# Patient Record
Sex: MALE | Race: WHITE | NOT HISPANIC OR LATINO | Employment: OTHER | URBAN - METROPOLITAN AREA
[De-identification: names, ages, dates, MRNs, and addresses within clinical notes are randomized per-mention and may not be internally consistent; named-entity substitution may affect disease eponyms.]

---

## 2020-01-09 ENCOUNTER — TELEPHONE (OUTPATIENT)
Dept: SCHEDULING | Facility: IMAGING CENTER | Age: 65
End: 2020-01-09

## 2020-01-21 ENCOUNTER — OFFICE VISIT (OUTPATIENT)
Dept: MEDICAL GROUP | Facility: PHYSICIAN GROUP | Age: 65
End: 2020-01-21
Payer: COMMERCIAL

## 2020-01-21 VITALS
WEIGHT: 315 LBS | OXYGEN SATURATION: 97 % | SYSTOLIC BLOOD PRESSURE: 142 MMHG | TEMPERATURE: 97.7 F | HEIGHT: 72 IN | DIASTOLIC BLOOD PRESSURE: 86 MMHG | RESPIRATION RATE: 20 BRPM | BODY MASS INDEX: 42.66 KG/M2 | HEART RATE: 70 BPM

## 2020-01-21 DIAGNOSIS — I10 BENIGN ESSENTIAL HTN: ICD-10-CM

## 2020-01-21 DIAGNOSIS — M54.16 LUMBAR RADICULAR SYNDROME: ICD-10-CM

## 2020-01-21 DIAGNOSIS — E78.2 MIXED HYPERLIPIDEMIA: ICD-10-CM

## 2020-01-21 DIAGNOSIS — Z79.01 CHRONIC ANTICOAGULATION: ICD-10-CM

## 2020-01-21 DIAGNOSIS — E11.9 CONTROLLED TYPE 2 DIABETES MELLITUS WITHOUT COMPLICATION, WITHOUT LONG-TERM CURRENT USE OF INSULIN (HCC): ICD-10-CM

## 2020-01-21 DIAGNOSIS — Z12.11 COLON CANCER SCREENING: ICD-10-CM

## 2020-01-21 DIAGNOSIS — E11.621 TYPE 2 DIABETES MELLITUS WITH LEFT DIABETIC FOOT ULCER (HCC): ICD-10-CM

## 2020-01-21 DIAGNOSIS — L97.529 TYPE 2 DIABETES MELLITUS WITH LEFT DIABETIC FOOT ULCER (HCC): ICD-10-CM

## 2020-01-21 DIAGNOSIS — Z86.718 HISTORY OF DVT (DEEP VEIN THROMBOSIS): ICD-10-CM

## 2020-01-21 PROBLEM — L97.519 TYPE 2 DIABETES MELLITUS WITH RIGHT DIABETIC FOOT ULCER (HCC): Status: ACTIVE | Noted: 2020-01-21

## 2020-01-21 PROCEDURE — 99204 OFFICE O/P NEW MOD 45 MIN: CPT | Performed by: FAMILY MEDICINE

## 2020-01-21 RX ORDER — ATENOLOL 100 MG/1
100 TABLET ORAL DAILY
Qty: 100 TAB | Refills: 1 | Status: ON HOLD | OUTPATIENT
Start: 2020-01-21 | End: 2020-03-25

## 2020-01-21 RX ORDER — HYDROCHLOROTHIAZIDE 25 MG/1
25 TABLET ORAL DAILY
Qty: 100 TAB | Refills: 1 | Status: ON HOLD | OUTPATIENT
Start: 2020-01-21 | End: 2020-03-25

## 2020-01-21 RX ORDER — LISINOPRIL 40 MG/1
40 TABLET ORAL DAILY
COMMUNITY
End: 2021-03-09 | Stop reason: SDUPTHER

## 2020-01-21 RX ORDER — DOCUSATE SODIUM 250 MG
250 CAPSULE ORAL 2 TIMES DAILY
Status: ON HOLD | COMMUNITY
End: 2020-03-25

## 2020-01-21 RX ORDER — ATORVASTATIN CALCIUM 10 MG/1
10 TABLET, FILM COATED ORAL NIGHTLY
COMMUNITY
End: 2020-01-21 | Stop reason: SDUPTHER

## 2020-01-21 RX ORDER — OXYCODONE HYDROCHLORIDE AND ACETAMINOPHEN 5; 325 MG/1; MG/1
1 TABLET ORAL EVERY MORNING
COMMUNITY
End: 2022-07-27

## 2020-01-21 RX ORDER — ATORVASTATIN CALCIUM 10 MG/1
10 TABLET, FILM COATED ORAL DAILY
Qty: 100 TAB | Refills: 1 | Status: ON HOLD | OUTPATIENT
Start: 2020-01-21 | End: 2020-03-25

## 2020-01-21 RX ORDER — ATENOLOL 100 MG/1
100 TABLET ORAL DAILY
COMMUNITY
End: 2020-01-21 | Stop reason: SDUPTHER

## 2020-01-21 RX ORDER — LISINOPRIL 40 MG/1
40 TABLET ORAL DAILY
Qty: 100 TAB | Refills: 1 | Status: ON HOLD | OUTPATIENT
Start: 2020-01-21 | End: 2020-03-25

## 2020-01-21 RX ORDER — HYDROCHLOROTHIAZIDE 25 MG/1
25 TABLET ORAL DAILY
COMMUNITY
End: 2020-01-21 | Stop reason: SDUPTHER

## 2020-01-21 SDOH — HEALTH STABILITY: MENTAL HEALTH: HOW OFTEN DO YOU HAVE A DRINK CONTAINING ALCOHOL?: MONTHLY OR LESS

## 2020-01-21 ASSESSMENT — ENCOUNTER SYMPTOMS
HEARTBURN: 0
BRUISES/BLEEDS EASILY: 0
DOUBLE VISION: 0
DIZZINESS: 0
CHILLS: 0
GASTROINTESTINAL NEGATIVE: 1
FEVER: 0
EYES NEGATIVE: 1
BACK PAIN: 1
TINGLING: 0
PSYCHIATRIC NEGATIVE: 1
DEPRESSION: 0
CARDIOVASCULAR NEGATIVE: 1
CONSTITUTIONAL NEGATIVE: 1
NEUROLOGICAL NEGATIVE: 1
HEMOPTYSIS: 0
RESPIRATORY NEGATIVE: 1
HEADACHES: 0
MYALGIAS: 0
BLURRED VISION: 0
NAUSEA: 0
COUGH: 0
PALPITATIONS: 0

## 2020-01-21 ASSESSMENT — PATIENT HEALTH QUESTIONNAIRE - PHQ9: CLINICAL INTERPRETATION OF PHQ2 SCORE: 0

## 2020-01-21 NOTE — PROGRESS NOTES
Subjective:      Jose Mc is a 64 y.o. male who presents with Wound Check (Foot, wants to be looked out) and Referral Needed (Wound care, Pain management)            New patient visit  Retired  moved here last spring and has been commuting to [place] for past year but now getting to be too hard  Has history of htn/dm/hld and needs refills on those meds  Also with amputation of left 4th and 5th toes last year due to DM ulcers (had 3rd toe amputated years ago) and still has ulcer on lateral left foot. Has been seeing podiatry in [place] but now wishes to take care of here  He has been using dressings/cleaning it per their instructions  On exam he has a 1.5 by 1.5 cm ulcer on the lateral left foot approximately 5 mm deep, no erythema or discharge  Will send to wound care clinic for hyperbarics and dressing changes  Also needs to see pain clinic for chronic back pain - uses cane for ambulation    1. Colon cancer screening    - OCCULT BLOOD FECES IMMUNOASSAY; Future  - Comp Metabolic Panel; Future  - HEMOGLOBIN A1C; Future  - Lipid Profile; Future  - MICROALBUMIN 24 HR URINE; Future  - CBC WITHOUT DIFFERENTIAL; Future    2. Benign essential HTN  Currently treated for HTN, taking meds with no CP or sob, monitors bp at home periodically. controlled  - Comp Metabolic Panel; Future  - HEMOGLOBIN A1C; Future  - Lipid Profile; Future  - MICROALBUMIN 24 HR URINE; Future  - CBC WITHOUT DIFFERENTIAL; Future  - lisinopril (PRINIVIL) 40 MG tablet; Take 1 Tab by mouth every day.  Dispense: 100 Tab; Refill: 1  - hydroCHLOROthiazide (HYDRODIURIL) 25 MG Tab; Take 1 Tab by mouth every day.  Dispense: 100 Tab; Refill: 1  - atenolol (TENORMIN) 100 MG Tab; Take 1 Tab by mouth every day.  Dispense: 100 Tab; Refill: 1    3. Controlled type 2 diabetes mellitus without complication, without long-term current use of insulin (HCC)  Currently treated for DM, taking meds and checking bs at home, trying to do DM  diet.controlled    - REFERRAL TO WOUND CLINIC  - Comp Metabolic Panel; Future  - HEMOGLOBIN A1C; Future  - Lipid Profile; Future  - MICROALBUMIN 24 HR URINE; Future  - CBC WITHOUT DIFFERENTIAL; Future  - metformin (GLUCOPHAGE) 1000 MG tablet; Take 1 Tab by mouth 2 times a day.  Dispense: 200 Tab; Refill: 1    4. Mixed hyperlipidemia  Currently treated for HLD, taking meds with no new myalgias or joint pain, watching fats in diet  controlled    - Comp Metabolic Panel; Future  - HEMOGLOBIN A1C; Future  - Lipid Profile; Future  - MICROALBUMIN 24 HR URINE; Future  - CBC WITHOUT DIFFERENTIAL; Future  - atorvastatin (LIPITOR) 10 MG Tab; Take 1 Tab by mouth every day.  Dispense: 100 Tab; Refill: 1    5. History of DVT (deep vein thrombosis)  Left thigh in the past - had twice so told to be on anticoag for life  - Comp Metabolic Panel; Future  - HEMOGLOBIN A1C; Future  - Lipid Profile; Future  - MICROALBUMIN 24 HR URINE; Future  - CBC WITHOUT DIFFERENTIAL; Future  - rivaroxaban (XARELTO) 20 MG Tab tablet; Take 1 Tab by mouth with dinner.  Dispense: 100 Tab; Refill: 1    6. Chronic anticoagulation    - Comp Metabolic Panel; Future  - HEMOGLOBIN A1C; Future  - Lipid Profile; Future  - MICROALBUMIN 24 HR URINE; Future  - CBC WITHOUT DIFFERENTIAL; Future  - rivaroxaban (XARELTO) 20 MG Tab tablet; Take 1 Tab by mouth with dinner.  Dispense: 100 Tab; Refill: 1    7. Lumbar radicular syndrome    - Comp Metabolic Panel; Future  - HEMOGLOBIN A1C; Future  - Lipid Profile; Future  - MICROALBUMIN 24 HR URINE; Future  - CBC WITHOUT DIFFERENTIAL; Future  - REFERRAL TO PAIN CLINIC    8. Type 2 diabetes mellitus with left diabetic foot ulcer (HCC)    - REFERRAL TO WOUND CLINIC  - Comp Metabolic Panel; Future  - HEMOGLOBIN A1C; Future  - Lipid Profile; Future  - MICROALBUMIN 24 HR URINE; Future  - CBC WITHOUT DIFFERENTIAL; Future    Past Medical History:  No date: Diabetes (HCC)  No date: Diabetic neuropathy (HCC)  No date:  Hyperlipidemia  No date: Hypertension  Past Surgical History:  No date: TOE AMPUTATION; Left  Social History    Tobacco Use      Smoking status: Former Smoker        Packs/day: 0.00        Quit date: 1980        Years since quittin.0      Smokeless tobacco: Never Used    Alcohol use: Not Currently      Frequency: Monthly or less      Comment: OCC    Drug use: Never    Review of patient's family history indicates:  Problem: Cancer      Relation: Mother          Age of Onset: (Not Specified)  Problem: Cancer      Relation: Father          Age of Onset: (Not Specified)      Current Outpatient Medications: •  oxyCODONE-acetaminophen (PERCOCET) 5-325 MG Tab, Take 1 Tab by mouth every morning., Disp: , Rfl: •  docusate sodium (COLACE) 250 MG capsule, Take 250 mg by mouth 2 Times a Day., Disp: , Rfl: •  Sennosides 17.2 MG Tab, Take 17.2 mg by mouth 2 times a day., Disp: , Rfl: •  rivaroxaban (XARELTO) 20 MG Tab tablet, Take 1 Tab by mouth with dinner., Disp: 100 Tab, Rfl: 1•  metformin (GLUCOPHAGE) 1000 MG tablet, Take 1 Tab by mouth 2 times a day., Disp: 200 Tab, Rfl: 1•  lisinopril (PRINIVIL) 40 MG tablet, Take 1 Tab by mouth every day., Disp: 100 Tab, Rfl: 1•  hydroCHLOROthiazide (HYDRODIURIL) 25 MG Tab, Take 1 Tab by mouth every day., Disp: 100 Tab, Rfl: 1•  atorvastatin (LIPITOR) 10 MG Tab, Take 1 Tab by mouth every day., Disp: 100 Tab, Rfl: 1•  atenolol (TENORMIN) 100 MG Tab, Take 1 Tab by mouth every day., Disp: 100 Tab, Rfl: 1    Patient was instructed on the use of medications, either prescriptions or OTC and informed on when the appropriate follow up time period should be. In addition, patient was also instructed that should any acute worsening occur that they should notify this clinic asap or call 911.          Review of Systems   Constitutional: Negative.  Negative for chills and fever.   HENT: Negative.  Negative for hearing loss.    Eyes: Negative.  Negative for blurred vision and double vision.    Respiratory: Negative.  Negative for cough and hemoptysis.    Cardiovascular: Negative.  Negative for chest pain and palpitations.   Gastrointestinal: Negative.  Negative for heartburn and nausea.   Genitourinary: Negative.  Negative for dysuria.   Musculoskeletal: Positive for back pain. Negative for myalgias.   Skin: Negative.  Negative for rash.   Neurological: Negative.  Negative for dizziness, tingling and headaches.   Endo/Heme/Allergies: Negative.  Does not bruise/bleed easily.   Psychiatric/Behavioral: Negative.  Negative for depression and suicidal ideas.   All other systems reviewed and are negative.         Objective:     /86   Pulse 70   Temp 36.5 °C (97.7 °F) (Temporal)   Resp 20   Ht 1.829 m (6')   Wt (!) 144.7 kg (319 lb)   SpO2 97%   BMI 43.26 kg/m²      Physical Exam  Vitals signs and nursing note reviewed.   Constitutional:       General: He is not in acute distress.     Appearance: He is well-developed. He is not diaphoretic.   HENT:      Head: Normocephalic and atraumatic.      Mouth/Throat:      Pharynx: No oropharyngeal exudate.   Eyes:      Pupils: Pupils are equal, round, and reactive to light.   Cardiovascular:      Rate and Rhythm: Normal rate and regular rhythm.      Heart sounds: Normal heart sounds. No murmur. No friction rub. No gallop.    Pulmonary:      Effort: Pulmonary effort is normal. No respiratory distress.      Breath sounds: Normal breath sounds. No wheezing or rales.   Chest:      Chest wall: No tenderness.   Musculoskeletal:        Feet:    Feet:      Left foot:      Skin integrity: Ulcer present.   Neurological:      Mental Status: He is alert and oriented to person, place, and time.   Psychiatric:         Behavior: Behavior normal.         Thought Content: Thought content normal.         Judgment: Judgment normal.                 Assessment/Plan:       1. Colon cancer screening    - OCCULT BLOOD FECES IMMUNOASSAY; Future  - Comp Metabolic Panel; Future  -  HEMOGLOBIN A1C; Future  - Lipid Profile; Future  - MICROALBUMIN 24 HR URINE; Future  - CBC WITHOUT DIFFERENTIAL; Future    2. Benign essential HTN    - Comp Metabolic Panel; Future  - HEMOGLOBIN A1C; Future  - Lipid Profile; Future  - MICROALBUMIN 24 HR URINE; Future  - CBC WITHOUT DIFFERENTIAL; Future  - lisinopril (PRINIVIL) 40 MG tablet; Take 1 Tab by mouth every day.  Dispense: 100 Tab; Refill: 1  - hydroCHLOROthiazide (HYDRODIURIL) 25 MG Tab; Take 1 Tab by mouth every day.  Dispense: 100 Tab; Refill: 1  - atenolol (TENORMIN) 100 MG Tab; Take 1 Tab by mouth every day.  Dispense: 100 Tab; Refill: 1    3. Controlled type 2 diabetes mellitus without complication, without long-term current use of insulin (HCC)    - REFERRAL TO WOUND CLINIC  - Comp Metabolic Panel; Future  - HEMOGLOBIN A1C; Future  - Lipid Profile; Future  - MICROALBUMIN 24 HR URINE; Future  - CBC WITHOUT DIFFERENTIAL; Future  - metformin (GLUCOPHAGE) 1000 MG tablet; Take 1 Tab by mouth 2 times a day.  Dispense: 200 Tab; Refill: 1    4. Mixed hyperlipidemia    - Comp Metabolic Panel; Future  - HEMOGLOBIN A1C; Future  - Lipid Profile; Future  - MICROALBUMIN 24 HR URINE; Future  - CBC WITHOUT DIFFERENTIAL; Future  - atorvastatin (LIPITOR) 10 MG Tab; Take 1 Tab by mouth every day.  Dispense: 100 Tab; Refill: 1    5. History of DVT (deep vein thrombosis)    - Comp Metabolic Panel; Future  - HEMOGLOBIN A1C; Future  - Lipid Profile; Future  - MICROALBUMIN 24 HR URINE; Future  - CBC WITHOUT DIFFERENTIAL; Future  - rivaroxaban (XARELTO) 20 MG Tab tablet; Take 1 Tab by mouth with dinner.  Dispense: 100 Tab; Refill: 1    6. Chronic anticoagulation    - Comp Metabolic Panel; Future  - HEMOGLOBIN A1C; Future  - Lipid Profile; Future  - MICROALBUMIN 24 HR URINE; Future  - CBC WITHOUT DIFFERENTIAL; Future  - rivaroxaban (XARELTO) 20 MG Tab tablet; Take 1 Tab by mouth with dinner.  Dispense: 100 Tab; Refill: 1    7. Lumbar radicular syndrome    - Comp Metabolic  Panel; Future  - HEMOGLOBIN A1C; Future  - Lipid Profile; Future  - MICROALBUMIN 24 HR URINE; Future  - CBC WITHOUT DIFFERENTIAL; Future  - REFERRAL TO PAIN CLINIC    8. Type 2 diabetes mellitus with left diabetic foot ulcer (HCC  - REFERRAL TO WOUND CLINIC  - Comp Metabolic Panel; Future  - HEMOGLOBIN A1C; Future  - Lipid Profile; Future  - MICROALBUMIN 24 HR URINE; Future  - CBC WITHOUT DIFFERENTIAL; Future

## 2020-01-31 ENCOUNTER — HOSPITAL ENCOUNTER (OUTPATIENT)
Facility: MEDICAL CENTER | Age: 65
End: 2020-01-31
Attending: FAMILY MEDICINE
Payer: COMMERCIAL

## 2020-01-31 PROCEDURE — 82274 ASSAY TEST FOR BLOOD FECAL: CPT

## 2020-02-03 DIAGNOSIS — Z12.11 COLON CANCER SCREENING: ICD-10-CM

## 2020-02-03 LAB — HEMOCCULT STL QL IA: NEGATIVE

## 2020-03-10 ENCOUNTER — HOSPITAL ENCOUNTER (INPATIENT)
Facility: REHABILITATION | Age: 65
LOS: 16 days | DRG: 560 | End: 2020-03-26
Attending: PHYSICAL MEDICINE & REHABILITATION | Admitting: PHYSICAL MEDICINE & REHABILITATION
Payer: COMMERCIAL

## 2020-03-10 DIAGNOSIS — Z79.01 CHRONIC ANTICOAGULATION: ICD-10-CM

## 2020-03-10 DIAGNOSIS — M54.16 LUMBAR RADICULAR SYNDROME: ICD-10-CM

## 2020-03-10 DIAGNOSIS — Z86.718 HISTORY OF DVT (DEEP VEIN THROMBOSIS): ICD-10-CM

## 2020-03-10 DIAGNOSIS — Z79.891 CHRONIC PRESCRIPTION OPIATE USE: ICD-10-CM

## 2020-03-10 PROBLEM — E08.65 DIABETES MELLITUS DUE TO UNDERLYING CONDITION WITH HYPERGLYCEMIA (HCC): Status: ACTIVE | Noted: 2020-03-10

## 2020-03-10 PROBLEM — Z89.512 STATUS POST BELOW-KNEE AMPUTATION OF LEFT LOWER EXTREMITY (HCC): Status: ACTIVE | Noted: 2020-03-10

## 2020-03-10 PROBLEM — G47.33 OBSTRUCTIVE SLEEP APNEA SYNDROME: Status: ACTIVE | Noted: 2020-03-10

## 2020-03-10 PROBLEM — G62.9 PERIPHERAL NEUROPATHY: Status: ACTIVE | Noted: 2020-03-10

## 2020-03-10 LAB
GLUCOSE BLD-MCNC: 140 MG/DL (ref 65–99)
GLUCOSE BLD-MCNC: 154 MG/DL (ref 65–99)

## 2020-03-10 PROCEDURE — 700102 HCHG RX REV CODE 250 W/ 637 OVERRIDE(OP): Performed by: PHYSICAL MEDICINE & REHABILITATION

## 2020-03-10 PROCEDURE — 770010 HCHG ROOM/CARE - REHAB SEMI PRIVAT*

## 2020-03-10 PROCEDURE — 99223 1ST HOSP IP/OBS HIGH 75: CPT | Performed by: PHYSICAL MEDICINE & REHABILITATION

## 2020-03-10 PROCEDURE — A9270 NON-COVERED ITEM OR SERVICE: HCPCS | Performed by: PHYSICAL MEDICINE & REHABILITATION

## 2020-03-10 PROCEDURE — 82962 GLUCOSE BLOOD TEST: CPT | Mod: 91

## 2020-03-10 PROCEDURE — 94760 N-INVAS EAR/PLS OXIMETRY 1: CPT

## 2020-03-10 RX ORDER — HYDROXYZINE HYDROCHLORIDE 25 MG/1
50 TABLET, FILM COATED ORAL EVERY 6 HOURS PRN
Status: DISCONTINUED | OUTPATIENT
Start: 2020-03-10 | End: 2020-03-26 | Stop reason: HOSPADM

## 2020-03-10 RX ORDER — POLYVINYL ALCOHOL 14 MG/ML
1 SOLUTION/ DROPS OPHTHALMIC PRN
Status: DISCONTINUED | OUTPATIENT
Start: 2020-03-10 | End: 2020-03-26 | Stop reason: HOSPADM

## 2020-03-10 RX ORDER — LACTULOSE 20 G/30ML
30 SOLUTION ORAL
Status: DISCONTINUED | OUTPATIENT
Start: 2020-03-10 | End: 2020-03-26 | Stop reason: HOSPADM

## 2020-03-10 RX ORDER — AMOXICILLIN 250 MG
2 CAPSULE ORAL 2 TIMES DAILY
Status: DISCONTINUED | OUTPATIENT
Start: 2020-03-10 | End: 2020-03-17

## 2020-03-10 RX ORDER — OXYCODONE HYDROCHLORIDE AND ACETAMINOPHEN 5; 325 MG/1; MG/1
1 TABLET ORAL EVERY 4 HOURS PRN
Status: DISCONTINUED | OUTPATIENT
Start: 2020-03-10 | End: 2020-03-26 | Stop reason: HOSPADM

## 2020-03-10 RX ORDER — ATENOLOL 25 MG/1
100 TABLET ORAL
Status: DISCONTINUED | OUTPATIENT
Start: 2020-03-11 | End: 2020-03-26 | Stop reason: HOSPADM

## 2020-03-10 RX ORDER — ONDANSETRON 4 MG/1
4 TABLET, ORALLY DISINTEGRATING ORAL 4 TIMES DAILY PRN
Status: DISCONTINUED | OUTPATIENT
Start: 2020-03-10 | End: 2020-03-26 | Stop reason: HOSPADM

## 2020-03-10 RX ORDER — MORPHINE SULFATE 15 MG/1
15 TABLET, FILM COATED, EXTENDED RELEASE ORAL EVERY 12 HOURS
Status: DISCONTINUED | OUTPATIENT
Start: 2020-03-10 | End: 2020-03-26 | Stop reason: HOSPADM

## 2020-03-10 RX ORDER — ACETAMINOPHEN 500 MG
1000 TABLET ORAL 3 TIMES DAILY
Status: DISCONTINUED | OUTPATIENT
Start: 2020-03-10 | End: 2020-03-10

## 2020-03-10 RX ORDER — ACETAMINOPHEN 325 MG/1
650 TABLET ORAL EVERY 4 HOURS PRN
Status: DISCONTINUED | OUTPATIENT
Start: 2020-03-10 | End: 2020-03-26 | Stop reason: HOSPADM

## 2020-03-10 RX ORDER — BISACODYL 10 MG
10 SUPPOSITORY, RECTAL RECTAL
Status: DISCONTINUED | OUTPATIENT
Start: 2020-03-10 | End: 2020-03-17

## 2020-03-10 RX ORDER — DEXTROSE MONOHYDRATE 25 G/50ML
50 INJECTION, SOLUTION INTRAVENOUS
Status: DISCONTINUED | OUTPATIENT
Start: 2020-03-10 | End: 2020-03-11

## 2020-03-10 RX ORDER — ECHINACEA PURPUREA EXTRACT 125 MG
2 TABLET ORAL PRN
Status: DISCONTINUED | OUTPATIENT
Start: 2020-03-10 | End: 2020-03-26 | Stop reason: HOSPADM

## 2020-03-10 RX ORDER — ONDANSETRON 2 MG/ML
4 INJECTION INTRAMUSCULAR; INTRAVENOUS 4 TIMES DAILY PRN
Status: DISCONTINUED | OUTPATIENT
Start: 2020-03-10 | End: 2020-03-26 | Stop reason: HOSPADM

## 2020-03-10 RX ORDER — TRAMADOL HYDROCHLORIDE 50 MG/1
50 TABLET ORAL EVERY 4 HOURS PRN
Status: DISCONTINUED | OUTPATIENT
Start: 2020-03-10 | End: 2020-03-26 | Stop reason: HOSPADM

## 2020-03-10 RX ORDER — LANOLIN ALCOHOL/MO/W.PET/CERES
3 CREAM (GRAM) TOPICAL NIGHTLY PRN
Status: DISCONTINUED | OUTPATIENT
Start: 2020-03-10 | End: 2020-03-26 | Stop reason: HOSPADM

## 2020-03-10 RX ORDER — POLYETHYLENE GLYCOL 3350 17 G/17G
1 POWDER, FOR SOLUTION ORAL
Status: DISCONTINUED | OUTPATIENT
Start: 2020-03-10 | End: 2020-03-17

## 2020-03-10 RX ORDER — GABAPENTIN 300 MG/1
300 CAPSULE ORAL 3 TIMES DAILY
Status: DISCONTINUED | OUTPATIENT
Start: 2020-03-10 | End: 2020-03-26 | Stop reason: HOSPADM

## 2020-03-10 RX ORDER — ALUMINA, MAGNESIA, AND SIMETHICONE 2400; 2400; 240 MG/30ML; MG/30ML; MG/30ML
20 SUSPENSION ORAL
Status: DISCONTINUED | OUTPATIENT
Start: 2020-03-10 | End: 2020-03-26 | Stop reason: HOSPADM

## 2020-03-10 RX ORDER — TRAZODONE HYDROCHLORIDE 50 MG/1
50 TABLET ORAL
Status: DISCONTINUED | OUTPATIENT
Start: 2020-03-10 | End: 2020-03-26 | Stop reason: HOSPADM

## 2020-03-10 RX ORDER — ATORVASTATIN CALCIUM 10 MG/1
20 TABLET, FILM COATED ORAL EVERY EVENING
Status: DISCONTINUED | OUTPATIENT
Start: 2020-03-10 | End: 2020-03-26 | Stop reason: HOSPADM

## 2020-03-10 RX ORDER — OXYCODONE HYDROCHLORIDE 5 MG/1
5 TABLET ORAL EVERY 4 HOURS PRN
Status: DISCONTINUED | OUTPATIENT
Start: 2020-03-10 | End: 2020-03-26 | Stop reason: HOSPADM

## 2020-03-10 RX ADMIN — SENNOSIDES AND DOCUSATE SODIUM 2 TABLET: 8.6; 5 TABLET ORAL at 20:53

## 2020-03-10 RX ADMIN — INSULIN HUMAN 1 UNITS: 100 INJECTION, SOLUTION PARENTERAL at 21:52

## 2020-03-10 RX ADMIN — MORPHINE SULFATE 15 MG: 15 TABLET, FILM COATED, EXTENDED RELEASE ORAL at 20:53

## 2020-03-10 RX ADMIN — ATORVASTATIN CALCIUM 20 MG: 10 TABLET, FILM COATED ORAL at 20:53

## 2020-03-10 RX ADMIN — GABAPENTIN 300 MG: 300 CAPSULE ORAL at 17:00

## 2020-03-10 RX ADMIN — OXYCODONE HYDROCHLORIDE AND ACETAMINOPHEN 1 TABLET: 5; 325 TABLET ORAL at 17:33

## 2020-03-10 RX ADMIN — RIVAROXABAN 20 MG: 10 TABLET, FILM COATED ORAL at 17:33

## 2020-03-10 RX ADMIN — GABAPENTIN 300 MG: 300 CAPSULE ORAL at 20:53

## 2020-03-10 ASSESSMENT — PATIENT HEALTH QUESTIONNAIRE - PHQ9
1. LITTLE INTEREST OR PLEASURE IN DOING THINGS: NOT AT ALL
SUM OF ALL RESPONSES TO PHQ9 QUESTIONS 1 AND 2: 0
2. FEELING DOWN, DEPRESSED, IRRITABLE, OR HOPELESS: NOT AT ALL

## 2020-03-10 ASSESSMENT — LIFESTYLE VARIABLES
ALCOHOL_USE: NO
ON A TYPICAL DAY WHEN YOU DRINK ALCOHOL HOW MANY DRINKS DO YOU HAVE: 0
TOTAL SCORE: 0
EVER FELT BAD OR GUILTY ABOUT YOUR DRINKING: NO
TOTAL SCORE: 0
TOTAL SCORE: 0
EVER HAD A DRINK FIRST THING IN THE MORNING TO STEADY YOUR NERVES TO GET RID OF A HANGOVER: NO
AVERAGE NUMBER OF DAYS PER WEEK YOU HAVE A DRINK CONTAINING ALCOHOL: 0
HOW MANY TIMES IN THE PAST YEAR HAVE YOU HAD 5 OR MORE DRINKS IN A DAY: 0
HAVE YOU EVER FELT YOU SHOULD CUT DOWN ON YOUR DRINKING: NO
CONSUMPTION TOTAL: NEGATIVE
EVER_SMOKED: YES
HAVE PEOPLE ANNOYED YOU BY CRITICIZING YOUR DRINKING: NO

## 2020-03-10 NOTE — H&P
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 male with a past medical history of diabetes, peripheral neuropathy, hypertension, sleep apnea, DVT on Xarelto; now admitted for acute inpatient rehabilitation with severe functional debility after a left BKA.      On admission the patient and medical record report patient was admitted to the outside hospital with a non-healing left foot ulcer and osteomyelitis, for which he underwent a left transtibial amputation on 3/6 with Dr. Castro. He was restarted on his Xarelto on 3/8, which he takes for his history of DVT. His most recent HgbA1c was 6.5. He takes Metformin at home, no insulin. He is working with Howard from [employer].     Patient current reports residual limb pain.  He reports prior to the surgery he was heel weightbearing and ambulating with a cane.  His post-operative drain was removed yesterday and did have a small amount of drainage. He is on chronic opiates at home, Morphine 15 mg twice daily, as well as Percocet 5 mg. He has had diabetes for 10 years. He has a history of peripheral neuropathy, with numbness in his feet. His DVT was in his LLE 2 years ago, and he's been on Xarelto ever since then. He has chronic constipation for which he takes laxatives and stool softeners at home. He denies any urinary symptoms.     Patient was evaluated by Rehab Medicine physician and Physical Therapy and Occupational Therapy and determined to be appropriate for acute inpatient rehab and was transferred to [facility] on 3/10/2020  2:38 PM.    With this acute therapeutic intervention, this patient hopes to  improve his functional status, and return to independent living with the supportive care of family.    REVIEW OF SYSTEMS:     A complete review of systems was performed and was negative in detail with the exception of items mentioned elsewhere in this document.    PMH:  Past Medical History:   Diagnosis Date   • At risk for sleep apnea    • Diabetes (HCC)    • Diabetic neuropathy (HCC)    • DVT (deep venous thrombosis) (HCC)    • Hyperlipidemia    • Hypertension    • Sleep apnea        PSH:  Past Surgical History:   Procedure Laterality Date   • TOE AMPUTATION Left        Family History   Problem Relation Age of Onset   • Cancer Mother    • Cancer Father         MEDICATIONS:  Current Facility-Administered Medications   Medication Dose   • Respiratory Therapy Consult     • Pharmacy Consult Request ...Pain Management Review 1 Each  1 Each   • acetaminophen (TYLENOL) tablet 650 mg  650 mg   • senna-docusate (PERICOLACE or SENOKOT S) 8.6-50 MG per tablet 2 Tab  2 Tab    And   • polyethylene glycol/lytes (MIRALAX) PACKET 1 Packet  1 Packet    And   • magnesium hydroxide (MILK OF MAGNESIA) suspension 30 mL  30 mL    And   • bisacodyl (DULCOLAX) suppository 10 mg  10 mg   • artificial tears ophthalmic solution 1 Drop  1 Drop   • benzocaine-menthol (CEPACOL) lozenge 1 Lozenge  1 Lozenge   • mag hydrox-al hydrox-simeth (MAALOX PLUS ES or MYLANTA DS) suspension 20 mL  20 mL   • ondansetron (ZOFRAN ODT) dispertab 4 mg  4 mg    Or   • ondansetron (ZOFRAN) syringe/vial injection 4 mg  4 mg   • traZODone (DESYREL) tablet 50 mg  50 mg   • sodium chloride (OCEAN) 0.65 % nasal spray 2 Spray  2 Spray   • hydrOXYzine HCl (ATARAX) tablet 50 mg  50 mg   • melatonin tablet 3 mg  3 mg   • [START ON 3/11/2020] atenolol (TENORMIN) tablet 100 mg  100 mg   • atorvastatin (LIPITOR) tablet 20 mg  20 mg   • gabapentin (NEURONTIN) capsule 300 mg  300 mg   • tramadol (ULTRAM) 50 MG tablet 50 mg  50 mg   • acetaminophen (TYLENOL) tablet 1,000 mg   1,000 mg   • oxyCODONE immediate-release (ROXICODONE) tablet 5 mg  5 mg   • enoxaparin (LOVENOX) inj 40 mg  40 mg   • lactulose 20 GM/30ML solution 30 mL  30 mL   • docusate sodium (ENEMEEZ) enema 283 mg  283 mg   • insulin regular (HUMULIN R) injection 1-6 Units  1-6 Units    And   • glucose 4 g chewable tablet 16 g  16 g    And   • dextrose 50% (D50W) injection 50 mL  50 mL       ALLERGIES:  Pcn [penicillins]; Eggs; and Zyvox [linezolid]    PSYCHOSOCIAL HISTORY:  Pre-mobidly, the patient lived in a single level home with 3 steps to enter, in [place] with his family. He moved her 1 year ago from [place].     He is currently living with his mother who has multiple myeloma and his brother who is visiting from [place] to help keep take care of his mother.  Her to the surgery the plan was for the brother to return to [place] and the patient was going to take care of their mother.  It is surgery other family members are planning to move here for assistance at discharge.    Patient is been a  for 7 years.  He has 1 son.  Patient is a retired .  For recreation he likes to play poker, shoot, and does oil painting.    Patient quit tobacco 10 years ago, rare alcohol, no marijuana or drugs.    LEVEL OF FUNCTION PRIOR TO DISABILTY:  Modified independent, ambulated with a cane    LEVEL OF FUNCTION PRIOR TO ADMISSION to [facility]:  Min assist for transfers  Min to max assist for ADLs    CURRENT LEVEL OF FUNCTION:   Same as level of function prior to admission to [facility]    PHYSICAL EXAM:     VITAL SIGNS:   height is 1.829 m (6') and weight is 150.6 kg (332 lb) (abnormal). His temporal temperature is 36.5 °C (97.7 °F). His blood pressure is 148/81 and his pulse is 74. His respiration is 18 and oxygen saturation is 94%.     GENERAL: No apparent distress, obese  HEENT: Normocephalic/atraumatic  CARDIAC: Regular rate and rhythm, normal S1, S2, no murmurs, no  peripheral edema   LUNGS: Clear to auscultation, normal respiratory effort, on room air   ABDOMINAL: bowel sounds present, soft, nontender and nondistended    EXTREMITIES: crumbling yellow toes on right foot  MSK: left transtibial amputation    NEURO:    Mental status: alert    Motor:  Shoulder flexors:  Right -  5/5, Left -  5/5  Elbow flexors:  Right -  5/5, Left -  5/5  Elbow extensors:  Right -  5/5, Left -  5/5  Symmetrical   Hip flexors:  Right -  4/5, Left -  4/5  Knee ext:  Right -  5/5  Dorsiflexors:  Right -  5/5  EHL:  Right -  5/5  Plantar flexors:  Right -  5/5    Sensory:   Absent in right foot     PRIMARY REHAB DIAGNOSIS:    This patient is a 64 y.o. male admitted for acute inpatient rehabilitation with Status post below-knee amputation of left lower extremity (HCC).    IMPAIRMENTS:   ADLs/IADLs  Mobility    SECONDARY DIAGNOSIS/MEDICAL CO-MORBIDITIES AFFECTING FUNCTION:    Peripheral Neuropathy  Opiate induced constipation  Hypertension  Diabetes with hyperglycemia  Sleep apnea  Hyperlipidemia  History of DVT    RELEVANT CHANGES SINCE PREADMISSION EVALUATION:    Status unchanged    The patient's rehabilitation potential is excellent  The patient's medical prognosis is good    PLAN:   Discussion and Recommendations, discussed with the patient and/or family:   1. The patient requires an acute inpatient rehabilitation program with a coordinated program of care at an intensity and frequency not available at a lower level of care. This recommendation is substantiated by the patient's medical physicians who recommend that the patient's intervention and assessment of medical issues needs to be done at an acute level of care for patient's safety and maximum outcome.     2. A coordinated program of care will be supplied by an interdisciplinary team of physical therapy, occupational therapy, rehab physician, rehab nursing, and, if needed, speech therapy and rehab psychology. Rehab team presents a  patient-specific rehabilitation and education program concentrating on prevention of future problems related to accessibility, mobility, skin, bowel, bladder, sexuality, and psychosocial and medical/surgical problems.     3. Need for Rehabilitation Physician: The rehab physician will be evaluating the patient on a multi-weekly basis to help coordinate the program of care. The rehab physician communicates between medical physicians, therapists, and nurses to maximize the patient's potential outcome. Specific areas in which the rehab physician will be providing daily assessment include the following:   A. Assessing the patient's heart rate and blood pressure response (vitals monitoring) to activity and making adjustments in medications or conservative measures as needed.   B. The rehab physician will be assessing the frequency at which the program can be increased to allow the patient to reach optimal functional outcome.   C. The rehab physician will also provide assessments in daily skin care, especially in light of patient's impairments in mobility.   D. The rehab physician will provide special expertise in understanding how to work with functional impairment and recommend appropriate interventions, compensatory techniques, and education that will facilitate the patient's outcome.     4. Rehab R.N.   The rehab RN will be working with patient to carry over in room mobility and activities of daily living when the patient is not in 3 hours of skilled therapy. Rehab nursing will be working in conjunction with rehab physician to address all the medical issues above and continue to assess laboratory work and discuss abnormalities with the treating physicians, assess vitals, and response to activity, and discuss and report abnormalities with the rehab physician. Rehab RN will also continue daily skin care, supervise bladder/bowel program, instruct in medication administration, and ensure patient safety.     5. Therapies to  treat at intensity and frequency of (may change after completion of evaluation by all therapeutic disciplines):       PT:  Physical therapy to address mobility, transfer, gait training and evaluation for adaptive equipment needs 1.5hour/day at least 5 days/week for the duration of the ELOS (see below)       OT:  Occupational therapy to address ADLs, self-care, home management training, functional mobility/transfers and assistive device evaluation, and community re-integration 1.5hour/day at least 5 days/week for the duration of the ELOS (see below).          6. Medical management / Rehabilitation Issues/Adverse Potential affecting function as part of rehabilitation plan.    Chronic opiate use  Continue home medications  Morphine ER 15 mg BID  Percocets 5-325     Peripheral Neuropathy  Continue gabapentin  Monitor need to increase    Opioid induced constipation  Continue bowel meds    Appreciate assistance of hospitalist with his medical co-morbidities:    Hypertension  Diabetes with hyperglycemia  Sleep apnea  Hyperlipidemia  History of DVT    I performed a complete drug regimen review and did not identify any potential clinically significant medication issues.    The patient's CODE STATUS was confirmed as FULL CODE on admission, with the patient and/or family at bedside.    REHABILITATION ISSUES/ADVERSE POTENTIAL:  1.  BKA: Patient demonstrates functional deficits in strength, balance, coordination, and ADL's. Patient is admitted to [facility] for comprehensive rehabilitation therapy as described below.   Rehabilitation nursing monitors bowel and bladder control, educates on medication administration, co-morbidities and monitors patient safety.    2.  DVT prophylaxis:  Patient is on Xarelto for anticoagulation upon transfer. Encourage OOB. Monitor daily for signs and symptoms of DVT including but not limited to swelling and pain to prevent the development of DVT that may interfere with  therapies.    3.  Pain: See above.    4.  Nutrition/Dysphagia: Dietician monitors nutrient intake, recommend supplements prn and provide nutrition education to pt/family to promote optimal nutrition for wound healing/recovery.     5.  Bladder/bowel:  Start bowel and bladder program as described below, to prevent constipation, urinary retention (which may lead to UTI), and urinary incontinence (which will impact upon pt's functional independence).   - TV Q3h while awake with post void bladder scans, I&O cath for PVRs >400  - up to commode after meal     6.  Skin/dermal ulcer prophylaxis: Monitor for new skin conditions with q.2 h. turns as required to prevent the development of skin breakdown.     7.  Cognition/Behavior:  Psychologist Dr. Reno provides adjustment counseling to illness and psychosocial barriers that may be potential barriers to rehabilitation.     8. Respiratory therapy: RT performs O2 management prn, breathing retraining, pulmonary hygiene and bronchospasm management prn to optimize participation in therapies.    Pt was seen today for 73 min, and entire time spent in face-to-face contact was >50% in counseling and coordination of care as detailed in A/P above.        GOALS/EXPECTED LEVEL OF FUNCTION BASED ON CURRENT MEDICAL AND FUNCTIONAL STATUS (may change based on patient's medical status and rate of impairment recovery):  Transfers:   Modified Independent  Mobility/Gait:   Modified Independent  ADL's:   Minimal Assistance    DISPOSITION: Discharge to pre-morbid independent living setting with the supportive care of patient's family.      ELOS: 14 days    Sophia Beck M.D.  Physical Medicine and Rehabilitation

## 2020-03-10 NOTE — FLOWSHEET NOTE
03/10/20 1611   Events/Summary/Plan   Events/Summary/Plan CPAP set up at pt bedside   Vital Signs   Pulse 72   Respiration 18   Pulse Oximetry 92 %   $ Pulse Oximetry (Spot Check) Yes   Respiratory Assessment   Level of Consciousness Alert   Breath Sounds   RLL Breath Sounds Diminished   LLL Breath Sounds Diminished   Oxygen   O2 Delivery Device None - Room Air   Smoking History   Have you ever smoked Yes   Have you smoked in the last 12 months No   Confirm Quit Date 03/10/83

## 2020-03-10 NOTE — CARE PLAN
Problem: Safety  Goal: Will remain free from falls  Note: Patient is AOx4 oriented to unit and unit routine. Patient oriented to safety precautions that are in place and how to call for assistance when assistance is needed. Patient verbalizes understanding on how to use the call light. Patient has call light close by, bed in low position, alarms set on bed and w/c. Patient has non-skid sock in place, frequently rounded on to make sure needs are being met. Will continue to educate as needed.     Problem: Skin Integrity  Goal: Risk for impaired skin integrity will decrease  Note: 2 RN skin check performed with Carmelita. Patient has left BKA, area cleansed and redressed. Patient has dry flaky right foot with crack to heel. Patient has redness to left arm where PICC line was removed today. Patient has moist skin in general with some redness noted on back that is blanching. In general patient skin is moist but intact. Photos have been uploaded to media manager, wound consult has been placed and podiatry consult has been placed.

## 2020-03-10 NOTE — PROGRESS NOTES
1430 Pt arrived at [facility] from [facility] via w/c. Dr Beck to follow for diagnosis of left BKA. Initial assessment initiated. Pt oriented to room and facility routine and safety measures; pt education binder provided and discussed. Pt A/O x 4, continent of bowel and bladder. Mod assist for transfers. All wounds photographed and documented; photos uploaded to . Pt denies pain or discomfort at this time. Pt positioned for comfort in bed. Call light within reach, safety measures in place. Will continue to monitor.

## 2020-03-10 NOTE — FLOWSHEET NOTE
03/10/20 1610   Patient History   Pulmonary Diagnosis ÓSCAR   Procedures Relevant to Respiratory Status no   Home O2 No   Nocturnal CPAP Yes   Nocturnal CPAP Oxygen liter flow 0   Home Treatments/Frequency No   Protocol Pathways   Protocol Pathways None

## 2020-03-11 ENCOUNTER — APPOINTMENT (OUTPATIENT)
Dept: RADIOLOGY | Facility: MEDICAL CENTER | Age: 65
DRG: 560 | End: 2020-03-11
Attending: PHYSICAL MEDICINE & REHABILITATION
Payer: COMMERCIAL

## 2020-03-11 ENCOUNTER — ANCILLARY PROCEDURE (OUTPATIENT)
Dept: CARDIOLOGY | Facility: REHABILITATION | Age: 65
DRG: 560 | End: 2020-03-11
Attending: PHYSICAL MEDICINE & REHABILITATION
Payer: COMMERCIAL

## 2020-03-11 PROBLEM — Z78.9 IMPAIRED MOBILITY AND ADLS: Status: ACTIVE | Noted: 2020-03-11

## 2020-03-11 PROBLEM — E55.9 VITAMIN D DEFICIENCY: Status: ACTIVE | Noted: 2020-03-11

## 2020-03-11 PROBLEM — D72.829 LEUKOCYTOSIS: Status: ACTIVE | Noted: 2020-03-11

## 2020-03-11 PROBLEM — Z74.09 IMPAIRED MOBILITY AND ADLS: Status: ACTIVE | Noted: 2020-03-11

## 2020-03-11 LAB
25(OH)D3 SERPL-MCNC: 14 NG/ML (ref 30–100)
ALBUMIN SERPL BCP-MCNC: 3.2 G/DL (ref 3.2–4.9)
ALBUMIN/GLOB SERPL: 1 G/DL
ALP SERPL-CCNC: 65 U/L (ref 30–99)
ALT SERPL-CCNC: 15 U/L (ref 2–50)
ANION GAP SERPL CALC-SCNC: 9 MMOL/L (ref 0–11.9)
AST SERPL-CCNC: 13 U/L (ref 12–45)
BASOPHILS # BLD AUTO: 0.6 % (ref 0–1.8)
BASOPHILS # BLD: 0.07 K/UL (ref 0–0.12)
BILIRUB SERPL-MCNC: 0.5 MG/DL (ref 0.1–1.5)
BUN SERPL-MCNC: 20 MG/DL (ref 8–22)
CALCIUM SERPL-MCNC: 9 MG/DL (ref 8.5–10.5)
CHLORIDE SERPL-SCNC: 107 MMOL/L (ref 96–112)
CO2 SERPL-SCNC: 24 MMOL/L (ref 20–33)
CREAT SERPL-MCNC: 1.17 MG/DL (ref 0.5–1.4)
EOSINOPHIL # BLD AUTO: 0.34 K/UL (ref 0–0.51)
EOSINOPHIL NFR BLD: 2.7 % (ref 0–6.9)
ERYTHROCYTE [DISTWIDTH] IN BLOOD BY AUTOMATED COUNT: 42.5 FL (ref 35.9–50)
GLOBULIN SER CALC-MCNC: 3.2 G/DL (ref 1.9–3.5)
GLUCOSE BLD-MCNC: 135 MG/DL (ref 65–99)
GLUCOSE BLD-MCNC: 144 MG/DL (ref 65–99)
GLUCOSE BLD-MCNC: 151 MG/DL (ref 65–99)
GLUCOSE BLD-MCNC: 166 MG/DL (ref 65–99)
GLUCOSE SERPL-MCNC: 126 MG/DL (ref 65–99)
HCT VFR BLD AUTO: 35.6 % (ref 42–52)
HGB BLD-MCNC: 11.6 G/DL (ref 14–18)
IMM GRANULOCYTES # BLD AUTO: 0.34 K/UL (ref 0–0.11)
IMM GRANULOCYTES NFR BLD AUTO: 2.7 % (ref 0–0.9)
LYMPHOCYTES # BLD AUTO: 1.89 K/UL (ref 1–4.8)
LYMPHOCYTES NFR BLD: 15.1 % (ref 22–41)
MAGNESIUM SERPL-MCNC: 1.7 MG/DL (ref 1.5–2.5)
MCH RBC QN AUTO: 28.5 PG (ref 27–33)
MCHC RBC AUTO-ENTMCNC: 32.6 G/DL (ref 33.7–35.3)
MCV RBC AUTO: 87.5 FL (ref 81.4–97.8)
MONOCYTES # BLD AUTO: 0.83 K/UL (ref 0–0.85)
MONOCYTES NFR BLD AUTO: 6.6 % (ref 0–13.4)
NEUTROPHILS # BLD AUTO: 9.05 K/UL (ref 1.82–7.42)
NEUTROPHILS NFR BLD: 72.3 % (ref 44–72)
NRBC # BLD AUTO: 0 K/UL
NRBC BLD-RTO: 0 /100 WBC
PLATELET # BLD AUTO: 338 K/UL (ref 164–446)
PMV BLD AUTO: 10.7 FL (ref 9–12.9)
POTASSIUM SERPL-SCNC: 4.2 MMOL/L (ref 3.6–5.5)
PROT SERPL-MCNC: 6.4 G/DL (ref 6–8.2)
RBC # BLD AUTO: 4.07 M/UL (ref 4.7–6.1)
SODIUM SERPL-SCNC: 140 MMOL/L (ref 135–145)
WBC # BLD AUTO: 12.5 K/UL (ref 4.8–10.8)

## 2020-03-11 PROCEDURE — 97110 THERAPEUTIC EXERCISES: CPT

## 2020-03-11 PROCEDURE — 770010 HCHG ROOM/CARE - REHAB SEMI PRIVAT*

## 2020-03-11 PROCEDURE — 99223 1ST HOSP IP/OBS HIGH 75: CPT | Performed by: HOSPITALIST

## 2020-03-11 PROCEDURE — 97162 PT EVAL MOD COMPLEX 30 MIN: CPT

## 2020-03-11 PROCEDURE — 80053 COMPREHEN METABOLIC PANEL: CPT

## 2020-03-11 PROCEDURE — 97166 OT EVAL MOD COMPLEX 45 MIN: CPT

## 2020-03-11 PROCEDURE — 97530 THERAPEUTIC ACTIVITIES: CPT

## 2020-03-11 PROCEDURE — A9270 NON-COVERED ITEM OR SERVICE: HCPCS | Performed by: PHYSICAL MEDICINE & REHABILITATION

## 2020-03-11 PROCEDURE — 83735 ASSAY OF MAGNESIUM: CPT

## 2020-03-11 PROCEDURE — 99233 SBSQ HOSP IP/OBS HIGH 50: CPT | Performed by: PHYSICAL MEDICINE & REHABILITATION

## 2020-03-11 PROCEDURE — 82962 GLUCOSE BLOOD TEST: CPT | Mod: 91

## 2020-03-11 PROCEDURE — 700102 HCHG RX REV CODE 250 W/ 637 OVERRIDE(OP): Performed by: HOSPITALIST

## 2020-03-11 PROCEDURE — 93971 EXTREMITY STUDY: CPT | Mod: LT

## 2020-03-11 PROCEDURE — 97535 SELF CARE MNGMENT TRAINING: CPT

## 2020-03-11 PROCEDURE — 85025 COMPLETE CBC W/AUTO DIFF WBC: CPT

## 2020-03-11 PROCEDURE — 82306 VITAMIN D 25 HYDROXY: CPT

## 2020-03-11 PROCEDURE — 36415 COLL VENOUS BLD VENIPUNCTURE: CPT

## 2020-03-11 PROCEDURE — 700102 HCHG RX REV CODE 250 W/ 637 OVERRIDE(OP): Performed by: PHYSICAL MEDICINE & REHABILITATION

## 2020-03-11 PROCEDURE — 94760 N-INVAS EAR/PLS OXIMETRY 1: CPT

## 2020-03-11 PROCEDURE — A9270 NON-COVERED ITEM OR SERVICE: HCPCS | Performed by: HOSPITALIST

## 2020-03-11 RX ORDER — SULFAMETHOXAZOLE AND TRIMETHOPRIM 800; 160 MG/1; MG/1
1 TABLET ORAL EVERY 12 HOURS
Status: COMPLETED | OUTPATIENT
Start: 2020-03-11 | End: 2020-03-16

## 2020-03-11 RX ORDER — VITAMIN B COMPLEX
2000 TABLET ORAL DAILY
Status: DISCONTINUED | OUTPATIENT
Start: 2020-03-11 | End: 2020-03-18

## 2020-03-11 RX ORDER — DEXTROSE MONOHYDRATE 25 G/50ML
50 INJECTION, SOLUTION INTRAVENOUS
Status: DISCONTINUED | OUTPATIENT
Start: 2020-03-11 | End: 2020-03-26 | Stop reason: HOSPADM

## 2020-03-11 RX ADMIN — ATENOLOL 100 MG: 25 TABLET ORAL at 05:39

## 2020-03-11 RX ADMIN — SENNOSIDES AND DOCUSATE SODIUM 2 TABLET: 8.6; 5 TABLET ORAL at 08:23

## 2020-03-11 RX ADMIN — GABAPENTIN 300 MG: 300 CAPSULE ORAL at 19:51

## 2020-03-11 RX ADMIN — ATORVASTATIN CALCIUM 20 MG: 10 TABLET, FILM COATED ORAL at 19:51

## 2020-03-11 RX ADMIN — SULFAMETHOXAZOLE AND TRIMETHOPRIM 1 TABLET: 800; 160 TABLET ORAL at 19:51

## 2020-03-11 RX ADMIN — INSULIN HUMAN 1 UNITS: 100 INJECTION, SOLUTION PARENTERAL at 10:59

## 2020-03-11 RX ADMIN — MORPHINE SULFATE 15 MG: 15 TABLET, FILM COATED, EXTENDED RELEASE ORAL at 19:51

## 2020-03-11 RX ADMIN — RIVAROXABAN 20 MG: 10 TABLET, FILM COATED ORAL at 17:08

## 2020-03-11 RX ADMIN — MORPHINE SULFATE 15 MG: 15 TABLET, FILM COATED, EXTENDED RELEASE ORAL at 08:22

## 2020-03-11 RX ADMIN — OXYCODONE HYDROCHLORIDE AND ACETAMINOPHEN 1 TABLET: 5; 325 TABLET ORAL at 05:39

## 2020-03-11 RX ADMIN — GABAPENTIN 300 MG: 300 CAPSULE ORAL at 08:23

## 2020-03-11 RX ADMIN — SENNOSIDES AND DOCUSATE SODIUM 2 TABLET: 8.6; 5 TABLET ORAL at 19:50

## 2020-03-11 RX ADMIN — GABAPENTIN 300 MG: 300 CAPSULE ORAL at 15:28

## 2020-03-11 RX ADMIN — MELATONIN 2000 UNITS: at 10:05

## 2020-03-11 ASSESSMENT — BRIEF INTERVIEW FOR MENTAL STATUS (BIMS)
WHAT DAY OF THE WEEK IS IT: CORRECT
ASKED TO RECALL SOCK: YES, NO CUE REQUIRED
WHAT YEAR IS IT: CORRECT
BIMS SUMMARY SCORE: 15
INITIAL REPETITION OF BED BLUE SOCK - FIRST ATTEMPT: 3
ASKED TO RECALL BED: YES, NO CUE REQUIRED
WHAT MONTH IS IT: ACCURATE WITHIN 5 DAYS
ASKED TO RECALL BLUE: YES, NO CUE REQUIRED

## 2020-03-11 ASSESSMENT — ACTIVITIES OF DAILY LIVING (ADL): TOILETING: INDEPENDENT

## 2020-03-11 NOTE — ASSESSMENT & PLAN NOTE
Hba1c: 6.8  On Metformin: 250 mg bid  Off accuchecks  Note: home meds include Metformin 1000 mg bid

## 2020-03-11 NOTE — THERAPY
Physical Therapy   Daily Treatment     Patient Name: Jose Mc  Age:  64 y.o., Sex:  male  Medical Record #: [id number]  Today's Date: 3/11/2020     Precautions  Precautions: Non Weight Bearing Left Lower Extremity, Fall Risk  Comments: L BKA    Subjective    Pt sitting edge of bed, ready for PT. Just received IPOP today.     Objective       03/11/20 1401   Supine Lower Body Exercise   Supine Lower Body Exercises Yes   Hip Flexion 2 sets of 10   Other Exercises quad sets/ glut sets/ adductor squeezes 2 x 10.    Bed Mobility    Sit to Stand Contact Guard Assist  (from slightly elevated edge of mat to FWW/ 5 trials)   PT Total Time Spent   PT Individual Total Time Spent (Mins) 30   PT Charge Group   PT Therapeutic Exercise 1   PT Therapeutic Activities 1     Skin inspection under IPOP protector - area of redness along posterior thigh from plastic edge of protector sleeve. Removed during exercises/ re-wrapped residual limb after ROM/ strength training. IPOP replaced over pant leg for skin protection.     Sit<>stand/ pivot transfer with FWW and min A for balance    Assessment    Pt pleasant/ cooperative and motivated for improved independence. Pt aware of skin protection and receptive to all education. Improved comfort with pant leg between plastic IPOP and skin.     Plan    L BKA education/ positioning/ desensitization training, LE strength training, progressive mobility at wc and bariatric FWW level of function.

## 2020-03-11 NOTE — CARE PLAN
Problem: Communication  Goal: The ability to communicate needs accurately and effectively will improve  Outcome: PROGRESSING AS EXPECTED  Note: Pt is able to communicate his needs effectively to staff.      Problem: Safety  Goal: Will remain free from injury  Outcome: PROGRESSING AS EXPECTED  Note: Pt uses call light consistently for staff assistance. Pt has good safety awareness, no impulsivity observed.      Problem: Respiratory:  Goal: Respiratory status will improve  Outcome: PROGRESSING AS EXPECTED  Note: Pt wears CPAP at night and respirations are WNL

## 2020-03-11 NOTE — FLOWSHEET NOTE
03/11/20 1115   Events/Summary/Plan   Events/Summary/Plan 02 spot check, pt reminded to do deep breathing   Vital Signs   Pulse 68   Respiration 18   Pulse Oximetry 91 %   $ Pulse Oximetry (Spot Check) Yes   Respiratory Assessment   Level of Consciousness Alert   Chest Exam   Chest Observation Barrel Chest   Oxygen   O2 Delivery Device None - Room Air   Non-Invasive Ventilation ÓSCAR Group   Nocturnal CPAP or BIPAP CPAP - Home Unit

## 2020-03-11 NOTE — ASSESSMENT & PLAN NOTE
Incision clean and dry with intact sutures  U/S (3/15): showed extensive soft tissue edema and phlegmonous change without evidence of well formed loculated fluid collection  U/S (3/22): fluid collections within the soft tissues measuring up to 3.4 x 1 x 4.9 cm fluid                     may represent postsurgical seromas, hematomas or abscesses  S/P leukocytosis  S/P empiric Cipro (3/24)  For f/u with Surgeon Friday

## 2020-03-11 NOTE — THERAPY
"Occupational Therapy  Daily Treatment     Patient Name: Jose Mc  Age:  64 y.o., Sex:  male  Medical Record #: [id number]  Today's Date: 3/11/2020     Precautions  Precautions: Non Weight Bearing Left Lower Extremity, Fall Risk  Comments: L BKA         Subjective    \"If I have to I can hop into the bathroom\"     Objective    Stand pivot w/c to bed with FWW CGA    Edu pt re: bathroom DME, demo provided on tub transfer bench and bariatric commode with drop-arms. Discussed bathroom set-up and managing shower transfer with walk-in shower curb - pt thinks his shower chair is wide enough that it can extend to outside of shower for ease of transfer.     Assessed skin around IPOP d/t reports from PT that IPOP edges were rubbing/pinching against skin. Trialed IPOP over pants which showed improvement with skin integrity.      03/11/20 1431   Interdisciplinary Plan of Care Collaboration   Patient Position at End of Therapy Call Light within Reach;Tray Table within Reach;In Bed  (Dr. Curry present in room)   OT Total Time Spent   OT Individual Total Time Spent (Mins) 30   OT Charge Group   OT Self Care / ADL 2       Assessment    Edu on bathroom DME/set-up initiated. Pt will provide pictures and measurements of home/bathroom. IPOP to be worn over pants to prevent skin wounds.    Plan    Cont overall strength/endurance and standing balance to improve ADL's and functional mobility    "

## 2020-03-11 NOTE — CONSULTS
DATE OF SERVICE:  3/11/2020    REQUESTING PHYSICIAN:  Sophia Beck MD    CHIEF COMPLAINT / REASON FOR CONSULTATION:   Hypertension  Diabetes  Leukocytosis  LUE swelling & erythema    HISTORY OF PRESENT ILLNESS:  This is a 65 y/o male with a PMH significant for hypertension, diabetes, peripheral neuropathy, sleep apnea, and a hx of DVT on Xarelto who was admitted to an outside hospital with a non-healing left foot ulcer and osteomyelitis.  He subsequently underwent a left transtibial amputation on 3/6 with Dr. Castro.  He tolerated the procedure well.  He was restarted on his Xarelto after the surgery which he takes for his history of DVT.  His most recent Hgba1c was 6.5.  He takes Metformin at home for his diabetes.    Because of the patient's weakness and debility, Rehab was consulted, evaluated the patient, and was deemed a good Rehab candidate.  The patient was transferred over to the Rehab facility on 3/10/2020.      The patient denies fever, chills, nausea, vomiting, headaches, blurry vision, or chest pain.    REVIEW OF SYSTEMS: All review of systems are negative pre AMA and CMS criteria   except for that stated in the HPI.    PAST MEDICAL HISTORY:  Past Medical History:   Diagnosis Date   • At risk for sleep apnea    • Diabetes (HCC)    • Diabetic neuropathy (HCC)    • DVT (deep venous thrombosis) (HCC)    • Hyperlipidemia    • Hypertension    • Sleep apnea        PAST SURGICAL HISTORY:  Past Surgical History:   Procedure Laterality Date   • TOE AMPUTATION Left        Allergies   Allergen Reactions   • Pcn [Penicillins] Unspecified     Given in eye drops as a child, eyes swell   • Zyvox [Linezolid]        CURRENT MEDICATIONS:    Current Facility-Administered Medications:   •  vitamin D  •  Respiratory Therapy Consult  •  Pharmacy Consult Request  •  acetaminophen  •  senna-docusate **AND** polyethylene glycol/lytes **AND** magnesium hydroxide **AND** bisacodyl  •  artificial tears  •   benzocaine-menthol  •  mag hydrox-al hydrox-simeth  •  ondansetron **OR** ondansetron  •  traZODone  •  sodium chloride  •  hydrOXYzine HCl  •  melatonin  •  atenolol  •  atorvastatin  •  gabapentin  •  tramadol  •  oxyCODONE immediate-release  •  lactulose  •  docusate sodium  •  insulin regular **AND** Accu-Chek ACHS **AND** NOTIFY MD and PharmD **AND** glucose **AND** dextrose 50%  •  rivaroxaban  •  morphine ER  •  oxyCODONE-acetaminophen    Social History     Socioeconomic History   • Marital status:      Spouse name: Not on file   • Number of children: Not on file   • Years of education: Not on file   • Highest education level: Not on file   Occupational History   • Occupation: retired    Social Needs   • Financial resource strain: Not on file   • Food insecurity     Worry: Not on file     Inability: Not on file   • Transportation needs     Medical: Not on file     Non-medical: Not on file   Tobacco Use   • Smoking status: Former Smoker     Packs/day: 0.00     Last attempt to quit: 1980     Years since quittin.1   • Smokeless tobacco: Never Used   Substance and Sexual Activity   • Alcohol use: Not Currently     Frequency: Monthly or less     Comment: OCC   • Drug use: Never   • Sexual activity: Not Currently     Partners: Female   Lifestyle   • Physical activity     Days per week: Not on file     Minutes per session: Not on file   • Stress: Not on file   Relationships   • Social connections     Talks on phone: Not on file     Gets together: Not on file     Attends Restorationism service: Not on file     Active member of club or organization: Not on file     Attends meetings of clubs or organizations: Not on file     Relationship status: Not on file   • Intimate partner violence     Fear of current or ex partner: Not on file     Emotionally abused: Not on file     Physically abused: Not on file     Forced sexual activity: Not on file   Other Topics Concern   • Not on file   Social History  Narrative   • Not on file       FAMILY HISTORY:  was reviewed and is not pertinent to this consultation.    PHYSICAL EXAMINATION:  VITAL SIGNS:  Temp is 97.8, blood pressure is 112/56, heart rate is 63, respiratory rate is 18.  GENERAL:  Patient was lying in bed in no distress.  HEENT:  Pupils were equal, round and reactive to light and accomodation.  Oral mucosa was pink and moist.  NECK:  Soft.  Supple.  No JVD.  HEART:  Regular rate and rhythm.  Normal S1 and S2.  No murmurs were appreciated.  LUNGS:  Are clear to auscultation bilaterally.  ABDOMEN:  Soft, non tender, non distended.  Bowels sound were positive in all four quadrants.  EXTREMITIES:  No clubbing, cyanosis.  There was no right lower extremity edema.  There was noted the left BKA with surgical wound appearing to be healing well.  Has some LUE swelling with some pinkish discoloration.  NEUROLOGIC:  Cranial nerves two through twelve were grossly intact.    LABS:  Lab Results   Component Value Date/Time    SODIUM 140 03/11/2020 05:35 AM    POTASSIUM 4.2 03/11/2020 05:35 AM    CHLORIDE 107 03/11/2020 05:35 AM    CO2 24 03/11/2020 05:35 AM    GLUCOSE 126 (H) 03/11/2020 05:35 AM    BUN 20 03/11/2020 05:35 AM    CREATININE 1.17 03/11/2020 05:35 AM      Lab Results   Component Value Date/Time    WBC 12.5 (H) 03/11/2020 05:35 AM    RBC 4.07 (L) 03/11/2020 05:35 AM    HEMOGLOBIN 11.6 (L) 03/11/2020 05:35 AM    HEMATOCRIT 35.6 (L) 03/11/2020 05:35 AM    MCV 87.5 03/11/2020 05:35 AM    MCH 28.5 03/11/2020 05:35 AM    MCHC 32.6 (L) 03/11/2020 05:35 AM    MPV 10.7 03/11/2020 05:35 AM    NEUTSPOLYS 72.30 (H) 03/11/2020 05:35 AM    LYMPHOCYTES 15.10 (L) 03/11/2020 05:35 AM    MONOCYTES 6.60 03/11/2020 05:35 AM    EOSINOPHILS 2.70 03/11/2020 05:35 AM    BASOPHILS 0.60 03/11/2020 05:35 AM      No results found for: PROTHROMBTM, INR       Benign essential HTN  BP ok  On Atenolol: 100 mg daily  Monitor    Diabetes mellitus due to underlying condition with hyperglycemia  ([facility]ly [facility])  Hba1c: no levels in Epic -- but reported recently as 6.5 -- will get current level at the next blood draw  BS: 140-154  Currently not on any diabetic meds  Will decrease SS coverage for better BS eval  Note: home meds include Metformin 1000 mg bid  Cont to monitor    History of DVT (deep vein thrombosis)  On Xarelto    Mixed hyperlipidemia  On Lipitor    Peripheral neuropathy  On Neurontin    Status post below-knee amputation of left lower extremity (Former[facility])  Surgical wound appears to be healing well  Has some dusky erythema  Has a little drainage on the bandage    Leukocytosis  Has been afebrile  WBC's: 12.5 (3/11)  Stump surgical wound appears to be healing well  Has swelling and mild erythema of LUE around the medial elbow area  US LUE: no DVT  Had a recent PICC line around that area that was pulled just recently -- suspect phlebitis or infiltration  Will start Bactrim (3/11)  Monitor    Vitamin D deficiency  Vit D: 14  On supplements      This case has been discussed with the attending Physiatrist.    Thank you for the consultation.  Will follow the patient with you.

## 2020-03-11 NOTE — THERAPY
Occupational Therapy   Initial Evaluation     Patient Name: Jose Mc  Age:  64 y.o., Sex:  male  Medical Record #: [id number]  Today's Date: 3/11/2020     Subjective    Pt supine awake in bed upon arrival, pleasant and cooperative, agreeable to therapy - declined shower as he had a bed bath the day before     Objective       03/11/20 0701   Prior Living Situation   Prior Services None   Housing / Facility 1 Story House   Steps Into Home 3   Steps In Home 0   Elevator No   Bathroom Set up Walk In Shower;Grab Bars;Shower Chair   Equipment Owned Wheelchair;Grab Bar(s) In Tub / Shower;Tub / Shower Seat;Single Point Cane   Lives with - Patient's Self Care Capacity Other (Comments)  (mother and brother)   Comments pt reports living in [place] with mother and brother. Brother provides physical assist for mother. Some modifications are in place d/t mothers current functional status, they plan making on more modifications including a ramp   Prior Level of ADL Function   Self Feeding Independent   Grooming / Hygiene Independent   Bathing Independent   Dressing Independent   Toileting Independent   Prior Level of IADL Function   Medication Management Independent   Laundry Independent   Kitchen Mobility Independent   Finances Independent   Home Management Independent   Shopping Independent   Prior Level Of Mobility Independent With Device in Community;Independent With Device in Home   Driving / Transportation Driving Independent   Occupation (Pre-Hospital Vocational) Retired Due To Disability   IRF-SANDY:  Prior Functioning: Everyday Activities   Self Care Independent   Indoor Mobility (Ambulation) Independent   Stairs Independent   Functional Cognition Independent   Prior Device Use Manual wheelchair   Pain 0 - 10 Group   Therapist Pain Assessment 0;Prior to Activity   Cognition    Cognition / Consciousness WDL   IRF-SANDY:  Brief Interview for Mental Status (BIMS)   Repetition of Three Words (First Attempt) 3   Temporal  "Orientation: Able to Report the Correct Year Correct   Temporal Orientation: Able to Report the Correct Month Accurate within 5 days   Temporal Orientation: Able to Report the Correct Day of the Week Correct   Able to Recall \"Sock\" Yes, no cue required   Able to Recall \"Blue\" Yes, no cue required   Able to Recall \"Bed\" Yes, no cue required   BIMS Summary Score 15   Passive ROM Upper Body   Passive ROM Upper Body WDL   Active ROM Upper Body   Active ROM Upper Body  WDL   Dominant Hand Right   Strength Upper Body   Upper Body Strength  WDL   Sensation Upper Body   Upper Extremity Sensation  WDL   Upper Body Muscle Tone   Upper Body Muscle Tone  WDL   Balance Assessment   Sitting Balance (Static) Good   Sitting Balance (Dynamic) Fair +   Standing Balance (Static) Fair -  (with UE support)   Standing Balance (Dynamic) Fair -  (with UE support)   Weight Shift Sitting Fair   Weight Shift Standing Absent  (L BKA)   Bed Mobility    Supine to Sit Stand by Assist   Sit to Stand Minimal Assist   Coordination Upper Body   Coordination WDL   IRF-SANDY:  Eating   Assistance Needed Set-up / clean-up   [place] Physical Assistance Level No physical assistance or only touching/steadying assist   CARE Score 5   Discharge Goal:  Assistance Needed Independent   Discharge Goal:  Physical Assistance Level No physical assistance or only touching/steadying assist   Discharge Goal:  Score 6   IRF-SANDY:  Oral Hygiene   Assistance Needed Set-up / clean-up   Physical Assistance Level No physical assistance   CARE Score 5   Discharge Goal:  Assistance Needed Independent   Discharge Goal:  Physical Assistance Level No physical assistance or only touching/steadying assist   Discharge Goal:  Score 6   IRF-SANDY:  Shower/Bathe Self   Assistance Needed Physical assistance   Physical Assistance Level 50%-74%   CARE Score 2   Discharge Goal:  Assistance Needed Independent;Adaptive equipment   Discharge Goal:  Physical Assistance Level No physical assistance " or only touching/steadying assist   Discharge Goal Score 6   IRF-SANDY:  Upper Body Dressing   Assistance Needed Set-up / clean-up   Physical Assistance Level No physical assistance or only touching/steadying assist   CARE Score 5   Discharge Goal:  Assistance Needed Independent   Discharge Goal:  Physical Assistance Level No physical assistance or only touching/steadying assist   Dischage Goal:  Score 6   IRF-SANDY:  Lower Body Dressing   Assistance Needed Physical assistance   Physical Assistance Level 75% or more   CARE Score 2   Discharge Goal:  Assistance Needed Independent;Adaptive equipment   Discharge Goal:  Physical Assistance Level No physical assistance or only touching/steadying assist   Discharge Goal:  Score 6   IRF SANDY:  Putting On/Taking Off Footwear   Assistance Needed Physical assistance   Physical Assistance Level Total assistance   CARE Score 1   Discharge Goal:  Assistance Needed Independent;Adaptive equipment   Discharge Goal:  Physical Assistance Level No physical assistance or only touching/steadying assist   Discharge Goal:  Score 6   IRF-SANDY:  Toileting Hygiene   Assistance Needed Physical assistance   Physical Assistance Level Total assistance   CARE Score 1   Discharge Goal:  Assistance Needed Independent;Adaptive equipment   Discharge Goal:  Physical Assistance Level No physical assistance or only touching/steadying assist   Discharge Goal:  Score 6   IRF-SANDY:  Toilet Transfer   Assistance Needed Physical assistance   Physical Assistance Level Less than 25%   CARE Score 3   Discharge Goal:  Assistance Needed Independent;Adaptive equipment   Discharge Goal:  Physical Assistance Level No physical assistance or only touching/steadying assist   Discahrge Goal:  Score 6   Problem List   Problem List Decreased Active Daily Living Skills;Decreased Homemaking Skills;Decreased Functional Mobility;Decreased Activity Tolerance;Impaired Postural Control / Balance;Limited Knowledge of Post Op Precautions    Precautions   Precautions Non Weight Bearing Left Lower Extremity;Fall Risk   Comments L BKA   Current Discharge Plan   Current Discharge Plan Return to Prior Living Situation   Benefit    Therapy Benefit Patient Would Benefit from Inpatient Rehab Occupational Therapy to Maximize [place] with ADLs, IADLs and Functional Mobility.   Interdisciplinary Plan of Care Collaboration   IDT Collaboration with  Nursing;Certified Nursing Assistant;Physical Therapist   Patient Position at End of Therapy Seated;Self Releasing Lap Belt Applied  (in dining room)   Collaboration Comments re: CLOF   Equipment Needs   Assistive Device / DME Grab Bars By Toilet;Grab Bars In Shower / Tub;Tub Transfer Bench;Bedside Commode   Adaptive Equipment Reacher;Sock Aide   OT Total Time Spent   OT Individual Total Time Spent (Mins) 60   OT Charge Group   Charges Yes   OT Self Care / ADL 1   OT Evaluation OT Evaluation Mod       FIM Eating Score:  5 - Standby Prompting/Supervision or Set-up  Eating Description:  Supervision for safety, Verbal cueing, Set-up of equipment or meal/tube feeding, Increased time    FIM Grooming Score:  5 - Standby Prompting/Supervision or Set-up  Grooming Description:  Increased time, Supervision for safety, Verbal cueing, Set-up of equipment    FIM Bathing Score:  0 - Not tested, patient refused  Bathing Description:       FIM Upper Body Dressin - Standby Prompting/Supervision or Set-up  Upper Body Dressing Description:  Supervision for safety, Verbal cueing, Set-up of equipment, Increased time    FIM Lower Body Dressing Score:  2 - Max Assistance  Lower Body Dressing Description:  Increased time, Supervision for safety, Verbal cueing, Set-up of equipment    FIM Toileting Body Dressin - Total Assistance  Toileting Description:  Grab bar, Increased time, Supervision for safety, Verbal cueing, Set-up of equipment    FIM Bed/Chair/Wheelchair Transfers Score:    Bed/Chair/Wheelchair Transfers Description:        FIM Toilet Transfer Score:  4 - Minimal Assistance  Toilet Transfer Description:  Grab bar, Increased time, Supervision for safety, Verbal cueing, Set-up of equipment(stand pivot w/c<>toilet with GB)    FIM Tub/Shower Transfers Score:     Tub/Shower Transfers Description:         Assessment  Patient is 64 y.o. male with a diagnosis of L BKA. PMH includes diabetes, peripheral neuropathy, hypertension, sleep apnea, DVT on Xarelto.  Additional factors influencing patient status / progress (ie: cognitive factors, co-morbidities, social support, etc): Pt presents with poor standing balance, will require home modification education, BKA care education, and decrease strength/activity tolerance/coordination that impacts full participation with ADL's and functional mobility.      Plan  Recommend Occupational Therapy  minutes per day 5-7 days per week for 2 weeks for the following treatments:  OT Group Therapy, OT Self Care/ADL, OT Community Reintegration, OT Neuro Re-Ed/Balance, OT Therapeutic Activity and OT Therapeutic Exercise.    Goals:  Long term and short term goals have been discussed with patient and they are in agreement.    Occupational Therapy Goals     Problem: Bathing     Dates: Start: 03/11/20       Description:     Goal: STG-Within one week, patient will bathe     Dates: Start: 03/11/20       Description: 1) Individualized Goal:  With Min A using DME/AE as needed  2) Interventions:  OT Group Therapy, OT Self Care/ADL, OT Community Reintegration, OT Neuro Re-Ed/Balance, OT Therapeutic Activity and OT Therapeutic Exercise                   Problem: Dressing     Dates: Start: 03/11/20       Description:     Goal: STG-Within one week, patient will dress LB     Dates: Start: 03/11/20       Description: 1) Individualized Goal: With Min A using DME/AE as needed  2) Interventions: OT Group Therapy, OT Self Care/ADL, OT Community Reintegration, OT Neuro Re-Ed/Balance, OT Therapeutic Activity and OT  Therapeutic Exercise                   Problem: OT Long Term Goals     Dates: Start: 03/11/20       Description:     Goal: LTG-By discharge, patient will complete basic self care tasks     Dates: Start: 03/11/20       Description: 1) Individualized Goal: With Mod I using DME/AE as needed  2) Interventions: OT Group Therapy, OT Self Care/ADL, OT Community Reintegration, OT Neuro Re-Ed/Balance, OT Therapeutic Activity and OT Therapeutic Exercise             Goal: LTG-By discharge, patient will perform bathroom transfers     Dates: Start: 03/11/20       Description: 1) Individualized Goal: With Mod I using DME/AE as needed  2) Interventions: OT Group Therapy, OT Self Care/ADL, OT Community Reintegration, OT Neuro Re-Ed/Balance, OT Therapeutic Activity and OT Therapeutic Exercise                 Problem: Toileting     Dates: Start: 03/11/20       Description:     Goal: STG-Within one week, patient will complete toileting tasks     Dates: Start: 03/11/20       Description: 1) Individualized Goal: With Mod A using DME/AE as needed  2) Interventions: OT Group Therapy, OT Self Care/ADL, OT Community Reintegration, OT Neuro Re-Ed/Balance, OT Therapeutic Activity and OT Therapeutic Exercise

## 2020-03-11 NOTE — PROGRESS NOTES
Rehab Progress Note     Date of Service: 3/11/2020  Chief Complaint: follow up BKA    Interval Events (Subjective)    Patient seen and evaluated today in his room.  He is already had some of his therapies this morning.  He denies any pain at rest in his residual limb.  It does start to hurt when he moves however.  He denies any significant phantom sensation or symptoms at this morning.  He is getting an ultrasound of the right upper extremity to check for a clot.  His residual limb was unwrapped and evaluated at bedside with the hospitalist.  Patient has not moved his bowels since yesterday.  He is urinating without any issues.    Objective:  VITAL SIGNS: /56   Pulse 68   Temp 36.6 °C (97.8 °F) (Oral)   Resp 18   Ht 1.829 m (6')   Wt (!) 150.6 kg (332 lb)   SpO2 91%   BMI 45.03 kg/m²   Gen: alert, no apparent distress  CV: regular rate and rhythm, no murmurs, no peripheral edema  Resp: clear to ascultation bilaterally, normal respiratory effort  GI: soft, non-tender abdomen, bowel sounds present  Msk: Left residual limb with intact sutures, mild surrounding erythema, no fluid collections, minimal drainage    Recent Results (from the past 72 hour(s))   ACCU-CHEK GLUCOSE    Collection Time: 03/10/20  5:07 PM   Result Value Ref Range    Glucose - Accu-Ck 140 (H) 65 - 99 mg/dL   ACCU-CHEK GLUCOSE    Collection Time: 03/10/20  9:48 PM   Result Value Ref Range    Glucose - Accu-Ck 154 (H) 65 - 99 mg/dL   CBC with Differential    Collection Time: 03/11/20  5:35 AM   Result Value Ref Range    WBC 12.5 (H) 4.8 - 10.8 K/uL    RBC 4.07 (L) 4.70 - 6.10 M/uL    Hemoglobin 11.6 (L) 14.0 - 18.0 g/dL    Hematocrit 35.6 (L) 42.0 - 52.0 %    MCV 87.5 81.4 - 97.8 fL    MCH 28.5 27.0 - 33.0 pg    MCHC 32.6 (L) 33.7 - 35.3 g/dL    RDW 42.5 35.9 - 50.0 fL    Platelet Count 338 164 - 446 K/uL    MPV 10.7 9.0 - 12.9 fL    Neutrophils-Polys 72.30 (H) 44.00 - 72.00 %    Lymphocytes 15.10 (L) 22.00 - 41.00 %    Monocytes 6.60  0.00 - 13.40 %    Eosinophils 2.70 0.00 - 6.90 %    Basophils 0.60 0.00 - 1.80 %    Immature Granulocytes 2.70 (H) 0.00 - 0.90 %    Nucleated RBC 0.00 /100 WBC    Neutrophils (Absolute) 9.05 (H) 1.82 - 7.42 K/uL    Lymphs (Absolute) 1.89 1.00 - 4.80 K/uL    Monos (Absolute) 0.83 0.00 - 0.85 K/uL    Eos (Absolute) 0.34 0.00 - 0.51 K/uL    Baso (Absolute) 0.07 0.00 - 0.12 K/uL    Immature Granulocytes (abs) 0.34 (H) 0.00 - 0.11 K/uL    NRBC (Absolute) 0.00 K/uL   Comp Metabolic Panel (CMP)    Collection Time: 03/11/20  5:35 AM   Result Value Ref Range    Sodium 140 135 - 145 mmol/L    Potassium 4.2 3.6 - 5.5 mmol/L    Chloride 107 96 - 112 mmol/L    Co2 24 20 - 33 mmol/L    Anion Gap 9.0 0.0 - 11.9    Glucose 126 (H) 65 - 99 mg/dL    Bun 20 8 - 22 mg/dL    Creatinine 1.17 0.50 - 1.40 mg/dL    Calcium 9.0 8.5 - 10.5 mg/dL    AST(SGOT) 13 12 - 45 U/L    ALT(SGPT) 15 2 - 50 U/L    Alkaline Phosphatase 65 30 - 99 U/L    Total Bilirubin 0.5 0.1 - 1.5 mg/dL    Albumin 3.2 3.2 - 4.9 g/dL    Total Protein 6.4 6.0 - 8.2 g/dL    Globulin 3.2 1.9 - 3.5 g/dL    A-G Ratio 1.0 g/dL   Magnesium    Collection Time: 03/11/20  5:35 AM   Result Value Ref Range    Magnesium 1.7 1.5 - 2.5 mg/dL   Vitamin D, 25-hydroxy (blood)    Collection Time: 03/11/20  5:35 AM   Result Value Ref Range    25-Hydroxy   Vitamin D 25 14 (L) 30 - 100 ng/mL   ESTIMATED GFR    Collection Time: 03/11/20  5:35 AM   Result Value Ref Range    GFR If African American >60 >60 mL/min/1.73 m 2    GFR If Non African American >60 >60 mL/min/1.73 m 2   ACCU-CHEK GLUCOSE    Collection Time: 03/11/20  8:00 AM   Result Value Ref Range    Glucose - Accu-Ck 144 (H) 65 - 99 mg/dL   ACCU-CHEK GLUCOSE    Collection Time: 03/11/20 10:58 AM   Result Value Ref Range    Glucose - Accu-Ck 151 (H) 65 - 99 mg/dL       Current Facility-Administered Medications   Medication Frequency   • vitamin D (cholecalciferol) tablet 2,000 Units DAILY   • Respiratory Therapy Consult Continuous RT    • Pharmacy Consult Request ...Pain Management Review 1 Each PHARMACY TO DOSE   • acetaminophen (TYLENOL) tablet 650 mg Q4HRS PRN   • senna-docusate (PERICOLACE or SENOKOT S) 8.6-50 MG per tablet 2 Tab BID    And   • polyethylene glycol/lytes (MIRALAX) PACKET 1 Packet QDAY PRN    And   • magnesium hydroxide (MILK OF MAGNESIA) suspension 30 mL QDAY PRN    And   • bisacodyl (DULCOLAX) suppository 10 mg QDAY PRN   • artificial tears ophthalmic solution 1 Drop PRN   • benzocaine-menthol (CEPACOL) lozenge 1 Lozenge Q2HRS PRN   • mag hydrox-al hydrox-simeth (MAALOX PLUS ES or MYLANTA DS) suspension 20 mL Q2HRS PRN   • ondansetron (ZOFRAN ODT) dispertab 4 mg 4X/DAY PRN    Or   • ondansetron (ZOFRAN) syringe/vial injection 4 mg 4X/DAY PRN   • traZODone (DESYREL) tablet 50 mg QHS PRN   • sodium chloride (OCEAN) 0.65 % nasal spray 2 Spray PRN   • hydrOXYzine HCl (ATARAX) tablet 50 mg Q6HRS PRN   • melatonin tablet 3 mg HS PRN   • atenolol (TENORMIN) tablet 100 mg Q DAY   • atorvastatin (LIPITOR) tablet 20 mg Q EVENING   • gabapentin (NEURONTIN) capsule 300 mg TID   • tramadol (ULTRAM) 50 MG tablet 50 mg Q4HRS PRN   • oxyCODONE immediate-release (ROXICODONE) tablet 5 mg Q4HRS PRN   • lactulose 20 GM/30ML solution 30 mL QDAY PRN   • docusate sodium (ENEMEEZ) enema 283 mg QDAY PRN   • insulin regular (HUMULIN R) injection 1-6 Units 4X/DAY ACHS    And   • glucose 4 g chewable tablet 16 g Q15 MIN PRN    And   • dextrose 50% (D50W) injection 50 mL Q15 MIN PRN   • rivaroxaban (XARELTO) tablet 20 mg PM MEAL   • morphine ER (MS CONTIN) tablet 15 mg Q12HRS   • oxyCODONE-acetaminophen (PERCOCET) 5-325 MG per tablet 1 Tab Q4HRS PRN       Orders Placed This Encounter   Procedures   • Diet Order Diabetic     Standing Status:   Standing     Number of Occurrences:   1     Order Specific Question:   Diet:     Answer:   Diabetic [3]       Assessment:  Active Hospital Problems    Diagnosis   • *Status post below-knee amputation of left lower  extremity (HCC)   • Leukocytosis   • Vitamin D deficiency   • Impaired mobility and ADLs   • Diabetes mellitus due to underlying condition with hyperglycemia (HCC)   • Peripheral neuropathy   • Obstructive sleep apnea syndrome   • Benign essential HTN   • Mixed hyperlipidemia   • History of DVT (deep vein thrombosis)     This patient is a 64 y.o. male admitted for acute inpatient rehabilitation with Status post below-knee amputation of left lower extremity (HCC).     Medical Decision Making and Plan:    Left BKA  3/6 Dr. Ceja  Daily wound care  PT/OT, 1.5 hr each discipline, 5 days per week    Chronic opiate use  Continue home medications  Morphine ER 15 mg BID  Percocets 5-325      Peripheral Neuropathy  Continue gabapentin  Monitor need to increase     Opioid induced constipation  Continue bowel meds  Last BM 3/10    Bladder  Check PVRs  ICP for over 400 cc  Scheduled toileting    DVT prophylaxis  Xarelto       Appreciate assistance of hospitalist with his medical co-morbidities:     Hypertension  Diabetes with hyperglycemia  Sleep apnea  Hyperlipidemia  History of DVT  Left arm erythema/edema  Leukocytosis  Anemia  Vit D deficiency    Total time:  35 minutes.  I spent greater than 50% of the time for patient care, counseling, and coordination on this date, including patient face-to face time, unit/floor time with review of records/pertinent lab data and studies, as well as discussing diagnostic evaluation/work up, planned therapeutic interventions, and future disposition of care, as per the interval events/subjective and the assessment and plan as noted above.      Sophia Beck M.D.   Physical Medicine and Rehabilitation

## 2020-03-11 NOTE — CARE PLAN
Problem: Safety  Goal: Will remain free from injury  Outcome: PROGRESSING AS EXPECTED  Goal: Will remain free from falls  Outcome: PROGRESSING AS EXPECTED      Education performed on fall precaution and fall risk given also with education on safety, pt shows understanding, will continue to reinforce fall AND safety educations needed and monitor pt .

## 2020-03-12 PROBLEM — I80.8 PHLEBITIS OF LEFT ARM: Status: ACTIVE | Noted: 2020-03-11

## 2020-03-12 LAB
GLUCOSE BLD-MCNC: 106 MG/DL (ref 65–99)
GLUCOSE BLD-MCNC: 126 MG/DL (ref 65–99)
GLUCOSE BLD-MCNC: 134 MG/DL (ref 65–99)
GLUCOSE BLD-MCNC: 186 MG/DL (ref 65–99)

## 2020-03-12 PROCEDURE — A9270 NON-COVERED ITEM OR SERVICE: HCPCS | Performed by: HOSPITALIST

## 2020-03-12 PROCEDURE — 700102 HCHG RX REV CODE 250 W/ 637 OVERRIDE(OP): Performed by: HOSPITALIST

## 2020-03-12 PROCEDURE — 97535 SELF CARE MNGMENT TRAINING: CPT

## 2020-03-12 PROCEDURE — 700102 HCHG RX REV CODE 250 W/ 637 OVERRIDE(OP): Performed by: PHYSICAL MEDICINE & REHABILITATION

## 2020-03-12 PROCEDURE — 99232 SBSQ HOSP IP/OBS MODERATE 35: CPT | Performed by: HOSPITALIST

## 2020-03-12 PROCEDURE — 97530 THERAPEUTIC ACTIVITIES: CPT

## 2020-03-12 PROCEDURE — 94760 N-INVAS EAR/PLS OXIMETRY 1: CPT

## 2020-03-12 PROCEDURE — 97110 THERAPEUTIC EXERCISES: CPT

## 2020-03-12 PROCEDURE — A9270 NON-COVERED ITEM OR SERVICE: HCPCS | Performed by: PHYSICAL MEDICINE & REHABILITATION

## 2020-03-12 PROCEDURE — 99232 SBSQ HOSP IP/OBS MODERATE 35: CPT | Performed by: PHYSICAL MEDICINE & REHABILITATION

## 2020-03-12 PROCEDURE — 82962 GLUCOSE BLOOD TEST: CPT | Mod: 91

## 2020-03-12 PROCEDURE — 97116 GAIT TRAINING THERAPY: CPT

## 2020-03-12 PROCEDURE — 770010 HCHG ROOM/CARE - REHAB SEMI PRIVAT*

## 2020-03-12 RX ADMIN — SULFAMETHOXAZOLE AND TRIMETHOPRIM 1 TABLET: 800; 160 TABLET ORAL at 08:24

## 2020-03-12 RX ADMIN — MELATONIN 2000 UNITS: at 08:24

## 2020-03-12 RX ADMIN — MORPHINE SULFATE 15 MG: 15 TABLET, FILM COATED, EXTENDED RELEASE ORAL at 08:25

## 2020-03-12 RX ADMIN — SENNOSIDES AND DOCUSATE SODIUM 2 TABLET: 8.6; 5 TABLET ORAL at 08:24

## 2020-03-12 RX ADMIN — METFORMIN HYDROCHLORIDE 250 MG: 500 TABLET ORAL at 09:46

## 2020-03-12 RX ADMIN — SULFAMETHOXAZOLE AND TRIMETHOPRIM 1 TABLET: 800; 160 TABLET ORAL at 20:14

## 2020-03-12 RX ADMIN — ATENOLOL 100 MG: 25 TABLET ORAL at 05:32

## 2020-03-12 RX ADMIN — ATORVASTATIN CALCIUM 20 MG: 10 TABLET, FILM COATED ORAL at 20:14

## 2020-03-12 RX ADMIN — METFORMIN HYDROCHLORIDE 250 MG: 500 TABLET ORAL at 17:15

## 2020-03-12 RX ADMIN — GABAPENTIN 300 MG: 300 CAPSULE ORAL at 15:23

## 2020-03-12 RX ADMIN — MORPHINE SULFATE 15 MG: 15 TABLET, FILM COATED, EXTENDED RELEASE ORAL at 20:15

## 2020-03-12 RX ADMIN — GABAPENTIN 300 MG: 300 CAPSULE ORAL at 08:24

## 2020-03-12 RX ADMIN — GABAPENTIN 300 MG: 300 CAPSULE ORAL at 20:15

## 2020-03-12 RX ADMIN — SENNOSIDES AND DOCUSATE SODIUM 2 TABLET: 8.6; 5 TABLET ORAL at 20:14

## 2020-03-12 RX ADMIN — RIVAROXABAN 20 MG: 10 TABLET, FILM COATED ORAL at 17:15

## 2020-03-12 ASSESSMENT — ENCOUNTER SYMPTOMS
DIZZINESS: 0
BLURRED VISION: 0
COUGH: 0
FEVER: 0
DIARRHEA: 0
NERVOUS/ANXIOUS: 0

## 2020-03-12 NOTE — PROGRESS NOTES
Rehab Progress Note     Date of Service: 3/12/2020  Chief Complaint: follow up BKA    Interval Events (Subjective)    Seen and evaluated today in his room.  His wound was examined at bedside.  He reports minimal pain in his residual limb only about a 1 out of 10.  He denies any significant phantom pain currently.  He reports his surgeon's office called him to schedule a follow-up appointment.  IPOP was delivered yesterday and he reports it does not fit very well and falls off often.  Is moving his bowels and denies any issues with his urination.  He has no other complaints.    Objective:  VITAL SIGNS: /49   Pulse 69   Temp 36.2 °C (97.2 °F) (Temporal)   Resp 18   Ht 1.829 m (6')   Wt (!) 150.6 kg (332 lb)   SpO2 92%   BMI 45.03 kg/m²   Gen: alert, no apparent distress  CV: regular rate and rhythm, no murmurs, no peripheral edema  Resp: clear to ascultation bilaterally, normal respiratory effort  GI: soft, non-tender abdomen, bowel sounds present  Msk: Left residual limb with edema, dependent erythema, no significant drainage, intact sutures    Recent Results (from the past 72 hour(s))   ACCU-CHEK GLUCOSE    Collection Time: 03/10/20  5:07 PM   Result Value Ref Range    Glucose - Accu-Ck 140 (H) 65 - 99 mg/dL   ACCU-CHEK GLUCOSE    Collection Time: 03/10/20  9:48 PM   Result Value Ref Range    Glucose - Accu-Ck 154 (H) 65 - 99 mg/dL   CBC with Differential    Collection Time: 03/11/20  5:35 AM   Result Value Ref Range    WBC 12.5 (H) 4.8 - 10.8 K/uL    RBC 4.07 (L) 4.70 - 6.10 M/uL    Hemoglobin 11.6 (L) 14.0 - 18.0 g/dL    Hematocrit 35.6 (L) 42.0 - 52.0 %    MCV 87.5 81.4 - 97.8 fL    MCH 28.5 27.0 - 33.0 pg    MCHC 32.6 (L) 33.7 - 35.3 g/dL    RDW 42.5 35.9 - 50.0 fL    Platelet Count 338 164 - 446 K/uL    MPV 10.7 9.0 - 12.9 fL    Neutrophils-Polys 72.30 (H) 44.00 - 72.00 %    Lymphocytes 15.10 (L) 22.00 - 41.00 %    Monocytes 6.60 0.00 - 13.40 %    Eosinophils 2.70 0.00 - 6.90 %    Basophils 0.60  0.00 - 1.80 %    Immature Granulocytes 2.70 (H) 0.00 - 0.90 %    Nucleated RBC 0.00 /100 WBC    Neutrophils (Absolute) 9.05 (H) 1.82 - 7.42 K/uL    Lymphs (Absolute) 1.89 1.00 - 4.80 K/uL    Monos (Absolute) 0.83 0.00 - 0.85 K/uL    Eos (Absolute) 0.34 0.00 - 0.51 K/uL    Baso (Absolute) 0.07 0.00 - 0.12 K/uL    Immature Granulocytes (abs) 0.34 (H) 0.00 - 0.11 K/uL    NRBC (Absolute) 0.00 K/uL   Comp Metabolic Panel (CMP)    Collection Time: 03/11/20  5:35 AM   Result Value Ref Range    Sodium 140 135 - 145 mmol/L    Potassium 4.2 3.6 - 5.5 mmol/L    Chloride 107 96 - 112 mmol/L    Co2 24 20 - 33 mmol/L    Anion Gap 9.0 0.0 - 11.9    Glucose 126 (H) 65 - 99 mg/dL    Bun 20 8 - 22 mg/dL    Creatinine 1.17 0.50 - 1.40 mg/dL    Calcium 9.0 8.5 - 10.5 mg/dL    AST(SGOT) 13 12 - 45 U/L    ALT(SGPT) 15 2 - 50 U/L    Alkaline Phosphatase 65 30 - 99 U/L    Total Bilirubin 0.5 0.1 - 1.5 mg/dL    Albumin 3.2 3.2 - 4.9 g/dL    Total Protein 6.4 6.0 - 8.2 g/dL    Globulin 3.2 1.9 - 3.5 g/dL    A-G Ratio 1.0 g/dL   Magnesium    Collection Time: 03/11/20  5:35 AM   Result Value Ref Range    Magnesium 1.7 1.5 - 2.5 mg/dL   Vitamin D, 25-hydroxy (blood)    Collection Time: 03/11/20  5:35 AM   Result Value Ref Range    25-Hydroxy   Vitamin D 25 14 (L) 30 - 100 ng/mL   ESTIMATED GFR    Collection Time: 03/11/20  5:35 AM   Result Value Ref Range    GFR If African American >60 >60 mL/min/1.73 m 2    GFR If Non African American >60 >60 mL/min/1.73 m 2   ACCU-CHEK GLUCOSE    Collection Time: 03/11/20  8:00 AM   Result Value Ref Range    Glucose - Accu-Ck 144 (H) 65 - 99 mg/dL   ACCU-CHEK GLUCOSE    Collection Time: 03/11/20 10:58 AM   Result Value Ref Range    Glucose - Accu-Ck 151 (H) 65 - 99 mg/dL   ACCU-CHEK GLUCOSE    Collection Time: 03/11/20  5:06 PM   Result Value Ref Range    Glucose - Accu-Ck 135 (H) 65 - 99 mg/dL   ACCU-CHEK GLUCOSE    Collection Time: 03/11/20  8:07 PM   Result Value Ref Range    Glucose - Accu-Ck 166 (H) 65 -  99 mg/dL   ACCU-CHEK GLUCOSE    Collection Time: 03/12/20 11:03 AM   Result Value Ref Range    Glucose - Accu-Ck 126 (H) 65 - 99 mg/dL       Current Facility-Administered Medications   Medication Frequency   • metFORMIN (GLUCOPHAGE) tablet 250 mg BID WITH MEALS   • vitamin D (cholecalciferol) tablet 2,000 Units DAILY   • insulin regular (HUMULIN R) injection 2-10 Units 4X/DAY ACHS    And   • glucose 4 g chewable tablet 16 g Q15 MIN PRN    And   • dextrose 50% (D50W) injection 50 mL Q15 MIN PRN   • sulfamethoxazole-trimethoprim (BACTRIM DS) 800-160 MG tablet 1 Tab Q12HRS   • Respiratory Therapy Consult Continuous RT   • Pharmacy Consult Request ...Pain Management Review 1 Each PHARMACY TO DOSE   • acetaminophen (TYLENOL) tablet 650 mg Q4HRS PRN   • senna-docusate (PERICOLACE or SENOKOT S) 8.6-50 MG per tablet 2 Tab BID    And   • polyethylene glycol/lytes (MIRALAX) PACKET 1 Packet QDAY PRN    And   • magnesium hydroxide (MILK OF MAGNESIA) suspension 30 mL QDAY PRN    And   • bisacodyl (DULCOLAX) suppository 10 mg QDAY PRN   • artificial tears ophthalmic solution 1 Drop PRN   • benzocaine-menthol (CEPACOL) lozenge 1 Lozenge Q2HRS PRN   • mag hydrox-al hydrox-simeth (MAALOX PLUS ES or MYLANTA DS) suspension 20 mL Q2HRS PRN   • ondansetron (ZOFRAN ODT) dispertab 4 mg 4X/DAY PRN    Or   • ondansetron (ZOFRAN) syringe/vial injection 4 mg 4X/DAY PRN   • traZODone (DESYREL) tablet 50 mg QHS PRN   • sodium chloride (OCEAN) 0.65 % nasal spray 2 Spray PRN   • hydrOXYzine HCl (ATARAX) tablet 50 mg Q6HRS PRN   • melatonin tablet 3 mg HS PRN   • atenolol (TENORMIN) tablet 100 mg Q DAY   • atorvastatin (LIPITOR) tablet 20 mg Q EVENING   • gabapentin (NEURONTIN) capsule 300 mg TID   • tramadol (ULTRAM) 50 MG tablet 50 mg Q4HRS PRN   • oxyCODONE immediate-release (ROXICODONE) tablet 5 mg Q4HRS PRN   • lactulose 20 GM/30ML solution 30 mL QDAY PRN   • docusate sodium (ENEMEEZ) enema 283 mg QDAY PRN   • rivaroxaban (XARELTO) tablet 20  mg PM MEAL   • morphine ER (MS CONTIN) tablet 15 mg Q12HRS   • oxyCODONE-acetaminophen (PERCOCET) 5-325 MG per tablet 1 Tab Q4HRS PRN       Orders Placed This Encounter   Procedures   • Diet Order Diabetic     Standing Status:   Standing     Number of Occurrences:   1     Order Specific Question:   Diet:     Answer:   Diabetic [3]       Assessment:  Active Hospital Problems    Diagnosis   • *Status post below-knee amputation of left lower extremity (HCC)   • Leukocytosis   • Vitamin D deficiency   • Impaired mobility and ADLs   • Diabetes mellitus due to underlying condition with hyperglycemia (HCC)   • Peripheral neuropathy   • Obstructive sleep apnea syndrome   • Benign essential HTN   • Mixed hyperlipidemia   • History of DVT (deep vein thrombosis)     This patient is a 64 y.o. male admitted for acute inpatient rehabilitation with Status post below-knee amputation of left lower extremity (HCC).     Medical Decision Making and Plan:    Left BKA  3/6 Dr. Ceja  Daily wound care  PT/OT, 1.5 hr each discipline, 5 days per week  Outpatient follow up with surgery    Chronic opiate use  Continue home medications  Morphine ER 15 mg BID  Percocets 5-325 PRN     Peripheral Neuropathy  Continue gabapentin  Monitor need to increase     Opioid induced constipation  Continue bowel meds  Last BM 3/12    Bladder  Check PVRs  Not retaining  ICP for over 400 cc  Scheduled toileting    DVT prophylaxis  Xarelto     Appreciate assistance of hospitalist with his medical co-morbidities:     Hypertension, atenolol  Diabetes with hyperglycemia, SSI  Sleep apnea, CPAP  Hyperlipidemia, statin  History of DVT  Left arm erythema/edema/?cellulitis, Bactrim  Leukocytosis  Anemia  Vit D deficiency, on supplementation    Total time:  26 minutes.  I spent greater than 50% of the time for patient care, counseling, and coordination on this date, including patient face-to face time, unit/floor time with review of records/pertinent lab data and  studies, as well as discussing diagnostic evaluation/work up, planned therapeutic interventions, and future disposition of care, as per the interval events/subjective and the assessment and plan as noted above.    I have performed a physical exam, reviewed and updated ROS, as well as the assessment and plan today 3/12/2020. In review of note from 3/11/2020 there are no new changes except as documented above.          Sophia Beck M.D.   Physical Medicine and Rehabilitation

## 2020-03-12 NOTE — CARE PLAN
Problem: Skin Integrity  Goal: Risk for impaired skin integrity will decrease  Note: Left BKA incision appears pink, edematous and approximated. Sutures in place. Area was cleansed and new dressing was applied.      Problem: Pain Management  Goal: Pain level will decrease to patient's comfort goal  Note: Pt. denies pain through the day. Per patient, Morphine ER works just fine. He didn't requested any PRN meds today.

## 2020-03-12 NOTE — THERAPY
Physical Therapy   Daily Treatment     Patient Name: Jose Mc  Age:  64 y.o., Sex:  male  Medical Record #: [id number]  Today's Date: 3/12/2020     Precautions  Precautions: Non Weight Bearing Left Lower Extremity, Fall Risk  Comments: L BKA    Subjective    Pt resting in bed, willing to participate     Objective       03/12/20 0901   Supine Lower Body Exercise   Hip Flexion 3 sets of 10;Left   Other Exercises AAROM and manual cues for desensitization/ deep pressure sensory input.    PT Total Time Spent   PT Individual Total Time Spent (Mins) 30   PT Charge Group   PT Gait Training 1   PT Therapeutic Exercise 1       FIM Bed/Chair/Wheelchair Transfers Score: 4 - Minimal Assistance  Bed/Chair/Wheelchair Transfers Description:  Adaptive equipment, Increased time, Set-up of equipment(CGA/ min A steadying for sit<>stand-pivot transfer with FWW)    FIM Walking Score:  1 - Total Assistance  Walking Description:  (CGA/ mi nA with // bars for 10 ft x 2 hop-to BKA training. )    Assessment    Pt with improved gait tolerance today but remains with limited upper body strength for smooth hop-to sequencing.     Plan    L BKA education/ positioning/ desensitization training, LE strength training, progressive mobility at wc and bariatric FWW level of function.

## 2020-03-12 NOTE — WOUND TEAM
Wound Team in to assess right heel. There is a small superficial fissure to plantar aspect of right heel. Does not need wound care. Applied Sween Daily Moisturizer to right foot and ankle, not in between the toes, because patient has xerosis.

## 2020-03-12 NOTE — FLOWSHEET NOTE
03/12/20 1203   Events/Summary/Plan   Events/Summary/Plan SpO2 check, CPAP machine does not read Daily hours of use.   Vital Signs   Pulse 66   Respiration 18   Pulse Oximetry 92 %   $ Pulse Oximetry (Spot Check) Yes   Oxygen   O2 Delivery Device None - Room Air

## 2020-03-12 NOTE — CARE PLAN
Problem: Communication  Goal: The ability to communicate needs accurately and effectively will improve  Outcome: PROGRESSING AS EXPECTED  Note: Pt is able to communicate his needs effectively to staff.      Problem: Safety  Goal: Will remain free from injury  Outcome: PROGRESSING AS EXPECTED  Note: Pt uses call light consistently for staff assistance. Pt has good safety awareness, no impulsivity observed.      Problem: Bowel/Gastric:  Goal: Normal bowel function is maintained or improved  Outcome: PROGRESSING AS EXPECTED  Note: Pt is continent of bowel and has BMs at least every other day.

## 2020-03-12 NOTE — PROGRESS NOTES
[facility] Medicine Daily Progress Note    Date of Service  3/12/2020    Chief Complaint:  Hypertension  Diabetes  Leukocytosis  LUE swelling & erythema    Interval History:  No significant events or changes since last visit    Review of Systems  Review of Systems   Constitutional: Negative for fever.   Eyes: Negative for blurred vision.   Respiratory: Negative for cough.    Cardiovascular: Negative for chest pain.   Gastrointestinal: Negative for diarrhea.   Musculoskeletal: Negative for joint pain.   Neurological: Negative for dizziness.   Psychiatric/Behavioral: The patient is not nervous/anxious.         Physical Exam  Temp:  [36.4 °C (97.6 °F)-37 °C (98.6 °F)] 36.6 °C (97.9 °F)  Pulse:  [60-72] 64  Resp:  [18-20] 20  BP: (118-155)/(68-95) 118/68  SpO2:  [91 %-96 %] 96 %    Physical Exam  Vitals signs and nursing note reviewed.   Constitutional:       Appearance: He is not diaphoretic.   HENT:      Mouth/Throat:      Pharynx: No oropharyngeal exudate or posterior oropharyngeal erythema.   Eyes:      Extraocular Movements: Extraocular movements intact.   Neck:      Vascular: No carotid bruit.   Cardiovascular:      Rate and Rhythm: Normal rate and regular rhythm.      Heart sounds: No murmur.   Pulmonary:      Effort: Pulmonary effort is normal.      Breath sounds: Normal breath sounds. No stridor.   Abdominal:      General: Bowel sounds are normal.      Palpations: Abdomen is soft.   Musculoskeletal:      Right lower leg: No edema.      Comments: Has left BKA.  Surgical wound healing well.   Skin:     General: Skin is warm and dry.      Comments: Has mild LUE swelling.  Pinikish discoloration nearly resolved.   Neurological:      Mental Status: He is alert and oriented to person, place, and time.   Psychiatric:         Mood and Affect: Mood normal.         Behavior: Behavior normal.         Fluids    Intake/Output Summary (Last 24 hours) at 3/12/2020 0838  Last data filed at 3/12/2020 0532  Gross per 24 hour    Intake 600 ml   Output 900 ml   Net -300 ml       Laboratory  Recent Labs     03/11/20  0535   WBC 12.5*   RBC 4.07*   HEMOGLOBIN 11.6*   HEMATOCRIT 35.6*   MCV 87.5   MCH 28.5   MCHC 32.6*   RDW 42.5   PLATELETCT 338   MPV 10.7     Recent Labs     03/11/20  0535   SODIUM 140   POTASSIUM 4.2   CHLORIDE 107   CO2 24   GLUCOSE 126*   BUN 20   CREATININE 1.17   CALCIUM 9.0                   Imaging    Assessment/Plan  * Status post below-knee amputation of left lower extremity (HCC)- (present on admission)  Assessment & Plan  Surgical wound appears to be healing well  Has some dusky erythema  Has a little drainage on the bandage    Vitamin D deficiency- (present on admission)  Assessment & Plan  Vit D: 14  On supplements    Phlebitis of left arm- (present on admission)  Assessment & Plan  Has been afebrile  WBC's: 12.5 (3/11)  Stump surgical wound appears to be healing well  The swelling and mild erythema of LUE are nearly resolved  US LUE: no DVT  Had a recent PICC line around that area that was pulled just recently -- suspect phlebitis and/or infiltration  On Bactrim (thru 3/16)  Monitor    Peripheral neuropathy- (present on admission)  Assessment & Plan  On Neurontin    Diabetes mellitus due to underlying condition with hyperglycemia (HCC)- (present on admission)  Assessment & Plan  Hba1c: no levels in Epic -- but reported recently as 6.5 -- will get current level at the next blood draw  BS: 135-186  Currently not on any diabetic meds  Will start Metformin: 250 mg bid (3/12)  On SS with decreased coverage  Note: home meds include Metformin 1000 mg bid  Cont to monitor    History of DVT (deep vein thrombosis)- (present on admission)  Assessment & Plan  On Xarelto    Mixed hyperlipidemia- (present on admission)  Assessment & Plan  On Lipitor    Benign essential HTN- (present on admission)  Assessment & Plan  BP ok  On Atenolol: 100 mg daily  Monitor

## 2020-03-12 NOTE — THERAPY
"Occupational Therapy  Daily Treatment     Patient Name: Jose Mc  Age:  64 y.o., Sex:  male  Medical Record #: [id number]  Today's Date: 3/12/2020     Precautions  Precautions: Non Weight Bearing Left Lower Extremity, Fall Risk  Comments: L BKA    Safety   ADL Safety : (P) Requires Supervision for Safety, Requires Physical Assist for Safety    Subjective    \"The IPOP keeps falling off. Both of my primary therapists know about it, but if I don't have material under the IPOP my skin welts.\"    \"I need a break before pivoting. Pulling my pants up took a lot out of me.\" pt stated as he was breathing heavily.     Objective       20 0931   Safety    ADL Safety  Requires Supervision for Safety;Requires Physical Assist for Safety   Pain 0 - 10 Group   Pain Rating Scale (NPRS) 1  (\"it is there but not limiting at all.\")   Non Verbal Descriptors   Non Verbal Scale  Calm   Cognition    Level of Consciousness Alert   Sitting Upper Body Exercises   Other Exercise rickshaw: 3 sets of 15; 30 lbs; facing forward   Balance   Sitting Balance (Static) Good   Sitting Balance (Dynamic) Good   Standing Balance (Static) Fair -   Standing Balance (Dynamic) Fair -   Interdisciplinary Plan of Care Collaboration   Patient Position at End of Therapy In Bed;Call Light within Reach;Tray Table within Reach;Phone within Reach   OT Total Time Spent   OT Individual Total Time Spent (Mins) 30   OT Charge Group   OT Self Care / ADL 1   OT Therapeutic Exercise  1         FIM Grooming Score:  6 - Modified Independent  Grooming Description:  (seated washing hands at sink)    FIM Toileting Body Dressin - Minimal Assistance  Toileting Description:  Increased time, Grab bar, Supervision for safety(w/c to toilet transfer with grab bar. Pt required rest break after pulling pants up/before pivoting back to w/c.)    FIM Bed/Chair/Wheelchair Transfers Score: 5 - Standby Prompting/Supervision or Set-up  Bed/Chair/Wheelchair Transfers Description: "  Increased time, Supervision for safety, Set-up of equipment    FIM Toilet Transfer Score:  5 - Standby Prompting/Supervision or Set-up  Toilet Transfer Description:  (w/c to toilet transfer with grab bar. Pt required rest break after pulling pants up/before pivoting back to w/c.))        Assessment    Pt with motivated affect. Pt progressed with toileting this date. Pt able to tolerate UE exercise after ADL session. Pt educated on importance of sitting up in w/c at times instead of staying in bed. Pt declined recommendation to stay in chair, but did choose to sit at EOB for a bit before returning to supine.    Plan    Cont overall strength/endurance and standing balance to improve ADL's and functional mobility

## 2020-03-12 NOTE — THERAPY
Occupational Therapy  Daily Treatment     Patient Name: Jose Mc  Age:  64 y.o., Sex:  male  Medical Record #: [id number]  Today's Date: 3/12/2020     Precautions  Precautions: Non Weight Bearing Left Lower Extremity, Fall Risk  Comments: L OFELIAA         Subjective    Pt seated EOB upon arrival, pleasant and cooperative, agreeable to therapy - ready for shower     Objective       20 0701   Interdisciplinary Plan of Care Collaboration   Patient Position at End of Therapy In Bed;Call Light within Reach;Tray Table within Reach   OT Total Time Spent   OT Individual Total Time Spent (Mins) 60   OT Charge Group   OT Self Care / ADL 4       FIM Eating Score:  5 - Standby Prompting/Supervision or Set-up  Eating Description:       FIM Grooming Score:  6 - Modified Independent  Grooming Description:  Increased time    FIM Bathing Score:  4 - Minimal Assistance  Bathing Description:       FIM Upper Body Dressin - Standby Prompting/Supervision or Set-up  Upper Body Dressing Description:  Supervision for safety, Verbal cueing, Set-up of equipment, Increased time    FIM Lower Body Dressing Score:  4 - Minimal Assistance  Lower Body Dressing Description:  Increased time, Supervision for safety, Verbal cueing, Set-up of equipment    FIM Toileting Body Dressin - Max Assistance  Toileting Description:  Grab bar, Increased time, Supervision for safety, Verbal cueing, Set-up of equipment    FIM Bed/Chair/Wheelchair Transfers Score:    Bed/Chair/Wheelchair Transfers Description:       FIM Toilet Transfer Score:  5 - Standby Prompting/Supervision or Set-up  Toilet Transfer Description:  Grab bar, Increased time, Supervision for safety, Verbal cueing, Set-up of equipment(stand pivot w/c<>toilet with GB)    FIM Tub/Shower Transfers Score:  4 - Minimal Assistance  Tub/Shower Transfers Description:  Grab bar, Increased time, Verbal cueing, Supervision for safety, Set-up of equipment(stand pivot w/c<>tub transfer bench  with GB)      Assessment    Pt completed shower routine at S[facility] - Min A overall using w/c, shower bench, GB's. Pt's functional performance was impacted by poor activity tolerance, obesity, impaired standing balance    Plan    Cardiovascular endurance exercise;     primary therapist will trial various toileting aids/clothing mgmt strategies/home bathroom DME options next week with pt

## 2020-03-13 LAB
GLUCOSE BLD-MCNC: 113 MG/DL (ref 65–99)
GLUCOSE BLD-MCNC: 114 MG/DL (ref 65–99)
GLUCOSE BLD-MCNC: 116 MG/DL (ref 65–99)
GLUCOSE BLD-MCNC: 130 MG/DL (ref 65–99)

## 2020-03-13 PROCEDURE — 82962 GLUCOSE BLOOD TEST: CPT | Mod: 91

## 2020-03-13 PROCEDURE — 99231 SBSQ HOSP IP/OBS SF/LOW 25: CPT | Performed by: PHYSICAL MEDICINE & REHABILITATION

## 2020-03-13 PROCEDURE — 97110 THERAPEUTIC EXERCISES: CPT

## 2020-03-13 PROCEDURE — 97110 THERAPEUTIC EXERCISES: CPT | Mod: CQ

## 2020-03-13 PROCEDURE — 770010 HCHG ROOM/CARE - REHAB SEMI PRIVAT*

## 2020-03-13 PROCEDURE — A9270 NON-COVERED ITEM OR SERVICE: HCPCS | Performed by: HOSPITALIST

## 2020-03-13 PROCEDURE — 97116 GAIT TRAINING THERAPY: CPT

## 2020-03-13 PROCEDURE — 94760 N-INVAS EAR/PLS OXIMETRY 1: CPT

## 2020-03-13 PROCEDURE — 700102 HCHG RX REV CODE 250 W/ 637 OVERRIDE(OP): Performed by: HOSPITALIST

## 2020-03-13 PROCEDURE — 97530 THERAPEUTIC ACTIVITIES: CPT | Mod: CQ

## 2020-03-13 PROCEDURE — 700102 HCHG RX REV CODE 250 W/ 637 OVERRIDE(OP): Performed by: PHYSICAL MEDICINE & REHABILITATION

## 2020-03-13 PROCEDURE — 99232 SBSQ HOSP IP/OBS MODERATE 35: CPT | Performed by: HOSPITALIST

## 2020-03-13 PROCEDURE — A9270 NON-COVERED ITEM OR SERVICE: HCPCS | Performed by: PHYSICAL MEDICINE & REHABILITATION

## 2020-03-13 RX ADMIN — GABAPENTIN 300 MG: 300 CAPSULE ORAL at 08:39

## 2020-03-13 RX ADMIN — OXYCODONE HYDROCHLORIDE AND ACETAMINOPHEN 1 TABLET: 5; 325 TABLET ORAL at 09:19

## 2020-03-13 RX ADMIN — ATENOLOL 100 MG: 25 TABLET ORAL at 05:15

## 2020-03-13 RX ADMIN — MORPHINE SULFATE 15 MG: 15 TABLET, FILM COATED, EXTENDED RELEASE ORAL at 20:18

## 2020-03-13 RX ADMIN — MELATONIN 2000 UNITS: at 08:39

## 2020-03-13 RX ADMIN — SULFAMETHOXAZOLE AND TRIMETHOPRIM 1 TABLET: 800; 160 TABLET ORAL at 08:39

## 2020-03-13 RX ADMIN — GABAPENTIN 300 MG: 300 CAPSULE ORAL at 16:19

## 2020-03-13 RX ADMIN — METFORMIN HYDROCHLORIDE 250 MG: 500 TABLET ORAL at 08:39

## 2020-03-13 RX ADMIN — RIVAROXABAN 20 MG: 10 TABLET, FILM COATED ORAL at 17:18

## 2020-03-13 RX ADMIN — SULFAMETHOXAZOLE AND TRIMETHOPRIM 1 TABLET: 800; 160 TABLET ORAL at 20:17

## 2020-03-13 RX ADMIN — ATORVASTATIN CALCIUM 20 MG: 10 TABLET, FILM COATED ORAL at 20:18

## 2020-03-13 RX ADMIN — SENNOSIDES AND DOCUSATE SODIUM 2 TABLET: 8.6; 5 TABLET ORAL at 20:18

## 2020-03-13 RX ADMIN — SENNOSIDES AND DOCUSATE SODIUM 2 TABLET: 8.6; 5 TABLET ORAL at 08:39

## 2020-03-13 RX ADMIN — MORPHINE SULFATE 15 MG: 15 TABLET, FILM COATED, EXTENDED RELEASE ORAL at 08:39

## 2020-03-13 RX ADMIN — GABAPENTIN 300 MG: 300 CAPSULE ORAL at 20:18

## 2020-03-13 RX ADMIN — METFORMIN HYDROCHLORIDE 250 MG: 500 TABLET ORAL at 17:18

## 2020-03-13 ASSESSMENT — ENCOUNTER SYMPTOMS
VOMITING: 0
ABDOMINAL PAIN: 0
DIARRHEA: 0
FEVER: 0
SHORTNESS OF BREATH: 0
NERVOUS/ANXIOUS: 0
CHILLS: 0
NAUSEA: 0

## 2020-03-13 NOTE — PROGRESS NOTES
Received patient during shift change, report rec'd from day shift RN. Resting in bed, VS stable on room air. Per report continent of B&B, Mod assist for transfers. A&O x 4, able to make needs known. Bed in low position, call light within reach.

## 2020-03-13 NOTE — THERAPY
"Occupational Therapy  Daily Treatment     Patient Name: Jose Mc  Age:  64 y.o., Sex:  male  Medical Record #: [id number]  Today's Date: 3/13/2020     Precautions  Precautions: (P) Non Weight Bearing Left Lower Extremity, Fall Risk  Comments: (P) L BKA    Safety   ADL Safety : Requires Supervision for Safety, Requires Physical Assist for Safety    Subjective    \"I need to get my arms stronger\"     Objective       03/13/20 1301   Precautions   Precautions Non Weight Bearing Left Lower Extremity;Fall Risk   Comments L BKA   Sitting Upper Body Exercises   Sitting Upper Body Exercises Yes   Upper Extremity Bike Level 3 Resistance  (20 minutes fluidobike, 3.23 miles)   OT Total Time Spent   OT Individual Total Time Spent (Mins) 30   OT Charge Group   OT Therapeutic Exercise  2     Assessment    Pt tolerated session well with focus on progressing UE strength end endurance toward increased independence with transfers and functional mobility. He remains motivated and participatory toward increasing independence    Plan    Cardiovascular endurance exercise;      primary therapist will trial various toileting aids/clothing mgmt strategies/home bathroom DME options next week with pt    "

## 2020-03-13 NOTE — THERAPY
Physical Therapy   Daily Treatment     Patient Name: Jose Mc  Age:  64 y.o., Sex:  male  Medical Record #: [id number]  Today's Date: 3/13/2020     Precautions  Precautions: Non Weight Bearing Left Lower Extremity, Fall Risk  Comments: L OFELIAA    Subjective    Pt sitting edge of bed, ready for PT     Objective       03/12/20 1401   Supine Lower Body Exercise   Supine Lower Body Exercises Yes   Bridges Two Legged;3 sets of 10  (bolster to L BKA)   Hip Flexion 3 sets of 10   Hip Abduction Side Lying;3 sets of 10;Left   Straight Leg Raises 3 sets of 10;Left   Other Exercises quad sets/ glut sets/ adductor squeezes 3 x `0. Prone lying hip ext/ hamstring curls LLE.   Sitting Lower Body Exercises   Sitting Lower Body Exercises Yes   Long Arc Quad 3 sets of 10   Hamstring Curl 3 sets of 10   PT Total Time Spent   PT Individual Total Time Spent (Mins) 60   PT Charge Group   PT Therapeutic Exercise 3   PT Therapeutic Activities 1     Transfer training wc<>bed/ therapy mat with FWW and min A for balance/ steadying.     Assessment    Pt tolerated BKA mat program with manual cues for coordination LLE. Remains with upper body weakness for use of FWW for transfers. Aware of functional deficits.    Plan    L BKA education/ positioning/ desensitization training, LE strength training, progressive mobility at wc and bariatric FWW level of function.

## 2020-03-13 NOTE — REHAB-PHARMACY IDT TEAM NOTE
Pharmacy   Pharmacy  Antibiotics/Antifungals/Antivirals:  Medication:      Active Orders (From admission, onward)    Ordered     Ordering Provider       Wed Mar 11, 2020  4:15 PM    03/11/20 1615  sulfamethoxazole-trimethoprim (BACTRIM DS) 800-160 MG tablet 1 Tab  EVERY 12 HOURS      Alberto Curry M.D.        Route:         po  Stop Date:  3/16  Reason: UTI  Antihypertensives/Cardiac:  Medication:    Orders (72h ago, onward)     Start     Ordered    03/11/20 0600  atenolol (TENORMIN) tablet 100 mg  Q DAY      03/10/20 1445    03/10/20 2100  atorvastatin (LIPITOR) tablet 20 mg  EVERY EVENING      03/10/20 1445              Patient Vitals for the past 24 hrs:   BP Pulse   03/13/20 0814 -- 64   03/13/20 0640 110/74 62   03/13/20 0515 131/74 60   03/13/20 0514 131/74 60   03/12/20 1900 111/56 68   03/12/20 1310 113/49 69   03/12/20 1203 -- 66     Anticoagulation:  Medication:  Xarelto  INR:      Other key medications: gabapentin, humulin R, metformin   A review of the medication list reveals no issues at this time.    Section completed by:  Ava Crump RP

## 2020-03-13 NOTE — THERAPY
Physical Therapy   Daily Treatment     Patient Name: Jose Mc  Age:  64 y.o., Sex:  male  Medical Record #: [id number]  Today's Date: 3/13/2020     Precautions  Precautions: Non Weight Bearing Left Lower Extremity, Fall Risk  Comments: L BKA    Subjective    Pt resting in bed, willing to participate     Objective       03/13/20 0931   Supine Lower Body Exercise   Supine Lower Body Exercises Yes   Knee to Chest 3 sets of 10   Sitting Lower Body Exercises   Tricep Press   (4 sets of 5 reps with push-up blocks seated edge of mat)   PT Total Time Spent   PT Individual Total Time Spent (Mins) 60   PT Charge Group   PT Gait Training 1   PT Therapeutic Exercise 2   PT Therapeutic Activities 1       FIM Bed/Chair/Wheelchair Transfers Score: 4 - Minimal Assistance  Bed/Chair/Wheelchair Transfers Description:  (CGA/ mi nA sit<>stand/pivot-hop transfer with bariatric FWW)    FIM Walking Score:  1 - Total Assistance  Walking Description:  (min A with // bars 10 ft x 3)    FIM Wheelchair Score:  5 - Standby Prompting/Supervision or Set-up  Wheelchair Description:   SPV on unit with BUE's / RLE > 150 ft      Assessment    Pt remains highly motivated with all exercises and activity. Improved gait with // bars and cues for RLE joint protection.     Plan    L BKA education/ positioning/ desensitization training, LE strength training, progressive mobility at wc and bariatric FWW level of function.

## 2020-03-13 NOTE — PROGRESS NOTES
Rehab Progress Note     Date of Service: 3/13/2020  Chief Complaint: follow up BKA    Interval Events (Subjective)    Patient seen and evaluated in the therapy gym today. He is working in the parallel bars with PT. Working on decreasing the amount of force he puts on his right leg, by using his arms more. He is currently denying any phantom pain, but did have phantom sensation last night. Residual limb pain is controlled. He has his IPOP on. He moved his bowels yesterday. No issues with urination. No new complaints.     Objective:  VITAL SIGNS: /74   Pulse 64   Temp 36.4 °C (97.6 °F) (Oral)   Resp 16   Ht 1.829 m (6')   Wt (!) 150.6 kg (332 lb)   SpO2 91%   BMI 45.03 kg/m²   Gen: alert, no apparent distress, obese  CV: regular rate and rhythm, no murmurs, no peripheral edema  Resp: clear to ascultation bilaterally, normal respiratory effort  GI: soft, non-tender abdomen, bowel sounds present  Msk: left BKA with IPOP      Recent Results (from the past 72 hour(s))   ACCU-CHEK GLUCOSE    Collection Time: 03/10/20  5:07 PM   Result Value Ref Range    Glucose - Accu-Ck 140 (H) 65 - 99 mg/dL   ACCU-CHEK GLUCOSE    Collection Time: 03/10/20  9:48 PM   Result Value Ref Range    Glucose - Accu-Ck 154 (H) 65 - 99 mg/dL   CBC with Differential    Collection Time: 03/11/20  5:35 AM   Result Value Ref Range    WBC 12.5 (H) 4.8 - 10.8 K/uL    RBC 4.07 (L) 4.70 - 6.10 M/uL    Hemoglobin 11.6 (L) 14.0 - 18.0 g/dL    Hematocrit 35.6 (L) 42.0 - 52.0 %    MCV 87.5 81.4 - 97.8 fL    MCH 28.5 27.0 - 33.0 pg    MCHC 32.6 (L) 33.7 - 35.3 g/dL    RDW 42.5 35.9 - 50.0 fL    Platelet Count 338 164 - 446 K/uL    MPV 10.7 9.0 - 12.9 fL    Neutrophils-Polys 72.30 (H) 44.00 - 72.00 %    Lymphocytes 15.10 (L) 22.00 - 41.00 %    Monocytes 6.60 0.00 - 13.40 %    Eosinophils 2.70 0.00 - 6.90 %    Basophils 0.60 0.00 - 1.80 %    Immature Granulocytes 2.70 (H) 0.00 - 0.90 %    Nucleated RBC 0.00 /100 WBC    Neutrophils (Absolute) 9.05 (H)  1.82 - 7.42 K/uL    Lymphs (Absolute) 1.89 1.00 - 4.80 K/uL    Monos (Absolute) 0.83 0.00 - 0.85 K/uL    Eos (Absolute) 0.34 0.00 - 0.51 K/uL    Baso (Absolute) 0.07 0.00 - 0.12 K/uL    Immature Granulocytes (abs) 0.34 (H) 0.00 - 0.11 K/uL    NRBC (Absolute) 0.00 K/uL   Comp Metabolic Panel (CMP)    Collection Time: 03/11/20  5:35 AM   Result Value Ref Range    Sodium 140 135 - 145 mmol/L    Potassium 4.2 3.6 - 5.5 mmol/L    Chloride 107 96 - 112 mmol/L    Co2 24 20 - 33 mmol/L    Anion Gap 9.0 0.0 - 11.9    Glucose 126 (H) 65 - 99 mg/dL    Bun 20 8 - 22 mg/dL    Creatinine 1.17 0.50 - 1.40 mg/dL    Calcium 9.0 8.5 - 10.5 mg/dL    AST(SGOT) 13 12 - 45 U/L    ALT(SGPT) 15 2 - 50 U/L    Alkaline Phosphatase 65 30 - 99 U/L    Total Bilirubin 0.5 0.1 - 1.5 mg/dL    Albumin 3.2 3.2 - 4.9 g/dL    Total Protein 6.4 6.0 - 8.2 g/dL    Globulin 3.2 1.9 - 3.5 g/dL    A-G Ratio 1.0 g/dL   Magnesium    Collection Time: 03/11/20  5:35 AM   Result Value Ref Range    Magnesium 1.7 1.5 - 2.5 mg/dL   Vitamin D, 25-hydroxy (blood)    Collection Time: 03/11/20  5:35 AM   Result Value Ref Range    25-Hydroxy   Vitamin D 25 14 (L) 30 - 100 ng/mL   ESTIMATED GFR    Collection Time: 03/11/20  5:35 AM   Result Value Ref Range    GFR If African American >60 >60 mL/min/1.73 m 2    GFR If Non African American >60 >60 mL/min/1.73 m 2   ACCU-CHEK GLUCOSE    Collection Time: 03/11/20  8:00 AM   Result Value Ref Range    Glucose - Accu-Ck 144 (H) 65 - 99 mg/dL   ACCU-CHEK GLUCOSE    Collection Time: 03/11/20 10:58 AM   Result Value Ref Range    Glucose - Accu-Ck 151 (H) 65 - 99 mg/dL   ACCU-CHEK GLUCOSE    Collection Time: 03/11/20  5:06 PM   Result Value Ref Range    Glucose - Accu-Ck 135 (H) 65 - 99 mg/dL   ACCU-CHEK GLUCOSE    Collection Time: 03/11/20  8:07 PM   Result Value Ref Range    Glucose - Accu-Ck 166 (H) 65 - 99 mg/dL   ACCU-CHEK GLUCOSE    Collection Time: 03/12/20  7:39 AM   Result Value Ref Range    Glucose - Accu-Ck 186 (H) 65 -  99 mg/dL   ACCU-CHEK GLUCOSE    Collection Time: 03/12/20 11:03 AM   Result Value Ref Range    Glucose - Accu-Ck 126 (H) 65 - 99 mg/dL   ACCU-CHEK GLUCOSE    Collection Time: 03/12/20  5:14 PM   Result Value Ref Range    Glucose - Accu-Ck 106 (H) 65 - 99 mg/dL   ACCU-CHEK GLUCOSE    Collection Time: 03/12/20  8:12 PM   Result Value Ref Range    Glucose - Accu-Ck 134 (H) 65 - 99 mg/dL   ACCU-CHEK GLUCOSE    Collection Time: 03/13/20  7:19 AM   Result Value Ref Range    Glucose - Accu-Ck 114 (H) 65 - 99 mg/dL       Current Facility-Administered Medications   Medication Frequency   • metFORMIN (GLUCOPHAGE) tablet 250 mg BID WITH MEALS   • vitamin D (cholecalciferol) tablet 2,000 Units DAILY   • insulin regular (HUMULIN R) injection 2-10 Units 4X/DAY ACHS    And   • glucose 4 g chewable tablet 16 g Q15 MIN PRN    And   • dextrose 50% (D50W) injection 50 mL Q15 MIN PRN   • sulfamethoxazole-trimethoprim (BACTRIM DS) 800-160 MG tablet 1 Tab Q12HRS   • Respiratory Therapy Consult Continuous RT   • Pharmacy Consult Request ...Pain Management Review 1 Each PHARMACY TO DOSE   • acetaminophen (TYLENOL) tablet 650 mg Q4HRS PRN   • senna-docusate (PERICOLACE or SENOKOT S) 8.6-50 MG per tablet 2 Tab BID    And   • polyethylene glycol/lytes (MIRALAX) PACKET 1 Packet QDAY PRN    And   • magnesium hydroxide (MILK OF MAGNESIA) suspension 30 mL QDAY PRN    And   • bisacodyl (DULCOLAX) suppository 10 mg QDAY PRN   • artificial tears ophthalmic solution 1 Drop PRN   • benzocaine-menthol (CEPACOL) lozenge 1 Lozenge Q2HRS PRN   • mag hydrox-al hydrox-simeth (MAALOX PLUS ES or MYLANTA DS) suspension 20 mL Q2HRS PRN   • ondansetron (ZOFRAN ODT) dispertab 4 mg 4X/DAY PRN    Or   • ondansetron (ZOFRAN) syringe/vial injection 4 mg 4X/DAY PRN   • traZODone (DESYREL) tablet 50 mg QHS PRN   • sodium chloride (OCEAN) 0.65 % nasal spray 2 Spray PRN   • hydrOXYzine HCl (ATARAX) tablet 50 mg Q6HRS PRN   • melatonin tablet 3 mg HS PRN   • atenolol  (TENORMIN) tablet 100 mg Q DAY   • atorvastatin (LIPITOR) tablet 20 mg Q EVENING   • gabapentin (NEURONTIN) capsule 300 mg TID   • tramadol (ULTRAM) 50 MG tablet 50 mg Q4HRS PRN   • oxyCODONE immediate-release (ROXICODONE) tablet 5 mg Q4HRS PRN   • lactulose 20 GM/30ML solution 30 mL QDAY PRN   • docusate sodium (ENEMEEZ) enema 283 mg QDAY PRN   • rivaroxaban (XARELTO) tablet 20 mg PM MEAL   • morphine ER (MS CONTIN) tablet 15 mg Q12HRS   • oxyCODONE-acetaminophen (PERCOCET) 5-325 MG per tablet 1 Tab Q4HRS PRN       Orders Placed This Encounter   Procedures   • Diet Order Diabetic     Standing Status:   Standing     Number of Occurrences:   1     Order Specific Question:   Diet:     Answer:   Diabetic [3]       Assessment:  Active Hospital Problems    Diagnosis   • *Status post below-knee amputation of left lower extremity (HCC)   • Leukocytosis   • Vitamin D deficiency   • Impaired mobility and ADLs   • Diabetes mellitus due to underlying condition with hyperglycemia ([facility])   • Peripheral neuropathy   • Obstructive sleep apnea syndrome   • Benign essential HTN   • Mixed hyperlipidemia   • History of DVT (deep vein thrombosis)     This patient is a 64 y.o. male admitted for acute inpatient rehabilitation with Status post below-knee amputation of left lower extremity (HCC).    Therapy update 3/13/2020  Supervision eating  Modified Independent grooming  Min assist bathing  Supervision upper body dressing  Min assist lower body dressing  Min assist toileting  Min assistbladder  Min assist bowel  Min assist bed/chair transfer  Supervision toilet transfer  Min assist tub/shower transfer  Total assist ambulation  Supervision wheelchair propulsion  Not tested stairs  Modified Independent comprehension  Modified Independent expression  Independent social interaction  Supervision problem solving  Supervision memory    We will have his first weekly conference on Monday to discuss a discharge date.        Medical Decision  Making and Plan:    Left BKA  3/6 Dr. Ceja  Daily wound care  PT/OT, 1.5 hr each discipline, 5 days per week  Outpatient follow up with surgery    Chronic opiate use  Continue home medications  Morphine ER 15 mg BID  Percocets 5-325 PRN     Peripheral Neuropathy  Phantom sensation  Continue gabapentin  Monitor need to increase     Opioid induced constipation  Continue bowel meds  Last BM 3/13    Bladder  Check PVRs  Not retaining  ICP for over 400 cc  Scheduled toileting    DVT prophylaxis  Xarelto     Appreciate assistance of hospitalist with his medical co-morbidities:     Hypertension, atenolol  Diabetes with hyperglycemia, SSI  Sleep apnea, CPAP  Hyperlipidemia, statin  History of DVT, Xarelto  Left arm erythema/edema/?cellulitis, Bactrim  Leukocytosis, recheck in am  Anemia  Vit D deficiency, on supplementation    Total time:  17 minutes.  I spent greater than 50% of the time for patient care, counseling, and coordination on this date, including patient face-to face time, unit/floor time with review of records/pertinent lab data and studies, as well as discussing diagnostic evaluation/work up, planned therapeutic interventions, and future disposition of care, as per the interval events/subjective and the assessment and plan as noted above.    I have performed a physical exam, reviewed and updated ROS, as well as the assessment and plan today 3/13/2020. In review of note from 3/12/2020 there are no new changes except as documented above.            Sophia Beck M.D.   Physical Medicine and Rehabilitation

## 2020-03-13 NOTE — CARE PLAN
Problem: Communication  Goal: The ability to communicate needs accurately and effectively will improve  Outcome: PROGRESSING AS EXPECTED     Problem: Safety  Goal: Will remain free from injury  Outcome: PROGRESSING AS EXPECTED     Problem: Venous Thromboembolism (VTW)/Deep Vein Thrombosis (DVT) Prevention:  Goal: Patient will participate in Venous Thrombosis (VTE)/Deep Vein Thrombosis (DVT)Prevention Measures  Outcome: PROGRESSING AS EXPECTED     Problem: Bowel/Gastric:  Goal: Normal bowel function is maintained or improved  Outcome: PROGRESSING AS EXPECTED     Problem: Pain Management  Goal: Pain level will decrease to patient's comfort goal  Outcome: PROGRESSING AS EXPECTED  Intervention: Follow pain managment plan developed in collaboration with patient and Interdisciplinary Team  Note: Scheduled pain meds administered at hs. Per pt, pain managed w/scheduled meds at this time.     Problem: Respiratory:  Goal: Respiratory status will improve  Outcome: PROGRESSING AS EXPECTED  Intervention: Administer and titrate oxygen therapy  Note: Pt placed home Cpap at hs. Tolerating well.

## 2020-03-13 NOTE — REHAB-DIETARY IDT TEAM NOTE
Dietary   Nutrition  Dietary Problems (Active)      There are no active problems.            NUTRITION SERVICES: BMI - Pt with BMI >40 (=Body mass index is 45.03 kg/m².), morbid obesity. Weight loss counseling not appropriate in acute care setting. RECOMMEND - Referral to outpatient nutrition services for weight management after D/C.     Section completed by:  Gina Dunlap R.D.

## 2020-03-13 NOTE — FLOWSHEET NOTE
03/13/20 0814   Events/Summary/Plan   Events/Summary/Plan spot check done pt wearing cpap at noc    Vital Signs   Pulse 64   Respiration 16   Pulse Oximetry 91 %   $ Pulse Oximetry (Spot Check) Yes   Respiratory Assessment   Level of Consciousness Alert   Oxygen   O2 (LPM) 0   O2 Delivery Device None - Room Air   Non-Invasive Ventilation [employer]   Nocturnal CPAP or BIPAP CPAP - Home Unit   FiO2 or LPM 0

## 2020-03-13 NOTE — PROGRESS NOTES
[facility] Medicine Daily Progress Note    Date of Service  3/13/2020    Chief Complaint:  Hypertension  Diabetes  Leukocytosis  LUE swelling & erythema    Interval History:  No significant events or changes since last visit    Review of Systems  Review of Systems   Constitutional: Negative for chills and fever.   Respiratory: Negative for shortness of breath.    Cardiovascular: Negative for chest pain.   Gastrointestinal: Negative for abdominal pain, diarrhea, nausea and vomiting.   Psychiatric/Behavioral: The patient is not nervous/anxious.         Physical Exam  Temp:  [36.2 °C (97.2 °F)-36.4 °C (97.6 °F)] 36.4 °C (97.6 °F)  Pulse:  [60-69] 64  Resp:  [16-18] 16  BP: (110-131)/(49-74) 110/74  SpO2:  [91 %-94 %] 91 %    Physical Exam  Vitals signs and nursing note reviewed.   Constitutional:       Appearance: Normal appearance.   HENT:      Head: Atraumatic.   Eyes:      Conjunctiva/sclera: Conjunctivae normal.      Pupils: Pupils are equal, round, and reactive to light.   Neck:      Musculoskeletal: Normal range of motion and neck supple.   Cardiovascular:      Rate and Rhythm: Normal rate and regular rhythm.   Pulmonary:      Effort: Pulmonary effort is normal.      Breath sounds: Normal breath sounds.   Abdominal:      General: Bowel sounds are normal.      Palpations: Abdomen is soft.   Musculoskeletal:      Right lower leg: No edema.      Comments: Has left BKA.  Surgical wound healing well.   Skin:     General: Skin is warm and dry.   Neurological:      Mental Status: He is alert and oriented to person, place, and time.   Psychiatric:         Mood and Affect: Mood normal.         Behavior: Behavior normal.         Fluids    Intake/Output Summary (Last 24 hours) at 3/13/2020 0902  Last data filed at 3/13/2020 0546  Gross per 24 hour   Intake 480 ml   Output 1550 ml   Net -1070 ml       Laboratory  Recent Labs     03/11/20  0535   WBC 12.5*   RBC 4.07*   HEMOGLOBIN 11.6*   HEMATOCRIT 35.6*   MCV 87.5   MCH 28.5    MCHC 32.6*   RDW 42.5   PLATELETCT 338   MPV 10.7     Recent Labs     03/11/20  0535   SODIUM 140   POTASSIUM 4.2   CHLORIDE 107   CO2 24   GLUCOSE 126*   BUN 20   CREATININE 1.17   CALCIUM 9.0                   Imaging    Assessment/Plan  * Status post below-knee amputation of left lower extremity (HCC)- (present on admission)  Assessment & Plan  Surgical wound appears to be healing well  Has some dusky erythema  Has a little drainage on the bandage    Vitamin D deficiency- (present on admission)  Assessment & Plan  Vit D: 14  On supplements    Phlebitis of left arm- (present on admission)  Assessment & Plan  Has been afebrile  WBC's: 12.5 (3/11)  Had swelling and mild erythema of LUE -- resolved (3/12)  Stump surgical wound appears to be healing well  US LUE: no DVT  Suspect 2nd to phlebitis and/or infiltration  Note: had a recent PICC line around that area that was pulled just recently  On Bactrim (thru 3/16)  Monitor    Peripheral neuropathy- (present on admission)  Assessment & Plan  On Neurontin    Diabetes mellitus due to underlying condition with hyperglycemia (HCC)- (present on admission)  Assessment & Plan  Hba1c: no levels in Epic -- but reported recently as 6.5 -- will get current level at the next blood draw  BS: 106-134  On Metformin: 250 mg bid (3/12)  On SS with decreased coverage  Note: home meds include Metformin 1000 mg bid  Cont to monitor    History of DVT (deep vein thrombosis)- (present on admission)  Assessment & Plan  On Xarelto    Mixed hyperlipidemia- (present on admission)  Assessment & Plan  On Lipitor    Benign essential HTN- (present on admission)  Assessment & Plan  BP ok  On Atenolol: 100 mg daily  Monitor

## 2020-03-13 NOTE — THERAPY
"Occupational Therapy  Daily Treatment     Patient Name: Jose Mc  Age:  64 y.o., Sex:  male  Medical Record #: [id number]  Today's Date: 3/13/2020     Precautions  Precautions: (P) Non Weight Bearing Left Lower Extremity, Fall Risk  Comments: (P) L BKA    Safety   ADL Safety : Requires Supervision for Safety, Requires Physical Assist for Safety    Subjective    \" I really want to walk again. These will help.\" referring to exercises performed      Objective       03/13/20 1031   Precautions   Precautions Non Weight Bearing Left Lower Extremity;Fall Risk   Comments L BKA   Sitting Upper Body Exercises   Chest Press 3 sets of 15;Bilateral  (wegihted pulley  15lbs )   Lat Pull 3 sets of 15;Bilateral  (weighted pulley  20lbs )   Tricep Press 3 sets of 15;Bilateral  (rickhsaw  backed in  with 25lbs )   Comments 3 sets of 15;Bilateral  overhead press seated  weighted pulley   15lbs    Interdisciplinary Plan of Care Collaboration   Patient Position at End of Therapy Seated;Self Releasing Lap Belt Applied  (self propel w/b back to room )   OT Total Time Spent   OT Individual Total Time Spent (Mins) 30   OT Charge Group   OT Therapeutic Exercise  2           Assessment    Completed tx no complaints   Participates to best of his ability     Plan     ADL  IADL , related mobility  Strength/endurance building     "

## 2020-03-13 NOTE — THERAPY
"Physical Therapy   Daily Treatment     Patient Name: Jose Mc  Age:  64 y.o., Sex:  male  Medical Record #: [id number]  Today's Date: 3/13/2020     Precautions  Precautions: Non Weight Bearing Left Lower Extremity, Fall Risk  Comments: L BKA    Subjective    \"Joanne got my exercises right here in my bag\"     Objective       03/13/20 1331   Interdisciplinary Plan of Care Collaboration   IDT Collaboration with  Physical Therapist   Patient Position at End of Therapy Seated;Self Releasing Lap Belt Applied   Collaboration Comments tx plan   PT Total Time Spent   PT Individual Total Time Spent (Mins) 60   PT Charge Group   PT Therapeutic Exercise 3   PT Therapeutic Activities 1   Supervising Physical Therapist jose Tabares       FIM Bed/Chair/Wheelchair Transfers Score: 4 - Minimal Assistance  Bed/Chair/Wheelchair Transfers Description:  Supervision for safety, Verbal cueing, Increased time(CG/Liz SPT with bariatric FWW, bed mobility SPV)    TAM mat program-    GS, QS, SLR, hip add, sidelying abd, prone hip ext and prone knee flexion    All 3 x 15    Assessment    Pt able to complete mat program with SPV. Required steadying assist with SPT using a FWW    Plan    L OFELIAA education/ positioning/ desensitization training, LE strength training, progressive mobility at wc and bariatric FWW level of function.    "

## 2020-03-13 NOTE — DISCHARGE PLANNING
CASE MANAGEMENT INITIAL ASSESSMENT    Admit Date:  3/10/2020     I met with patient to discuss role of case management / discharge planning / team conference.     Patient is a  64 y.o. male who lives in [place], [place].  His elderly mom lives with him and his brother is also staying with him to help out.  Patient has other family members coming to stay PRN.  Much support at home.      Patient is very pleasant and appeared motivated to get better as soon as possible.  He stated he is very happy with rehab and plans to gain strength and heal. CM will continue to assist as needed.      Diagnosis: 05.4 bilateral le bka  S/P BKA (below knee amputation) ([facility])    Co-morbidities:   Patient Active Problem List    Diagnosis Date Noted   • Phlebitis of left arm 03/11/2020   • Vitamin D deficiency 03/11/2020   • Impaired mobility and ADLs 03/11/2020   • Diabetes mellitus due to underlying condition with hyperglycemia ([facility]) 03/10/2020   • Peripheral neuropathy 03/10/2020   • Status post below-knee amputation of left lower extremity ([facility]) 03/10/2020   • Obstructive sleep apnea syndrome 03/10/2020   • Benign essential HTN 01/21/2020   • Controlled type 2 diabetes mellitus without complication, without long-term current use of insulin ([facility]) 01/21/2020   • Mixed hyperlipidemia 01/21/2020   • Type 2 diabetes mellitus with right diabetic foot ulcer ([facility]) 01/21/2020   • History of DVT (deep vein thrombosis) 01/21/2020   • Chronic anticoagulation 01/21/2020   • Lumbar radicular syndrome 01/21/2020     Prior Living Situation:  Housing / Facility: 1 Story House  Lives with - Patient's Self Care Capacity: Other (Comments)(Mother lives with patient and patients brother is there too.)    Prior Level of Function:  Medication Management: Independent  Finances: Independent  Home Management: Requires Assist  Shopping: Independent  Prior Level Of Mobility: Independent With Device in Community  Driving / Transportation: Driving  Independent    Support Systems:  Primary : Benoit-Brother: [phone]  Mom, brother and other siblings and son.         Previous Services Utilized:   Equipment Owned: Single Point Cane, Wheelchair, Tub / Shower Seat, Grab Bar(s) In Tub / Shower  Prior Services: None    Other Information:  Occupation (Pre-Hospital Vocational): Retired Due To Age     Primary Payor Source: Private Insurance([facility])  Primary Care Practitioner : Sergio James: [phone]    Additional Case Management Questions:  Have you ever received case management services for yourself or a family member? I don't know    Do you feel you have and an understanding of what services  provide? Yes    Do you have any additional questions regarding case management?    No      CASE MANAGEMENT PLAN OF CARE   Individualized Goals:   1. To get stronger  2. Be able to walk      Barriers:   1. New condition    Patient / Family Goal:  Patient / Family Goal: To be able to walk, to gain more independence, to get home as soon as possible    Plan:  1. Continue to follow patient through hospitalization and provide discharge planning in collaboration with patient, family, physicians and ancillary services.     2. Utilize community resources to ensure a safe discharge.

## 2020-03-14 ENCOUNTER — APPOINTMENT (OUTPATIENT)
Dept: RADIOLOGY | Facility: REHABILITATION | Age: 65
DRG: 560 | End: 2020-03-14
Attending: HOSPITALIST
Payer: COMMERCIAL

## 2020-03-14 PROBLEM — D72.829 LEUKOCYTOSIS: Status: ACTIVE | Noted: 2020-03-14

## 2020-03-14 LAB
BASOPHILS # BLD AUTO: 0.6 % (ref 0–1.8)
BASOPHILS # BLD: 0.1 K/UL (ref 0–0.12)
EOSINOPHIL # BLD AUTO: 0.7 K/UL (ref 0–0.51)
EOSINOPHIL NFR BLD: 3.9 % (ref 0–6.9)
ERYTHROCYTE [DISTWIDTH] IN BLOOD BY AUTOMATED COUNT: 46.3 FL (ref 35.9–50)
GLUCOSE BLD-MCNC: 103 MG/DL (ref 65–99)
GLUCOSE BLD-MCNC: 104 MG/DL (ref 65–99)
GLUCOSE BLD-MCNC: 111 MG/DL (ref 65–99)
GLUCOSE BLD-MCNC: 96 MG/DL (ref 65–99)
HCT VFR BLD AUTO: 41.5 % (ref 42–52)
HGB BLD-MCNC: 12.8 G/DL (ref 14–18)
IMM GRANULOCYTES # BLD AUTO: 0.37 K/UL (ref 0–0.11)
IMM GRANULOCYTES NFR BLD AUTO: 2.1 % (ref 0–0.9)
LYMPHOCYTES # BLD AUTO: 2.86 K/UL (ref 1–4.8)
LYMPHOCYTES NFR BLD: 15.9 % (ref 22–41)
MCH RBC QN AUTO: 28.5 PG (ref 27–33)
MCHC RBC AUTO-ENTMCNC: 30.8 G/DL (ref 33.7–35.3)
MCV RBC AUTO: 92.4 FL (ref 81.4–97.8)
MONOCYTES # BLD AUTO: 1 K/UL (ref 0–0.85)
MONOCYTES NFR BLD AUTO: 5.6 % (ref 0–13.4)
NEUTROPHILS # BLD AUTO: 12.96 K/UL (ref 1.82–7.42)
NEUTROPHILS NFR BLD: 71.9 % (ref 44–72)
NRBC # BLD AUTO: 0 K/UL
NRBC BLD-RTO: 0 /100 WBC
PLATELET # BLD AUTO: 433 K/UL (ref 164–446)
PMV BLD AUTO: 10.8 FL (ref 9–12.9)
RBC # BLD AUTO: 4.49 M/UL (ref 4.7–6.1)
WBC # BLD AUTO: 18 K/UL (ref 4.8–10.8)

## 2020-03-14 PROCEDURE — 71045 X-RAY EXAM CHEST 1 VIEW: CPT

## 2020-03-14 PROCEDURE — 97535 SELF CARE MNGMENT TRAINING: CPT

## 2020-03-14 PROCEDURE — A9270 NON-COVERED ITEM OR SERVICE: HCPCS | Performed by: PHYSICAL MEDICINE & REHABILITATION

## 2020-03-14 PROCEDURE — 85025 COMPLETE CBC W/AUTO DIFF WBC: CPT

## 2020-03-14 PROCEDURE — 97530 THERAPEUTIC ACTIVITIES: CPT

## 2020-03-14 PROCEDURE — 81001 URINALYSIS AUTO W/SCOPE: CPT

## 2020-03-14 PROCEDURE — 94760 N-INVAS EAR/PLS OXIMETRY 1: CPT

## 2020-03-14 PROCEDURE — 700102 HCHG RX REV CODE 250 W/ 637 OVERRIDE(OP): Performed by: PHYSICAL MEDICINE & REHABILITATION

## 2020-03-14 PROCEDURE — 36415 COLL VENOUS BLD VENIPUNCTURE: CPT

## 2020-03-14 PROCEDURE — 99232 SBSQ HOSP IP/OBS MODERATE 35: CPT | Performed by: HOSPITALIST

## 2020-03-14 PROCEDURE — 82962 GLUCOSE BLOOD TEST: CPT | Mod: 91

## 2020-03-14 PROCEDURE — 97110 THERAPEUTIC EXERCISES: CPT

## 2020-03-14 PROCEDURE — 770010 HCHG ROOM/CARE - REHAB SEMI PRIVAT*

## 2020-03-14 PROCEDURE — 700102 HCHG RX REV CODE 250 W/ 637 OVERRIDE(OP): Performed by: HOSPITALIST

## 2020-03-14 PROCEDURE — A9270 NON-COVERED ITEM OR SERVICE: HCPCS | Performed by: HOSPITALIST

## 2020-03-14 RX ORDER — DOCUSATE SODIUM 100 MG/1
100 CAPSULE, LIQUID FILLED ORAL 2 TIMES DAILY
Status: DISCONTINUED | OUTPATIENT
Start: 2020-03-14 | End: 2020-03-17

## 2020-03-14 RX ADMIN — MORPHINE SULFATE 15 MG: 15 TABLET, FILM COATED, EXTENDED RELEASE ORAL at 09:00

## 2020-03-14 RX ADMIN — MELATONIN 2000 UNITS: at 09:00

## 2020-03-14 RX ADMIN — GABAPENTIN 300 MG: 300 CAPSULE ORAL at 09:00

## 2020-03-14 RX ADMIN — OXYCODONE HYDROCHLORIDE AND ACETAMINOPHEN 1 TABLET: 5; 325 TABLET ORAL at 09:09

## 2020-03-14 RX ADMIN — GABAPENTIN 300 MG: 300 CAPSULE ORAL at 15:00

## 2020-03-14 RX ADMIN — RIVAROXABAN 20 MG: 10 TABLET, FILM COATED ORAL at 18:00

## 2020-03-14 RX ADMIN — METFORMIN HYDROCHLORIDE 250 MG: 500 TABLET ORAL at 08:00

## 2020-03-14 RX ADMIN — SULFAMETHOXAZOLE AND TRIMETHOPRIM 1 TABLET: 800; 160 TABLET ORAL at 20:05

## 2020-03-14 RX ADMIN — SULFAMETHOXAZOLE AND TRIMETHOPRIM 1 TABLET: 800; 160 TABLET ORAL at 09:00

## 2020-03-14 RX ADMIN — GABAPENTIN 300 MG: 300 CAPSULE ORAL at 20:05

## 2020-03-14 RX ADMIN — METFORMIN HYDROCHLORIDE 250 MG: 500 TABLET ORAL at 17:30

## 2020-03-14 RX ADMIN — ATENOLOL 100 MG: 25 TABLET ORAL at 05:22

## 2020-03-14 RX ADMIN — SENNOSIDES AND DOCUSATE SODIUM 2 TABLET: 8.6; 5 TABLET ORAL at 09:00

## 2020-03-14 RX ADMIN — ATORVASTATIN CALCIUM 20 MG: 10 TABLET, FILM COATED ORAL at 20:05

## 2020-03-14 RX ADMIN — MORPHINE SULFATE 15 MG: 15 TABLET, FILM COATED, EXTENDED RELEASE ORAL at 20:05

## 2020-03-14 ASSESSMENT — ENCOUNTER SYMPTOMS
CHILLS: 0
ABDOMINAL PAIN: 0
VOMITING: 0
NAUSEA: 0
FEVER: 0
DIARRHEA: 0
NERVOUS/ANXIOUS: 0
SHORTNESS OF BREATH: 0

## 2020-03-14 NOTE — THERAPY
Physical Therapy   Daily Treatment     Patient Name: Jose Mc  Age:  64 y.o., Sex:  male  Medical Record #: [id number]  Today's Date: 3/14/2020     Precautions  Precautions: (P) Non Weight Bearing Left Lower Extremity, Fall Risk  Comments: (P) L BKA    Subjective    Patient in bed and agreeable to therapy, no pain reported.     Objective       03/14/20 1301   Precautions   Precautions Non Weight Bearing Left Lower Extremity;Fall Risk   Comments L BKA   Bed Mobility    Supine to Sit Stand by Assist   Sit to Supine Stand by Assist   Sit to Stand Contact Guard Assist   Scooting Stand by Assist   Rolling Supervised   Interdisciplinary Plan of Care Collaboration   Patient Position at End of Therapy Seated;Self Releasing Lap Belt Applied  (in gym waiting for OT)   PT Total Time Spent   PT Individual Total Time Spent (Mins) 30   PT Charge Group   PT Therapeutic Exercise 1   PT Therapeutic Activities 1       FIM Bed/Chair/Wheelchair Transfers Score: 4 - Minimal Assistance  Bed/Chair/Wheelchair Transfers Description:  Verbal cueing, Supervision for safety, Increased time, Adaptive equipment(SBA bed mobility on therapy mat, CGA stand pivot transfer with FWW)    Prone positioning for passive hip flexor stretch as well as performing BUE scapular protraction in prone on elbows position 1x15.    Education and trial of Dycem underneath IPOP straps to reduce IPOP falling off per patient report.    Seated BUE press ups on yellow kickboards x3 for lat/tricep/serratus strengthening/motor control 1x10.     Unsupported seated 4# medball oblique crunches 2x10 each direction.    Standing partial hops with good control to reduce strain on R knee with CGA and FWW x10.      Assessment    Patient tolerated session well, focusing on functional mobility and amputee strengthening exercises. Demonstrates good insight into ability and pacing.    Plan    L BKA education/ positioning/ desensitization training, LE strength training, progressive  mobility at wc and bariatric FWW level of function.

## 2020-03-14 NOTE — THERAPY
"Physical Therapy   Daily Treatment     Patient Name: Jose Mc  Age:  64 y.o., Sex:  male  Medical Record #: [id number]  Today's Date: 3/14/2020     Precautions  Precautions: Non Weight Bearing Left Lower Extremity, Fall Risk  Comments: L BKA     Subjective    Pt pleasant and agreeable to therapy.       Objective     03/14/20 0931   Precautions   Precautions Non Weight Bearing Left Lower Extremity;Fall Risk   Comments L BKA    Supine Lower Body Exercise   Supine Lower Body Exercises Yes   Sitting Lower Body Exercises   Sitting Lower Body Exercises Yes   PT Total Time Spent   PT Individual Total Time Spent (Mins) 60   PT Charge Group   PT Therapeutic Exercise 2   PT Therapeutic Activities 2     Repeated transfer training w/c <> mat table stand pivot on RLE/ hop FWW. Verbal instructions on weight bearing through walker as to not tip to one side during transition to and from sit to stand. Repeated practice pt was able to demonstrates consistent SBA with good safety     FIM Bed/Chair/Wheelchair Transfers Score: 5 - Standby Prompting/Supervision or Set-up  Bed/Chair/Wheelchair Transfers Description:  (Stand pivot/hop transfer w/c<> mat table with use of FWW SBA . Sit <> supine <> prone SBA )    Therapeutic exercise: seated LLE on mat RLE on floor table long sit hamstring stretch on left 2 x 30 \", seated long sit left hip adductor stretch 2 x 30\", seated left figure 4 stretch with left limb 2 x 30\". Supine hamstring stretch LLE 2 x 30\", prone prolonged extension stretch with bolster under left thigh 5 min, prone left knee flexion 2 x 10, prone left LE hip extension 2 x 10. Seated mirror therapy RLE ankle pumps, circles, knee extension/flexion all x 10. Pt education given on phantom limb pain and use of mirror exercises.     Standing balance: static standing instructed to reduce UE support on walk to light or non 1 minute intervals x 2. Increased task with head turns right to left and UE and down "     Assessment    Improved safety and confidence with stand pivot transfer following repeated practice. Pt doing well with mat program. He like the new stretches.     Plan    Continue standing balance, mat program, patient education

## 2020-03-14 NOTE — THERAPY
"Occupational Therapy  Daily Treatment     Patient Name: Jose Mc  Age:  64 y.o., Sex:  male  Medical Record #: [id number]  Today's Date: 3/14/2020     Precautions  Precautions: Non Weight Bearing Left Lower Extremity, Fall Risk  Comments: L BKA     Safety   ADL Safety : Requires Supervision for Safety, Requires Physical Assist for Safety    Subjective    \"I'm using all the techniques you've taught. They've been really helpful. I do a better job using the w/c now and I make sure not to tip to one side with the walker. 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 Pt nostalgic.     Objective       03/14/20 1331   Pain 0 - 10 Group   Pain Rating Scale (NPRS) 0   Non Verbal Descriptors   Non Verbal Scale  Calm   Cognition    Level of Consciousness Alert   Sitting Upper Body Exercises   Upper Extremity Bike Level 3 Resistance  (seated fluidobike 20 min, 3 km)   Bed Mobility    Comments Practiced in queen sized bed. Used FWW for 2 transfers. Pt practiced moving his w/c out of the way during transfer which was difficult, but doable with CGA. Pt utilized bed rail without FWW x2 which was less favorable. Pt had no LOB. Pt required education on making the shortest turns/pivots as opposed to making a 300 degree turn. OTR recommended pt use FWW as opposed to bed rails in future as this was most successful and safer. Pt's bed at home is taller and more firm which will be to his benefit. Pt required several breaks throughout the 4 transfers due to impaired endurance.    Interdisciplinary Plan of Care Collaboration   Patient Position at End of Therapy Edge of Bed;Tray Table within Reach;Phone within Reach;Call Light within Reach   OT Total Time Spent   OT Individual Total Time Spent (Mins) 60   OT Charge Group   OT Self Care / " ADL 2   OT Therapeutic Exercise  2     Adjustments made to pt's w/c, specifically arm rest and seatbelt fixes.      FIM Bed/Chair/Wheelchair Transfers Score: 5 - Standby Prompting/Supervision or Set-up  Bed/Chair/Wheelchair Transfers Description:  Increased time, Supervision for safety, Verbal cueing, Set-up of equipment, Initial preparation for task(transferred from w/c to queen sized bed x4. FWW used for 2 transfers and bed rail used for 2 transfers)      Assessment    Pt with motivated affect. Pt is doing a great job of carrying over techniques taught in prior sessions. Pt with improved, but still limited, endurance this date. Pt enjoys working out and if given good HEP may continue on his own upon d/c. Prior to amputation, pt states he did not work out so this would be a good lifestyle change for him.    Plan    Bed mobility while pt manages w/c on his own     Cardiovascular endurance exercise;      primary therapist will trial various toileting aids/clothing mgmt strategies/home bathroom DME options next week with pt

## 2020-03-14 NOTE — PROGRESS NOTES
[facility] Medicine Daily Progress Note    Date of Service  3/14/2020    Chief Complaint:  Hypertension  Diabetes  Leukocytosis  LUE swelling & erythema    Interval History:  No significant events or changes since last visit    Review of Systems  Review of Systems   Constitutional: Negative for chills and fever.   Respiratory: Negative for shortness of breath.    Cardiovascular: Negative for chest pain.   Gastrointestinal: Negative for abdominal pain, diarrhea, nausea and vomiting.   Psychiatric/Behavioral: The patient is not nervous/anxious.         Physical Exam  Temp:  [36.3 °C (97.3 °F)-36.9 °C (98.4 °F)] 36.9 °C (98.4 °F)  Pulse:  [62-81] 64  Resp:  [18] 18  BP: (109-128)/(63-79) 109/63  SpO2:  [94 %] 94 %    Physical Exam  Vitals signs and nursing note reviewed.   Constitutional:       Appearance: Normal appearance.   HENT:      Head: Atraumatic.   Eyes:      Conjunctiva/sclera: Conjunctivae normal.      Pupils: Pupils are equal, round, and reactive to light.   Neck:      Musculoskeletal: Normal range of motion and neck supple.   Cardiovascular:      Rate and Rhythm: Normal rate and regular rhythm.   Pulmonary:      Effort: Pulmonary effort is normal.      Breath sounds: Normal breath sounds.   Abdominal:      General: Bowel sounds are normal.      Palpations: Abdomen is soft.   Musculoskeletal:      Right lower leg: No edema.      Comments: Has left BKA.  Surgical wound healing well.   Skin:     General: Skin is warm and dry.   Neurological:      Mental Status: He is alert and oriented to person, place, and time.   Psychiatric:         Mood and Affect: Mood normal.         Behavior: Behavior normal.         Fluids    Intake/Output Summary (Last 24 hours) at 3/14/2020 0910  Last data filed at 3/14/2020 0100  Gross per 24 hour   Intake 1360 ml   Output 1200 ml   Net 160 ml       Laboratory  Recent Labs     03/14/20  0544   WBC 18.0*   RBC 4.49*   HEMOGLOBIN 12.8*   HEMATOCRIT 41.5*   MCV 92.4   MCH 28.5   MCHC  30.8*   RDW 46.3   PLATELETCT 433   MPV 10.8                       Imaging    Assessment/Plan  * Status post below-knee amputation of left lower extremity (HCC)- (present on admission)  Assessment & Plan  Surgical wound appears to be healing well  Has some dusky erythema  Has a little drainage on the bandage    Leukocytosis  Assessment & Plan  Has been afebrile  WBC's: 12.5 (3/11) --> 18.0 (3/14)  Surgical wound healing well  On abx for LUE phlebitis (nearly resolved)  Will check U/A  Will check CXR  Will check repeat CBC  Monitor    Vitamin D deficiency- (present on admission)  Assessment & Plan  Vit D: 14  On supplements    Phlebitis of left arm- (present on admission)  Assessment & Plan  Had swelling and mild erythema of LUE -- resolved (3/12)  Stump surgical wound appears to be healing well  US LUE: no DVT  Suspect 2nd to phlebitis and/or infiltration  Note: had a recent PICC line around that area that was pulled just recently  On Bactrim (thru 3/16)  Monitor    Peripheral neuropathy- (present on admission)  Assessment & Plan  On Neurontin    Diabetes mellitus due to underlying condition with hyperglycemia (HCC)- (present on admission)  Assessment & Plan  Hba1c: no levels in Epic -- but reported recently as 6.5 -- will get current level at the next blood draw  BS: 106-134  On Metformin: 250 mg bid (3/12)  On SS with decreased coverage  Note: home meds include Metformin 1000 mg bid  Cont to monitor    History of DVT (deep vein thrombosis)- (present on admission)  Assessment & Plan  On Xarelto    Mixed hyperlipidemia- (present on admission)  Assessment & Plan  On Lipitor    Benign essential HTN- (present on admission)  Assessment & Plan  BP ok  On Atenolol: 100 mg daily  Monitor

## 2020-03-14 NOTE — FLOWSHEET NOTE
03/14/20 1009   Events/Summary/Plan   Events/Summary/Plan spot check done pt wearing home cpap unit at noc   Vital Signs   Pulse 77   Respiration 18   Pulse Oximetry 94 %   $ Pulse Oximetry (Spot Check) Yes   Respiratory Assessment   Level of Consciousness Alert   Oxygen   O2 Delivery Device None - Room Air   Non-Invasive Ventilation ÓSCAR Group   Nocturnal CPAP or BIPAP CPAP - Home Unit   FiO2 or LPM 0

## 2020-03-14 NOTE — ASSESSMENT & PLAN NOTE
C Dif negative  Symptoms improved on Questran, Culturelle, and Simethicone  Change Questran to prn to avoid constipation  Continue Culturelle while on abx

## 2020-03-14 NOTE — THERAPY
"Occupational Therapy  Daily Treatment     Patient Name: Jose Mc  Age:  64 y.o., Sex:  male  Medical Record #: [id number]  Today's Date: 3/14/2020     Precautions  Precautions: Non Weight Bearing Left Lower Extremity, Fall Risk  Comments: L BKA    Safety   ADL Safety : Requires Supervision for Safety, Requires Physical Assist for Safety    Subjective    \"I love working out. It feels so good to sweat. I stopped working out when I got  because I didn't have to look good anymore.\"     Objective       03/14/20 1031   Pain 0 - 10 Group   Pain Rating Scale (NPRS) 0   Non Verbal Descriptors   Non Verbal Scale  Calm   Cognition    Level of Consciousness Alert   Sitting Upper Body Exercises   Lat Pull 3 sets of 15   Bilateral Row 3 sets of 15   Other Exercise rickshaw 3 sets of 15 forward; 2 sets of 15 backwards; 40 lbs   Comments 25 lb equalizer   Balance   Sitting Balance (Static) Good   Sitting Balance (Dynamic) Good   Standing Balance (Static) Fair -   Standing Balance (Dynamic) Fair -   Interdisciplinary Plan of Care Collaboration   Patient Position at End of Therapy Edge of Bed;Call Light within Reach;Tray Table within Reach;Phone within Reach   OT Total Time Spent   OT Individual Total Time Spent (Mins) 30   OT Charge Group   OT Therapeutic Exercise  2     FIM Bed/Chair/Wheelchair Transfers Score: 5 - Standby Prompting/Supervision or Set-up  Bed/Chair/Wheelchair Transfers Description:  Increased time, Supervision for safety, Verbal cueing, Set-up of equipment(SPT with bariatric FWW, bed mobility SPV)    Pt brought up showering at home. Pt has shower chair without arm rests. Pt states he is considering getting a bench style for ease of use.     Assessment    Pt with bright affect. Pt motivated and requesting more weight on machines. Pt's activity tolerance is increasing. Pt's bed transfer was safer this date with supervision level and FWW.    Plan    Cardiovascular endurance exercise;      primary " therapist will trial various toileting aids/clothing mgmt strategies/home bathroom DME options next week with pt

## 2020-03-14 NOTE — CARE PLAN
Problem: Skin Integrity  Goal: Risk for impaired skin integrity will decrease  Note:   Note: Left BKA incision appears pink, edematous and approximated. Sutures in place. Area was cleansed and new dressing was applied.      Problem: Pain Management  Goal: Pain level will decrease to patient's comfort goal  Note: Pt. denies pain through the day. Per patient, Morphine ER works just fine. He didn't requested any PRN meds today.

## 2020-03-14 NOTE — CARE PLAN
Problem: Communication  Goal: The ability to communicate needs accurately and effectively will improve  Outcome: PROGRESSING AS EXPECTED     Problem: Safety  Goal: Will remain free from injury  Outcome: PROGRESSING AS EXPECTED     Problem: Bowel/Gastric:  Goal: Normal bowel function is maintained or improved  Outcome: PROGRESSING AS EXPECTED     Problem: Pain Management  Goal: Pain level will decrease to patient's comfort goal  Outcome: PROGRESSING AS EXPECTED     Problem: Respiratory:  Goal: Respiratory status will improve  Outcome: PROGRESSING AS EXPECTED

## 2020-03-15 ENCOUNTER — APPOINTMENT (OUTPATIENT)
Dept: RADIOLOGY | Facility: REHABILITATION | Age: 65
DRG: 560 | End: 2020-03-15
Attending: HOSPITALIST
Payer: COMMERCIAL

## 2020-03-15 ENCOUNTER — APPOINTMENT (OUTPATIENT)
Dept: RADIOLOGY | Facility: MEDICAL CENTER | Age: 65
DRG: 560 | End: 2020-03-15
Attending: HOSPITALIST
Payer: COMMERCIAL

## 2020-03-15 LAB
APPEARANCE UR: ABNORMAL
BACTERIA #/AREA URNS HPF: NEGATIVE /HPF
BASOPHILS # BLD AUTO: 0.6 % (ref 0–1.8)
BASOPHILS # BLD: 0.14 K/UL (ref 0–0.12)
BILIRUB UR QL STRIP.AUTO: NEGATIVE
COLOR UR: YELLOW
EOSINOPHIL # BLD AUTO: 0.31 K/UL (ref 0–0.51)
EOSINOPHIL NFR BLD: 1.2 % (ref 0–6.9)
EPI CELLS #/AREA URNS HPF: NEGATIVE /HPF
ERYTHROCYTE [DISTWIDTH] IN BLOOD BY AUTOMATED COUNT: 44.8 FL (ref 35.9–50)
EST. AVERAGE GLUCOSE BLD GHB EST-MCNC: 148 MG/DL
GLUCOSE BLD-MCNC: 133 MG/DL (ref 65–99)
GLUCOSE UR STRIP.AUTO-MCNC: NEGATIVE MG/DL
HBA1C MFR BLD: 6.8 % (ref 0–5.6)
HCT VFR BLD AUTO: 39.1 % (ref 42–52)
HGB BLD-MCNC: 12.1 G/DL (ref 14–18)
HYALINE CASTS #/AREA URNS LPF: ABNORMAL /LPF
IMM GRANULOCYTES # BLD AUTO: 0.34 K/UL (ref 0–0.11)
IMM GRANULOCYTES NFR BLD AUTO: 1.4 % (ref 0–0.9)
KETONES UR STRIP.AUTO-MCNC: NEGATIVE MG/DL
LEUKOCYTE ESTERASE UR QL STRIP.AUTO: ABNORMAL
LYMPHOCYTES # BLD AUTO: 1.59 K/UL (ref 1–4.8)
LYMPHOCYTES NFR BLD: 6.3 % (ref 22–41)
MCH RBC QN AUTO: 27.8 PG (ref 27–33)
MCHC RBC AUTO-ENTMCNC: 30.9 G/DL (ref 33.7–35.3)
MCV RBC AUTO: 89.7 FL (ref 81.4–97.8)
MICRO URNS: ABNORMAL
MONOCYTES # BLD AUTO: 1.3 K/UL (ref 0–0.85)
MONOCYTES NFR BLD AUTO: 5.2 % (ref 0–13.4)
NEUTROPHILS # BLD AUTO: 21.39 K/UL (ref 1.82–7.42)
NEUTROPHILS NFR BLD: 85.3 % (ref 44–72)
NITRITE UR QL STRIP.AUTO: NEGATIVE
NRBC # BLD AUTO: 0 K/UL
NRBC BLD-RTO: 0 /100 WBC
PH UR STRIP.AUTO: 5 [PH] (ref 5–8)
PLATELET # BLD AUTO: 367 K/UL (ref 164–446)
PMV BLD AUTO: 11.1 FL (ref 9–12.9)
PROT UR QL STRIP: NEGATIVE MG/DL
RBC # BLD AUTO: 4.36 M/UL (ref 4.7–6.1)
RBC # URNS HPF: ABNORMAL /HPF
RBC UR QL AUTO: NEGATIVE
SP GR UR STRIP.AUTO: 1.02
URATE CRY #/AREA URNS HPF: POSITIVE /HPF
UROBILINOGEN UR STRIP.AUTO-MCNC: 0.2 MG/DL
WBC # BLD AUTO: 25.1 K/UL (ref 4.8–10.8)
WBC #/AREA URNS HPF: ABNORMAL /HPF

## 2020-03-15 PROCEDURE — A9270 NON-COVERED ITEM OR SERVICE: HCPCS | Performed by: PHYSICAL MEDICINE & REHABILITATION

## 2020-03-15 PROCEDURE — 700102 HCHG RX REV CODE 250 W/ 637 OVERRIDE(OP): Performed by: PHYSICAL MEDICINE & REHABILITATION

## 2020-03-15 PROCEDURE — 82962 GLUCOSE BLOOD TEST: CPT

## 2020-03-15 PROCEDURE — 76882 US LMTD JT/FCL EVL NVASC XTR: CPT | Mod: LT

## 2020-03-15 PROCEDURE — 700102 HCHG RX REV CODE 250 W/ 637 OVERRIDE(OP): Performed by: HOSPITALIST

## 2020-03-15 PROCEDURE — 99232 SBSQ HOSP IP/OBS MODERATE 35: CPT | Performed by: HOSPITALIST

## 2020-03-15 PROCEDURE — 770010 HCHG ROOM/CARE - REHAB SEMI PRIVAT*

## 2020-03-15 PROCEDURE — 36415 COLL VENOUS BLD VENIPUNCTURE: CPT

## 2020-03-15 PROCEDURE — 83036 HEMOGLOBIN GLYCOSYLATED A1C: CPT

## 2020-03-15 PROCEDURE — 94760 N-INVAS EAR/PLS OXIMETRY 1: CPT

## 2020-03-15 PROCEDURE — A9270 NON-COVERED ITEM OR SERVICE: HCPCS | Performed by: HOSPITALIST

## 2020-03-15 PROCEDURE — 85025 COMPLETE CBC W/AUTO DIFF WBC: CPT

## 2020-03-15 RX ADMIN — MELATONIN 2000 UNITS: at 08:37

## 2020-03-15 RX ADMIN — SULFAMETHOXAZOLE AND TRIMETHOPRIM 1 TABLET: 800; 160 TABLET ORAL at 20:05

## 2020-03-15 RX ADMIN — METFORMIN HYDROCHLORIDE 250 MG: 500 TABLET ORAL at 08:38

## 2020-03-15 RX ADMIN — GABAPENTIN 300 MG: 300 CAPSULE ORAL at 20:06

## 2020-03-15 RX ADMIN — METFORMIN HYDROCHLORIDE 250 MG: 500 TABLET ORAL at 17:21

## 2020-03-15 RX ADMIN — ATENOLOL 100 MG: 25 TABLET ORAL at 05:17

## 2020-03-15 RX ADMIN — GABAPENTIN 300 MG: 300 CAPSULE ORAL at 08:37

## 2020-03-15 RX ADMIN — SULFAMETHOXAZOLE AND TRIMETHOPRIM 1 TABLET: 800; 160 TABLET ORAL at 08:38

## 2020-03-15 RX ADMIN — GABAPENTIN 300 MG: 300 CAPSULE ORAL at 14:57

## 2020-03-15 RX ADMIN — ATORVASTATIN CALCIUM 20 MG: 10 TABLET, FILM COATED ORAL at 20:06

## 2020-03-15 RX ADMIN — MORPHINE SULFATE 15 MG: 15 TABLET, FILM COATED, EXTENDED RELEASE ORAL at 20:06

## 2020-03-15 RX ADMIN — RIVAROXABAN 20 MG: 10 TABLET, FILM COATED ORAL at 17:21

## 2020-03-15 RX ADMIN — MORPHINE SULFATE 15 MG: 15 TABLET, FILM COATED, EXTENDED RELEASE ORAL at 08:37

## 2020-03-15 ASSESSMENT — FIBROSIS 4 INDEX: FIB4 SCORE: 0.5

## 2020-03-15 ASSESSMENT — ENCOUNTER SYMPTOMS
DIZZINESS: 0
COUGH: 0
DIARRHEA: 0
NERVOUS/ANXIOUS: 0
BLURRED VISION: 0
FEVER: 0

## 2020-03-15 NOTE — CARE PLAN
Problem: Communication  Goal: The ability to communicate needs accurately and effectively will improve  Outcome: PROGRESSING AS EXPECTED     Problem: Safety  Goal: Will remain free from injury  Outcome: PROGRESSING AS EXPECTED     Problem: Infection  Goal: Will remain free from infection  Outcome: PROGRESSING AS EXPECTED     Problem: Bowel/Gastric:  Goal: Normal bowel function is maintained or improved  Outcome: PROGRESSING AS EXPECTED     Problem: Skin Integrity  Goal: Risk for impaired skin integrity will decrease  Outcome: PROGRESSING AS EXPECTED     Problem: Pain Management  Goal: Pain level will decrease to patient's comfort goal  Outcome: PROGRESSING AS EXPECTED     Problem: Respiratory:  Goal: Respiratory status will improve  Outcome: PROGRESSING AS EXPECTED

## 2020-03-15 NOTE — CARE PLAN
Problem: Safety  Goal: Will remain free from injury  Outcome: PROGRESSING AS EXPECTED  Patient with left BKA & unsteady, assisted with transfers to prevent falls/injury.     Problem: Infection  Goal: Will remain free from infection  Outcome: PROGRESSING SLOWER THAN EXPECTED  Dressing to left BKA wound changed, site red, examined by Dr. Curry, pt remains on ABT.

## 2020-03-15 NOTE — PROGRESS NOTES
[facility] Medicine Daily Progress Note    Date of Service  3/15/2020    Chief Complaint:  Hypertension  Diabetes  Leukocytosis  LUE swelling & erythema    Interval History:  No significant events or changes since last visit    Review of Systems  Review of Systems   Constitutional: Negative for fever.   Eyes: Negative for blurred vision.   Respiratory: Negative for cough.    Cardiovascular: Negative for chest pain.   Gastrointestinal: Negative for diarrhea.   Musculoskeletal: Negative for joint pain.   Neurological: Negative for dizziness.   Psychiatric/Behavioral: The patient is not nervous/anxious.         Physical Exam  Temp:  [36.3 °C (97.3 °F)-37.3 °C (99.2 °F)] 37.1 °C (98.8 °F)  Pulse:  [67-77] 76  Resp:  [17-18] 18  BP: (115-130)/(56-74) 130/74  SpO2:  [94 %] 94 %    Physical Exam  Vitals signs and nursing note reviewed.   Constitutional:       Appearance: He is not diaphoretic.   HENT:      Mouth/Throat:      Pharynx: No oropharyngeal exudate or posterior oropharyngeal erythema.   Eyes:      Extraocular Movements: Extraocular movements intact.   Neck:      Vascular: No carotid bruit.   Cardiovascular:      Rate and Rhythm: Normal rate and regular rhythm.   Pulmonary:      Effort: Pulmonary effort is normal.      Breath sounds: No wheezing or rales.   Abdominal:      General: There is no distension.      Palpations: Abdomen is soft.      Tenderness: There is no abdominal tenderness.   Musculoskeletal:      Right lower leg: No edema.      Comments: Has left BKA.  Surgical wound healing well.   Skin:     General: Skin is warm and dry.   Neurological:      Mental Status: He is alert and oriented to person, place, and time.   Psychiatric:         Mood and Affect: Mood normal.         Behavior: Behavior normal.         Fluids    Intake/Output Summary (Last 24 hours) at 3/15/2020 0852  Last data filed at 3/15/2020 0700  Gross per 24 hour   Intake 480 ml   Output 1350 ml   Net -870 ml       Laboratory  Recent Labs      03/14/20  0544 03/15/20  0529   WBC 18.0* 25.1*   RBC 4.49* 4.36*   HEMOGLOBIN 12.8* 12.1*   HEMATOCRIT 41.5* 39.1*   MCV 92.4 89.7   MCH 28.5 27.8   MCHC 30.8* 30.9*   RDW 46.3 44.8   PLATELETCT 433 367   MPV 10.8 11.1                       Imaging    Assessment/Plan  * Status post below-knee amputation of left lower extremity (HCC)- (present on admission)  Assessment & Plan  Surgical wound appears to be healing well  Has some dusky erythema  Has a little drainage on the bandage    Leukocytosis  Assessment & Plan  Has been afebrile  WBC's: 12.5 (3/11) --> 18.0 (3/14) --> 25.1 (3/15)  Surgical wound healing well  On Bactrim for LUE phlebitis -- see other assessment  U/A (-)  CXR: unremarkable  Has occ loose stools -- will get C. Diff if recurs  Will get US of stump  Monitor    Vitamin D deficiency- (present on admission)  Assessment & Plan  Vit D: 14  On supplements    Phlebitis of left arm- (present on admission)  Assessment & Plan  Had swelling and mild erythema of LUE -- resolved (3/12)  Stump surgical wound appears to be healing well  US LUE: no DVT  Suspect 2nd to phlebitis and/or infiltration  Note: had a recent PICC line around that area that was pulled just recently  On Bactrim (thru 3/16)  Monitor    Peripheral neuropathy- (present on admission)  Assessment & Plan  On Neurontin    Diabetes mellitus due to underlying condition with hyperglycemia (HCC)- (present on admission)  Assessment & Plan  Hba1c: no levels in Epic -- but reported recently as 6.5   BS: 196-130  On Metformin: 250 mg bid (3/12)  On SS with decreased coverage -- will d/c (3/15)  Note: home meds include Metformin 1000 mg bid  Cont to monitor    History of DVT (deep vein thrombosis)- (present on admission)  Assessment & Plan  On Xarelto    Mixed hyperlipidemia- (present on admission)  Assessment & Plan  On Lipitor    Benign essential HTN- (present on admission)  Assessment & Plan  BP ok  On Atenolol: 100 mg daily  Monitor

## 2020-03-15 NOTE — PROGRESS NOTES
Received patient during shift change, report rec'd from day shift RN. Resting in bed, VS stable on room air. Continent of B&B, min-mod assist for transfers. A&O x 4, able to make needs known. Bed in low position, call light within reach.

## 2020-03-15 NOTE — CARE PLAN
Problem: Communication  Goal: The ability to communicate needs accurately and effectively will improve  Outcome: PROGRESSING AS EXPECTED     Problem: Safety  Goal: Will remain free from injury  Outcome: PROGRESSING AS EXPECTED     Problem: Bowel/Gastric:  Goal: Normal bowel function is maintained or improved  Outcome: PROGRESSING SLOWER THAN EXPECTED  Note: Pt had hard BM this morning, then this evening has a large loose accident.      Problem: Infection  Goal: Will remain free from infection  Note: Pt's WBC elevated. UA collected, chest xray complete. L BKA dressing changed. Scant dry serosanguinous drainage on old bandage, pink around incision, photos taken and uploaded.

## 2020-03-15 NOTE — FLOWSHEET NOTE
03/15/20 1123   Events/Summary/Plan   Events/Summary/Plan 02 spot check, pt using CPAP at night   Vital Signs   Pulse 74   Respiration 18   Pulse Oximetry 92 %   $ Pulse Oximetry (Spot Check) Yes   Respiratory Assessment   Level of Consciousness Alert   Chest Exam   Chest Observation Barrel Chest   Oxygen   O2 Delivery Device None - Room Air

## 2020-03-16 LAB
ALBUMIN SERPL BCP-MCNC: 3.2 G/DL (ref 3.2–4.9)
ALBUMIN/GLOB SERPL: 1 G/DL
ALP SERPL-CCNC: 83 U/L (ref 30–99)
ALT SERPL-CCNC: 9 U/L (ref 2–50)
ANION GAP SERPL CALC-SCNC: 11 MMOL/L (ref 7–16)
AST SERPL-CCNC: 16 U/L (ref 12–45)
BASOPHILS # BLD AUTO: 0.3 % (ref 0–1.8)
BASOPHILS # BLD: 0.07 K/UL (ref 0–0.12)
BILIRUB SERPL-MCNC: 0.4 MG/DL (ref 0.1–1.5)
BUN SERPL-MCNC: 24 MG/DL (ref 8–22)
C DIFF DNA SPEC QL NAA+PROBE: NORMAL
C DIFF TOX A+B STL QL IA: NEGATIVE
C DIFF TOX GENS STL QL NAA+PROBE: NORMAL
CALCIUM SERPL-MCNC: 8.4 MG/DL (ref 8.5–10.5)
CHLORIDE SERPL-SCNC: 110 MMOL/L (ref 96–112)
CO2 SERPL-SCNC: 13 MMOL/L (ref 20–33)
CREAT SERPL-MCNC: 1.49 MG/DL (ref 0.5–1.4)
EOSINOPHIL # BLD AUTO: 0.29 K/UL (ref 0–0.51)
EOSINOPHIL NFR BLD: 1.2 % (ref 0–6.9)
ERYTHROCYTE [DISTWIDTH] IN BLOOD BY AUTOMATED COUNT: 46.5 FL (ref 35.9–50)
GLOBULIN SER CALC-MCNC: 3.2 G/DL (ref 1.9–3.5)
GLUCOSE SERPL-MCNC: 116 MG/DL (ref 65–99)
HCT VFR BLD AUTO: 35.5 % (ref 42–52)
HGB BLD-MCNC: 11.2 G/DL (ref 14–18)
IMM GRANULOCYTES # BLD AUTO: 0.3 K/UL (ref 0–0.11)
IMM GRANULOCYTES NFR BLD AUTO: 1.2 % (ref 0–0.9)
LACTATE BLD-SCNC: 1.5 MMOL/L (ref 0.5–2)
LYMPHOCYTES # BLD AUTO: 1.69 K/UL (ref 1–4.8)
LYMPHOCYTES NFR BLD: 7 % (ref 22–41)
MAGNESIUM SERPL-MCNC: 1.7 MG/DL (ref 1.5–2.5)
MCH RBC QN AUTO: 28.7 PG (ref 27–33)
MCHC RBC AUTO-ENTMCNC: 31.5 G/DL (ref 33.7–35.3)
MCV RBC AUTO: 91 FL (ref 81.4–97.8)
MONOCYTES # BLD AUTO: 1.29 K/UL (ref 0–0.85)
MONOCYTES NFR BLD AUTO: 5.3 % (ref 0–13.4)
NEUTROPHILS # BLD AUTO: 20.66 K/UL (ref 1.82–7.42)
NEUTROPHILS NFR BLD: 85 % (ref 44–72)
NRBC # BLD AUTO: 0 K/UL
NRBC BLD-RTO: 0 /100 WBC
PHOSPHATE SERPL-MCNC: 3.4 MG/DL (ref 2.5–4.5)
PLATELET # BLD AUTO: 274 K/UL (ref 164–446)
PMV BLD AUTO: 11.2 FL (ref 9–12.9)
POTASSIUM SERPL-SCNC: 4.2 MMOL/L (ref 3.6–5.5)
PROT SERPL-MCNC: 6.4 G/DL (ref 6–8.2)
RBC # BLD AUTO: 3.9 M/UL (ref 4.7–6.1)
SODIUM SERPL-SCNC: 134 MMOL/L (ref 135–145)
WBC # BLD AUTO: 24.3 K/UL (ref 4.8–10.8)

## 2020-03-16 PROCEDURE — 84100 ASSAY OF PHOSPHORUS: CPT

## 2020-03-16 PROCEDURE — 85025 COMPLETE CBC W/AUTO DIFF WBC: CPT

## 2020-03-16 PROCEDURE — 770010 HCHG ROOM/CARE - REHAB SEMI PRIVAT*

## 2020-03-16 PROCEDURE — 99232 SBSQ HOSP IP/OBS MODERATE 35: CPT | Performed by: HOSPITALIST

## 2020-03-16 PROCEDURE — 87324 CLOSTRIDIUM AG IA: CPT

## 2020-03-16 PROCEDURE — 97110 THERAPEUTIC EXERCISES: CPT

## 2020-03-16 PROCEDURE — A9270 NON-COVERED ITEM OR SERVICE: HCPCS | Performed by: PHYSICAL MEDICINE & REHABILITATION

## 2020-03-16 PROCEDURE — 97535 SELF CARE MNGMENT TRAINING: CPT

## 2020-03-16 PROCEDURE — 83605 ASSAY OF LACTIC ACID: CPT

## 2020-03-16 PROCEDURE — 97116 GAIT TRAINING THERAPY: CPT

## 2020-03-16 PROCEDURE — A9270 NON-COVERED ITEM OR SERVICE: HCPCS | Performed by: HOSPITALIST

## 2020-03-16 PROCEDURE — 700102 HCHG RX REV CODE 250 W/ 637 OVERRIDE(OP): Performed by: PHYSICAL MEDICINE & REHABILITATION

## 2020-03-16 PROCEDURE — 99233 SBSQ HOSP IP/OBS HIGH 50: CPT | Performed by: PHYSICAL MEDICINE & REHABILITATION

## 2020-03-16 PROCEDURE — 87493 C DIFF AMPLIFIED PROBE: CPT

## 2020-03-16 PROCEDURE — 36415 COLL VENOUS BLD VENIPUNCTURE: CPT

## 2020-03-16 PROCEDURE — 94760 N-INVAS EAR/PLS OXIMETRY 1: CPT

## 2020-03-16 PROCEDURE — 83735 ASSAY OF MAGNESIUM: CPT

## 2020-03-16 PROCEDURE — 97530 THERAPEUTIC ACTIVITIES: CPT

## 2020-03-16 PROCEDURE — 80053 COMPREHEN METABOLIC PANEL: CPT

## 2020-03-16 PROCEDURE — 700102 HCHG RX REV CODE 250 W/ 637 OVERRIDE(OP): Performed by: HOSPITALIST

## 2020-03-16 RX ADMIN — ATORVASTATIN CALCIUM 20 MG: 10 TABLET, FILM COATED ORAL at 22:02

## 2020-03-16 RX ADMIN — GABAPENTIN 300 MG: 300 CAPSULE ORAL at 22:03

## 2020-03-16 RX ADMIN — RIVAROXABAN 20 MG: 10 TABLET, FILM COATED ORAL at 17:36

## 2020-03-16 RX ADMIN — ATENOLOL 100 MG: 25 TABLET ORAL at 04:28

## 2020-03-16 RX ADMIN — METFORMIN HYDROCHLORIDE 250 MG: 500 TABLET ORAL at 08:37

## 2020-03-16 RX ADMIN — MELATONIN 2000 UNITS: at 08:38

## 2020-03-16 RX ADMIN — MORPHINE SULFATE 15 MG: 15 TABLET, FILM COATED, EXTENDED RELEASE ORAL at 08:38

## 2020-03-16 RX ADMIN — SULFAMETHOXAZOLE AND TRIMETHOPRIM 1 TABLET: 800; 160 TABLET ORAL at 08:39

## 2020-03-16 RX ADMIN — GABAPENTIN 300 MG: 300 CAPSULE ORAL at 14:18

## 2020-03-16 RX ADMIN — MORPHINE SULFATE 15 MG: 15 TABLET, FILM COATED, EXTENDED RELEASE ORAL at 22:02

## 2020-03-16 RX ADMIN — GABAPENTIN 300 MG: 300 CAPSULE ORAL at 08:38

## 2020-03-16 RX ADMIN — METFORMIN HYDROCHLORIDE 250 MG: 500 TABLET ORAL at 17:35

## 2020-03-16 ASSESSMENT — ENCOUNTER SYMPTOMS
NAUSEA: 0
FEVER: 0
CHILLS: 0
ABDOMINAL PAIN: 0
VOMITING: 0
DIARRHEA: 0
SHORTNESS OF BREATH: 0
NERVOUS/ANXIOUS: 0

## 2020-03-16 NOTE — PROGRESS NOTES
[facility] Medicine Daily Progress Note    Date of Service  3/16/2020    Chief Complaint:  Hypertension  Diabetes  Leukocytosis  LUE swelling & erythema    Interval History:  No significant events or changes since last visit    Review of Systems  Review of Systems   Constitutional: Negative for chills and fever.   Respiratory: Negative for shortness of breath.    Cardiovascular: Negative for chest pain.   Gastrointestinal: Negative for abdominal pain, diarrhea, nausea and vomiting.   Psychiatric/Behavioral: The patient is not nervous/anxious.         Physical Exam  Temp:  [36.8 °C (98.3 °F)-36.9 °C (98.5 °F)] 36.8 °C (98.3 °F)  Pulse:  [54-88] 54  Resp:  [17-18] 18  BP: (101-113)/(55-64) 113/63  SpO2:  [91 %-92 %] 91 %    Physical Exam  Vitals signs and nursing note reviewed.   Constitutional:       Appearance: Normal appearance.   HENT:      Head: Atraumatic.   Eyes:      Conjunctiva/sclera: Conjunctivae normal.      Pupils: Pupils are equal, round, and reactive to light.   Neck:      Musculoskeletal: Normal range of motion.      Vascular: No carotid bruit.   Cardiovascular:      Rate and Rhythm: Normal rate and regular rhythm.      Heart sounds: No murmur.   Pulmonary:      Effort: Pulmonary effort is normal.      Breath sounds: No stridor. No wheezing or rales.   Abdominal:      General: There is no distension.      Palpations: Abdomen is soft.      Tenderness: There is no abdominal tenderness.   Musculoskeletal:      Right lower leg: No edema.      Comments: Has left BKA.  Surgical wound healing well.   Skin:     General: Skin is warm and dry.      Findings: No rash.   Neurological:      Mental Status: He is alert and oriented to person, place, and time.   Psychiatric:         Mood and Affect: Mood normal.         Behavior: Behavior normal.         Fluids    Intake/Output Summary (Last 24 hours) at 3/16/2020 0841  Last data filed at 3/16/2020 0431  Gross per 24 hour   Intake 840 ml   Output --   Net 840 ml        Laboratory  Recent Labs     03/14/20  0544 03/15/20  0529 03/16/20  0645   WBC 18.0* 25.1* 24.3*   RBC 4.49* 4.36* 3.90*   HEMOGLOBIN 12.8* 12.1* 11.2*   HEMATOCRIT 41.5* 39.1* 35.5*   MCV 92.4 89.7 91.0   MCH 28.5 27.8 28.7   MCHC 30.8* 30.9* 31.5*   RDW 46.3 44.8 46.5   PLATELETCT 433 367 274   MPV 10.8 11.1 11.2                       Imaging    Assessment/Plan  * Status post below-knee amputation of left lower extremity (HCC)- (present on admission)  Assessment & Plan  Surgical wound appears to be healing well  Has some dusky erythema  Has a little drainage on the bandage    Leukocytosis  Assessment & Plan  Has been afebrile  Denies cough  WBC's: 12.5 (3/11) --> 18.0 (3/14) --> 25.1 (3/15) --> 24.3 (3/16)  Surgical wound: clinically healing well, no discharge from wound  U/A (-)  CXR: unremarkable  US stump: there is extensive soft tissue edema in the area of concern;                    no well-defined loculated fluid collection identified                    there does appear to be phlegmonous change  Has occ loose stools -- but recently formed, soft   Note: on Bactrim for LUE phlebitis -- see other assessment  ? Etiology  Suggest eval by surgeon  Monitor    Vitamin D deficiency- (present on admission)  Assessment & Plan  Vit D: 14  On supplements    Phlebitis of left arm- (present on admission)  Assessment & Plan  Had swelling and mild erythema of LUE -- resolved   Stump surgical wound appears to be healing well  US LUE: no DVT  Suspect 2nd to phlebitis and/or infiltration  S/P Bactrim (3/16)  Note: had a recent PICC line around that area that was pulled just recently  Monitor    Peripheral neuropathy- (present on admission)  Assessment & Plan  On Neurontin    Diabetes mellitus due to underlying condition with hyperglycemia (HCC)- (present on admission)  Assessment & Plan  Hba1c: no levels in Epic -- but reported recently as 6.5   BS: have been good  On Metformin: 250 mg bid (3/12)  Off accuchecks  (3/15)  Note: home meds include Metformin 1000 mg bid  Cont to monitor    History of DVT (deep vein thrombosis)- (present on admission)  Assessment & Plan  On Xarelto    Mixed hyperlipidemia- (present on admission)  Assessment & Plan  On Lipitor    Benign essential HTN- (present on admission)  Assessment & Plan  BP ok  On Atenolol: 100 mg daily  Monitor

## 2020-03-16 NOTE — REHAB-PT IDT TEAM NOTE
Physical Therapy   Mobility  Bed mobility:   SBA  Bed /Chair/Wheelchair Transfer Initial:  4 - Minimal Assistance  Bed /Chair/Wheelchair Transfer Current:  5 - Standby Prompting/Supervision SBA FWW   Bed/Chair/Wheelchair Transfer Description:  Adaptive equipment, Increased time, Supervision for safety, Verbal cueing(seated in wc <> supine on mat table and wc > seated EOB transfer with FWW, SBA with VC for hand placement during sit >stand)  Walk Initial:  1 - Total Assistance  Walk Current:  1 - Total Assistance  //bars 10 ft x 2, CGA   Walk Description:  Assist device/equipment, Hand rail, Walker, Extra time, Safety concerns, Verbal cueing, Supervision for safety, Requires incidental assist(4x 10 ft with BUE support on // bars, 2x 10 ft with FWW in // bars, CGA with wc follow and VC for controlled landing and use of RLE for momentum)  Wheelchair Initial:  5 - Standby Prompting/Supervision or Set-up  Wheelchair Current:  5 - Standby Prompting/Supervision SPV, 250 ft   Wheelchair Description:  Adaptive equipment, Verbal cueing(self propelled wc 250 ft with BUE from room > back gym, SPV, VC for pathfinding)  Stairs Initial:  0 - Not tested,unsafe activity  Stairs Current: 0 - Not tested,unsafe activity   Stairs Description:    Patient/Family Training/Education:  In progress  DME/DC Recommendations:  TBD  Strengths:  Making steady progress towards goals, Motivated for self care and independence, Pleasant and cooperative and Willingly participates in therapeutic activities  Barriers:   Other: Bilateral BKA, impaired mobility, transfers fall risk  # of short term goals set= 6  # of short term goals met=2  Physical Therapy Problems     Problem: Mobility     Dates: Start: 03/11/20       Description:     Goal: STG-Within one week, patient will propel wheelchair community     Dates: Start: 03/11/20       Description: 1) Individualized goal:  Min A x 150 ft for outdoor terrain/ small ramps  2) Interventions:  PT Group Therapy,  PT E Stim Attended, PT Orthotics Training, PT Prosthetic Training, PT Gait Training, PT Self Care/Home Eval, PT Therapeutic Exercises, PT TENS Application, PT Neuro Re-Ed/Balance, PT Therapeutic Activity, PT Manual Therapy and PT Evaluation             Goal: STG-Within one week, patient will ambulate household distance     Dates: Start: 03/11/20       Description: 1) Individualized goal:  Min A with FWW 10 ft x 2  2) Interventions:  PT Group Therapy, PT E Stim Attended, PT Orthotics Training, PT Prosthetic Training, PT Gait Training, PT Self Care/Home Eval, PT Therapeutic Exercises, PT TENS Application, PT Neuro Re-Ed/Balance, PT Therapeutic Activity, PT Manual Therapy and PT Evaluation                   Problem: Mobility Transfers     Dates: Start: 03/11/20       Description:     Goal: STG-Within one week, patient will sit to stand     Dates: Start: 03/11/20       Description: 1) Individualized goal:  SBA with FWW  2) Interventions:  PT Group Therapy, PT E Stim Attended, PT Orthotics Training, PT Prosthetic Training, PT Gait Training, PT Self Care/Home Eval, PT Therapeutic Exercises, PT TENS Application, PT Neuro Re-Ed/Balance, PT Therapeutic Activity, PT Manual Therapy and PT Evaluation             Goal: STG-Within one week, patient will transfer bed to chair     Dates: Start: 03/11/20       Description: 1) Individualized goal:  SBA/ set-up with FWW vs wc level scoot  2) Interventions:  PT Group Therapy, PT E Stim Attended, PT Orthotics Training, PT Prosthetic Training, PT Gait Training, PT Self Care/Home Eval, PT Therapeutic Exercises, PT TENS Application, PT Neuro Re-Ed/Balance, PT Therapeutic Activity, PT Manual Therapy and PT Evaluation                   Problem: PT-Long Term Goals     Dates: Start: 03/11/20       Description:     Goal: LTG-By discharge, patient will propel wheelchair     Dates: Start: 03/11/20       Description: 1) Individualized goal:  Modified independent 150 ft x 2 indoors/ level  outdoors  2) Interventions:  PT Group Therapy, PT E Stim Attended, PT Orthotics Training, PT Prosthetic Training, PT Gait Training, PT Self Care/Home Eval, PT Therapeutic Exercises, PT TENS Application, PT Neuro Re-Ed/Balance, PT Therapeutic Activity, PT Manual Therapy and PT Evaluation             Goal: LTG-By discharge, patient will ambulate     Dates: Start: 03/11/20       Description: 1) Individualized goal:  SBA with FWW 25 ft x 2  2) Interventions:  PT Group Therapy, PT E Stim Attended, PT Orthotics Training, PT Prosthetic Training, PT Gait Training, PT Self Care/Home Eval, PT Therapeutic Exercises, PT TENS Application, PT Neuro Re-Ed/Balance, PT Therapeutic Activity, PT Manual Therapy and PT Evaluation           Goal: LTG-By discharge, patient will transfer one surface to another     Dates: Start: 03/11/20       Description: 1) Individualized goal:  Modified independent with FWW vs wc level  2) Interventions:  PT Group Therapy, PT E Stim Attended, PT Orthotics Training, PT Prosthetic Training, PT Gait Training, PT Self Care/Home Eval, PT Therapeutic Exercises, PT TENS Application, PT Neuro Re-Ed/Balance, PT Therapeutic Activity, PT Manual Therapy and PT Evaluation             Goal: LTG-By discharge, patient will transfer in/out of a car     Dates: Start: 03/11/20       Description: 1) Individualized goal:  SPV/ sset-up with FWW vs wc level  2) Interventions:  PT Group Therapy, PT E Stim Attended, PT Orthotics Training, PT Prosthetic Training, PT Gait Training, PT Self Care/Home Eval, PT Therapeutic Exercises, PT TENS Application, PT Neuro Re-Ed/Balance, PT Therapeutic Activity, PT Manual Therapy and PT Evaluation                           Section completed by:  RICARDO Geiger

## 2020-03-16 NOTE — THERAPY
"Occupational Therapy  Daily Treatment     Patient Name: Jose Mc  Age:  64 y.o., Sex:  male  Medical Record #: [id number]  Today's Date: 3/16/2020     Precautions  Precautions: Non Weight Bearing Left Lower Extremity, Fall Risk  Comments: L BKA    Safety   ADL Safety : Requires Supervision for Safety, Requires Physical Assist for Safety    Subjective    \"this is like the one my mom uses\" re: toileting aid     Objective    Edu pt re: toileting aids. Provided pt with toileting aid similar to tongs to trial and how to position on toilet - pt in agreement to trial. Demo provided re: tub transfer bench placement in walk-in shower to assist with ease of shower transfers and safety. Pt in agreement that the bench will be more stable than the shower stool that he currently owns.      03/16/20 0831   Sitting Upper Body Exercises   Upper Extremity Bike Level 8 Resistance  (20 min, 0 RB, 2.69 mile )   Interdisciplinary Plan of Care Collaboration   Patient Position at End of Therapy Seated;Call Light within Reach;Tray Table within Reach   OT Total Time Spent   OT Individual Total Time Spent (Mins) 60   OT Charge Group   OT Self Care / ADL 2   OT Therapeutic Exercise  2       Assessment    Continuing to provide edu re: bathroom modification/DME set-up. This session pt in agreement to using a tub transfer bench for his walk-in shower and pt is open to using toileting aid during rehab stay to trial that will improve with per-hygiene independence. Will continue to edu and discuss next on pt's toilet set-up and will receive measurements from family whether w/c or FWW will fit into bathroom    Plan    Cont overall strength/endurance and standing balance to improve ADL's and functional mobility    "

## 2020-03-16 NOTE — REHAB-NURSING IDT TEAM NOTE
Nursing   Nursing  Diet/Nutrition:  Diabetic and Thin Liquids  Medication Administration:  Whole with Liquid Wash  % consumed at meals in last 24 hours:  Consumed % of meals per documentation.  Meal/Snack Consumption for the past 24 hrs:   Oral Nutrition   03/15/20 1245 Lunch;Between % Consumed   03/15/20 0947 Breakfast;Between % Consumed     Snack schedule:    Supplement:  none  Appetite:  Good  Fluid Intake/Output in past 24 hours: In: 480 [P.O.:480]  Out: 850   Admit Weight:  Weight: (Abnormal) 150.6 kg (332 lb)  Weight Last 7 Days   [147 kg (324 lb 1.2 oz)-150.6 kg (332 lb)] 147 kg (324 lb 1.2 oz) (03/15 0517)    Pain Issues:    Location:  Leg (03/15 2001)  Left (03/15 2001)         Severity:  Mild   Scheduled pain medications:  gabapentin (NEURONTIN) ; MS Contin     PRN pain medications used in last 24 hours:  None   Non Pharmacologic Interventions:  warm blanket, distraction, emotional support, food, relaxation, repositioned and rest  Effectiveness of pain management plan:  good=patient states acceptable comfort after interventions    Bowel:    Bowel Assist Initial Score:  4 - Minimal Assistance  Bowel Assist Current Score:  4 - Minimal Assistance  Bowl Accidents in last 7 days:     Last bowel movement: 03/15/20  Stool Description: Large, Brown, Soft     Usual bowel pattern:  More than once a day  Scheduled bowel medications:  docusate sodium (COLACE) and senna-docusate (PERICOLACE or SENOKOT S)   PRN bowel medications used in last 24 hours:  None  Nursing Interventions:  Increased time, Scheduled medication, Supervision, Positioning on commode/toilet, Brief  Effectiveness of bowel program:   good=regular, predictable, controlled emptying of bowel  Bladder:    Bladder Assist Initial Score:  4 - Minimal Assistance  Bladder Assist Current Score:  4 - Minimal Assistance  Bladder Accidents in last 7 days:  0  PVR range for past 24-48 hours:  [25]   Intermittent Catheterization:  0  Medications  affecting bladder:  None    Time void schedule/voiding pattern:  Voiding every 2-4 hours  Interventions:  Increased time, Emptying of device, Urinal, Time void patient initiated  Effectiveness of bladder training:  Good=regular, predictable, emptying of bladder, patient initiates time voiding      Diabetes:  Blood Sugar Frequency:  Before meals and at bedtime    Range of BS for last 48 hours:     Recent Labs     03/14/20  0806 03/14/20  1120 03/14/20  1718 03/14/20  2002 03/15/20  0740   POCGLUCOSE 111* 96 104* 103* 133*     Scheduled diabetic medications:  None  Sliding scale usage in past 24 hours:  No  Total Short acting insulin in the past 24 hours:  None  Total Long acting insulin in the past 24 hours:  None    Wound:         Patient Lines/Drains/Airways Status    Active Current Wounds     Name: Placement date: Placement time: Site: Days:    Wound 03/10/20 Left left BKA  03/10/20   1600   no documentation  5                   Interventions:  Daily dressing change  Effectiveness of intervention:  wound is improving       Sleep/wake cycle:   Average hours slept:  Sleeps 4-6 hours without waking  Sleep medication usage:  Declines    Patient/Family Training/Education:  Bowel Management/Training, Diabetes Management, Diet/Nutrition, Fall Prevention, General Self Care, Medication Management, Pain Management, Respiratory Hygiene, Safe Transfers, Safety, Skin Care and Wound Care  Discharge Supply Recommendations:  Wound Care Supplies  Strengths: Alert and oriented, Willingly participates in therapeutic activities, Able to follow instructions, Supportive family, Pleasant and cooperative, Effective communication skills, Good carryover of learning, Motivated for self care and independence, Good endurance and Manages pain appropriately   Barriers:   Fatigue and Generalized weakness       Nursing Problems     Problem: Bowel/Gastric:     Description:     Goal: Normal bowel function is maintained or improved     Description:            Goal: Will not experience complications related to bowel motility     Description:                 Problem: Communication     Description:     Goal: The ability to communicate needs accurately and effectively will improve     Description:                 Problem: Discharge Barriers/Planning     Description:     Goal: Patient's continuum of care needs will be met     Description:                 Problem: Infection     Description:     Goal: Will remain free from infection     Description:                 Problem: Knowledge Deficit     Description:     Goal: Knowledge of disease process/condition, treatment plan, diagnostic tests, and medications will improve     Description:           Goal: Knowledge of the prescribed therapeutic regimen will improve     Description:                 Problem: Pain Management     Description:     Goal: Pain level will decrease to patient's comfort goal     Description:                 Problem: Psychosocial Needs:     Description:     Goal: Level of anxiety will decrease     Description:                 Problem: Respiratory:     Description:     Goal: Respiratory status will improve     Description:                 Problem: Safety     Description:     Goal: Will remain free from injury     Description:           Goal: Will remain free from falls     Description:                 Problem: Skin Integrity     Description:     Goal: Risk for impaired skin integrity will decrease     Description:                 Problem: Venous Thromboembolism (VTW)/Deep Vein Thrombosis (DVT) Prevention:     Description:     Goal: Patient will participate in Venous Thrombosis (VTE)/Deep Vein Thrombosis (DVT)Prevention Measures     Description:                        Long Term Goals:   At discharge patient will be able to function safely at home and in the community with support.    Section completed by:  Kristin Díaz R.N.

## 2020-03-16 NOTE — REHAB-CM IDT TEAM NOTE
Case Management    DC Planning  DC destination/dispostion:  Home with family    Referrals: TBD     DC Needs: Anticipate Home Health/DME     Barriers to discharge: Functional deficits       Strengths: Supportive family, positive attitude, motivated to gain independence again     Section completed by:  Celia Stallworth

## 2020-03-16 NOTE — PROGRESS NOTES
Rehab Progress Note     Date of Service: 3/16/2020  Chief Complaint: follow up BKA    Interval Events (Subjective)    Patient seen and examined in his room this morning. He denies any pain in his residual limb. He had loose stools over the weekend, resolved this morning. He has an elevated WBC, discussed with Dr. Curry. We are going to move up his appointment with his surgeon. US over the weekend did not show a fluid collection.    Objective:  VITAL SIGNS: /63   Pulse (!) 54   Temp 36.8 °C (98.3 °F) (Oral)   Resp 18   Ht 1.829 m (6')   Wt (!) 147 kg (324 lb 1.2 oz)   SpO2 91%   BMI 43.95 kg/m²   Gen: alert, no apparent distress  CV: regular rate and rhythm, no murmurs, no peripheral edema  Resp: clear to ascultation bilaterally, normal respiratory effort  GI: soft, non-tender abdomen, bowel sounds present  Msk: left transtibial amputation, IPOP      Recent Results (from the past 72 hour(s))   ACCU-CHEK GLUCOSE    Collection Time: 03/13/20  5:17 PM   Result Value Ref Range    Glucose - Accu-Ck 116 (H) 65 - 99 mg/dL   ACCU-CHEK GLUCOSE    Collection Time: 03/13/20  8:15 PM   Result Value Ref Range    Glucose - Accu-Ck 130 (H) 65 - 99 mg/dL   CBC WITH DIFFERENTIAL    Collection Time: 03/14/20  5:44 AM   Result Value Ref Range    WBC 18.0 (H) 4.8 - 10.8 K/uL    RBC 4.49 (L) 4.70 - 6.10 M/uL    Hemoglobin 12.8 (L) 14.0 - 18.0 g/dL    Hematocrit 41.5 (L) 42.0 - 52.0 %    MCV 92.4 81.4 - 97.8 fL    MCH 28.5 27.0 - 33.0 pg    MCHC 30.8 (L) 33.7 - 35.3 g/dL    RDW 46.3 35.9 - 50.0 fL    Platelet Count 433 164 - 446 K/uL    MPV 10.8 9.0 - 12.9 fL    Neutrophils-Polys 71.90 44.00 - 72.00 %    Lymphocytes 15.90 (L) 22.00 - 41.00 %    Monocytes 5.60 0.00 - 13.40 %    Eosinophils 3.90 0.00 - 6.90 %    Basophils 0.60 0.00 - 1.80 %    Immature Granulocytes 2.10 (H) 0.00 - 0.90 %    Nucleated RBC 0.00 /100 WBC    Neutrophils (Absolute) 12.96 (H) 1.82 - 7.42 K/uL    Lymphs (Absolute) 2.86 1.00 - 4.80 K/uL    Monos  (Absolute) 1.00 (H) 0.00 - 0.85 K/uL    Eos (Absolute) 0.70 (H) 0.00 - 0.51 K/uL    Baso (Absolute) 0.10 0.00 - 0.12 K/uL    Immature Granulocytes (abs) 0.37 (H) 0.00 - 0.11 K/uL    NRBC (Absolute) 0.00 K/uL   ACCU-CHEK GLUCOSE    Collection Time: 03/14/20  8:06 AM   Result Value Ref Range    Glucose - Accu-Ck 111 (H) 65 - 99 mg/dL   ACCU-CHEK GLUCOSE    Collection Time: 03/14/20 11:20 AM   Result Value Ref Range    Glucose - Accu-Ck 96 65 - 99 mg/dL   URINALYSIS    Collection Time: 03/14/20  5:00 PM   Result Value Ref Range    Color Yellow     Character Cloudy (A)     Specific Gravity 1.019 <1.035    Ph 5.0 5.0 - 8.0    Glucose Negative Negative mg/dL    Ketones Negative Negative mg/dL    Protein Negative Negative mg/dL    Bilirubin Negative Negative    Urobilinogen, Urine 0.2 Negative    Nitrite Negative Negative    Leukocyte Esterase Trace (A) Negative    Occult Blood Negative Negative    Micro Urine Req Microscopic    URINE MICROSCOPIC (W/UA)    Collection Time: 03/14/20  5:00 PM   Result Value Ref Range    WBC 0-2 (A) /hpf    RBC 2-5 (A) /hpf    Bacteria Negative None /hpf    Epithelial Cells Negative /hpf    Uric Acid Crystal Positive /hpf    Hyaline Cast 0-2 /lpf   ACCU-CHEK GLUCOSE    Collection Time: 03/14/20  5:18 PM   Result Value Ref Range    Glucose - Accu-Ck 104 (H) 65 - 99 mg/dL   ACCU-CHEK GLUCOSE    Collection Time: 03/14/20  8:02 PM   Result Value Ref Range    Glucose - Accu-Ck 103 (H) 65 - 99 mg/dL   CBC WITH DIFFERENTIAL    Collection Time: 03/15/20  5:29 AM   Result Value Ref Range    WBC 25.1 (H) 4.8 - 10.8 K/uL    RBC 4.36 (L) 4.70 - 6.10 M/uL    Hemoglobin 12.1 (L) 14.0 - 18.0 g/dL    Hematocrit 39.1 (L) 42.0 - 52.0 %    MCV 89.7 81.4 - 97.8 fL    MCH 27.8 27.0 - 33.0 pg    MCHC 30.9 (L) 33.7 - 35.3 g/dL    RDW 44.8 35.9 - 50.0 fL    Platelet Count 367 164 - 446 K/uL    MPV 11.1 9.0 - 12.9 fL    Neutrophils-Polys 85.30 (H) 44.00 - 72.00 %    Lymphocytes 6.30 (L) 22.00 - 41.00 %    Monocytes  5.20 0.00 - 13.40 %    Eosinophils 1.20 0.00 - 6.90 %    Basophils 0.60 0.00 - 1.80 %    Immature Granulocytes 1.40 (H) 0.00 - 0.90 %    Nucleated RBC 0.00 /100 WBC    Neutrophils (Absolute) 21.39 (H) 1.82 - 7.42 K/uL    Lymphs (Absolute) 1.59 1.00 - 4.80 K/uL    Monos (Absolute) 1.30 (H) 0.00 - 0.85 K/uL    Eos (Absolute) 0.31 0.00 - 0.51 K/uL    Baso (Absolute) 0.14 (H) 0.00 - 0.12 K/uL    Immature Granulocytes (abs) 0.34 (H) 0.00 - 0.11 K/uL    NRBC (Absolute) 0.00 K/uL   HEMOGLOBIN A1C    Collection Time: 03/15/20  5:29 AM   Result Value Ref Range    Glycohemoglobin 6.8 (H) 0.0 - 5.6 %    Est Avg Glucose 148 mg/dL   ACCU-CHEK GLUCOSE    Collection Time: 03/15/20  7:40 AM   Result Value Ref Range    Glucose - Accu-Ck 133 (H) 65 - 99 mg/dL   CBC WITH DIFFERENTIAL    Collection Time: 03/16/20  6:45 AM   Result Value Ref Range    WBC 24.3 (H) 4.8 - 10.8 K/uL    RBC 3.90 (L) 4.70 - 6.10 M/uL    Hemoglobin 11.2 (L) 14.0 - 18.0 g/dL    Hematocrit 35.5 (L) 42.0 - 52.0 %    MCV 91.0 81.4 - 97.8 fL    MCH 28.7 27.0 - 33.0 pg    MCHC 31.5 (L) 33.7 - 35.3 g/dL    RDW 46.5 35.9 - 50.0 fL    Platelet Count 274 164 - 446 K/uL    MPV 11.2 9.0 - 12.9 fL    Neutrophils-Polys 85.00 (H) 44.00 - 72.00 %    Lymphocytes 7.00 (L) 22.00 - 41.00 %    Monocytes 5.30 0.00 - 13.40 %    Eosinophils 1.20 0.00 - 6.90 %    Basophils 0.30 0.00 - 1.80 %    Immature Granulocytes 1.20 (H) 0.00 - 0.90 %    Nucleated RBC 0.00 /100 WBC    Neutrophils (Absolute) 20.66 (H) 1.82 - 7.42 K/uL    Lymphs (Absolute) 1.69 1.00 - 4.80 K/uL    Monos (Absolute) 1.29 (H) 0.00 - 0.85 K/uL    Eos (Absolute) 0.29 0.00 - 0.51 K/uL    Baso (Absolute) 0.07 0.00 - 0.12 K/uL    Immature Granulocytes (abs) 0.30 (H) 0.00 - 0.11 K/uL    NRBC (Absolute) 0.00 K/uL   MAGNESIUM    Collection Time: 03/16/20  6:45 AM   Result Value Ref Range    Magnesium 1.7 1.5 - 2.5 mg/dL   PHOSPHORUS    Collection Time: 03/16/20  6:45 AM   Result Value Ref Range    Phosphorus 3.4 2.5 - 4.5  mg/dL   LACTIC ACID    Collection Time: 03/16/20  6:45 AM   Result Value Ref Range    Lactic Acid 1.5 0.5 - 2.0 mmol/L   Comp Metabolic Panel    Collection Time: 03/16/20  6:45 AM   Result Value Ref Range    Sodium 134 (L) 135 - 145 mmol/L    Potassium 4.2 3.6 - 5.5 mmol/L    Chloride 110 96 - 112 mmol/L    Co2 13 (L) 20 - 33 mmol/L    Anion Gap 11.0 7.0 - 16.0    Glucose 116 (H) 65 - 99 mg/dL    Bun 24 (H) 8 - 22 mg/dL    Creatinine 1.49 (H) 0.50 - 1.40 mg/dL    Calcium 8.4 (L) 8.5 - 10.5 mg/dL    AST(SGOT) 16 12 - 45 U/L    ALT(SGPT) 9 2 - 50 U/L    Alkaline Phosphatase 83 30 - 99 U/L    Total Bilirubin 0.4 0.1 - 1.5 mg/dL    Albumin 3.2 3.2 - 4.9 g/dL    Total Protein 6.4 6.0 - 8.2 g/dL    Globulin 3.2 1.9 - 3.5 g/dL    A-G Ratio 1.0 g/dL   ESTIMATED GFR    Collection Time: 03/16/20  6:45 AM   Result Value Ref Range    GFR If  57 (A) >60 mL/min/1.73 m 2    GFR If Non  47 (A) >60 mL/min/1.73 m 2       Current Facility-Administered Medications   Medication Frequency   • docusate sodium (COLACE) capsule 100 mg BID   • metFORMIN (GLUCOPHAGE) tablet 250 mg BID WITH MEALS   • vitamin D (cholecalciferol) tablet 2,000 Units DAILY   • glucose 4 g chewable tablet 16 g Q15 MIN PRN    And   • dextrose 50% (D50W) injection 50 mL Q15 MIN PRN   • Respiratory Therapy Consult Continuous RT   • Pharmacy Consult Request ...Pain Management Review 1 Each PHARMACY TO DOSE   • acetaminophen (TYLENOL) tablet 650 mg Q4HRS PRN   • senna-docusate (PERICOLACE or SENOKOT S) 8.6-50 MG per tablet 2 Tab BID    And   • polyethylene glycol/lytes (MIRALAX) PACKET 1 Packet QDAY PRN    And   • magnesium hydroxide (MILK OF MAGNESIA) suspension 30 mL QDAY PRN    And   • bisacodyl (DULCOLAX) suppository 10 mg QDAY PRN   • artificial tears ophthalmic solution 1 Drop PRN   • benzocaine-menthol (CEPACOL) lozenge 1 Lozenge Q2HRS PRN   • mag hydrox-al hydrox-simeth (MAALOX PLUS ES or MYLANTA DS) suspension 20 mL Q2HRS PRN   •  ondansetron (ZOFRAN ODT) dispertab 4 mg 4X/DAY PRN    Or   • ondansetron (ZOFRAN) syringe/vial injection 4 mg 4X/DAY PRN   • traZODone (DESYREL) tablet 50 mg QHS PRN   • sodium chloride (OCEAN) 0.65 % nasal spray 2 Spray PRN   • hydrOXYzine HCl (ATARAX) tablet 50 mg Q6HRS PRN   • melatonin tablet 3 mg HS PRN   • atenolol (TENORMIN) tablet 100 mg Q DAY   • atorvastatin (LIPITOR) tablet 20 mg Q EVENING   • gabapentin (NEURONTIN) capsule 300 mg TID   • tramadol (ULTRAM) 50 MG tablet 50 mg Q4HRS PRN   • oxyCODONE immediate-release (ROXICODONE) tablet 5 mg Q4HRS PRN   • lactulose 20 GM/30ML solution 30 mL QDAY PRN   • docusate sodium (ENEMEEZ) enema 283 mg QDAY PRN   • rivaroxaban (XARELTO) tablet 20 mg PM MEAL   • morphine ER (MS CONTIN) tablet 15 mg Q12HRS   • oxyCODONE-acetaminophen (PERCOCET) 5-325 MG per tablet 1 Tab Q4HRS PRN       Orders Placed This Encounter   Procedures   • Diet Order Diabetic     Standing Status:   Standing     Number of Occurrences:   1     Order Specific Question:   Diet:     Answer:   Diabetic [3]       Assessment:  Active Hospital Problems    Diagnosis   • *Status post below-knee amputation of left lower extremity (HCC)   • Leukocytosis   • Vitamin D deficiency   • Impaired mobility and ADLs   • Diabetes mellitus due to underlying condition with hyperglycemia (HCC)   • Peripheral neuropathy   • Obstructive sleep apnea syndrome   • Benign essential HTN   • Mixed hyperlipidemia   • History of DVT (deep vein thrombosis)     This patient is a 64 y.o. male admitted for acute inpatient rehabilitation with Status post below-knee amputation of left lower extremity (HCC).    I led and attended the weekly conference today, and agree with the IDT conference documentation and plan of care as noted below.    Date of conference: 3/16/2020    Goals and barriers: See IDT note.    Biggest barriers: obesity    Goals in next week: home evaluation    Therapy update 3/16/2020  Supervision eating  Modified  Independent grooming  Min assist bathing  Supervision upper body dressing  Min assist lower body dressing  Min assist toileting  Min assistbladder  Min assist bowel  Supervision bed/chair transfer  Supervision toilet transfer  Min assist tub/shower transfer  Total assist ambulation  Supervision wheelchair propulsion  Not tested stairs  Modified Independent comprehension  Modified Independent expression  Independent social interaction  Supervision problem solving  Supervision memory    CM/social support: siblings supportive    Anticipated DC date: 3/26/2020    Home health: PT/OT/RN    Equip: WC, FWW    Follow up: PCP, surgery     Medical Decision Making and Plan:    Left BKA  3/6 Dr. Ceja  Daily wound care  PT/OT, 1.5 hr each discipline, 5 days per week  Outpatient follow up with surgery, scheduled for tomorrow    Chronic opiate use  Continue home medications  Morphine ER 15 mg BID  Percocets 5-325 PRN     Peripheral Neuropathy  Phantom sensation  Continue gabapentin  Monitor need to increase  Currently well controlled     Opioid induced constipation  Continue bowel meds  Last BM 3/16    Bladder  Check PVRs  Not retaining  ICP for over 400 cc  Scheduled toileting    DVT prophylaxis  Xarelto     Appreciate assistance of hospitalist with his medical co-morbidities:     Hypertension, atenolol  Diabetes with hyperglycemia, metformin, SSI  Sleep apnea, CPAP  Hyperlipidemia, statin  History of DVT, Xarelto  Left arm erythema/edema/?cellulitis, s/p Bactrim  Leukocytosis, continues, unclear etiology, US of residual limb without fluid collection  Anemia  Vit D deficiency, on supplementation    Total time:  40 minutes.  I spent greater than 50% of the time for patient care, counseling, and coordination on this date, including patient face-to face time, unit/floor time with review of records/pertinent lab data and studies, as well as discussing diagnostic evaluation/work up, planned therapeutic interventions, and future  disposition of care, as per the interval events/subjective and the assessment and plan as noted above.      Sophia Beck M.D.   Physical Medicine and Rehabilitation

## 2020-03-16 NOTE — REHAB-OT IDT TEAM NOTE
Occupational Therapy   Activities of Daily Living  Eating Initial:  5 - Standby Prompting/Supervision or Set-up  Eating Current:  5 - Standby Prompting/Supervision or Set-up   Eating Description:  Supervision for safety, Verbal cueing, Set-up of equipment or meal/tube feeding, Increased time  Grooming Initial:  5 - Standby Prompting/Supervision or Set-up  Grooming Current:  6 - Modified Independent   Grooming Description:  (seated washing hands at sink)  Bathing Initial:  0 - Not tested, patient refused  Bathing Current:  4 - Minimal Assistance   Bathing Description:  Grab bar, Tub bench, Hand held shower, Increased time, Supervision for safety, Verbal cueing(Min A to wash buttocks)  Upper Body Dressing Initial:  5 - Standby Prompting/Supervision or Set-up  Upper Body Dressing Current:  5 - Standby Prompting/Supervision or Set-up   Upper Body Dressing Description:  Supervision for safety, Verbal cueing, Set-up of equipment, Increased time  Lower Body Dressing Initial:  2 - Max Assistance  Lower Body Dressing Current:  4 - Minimal Assistance   Lower Body Dressing Description:  4 - Minimal Assistance  Toileting Initial:  1 - Total Assistance  Toileting Current:  4 - Minimal Assistance   Toileting Description:  Grab bar, Increased time, Supervision for safety  Toilet Transfer Initial:  4 - Minimal Assistance  Toilet Transfer Current:  5 - Standby Prompting/Supervision or Set-up   Toilet Transfer Description:  5 - Standby Prompting/Supervision or Set-up  Tub / Shower Transfer Initial:  4 - Minimal Assistance  Tub / Shower Transfer Current:  4 - Minimal Assistance   Tub / Shower Transfer Description:  Grab bar, Increased time, Verbal cueing, Supervision for safety, Set-up of equipment(stand pivot w/c<>tub transfer bench with GB)  IADL:  SBA for laundry at w/c level, Min v/c's for w/c safety when reaching   Family Training/Education:  TBD  DME/DC Recommendations: pt has walk in shower and GB's in shower. Recommend tub  transfer bench, possibly commode over toilet, and GB's adjacent to toilet     Strengths:  Able to follow instructions, Adequate strength, Alert and oriented, Effective communication skills, Independent PLOF, Making steady progress towards goals, Motivated for self care and independence, Pleasant and cooperative, Supportive family and Willingly participates in therapeutic activities  Barriers:  Decreased endurance, Generalized weakness, Home accessibility, Poor activity tolerance and Poor balance     # of short term goals set= 3    # of short term goals met= 3/3     Occupational Therapy Goals     Problem: Bathing     Dates: Start: 03/16/20       Description:     Goal: STG-Within one week, patient will bathe     Dates: Start: 03/16/20       Description: 1) Individualized Goal: With SBA using DME/AE as needed  2) Interventions: OT Group Therapy, OT Self Care/ADL, OT Community Reintegration, OT Neuro Re-Ed/Balance, OT Therapeutic Activity and OT Therapeutic Exercise                   Problem: Dressing     Dates: Start: 03/16/20       Description:     Goal: STG-Within one week, patient will dress LB     Dates: Start: 03/16/20       Description: 1) Individualized Goal: With SBA using DME/AE as needed  2) Interventions: OT Group Therapy, OT Self Care/ADL, OT Community Reintegration, OT Neuro Re-Ed/Balance, OT Therapeutic Activity and OT Therapeutic Exercise                   Problem: OT Long Term Goals     Dates: Start: 03/11/20       Description:     Goal: LTG-By discharge, patient will complete basic self care tasks     Dates: Start: 03/11/20       Description: 1) Individualized Goal: With Mod I using DME/AE as needed  2) Interventions: OT Group Therapy, OT Self Care/ADL, OT Community Reintegration, OT Neuro Re-Ed/Balance, OT Therapeutic Activity and OT Therapeutic Exercise             Goal: LTG-By discharge, patient will perform bathroom transfers     Dates: Start: 03/11/20       Description: 1) Individualized Goal:  With Mod I using DME/AE as needed  2) Interventions: OT Group Therapy, OT Self Care/ADL, OT Community Reintegration, OT Neuro Re-Ed/Balance, OT Therapeutic Activity and OT Therapeutic Exercise                 Problem: Toileting     Dates: Start: 03/16/20       Description:     Goal: STG-Within one week, patient will complete toileting tasks     Dates: Start: 03/16/20       Description: 1) Individualized Goal: With SBA using DME/AE as needed  2) Interventions: OT Group Therapy, OT Self Care/ADL, OT Community Reintegration, OT Neuro Re-Ed/Balance, OT Therapeutic Activity and OT Therapeutic Exercise                         Section completed by:  Radha Pierre MS,OTR/L

## 2020-03-16 NOTE — THERAPY
"Physical Therapy   Daily Treatment     Patient Name: Jose Mc  Age:  64 y.o., Sex:  male  Medical Record #: [id number]  Today's Date: 3/16/2020     Precautions  Precautions: (P) Non Weight Bearing Left Lower Extremity, Fall Risk  Comments: (P) L BKA    Subjective    Pt seated in room, agreeable to PT. \"I just went to the bathroom so we're good.\"     Objective       03/16/20 1031   Precautions   Precautions Non Weight Bearing Left Lower Extremity;Fall Risk   Comments L BKA   Cognition    Level of Consciousness Alert   Supine Lower Body Exercise   Bridges Two Legged  (8 x during supine mobility, bolster to L BKA)   Other Exercises Prone lying hip flexor stretch 2 x 1 min for LLE; Sidelying hip ABD, hip ABD + Ext, and forward/backward hip circles 1 set 10 reps each, performed bilaterally; pt instructed in single knee to chest stretch    Sitting Lower Body Exercises   Sit to Stand Other Resistance (See Comments)  (1 set of 5 with FWW )   Bed Mobility    Supine to Sit Stand by Assist   Sit to Supine Stand by Assist   Sit to Stand Contact Guard Assist   Scooting Stand by Assist   Rolling Supervised   Interdisciplinary Plan of Care Collaboration   IDT Collaboration with  Occupational Therapist   Patient Position at End of Therapy Seated;Edge of Bed;Call Light within Reach   Collaboration Comments Re: CLOF     FIM Bed/Chair/Wheelchair Transfers Score: 5 - Standby Prompting/Supervision or Set-up  Bed/Chair/Wheelchair Transfers Description:  Adaptive equipment, Increased time, Supervision for safety, Verbal cueing(seated in wc <> supine on mat table and wc > seated EOB transfer with FWW, SBA with VC for hand placement during sit >stand)    FIM Walking Score:  1 - Total Assistance  Walking Description:  Assist device/equipment, Hand rail, Walker, Extra time, Safety concerns, Verbal cueing, Supervision for safety, Requires incidental assist(4x 10 ft with BUE support on // bars, 2x 10 ft with FWW in // bars (with WC " follow), CGA with VC for controlled descent and use of RLE for momentum)    FIM Wheelchair Score:  5 - Standby Prompting/Supervision or Set-up  Wheelchair Description:  Adaptive equipment, Verbal cueing(self propelled wc 250 ft with BUE from room > back gym, SPV, VC for pathfinding)    Assessment    Pt participatory in therapy. Progressed to ambulating with FWW in // bars with wc follow, pt tolerated well and highly motivated to become more independent. Pt tolerated hip strengthening well and will benefit from continued hip and LE strengthening.    Plan    L BKA education/ positioning/ desensitization training, LE strength training and stretching, progressive mobility at wc and bariatric FWW level of function, continue gait training with bariatric FWW.

## 2020-03-16 NOTE — THERAPY
"Occupational Therapy  Daily Treatment     Patient Name: Jose Mc  Age:  64 y.o., Sex:  male  Medical Record #: [id number]  Today's Date: 3/16/2020     Precautions  Precautions: Non Weight Bearing Left Lower Extremity, Fall Risk  Comments: L BKA    Safety   ADL Safety : Requires Supervision for Safety, Requires Physical Assist for Safety    Subjective    \"I can feel the burn\"      Objective       03/16/20 1231   Sitting Upper Body Exercises   Lat Press 3 sets of 10;Bilateral;Weight (See Comments for lbs)  (40# rickshaw)   Bilateral Row 3 sets of 10;Bilateral;Weight (See Comments for lbs)  (25# equalizer)   Bicep Curls 3 sets of 10;Bilateral;Weight (See Comments for lbs)  (25# equalizer)   Tricep Press 3 sets of 10;Bilateral;Weight (See Comments for lbs)  (25# rickshaw)   Interdisciplinary Plan of Care Collaboration   Patient Position at End of Therapy Seated;Call Light within Reach;Tray Table within Reach;Self Releasing Lap Belt Applied   OT Total Time Spent   OT Individual Total Time Spent (Mins) 30   OT Charge Group   OT Therapeutic Exercise  2       Assessment    Pt completed UB therex to the best of their abilities with no complaints of pain    Plan    Cont overall strength/endurance and standing balance to improve ADL's and functional mobility    "

## 2020-03-16 NOTE — THERAPY
Physical Therapy   Daily Treatment     Patient Name: Jose Mc  Age:  64 y.o., Sex:  male  Medical Record #: [id number]  Today's Date: 3/16/2020     Precautions  Precautions: Non Weight Bearing Left Lower Extremity, Fall Risk  Comments: L BKA    Subjective    I am tired today. Can we check my laundry?     Objective     03/16/20 0931   Precautions   Precautions Non Weight Bearing Left Lower Extremity;Fall Risk   Comments L BKA   Pain   Intervention Declines   Non Verbal Descriptors   Non Verbal Scale  Calm   Cognition    Level of Consciousness Alert   Supine Lower Body Exercise   Bridges 3 sets of 10;Two Legged  (green bolster for improved hip extension)   Other Exercises glut sets, 10 x 3, hold 5 sec with each rep   Sitting Lower Body Exercises   Hip Adduction 3 sets of 10  (squeezing pillow, 5 sec isometric hold each rep)   Other Exercises 10 x 3 oblique crunches with medicine ball, 6 lbs, unsupported sitting, patient tapping ball on stack of 3 kickboards left and right   Bed Mobility    Supine to Sit Stand by Assist   Sit to Supine Stand by Assist   Scooting Supervised   Neuro-Muscular Treatments   Neuro-Muscular Treatments Sequencing;Co-Contraction   Interdisciplinary Plan of Care Collaboration   IDT Collaboration with  Certified Nursing Assistant   Patient Position at End of Therapy Seated   Collaboration Comments patient requesting transfer back to bed shortly   PT Total Time Spent   PT Individual Total Time Spent (Mins) 30   PT Charge Group   PT Therapeutic Exercise 2       FIM Bed/Chair/Wheelchair Transfers Score: 5 - Standby Prompting/Supervision or Set-up  Bed/Chair/Wheelchair Transfers Description:  Supervision for safety, Increased time, Verbal cueing, Adaptive equipment(SBA squat pivot WC to mat, R arm rest removed, mat table loser than WC; supervised for positioning on mat)    FIM Wheelchair Score:  5 - Standby Prompting/Supervision or Set-up  Wheelchair Description:  Adaptive equipment, Verbal  cueing      Assessment    Patient able to complete transfers from right side to mat, and from left side to WC. Patient demonstrated good safety awareness with transfers, removing arm rests, positioning of WC. Good form with supine and seated exercises for L BKA.    Plan    L BKA education/ positioning/ desensitization training, LE strength training, progressive mobility at wc and bariatric FWW level of function.

## 2020-03-16 NOTE — RESPIRATORY CARE
Conscious Sedation Respiratory Update       O2 (LPM): 0 (03/16/20 1250)       Events/Summary/Plan: SpO2 check on RA.  Pt uses his CPAP every night and really likes it. (03/16/20 1250)

## 2020-03-16 NOTE — CARE PLAN
Problem: Safety  Goal: Will remain free from injury  Outcome: PROGRESSING AS EXPECTED  Patient with R BKA & is high risk for falls, reminded to call for assist, patient compliant.     Problem: Infection  Goal: Will remain free from infection  Outcome: PROGRESSING AS EXPECTED  Dressing to left leg BKA done, site reddened with swelling, remains on ABT for infection.

## 2020-03-16 NOTE — REHAB-COLLABORATIVE ONGOING IDT TEAM NOTE
Weekly Interdisciplinary Team Conference Note    Weekly Interdisciplinary Team Conference # 1   Date:  3/16/2020    Clinicians present and reporting at team conference include the following:   MD: Sophia Beck MD   RN:  Adelita Chamberlain RN   PT:   Hugo Tovar PT, MPT, MEd  OT:  Radha Pierre MS, OT/L   ST:  None  CM:  Celia Stallworth MS, LSW  REC:  None  RT:  None  RPh:  [facility]  Other:   None  All reporting clinicians have a working knowledge of this patient's plan of care.    Targeted DC Date:  3/26/2020     Medical    Patient Active Problem List    Diagnosis Date Noted   • Leukocytosis 03/14/2020   • Phlebitis of left arm 03/11/2020   • Vitamin D deficiency 03/11/2020   • Impaired mobility and ADLs 03/11/2020   • Diabetes mellitus due to underlying condition with hyperglycemia (Formerly [facility]) 03/10/2020   • Peripheral neuropathy 03/10/2020   • Status post below-knee amputation of left lower extremity (Formerly [facility]) 03/10/2020   • Obstructive sleep apnea syndrome 03/10/2020   • Benign essential HTN 01/21/2020   • Controlled type 2 diabetes mellitus without complication, without long-term current use of insulin (Formerly [facility]) 01/21/2020   • Mixed hyperlipidemia 01/21/2020   • Type 2 diabetes mellitus with right diabetic foot ulcer (Formerly [facility]) 01/21/2020   • History of DVT (deep vein thrombosis) 01/21/2020   • Chronic anticoagulation 01/21/2020   • Lumbar radicular syndrome 01/21/2020     Results     Procedure Component Value Ref Range Date/Time    C Diff Toxin [[id number]] Collected:  03/16/20 0100    Order Status:  Completed Lab Status:  Final result Updated:  03/16/20 1335     C.Diff Toxin A&B Negative     Comment: Treatment for C. difficile not recommended.  TOXIN NEGATIVE  Toxin negative by EIA; C. difficile detected by PCR.  Indicates colonization, infection unlikely. If clinical  suspicion persists, consider ID or GI Consult.  Repeat testing not indicated within 7 days.         Narrative:       Collected By:[id number] CHAI BATRES  F  Does this patient have risk factors for C-diff?->Yes    Cdiff By PCR Rflx Toxin [[id number]] Collected:  03/16/20 0100    Order Status:  Completed Lab Status:  Final result Updated:  03/16/20 1333    Specimen:  Stool      C Diff by PCR See Toxin Negative      027-NAP1-BI Presumptive See Toxin Negative      Comment: Presumptive 027/NAP1/BI target DNA sequences are DETECTED.       Narrative:       Collected By:[id number] GIUSEPPESCOTT BATRES F  Does this patient have risk factors for C-diff?->Yes    ESTIMATED GFR [[id number]]  (Abnormal) Collected:  03/16/20 0645    Order Status:  Completed Lab Status:  Final result Updated:  03/16/20 1207     GFR If African American 57 >60 mL/min/1.73 m 2      GFR If Non  47 >60 mL/min/1.73 m 2     MAGNESIUM [[id number]] Collected:  03/16/20 0645    Order Status:  Completed Lab Status:  Final result Updated:  03/16/20 1207    Specimen:  Blood      Magnesium 1.7 1.5 - 2.5 mg/dL     PHOSPHORUS [[id number]] Collected:  03/16/20 0645    Order Status:  Completed Lab Status:  Final result Updated:  03/16/20 1207    Specimen:  Blood      Phosphorus 3.4 2.5 - 4.5 mg/dL     Comp Metabolic Panel [[id number]]  (Abnormal) Collected:  03/16/20 0645    Order Status:  Completed Lab Status:  Final result Updated:  03/16/20 1207    Specimen:  Blood      Sodium 134 135 - 145 mmol/L      Potassium 4.2 3.6 - 5.5 mmol/L      Chloride 110 96 - 112 mmol/L      Co2 13 20 - 33 mmol/L      Anion Gap 11.0 7.0 - 16.0      Glucose 116 65 - 99 mg/dL      Bun 24 8 - 22 mg/dL      Creatinine 1.49 0.50 - 1.40 mg/dL      Calcium 8.4 8.5 - 10.5 mg/dL      AST(SGOT) 16 12 - 45 U/L      ALT(SGPT) 9 2 - 50 U/L      Alkaline Phosphatase 83 30 - 99 U/L      Total Bilirubin 0.4 0.1 - 1.5 mg/dL      Albumin 3.2 3.2 - 4.9 g/dL      Total Protein 6.4 6.0 - 8.2 g/dL      Globulin 3.2 1.9 - 3.5 g/dL      A-G Ratio 1.0 g/dL     LACTIC ACID [[id number]] Collected:  03/16/20 0645    Order Status:  Completed Lab Status:  Final  result Updated:  03/16/20 0720    Specimen:  Blood      Lactic Acid 1.5 0.5 - 2.0 mmol/L     CBC WITH DIFFERENTIAL [[id number]]  (Abnormal) Collected:  03/16/20 0645    Order Status:  Completed Lab Status:  Final result Updated:  03/16/20 0709    Specimen:  Blood      WBC 24.3 4.8 - 10.8 K/uL      RBC 3.90 4.70 - 6.10 M/uL      Hemoglobin 11.2 14.0 - 18.0 g/dL      Hematocrit 35.5 42.0 - 52.0 %      MCV 91.0 81.4 - 97.8 fL      MCH 28.7 27.0 - 33.0 pg      MCHC 31.5 33.7 - 35.3 g/dL      RDW 46.5 35.9 - 50.0 fL      Platelet Count 274 164 - 446 K/uL      MPV 11.2 9.0 - 12.9 fL      Neutrophils-Polys 85.00 44.00 - 72.00 %      Lymphocytes 7.00 22.00 - 41.00 %      Monocytes 5.30 0.00 - 13.40 %      Eosinophils 1.20 0.00 - 6.90 %      Basophils 0.30 0.00 - 1.80 %      Immature Granulocytes 1.20 0.00 - 0.90 %      Nucleated RBC 0.00 /100 WBC      Neutrophils (Absolute) 20.66 1.82 - 7.42 K/uL      Comment: Includes immature neutrophils, if present.        Lymphs (Absolute) 1.69 1.00 - 4.80 K/uL      Monos (Absolute) 1.29 0.00 - 0.85 K/uL      Eos (Absolute) 0.29 0.00 - 0.51 K/uL      Baso (Absolute) 0.07 0.00 - 0.12 K/uL      Immature Granulocytes (abs) 0.30 0.00 - 0.11 K/uL      NRBC (Absolute) 0.00 K/uL         Nursing  Diet/Nutrition:  Diabetic and Thin Liquids  Medication Administration:  Whole with Liquid Wash  % consumed at meals in last 24 hours:  Consumed % of meals per documentation.  Meal/Snack Consumption for the past 24 hrs:   Oral Nutrition   03/15/20 1245 Lunch;Between % Consumed   03/15/20 0947 Breakfast;Between % Consumed     Snack schedule:  HS  Supplement:  none  Appetite:  Good  Fluid Intake/Output in past 24 hours: In: 480 [P.O.:480]  Out: 850   Admit Weight:  Weight: (Abnormal) 150.6 kg (332 lb)  Weight Last 7 Days   [147 kg (324 lb 1.2 oz)-150.6 kg (332 lb)] 147 kg (324 lb 1.2 oz) (03/15 0517)    Pain Issues:    Location:  Leg (03/15 2001)  Left (03/15 2001)         Severity:  Mild    Scheduled pain medications:  gabapentin (NEURONTIN) ; MS Contin     PRN pain medications used in last 24 hours:  None   Non Pharmacologic Interventions:  warm blanket, distraction, emotional support, food, relaxation, repositioned and rest  Effectiveness of pain management plan:  good=patient states acceptable comfort after interventions    Bowel:    Bowel Assist Initial Score:  4 - Minimal Assistance  Bowel Assist Current Score:  4 - Minimal Assistance  Bowl Accidents in last 7 days:     Last bowel movement: 03/15/20  Stool Description: Large, Brown, Soft     Usual bowel pattern:  More than once a day  Scheduled bowel medications:  docusate sodium (COLACE) and senna-docusate (PERICOLACE or SENOKOT S)   PRN bowel medications used in last 24 hours:  None  Nursing Interventions:  Increased time, Scheduled medication, Supervision, Positioning on commode/toilet, Brief  Effectiveness of bowel program:   good=regular, predictable, controlled emptying of bowel  Bladder:    Bladder Assist Initial Score:  4 - Minimal Assistance  Bladder Assist Current Score:  4 - Minimal Assistance  Bladder Accidents in last 7 days:  0  PVR range for past 24-48 hours:  [25]   Intermittent Catheterization:  0  Medications affecting bladder:  None    Time void schedule/voiding pattern:  Voiding every 2-4 hours  Interventions:  Increased time, Emptying of device, Urinal, Time void patient initiated  Effectiveness of bladder training:  Good=regular, predictable, emptying of bladder, patient initiates time voiding      Diabetes:  Blood Sugar Frequency:  Before meals and at bedtime    Range of BS for last 48 hours:     Recent Labs     03/14/20  0806 03/14/20  1120 03/14/20  1718 03/14/20  2002 03/15/20  0740   POCGLUCOSE 111* 96 104* 103* 133*     Scheduled diabetic medications:  None  Sliding scale usage in past 24 hours:  No  Total Short acting insulin in the past 24 hours:  None  Total Long acting insulin in the past 24 hours:  None    Wound:          Patient Lines/Drains/Airways Status    Active Current Wounds     Name: Placement date: Placement time: Site: Days:    Wound 03/10/20 Left left BKA  03/10/20   1600   no documentation  5                   Interventions:  Daily dressing change  Effectiveness of intervention:  wound is improving       Sleep/wake cycle:   Average hours slept:  Sleeps 4-6 hours without waking  Sleep medication usage:  Declines    Patient/Family Training/Education:  Bowel Management/Training, Diabetes Management, Diet/Nutrition, Fall Prevention, General Self Care, Medication Management, Pain Management, Respiratory Hygiene, Safe Transfers, Safety, Skin Care and Wound Care  Discharge Supply Recommendations:  Wound Care Supplies  Strengths: Alert and oriented, Willingly participates in therapeutic activities, Able to follow instructions, Supportive family, Pleasant and cooperative, Effective communication skills, Good carryover of learning, Motivated for self care and independence, Good endurance and Manages pain appropriately   Barriers:   Fatigue and Generalized weakness       Nursing Problems     Problem: Bowel/Gastric:     Description:     Goal: Normal bowel function is maintained or improved     Description:           Goal: Will not experience complications related to bowel motility     Description:                 Problem: Communication     Description:     Goal: The ability to communicate needs accurately and effectively will improve     Description:                 Problem: Discharge Barriers/Planning     Description:     Goal: Patient's continuum of care needs will be met     Description:                 Problem: Infection     Description:     Goal: Will remain free from infection     Description:                 Problem: Knowledge Deficit     Description:     Goal: Knowledge of disease process/condition, treatment plan, diagnostic tests, and medications will improve     Description:           Goal: Knowledge of the  prescribed therapeutic regimen will improve     Description:                 Problem: Pain Management     Description:     Goal: Pain level will decrease to patient's comfort goal     Description:                 Problem: Psychosocial Needs:     Description:     Goal: Level of anxiety will decrease     Description:                 Problem: Respiratory:     Description:     Goal: Respiratory status will improve     Description:                 Problem: Safety     Description:     Goal: Will remain free from injury     Description:           Goal: Will remain free from falls     Description:                 Problem: Skin Integrity     Description:     Goal: Risk for impaired skin integrity will decrease     Description:                 Problem: Venous Thromboembolism (VTW)/Deep Vein Thrombosis (DVT) Prevention:     Description:     Goal: Patient will participate in Venous Thrombosis (VTE)/Deep Vein Thrombosis (DVT)Prevention Measures     Description:                        Long Term Goals:   At discharge patient will be able to function safely at home and in the community with support.    Section completed by:  Kristin Díaz R.N.           Mobility  Bed mobility:   SBA  Bed /Chair/Wheelchair Transfer Initial:  4 - Minimal Assistance  Bed /Chair/Wheelchair Transfer Current:  5 - Standby Prompting/Supervision SBA FWW   Bed/Chair/Wheelchair Transfer Description:  Adaptive equipment, Increased time, Supervision for safety, Verbal cueing(seated in wc <> supine on mat table and wc > seated EOB transfer with FWW, SBA with VC for hand placement during sit >stand)  Walk Initial:  1 - Total Assistance  Walk Current:  1 - Total Assistance  //bars 10 ft x 2, CGA   Walk Description:  Assist device/equipment, Hand rail, Walker, Extra time, Safety concerns, Verbal cueing, Supervision for safety, Requires incidental assist(4x 10 ft with BUE support on // bars, 2x 10 ft with FWW in // bars, CGA with wc follow and VC for controlled  landing and use of RLE for momentum)  Wheelchair Initial:  5 - Standby Prompting/Supervision or Set-up  Wheelchair Current:  5 - Standby Prompting/Supervision SPV, 250 ft   Wheelchair Description:  Adaptive equipment, Verbal cueing(self propelled wc 250 ft with BUE from room > back gym, SPV, VC for pathfinding)  Stairs Initial:  0 - Not tested,unsafe activity  Stairs Current: 0 - Not tested,unsafe activity   Stairs Description:    Patient/Family Training/Education:  In progress  DME/DC Recommendations:  TBD  Strengths:  Making steady progress towards goals, Motivated for self care and independence, Pleasant and cooperative and Willingly participates in therapeutic activities  Barriers:   Other: Bilateral BKA, impaired mobility, transfers fall risk  # of short term goals set= 6  # of short term goals met=2  Physical Therapy Problems     Problem: Mobility     Dates: Start: 03/11/20       Description:     Goal: STG-Within one week, patient will propel wheelchair community     Dates: Start: 03/11/20       Description: 1) Individualized goal:  Min A x 150 ft for outdoor terrain/ small ramps  2) Interventions:  PT Group Therapy, PT E Stim Attended, PT Orthotics Training, PT Prosthetic Training, PT Gait Training, PT Self Care/Home Eval, PT Therapeutic Exercises, PT TENS Application, PT Neuro Re-Ed/Balance, PT Therapeutic Activity, PT Manual Therapy and PT Evaluation             Goal: STG-Within one week, patient will ambulate household distance     Dates: Start: 03/11/20       Description: 1) Individualized goal:  Min A with FWW 10 ft x 2  2) Interventions:  PT Group Therapy, PT E Stim Attended, PT Orthotics Training, PT Prosthetic Training, PT Gait Training, PT Self Care/Home Eval, PT Therapeutic Exercises, PT TENS Application, PT Neuro Re-Ed/Balance, PT Therapeutic Activity, PT Manual Therapy and PT Evaluation                   Problem: Mobility Transfers     Dates: Start: 03/11/20       Description:     Goal: STG-Within  one week, patient will sit to stand     Dates: Start: 03/11/20       Description: 1) Individualized goal:  SBA with FWW  2) Interventions:  PT Group Therapy, PT E Stim Attended, PT Orthotics Training, PT Prosthetic Training, PT Gait Training, PT Self Care/Home Eval, PT Therapeutic Exercises, PT TENS Application, PT Neuro Re-Ed/Balance, PT Therapeutic Activity, PT Manual Therapy and PT Evaluation             Goal: STG-Within one week, patient will transfer bed to chair     Dates: Start: 03/11/20       Description: 1) Individualized goal:  SBA/ set-up with FWW vs wc level scoot  2) Interventions:  PT Group Therapy, PT E Stim Attended, PT Orthotics Training, PT Prosthetic Training, PT Gait Training, PT Self Care/Home Eval, PT Therapeutic Exercises, PT TENS Application, PT Neuro Re-Ed/Balance, PT Therapeutic Activity, PT Manual Therapy and PT Evaluation                   Problem: PT-Long Term Goals     Dates: Start: 03/11/20       Description:     Goal: LTG-By discharge, patient will propel wheelchair     Dates: Start: 03/11/20       Description: 1) Individualized goal:  Modified independent 150 ft x 2 indoors/ level outdoors  2) Interventions:  PT Group Therapy, PT E Stim Attended, PT Orthotics Training, PT Prosthetic Training, PT Gait Training, PT Self Care/Home Eval, PT Therapeutic Exercises, PT TENS Application, PT Neuro Re-Ed/Balance, PT Therapeutic Activity, PT Manual Therapy and PT Evaluation             Goal: LTG-By discharge, patient will ambulate     Dates: Start: 03/11/20       Description: 1) Individualized goal:  SBA with FWW 25 ft x 2  2) Interventions:  PT Group Therapy, PT E Stim Attended, PT Orthotics Training, PT Prosthetic Training, PT Gait Training, PT Self Care/Home Eval, PT Therapeutic Exercises, PT TENS Application, PT Neuro Re-Ed/Balance, PT Therapeutic Activity, PT Manual Therapy and PT Evaluation           Goal: LTG-By discharge, patient will transfer one surface to another     Dates: Start:  03/11/20       Description: 1) Individualized goal:  Modified independent with FWW vs wc level  2) Interventions:  PT Group Therapy, PT E Stim Attended, PT Orthotics Training, PT Prosthetic Training, PT Gait Training, PT Self Care/Home Eval, PT Therapeutic Exercises, PT TENS Application, PT Neuro Re-Ed/Balance, PT Therapeutic Activity, PT Manual Therapy and PT Evaluation             Goal: LTG-By discharge, patient will transfer in/out of a car     Dates: Start: 03/11/20       Description: 1) Individualized goal:  SPV/ sset-up with FWW vs wc level  2) Interventions:  PT Group Therapy, PT E Stim Attended, PT Orthotics Training, PT Prosthetic Training, PT Gait Training, PT Self Care/Home Eval, PT Therapeutic Exercises, PT TENS Application, PT Neuro Re-Ed/Balance, PT Therapeutic Activity, PT Manual Therapy and PT Evaluation                           Section completed by:  Hugo Tovar MSPT    Activities of Daily Living  Eating Initial:  5 - Standby Prompting/Supervision or Set-up  Eating Current:  5 - Standby Prompting/Supervision or Set-up   Eating Description:  Supervision for safety, Verbal cueing, Set-up of equipment or meal/tube feeding, Increased time  Grooming Initial:  5 - Standby Prompting/Supervision or Set-up  Grooming Current:  6 - Modified Independent   Grooming Description:  (seated washing hands at sink)  Bathing Initial:  0 - Not tested, patient refused  Bathing Current:  4 - Minimal Assistance   Bathing Description:  Grab bar, Tub bench, Hand held shower, Increased time, Supervision for safety, Verbal cueing(Min A to wash buttocks)  Upper Body Dressing Initial:  5 - Standby Prompting/Supervision or Set-up  Upper Body Dressing Current:  5 - Standby Prompting/Supervision or Set-up   Upper Body Dressing Description:  Supervision for safety, Verbal cueing, Set-up of equipment, Increased time  Lower Body Dressing Initial:  2 - Max Assistance  Lower Body Dressing Current:  4 - Minimal Assistance   Lower  Body Dressing Description:  4 - Minimal Assistance  Toileting Initial:  1 - Total Assistance  Toileting Current:  4 - Minimal Assistance   Toileting Description:  Grab bar, Increased time, Supervision for safety  Toilet Transfer Initial:  4 - Minimal Assistance  Toilet Transfer Current:  5 - Standby Prompting/Supervision or Set-up   Toilet Transfer Description:  5 - Standby Prompting/Supervision or Set-up  Tub / Shower Transfer Initial:  4 - Minimal Assistance  Tub / Shower Transfer Current:  4 - Minimal Assistance   Tub / Shower Transfer Description:  Grab bar, Increased time, Verbal cueing, Supervision for safety, Set-up of equipment(stand pivot w/c<>tub transfer bench with GB)  IADL:  SBA for laundry at w/c level, Min v/c's for w/c safety when reaching   Family Training/Education:  TBD  DME/DC Recommendations: pt has walk in shower and GB's in shower. Recommend tub transfer bench, possibly commode over toilet, and GB's adjacent to toilet     Strengths:  Able to follow instructions, Adequate strength, Alert and oriented, Effective communication skills, Independent PLOF, Making steady progress towards goals, Motivated for self care and independence, Pleasant and cooperative, Supportive family and Willingly participates in therapeutic activities  Barriers:  Decreased endurance, Generalized weakness, Home accessibility, Poor activity tolerance and Poor balance     # of short term goals set= 3    # of short term goals met= 3/3     Occupational Therapy Goals     Problem: Bathing     Dates: Start: 03/16/20       Description:     Goal: STG-Within one week, patient will bathe     Dates: Start: 03/16/20       Description: 1) Individualized Goal: With SBA using DME/AE as needed  2) Interventions: OT Group Therapy, OT Self Care/ADL, OT Community Reintegration, OT Neuro Re-Ed/Balance, OT Therapeutic Activity and OT Therapeutic Exercise                   Problem: Dressing     Dates: Start: 03/16/20       Description:     Goal:  STG-Within one week, patient will dress LB     Dates: Start: 03/16/20       Description: 1) Individualized Goal: With SBA using DME/AE as needed  2) Interventions: OT Group Therapy, OT Self Care/ADL, OT Community Reintegration, OT Neuro Re-Ed/Balance, OT Therapeutic Activity and OT Therapeutic Exercise                   Problem: OT Long Term Goals     Dates: Start: 03/11/20       Description:     Goal: LTG-By discharge, patient will complete basic self care tasks     Dates: Start: 03/11/20       Description: 1) Individualized Goal: With Mod I using DME/AE as needed  2) Interventions: OT Group Therapy, OT Self Care/ADL, OT Community Reintegration, OT Neuro Re-Ed/Balance, OT Therapeutic Activity and OT Therapeutic Exercise             Goal: LTG-By discharge, patient will perform bathroom transfers     Dates: Start: 03/11/20       Description: 1) Individualized Goal: With Mod I using DME/AE as needed  2) Interventions: OT Group Therapy, OT Self Care/ADL, OT Community Reintegration, OT Neuro Re-Ed/Balance, OT Therapeutic Activity and OT Therapeutic Exercise                 Problem: Toileting     Dates: Start: 03/16/20       Description:     Goal: STG-Within one week, patient will complete toileting tasks     Dates: Start: 03/16/20       Description: 1) Individualized Goal: With SBA using DME/AE as needed  2) Interventions: OT Group Therapy, OT Self Care/ADL, OT Community Reintegration, OT Neuro Re-Ed/Balance, OT Therapeutic Activity and OT Therapeutic Exercise                         Section completed by:  Radha Pierre MS,OTR/L          Nutrition  Dietary Problems (Active)      There are no active problems.            NUTRITION SERVICES: BMI - Pt with BMI >40 (=Body mass index is 45.03 kg/m².), morbid obesity. Weight loss counseling not appropriate in acute care setting. RECOMMEND - Referral to outpatient nutrition services for weight management after D/C.     Section completed by:  Gina Dunlap,  [place]    REHAB-Pharmacy IDT Team Note by Ava Crump RPH at 3/13/2020 12:01 PM  Version 1 of 1    Author:  Ava Crump RPH Service:  -- Author Type:  Pharmacist    Filed:  3/13/2020 12:03 PM Date of Service:  3/13/2020 12:01 PM Status:  Signed    :  Ava Crump RPH (Pharmacist)         Pharmacy   Pharmacy  Antibiotics/Antifungals/Antivirals:  Medication:      Active Orders (From admission, onward)    Ordered     Ordering Provider       Wed Mar 11, 2020  4:15 PM    03/11/20 1615  sulfamethoxazole-trimethoprim (BACTRIM DS) 800-160 MG tablet 1 Tab  EVERY 12 HOURS      Alberto Curry M.D.        Route:         po  Stop Date:  3/16  Reason: UTI  Antihypertensives/Cardiac:  Medication:    Orders (72h ago, onward)     Start     Ordered    03/11/20 0600  atenolol (TENORMIN) tablet 100 mg  Q DAY      03/10/20 1445    03/10/20 2100  atorvastatin (LIPITOR) tablet 20 mg  EVERY EVENING      03/10/20 1445              Patient Vitals for the past 24 hrs:   BP Pulse   03/13/20 0814 -- 64   03/13/20 0640 110/74 62   03/13/20 0515 131/74 60   03/13/20 0514 131/74 60   03/12/20 1900 111/56 68   03/12/20 1310 113/49 69   03/12/20 1203 -- 66     Anticoagulation:  Medication:  Xarelto  INR:      Other key medications: gabapentin, humulin R, metformin   A review of the medication list reveals no issues at this time.    Section completed by:  Ava Crump RPH[TK.1]        Attribution Key     TK.1 - Ava Crump RPH on 3/13/2020 12:01 PM                  DC Planning  DC destination/dispostion:  Home with family    Referrals: TBD     DC Needs: Anticipate Home Health/DME     Barriers to discharge: Functional deficits       Strengths: Supportive family, positive attitude, motivated to gain independence again     Section completed by:  Celia Stallworth      Physician Summary  Sophia Beck MD participated and led team conference discussion.

## 2020-03-17 PROCEDURE — 97530 THERAPEUTIC ACTIVITIES: CPT

## 2020-03-17 PROCEDURE — 700102 HCHG RX REV CODE 250 W/ 637 OVERRIDE(OP): Performed by: PHYSICAL MEDICINE & REHABILITATION

## 2020-03-17 PROCEDURE — 99232 SBSQ HOSP IP/OBS MODERATE 35: CPT | Performed by: PHYSICAL MEDICINE & REHABILITATION

## 2020-03-17 PROCEDURE — 97116 GAIT TRAINING THERAPY: CPT

## 2020-03-17 PROCEDURE — A9270 NON-COVERED ITEM OR SERVICE: HCPCS | Performed by: PHYSICAL MEDICINE & REHABILITATION

## 2020-03-17 PROCEDURE — 97110 THERAPEUTIC EXERCISES: CPT

## 2020-03-17 PROCEDURE — A9270 NON-COVERED ITEM OR SERVICE: HCPCS | Performed by: HOSPITALIST

## 2020-03-17 PROCEDURE — 99232 SBSQ HOSP IP/OBS MODERATE 35: CPT | Performed by: HOSPITALIST

## 2020-03-17 PROCEDURE — 700102 HCHG RX REV CODE 250 W/ 637 OVERRIDE(OP): Performed by: HOSPITALIST

## 2020-03-17 PROCEDURE — 770010 HCHG ROOM/CARE - REHAB SEMI PRIVAT*

## 2020-03-17 PROCEDURE — 97535 SELF CARE MNGMENT TRAINING: CPT

## 2020-03-17 RX ORDER — POLYETHYLENE GLYCOL 3350 17 G/17G
1 POWDER, FOR SOLUTION ORAL
Status: DISCONTINUED | OUTPATIENT
Start: 2020-03-17 | End: 2020-03-26 | Stop reason: HOSPADM

## 2020-03-17 RX ORDER — AMOXICILLIN 250 MG
2 CAPSULE ORAL 2 TIMES DAILY PRN
Status: DISCONTINUED | OUTPATIENT
Start: 2020-03-17 | End: 2020-03-26 | Stop reason: HOSPADM

## 2020-03-17 RX ORDER — BISACODYL 10 MG
10 SUPPOSITORY, RECTAL RECTAL
Status: DISCONTINUED | OUTPATIENT
Start: 2020-03-17 | End: 2020-03-26 | Stop reason: HOSPADM

## 2020-03-17 RX ADMIN — ATENOLOL 100 MG: 25 TABLET ORAL at 05:48

## 2020-03-17 RX ADMIN — RIVAROXABAN 20 MG: 10 TABLET, FILM COATED ORAL at 17:27

## 2020-03-17 RX ADMIN — GABAPENTIN 300 MG: 300 CAPSULE ORAL at 08:26

## 2020-03-17 RX ADMIN — METFORMIN HYDROCHLORIDE 250 MG: 500 TABLET ORAL at 17:26

## 2020-03-17 RX ADMIN — ATORVASTATIN CALCIUM 20 MG: 10 TABLET, FILM COATED ORAL at 20:02

## 2020-03-17 RX ADMIN — GABAPENTIN 300 MG: 300 CAPSULE ORAL at 20:02

## 2020-03-17 RX ADMIN — MELATONIN 2000 UNITS: at 08:26

## 2020-03-17 RX ADMIN — MORPHINE SULFATE 15 MG: 15 TABLET, FILM COATED, EXTENDED RELEASE ORAL at 20:02

## 2020-03-17 RX ADMIN — MORPHINE SULFATE 15 MG: 15 TABLET, FILM COATED, EXTENDED RELEASE ORAL at 08:26

## 2020-03-17 RX ADMIN — METFORMIN HYDROCHLORIDE 250 MG: 500 TABLET ORAL at 08:26

## 2020-03-17 RX ADMIN — GABAPENTIN 300 MG: 300 CAPSULE ORAL at 14:29

## 2020-03-17 NOTE — THERAPY
"Occupational Therapy  Daily Treatment     Patient Name: Jose Mc  Age:  64 y.o., Sex:  male  Medical Record #: [id number]  Today's Date: 3/17/2020     Precautions  Precautions: Non Weight Bearing Left Lower Extremity, Fall Risk  Comments: L BKA    Safety   ADL Safety : Requires Supervision for Safety, Requires Physical Assist for Safety    Subjective    \"that works great\"     Objective       20 0701   Interdisciplinary Plan of Care Collaboration   Patient Position at End of Therapy Call Light within Reach;Tray Table within Reach;In Bed   OT Total Time Spent   OT Individual Total Time Spent (Mins) 60   OT Charge Group   OT Self Care / ADL 4       FIM Eating Score:  5 - Standby Prompting/Supervision or Set-up  Eating Description:       FIM Grooming Score:  6 - Modified Independent  Grooming Description:  Increased time    FIM Bathing Score:  5 - Standby Prompting/Supervision or Set-up  Bathing Description:       FIM Upper Body Dressin - Standby Prompting/Supervision or Set-up  Upper Body Dressing Description:  Supervision for safety, Verbal cueing, Set-up of equipment, Increased time    FIM Lower Body Dressing Score:  5 - Standby Prompting/Supervsion or Set-up  Lower Body Dressing Description:  Increased time, Supervision for safety, Verbal cueing, Set-up of equipment(don clothes at bed level)    FIM Toileting Body Dressing:  3 - Moderate Assistance  Toileting Description:  Grab bar, Increased time, Supervision for safety, Verbal cueing, Set-up of equipment(pt used toileting aid for deborah-hygiene)    FIM Bed/Chair/Wheelchair Transfers Score:    Bed/Chair/Wheelchair Transfers Description:       FIM Toilet Transfer Score:  5 - Standby Prompting/Supervision or Set-up  Toilet Transfer Description:  Grab bar, Increased time, Supervision for safety, Verbal cueing, Set-up of equipment(stand pivot w/c<>toilet with GB)    FIM Tub/Shower Transfers Score:  5 - Standby Prompting/Supervision or Set-up  Tub/Shower " Transfers Description:  Grab bar, Increased time, Supervision for safety, Verbal cueing, Set-up of equipment(stand pivot w/c<>fold down bench with GB)      Assessment    Pt completed shower routine at A overall using w/c, shower bench, GB's but cont to require v/c's for set-up and w/c safety. Pt's functional performance was impacted by impaired activity tolerance and standing dynamic balance    Plan    Cont overall strength/endurance and standing balance to improve ADL's and functional mobility

## 2020-03-17 NOTE — THERAPY
Physical Therapy   Daily Treatment     Patient Name: Jose Mc  Age:  64 y.o., Sex:  male  Medical Record #: [id number]  Today's Date: 3/17/2020     Precautions  Precautions: (P) Non Weight Bearing Left Lower Extremity  Comments: (P) left IPOP    Subjective    Patient tired from Dr appointment- pleased no infection was found     Objective       03/17/20 1601   Precautions   Precautions Non Weight Bearing Left Lower Extremity   Comments left IPOP   Supine Lower Body Exercise   Supine Lower Body Exercises Yes   Bridges 1 set of 10   Hip Abduction 1 set of 10;Bilateral   Straight Leg Raises 1 set of 10;Left   Other Exercises sidelying leg lift 10x left, prone leg extension 10x left, prone hamstring curls 10x left   Sitting Lower Body Exercises   Long Arc Quad 1 set of 10;Left  (for PROM extension/flexion stretch)   PT Total Time Spent   PT Individual Total Time Spent (Mins) 30   PT Charge Group   PT Therapeutic Exercise 2       Assessment    Patient compliant with supine strengthening program, requires minimal cueing for safe exercise form    Plan    Progress gait with bariatric FWW, trial w/c on uneven surfaces; patient education on prosthetic prep

## 2020-03-17 NOTE — CARE PLAN
Problem: Safety  Goal: Will remain free from falls  Intervention: Implement fall precautions  Flowsheets (Taken 3/17/2020 0429)  Bed Alarm: Yes - Alarm On  Environmental Precautions:   Treaded Slipper Socks on Patient   Personal Belongings, Wastebasket, Call Bell etc. in Easy Reach   Transferred to Stronger Side   Bed in Low Position   Mobility Assessed & Appropriate Sign Placed  Note: With left BKA, safety measures enforced, needs anticipated and attended. Pt free from fall and injury.     Problem: Bowel/Gastric:  Goal: Will not experience complications related to bowel motility  Intervention: Implement Bowel Protocol, if applicable  Note: Pt refused colace and senna, pt had bm, 3/16/20.

## 2020-03-17 NOTE — DISCHARGE PLANNING
CM met with patient to discuss DC planning.  Patient chose [facility] for [facility]. And no preference for DME Co.  Therapies will do a home eval on 3/24/2020 and will know more regarding equipment-Patient needs bariatric DME and therapies want to make sure door openings can accommodate WC and walker.  CM will continue to monitor for DC needs.

## 2020-03-17 NOTE — THERAPY
"Physical Therapy   Daily Treatment     Patient Name: Jose Mc  Age:  64 y.o., Sex:  male  Medical Record #: [id number]  Today's Date: 3/17/2020     Precautions  Precautions: Non Weight Bearing Left Lower Extremity  Comments: MIRANDA GILBERTJOSIAH    Subjective    Patient reports therapy is challenging, reports his family is planning to install ramp for home entrance (home eval planned for next Tuesday)     Objective       03/17/20 0931   Precautions   Precautions Non Weight Bearing Left Lower Extremity   Comments L BKA   Sitting Lower Body Exercises   Other Exercises Nu-Step 10min # (left LE on stool)   Interdisciplinary Plan of Care Collaboration   IDT Collaboration with  Physician   Collaboration Comments daily rounds   PT Total Time Spent   PT Individual Total Time Spent (Mins) 60   PT Charge Group   PT Gait Training 1   PT Therapeutic Exercise 1   PT Therapeutic Activities 2       FIM Bed/Chair/Wheelchair Transfers Score: 5 - Standby Prompting/Supervision or Set-up  Bed/Chair/Wheelchair Transfers Description:  (SBA SPT; extra time for set-up)    FIM Walking Score:  1 - Total Assistance  Walking Description:  (amb 10ft, 15ft FWW hop-to CGA with w/c follow, 1 LOB with stand to sit requiring min assist)    FIM Wheelchair Score:  6 - Modified Independent  Wheelchair Description:  (mod I w/c indoors)      Assessment    Ambulation distance limited by right LE weakness (patient reports feeling like right knee \"could give out\"), SOB, and UE fatigue from FWW depression; right foot able to hop \"softly\" for first 5 hops, then begins to  \"thud\" with fatigue    Plan    Continue to progress ambulation with FWW; practice transfer sequencing, residual limb education, review supine LE strengthening program    "

## 2020-03-17 NOTE — PROGRESS NOTES
Rehab Progress Note     Date of Service: 3/17/2020  Chief Complaint: follow up BKA    Interval Events (Subjective)    Patient seen and examined in the therapy gym today. He has an appointment this afternoon with his surgeon. He is currently denying any pain. He is sleeping OK, moving his bowels, loose stools resolved, neg C. Diff, no issues with his urination. Plan for home evaluation next Tuesday.     Objective:  VITAL SIGNS: /62   Pulse 68   Temp 36.3 °C (97.4 °F) (Oral)   Resp 20   Ht 1.829 m (6')   Wt (!) 147 kg (324 lb 1.2 oz)   SpO2 95%   BMI 43.95 kg/m²   Gen: alert, no apparent distress  CV: regular rate and rhythm, no murmurs, no peripheral edema  Resp: clear to ascultation bilaterally, normal respiratory effort  GI: soft, non-tender abdomen, bowel sounds present  Msk: left BKA with IPOP      Recent Results (from the past 72 hour(s))   URINALYSIS    Collection Time: 03/14/20  5:00 PM   Result Value Ref Range    Color Yellow     Character Cloudy (A)     Specific Gravity 1.019 <1.035    Ph 5.0 5.0 - 8.0    Glucose Negative Negative mg/dL    Ketones Negative Negative mg/dL    Protein Negative Negative mg/dL    Bilirubin Negative Negative    Urobilinogen, Urine 0.2 Negative    Nitrite Negative Negative    Leukocyte Esterase Trace (A) Negative    Occult Blood Negative Negative    Micro Urine Req Microscopic    URINE MICROSCOPIC (W/UA)    Collection Time: 03/14/20  5:00 PM   Result Value Ref Range    WBC 0-2 (A) /hpf    RBC 2-5 (A) /hpf    Bacteria Negative None /hpf    Epithelial Cells Negative /hpf    Uric Acid Crystal Positive /hpf    Hyaline Cast 0-2 /lpf   ACCU-CHEK GLUCOSE    Collection Time: 03/14/20  5:18 PM   Result Value Ref Range    Glucose - Accu-Ck 104 (H) 65 - 99 mg/dL   ACCU-CHEK GLUCOSE    Collection Time: 03/14/20  8:02 PM   Result Value Ref Range    Glucose - Accu-Ck 103 (H) 65 - 99 mg/dL   CBC WITH DIFFERENTIAL    Collection Time: 03/15/20  5:29 AM   Result Value Ref Range    WBC  25.1 (H) 4.8 - 10.8 K/uL    RBC 4.36 (L) 4.70 - 6.10 M/uL    Hemoglobin 12.1 (L) 14.0 - 18.0 g/dL    Hematocrit 39.1 (L) 42.0 - 52.0 %    MCV 89.7 81.4 - 97.8 fL    MCH 27.8 27.0 - 33.0 pg    MCHC 30.9 (L) 33.7 - 35.3 g/dL    RDW 44.8 35.9 - 50.0 fL    Platelet Count 367 164 - 446 K/uL    MPV 11.1 9.0 - 12.9 fL    Neutrophils-Polys 85.30 (H) 44.00 - 72.00 %    Lymphocytes 6.30 (L) 22.00 - 41.00 %    Monocytes 5.20 0.00 - 13.40 %    Eosinophils 1.20 0.00 - 6.90 %    Basophils 0.60 0.00 - 1.80 %    Immature Granulocytes 1.40 (H) 0.00 - 0.90 %    Nucleated RBC 0.00 /100 WBC    Neutrophils (Absolute) 21.39 (H) 1.82 - 7.42 K/uL    Lymphs (Absolute) 1.59 1.00 - 4.80 K/uL    Monos (Absolute) 1.30 (H) 0.00 - 0.85 K/uL    Eos (Absolute) 0.31 0.00 - 0.51 K/uL    Baso (Absolute) 0.14 (H) 0.00 - 0.12 K/uL    Immature Granulocytes (abs) 0.34 (H) 0.00 - 0.11 K/uL    NRBC (Absolute) 0.00 K/uL   HEMOGLOBIN A1C    Collection Time: 03/15/20  5:29 AM   Result Value Ref Range    Glycohemoglobin 6.8 (H) 0.0 - 5.6 %    Est Avg Glucose 148 mg/dL   ACCU-CHEK GLUCOSE    Collection Time: 03/15/20  7:40 AM   Result Value Ref Range    Glucose - Accu-Ck 133 (H) 65 - 99 mg/dL   Cdiff By PCR Rflx Toxin    Collection Time: 03/16/20  1:00 AM   Result Value Ref Range    C Diff by PCR See Toxin Negative    [id number] Presumptive See Toxin Negative   C Diff Toxin    Collection Time: 03/16/20  1:00 AM   Result Value Ref Range    C.Diff Toxin A&B Negative    CBC WITH DIFFERENTIAL    Collection Time: 03/16/20  6:45 AM   Result Value Ref Range    WBC 24.3 (H) 4.8 - 10.8 K/uL    RBC 3.90 (L) 4.70 - 6.10 M/uL    Hemoglobin 11.2 (L) 14.0 - 18.0 g/dL    Hematocrit 35.5 (L) 42.0 - 52.0 %    MCV 91.0 81.4 - 97.8 fL    MCH 28.7 27.0 - 33.0 pg    MCHC 31.5 (L) 33.7 - 35.3 g/dL    RDW 46.5 35.9 - 50.0 fL    Platelet Count 274 164 - 446 K/uL    MPV 11.2 9.0 - 12.9 fL    Neutrophils-Polys 85.00 (H) 44.00 - 72.00 %    Lymphocytes 7.00 (L) 22.00 - 41.00 %     Monocytes 5.30 0.00 - 13.40 %    Eosinophils 1.20 0.00 - 6.90 %    Basophils 0.30 0.00 - 1.80 %    Immature Granulocytes 1.20 (H) 0.00 - 0.90 %    Nucleated RBC 0.00 /100 WBC    Neutrophils (Absolute) 20.66 (H) 1.82 - 7.42 K/uL    Lymphs (Absolute) 1.69 1.00 - 4.80 K/uL    Monos (Absolute) 1.29 (H) 0.00 - 0.85 K/uL    Eos (Absolute) 0.29 0.00 - 0.51 K/uL    Baso (Absolute) 0.07 0.00 - 0.12 K/uL    Immature Granulocytes (abs) 0.30 (H) 0.00 - 0.11 K/uL    NRBC (Absolute) 0.00 K/uL   MAGNESIUM    Collection Time: 03/16/20  6:45 AM   Result Value Ref Range    Magnesium 1.7 1.5 - 2.5 mg/dL   PHOSPHORUS    Collection Time: 03/16/20  6:45 AM   Result Value Ref Range    Phosphorus 3.4 2.5 - 4.5 mg/dL   LACTIC ACID    Collection Time: 03/16/20  6:45 AM   Result Value Ref Range    Lactic Acid 1.5 0.5 - 2.0 mmol/L   Comp Metabolic Panel    Collection Time: 03/16/20  6:45 AM   Result Value Ref Range    Sodium 134 (L) 135 - 145 mmol/L    Potassium 4.2 3.6 - 5.5 mmol/L    Chloride 110 96 - 112 mmol/L    Co2 13 (L) 20 - 33 mmol/L    Anion Gap 11.0 7.0 - 16.0    Glucose 116 (H) 65 - 99 mg/dL    Bun 24 (H) 8 - 22 mg/dL    Creatinine 1.49 (H) 0.50 - 1.40 mg/dL    Calcium 8.4 (L) 8.5 - 10.5 mg/dL    AST(SGOT) 16 12 - 45 U/L    ALT(SGPT) 9 2 - 50 U/L    Alkaline Phosphatase 83 30 - 99 U/L    Total Bilirubin 0.4 0.1 - 1.5 mg/dL    Albumin 3.2 3.2 - 4.9 g/dL    Total Protein 6.4 6.0 - 8.2 g/dL    Globulin 3.2 1.9 - 3.5 g/dL    A-G Ratio 1.0 g/dL   ESTIMATED GFR    Collection Time: 03/16/20  6:45 AM   Result Value Ref Range    GFR If  57 (A) >60 mL/min/1.73 m 2    GFR If Non  47 (A) >60 mL/min/1.73 m 2       Current Facility-Administered Medications   Medication Frequency   • docusate sodium (COLACE) capsule 100 mg BID   • metFORMIN (GLUCOPHAGE) tablet 250 mg BID WITH MEALS   • vitamin D (cholecalciferol) tablet 2,000 Units DAILY   • glucose 4 g chewable tablet 16 g Q15 MIN PRN    And   • dextrose 50%  (D50W) injection 50 mL Q15 MIN PRN   • Respiratory Therapy Consult Continuous RT   • Pharmacy Consult Request ...Pain Management Review 1 Each PHARMACY TO DOSE   • acetaminophen (TYLENOL) tablet 650 mg Q4HRS PRN   • senna-docusate (PERICOLACE or SENOKOT S) 8.6-50 MG per tablet 2 Tab BID    And   • polyethylene glycol/lytes (MIRALAX) PACKET 1 Packet QDAY PRN    And   • magnesium hydroxide (MILK OF MAGNESIA) suspension 30 mL QDAY PRN    And   • bisacodyl (DULCOLAX) suppository 10 mg QDAY PRN   • artificial tears ophthalmic solution 1 Drop PRN   • benzocaine-menthol (CEPACOL) lozenge 1 Lozenge Q2HRS PRN   • mag hydrox-al hydrox-simeth (MAALOX PLUS ES or MYLANTA DS) suspension 20 mL Q2HRS PRN   • ondansetron (ZOFRAN ODT) dispertab 4 mg 4X/DAY PRN    Or   • ondansetron (ZOFRAN) syringe/vial injection 4 mg 4X/DAY PRN   • traZODone (DESYREL) tablet 50 mg QHS PRN   • sodium chloride (OCEAN) 0.65 % nasal spray 2 Spray PRN   • hydrOXYzine HCl (ATARAX) tablet 50 mg Q6HRS PRN   • melatonin tablet 3 mg HS PRN   • atenolol (TENORMIN) tablet 100 mg Q DAY   • atorvastatin (LIPITOR) tablet 20 mg Q EVENING   • gabapentin (NEURONTIN) capsule 300 mg TID   • tramadol (ULTRAM) 50 MG tablet 50 mg Q4HRS PRN   • oxyCODONE immediate-release (ROXICODONE) tablet 5 mg Q4HRS PRN   • lactulose 20 GM/30ML solution 30 mL QDAY PRN   • docusate sodium (ENEMEEZ) enema 283 mg QDAY PRN   • rivaroxaban (XARELTO) tablet 20 mg PM MEAL   • morphine ER (MS CONTIN) tablet 15 mg Q12HRS   • oxyCODONE-acetaminophen (PERCOCET) 5-325 MG per tablet 1 Tab Q4HRS PRN       Orders Placed This Encounter   Procedures   • Diet Order Diabetic     Standing Status:   Standing     Number of Occurrences:   1     Order Specific Question:   Diet:     Answer:   Diabetic [3]       Assessment:  Active Hospital Problems    Diagnosis   • *Status post below-knee amputation of left lower extremity (HCC)   • Leukocytosis   • Vitamin D deficiency   • Impaired mobility and ADLs   •  Diabetes mellitus due to underlying condition with hyperglycemia (HCC)   • Peripheral neuropathy   • Obstructive sleep apnea syndrome   • Benign essential HTN   • Mixed hyperlipidemia   • History of DVT (deep vein thrombosis)     This patient is a 64 y.o. male admitted for acute inpatient rehabilitation with Status post below-knee amputation of left lower extremity (HCC).    I led and attended the weekly conference, and agree with the IDT conference documentation and plan of care as noted below.    Date of conference: 3/16/2020    Goals and barriers: See IDT note.    Biggest barriers: obesity    Goals in next week: home evaluation    CM/social support: siblings supportive    Anticipated DC date: 3/26/2020    Home health: PT/OT/RN    Equip: WC, FWW    Follow up: PCP, surgery     Medical Decision Making and Plan:    Left BKA  3/6 Dr. Ceja  Daily wound care  PT/OT, 1.5 hr each discipline, 5 days per week  Outpatient follow up with surgery, scheduled for today    Chronic opiate use  Continue home medications  Morphine ER 15 mg BID  Percocets 5-325 PRN - not using very often     Peripheral Neuropathy  Phantom sensation  Continue gabapentin  Monitor need to increase  Currently well controlled     Opioid induced constipation, resolved  As needed bowel meds  Last BM 3/17    Bladder  Check PVRs  Not retaining  ICP for over 400 cc  Scheduled toileting    DVT prophylaxis  Xarelto     Appreciate assistance of hospitalist with his medical co-morbidities:     Hypertension, atenolol  Diabetes with hyperglycemia, metformin, SSI  Sleep apnea, CPAP  Hyperlipidemia, statin  History of DVT, Xarelto  Left arm erythema/edema/?cellulitis, s/p Bactrim  Leukocytosis, continues, unclear etiology, US of residual limb without fluid collection  Anemia  Vit D deficiency, on supplementation  Loose stools, resolved, negative c diff    Total time:  26 minutes.  I spent greater than 50% of the time for patient care, counseling, and  coordination on this date, including patient face-to face time, unit/floor time with review of records/pertinent lab data and studies, as well as discussing diagnostic evaluation/work up, planned therapeutic interventions, and future disposition of care, as per the interval events/subjective and the assessment and plan as noted above.    I have performed a physical exam, reviewed and updated ROS, as well as the assessment and plan today 3/17/2020. In review of note from 3/16/2020 there are no new changes except as documented above.    Sophia Beck M.D.   Physical Medicine and Rehabilitation

## 2020-03-17 NOTE — THERAPY
"Occupational Therapy  Daily Treatment     Patient Name: Jose Mc  Age:  64 y.o., Sex:  male  Medical Record #: [id number]  Today's Date: 3/17/2020     Precautions  Precautions: Non Weight Bearing Left Lower Extremity  Comments: L BKA    Safety   ADL Safety : Requires Supervision for Safety, Requires Physical Assist for Safety    Subjective    \"I think I am going to get a bench, it will be safer\"      Objective    Edu pt re: kitchen mobility and safety strategies at w/c level with reacher. Pt able to retreive items from high/low shelves and appliances at supervision level.     Edu pt re: shower transfer into walk-in shower using tub transfer bench. Pt able to complete lateral scoot with CGA and v/c's for sequencing hand placement.     Functional mobility/UB therex using w/c - pulling therapist 100ft with only 1 RB     03/17/20 1031   Sitting Upper Body Exercises   Other Exercise rebounder seated using soccer ball x 50, 6# ball x 50   Interdisciplinary Plan of Care Collaboration   Patient Position at End of Therapy In Bed;Call Light within Reach;Tray Table within Reach   OT Total Time Spent   OT Individual Total Time Spent (Mins) 60   OT Charge Group   OT Self Care / ADL 1   OT Therapy Activity 1   OT Therapeutic Exercise  2       FIM Toileting:  3 - Moderate Assistance  Toileting Description:  Grab bar, Increased time, Supervision for safety, Verbal cueing, Set-up of equipment    FIM Toilet Transfer Score:  5 - Standby Prompting/Supervision or Set-up  Toilet Transfer Description:  Increased time, Grab bar, Supervision for safety, Verbal cueing, Set-up of equipment(stand pivot w/c<>toilet with GB)      Assessment    Pt cont to demo improve independence with ADL's when using AE (toileting aid, reacher), but cont to require intermittent reminders for w/c safety and seat belt use when reaching outside VERONICA when seated in w/c. Also initiated edu re: kitchen mobility and shower transfers and demo good understanding " but would benefit with more practice for safety    Plan    Cont overall strength/endurance and standing balance to improve ADL's and functional mobility

## 2020-03-18 ENCOUNTER — APPOINTMENT (OUTPATIENT)
Dept: RADIOLOGY | Facility: REHABILITATION | Age: 65
DRG: 560 | End: 2020-03-18
Attending: PHYSICAL MEDICINE & REHABILITATION
Payer: COMMERCIAL

## 2020-03-18 PROBLEM — N17.9 AKI (ACUTE KIDNEY INJURY) (HCC): Status: ACTIVE | Noted: 2020-03-18

## 2020-03-18 PROBLEM — E87.6 HYPOKALEMIA: Status: ACTIVE | Noted: 2020-03-18

## 2020-03-18 LAB
ANION GAP SERPL CALC-SCNC: 12 MMOL/L (ref 7–16)
BUN SERPL-MCNC: 24 MG/DL (ref 8–22)
CALCIUM SERPL-MCNC: 8.5 MG/DL (ref 8.5–10.5)
CHLORIDE SERPL-SCNC: 107 MMOL/L (ref 96–112)
CO2 SERPL-SCNC: 18 MMOL/L (ref 20–33)
CREAT SERPL-MCNC: 1.45 MG/DL (ref 0.5–1.4)
ERYTHROCYTE [DISTWIDTH] IN BLOOD BY AUTOMATED COUNT: 44.6 FL (ref 35.9–50)
GLUCOSE SERPL-MCNC: 122 MG/DL (ref 65–99)
HCT VFR BLD AUTO: 33.6 % (ref 42–52)
HGB BLD-MCNC: 10.8 G/DL (ref 14–18)
MCH RBC QN AUTO: 28.3 PG (ref 27–33)
MCHC RBC AUTO-ENTMCNC: 32.1 G/DL (ref 33.7–35.3)
MCV RBC AUTO: 88 FL (ref 81.4–97.8)
PLATELET # BLD AUTO: 291 K/UL (ref 164–446)
PMV BLD AUTO: 11.5 FL (ref 9–12.9)
POTASSIUM SERPL-SCNC: 3.4 MMOL/L (ref 3.6–5.5)
RBC # BLD AUTO: 3.82 M/UL (ref 4.7–6.1)
SODIUM SERPL-SCNC: 137 MMOL/L (ref 135–145)
WBC # BLD AUTO: 20.5 K/UL (ref 4.8–10.8)

## 2020-03-18 PROCEDURE — 97110 THERAPEUTIC EXERCISES: CPT

## 2020-03-18 PROCEDURE — 700102 HCHG RX REV CODE 250 W/ 637 OVERRIDE(OP)

## 2020-03-18 PROCEDURE — A9270 NON-COVERED ITEM OR SERVICE: HCPCS | Performed by: HOSPITALIST

## 2020-03-18 PROCEDURE — 97116 GAIT TRAINING THERAPY: CPT

## 2020-03-18 PROCEDURE — 700102 HCHG RX REV CODE 250 W/ 637 OVERRIDE(OP): Performed by: HOSPITALIST

## 2020-03-18 PROCEDURE — 99232 SBSQ HOSP IP/OBS MODERATE 35: CPT | Performed by: PHYSICAL MEDICINE & REHABILITATION

## 2020-03-18 PROCEDURE — 80048 BASIC METABOLIC PNL TOTAL CA: CPT

## 2020-03-18 PROCEDURE — 700102 HCHG RX REV CODE 250 W/ 637 OVERRIDE(OP): Performed by: PHYSICAL MEDICINE & REHABILITATION

## 2020-03-18 PROCEDURE — 770010 HCHG ROOM/CARE - REHAB SEMI PRIVAT*

## 2020-03-18 PROCEDURE — 97535 SELF CARE MNGMENT TRAINING: CPT

## 2020-03-18 PROCEDURE — 74018 RADEX ABDOMEN 1 VIEW: CPT

## 2020-03-18 PROCEDURE — A9270 NON-COVERED ITEM OR SERVICE: HCPCS | Performed by: PHYSICAL MEDICINE & REHABILITATION

## 2020-03-18 PROCEDURE — 99232 SBSQ HOSP IP/OBS MODERATE 35: CPT | Performed by: HOSPITALIST

## 2020-03-18 PROCEDURE — A9270 NON-COVERED ITEM OR SERVICE: HCPCS

## 2020-03-18 PROCEDURE — 94760 N-INVAS EAR/PLS OXIMETRY 1: CPT

## 2020-03-18 PROCEDURE — 97530 THERAPEUTIC ACTIVITIES: CPT

## 2020-03-18 PROCEDURE — 85027 COMPLETE CBC AUTOMATED: CPT

## 2020-03-18 PROCEDURE — 36415 COLL VENOUS BLD VENIPUNCTURE: CPT

## 2020-03-18 RX ORDER — POTASSIUM CHLORIDE 20 MEQ/1
40 TABLET, EXTENDED RELEASE ORAL ONCE
Status: COMPLETED | OUTPATIENT
Start: 2020-03-18 | End: 2020-03-18

## 2020-03-18 RX ORDER — POTASSIUM CHLORIDE 20 MEQ/1
TABLET, EXTENDED RELEASE ORAL
Status: COMPLETED
Start: 2020-03-18 | End: 2020-03-18

## 2020-03-18 RX ORDER — METRONIDAZOLE 500 MG/1
500 TABLET ORAL EVERY 8 HOURS
Status: DISCONTINUED | OUTPATIENT
Start: 2020-03-18 | End: 2020-03-20

## 2020-03-18 RX ORDER — SODIUM CHLORIDE 9 MG/ML
1000 INJECTION, SOLUTION INTRAVENOUS ONCE
Status: COMPLETED | OUTPATIENT
Start: 2020-03-19 | End: 2020-03-19

## 2020-03-18 RX ORDER — CIPROFLOXACIN 500 MG/1
500 TABLET, FILM COATED ORAL EVERY 12 HOURS
Status: COMPLETED | OUTPATIENT
Start: 2020-03-18 | End: 2020-03-24

## 2020-03-18 RX ADMIN — ATORVASTATIN CALCIUM 20 MG: 10 TABLET, FILM COATED ORAL at 20:38

## 2020-03-18 RX ADMIN — POTASSIUM CHLORIDE 40 MEQ: 20 TABLET, EXTENDED RELEASE ORAL at 20:43

## 2020-03-18 RX ADMIN — CIPROFLOXACIN HYDROCHLORIDE 500 MG: 500 TABLET, FILM COATED ORAL at 14:27

## 2020-03-18 RX ADMIN — MELATONIN 2000 UNITS: at 08:25

## 2020-03-18 RX ADMIN — ALUMINUM HYDROXIDE, MAGNESIUM HYDROXIDE, AND DIMETHICONE 20 ML: 400; 400; 40 SUSPENSION ORAL at 20:37

## 2020-03-18 RX ADMIN — METRONIDAZOLE 500 MG: 500 TABLET ORAL at 20:38

## 2020-03-18 RX ADMIN — ALUMINUM HYDROXIDE, MAGNESIUM HYDROXIDE, AND DIMETHICONE 20 ML: 400; 400; 40 SUSPENSION ORAL at 16:07

## 2020-03-18 RX ADMIN — GABAPENTIN 300 MG: 300 CAPSULE ORAL at 08:25

## 2020-03-18 RX ADMIN — MORPHINE SULFATE 15 MG: 15 TABLET, FILM COATED, EXTENDED RELEASE ORAL at 20:38

## 2020-03-18 RX ADMIN — METRONIDAZOLE 500 MG: 500 TABLET ORAL at 14:28

## 2020-03-18 RX ADMIN — METFORMIN HYDROCHLORIDE 250 MG: 500 TABLET ORAL at 17:19

## 2020-03-18 RX ADMIN — GABAPENTIN 300 MG: 300 CAPSULE ORAL at 20:38

## 2020-03-18 RX ADMIN — MORPHINE SULFATE 15 MG: 15 TABLET, FILM COATED, EXTENDED RELEASE ORAL at 08:25

## 2020-03-18 RX ADMIN — POTASSIUM CHLORIDE 40 MEQ: 1500 TABLET, EXTENDED RELEASE ORAL at 20:43

## 2020-03-18 RX ADMIN — RIVAROXABAN 20 MG: 10 TABLET, FILM COATED ORAL at 17:19

## 2020-03-18 RX ADMIN — ATENOLOL 100 MG: 25 TABLET ORAL at 05:22

## 2020-03-18 RX ADMIN — CIPROFLOXACIN HYDROCHLORIDE 500 MG: 500 TABLET, FILM COATED ORAL at 20:38

## 2020-03-18 RX ADMIN — METFORMIN HYDROCHLORIDE 250 MG: 500 TABLET ORAL at 08:25

## 2020-03-18 RX ADMIN — GABAPENTIN 300 MG: 300 CAPSULE ORAL at 14:27

## 2020-03-18 RX ADMIN — OXYCODONE HYDROCHLORIDE AND ACETAMINOPHEN 1 TABLET: 5; 325 TABLET ORAL at 08:29

## 2020-03-18 ASSESSMENT — ENCOUNTER SYMPTOMS
BRUISES/BLEEDS EASILY: 0
PALPITATIONS: 0
COUGH: 0
EYES NEGATIVE: 1
NAUSEA: 0
DIARRHEA: 1
SHORTNESS OF BREATH: 0
VOMITING: 0
FEVER: 0
POLYDIPSIA: 0
CHILLS: 0
ABDOMINAL PAIN: 0

## 2020-03-18 NOTE — DISCHARGE PLANNING
JUNI spoke with Juan J Vance at [facility] [phone] and he stated he should be able to approve more LOS days until anticipated DC of 3/26/2020.  He stated he would look at the clinicals sent and fax over letter tomorrow.

## 2020-03-18 NOTE — THERAPY
Physical Therapy   Daily Treatment     Patient Name: Jose Mc  Age:  64 y.o., Sex:  male  Medical Record #: [id number]  Today's Date: 3/18/2020     Precautions  Precautions: (P) Non Weight Bearing Left Lower Extremity  Comments: (P) left IPOP too big, required multiple checks for secure fastening, [employer] rep- Sathish aware and trying to come to the hospital.    Subjective    X-ray tech in room at beginning of session, patient agreeable to PT post xrays.     Objective     03/18/20 1031   Precautions   Precautions Non Weight Bearing Left Lower Extremity   Comments left IPOP too big, required multiple checks for secure fastening, [employer] rep- Sathish aware and trying to come to the hospital.   Pain   Intervention Declines   Non Verbal Descriptors   Non Verbal Scale  Calm   Cognition    Level of Consciousness Alert   Standing Lower Body Exercises   Other Exercises gait in // bars forward x 4 laps with mirror for visual feedback, backwards gait x 5 ft   Neuro-Muscular Treatments   Neuro-Muscular Treatments Sequencing;Verbal Cuing;Postural Facilitation;Biofeedback   Comments mirror used for visual feedback for gait training   Interdisciplinary Plan of Care Collaboration   IDT Collaboration with  Physical Therapist   Patient Position at End of Therapy Other (Comments)  (transferred to OT)   Collaboration Comments CLOF, treatment plans   PT Total Time Spent   PT Individual Total Time Spent (Mins) 30   PT Charge Group   PT Gait Training 1   PT Therapeutic Activities 1     WC mobility around unit, Pal x 10 min; gait training in // bars and FWW with WC follow as needed.     FIM Walking Score:  1 - Total Assistance  Walking Description:  Walker, Requires wheelchair follow(25 ft hop with FWW, CGA, WC follow)    FIM Wheelchair Score:  6 - Modified Independent  Wheelchair Description:         Assessment    Patient able to control R LE stepping, step length and height  to decrease force on R LE with hopping with  fww.    Plan    Continue to progress FWW distance; w/c outdoors- ramps, uneven terrain; assess transfers for mod I status; review ther-ex for LE/core strengthening

## 2020-03-18 NOTE — PROGRESS NOTES
Hospital Medicine Daily Progress Note      Chief Complaint:  Hypertension  Diabetes  Leukocytosis  LUE swelling & erythema    Interval History:  No overnight problems.  Pain controlled.  Had Ortho F/U today.    Review of Systems  Review of Systems   Constitutional: Negative for chills and fever.   HENT: Negative.    Eyes: Negative.    Respiratory: Negative for cough and shortness of breath.    Cardiovascular: Negative for chest pain and palpitations.   Gastrointestinal: Negative for abdominal pain, nausea and vomiting.   Genitourinary: Negative.    Musculoskeletal:        Wound pain   Skin: Negative for itching and rash.   Endo/Heme/Allergies: Negative for polydipsia. Does not bruise/bleed easily.        Physical Exam  Temp:  [36.4 °C (97.6 °F)-37.1 °C (98.8 °F)] 36.4 °C (97.6 °F)  Pulse:  [68-79] 79  Resp:  [18] 18  BP: (104-122)/(54-67) 122/67  SpO2:  [91 %-98 %] 98 %    Physical Exam  Vitals signs reviewed.   Constitutional:       General: He is not in acute distress.     Appearance: Normal appearance. He is not ill-appearing.   HENT:      Head: Normocephalic and atraumatic.      Right Ear: External ear normal.      Left Ear: External ear normal.      Nose: Nose normal.      Mouth/Throat:      Pharynx: Oropharynx is clear.   Eyes:      General:         Right eye: No discharge.         Left eye: No discharge.      Extraocular Movements: Extraocular movements intact.      Conjunctiva/sclera: Conjunctivae normal.   Neck:      Musculoskeletal: Normal range of motion.      Vascular: No carotid bruit.   Cardiovascular:      Rate and Rhythm: Normal rate and regular rhythm.   Pulmonary:      Effort: No respiratory distress.      Breath sounds: No wheezing.      Comments: Decreased BS  Abdominal:      General: Bowel sounds are normal. There is no distension.      Palpations: Abdomen is soft.      Tenderness: There is no abdominal tenderness.   Musculoskeletal:      Comments: Left BKA incision clean and dry w/ intact  sutures, stump appears swollen   Skin:     General: Skin is warm and dry.   Neurological:      Mental Status: He is alert and oriented to person, place, and time.         Fluids    Intake/Output Summary (Last 24 hours) at 3/18/2020 0678  Last data filed at 3/18/2020 1900  Gross per 24 hour   Intake 600 ml   Output --   Net 600 ml       Laboratory  Recent Labs     03/16/20  0645 03/18/20  0529   WBC 24.3* 20.5*   RBC 3.90* 3.82*   HEMOGLOBIN 11.2* 10.8*   HEMATOCRIT 35.5* 33.6*   MCV 91.0 88.0   MCH 28.7 28.3   MCHC 31.5* 32.1*   RDW 46.5 44.6   PLATELETCT 274 291   MPV 11.2 11.5     Recent Labs     03/16/20  0645 03/18/20  0529   SODIUM 134* 137   POTASSIUM 4.2 3.4*   CHLORIDE 110 107   CO2 13* 18*   GLUCOSE 116* 122*   BUN 24* 24*   CREATININE 1.49* 1.45*   CALCIUM 8.4* 8.5                   Assessment/Plan  * Status post below-knee amputation of left lower extremity (HCC)- (present on admission)  Assessment & Plan  Incision clean and dry w/ intact sutures but stump appears swollen  U/S shows extensive soft tissue edema and phlegmonous change without evidence of well formed loculated fluid collection  Ortho F/U note states no concern for infection    MELANIE (acute kidney injury) ([facility])  Assessment & Plan  Has metabolic acidosis  Check F/U labs in am    Leukocytosis  Assessment & Plan  UA and CXR both unremarkable  C Dif negative  Completed abx for LUE phlebitis  Continue to monitor for post-op infection    Vitamin D deficiency- (present on admission)  Assessment & Plan  Vit D level 14  On supplementation    Phlebitis of left arm- (present on admission)  Assessment & Plan  U/S negative DVT  Suspect phlebitis from recent PICC  Completed Bactrim 3/16/20    Diabetes mellitus due to underlying condition with hyperglycemia ([facility])- (present on admission)  Assessment & Plan  HbA1c 6.8  Continue Metformin  Off FSBS and SSI  Outpt meds include Metformin 1000 mg bid    History of DVT (deep vein thrombosis)- (present on  admission)  Assessment & Plan  Anticoagulated on Xarelto    Mixed hyperlipidemia- (present on admission)  Assessment & Plan  On Lipitor    Benign essential HTN- (present on admission)  Assessment & Plan  Observe blood pressure trends on Atenolol    Full Code

## 2020-03-18 NOTE — THERAPY
"Physical Therapy   Daily Treatment     Patient Name: Jose Mc  Age:  64 y.o., Sex:  male  Medical Record #: [id number]  Today's Date: 3/18/2020     Precautions  Precautions: Non Weight Bearing Left Lower Extremity  Comments: left IPOP too big ([employer] [facility]- aware but unable to come to hospital per policy)    Subjective    Patient agreeable to therapy plan; motivated for progress     Objective       03/18/20 0831   Precautions   Precautions Non Weight Bearing Left Lower Extremity   Comments left IPOP too big ([employer] [facility]- aware but unable to come to hospital per policy)   Supine Lower Body Exercise   Bridges 2 sets of 10   Hip Abduction 2 sets of 10;Bilateral   Straight Leg Raises 2 sets of 10;Left   Other Exercises sidelying leg lift 10x left, prone leg extension 10x left, prone hamstring curls 10x left   PT Total Time Spent   PT Individual Total Time Spent (Mins) 60   PT Charge Group   PT Gait Training 1   PT Therapeutic Exercise 2   PT Therapeutic Activities 1       FIM Bed/Chair/Wheelchair Transfers Score: 5 - Standby Prompting/Supervision or Set-up  Bed/Chair/Wheelchair Transfers Description:  (SBA SPT; SPV bed mobility)    FIM Walking Score:  1 - Total Assistance  Walking Description:  (20ft hop-to FWW CGA with w/c follow)    FIM Wheelchair Score:  6 - Modified Independent  Wheelchair Description:  (mod I w/c indoors using bilateral UE)      Assessment    Ambulation distance limited by SOB, right knee weakness, and UE fatigue from FWW depression; able to make more delicate hops today by focusing on landing on ball of foot (not heel)    IPOP is too loose- called [facility] ([facility]) to adjust- [facility] relayed message vendor is \"not allowed to come to hospital at this time\", used Dysem and clothespin to keep IPOP temporarily    Plan    Continue to progress FWW distance; w/c outdoors- ramps, uneven terrain; assess transfers for mod I status; review there-ex for LE/core strengthening    "

## 2020-03-18 NOTE — THERAPY
"Occupational Therapy  Daily Treatment     Patient Name: Jose Mc  Age:  64 y.o., Sex:  male  Medical Record #: [id number]  Today's Date: 3/18/2020     Precautions  Precautions: Non Weight Bearing Left Lower Extremity  Comments: left IPOP too big, required multiple checks for secure fastening, [employer] [facility]- [facility] aware and trying to come to the hospital.    Safety   ADL Safety : Requires Supervision for Safety, Requires Physical Assist for Safety    Subjective    \"I feel the burn in my shoulders and back\"     Objective       03/18/20 1231   Sitting Upper Body Exercises   Upper Extremity Bike Level 4 Resistance  (BUE motomed 10 min, 0 RB, 1.32 mile)   Other Exercise seated EOM - sit-ups x 10, trunk rotational reach and lateral reach to tap cones x 20 each. BUE purple resistance band: scapular retraction, diagonal pulls, chest pull 2 sets x 10-15 each. 8# ball - chest press to shoulder press, overhead circles, shoulder extension, internal rotation x 10 each.   Interdisciplinary Plan of Care Collaboration   Patient Position at End of Therapy In Bed;Call Light within Reach;Tray Table within Reach   OT Total Time Spent   OT Individual Total Time Spent (Mins) 60   OT Charge Group   OT Therapeutic Exercise  4       Assessment    Pt completed UB therex to the best of their abilities with no complaints of pain    Plan    Cont overall strength/endurance and standing balance to improve ADL's and functional mobility    "

## 2020-03-18 NOTE — PROGRESS NOTES
Rehab Progress Note     Date of Service: 3/18/2020  Chief Complaint: follow up BKA    Interval Events (Subjective)    Patient seen and examined today in his room.  He denies any residual limb pain.  This was looked at at bedside with the hospitalist.  Patient continues to report however irritable bowel as well as diarrhea.  This was also discussed with the hospitalist.  He has not been getting any bowel medications.  Only new medication is vitamin D.    Objective:  VITAL SIGNS: /55   Pulse 75   Temp 37.1 °C (98.8 °F) (Oral)   Resp 18   Ht 1.829 m (6')   Wt (!) 147 kg (324 lb 1.2 oz)   SpO2 95%   BMI 43.95 kg/m²   Gen: alert, no apparent distress  CV: regular rate and rhythm, no murmurs, no peripheral edema  Resp: clear to ascultation bilaterally, normal respiratory effort  GI: soft, non-tender abdomen, bowel sounds present  Msk: Left transtibial amputation site with intact incision, no drainage, surrounding erythema particularly at the end of the residual limb      Recent Results (from the past 72 hour(s))   Cdiff By PCR Rflx Toxin    Collection Time: 03/16/20  1:00 AM   Result Value Ref Range    C Diff by PCR See Toxin Negative    [id number] Presumptive See Toxin Negative   C Diff Toxin    Collection Time: 03/16/20  1:00 AM   Result Value Ref Range    C.Diff Toxin A&B Negative    CBC WITH DIFFERENTIAL    Collection Time: 03/16/20  6:45 AM   Result Value Ref Range    WBC 24.3 (H) 4.8 - 10.8 K/uL    RBC 3.90 (L) 4.70 - 6.10 M/uL    Hemoglobin 11.2 (L) 14.0 - 18.0 g/dL    Hematocrit 35.5 (L) 42.0 - 52.0 %    MCV 91.0 81.4 - 97.8 fL    MCH 28.7 27.0 - 33.0 pg    MCHC 31.5 (L) 33.7 - 35.3 g/dL    RDW 46.5 35.9 - 50.0 fL    Platelet Count 274 164 - 446 K/uL    MPV 11.2 9.0 - 12.9 fL    Neutrophils-Polys 85.00 (H) 44.00 - 72.00 %    Lymphocytes 7.00 (L) 22.00 - 41.00 %    Monocytes 5.30 0.00 - 13.40 %    Eosinophils 1.20 0.00 - 6.90 %    Basophils 0.30 0.00 - 1.80 %    Immature Granulocytes 1.20 (H) 0.00 -  0.90 %    Nucleated RBC 0.00 /100 WBC    Neutrophils (Absolute) 20.66 (H) 1.82 - 7.42 K/uL    Lymphs (Absolute) 1.69 1.00 - 4.80 K/uL    Monos (Absolute) 1.29 (H) 0.00 - 0.85 K/uL    Eos (Absolute) 0.29 0.00 - 0.51 K/uL    Baso (Absolute) 0.07 0.00 - 0.12 K/uL    Immature Granulocytes (abs) 0.30 (H) 0.00 - 0.11 K/uL    NRBC (Absolute) 0.00 K/uL   MAGNESIUM    Collection Time: 03/16/20  6:45 AM   Result Value Ref Range    Magnesium 1.7 1.5 - 2.5 mg/dL   PHOSPHORUS    Collection Time: 03/16/20  6:45 AM   Result Value Ref Range    Phosphorus 3.4 2.5 - 4.5 mg/dL   LACTIC ACID    Collection Time: 03/16/20  6:45 AM   Result Value Ref Range    Lactic Acid 1.5 0.5 - 2.0 mmol/L   Comp Metabolic Panel    Collection Time: 03/16/20  6:45 AM   Result Value Ref Range    Sodium 134 (L) 135 - 145 mmol/L    Potassium 4.2 3.6 - 5.5 mmol/L    Chloride 110 96 - 112 mmol/L    Co2 13 (L) 20 - 33 mmol/L    Anion Gap 11.0 7.0 - 16.0    Glucose 116 (H) 65 - 99 mg/dL    Bun 24 (H) 8 - 22 mg/dL    Creatinine 1.49 (H) 0.50 - 1.40 mg/dL    Calcium 8.4 (L) 8.5 - 10.5 mg/dL    AST(SGOT) 16 12 - 45 U/L    ALT(SGPT) 9 2 - 50 U/L    Alkaline Phosphatase 83 30 - 99 U/L    Total Bilirubin 0.4 0.1 - 1.5 mg/dL    Albumin 3.2 3.2 - 4.9 g/dL    Total Protein 6.4 6.0 - 8.2 g/dL    Globulin 3.2 1.9 - 3.5 g/dL    A-G Ratio 1.0 g/dL   ESTIMATED GFR    Collection Time: 03/16/20  6:45 AM   Result Value Ref Range    GFR If  57 (A) >60 mL/min/1.73 m 2    GFR If Non  47 (A) >60 mL/min/1.73 m 2   Basic Metabolic Panel    Collection Time: 03/18/20  5:29 AM   Result Value Ref Range    Sodium 137 135 - 145 mmol/L    Potassium 3.4 (L) 3.6 - 5.5 mmol/L    Chloride 107 96 - 112 mmol/L    Co2 18 (L) 20 - 33 mmol/L    Glucose 122 (H) 65 - 99 mg/dL    Bun 24 (H) 8 - 22 mg/dL    Creatinine 1.45 (H) 0.50 - 1.40 mg/dL    Calcium 8.5 8.5 - 10.5 mg/dL    Anion Gap 12.0 7.0 - 16.0   CBC WITHOUT DIFFERENTIAL    Collection Time: 03/18/20  5:29 AM    Result Value Ref Range    WBC 20.5 (H) 4.8 - 10.8 K/uL    RBC 3.82 (L) 4.70 - 6.10 M/uL    Hemoglobin 10.8 (L) 14.0 - 18.0 g/dL    Hematocrit 33.6 (L) 42.0 - 52.0 %    MCV 88.0 81.4 - 97.8 fL    MCH 28.3 27.0 - 33.0 pg    MCHC 32.1 (L) 33.7 - 35.3 g/dL    RDW 44.6 35.9 - 50.0 fL    Platelet Count 291 164 - 446 K/uL    MPV 11.5 9.0 - 12.9 fL   ESTIMATED GFR    Collection Time: 03/18/20  5:29 AM   Result Value Ref Range    GFR If  59 (A) >60 mL/min/1.73 m 2    GFR If Non African American 49 (A) >60 mL/min/1.73 m 2       Current Facility-Administered Medications   Medication Frequency   • senna-docusate (PERICOLACE or SENOKOT S) 8.6-50 MG per tablet 2 Tab BID PRN    And   • polyethylene glycol/lytes (MIRALAX) PACKET 1 Packet QDAY PRN    And   • magnesium hydroxide (MILK OF MAGNESIA) suspension 30 mL QDAY PRN    And   • bisacodyl (DULCOLAX) suppository 10 mg QDAY PRN   • metFORMIN (GLUCOPHAGE) tablet 250 mg BID WITH MEALS   • glucose 4 g chewable tablet 16 g Q15 MIN PRN    And   • dextrose 50% (D50W) injection 50 mL Q15 MIN PRN   • Respiratory Therapy Consult Continuous RT   • Pharmacy Consult Request ...Pain Management Review 1 Each PHARMACY TO DOSE   • acetaminophen (TYLENOL) tablet 650 mg Q4HRS PRN   • artificial tears ophthalmic solution 1 Drop PRN   • benzocaine-menthol (CEPACOL) lozenge 1 Lozenge Q2HRS PRN   • mag hydrox-al hydrox-simeth (MAALOX PLUS ES or MYLANTA DS) suspension 20 mL Q2HRS PRN   • ondansetron (ZOFRAN ODT) dispertab 4 mg 4X/DAY PRN    Or   • ondansetron (ZOFRAN) syringe/vial injection 4 mg 4X/DAY PRN   • traZODone (DESYREL) tablet 50 mg QHS PRN   • sodium chloride (OCEAN) 0.65 % nasal spray 2 Spray PRN   • hydrOXYzine HCl (ATARAX) tablet 50 mg Q6HRS PRN   • melatonin tablet 3 mg HS PRN   • atenolol (TENORMIN) tablet 100 mg Q DAY   • atorvastatin (LIPITOR) tablet 20 mg Q EVENING   • gabapentin (NEURONTIN) capsule 300 mg TID   • tramadol (ULTRAM) 50 MG tablet 50 mg Q4HRS PRN   •  oxyCODONE immediate-release (ROXICODONE) tablet 5 mg Q4HRS PRN   • lactulose 20 GM/30ML solution 30 mL QDAY PRN   • docusate sodium (ENEMEEZ) enema 283 mg QDAY PRN   • rivaroxaban (XARELTO) tablet 20 mg PM MEAL   • morphine ER (MS CONTIN) tablet 15 mg Q12HRS   • oxyCODONE-acetaminophen (PERCOCET) 5-325 MG per tablet 1 Tab Q4HRS PRN       Orders Placed This Encounter   Procedures   • Diet Order Diabetic     Standing Status:   Standing     Number of Occurrences:   1     Order Specific Question:   Diet:     Answer:   Diabetic [3]       Assessment:  Active Hospital Problems    Diagnosis   • *Status post below-knee amputation of left lower extremity (HCC)   • Leukocytosis   • Vitamin D deficiency   • Impaired mobility and ADLs   • Diabetes mellitus due to underlying condition with hyperglycemia (HCC)   • Peripheral neuropathy   • Obstructive sleep apnea syndrome   • Benign essential HTN   • Mixed hyperlipidemia   • History of DVT (deep vein thrombosis)     This patient is a 64 y.o. male admitted for acute inpatient rehabilitation with Status post below-knee amputation of left lower extremity (HCC).    I led and attended the weekly conference, and agree with the IDT conference documentation and plan of care as noted below.    Date of conference: 3/16/2020    Goals and barriers: See IDT note.    Biggest barriers: obesity    Goals in next week: home evaluation    CM/social support: siblings supportive    Anticipated DC date: 3/26/2020    Home health: PT/OT/RN    Equip: EDA, RAVIW    Follow up: PCP, surgery     Medical Decision Making and Plan:    Left BKA  3/6 Dr. Ceja  Daily wound care  PT/OT, 1.5 hr each discipline, 5 days per week  Outpatient follow up with surgery 3/17 - no concern for infection  Will re-ultrasound residual limb next week    Chronic opiate use  Continue home medications  Morphine ER 15 mg BID  Percocets 5-325 PRN - not using very often     Peripheral Neuropathy  Phantom sensation  Continue  gabapentin  Monitor need to increase  Currently well controlled     Opioid induced constipation, resolved  Now with loose stools, neg c diff  As needed bowel meds  Last BM 3/17    Bladder  Check PVRs  Not retaining  ICP for over 400 cc  Scheduled toileting    DVT prophylaxis  Xarelto     Appreciate the assistance of hospitalist with his medical co-morbidities:     Hypertension, atenolol  Diabetes with hyperglycemia, metformin, SSI  Sleep apnea, CPAP  Hyperlipidemia, statin  History of DVT, Xarelto  Left arm erythema/edema/cellulitis, s/p Bactrim  Leukocytosis, continues/improved, unclear etiology, US of residual limb without fluid collection, recheck next week  Anemia  Vit D deficiency, discontinue supplementation  Loose stools, continues, unclear etiology, check KUB, negative c diff, ??colitis, discussed with Dr. Stockton    I have performed a physical exam, reviewed and updated ROS, as well as the assessment and plan today 3/18/2020. In review of note from 3/17/2020 there are no new changes except as documented above.    Sophia Beck M.D.   Physical Medicine and Rehabilitation

## 2020-03-18 NOTE — THERAPY
"Occupational Therapy  Daily Treatment     Patient Name: Jose Mc  Age:  64 y.o., Sex:  male  Medical Record #: [id number]  Today's Date: 3/18/2020     Precautions  Precautions: Non Weight Bearing Left Lower Extremity  Comments: left IPOP too big ([employer] [facility]- aware but unable to come to hospital per policy) - for now its clothespin to pants    Safety   ADL Safety : Requires Supervision for Safety, Requires Physical Assist for Safety    Subjective    \"I think my mother will also like to use the bench instead of our stool that we currently use. It will become a permament item in the bathroom\"     Objective    Edu provided on: bathroom DME set-up - most likely pt will need to ambulate (hop) with FWW into the bathroom and discussed whether there is enough space to turn FWW to transfer to toilet. Pt reports that he believes that there will be enough space however we don't know until we receive the measurements from the family and the completion of the home evaluation. Edu pt re: obtaining a long shower curtain and its set-up at the tub bench to keep water inside shower. Discussed toilet DME options - pt will most likely require a bariatric commode over the toilet with arms since grab bars are not feasible at this time. Provided pt with tong stye - toileting aid and bariatric tub transfer bench handouts from [employer] with pricing, product description and dimensions info.     Stand pivot w/c<>bed with SBA     03/18/20 1101   Interdisciplinary Plan of Care Collaboration   Patient Position at End of Therapy Call Light within Reach;Tray Table within Reach;In Bed   OT Total Time Spent   OT Individual Total Time Spent (Mins) 30   OT Charge Group   OT Self Care / ADL 2       Assessment    Continuing to edu/problem solve bathroom AE/DME set-up. Recommendations made so far: tong stye - toileting aid and bariatric tub transfer bench with handouts provided. Pt will also most likely require a bariatric commode over the " toilet. Pt verbalized understanding of edu and agreeable to obtaining equipment that was discussed.     Plan    Cont overall strength/endurance and standing balance to improve ADL's and functional mobility

## 2020-03-18 NOTE — FLOWSHEET NOTE
03/18/20 0748   Events/Summary/Plan   Events/Summary/Plan 02 spot check, using CPAP without problems   Vital Signs   Pulse 75   Respiration 18   Pulse Oximetry 95 %   $ Pulse Oximetry (Spot Check) Yes   Respiratory Assessment   Level of Consciousness Alert   Chest Exam   Work Of Breathing / Effort Accessory Muscle Use  (with dressing)   Oxygen   O2 Delivery Device None - Room Air   Non-Invasive Ventilation [employer]   Nocturnal CPAP or BIPAP CPAP - Home Unit

## 2020-03-19 PROBLEM — R19.7 DIARRHEA: Status: ACTIVE | Noted: 2020-03-14

## 2020-03-19 PROCEDURE — 700102 HCHG RX REV CODE 250 W/ 637 OVERRIDE(OP): Performed by: HOSPITALIST

## 2020-03-19 PROCEDURE — 770010 HCHG ROOM/CARE - REHAB SEMI PRIVAT*

## 2020-03-19 PROCEDURE — 700105 HCHG RX REV CODE 258: Performed by: HOSPITALIST

## 2020-03-19 PROCEDURE — A9270 NON-COVERED ITEM OR SERVICE: HCPCS | Performed by: PHYSICAL MEDICINE & REHABILITATION

## 2020-03-19 PROCEDURE — A9270 NON-COVERED ITEM OR SERVICE: HCPCS | Performed by: HOSPITALIST

## 2020-03-19 PROCEDURE — 97110 THERAPEUTIC EXERCISES: CPT

## 2020-03-19 PROCEDURE — 97116 GAIT TRAINING THERAPY: CPT

## 2020-03-19 PROCEDURE — 99232 SBSQ HOSP IP/OBS MODERATE 35: CPT | Performed by: PHYSICAL MEDICINE & REHABILITATION

## 2020-03-19 PROCEDURE — 94760 N-INVAS EAR/PLS OXIMETRY 1: CPT

## 2020-03-19 PROCEDURE — 99232 SBSQ HOSP IP/OBS MODERATE 35: CPT | Performed by: HOSPITALIST

## 2020-03-19 PROCEDURE — 700102 HCHG RX REV CODE 250 W/ 637 OVERRIDE(OP): Performed by: PHYSICAL MEDICINE & REHABILITATION

## 2020-03-19 PROCEDURE — 97530 THERAPEUTIC ACTIVITIES: CPT

## 2020-03-19 RX ORDER — CHOLESTYRAMINE LIGHT 4 G/5.7G
1 POWDER, FOR SUSPENSION ORAL 2 TIMES DAILY
Status: DISCONTINUED | OUTPATIENT
Start: 2020-03-19 | End: 2020-03-21

## 2020-03-19 RX ORDER — LACTOBACILLUS RHAMNOSUS GG 10B CELL
1 CAPSULE ORAL
Status: DISCONTINUED | OUTPATIENT
Start: 2020-03-19 | End: 2020-03-24

## 2020-03-19 RX ORDER — SIMETHICONE 80 MG
160 TABLET,CHEWABLE ORAL
Status: DISCONTINUED | OUTPATIENT
Start: 2020-03-19 | End: 2020-03-24

## 2020-03-19 RX ADMIN — CIPROFLOXACIN HYDROCHLORIDE 500 MG: 500 TABLET, FILM COATED ORAL at 20:13

## 2020-03-19 RX ADMIN — SIMETHICONE CHEW TAB 80 MG 160 MG: 80 TABLET ORAL at 20:13

## 2020-03-19 RX ADMIN — SIMETHICONE CHEW TAB 80 MG 160 MG: 80 TABLET ORAL at 11:32

## 2020-03-19 RX ADMIN — MORPHINE SULFATE 15 MG: 15 TABLET, FILM COATED, EXTENDED RELEASE ORAL at 08:29

## 2020-03-19 RX ADMIN — SODIUM CHLORIDE 1000 ML: 9 INJECTION, SOLUTION INTRAVENOUS at 06:59

## 2020-03-19 RX ADMIN — GABAPENTIN 300 MG: 300 CAPSULE ORAL at 08:29

## 2020-03-19 RX ADMIN — ATENOLOL 100 MG: 25 TABLET ORAL at 05:26

## 2020-03-19 RX ADMIN — RIVAROXABAN 20 MG: 10 TABLET, FILM COATED ORAL at 17:38

## 2020-03-19 RX ADMIN — Medication 1 CAPSULE: at 08:56

## 2020-03-19 RX ADMIN — METFORMIN HYDROCHLORIDE 250 MG: 500 TABLET ORAL at 17:38

## 2020-03-19 RX ADMIN — ALUMINUM HYDROXIDE, MAGNESIUM HYDROXIDE, AND DIMETHICONE 20 ML: 400; 400; 40 SUSPENSION ORAL at 01:05

## 2020-03-19 RX ADMIN — GABAPENTIN 300 MG: 300 CAPSULE ORAL at 20:13

## 2020-03-19 RX ADMIN — MORPHINE SULFATE 15 MG: 15 TABLET, FILM COATED, EXTENDED RELEASE ORAL at 20:13

## 2020-03-19 RX ADMIN — ATORVASTATIN CALCIUM 20 MG: 10 TABLET, FILM COATED ORAL at 20:13

## 2020-03-19 RX ADMIN — METFORMIN HYDROCHLORIDE 250 MG: 500 TABLET ORAL at 08:29

## 2020-03-19 RX ADMIN — GABAPENTIN 300 MG: 300 CAPSULE ORAL at 14:06

## 2020-03-19 RX ADMIN — METRONIDAZOLE 500 MG: 500 TABLET ORAL at 20:13

## 2020-03-19 RX ADMIN — SIMETHICONE CHEW TAB 80 MG 160 MG: 80 TABLET ORAL at 17:37

## 2020-03-19 RX ADMIN — METRONIDAZOLE 500 MG: 500 TABLET ORAL at 14:06

## 2020-03-19 RX ADMIN — ALUMINUM HYDROXIDE, MAGNESIUM HYDROXIDE, AND DIMETHICONE 20 ML: 400; 400; 40 SUSPENSION ORAL at 05:25

## 2020-03-19 RX ADMIN — CHOLESTYRAMINE 4 G: 4 POWDER, FOR SUSPENSION ORAL at 20:12

## 2020-03-19 RX ADMIN — METRONIDAZOLE 500 MG: 500 TABLET ORAL at 05:26

## 2020-03-19 RX ADMIN — CHOLESTYRAMINE 4 G: 4 POWDER, FOR SUSPENSION ORAL at 08:56

## 2020-03-19 RX ADMIN — CIPROFLOXACIN HYDROCHLORIDE 500 MG: 500 TABLET, FILM COATED ORAL at 08:29

## 2020-03-19 ASSESSMENT — ENCOUNTER SYMPTOMS
VOMITING: 0
BRUISES/BLEEDS EASILY: 0
EYES NEGATIVE: 1
PALPITATIONS: 0
COUGH: 0
POLYDIPSIA: 0
ABDOMINAL PAIN: 0
SHORTNESS OF BREATH: 0
FEVER: 0
NAUSEA: 0
DIARRHEA: 1
CHILLS: 0

## 2020-03-19 NOTE — PROGRESS NOTES
Rehab Progress Note     Date of Service: 3/19/2020  Chief Complaint: follow up BKA    Interval Events (Subjective)    Patient seen and examined today in his room.  He continued to have loose stools overnight only slightly improved on his last trip to the bathroom just now.  He reports his bowels still are very active.  He has been nervous about eating too much due to his loose stools.  He denies any pain in his residual limb.  He has no other complaints.    Objective:  VITAL SIGNS: /60   Pulse 72   Temp 36.4 °C (97.5 °F) (Oral)   Resp 18   Ht 1.829 m (6')   Wt (!) 147 kg (324 lb 1.2 oz)   SpO2 95%   BMI 43.95 kg/m²   Gen: alert, no apparent distress  CV: regular rate and rhythm, no murmurs, no peripheral edema  Resp: clear to ascultation bilaterally, normal respiratory effort  GI: soft, non-tender abdomen, bowel sounds present and hyperactive  Msk: left BKA      Recent Results (from the past 72 hour(s))   Basic Metabolic Panel    Collection Time: 03/18/20  5:29 AM   Result Value Ref Range    Sodium 137 135 - 145 mmol/L    Potassium 3.4 (L) 3.6 - 5.5 mmol/L    Chloride 107 96 - 112 mmol/L    Co2 18 (L) 20 - 33 mmol/L    Glucose 122 (H) 65 - 99 mg/dL    Bun 24 (H) 8 - 22 mg/dL    Creatinine 1.45 (H) 0.50 - 1.40 mg/dL    Calcium 8.5 8.5 - 10.5 mg/dL    Anion Gap 12.0 7.0 - 16.0   CBC WITHOUT DIFFERENTIAL    Collection Time: 03/18/20  5:29 AM   Result Value Ref Range    WBC 20.5 (H) 4.8 - 10.8 K/uL    RBC 3.82 (L) 4.70 - 6.10 M/uL    Hemoglobin 10.8 (L) 14.0 - 18.0 g/dL    Hematocrit 33.6 (L) 42.0 - 52.0 %    MCV 88.0 81.4 - 97.8 fL    MCH 28.3 27.0 - 33.0 pg    MCHC 32.1 (L) 33.7 - 35.3 g/dL    RDW 44.6 35.9 - 50.0 fL    Platelet Count 291 164 - 446 K/uL    MPV 11.5 9.0 - 12.9 fL   ESTIMATED GFR    Collection Time: 03/18/20  5:29 AM   Result Value Ref Range    GFR If  59 (A) >60 mL/min/1.73 m 2    GFR If Non African American 49 (A) >60 mL/min/1.73 m 2       Current Facility-Administered  Medications   Medication Frequency   • simethicone (MYLICON) chewable tab 160 mg 4X/DAY WITH MEALS + NIGHTLY   • lactobacillus rhamnosus (CULTURELLE) capsule 1 Cap QDAY with Breakfast   • cholestyramine (QUESTRAN,PREVALITE) 4 GM powder 4 g BID   • metroNIDAZOLE (FLAGYL) tablet 500 mg Q8HRS   • ciprofloxacin (CIPRO) tablet 500 mg Q12HRS   • senna-docusate (PERICOLACE or SENOKOT S) 8.6-50 MG per tablet 2 Tab BID PRN    And   • polyethylene glycol/lytes (MIRALAX) PACKET 1 Packet QDAY PRN    And   • magnesium hydroxide (MILK OF MAGNESIA) suspension 30 mL QDAY PRN    And   • bisacodyl (DULCOLAX) suppository 10 mg QDAY PRN   • metFORMIN (GLUCOPHAGE) tablet 250 mg BID WITH MEALS   • glucose 4 g chewable tablet 16 g Q15 MIN PRN    And   • dextrose 50% (D50W) injection 50 mL Q15 MIN PRN   • Respiratory Therapy Consult Continuous RT   • Pharmacy Consult Request ...Pain Management Review 1 Each PHARMACY TO DOSE   • acetaminophen (TYLENOL) tablet 650 mg Q4HRS PRN   • artificial tears ophthalmic solution 1 Drop PRN   • benzocaine-menthol (CEPACOL) lozenge 1 Lozenge Q2HRS PRN   • mag hydrox-al hydrox-simeth (MAALOX PLUS ES or MYLANTA DS) suspension 20 mL Q2HRS PRN   • ondansetron (ZOFRAN ODT) dispertab 4 mg 4X/DAY PRN    Or   • ondansetron (ZOFRAN) syringe/vial injection 4 mg 4X/DAY PRN   • traZODone (DESYREL) tablet 50 mg QHS PRN   • sodium chloride (OCEAN) 0.65 % nasal spray 2 Spray PRN   • hydrOXYzine HCl (ATARAX) tablet 50 mg Q6HRS PRN   • melatonin tablet 3 mg HS PRN   • atenolol (TENORMIN) tablet 100 mg Q DAY   • atorvastatin (LIPITOR) tablet 20 mg Q EVENING   • gabapentin (NEURONTIN) capsule 300 mg TID   • tramadol (ULTRAM) 50 MG tablet 50 mg Q4HRS PRN   • oxyCODONE immediate-release (ROXICODONE) tablet 5 mg Q4HRS PRN   • lactulose 20 GM/30ML solution 30 mL QDAY PRN   • docusate sodium (ENEMEEZ) enema 283 mg QDAY PRN   • rivaroxaban (XARELTO) tablet 20 mg PM MEAL   • morphine ER (MS CONTIN) tablet 15 mg Q12HRS   •  oxyCODONE-acetaminophen (PERCOCET) 5-325 MG per tablet 1 Tab Q4HRS PRN       Orders Placed This Encounter   Procedures   • Diet Order Diabetic     Standing Status:   Standing     Number of Occurrences:   1     Order Specific Question:   Diet:     Answer:   Diabetic [3]       Assessment:  Active Hospital Problems    Diagnosis   • *Status post below-knee amputation of left lower extremity (HCC)   • Leukocytosis   • Vitamin D deficiency   • Impaired mobility and ADLs   • Diabetes mellitus due to underlying condition with hyperglycemia (HCC)   • Peripheral neuropathy   • Obstructive sleep apnea syndrome   • Benign essential HTN   • Mixed hyperlipidemia   • History of DVT (deep vein thrombosis)     This patient is a 64 y.o. male admitted for acute inpatient rehabilitation with Status post below-knee amputation of left lower extremity (HCC).    I led and attended the weekly conference, and agree with the IDT conference documentation and plan of care as noted below.    Date of conference: 3/16/2020    Goals and barriers: See IDT note.    Biggest barriers: obesity    Goals in next week: home evaluation    CM/social support: siblings supportive    Anticipated DC date: 3/26/2020    Home health: PT/OT/RN    Equip: EDA, RAVIW    Follow up: PCP, surgery     Medical Decision Making and Plan:    Left BKA  3/6 Dr. Ceja  Daily wound care  PT/OT, 1.5 hr each discipline, 5 days per week  Outpatient follow up with surgery 3/17 - no concern for infection  Will re-ultrasound residual limb next week    Chronic opiate use  Continue home medications  Morphine ER 15 mg BID  Percocets 5-325 PRN - not using very often     Peripheral Neuropathy  Phantom sensation  Continue gabapentin  Monitor need to increase  Currently well controlled     Opioid induced constipation, resolved  Now with loose stools, neg c diff, see below  As needed bowel meds  Last BM 3/19    Bladder  Checked PVRs  Not retaining  ICP for over 400 cc  Scheduled  toileting    DVT prophylaxis  Xarelto     Appreciate the assistance of hospitalist with his medical co-morbidities:     Hypertension, atenolol  Diabetes with hyperglycemia, metformin, SSI  Sleep apnea, CPAP  Hyperlipidemia, statin  History of DVT, Xarelto  Left arm erythema/edema/cellulitis, s/p Bactrim  Leukocytosis, continues/improved, unclear etiology, US of residual limb without fluid collection, recheck next week  Anemia  Vit D deficiency, discontinue supplementation  Loose stools, continues, unclear etiology, negative KUB, negative c diff, ??colitis, discussed with Dr. Stockton, started on empiric antibiotics for enteritis/colitis    Total time:  26 minutes.  I spent greater than 50% of the time for patient care, counseling, and coordination on this date, including patient face-to face time, unit/floor time with review of records/pertinent lab data and studies, as well as discussing diagnostic evaluation/work up, planned therapeutic interventions, and future disposition of care, as per the interval events/subjective and the assessment and plan as noted above.    I have performed a physical exam, reviewed and updated ROS, as well as the assessment and plan today 3/19/2020. In review of note from 3/18/2020 there are no new changes except as documented above.          Sophia Beck M.D.   Physical Medicine and Rehabilitation

## 2020-03-19 NOTE — FLOWSHEET NOTE
Conscious Sedation Respiratory Update       O2 (LPM): 0 (03/17/20 0700)       Events/Summary/Plan: SpO2 checked.  No distress. (03/19/20 1030)

## 2020-03-19 NOTE — THERAPY
"Occupational Therapy  Daily Treatment     Patient Name: Jose Mc  Age:  64 y.o., Sex:  male  Medical Record #: [id number]  Today's Date: 3/19/2020     Precautions  Precautions: Non Weight Bearing Left Lower Extremity  Comments: left IPOP too big, required multiple checks for secure fastening, [employer] rep- Sathish aware and trying to come to the hospital.    Safety   ADL Safety : Requires Supervision for Safety, Requires Physical Assist for Safety    Subjective    \"I have been having diarrhea all night and day and stomach cramps\"     Objective       03/19/20 1001   Supine Upper Body Exercises   Other Exercise core therex: trunk rotation with UE stretch x 10. BUE purple resistance band with SPC as weight bar: shrugs, rows 3 sets x 20 with increasing speed each set to improve endurance   Interdisciplinary Plan of Care Collaboration   Patient Position at End of Therapy Seated;Call Light within Reach;Tray Table within Reach   OT Total Time Spent   OT Individual Total Time Spent (Mins) 30   OT Charge Group   OT Therapeutic Exercise  2     Assessment    Unable to complete therapy at 7 am today d/t bowel incontinence/diarrhea - CNA assisting with clean-up. Returned at this time, upon entering room CNA assisting pt again for clean-up of bowel incontinence/diarrhea at bed. Pt continues to be motivated and agreeable to exercises at bed level which he completed to the best of his abilities with no issues    Plan    Cont overall strength/endurance and standing balance to improve ADL's and functional mobility    "

## 2020-03-19 NOTE — PROGRESS NOTES
Hospital Medicine Daily Progress Note      Chief Complaint:  Hypertension  Diabetes  Leukocytosis  LUE swelling & erythema    Interval History:  Afebrile but still w/ high WBC and continues to have diarrhea.    Review of Systems  Review of Systems   Constitutional: Negative for chills and fever.   HENT: Negative.    Eyes: Negative.    Respiratory: Negative for cough and shortness of breath.    Cardiovascular: Negative for chest pain and palpitations.   Gastrointestinal: Positive for diarrhea. Negative for abdominal pain, nausea and vomiting.   Genitourinary: Negative.    Musculoskeletal:        Wound pain   Skin: Negative for itching and rash.   Endo/Heme/Allergies: Negative for polydipsia. Does not bruise/bleed easily.        Physical Exam  Temp:  [36.4 °C (97.6 °F)-37.1 °C (98.8 °F)] 36.4 °C (97.6 °F)  Pulse:  [68-79] 79  Resp:  [18] 18  BP: (104-122)/(54-67) 122/67  SpO2:  [91 %-98 %] 98 %    Physical Exam  Vitals signs reviewed.   Constitutional:       General: He is not in acute distress.     Appearance: Normal appearance. He is not ill-appearing.   HENT:      Head: Normocephalic and atraumatic.      Right Ear: External ear normal.      Left Ear: External ear normal.      Nose: Nose normal.      Mouth/Throat:      Pharynx: Oropharynx is clear.   Eyes:      General:         Right eye: No discharge.         Left eye: No discharge.      Extraocular Movements: Extraocular movements intact.      Conjunctiva/sclera: Conjunctivae normal.   Neck:      Musculoskeletal: Normal range of motion.      Vascular: No carotid bruit.   Cardiovascular:      Rate and Rhythm: Normal rate and regular rhythm.   Pulmonary:      Effort: No respiratory distress.      Breath sounds: No wheezing.      Comments: Decreased BS  Abdominal:      General: Bowel sounds are normal. There is no distension.      Palpations: Abdomen is soft.      Tenderness: There is no abdominal tenderness.   Musculoskeletal:      Comments: Left BKA incision clean  and dry w/ intact sutures, stump swollen   Skin:     General: Skin is warm and dry.   Neurological:      Mental Status: He is alert and oriented to person, place, and time.         Fluids    Intake/Output Summary (Last 24 hours) at 3/18/2020 2306  Last data filed at 3/18/2020 1900  Gross per 24 hour   Intake 600 ml   Output --   Net 600 ml       Laboratory  Recent Labs     03/16/20  0645 03/18/20  0529   WBC 24.3* 20.5*   RBC 3.90* 3.82*   HEMOGLOBIN 11.2* 10.8*   HEMATOCRIT 35.5* 33.6*   MCV 91.0 88.0   MCH 28.7 28.3   MCHC 31.5* 32.1*   RDW 46.5 44.6   PLATELETCT 274 291   MPV 11.2 11.5     Recent Labs     03/16/20  0645 03/18/20  0529   SODIUM 134* 137   POTASSIUM 4.2 3.4*   CHLORIDE 110 107   CO2 13* 18*   GLUCOSE 116* 122*   BUN 24* 24*   CREATININE 1.49* 1.45*   CALCIUM 8.4* 8.5                   Assessment/Plan  * Status post below-knee amputation of left lower extremity (HCC)- (present on admission)  Assessment & Plan  Incision clean and dry w/ intact sutures but stump appears swollen  U/S shows extensive soft tissue edema and phlegmonous change without evidence of well formed loculated fluid collection  Ortho F/U note states no concern for infection  Recommend repeat soft tissue U/S prior to discharge    Hypokalemia  Assessment & Plan  Replete K+    MELANIE (acute kidney injury) ([facility])  Assessment & Plan  Metabolic acidosis improved  Start IVF    Leukocytosis  Assessment & Plan  UA and CXR both unremarkable  C Dif negative  Completed Bactrim for LUE phlebitis  Start empiric Cipro and Flagyl for possible colitis/enteritis given diarrhea    Vitamin D deficiency- (present on admission)  Assessment & Plan  Vit D level 14  On supplementation    Phlebitis of left arm- (present on admission)  Assessment & Plan  U/S negative DVT  Suspect phlebitis from recent PICC  Completed Bactrim 3/16/20    Diabetes mellitus due to underlying condition with hyperglycemia ([facility])- (present on admission)  Assessment & Plan  HbA1c  6.8  Continue Metformin  Off FSBS and SSI  Outpt meds include Metformin 1000 mg bid    History of DVT (deep vein thrombosis)- (present on admission)  Assessment & Plan  Anticoagulated on Xarelto    Mixed hyperlipidemia- (present on admission)  Assessment & Plan  On Lipitor    Benign essential HTN- (present on admission)  Assessment & Plan  Observe blood pressure trends on Atenolol    Full Code    Reviewed w/ Dr. Beck

## 2020-03-19 NOTE — PROGRESS NOTES
Hospital Medicine Daily Progress Note      Chief Complaint:  Hypertension  Diabetes  Leukocytosis  LUE swelling & erythema    Interval History:  Pt c/o watery diarrhea overnight; no abd pain, nausea, or vomiting.    Review of Systems  Review of Systems   Constitutional: Negative for chills and fever.   HENT: Negative.    Eyes: Negative.    Respiratory: Negative for cough and shortness of breath.    Cardiovascular: Negative for chest pain and palpitations.   Gastrointestinal: Positive for diarrhea. Negative for abdominal pain, nausea and vomiting.   Genitourinary: Negative.    Musculoskeletal:        Wound pain   Skin: Negative for itching and rash.   Endo/Heme/Allergies: Negative for polydipsia. Does not bruise/bleed easily.        Physical Exam  Temp:  [36.4 °C (97.5 °F)-37 °C (98.6 °F)] 37 °C (98.6 °F)  Pulse:  [66-79] 66  Resp:  [18] 18  BP: (106-122)/(56-67) 110/56  SpO2:  [95 %-96 %] 95 %    Physical Exam  Vitals signs reviewed.   Constitutional:       General: He is not in acute distress.     Appearance: Normal appearance. He is not ill-appearing.   HENT:      Head: Normocephalic and atraumatic.      Right Ear: External ear normal.      Left Ear: External ear normal.      Nose: Nose normal.      Mouth/Throat:      Pharynx: Oropharynx is clear.   Eyes:      General:         Right eye: No discharge.         Left eye: No discharge.      Extraocular Movements: Extraocular movements intact.      Conjunctiva/sclera: Conjunctivae normal.   Neck:      Musculoskeletal: Normal range of motion.      Vascular: No carotid bruit.   Cardiovascular:      Rate and Rhythm: Normal rate and regular rhythm.   Pulmonary:      Effort: No respiratory distress.      Breath sounds: No wheezing.      Comments: Decreased BS  Abdominal:      General: Bowel sounds are normal. There is no distension.      Palpations: Abdomen is soft.      Tenderness: There is no abdominal tenderness.   Musculoskeletal:      Comments: Left BKA wound dressed    Skin:     General: Skin is warm and dry.   Neurological:      Mental Status: He is alert and oriented to person, place, and time.         Fluids    Intake/Output Summary (Last 24 hours) at 3/19/2020 1442  Last data filed at 3/19/2020 1200  Gross per 24 hour   Intake 1080 ml   Output --   Net 1080 ml       Laboratory  Recent Labs     03/18/20  0529   WBC 20.5*   RBC 3.82*   HEMOGLOBIN 10.8*   HEMATOCRIT 33.6*   MCV 88.0   MCH 28.3   MCHC 32.1*   RDW 44.6   PLATELETCT 291   MPV 11.5     Recent Labs     03/18/20  0529   SODIUM 137   POTASSIUM 3.4*   CHLORIDE 107   CO2 18*   GLUCOSE 122*   BUN 24*   CREATININE 1.45*   CALCIUM 8.5                   Assessment/Plan  * Status post below-knee amputation of left lower extremity (HCC)- (present on admission)  Assessment & Plan  Incision clean and dry w/ intact sutures but stump appears swollen  U/S shows extensive soft tissue edema and phlegmonous change without evidence of well formed loculated fluid collection  Ortho F/U note states no concern for infection  Recommend repeat soft tissue U/S prior to discharge    Hypokalemia  Assessment & Plan  Replete K+  Check Mg++    MELANIE (acute kidney injury) ([facility])  Assessment & Plan  Metabolic acidosis improved  Trial IVF  Check F/U labs in am    Diarrhea  Assessment & Plan  C Dif negative  Continue empiric Cipro and Flagyl for possible colitis/enteritis  Trial symptomatic control w/ Questran, Culturelle, and Simethicone    Vitamin D deficiency- (present on admission)  Assessment & Plan  Vit D level 14  On supplementation    Phlebitis of left arm- (present on admission)  Assessment & Plan  U/S negative DVT  Suspect phlebitis from recent PICC  Completed Bactrim 3/16/20    Diabetes mellitus due to underlying condition with hyperglycemia ([facility])- (present on admission)  Assessment & Plan  HbA1c 6.8  Continue Metformin  Off FSBS and SSI  Outpt meds include Metformin 1000 mg bid    History of DVT (deep vein thrombosis)- (present on  admission)  Assessment & Plan  Anticoagulated on Xarelto    Mixed hyperlipidemia- (present on admission)  Assessment & Plan  On Lipitor    Benign essential HTN- (present on admission)  Assessment & Plan  Observe blood pressure trends on Atenolol    Full Code

## 2020-03-19 NOTE — CARE PLAN
Problem: Safety  Goal: Will remain free from injury  Note: Patient educated on importance of utilizing call light to minimize the potential of injury from falls. Patient verbalizes understanding.       Problem: Infection  Goal: Will remain free from infection  Note: Education reinforced to wash hands thoroughly after using the restroom, prior to eating and throughout the day to minimize the potential of acquiring germs while in a facility setting. Patient engaged in conversation and verbalizes understanding.       Problem: Skin Integrity  Goal: Risk for impaired skin integrity will decrease  Note: Patient educated to change positions to minimize the potential of skin break down and possible further complications of open wounds due to pressure, when sitting or laying for periods over half of an hour. Patient engaged in education and verbalizes understanding.

## 2020-03-19 NOTE — THERAPY
Physical Therapy   Daily Treatment     Patient Name: Jose Mc  Age:  64 y.o., Sex:  male  Medical Record #: [id number]  Today's Date: 3/19/2020     Precautions  Precautions: (P) Non Weight Bearing Left Lower Extremity  Comments: (P) left IPOP too big- awaiting visit from Howard from [facility] for revision    Subjective    I have had several BMs in the bed. The antibiotics have given me diarrhea.     Objective     03/19/20 0901   Precautions   Precautions Non Weight Bearing Left Lower Extremity   Comments left IPOP too big- awaiting visit from Howard from [facility] for revision   Vitals   Pulse 74   Patient BP Position Sitting   Blood Pressure 110/60   Pulse Oximetry 95 %   Room Air Oximetry 95   O2 (LPM) 0   O2 Delivery Device Room air w/o2 available   Pain   Intervention Repositioned   Pain 0 - 10 Group   Location Abdomen   Location Orientation Lower   Pain Rating Scale (NPRS) 5   Description Cramping   Comfort Goal Comfort at Rest   Therapist Pain Assessment During Activity;Nurse Notified   Non Verbal Descriptors   Non Verbal Scale  Tense Body Language   Cognition    Level of Consciousness Alert   Supine Lower Body Exercise   Hip Abduction 2 sets of 10;Bilateral   Straight Leg Raises 2 sets of 10;Bilateral   Knee to Chest 2 sets of 10;Bilateral   Short Arc Quad 2 sets of 10;Bilateral   Other Exercises glut sets, 10 reps x 2, 5 sec isometric hold   Neuro-Muscular Treatments   Neuro-Muscular Treatments Co-Contraction   Interdisciplinary Plan of Care Collaboration   IDT Collaboration with  Certified Nursing Assistant   Patient Position at End of Therapy In Bed;Call Light within Reach;Tray Table within Reach;Phone within Reach   Collaboration Comments frequent bowel movements   PT Total Time Spent   PT Individual Total Time Spent (Mins) 30   PT Charge Group   PT Therapeutic Exercise 2       FIM Bed/Chair/Wheelchair Transfers Score: 5 - Standby Prompting/Supervision or Set-up  Bed/Chair/Wheelchair Transfers  Description:  Verbal cueing, Set-up of equipment, Adaptive equipment(supervised bed mobility, SBA squat pivot EOB to WC with fww)      Assessment    Patient with limited ability to leave room due to frequent bowel movements. RN/CNA aware. Good ROM with supine exercises fo L BKA. Reviewed need to perform frequently and change positions hourly for skin care and contracture prevention.     Plan    Once bowel movements are contained, continue to progress FWW distance; w/c outdoors- ramps, uneven terrain; assess transfers for mod I status; review there-ex for LE/core strengthening

## 2020-03-19 NOTE — DISCHARGE PLANNING
Home health referral sent to [facility] per choice form.  Awaiting response.

## 2020-03-19 NOTE — CARE PLAN
Problem: Safety  Goal: Will remain free from injury  Note: Patient uses call light consistently and appropriately this shift.  Waits for assistance when needed and does not attempt self transfer.  Able to verbalize needs.  Will continue to monitor.      Problem: Skin Integrity  Goal: Risk for impaired skin integrity will decrease  Note: Patient's skin remains intact and free from new or accidental injury this shift.  Will continue to monitor.

## 2020-03-19 NOTE — THERAPY
"Occupational Therapy  Daily Treatment     Patient Name: Jose Mc  Age:  64 y.o., Sex:  male  Medical Record #: [id number]  Today's Date: 3/19/2020     Precautions  Precautions: Non Weight Bearing Left Lower Extremity  Comments: left IPOP too big, Howard from [employer] aware and trying to come in to adjust    Safety   ADL Safety : Requires Supervision for Safety, Requires Physical Assist for Safety    Subjective    \"I feel a little bit better\"     Objective       03/19/20 1401   Supine Upper Body Exercises   Other Exercise BUE purple resistance band: shoulder abduction, scapular retraction, diagonal pulls, chest pull 2 sets x 10-15 each. BUE AROM with no weight: arm circles in both directions, overhead claps, shoulder flexion 2 sets x 20 each.    Interdisciplinary Plan of Care Collaboration   Patient Position at End of Therapy Call Light within Reach;Tray Table within Reach;In Bed   OT Total Time Spent   OT Individual Total Time Spent (Mins) 60   OT Charge Group   OT Therapeutic Exercise  4       Assessment    UB therex completed at bed level d/t fear of incontinence/diarrhea although last BM was late morning. Pt completed UB therex with no complaints of pain/no BM.     Plan    Cont overall strength/endurance and standing balance to improve ADL's and functional mobility    "

## 2020-03-20 LAB
ANION GAP SERPL CALC-SCNC: 10 MMOL/L (ref 7–16)
BUN SERPL-MCNC: 16 MG/DL (ref 8–22)
CALCIUM SERPL-MCNC: 8.2 MG/DL (ref 8.5–10.5)
CHLORIDE SERPL-SCNC: 110 MMOL/L (ref 96–112)
CO2 SERPL-SCNC: 18 MMOL/L (ref 20–33)
CREAT SERPL-MCNC: 1.03 MG/DL (ref 0.5–1.4)
ERYTHROCYTE [DISTWIDTH] IN BLOOD BY AUTOMATED COUNT: 45.4 FL (ref 35.9–50)
GLUCOSE SERPL-MCNC: 133 MG/DL (ref 65–99)
HCT VFR BLD AUTO: 36.3 % (ref 42–52)
HGB BLD-MCNC: 11.5 G/DL (ref 14–18)
MAGNESIUM SERPL-MCNC: 1.9 MG/DL (ref 1.5–2.5)
MCH RBC QN AUTO: 28.3 PG (ref 27–33)
MCHC RBC AUTO-ENTMCNC: 31.7 G/DL (ref 33.7–35.3)
MCV RBC AUTO: 89.4 FL (ref 81.4–97.8)
PLATELET # BLD AUTO: 282 K/UL (ref 164–446)
PMV BLD AUTO: 11.3 FL (ref 9–12.9)
POTASSIUM SERPL-SCNC: 3.2 MMOL/L (ref 3.6–5.5)
RBC # BLD AUTO: 4.06 M/UL (ref 4.7–6.1)
SODIUM SERPL-SCNC: 138 MMOL/L (ref 135–145)
WBC # BLD AUTO: 16.6 K/UL (ref 4.8–10.8)

## 2020-03-20 PROCEDURE — 99232 SBSQ HOSP IP/OBS MODERATE 35: CPT | Performed by: HOSPITALIST

## 2020-03-20 PROCEDURE — 99233 SBSQ HOSP IP/OBS HIGH 50: CPT | Performed by: PHYSICAL MEDICINE & REHABILITATION

## 2020-03-20 PROCEDURE — A9270 NON-COVERED ITEM OR SERVICE: HCPCS | Performed by: PHYSICAL MEDICINE & REHABILITATION

## 2020-03-20 PROCEDURE — 36415 COLL VENOUS BLD VENIPUNCTURE: CPT

## 2020-03-20 PROCEDURE — 97110 THERAPEUTIC EXERCISES: CPT | Mod: CQ

## 2020-03-20 PROCEDURE — A9270 NON-COVERED ITEM OR SERVICE: HCPCS | Performed by: HOSPITALIST

## 2020-03-20 PROCEDURE — 97110 THERAPEUTIC EXERCISES: CPT

## 2020-03-20 PROCEDURE — 700102 HCHG RX REV CODE 250 W/ 637 OVERRIDE(OP): Performed by: PHYSICAL MEDICINE & REHABILITATION

## 2020-03-20 PROCEDURE — 80048 BASIC METABOLIC PNL TOTAL CA: CPT

## 2020-03-20 PROCEDURE — 83735 ASSAY OF MAGNESIUM: CPT

## 2020-03-20 PROCEDURE — 85027 COMPLETE CBC AUTOMATED: CPT

## 2020-03-20 PROCEDURE — 97530 THERAPEUTIC ACTIVITIES: CPT | Mod: CQ

## 2020-03-20 PROCEDURE — 700102 HCHG RX REV CODE 250 W/ 637 OVERRIDE(OP): Performed by: HOSPITALIST

## 2020-03-20 PROCEDURE — 94760 N-INVAS EAR/PLS OXIMETRY 1: CPT

## 2020-03-20 PROCEDURE — 97535 SELF CARE MNGMENT TRAINING: CPT

## 2020-03-20 PROCEDURE — 770010 HCHG ROOM/CARE - REHAB SEMI PRIVAT*

## 2020-03-20 RX ORDER — POTASSIUM CHLORIDE 20 MEQ/1
40 TABLET, EXTENDED RELEASE ORAL ONCE
Status: COMPLETED | OUTPATIENT
Start: 2020-03-20 | End: 2020-03-20

## 2020-03-20 RX ADMIN — GABAPENTIN 300 MG: 300 CAPSULE ORAL at 15:35

## 2020-03-20 RX ADMIN — GABAPENTIN 300 MG: 300 CAPSULE ORAL at 09:10

## 2020-03-20 RX ADMIN — METFORMIN HYDROCHLORIDE 250 MG: 500 TABLET ORAL at 08:11

## 2020-03-20 RX ADMIN — CHOLESTYRAMINE 4 G: 4 POWDER, FOR SUSPENSION ORAL at 20:01

## 2020-03-20 RX ADMIN — METRONIDAZOLE 500 MG: 500 TABLET ORAL at 05:38

## 2020-03-20 RX ADMIN — CHOLESTYRAMINE 4 G: 4 POWDER, FOR SUSPENSION ORAL at 09:10

## 2020-03-20 RX ADMIN — CIPROFLOXACIN HYDROCHLORIDE 500 MG: 500 TABLET, FILM COATED ORAL at 20:01

## 2020-03-20 RX ADMIN — POTASSIUM CHLORIDE 40 MEQ: 1500 TABLET, EXTENDED RELEASE ORAL at 15:37

## 2020-03-20 RX ADMIN — OXYCODONE HYDROCHLORIDE 5 MG: 5 TABLET ORAL at 08:15

## 2020-03-20 RX ADMIN — SIMETHICONE CHEW TAB 80 MG 160 MG: 80 TABLET ORAL at 20:01

## 2020-03-20 RX ADMIN — MORPHINE SULFATE 15 MG: 15 TABLET, FILM COATED, EXTENDED RELEASE ORAL at 20:01

## 2020-03-20 RX ADMIN — MORPHINE SULFATE 15 MG: 15 TABLET, FILM COATED, EXTENDED RELEASE ORAL at 09:10

## 2020-03-20 RX ADMIN — ATORVASTATIN CALCIUM 20 MG: 10 TABLET, FILM COATED ORAL at 20:01

## 2020-03-20 RX ADMIN — RIVAROXABAN 20 MG: 10 TABLET, FILM COATED ORAL at 17:37

## 2020-03-20 RX ADMIN — METFORMIN HYDROCHLORIDE 250 MG: 500 TABLET ORAL at 17:37

## 2020-03-20 RX ADMIN — OXYCODONE HYDROCHLORIDE 5 MG: 5 TABLET ORAL at 15:40

## 2020-03-20 RX ADMIN — GABAPENTIN 300 MG: 300 CAPSULE ORAL at 20:01

## 2020-03-20 RX ADMIN — CIPROFLOXACIN HYDROCHLORIDE 500 MG: 500 TABLET, FILM COATED ORAL at 09:10

## 2020-03-20 RX ADMIN — Medication 1 CAPSULE: at 08:11

## 2020-03-20 RX ADMIN — SIMETHICONE CHEW TAB 80 MG 160 MG: 80 TABLET ORAL at 17:37

## 2020-03-20 RX ADMIN — ATENOLOL 100 MG: 25 TABLET ORAL at 05:38

## 2020-03-20 RX ADMIN — ACETAMINOPHEN 650 MG: 325 TABLET, FILM COATED ORAL at 15:38

## 2020-03-20 RX ADMIN — SIMETHICONE CHEW TAB 80 MG 160 MG: 80 TABLET ORAL at 08:11

## 2020-03-20 ASSESSMENT — ENCOUNTER SYMPTOMS
BRUISES/BLEEDS EASILY: 0
CHILLS: 0
FEVER: 0
EYES NEGATIVE: 1
NAUSEA: 0
POLYDIPSIA: 0
SHORTNESS OF BREATH: 0
PALPITATIONS: 0
COUGH: 0
VOMITING: 0
ABDOMINAL PAIN: 0
DIARRHEA: 1

## 2020-03-20 NOTE — THERAPY
"Physical Therapy   Daily Treatment     Patient Name: Jose Mc  Age:  64 y.o., Sex:  male  Medical Record #: [id number]  Today's Date: 3/20/2020     Precautions  Precautions: Non Weight Bearing Left Lower Extremity  Comments: L residual limb rigid orthosis too large ([facility] from [facility] aware)    Subjective    Patient agreeable to PT; received supine with elevated HOB; L residual limb orthosis in place as usual.    Pt reports Howard from [facility] (in [place]) is aware of current fit of L residual limb orthosis but that \"they're closed because of all that's going on\" regarding COVID-19 community restrictions.     Objective       03/20/20 0831   Precautions   Precautions Non Weight Bearing Left Lower Extremity   Comments L residual limb rigid orthosis too large ([facility]ard from [facility] aware)   Vitals   O2 (LPM) 0   O2 Delivery Device None - Room Air   Bed Mobility    Supine to Sit Modified Independent   Sit to Supine Modified Independent   Sit to Stand Supervised   Scooting Independent   Rolling Modified Independent   Interdisciplinary Plan of Care Collaboration   Patient Position at End of Therapy In Bed;Call Light within Reach;Tray Table within Reach;Phone within Reach   PT Total Time Spent   PT Individual Total Time Spent (Mins) 30   PT Charge Group   PT Therapeutic Activities 2     Discussed w/c mobility techniques, safety, CLOF, and POC.    FIM Bed/Chair/Wheelchair Transfers Score: 5 - Standby Prompting/Supervision or Set-up  Bed/Chair/Wheelchair Transfers Description:  (SPV, FWW, elevated HOB, setup, increased time. Mod I bed mobility. Patient able to direct care and sequencing.)    FIM Wheelchair Score:  5 - Standby Prompting/Supervision or Set-up  Wheelchair Description:  (SPV-Mod I indoors/outdoors with focus on long ramp, steep ramp, and uneven surfaces; 2 reps of about 500 feet each; setup A prn; pt uses posterior approach to ascending ramps.)    FIM Stairs Score:  0 - Not " tested,unsafe activity  Stairs Description:         Assessment    Patient has improving endurance, consistent safe performance but requires increased time, great cognition and awareness; pt demonstrates limited R foot clearance with hop-to usage using FWW.    Plan    Continue to progress FWW distance; w/c outdoors- ramps, uneven terrain; assess transfers for mod I status; review there-ex for LE/core strengthening

## 2020-03-20 NOTE — THERAPY
"Occupational Therapy  Daily Treatment     Patient Name: Jose Mc  Age:  64 y.o., Sex:  male  Medical Record #: [id number]  Today's Date: 3/20/2020     Precautions  Precautions: Non Weight Bearing Left Lower Extremity  Comments: L residual limb rigid orthosis too large (Howard from [facility] aware)    Safety   ADL Safety : Requires Supervision for Safety, Requires Physical Assist for Safety    Subjective    \"that's a lot of work\"     Objective       20 0931   Interdisciplinary Plan of Care Collaboration   IDT Collaboration with  Nursing   Patient Position at End of Therapy Call Light within Reach;Tray Table within Reach;In Bed   Collaboration Comments re: R foot redness on toes   OT Total Time Spent   OT Individual Total Time Spent (Mins) 60   OT Charge Group   OT Self Care / ADL 4       FIM Eating Score:  7 - Independent  Eating Description:       FIM Grooming Score:  7 - Independent  Grooming Description:       FIM Bathing Score:  6 - Modified Independent  Bathing Description:       FIM Upper Body Dressin - Modified Independent  Upper Body Dressing Description:  Increased time(tuck shirt into pants at bed level)    FIM Lower Body Dressing Score:  5 - Standby Prompting/Supervsion or Set-up  Lower Body Dressing Description:  Increased time, Supervision for safety, Verbal cueing, Set-up of equipment    FIM Toileting Body Dressing:  3 - Moderate Assistance  Toileting Description:       FIM Bed/Chair/Wheelchair Transfers Score:    Bed/Chair/Wheelchair Transfers Description:       FIM Toilet Transfer Score:  5 - Standby Prompting/Supervision or Set-up  Toilet Transfer Description:       FIM Tub/Shower Transfers Score:  5 - Standby Prompting/Supervision or Set-up  Tub/Shower Transfers Description:  Grab bar, Increased time, Supervision for safety, Verbal cueing, Set-up of equipment(stand pivot w/c<>tub bench with GB)      Assessment    Pt reported improve sleep last night with no bowel incontinence. " Pt completed shower routine at [facility] overall using w/c, shower bench, GB's, and bed to complete dressing. Pt's functional performance was impacted by L BKA, impaired standing balance, impaired activity tolerance, obesity. Pt demo improvement with set-up of items pre/post shower including covering own residual limb    Plan    Cont overall strength/endurance and standing balance to improve ADL's and functional mobility

## 2020-03-20 NOTE — CARE PLAN
Problem: Infection  Goal: Will remain free from infection  Note: Education reinforced to wash hands thoroughly after using the restroom, prior to eating and throughout the day to minimize the potential of acquiring germs while in a facility setting. Patient engaged in conversation and verbalizes understanding.       Problem: Bowel/Gastric:  Goal: Normal bowel function is maintained or improved  Note: Patient educated regarding diet, fluids, medications and mobilization to maximize potential for regular bowel movements. Patient engages in education and verbalizes understanding.

## 2020-03-20 NOTE — THERAPY
"Physical Therapy   Daily Treatment     Patient Name: Jose Mc  Age:  64 y.o., Sex:  male  Medical Record #: [id number]  Today's Date: 3/20/2020     Precautions  Precautions: Non Weight Bearing Left Lower Extremity  Comments: L residual limb rigid orthosis too large ([facility] from [facility] aware)    Subjective    \"im not sure about using the walker today, my right leg is feeling a little weak\"     Objective       03/20/20 1401   Supine Lower Body Exercise   Supine Lower Body Exercises Yes   Bridges Two Legged;3 sets of 15  (modified BKA positioning)   Hip Abduction 3 sets of 15;Side Lying;Left   Hip Adduction  3 sets of 15   Straight Leg Raises 3 sets of 15   Other Exercises quad sets, glute sets x 15 with iso holds, prone hip flexor stretch, prone knee flexion and hip extension 3 x 15   Interdisciplinary Plan of Care Collaboration   Patient Position at End of Therapy Seated;Self Releasing Lap Belt Applied   PT Total Time Spent   PT Individual Total Time Spent (Mins) 60   PT Charge Group   PT Therapeutic Exercise 3   PT Therapeutic Activities 1   Supervising Physical Therapist braulio eli       Assessment    Pt tolerated treatment session well with focus on improved independence mat program.     Plan    Continue to progress FWW distance; w/c outdoors- ramps, uneven terrain; assess transfers for mod I status; review there-ex for LE/core strengthening    "

## 2020-03-20 NOTE — PROGRESS NOTES
Rehab Progress Note     Date of Service: 3/20/2020  Chief Complaint: follow up BKA    Interval Events (Subjective)    Patient seen and examined today in his room.  He reports his stools have decreased in frequency and are slightly more firm, though he's still having irritable bowel sensation.  He denies any pain in his residual limb. It was examined at bedside, looks improved, discussed with Dr. Stockton. Patient went outside today with therapy and bumped in toes on the right side.     Objective:  VITAL SIGNS: /63   Pulse 65   Temp 36.7 °C (98.1 °F) (Oral)   Resp 17   Ht 1.829 m (6')   Wt (!) 147 kg (324 lb 1.2 oz)   SpO2 95%   BMI 43.95 kg/m²   Gen: alert, no apparent distress, obese  CV: regular rate and rhythm, no murmurs, no peripheral edema  Resp: clear to ascultation bilaterally, normal respiratory effort  GI: soft, non-tender abdomen, bowel sounds present  Msk: Left transtibial amputation incision clean dry and intact without any drainage, decrease in erythema at the end of the residual limb    Recent Results (from the past 72 hour(s))   Basic Metabolic Panel    Collection Time: 03/18/20  5:29 AM   Result Value Ref Range    Sodium 137 135 - 145 mmol/L    Potassium 3.4 (L) 3.6 - 5.5 mmol/L    Chloride 107 96 - 112 mmol/L    Co2 18 (L) 20 - 33 mmol/L    Glucose 122 (H) 65 - 99 mg/dL    Bun 24 (H) 8 - 22 mg/dL    Creatinine 1.45 (H) 0.50 - 1.40 mg/dL    Calcium 8.5 8.5 - 10.5 mg/dL    Anion Gap 12.0 7.0 - 16.0   CBC WITHOUT DIFFERENTIAL    Collection Time: 03/18/20  5:29 AM   Result Value Ref Range    WBC 20.5 (H) 4.8 - 10.8 K/uL    RBC 3.82 (L) 4.70 - 6.10 M/uL    Hemoglobin 10.8 (L) 14.0 - 18.0 g/dL    Hematocrit 33.6 (L) 42.0 - 52.0 %    MCV 88.0 81.4 - 97.8 fL    MCH 28.3 27.0 - 33.0 pg    MCHC 32.1 (L) 33.7 - 35.3 g/dL    RDW 44.6 35.9 - 50.0 fL    Platelet Count 291 164 - 446 K/uL    MPV 11.5 9.0 - 12.9 fL   ESTIMATED GFR    Collection Time: 03/18/20  5:29 AM   Result Value Ref Range    GFR If   59 (A) >60 mL/min/1.73 m 2    GFR If Non African American 49 (A) >60 mL/min/1.73 m 2   MAGNESIUM    Collection Time: 03/20/20  5:30 AM   Result Value Ref Range    Magnesium 1.9 1.5 - 2.5 mg/dL   CBC WITHOUT DIFFERENTIAL    Collection Time: 03/20/20  5:30 AM   Result Value Ref Range    WBC 16.6 (H) 4.8 - 10.8 K/uL    RBC 4.06 (L) 4.70 - 6.10 M/uL    Hemoglobin 11.5 (L) 14.0 - 18.0 g/dL    Hematocrit 36.3 (L) 42.0 - 52.0 %    MCV 89.4 81.4 - 97.8 fL    MCH 28.3 27.0 - 33.0 pg    MCHC 31.7 (L) 33.7 - 35.3 g/dL    RDW 45.4 35.9 - 50.0 fL    Platelet Count 282 164 - 446 K/uL    MPV 11.3 9.0 - 12.9 fL   Basic Metabolic Panel    Collection Time: 03/20/20  5:30 AM   Result Value Ref Range    Sodium 138 135 - 145 mmol/L    Potassium 3.2 (L) 3.6 - 5.5 mmol/L    Chloride 110 96 - 112 mmol/L    Co2 18 (L) 20 - 33 mmol/L    Glucose 133 (H) 65 - 99 mg/dL    Bun 16 8 - 22 mg/dL    Creatinine 1.03 0.50 - 1.40 mg/dL    Calcium 8.2 (L) 8.5 - 10.5 mg/dL    Anion Gap 10.0 7.0 - 16.0   ESTIMATED GFR    Collection Time: 03/20/20  5:30 AM   Result Value Ref Range    GFR If African American >60 >60 mL/min/1.73 m 2    GFR If Non African American >60 >60 mL/min/1.73 m 2       Current Facility-Administered Medications   Medication Frequency   • simethicone (MYLICON) chewable tab 160 mg 4X/DAY WITH MEALS + NIGHTLY   • lactobacillus rhamnosus (CULTURELLE) capsule 1 Cap QDAY with Breakfast   • cholestyramine (QUESTRAN,PREVALITE) 4 GM powder 4 g BID   • metroNIDAZOLE (FLAGYL) tablet 500 mg Q8HRS   • ciprofloxacin (CIPRO) tablet 500 mg Q12HRS   • senna-docusate (PERICOLACE or SENOKOT S) 8.6-50 MG per tablet 2 Tab BID PRN    And   • polyethylene glycol/lytes (MIRALAX) PACKET 1 Packet QDAY PRN    And   • magnesium hydroxide (MILK OF MAGNESIA) suspension 30 mL QDAY PRN    And   • bisacodyl (DULCOLAX) suppository 10 mg QDAY PRN   • metFORMIN (GLUCOPHAGE) tablet 250 mg BID WITH MEALS   • glucose 4 g chewable tablet 16 g Q15 MIN PRN     And   • dextrose 50% (D50W) injection 50 mL Q15 MIN PRN   • Respiratory Therapy Consult Continuous RT   • Pharmacy Consult Request ...Pain Management Review 1 Each PHARMACY TO DOSE   • acetaminophen (TYLENOL) tablet 650 mg Q4HRS PRN   • artificial tears ophthalmic solution 1 Drop PRN   • benzocaine-menthol (CEPACOL) lozenge 1 Lozenge Q2HRS PRN   • mag hydrox-al hydrox-simeth (MAALOX PLUS ES or MYLANTA DS) suspension 20 mL Q2HRS PRN   • ondansetron (ZOFRAN ODT) dispertab 4 mg 4X/DAY PRN    Or   • ondansetron (ZOFRAN) syringe/vial injection 4 mg 4X/DAY PRN   • traZODone (DESYREL) tablet 50 mg QHS PRN   • sodium chloride (OCEAN) 0.65 % nasal spray 2 Spray PRN   • hydrOXYzine HCl (ATARAX) tablet 50 mg Q6HRS PRN   • melatonin tablet 3 mg HS PRN   • atenolol (TENORMIN) tablet 100 mg Q DAY   • atorvastatin (LIPITOR) tablet 20 mg Q EVENING   • gabapentin (NEURONTIN) capsule 300 mg TID   • tramadol (ULTRAM) 50 MG tablet 50 mg Q4HRS PRN   • oxyCODONE immediate-release (ROXICODONE) tablet 5 mg Q4HRS PRN   • lactulose 20 GM/30ML solution 30 mL QDAY PRN   • docusate sodium (ENEMEEZ) enema 283 mg QDAY PRN   • rivaroxaban (XARELTO) tablet 20 mg PM MEAL   • morphine ER (MS CONTIN) tablet 15 mg Q12HRS   • oxyCODONE-acetaminophen (PERCOCET) 5-325 MG per tablet 1 Tab Q4HRS PRN       Orders Placed This Encounter   Procedures   • Diet Order Diabetic     Standing Status:   Standing     Number of Occurrences:   1     Order Specific Question:   Diet:     Answer:   Diabetic [3]       Assessment:  Active Hospital Problems    Diagnosis   • *Status post below-knee amputation of left lower extremity (HCC)   • Leukocytosis   • Vitamin D deficiency   • Impaired mobility and ADLs   • Diabetes mellitus due to underlying condition with hyperglycemia (HCC)   • Peripheral neuropathy   • Obstructive sleep apnea syndrome   • Benign essential HTN   • Mixed hyperlipidemia   • History of DVT (deep vein thrombosis)     This patient is a 64 y.o. male  admitted for acute inpatient rehabilitation with Status post below-knee amputation of left lower extremity (HCC).    I led and attended the weekly conference, and agree with the IDT conference documentation and plan of care as noted below.    Date of conference: 3/16/2020    Goals and barriers: See IDT note.    Biggest barriers: obesity    Goals in next week: home evaluation    CM/social support: siblings supportive    Anticipated DC date: 3/26/2020    Home health: PT/OT/RN    Equip: WC, FWW    Follow up: PCP, surgery     Medical Decision Making and Plan:    Left BKA  3/6 Dr. Ceja  Daily wound care  PT/OT, 1.5 hr each discipline, 5 days per week  Outpatient follow up with surgery 3/17 - no concern for infection, has another apt on 3/27  Will re-ultrasound residual limb next week    Chronic opiate use  Continue home medications  Morphine ER 15 mg BID  Percocets 5-325 PRN - not using very often     Peripheral Neuropathy  Phantom sensation  Continue gabapentin  Monitor need to increase  Currently well controlled     Opioid induced constipation, resolved  Now with loose stools, neg c diff, see below  As needed bowel meds  Last BM 3/19    Bladder  Checked PVRs  Not retaining  ICP for over 400 cc  Scheduled toileting    DVT prophylaxis  Xarelto     Appreciate the assistance of hospitalist with his medical co-morbidities:     Hypertension, atenolol  Diabetes with hyperglycemia, metformin, SSI  Sleep apnea, CPAP  Hyperlipidemia, statin  History of DVT, Xarelto  Left arm erythema/edema/cellulitis, s/p Bactrim  Leukocytosis, continues/improved, unclear etiology, US of residual limb without distinct fluid collection, recheck next week   Anemia  Vit D deficiency, discontinue supplementation  Loose stools, continues, unclear etiology, negative KUB, negative c diff, treatment per Dr. Stockton, could be side effect from anti-biotics    Total time:  36 minutes.  I spent greater than 50% of the time for patient care, counseling, and  coordination on this date, including patient face-to face time, unit/floor time with review of records/pertinent lab data and studies, as well as discussing diagnostic evaluation/work up, planned therapeutic interventions, and future disposition of care, as per the interval events/subjective and the assessment and plan as noted above.    I have performed a physical exam, reviewed and updated ROS, as well as the assessment and plan today 3/20/2020. In review of note from 3/19/2020 there are no new changes except as documented above.    oSphia Beck M.D.   Physical Medicine and Rehabilitation

## 2020-03-20 NOTE — REHAB-CM IDT TEAM NOTE
Case Management    DC Planning  DC destination/dispostion:  Single level home, 3 step entry, in [place] with family support.    Referrals: [facility] - PT/OT/RN    DC Needs: DME - has Single Point Cane, Wheelchair, Tub / Shower Seat, Grab Bar(s) In Tub / Shower. Needs FWW.   OT has recommended tub transfer bench, possibly commode over toilet, and GB's adjacent to toilet.  MD follow up appointments - PCP, surgery    Barriers to discharge:   Functional deficits.    Strengths: Motivated. PLOF -modified independent with cane. . Supportive family.     Section completed by:  Pamela Medina R.N.

## 2020-03-20 NOTE — PROGRESS NOTES
Hospital Medicine Daily Progress Note      Chief Complaint:  Hypertension  Diabetes  Leukocytosis  LUE swelling & erythema    Interval History:  Loose stools much better.  Labs reviewed.    Review of Systems  Review of Systems   Constitutional: Negative for chills and fever.   HENT: Negative.    Eyes: Negative.    Respiratory: Negative for cough and shortness of breath.    Cardiovascular: Negative for chest pain and palpitations.   Gastrointestinal: Positive for diarrhea. Negative for abdominal pain, nausea and vomiting.   Genitourinary: Negative.    Musculoskeletal:        Wound pain   Skin: Negative for itching and rash.   Endo/Heme/Allergies: Negative for polydipsia. Does not bruise/bleed easily.        Physical Exam  Temp:  [36.2 °C (97.1 °F)-36.4 °C (97.6 °F)] 36.2 °C (97.1 °F)  Pulse:  [60-70] 68  Resp:  [14-18] 18  BP: (104-124)/(54-70) 121/70  SpO2:  [98 %] 98 %    Physical Exam  Vitals signs reviewed.   Constitutional:       General: He is not in acute distress.     Appearance: Normal appearance. He is not ill-appearing.   HENT:      Head: Normocephalic and atraumatic.      Right Ear: External ear normal.      Left Ear: External ear normal.      Nose: Nose normal.      Mouth/Throat:      Pharynx: Oropharynx is clear.   Eyes:      General:         Right eye: No discharge.         Left eye: No discharge.      Extraocular Movements: Extraocular movements intact.      Conjunctiva/sclera: Conjunctivae normal.   Neck:      Musculoskeletal: Normal range of motion and neck supple.   Cardiovascular:      Rate and Rhythm: Normal rate and regular rhythm.   Pulmonary:      Effort: No respiratory distress.      Breath sounds: No wheezing.      Comments: Decreased BS  Abdominal:      General: Bowel sounds are normal. There is no distension.      Palpations: Abdomen is soft.      Tenderness: There is no abdominal tenderness.   Musculoskeletal:      Comments: Left BKA wound dressed   Skin:     General: Skin is warm and  dry.   Neurological:      Mental Status: He is alert and oriented to person, place, and time.         Fluids    Intake/Output Summary (Last 24 hours) at 3/21/2020 2226  Last data filed at 3/21/2020 1200  Gross per 24 hour   Intake 600 ml   Output --   Net 600 ml       Laboratory  Recent Labs     03/20/20  0530   WBC 16.6*   RBC 4.06*   HEMOGLOBIN 11.5*   HEMATOCRIT 36.3*   MCV 89.4   MCH 28.3   MCHC 31.7*   RDW 45.4   PLATELETCT 282   MPV 11.3     Recent Labs     03/20/20  0530   SODIUM 138   POTASSIUM 3.2*   CHLORIDE 110   CO2 18*   GLUCOSE 133*   BUN 16   CREATININE 1.03   CALCIUM 8.2*                   Assessment/Plan  * Status post below-knee amputation of left lower extremity (HCC)- (present on admission)  Assessment & Plan  Incision clean and dry w/ intact sutures but stump appears swollen  U/S shows extensive soft tissue edema and phlegmonous change without evidence of well formed loculated fluid collection  Ortho F/U note states no concern for infection  Recommend repeat soft tissue U/S prior to discharge    Hypokalemia  Assessment & Plan  Continue to replete K+    MELANIE (acute kidney injury) ([facility])  Assessment & Plan  Metabolic acidosis better  Renal function improved w/ IVF    Diarrhea  Assessment & Plan  C Dif negative  Continue empiric Cipro and Flagyl for possible colitis/enteritis  Symptoms improving on Questran, Culturelle, and Simethicone    Vitamin D deficiency- (present on admission)  Assessment & Plan  Vit D level 14  On supplementation    Phlebitis of left arm- (present on admission)  Assessment & Plan  U/S negative DVT  Suspect phlebitis from recent PICC  Completed Bactrim 3/16/20    Diabetes mellitus due to underlying condition with hyperglycemia ([facility])- (present on admission)  Assessment & Plan  HbA1c 6.8  Continue Metformin  Off FSBS and SSI  Outpt meds include Metformin 1000 mg bid    History of DVT (deep vein thrombosis)- (present on admission)  Assessment & Plan  Anticoagulated on  Xarelto    Mixed hyperlipidemia- (present on admission)  Assessment & Plan  On Lipitor    Benign essential HTN- (present on admission)  Assessment & Plan  Observe blood pressure trends on Atenolol    Full Code

## 2020-03-20 NOTE — THERAPY
"Occupational Therapy  Daily Treatment     Patient Name: Jose Mc  Age:  64 y.o., Sex:  male  Medical Record #: [id number]  Today's Date: 3/20/2020     Precautions  Precautions: Non Weight Bearing Left Lower Extremity  Comments: L residual limb rigid orthosis too large ([facility] from [facility] aware)    Safety   ADL Safety : Requires Supervision for Safety, Requires Physical Assist for Safety    Subjective    \"I was going faster than I thought\"     Objective       03/20/20 1101   Sitting Upper Body Exercises   Upper Extremity Bike Level 5 Resistance  (BUE motomed 15 min, 0 RB, 1.78 mile)   Interdisciplinary Plan of Care Collaboration   Patient Position at End of Therapy In Bed;Call Light within Reach;Tray Table within Reach   OT Total Time Spent   OT Individual Total Time Spent (Mins) 30   OT Charge Group   OT Therapeutic Exercise  2       Assessment    Pt completed UB therex to the best of their abilities with no complaints of pain    Plan    Cont overall strength/endurance, standing balance for ADL's and functional mobility    "

## 2020-03-20 NOTE — REHAB-PHARMACY IDT TEAM NOTE
Pharmacy   Pharmacy  Antibiotics/Antifungals/Antivirals:  Medication:      Active Orders (From admission, onward)    Ordered     Ordering Provider       Wed Mar 18, 2020  1:20 PM    03/18/20 1320  ciprofloxacin (CIPRO) tablet 500 mg  EVERY 12 HOURS      Lor Stockton M.D.        Route:         po  Stop Date:  3/25/2020  Reason: empiric Cipro  for possible colitis/enteritis  Antihypertensives/Cardiac:  Medication:    Orders (72h ago, onward)     Start     Ordered    03/19/20 0900  cholestyramine (QUESTRAN,PREVALITE) 4 GM powder 4 g  2 TIMES DAILY      03/19/20 0841    03/11/20 0600  atenolol (TENORMIN) tablet 100 mg  Q DAY      03/10/20 1445    03/10/20 2100  atorvastatin (LIPITOR) tablet 20 mg  EVERY EVENING      03/10/20 1445              Patient Vitals for the past 24 hrs:   BP Pulse   03/20/20 1340 112/63 65   03/20/20 0701 103/58 66   03/20/20 0538 119/66 65   03/19/20 1855 114/66 67     Anticoagulation:  Medication: Xarelto    Other key medications: A review of the medication list reveals no issues at this time. Patient is currently on antihypertensive(s). Recommend home blood pressure monitoring/recording if antihypertensive(s) regimen(s) continue. Patient is currently on diabetic medication(s) and/or Insulin(s). Recommend home blood glucose monitoring/recording if these regimen(s) continue.    Section completed by: Lee [facility]

## 2020-03-21 PROCEDURE — 94760 N-INVAS EAR/PLS OXIMETRY 1: CPT

## 2020-03-21 PROCEDURE — 770010 HCHG ROOM/CARE - REHAB SEMI PRIVAT*

## 2020-03-21 PROCEDURE — 97110 THERAPEUTIC EXERCISES: CPT

## 2020-03-21 PROCEDURE — 700102 HCHG RX REV CODE 250 W/ 637 OVERRIDE(OP): Performed by: HOSPITALIST

## 2020-03-21 PROCEDURE — A9270 NON-COVERED ITEM OR SERVICE: HCPCS | Performed by: PHYSICAL MEDICINE & REHABILITATION

## 2020-03-21 PROCEDURE — A9270 NON-COVERED ITEM OR SERVICE: HCPCS | Performed by: HOSPITALIST

## 2020-03-21 PROCEDURE — 99232 SBSQ HOSP IP/OBS MODERATE 35: CPT | Performed by: HOSPITALIST

## 2020-03-21 PROCEDURE — 97535 SELF CARE MNGMENT TRAINING: CPT

## 2020-03-21 PROCEDURE — 700102 HCHG RX REV CODE 250 W/ 637 OVERRIDE(OP): Performed by: PHYSICAL MEDICINE & REHABILITATION

## 2020-03-21 PROCEDURE — 97530 THERAPEUTIC ACTIVITIES: CPT

## 2020-03-21 RX ORDER — CHOLESTYRAMINE LIGHT 4 G/5.7G
1 POWDER, FOR SUSPENSION ORAL 3 TIMES DAILY
Status: DISCONTINUED | OUTPATIENT
Start: 2020-03-21 | End: 2020-03-23

## 2020-03-21 RX ADMIN — CHOLESTYRAMINE 4 G: 4 POWDER, FOR SUSPENSION ORAL at 09:41

## 2020-03-21 RX ADMIN — MORPHINE SULFATE 15 MG: 15 TABLET, FILM COATED, EXTENDED RELEASE ORAL at 21:22

## 2020-03-21 RX ADMIN — METFORMIN HYDROCHLORIDE 250 MG: 500 TABLET ORAL at 08:01

## 2020-03-21 RX ADMIN — ATENOLOL 100 MG: 25 TABLET ORAL at 05:21

## 2020-03-21 RX ADMIN — SIMETHICONE CHEW TAB 80 MG 160 MG: 80 TABLET ORAL at 17:41

## 2020-03-21 RX ADMIN — GABAPENTIN 300 MG: 300 CAPSULE ORAL at 15:04

## 2020-03-21 RX ADMIN — MORPHINE SULFATE 15 MG: 15 TABLET, FILM COATED, EXTENDED RELEASE ORAL at 09:41

## 2020-03-21 RX ADMIN — SIMETHICONE CHEW TAB 80 MG 160 MG: 80 TABLET ORAL at 08:01

## 2020-03-21 RX ADMIN — SIMETHICONE CHEW TAB 80 MG 160 MG: 80 TABLET ORAL at 21:22

## 2020-03-21 RX ADMIN — METFORMIN HYDROCHLORIDE 250 MG: 500 TABLET ORAL at 17:41

## 2020-03-21 RX ADMIN — CIPROFLOXACIN HYDROCHLORIDE 500 MG: 500 TABLET, FILM COATED ORAL at 09:42

## 2020-03-21 RX ADMIN — Medication 1 CAPSULE: at 08:00

## 2020-03-21 RX ADMIN — CHOLESTYRAMINE 4 G: 4 POWDER, FOR SUSPENSION ORAL at 21:22

## 2020-03-21 RX ADMIN — SIMETHICONE CHEW TAB 80 MG 160 MG: 80 TABLET ORAL at 11:18

## 2020-03-21 RX ADMIN — GABAPENTIN 300 MG: 300 CAPSULE ORAL at 09:42

## 2020-03-21 RX ADMIN — RIVAROXABAN 20 MG: 10 TABLET, FILM COATED ORAL at 17:41

## 2020-03-21 RX ADMIN — GABAPENTIN 300 MG: 300 CAPSULE ORAL at 21:22

## 2020-03-21 RX ADMIN — ACETAMINOPHEN 650 MG: 325 TABLET, FILM COATED ORAL at 11:19

## 2020-03-21 RX ADMIN — CHOLESTYRAMINE 4 G: 4 POWDER, FOR SUSPENSION ORAL at 15:03

## 2020-03-21 RX ADMIN — ATORVASTATIN CALCIUM 20 MG: 10 TABLET, FILM COATED ORAL at 21:22

## 2020-03-21 RX ADMIN — CIPROFLOXACIN HYDROCHLORIDE 500 MG: 500 TABLET, FILM COATED ORAL at 21:22

## 2020-03-21 ASSESSMENT — ENCOUNTER SYMPTOMS
EYES NEGATIVE: 1
SHORTNESS OF BREATH: 0
FEVER: 0
COUGH: 0
VOMITING: 0
PALPITATIONS: 0
POLYDIPSIA: 0
BRUISES/BLEEDS EASILY: 0
ABDOMINAL PAIN: 0
CHILLS: 0
DIARRHEA: 1
NAUSEA: 0

## 2020-03-21 NOTE — THERAPY
Occupational Therapy  Daily Treatment     Patient Name: Jose Mc  Age:  64 y.o., Sex:  male  Medical Record #: [id number]  Today's Date: 3/21/2020     Precautions  Precautions: Non Weight Bearing Left Lower Extremity  Comments: L residual limb rigid orthosis too large ([facility])    Safety   ADL Safety : Requires Supervision for Safety  Bathroom Safety: Requires Supervision for Safety  Comments: see FIM for toileting    Subjective    Pt expresses pain in R heel during amb but inability to wear current shoes due to stickiness of sole. Unable to fit into loaner shoe.      Objective       03/21/20 0831   Precautions   Precautions Non Weight Bearing Left Lower Extremity   Comments L residual limb rigid orthosis too large ([facility])   Safety    ADL Safety  Requires Supervision for Safety   Bathroom Safety Requires Supervision for Safety   Comments see FIM for toileting   Sitting Upper Body Exercises   Other Exercise Seated core and BUE exercises with wted ball. Side to side motion striking on kickboards with posterior lean to engage core with 6lb wted ball 3 sets of 10. Over head circles alteranting directions while engaging core 4 sets of 10. Triceps with 8lb wted dowel 3 sets of 10 L and R. Requires rest breaks between sets and occ vc for posture and core engagement.   Balance   Standing Balance (Static) Fair -   Standing Balance (Dynamic) Fair -   Skilled Intervention Verbal Cuing   Comments see DN for functional amb activity   Bed Mobility    Supine to Sit Modified Independent   Sit to Supine Modified Independent   Scooting Independent   Rolling Modified Independent   Interdisciplinary Plan of Care Collaboration   Patient Position at End of Therapy In Bed;Call Light within Reach;Tray Table within Reach;Phone within Reach   OT Total Time Spent   OT Individual Total Time Spent (Mins) 60   OT Charge Group   OT Self Care / ADL 1   OT Therapy Activity 1   OT  "Therapeutic Exercise  2       FIM Toiletin - Standby Prompting/Supervision or Set-up  Toileting Description:  Grab bar, Increased time, Supervision for safety    FIM Toilet Transfer Score:  5 - Standby Prompting/Supervision or Set-up  Toilet Transfer Description:  (SBA SPT wc <> toilet with support of grab bars)    Functional Amb:  -Pt expressed desire to walk due to not having scheduled PT this date. Able to amb 25ft along garcia with wc follow, intermittent standing rest breaks, and support of FWW. Demonstrates dec pain with small steps and dec heel strike. Discussed acquisition of less \"sticky\" shoe to dec pain with heel strike and inc ease with amb.       Assessment    Pt demonstrates strong motivation towards rehab goals. Tolerates UB and core strengthening with intermittent rest breaks. Amb longest reported distance this date of 25 ft; however, cont to be limited by R heel pain and BUE weakness.     Plan    Cont overall strength/endurance, standing balance for ADL's and functional mobility    "

## 2020-03-21 NOTE — CARE PLAN
Problem: Safety  Goal: Will remain free from injury  Outcome: PROGRESSING AS EXPECTED  Note: Call light with in reach. Redirection to use call light for assistance. Upper siderails up x2 while in bed.      Problem: Infection  Goal: Will remain free from infection  Outcome: PROGRESSING AS EXPECTED  Note: Continues ABT, no adverse reaction. Encouraged increase fluids intake. HS snacks given. No complains of pain at this time, on scheduled pain medication. CPAP on. Able to make needs known. Assisted as needed. Will continue to monitor.

## 2020-03-21 NOTE — CARE PLAN
Problem: Skin Integrity  Goal: Risk for impaired skin integrity will decrease  Outcome: PROGRESSING SLOWER THAN EXPECTED  1st noted today pt states he scraped his toes while in w/c, all toes have old and now new spots (see pictures. ) Wound consult ordered. Dr Beck saw and assessed foot and BKA on left leg BKA wound is clean dry and per DR Beck less red and less edematous. Cleansed and redressed. Will assess daily and note changes will change dressing if advised by wound care.

## 2020-03-21 NOTE — CARE PLAN
Problem: Safety  Goal: Will remain free from injury  Outcome: PROGRESSING AS EXPECTED  No falls no injury  Goal: Will remain free from falls  Outcome: PROGRESSING AS EXPECTED  Continue fall precautions and safety measure. Make sure to protect LE/toes are safe when transferring or in wheel chair     Problem: Infection  Goal: Will remain free from infection  Outcome: PROGRESSING AS EXPECTED  MEWs is 0 today. Denies feeling sick hot tired nauseated VSS. Wound BKA on left redressed and assessed, per Dr Stockton looks good  better then the last time she saw it. No drainage noted.      Problem: Bowel/Gastric:  Goal: Normal bowel function is maintained or improved  Outcome: PROGRESSING AS EXPECTED  BM times 2 so far this shift. See flow sheet. Increased Questran to TID from BID.

## 2020-03-21 NOTE — FLOWSHEET NOTE
03/20/20 1822   Events/Summary/Plan   Events/Summary/Plan SpO2 check   Vital Signs   Pulse 65   Respiration 16   Pulse Oximetry 96 %   $ Pulse Oximetry (Spot Check) Yes   Respiratory Assessment   Level of Consciousness Alert   Oxygen   O2 Delivery Device None - Room Air

## 2020-03-22 ENCOUNTER — APPOINTMENT (OUTPATIENT)
Dept: RADIOLOGY | Facility: REHABILITATION | Age: 65
DRG: 560 | End: 2020-03-22
Attending: HOSPITALIST
Payer: COMMERCIAL

## 2020-03-22 ENCOUNTER — APPOINTMENT (OUTPATIENT)
Dept: RADIOLOGY | Facility: MEDICAL CENTER | Age: 65
DRG: 560 | End: 2020-03-22
Attending: HOSPITALIST
Payer: COMMERCIAL

## 2020-03-22 PROCEDURE — 76882 US LMTD JT/FCL EVL NVASC XTR: CPT | Mod: LT

## 2020-03-22 PROCEDURE — 700102 HCHG RX REV CODE 250 W/ 637 OVERRIDE(OP): Performed by: HOSPITALIST

## 2020-03-22 PROCEDURE — 99232 SBSQ HOSP IP/OBS MODERATE 35: CPT | Performed by: HOSPITALIST

## 2020-03-22 PROCEDURE — 700102 HCHG RX REV CODE 250 W/ 637 OVERRIDE(OP): Performed by: PHYSICAL MEDICINE & REHABILITATION

## 2020-03-22 PROCEDURE — 94760 N-INVAS EAR/PLS OXIMETRY 1: CPT

## 2020-03-22 PROCEDURE — A9270 NON-COVERED ITEM OR SERVICE: HCPCS | Performed by: HOSPITALIST

## 2020-03-22 PROCEDURE — 770010 HCHG ROOM/CARE - REHAB SEMI PRIVAT*

## 2020-03-22 PROCEDURE — A9270 NON-COVERED ITEM OR SERVICE: HCPCS | Performed by: PHYSICAL MEDICINE & REHABILITATION

## 2020-03-22 RX ADMIN — CHOLESTYRAMINE 4 G: 4 POWDER, FOR SUSPENSION ORAL at 20:10

## 2020-03-22 RX ADMIN — CHOLESTYRAMINE 4 G: 4 POWDER, FOR SUSPENSION ORAL at 09:53

## 2020-03-22 RX ADMIN — CIPROFLOXACIN HYDROCHLORIDE 500 MG: 500 TABLET, FILM COATED ORAL at 09:53

## 2020-03-22 RX ADMIN — ATENOLOL 100 MG: 25 TABLET ORAL at 05:18

## 2020-03-22 RX ADMIN — SIMETHICONE CHEW TAB 80 MG 160 MG: 80 TABLET ORAL at 17:32

## 2020-03-22 RX ADMIN — GABAPENTIN 300 MG: 300 CAPSULE ORAL at 09:53

## 2020-03-22 RX ADMIN — GABAPENTIN 300 MG: 300 CAPSULE ORAL at 14:59

## 2020-03-22 RX ADMIN — MORPHINE SULFATE 15 MG: 15 TABLET, FILM COATED, EXTENDED RELEASE ORAL at 20:10

## 2020-03-22 RX ADMIN — SIMETHICONE CHEW TAB 80 MG 160 MG: 80 TABLET ORAL at 20:10

## 2020-03-22 RX ADMIN — ATORVASTATIN CALCIUM 20 MG: 10 TABLET, FILM COATED ORAL at 20:10

## 2020-03-22 RX ADMIN — CHOLESTYRAMINE 4 G: 4 POWDER, FOR SUSPENSION ORAL at 14:59

## 2020-03-22 RX ADMIN — GABAPENTIN 300 MG: 300 CAPSULE ORAL at 20:10

## 2020-03-22 RX ADMIN — MORPHINE SULFATE 15 MG: 15 TABLET, FILM COATED, EXTENDED RELEASE ORAL at 09:53

## 2020-03-22 RX ADMIN — SIMETHICONE CHEW TAB 80 MG 160 MG: 80 TABLET ORAL at 11:32

## 2020-03-22 RX ADMIN — SIMETHICONE CHEW TAB 80 MG 160 MG: 80 TABLET ORAL at 08:10

## 2020-03-22 RX ADMIN — RIVAROXABAN 20 MG: 10 TABLET, FILM COATED ORAL at 17:32

## 2020-03-22 RX ADMIN — Medication 1 CAPSULE: at 08:10

## 2020-03-22 RX ADMIN — METFORMIN HYDROCHLORIDE 250 MG: 500 TABLET ORAL at 08:10

## 2020-03-22 RX ADMIN — CIPROFLOXACIN HYDROCHLORIDE 500 MG: 500 TABLET, FILM COATED ORAL at 20:10

## 2020-03-22 RX ADMIN — METFORMIN HYDROCHLORIDE 250 MG: 500 TABLET ORAL at 17:32

## 2020-03-22 ASSESSMENT — ENCOUNTER SYMPTOMS
CHILLS: 0
ABDOMINAL PAIN: 0
FEVER: 0
VOMITING: 0
BRUISES/BLEEDS EASILY: 0
POLYDIPSIA: 0
DIARRHEA: 1
PALPITATIONS: 0
NAUSEA: 0
COUGH: 0
EYES NEGATIVE: 1
SHORTNESS OF BREATH: 0

## 2020-03-22 NOTE — FLOWSHEET NOTE
03/22/20 1552   Events/Summary/Plan   Events/Summary/Plan 02 spot check   Vital Signs   Pulse 70   Respiration 18   Pulse Oximetry 98 %   $ Pulse Oximetry (Spot Check) Yes   Respiratory Assessment   Level of Consciousness Alert   Chest Exam   Chest Observation Barrel Chest   Oxygen   O2 Delivery Device None - Room Air

## 2020-03-22 NOTE — FLOWSHEET NOTE
03/21/20 1915   Events/Summary/Plan   Events/Summary/Plan SpO2 check   Vital Signs   Pulse 68   Respiration 18   Pulse Oximetry 98 %   $ Pulse Oximetry (Spot Check) Yes   Respiratory Assessment   Level of Consciousness Alert   Non-Invasive Ventilation [employer]   Nocturnal CPAP or BIPAP CPAP - Home Unit

## 2020-03-22 NOTE — PROGRESS NOTES
Hospital Medicine Daily Progress Note      Chief Complaint:  Hypertension  Diabetes  Leukocytosis  LUE swelling & erythema    Interval History:  Stools bulking up but still frequent.  Wounds examined w/ RN.    Review of Systems  Review of Systems   Constitutional: Negative for chills and fever.   HENT: Negative.    Eyes: Negative.    Respiratory: Negative for cough and shortness of breath.    Cardiovascular: Negative for chest pain and palpitations.   Gastrointestinal: Positive for diarrhea. Negative for abdominal pain, nausea and vomiting.   Genitourinary: Negative.    Musculoskeletal:        Wound pain   Skin: Negative for itching and rash.   Endo/Heme/Allergies: Negative for polydipsia. Does not bruise/bleed easily.        Physical Exam  Temp:  [36.2 °C (97.1 °F)-36.4 °C (97.6 °F)] 36.2 °C (97.1 °F)  Pulse:  [60-70] 68  Resp:  [14-18] 18  BP: (104-124)/(54-70) 121/70  SpO2:  [98 %] 98 %    Physical Exam  Vitals signs reviewed.   Constitutional:       General: He is not in acute distress.     Appearance: Normal appearance. He is not ill-appearing.   HENT:      Head: Normocephalic and atraumatic.      Right Ear: External ear normal.      Left Ear: External ear normal.      Nose: Nose normal.      Mouth/Throat:      Pharynx: Oropharynx is clear.   Eyes:      General:         Right eye: No discharge.         Left eye: No discharge.      Extraocular Movements: Extraocular movements intact.      Conjunctiva/sclera: Conjunctivae normal.   Neck:      Musculoskeletal: Normal range of motion and neck supple.   Cardiovascular:      Rate and Rhythm: Normal rate and regular rhythm.   Pulmonary:      Effort: No respiratory distress.      Breath sounds: No wheezing.      Comments: Decreased BS  Abdominal:      General: Bowel sounds are normal. There is no distension.      Palpations: Abdomen is soft.      Tenderness: There is no abdominal tenderness.   Musculoskeletal:      Comments: Left BKA incision clean and dry w/ intact  sutures, stump much less swollen, fluctuance decreased   Skin:     General: Skin is warm and dry.   Neurological:      Mental Status: He is alert and oriented to person, place, and time.         Fluids    Intake/Output Summary (Last 24 hours) at 3/21/2020 2235  Last data filed at 3/21/2020 1200  Gross per 24 hour   Intake 600 ml   Output --   Net 600 ml       Laboratory  Recent Labs     03/20/20  0530   WBC 16.6*   RBC 4.06*   HEMOGLOBIN 11.5*   HEMATOCRIT 36.3*   MCV 89.4   MCH 28.3   MCHC 31.7*   RDW 45.4   PLATELETCT 282   MPV 11.3     Recent Labs     03/20/20  0530   SODIUM 138   POTASSIUM 3.2*   CHLORIDE 110   CO2 18*   GLUCOSE 133*   BUN 16   CREATININE 1.03   CALCIUM 8.2*                   Assessment/Plan  * Status post below-knee amputation of left lower extremity ([facility])- (present on admission)  Assessment & Plan  Incision clean and dry w/ intact sutures but stump appeared swollen and fluctuant  U/S showed extensive soft tissue edema and phlegmonous change without evidence of well formed loculated fluid collection  Ortho F/U note stated no concern for infection  F/U wound exam shows decreased swelling and fluctuance  Leukocytosis improving, continue empiric abx  Recommend repeat soft tissue U/S prior to discharge    Hypokalemia  Assessment & Plan  K+ repleted    MELANIE (acute kidney injury) ([facility])  Assessment & Plan  Metabolic acidosis better  Renal function improved w/ IVF    Diarrhea  Assessment & Plan  C Dif negative  Will increase Questran  Continue Culturelle and Simethicone    Vitamin D deficiency- (present on admission)  Assessment & Plan  Vit D level 14  On supplementation    Phlebitis of left arm- (present on admission)  Assessment & Plan  U/S negative DVT  Suspect phlebitis from recent PICC  Completed Bactrim 3/16/20    Diabetes mellitus due to underlying condition with hyperglycemia ([facility])- (present on admission)  Assessment & Plan  HbA1c 6.8  Continue Metformin  Off FSBS and SSI  Outpt meds include  Metformin 1000 mg bid    History of DVT (deep vein thrombosis)- (present on admission)  Assessment & Plan  Anticoagulated on Xarelto    Mixed hyperlipidemia- (present on admission)  Assessment & Plan  On Lipitor    Benign essential HTN- (present on admission)  Assessment & Plan  Observe blood pressure trends on Atenolol    Full Code

## 2020-03-22 NOTE — WOUND TEAM
[facility]  Inpatient Services  Initial Wound and Skin Care Evaluation    Admission Date: 3/10/2020     Last order of IP CONSULT TO WOUND CARE was found on 3/20/2020 from Hospital Encounter on 3/9/2020       HPI, PMH, SH: Reviewed    Unit where seen by Wound Team: [facility]08/[id number]     WOUND CONSULT RELATED TO:  R foot toes 2-4    Self Report / Pain Level: Neuropathy, no sensation.    OBJECTIVE:  Pt lying in bed eating dinner, dressing in place CDI.      WOUND TYPE, LOCATION, CHARACTERISTICS (Pressure Injuries: location, stage, POA or date identified)        Wound 03/20/20 Abrasion Foot;Toe, 3rd;Toe, 4th Anterior Right open reddend with some drainage pt states he scraped his toes while in the wheel chair while outside. all toes have scab darkened areas and nail fungus (Active)       3/21/2020     Site Assessment Red;Epithelialization    Periwound Assessment Intact    Margins Defined edges    Closure Secondary intention    Drainage Amount Scant    Drainage Description Serosanguineous    Treatments Cleansed;CSWD - Conservative Sharp Wound Debridement;Site care    Wound Cleansing Approved Wound Cleanser    Periwound Protectant Not Applicable    Dressing Cleansing/Solutions Not Applicable    Dressing Options Hydrofera Blue Ready;Hypafix Tape    Dressing Changed New    Dressing Status Clean;Dry;Intact    Dressing Change/Treatment Frequency Every 48 hrs, and As Needed    NEXT Dressing Change/Treatment Date 03/23/20    NEXT Weekly Photo (Inpatient Only) 03/28/20    Wound Length (cm) 0.5 cm    Wound Width (cm) 0.5 cm    Wound Depth (cm) 0.1 cm    Wound Surface Area (cm^2) 0.25 cm^2    Wound Volume (cm^3) 0.02 cm^3    Wound Odor None    Pulses 2+;DP    Exposed Structures None       Vascular:    COOPER:   No results found.      Lab Values:    Lab Results   Component Value Date/Time    WBC 16.6 (H) 03/20/2020 05:30 AM    RBC 4.06 (L) 03/20/2020 05:30 AM    HEMOGLOBIN 11.5 (L) 03/20/2020 05:30 AM    HEMATOCRIT 36.3 (L)  03/20/2020 05:30 AM    HBA1C 6.8 (H) 03/15/2020 05:29 AM          Culture: No clinical indicators   Culture Results show:  No results found for this or any previous visit (from the past 720 hour(s)).      INTERVENTIONS BY WOUND TEAM:  Removed dressings cleansed with wound cleanser.  3-4 toes appear to be abrasions, 2nd toe possible diabetic ulcer. 2nd toe debrided with curette to remove eschar, wound bed pink - no bleeding noted. Cleansed with wound cleanser. Hydrofera blue ready to each wound bed, secured with hypafix tape.     Interdisciplinary consultation: Patient, Bedside RN     EVALUATION: Hydrofera blue will maintain wound bed moisture while also being antimicrobial and antibacterial     Goals: Steady decrease in wound area and depth weekly.    NURSING PLAN OF CARE ORDERS (X):    Dressing changes: See Dressing Care orders: X  Skin care: See Skin Care orders:    Rectal tube care: See Rectal Tube Care orders:   Other orders:    RSKIN:   CURRENTLY IN PLACE (X), APPLIED THIS VISIT (A), ORDERED (O):    Q shift Des:    Q shift pressure point assessments:    Pressure redistribution mattress            Low Airloss          Bariatric SANCHO         Bariatric foam           Heel float boots     Heel Silicone dressing        Float Heels off Bed with Pillows               Barrier wipes         Barrier Cream         Barrier paste          Sacral silicone dressing         Silicone O2 tubing         Anchorfast         Cannula fixation Device (Tender )          Gray Foam Ear protectors           Trach with Optifoam split foam                 Waffle cushion        Waffle Overlay         Rectal tube or BMS    Purwick/Condom Cath          Antifungal tx      Interdry          Reposition q 2 hours        Up to chair    X    Ambulate  X    PT/OT        Dietician        Diabetes Education      PO  X   TF     TPN     NPO   # days   Other        WOUND TEAM PLAN OF CARE    Dressing changes by wound team:          Follow up 1-2  times weekly:        x       Follow up 3 times weekly:                NPWT change 3 times weekly:     Follow up as needed:       Other (explain):     Anticipated discharge plans:  LTACH:        SNF/Rehab:       X           Home Care:           Outpatient Wound Center:            Self Care:

## 2020-03-22 NOTE — CARE PLAN
Problem: Safety  Goal: Will remain free from injury  Outcome: PROGRESSING AS EXPECTED  Note: Call light with in reach. Redirection to use call light for assistance. Upper siderails up x2 while in bed. No complains of pain, on scheduled pain medication. CPAP on. HS snack given. Able to make needs known. Assisted as needed. Will continue to monitor.

## 2020-03-22 NOTE — PROGRESS NOTES
Hospital Medicine Daily Progress Note      Chief Complaint:  Hypertension  Diabetes  Leukocytosis  LUE swelling & erythema    Interval History:  No abd pain, nausea, or vomiting.  Loose stools bulking up.    Review of Systems  Review of Systems   Constitutional: Negative for chills and fever.   HENT: Negative.    Eyes: Negative.    Respiratory: Negative for cough and shortness of breath.    Cardiovascular: Negative for chest pain and palpitations.   Gastrointestinal: Positive for diarrhea. Negative for abdominal pain, nausea and vomiting.   Genitourinary: Negative.    Musculoskeletal:        Wound pain   Skin: Negative for itching and rash.   Endo/Heme/Allergies: Negative for polydipsia. Does not bruise/bleed easily.        Physical Exam  Temp:  [36.2 °C (97.1 °F)-36.3 °C (97.4 °F)] 36.3 °C (97.4 °F)  Pulse:  [60-68] 62  Resp:  [14-18] 18  BP: (108-121)/(58-70) 120/62  SpO2:  [98 %] 98 %    Physical Exam  Vitals signs reviewed.   Constitutional:       General: He is not in acute distress.     Appearance: Normal appearance. He is not ill-appearing.   HENT:      Head: Normocephalic and atraumatic.      Right Ear: External ear normal.      Left Ear: External ear normal.      Nose: Nose normal.      Mouth/Throat:      Pharynx: Oropharynx is clear.   Eyes:      General:         Right eye: No discharge.         Left eye: No discharge.      Extraocular Movements: Extraocular movements intact.      Conjunctiva/sclera: Conjunctivae normal.   Neck:      Musculoskeletal: Normal range of motion and neck supple.   Cardiovascular:      Rate and Rhythm: Normal rate and regular rhythm.   Pulmonary:      Effort: No respiratory distress.      Breath sounds: No wheezing.      Comments: Decreased BS  Abdominal:      General: Bowel sounds are normal. There is no distension.      Palpations: Abdomen is soft.      Tenderness: There is no abdominal tenderness.   Musculoskeletal:      Comments: Left BKA incision clean and dry w/ intact  sutures, stump much less swollen, fluctuance decreased   Skin:     General: Skin is warm and dry.   Neurological:      Mental Status: He is alert and oriented to person, place, and time.         Fluids    Intake/Output Summary (Last 24 hours) at 3/22/2020 1442  Last data filed at 3/22/2020 1200  Gross per 24 hour   Intake 720 ml   Output --   Net 720 ml       Laboratory  Recent Labs     03/20/20  0530   WBC 16.6*   RBC 4.06*   HEMOGLOBIN 11.5*   HEMATOCRIT 36.3*   MCV 89.4   MCH 28.3   MCHC 31.7*   RDW 45.4   PLATELETCT 282   MPV 11.3     Recent Labs     03/20/20  0530   SODIUM 138   POTASSIUM 3.2*   CHLORIDE 110   CO2 18*   GLUCOSE 133*   BUN 16   CREATININE 1.03   CALCIUM 8.2*                   Assessment/Plan  * Status post below-knee amputation of left lower extremity ([facility])- (present on admission)  Assessment & Plan  Incision clean and dry w/ intact sutures but stump appeared swollen and fluctuant  U/S showed extensive soft tissue edema and phlegmonous change without evidence of well formed loculated fluid collection  F/U wound exam shows decreased swelling and fluctuance  Leukocytosis improving, continue empiric abx  Repeat soft tissue U/S prior to discharge    Hypokalemia  Assessment & Plan  K+ repleted  Check F/U labs in am    MELANIE (acute kidney injury) ([facility])  Assessment & Plan  Metabolic acidosis better  Renal function improved w/ IVF  Check F/U labs in am    Diarrhea  Assessment & Plan  C Dif negative  Continue Questran, Culturelle, and Simethicone for symptomatic control    Vitamin D deficiency- (present on admission)  Assessment & Plan  Vit D level 14  On supplementation    Phlebitis of left arm- (present on admission)  Assessment & Plan  U/S negative DVT  Suspect phlebitis from recent PICC  Completed Bactrim 3/16/20    Diabetes mellitus due to underlying condition with hyperglycemia ([facility])- (present on admission)  Assessment & Plan  HbA1c 6.8  Continue Metformin  Off FSBS and SSI  Outpt meds include  Metformin 1000 mg bid    History of DVT (deep vein thrombosis)- (present on admission)  Assessment & Plan  Anticoagulated on Xarelto    Mixed hyperlipidemia- (present on admission)  Assessment & Plan  On Lipitor    Benign essential HTN- (present on admission)  Assessment & Plan  Observe blood pressure trends on Atenolol    Full Code

## 2020-03-23 LAB
ANION GAP SERPL CALC-SCNC: 10 MMOL/L (ref 7–16)
BUN SERPL-MCNC: 12 MG/DL (ref 8–22)
CALCIUM SERPL-MCNC: 8.2 MG/DL (ref 8.5–10.5)
CHLORIDE SERPL-SCNC: 116 MMOL/L (ref 96–112)
CO2 SERPL-SCNC: 19 MMOL/L (ref 20–33)
CREAT SERPL-MCNC: 1.09 MG/DL (ref 0.5–1.4)
ERYTHROCYTE [DISTWIDTH] IN BLOOD BY AUTOMATED COUNT: 46.2 FL (ref 35.9–50)
GLUCOSE SERPL-MCNC: 112 MG/DL (ref 65–99)
HCT VFR BLD AUTO: 33.7 % (ref 42–52)
HGB BLD-MCNC: 10.8 G/DL (ref 14–18)
MCH RBC QN AUTO: 28.3 PG (ref 27–33)
MCHC RBC AUTO-ENTMCNC: 32 G/DL (ref 33.7–35.3)
MCV RBC AUTO: 88.2 FL (ref 81.4–97.8)
PLATELET # BLD AUTO: 284 K/UL (ref 164–446)
PMV BLD AUTO: 10.8 FL (ref 9–12.9)
POTASSIUM SERPL-SCNC: 3.8 MMOL/L (ref 3.6–5.5)
RBC # BLD AUTO: 3.82 M/UL (ref 4.7–6.1)
SODIUM SERPL-SCNC: 145 MMOL/L (ref 135–145)
WBC # BLD AUTO: 10.7 K/UL (ref 4.8–10.8)

## 2020-03-23 PROCEDURE — 97530 THERAPEUTIC ACTIVITIES: CPT

## 2020-03-23 PROCEDURE — 97116 GAIT TRAINING THERAPY: CPT

## 2020-03-23 PROCEDURE — 97110 THERAPEUTIC EXERCISES: CPT

## 2020-03-23 PROCEDURE — A9270 NON-COVERED ITEM OR SERVICE: HCPCS | Performed by: HOSPITALIST

## 2020-03-23 PROCEDURE — 770010 HCHG ROOM/CARE - REHAB SEMI PRIVAT*

## 2020-03-23 PROCEDURE — 36415 COLL VENOUS BLD VENIPUNCTURE: CPT

## 2020-03-23 PROCEDURE — 99232 SBSQ HOSP IP/OBS MODERATE 35: CPT | Performed by: HOSPITALIST

## 2020-03-23 PROCEDURE — 97535 SELF CARE MNGMENT TRAINING: CPT

## 2020-03-23 PROCEDURE — 700102 HCHG RX REV CODE 250 W/ 637 OVERRIDE(OP): Performed by: HOSPITALIST

## 2020-03-23 PROCEDURE — 85027 COMPLETE CBC AUTOMATED: CPT

## 2020-03-23 PROCEDURE — 80048 BASIC METABOLIC PNL TOTAL CA: CPT

## 2020-03-23 PROCEDURE — A9270 NON-COVERED ITEM OR SERVICE: HCPCS | Performed by: PHYSICAL MEDICINE & REHABILITATION

## 2020-03-23 PROCEDURE — 700102 HCHG RX REV CODE 250 W/ 637 OVERRIDE(OP): Performed by: PHYSICAL MEDICINE & REHABILITATION

## 2020-03-23 PROCEDURE — 99233 SBSQ HOSP IP/OBS HIGH 50: CPT | Performed by: PHYSICAL MEDICINE & REHABILITATION

## 2020-03-23 RX ORDER — CHOLESTYRAMINE LIGHT 4 G/5.7G
1 POWDER, FOR SUSPENSION ORAL 3 TIMES DAILY PRN
Status: DISCONTINUED | OUTPATIENT
Start: 2020-03-23 | End: 2020-03-26 | Stop reason: HOSPADM

## 2020-03-23 RX ORDER — VITAMIN B COMPLEX
1000 TABLET ORAL DAILY
Status: DISCONTINUED | OUTPATIENT
Start: 2020-03-24 | End: 2020-03-26 | Stop reason: HOSPADM

## 2020-03-23 RX ADMIN — SIMETHICONE CHEW TAB 80 MG 160 MG: 80 TABLET ORAL at 17:36

## 2020-03-23 RX ADMIN — METFORMIN HYDROCHLORIDE 250 MG: 500 TABLET ORAL at 08:09

## 2020-03-23 RX ADMIN — GABAPENTIN 300 MG: 300 CAPSULE ORAL at 14:32

## 2020-03-23 RX ADMIN — GABAPENTIN 300 MG: 300 CAPSULE ORAL at 08:08

## 2020-03-23 RX ADMIN — OXYCODONE HYDROCHLORIDE AND ACETAMINOPHEN 1 TABLET: 5; 325 TABLET ORAL at 08:10

## 2020-03-23 RX ADMIN — SIMETHICONE CHEW TAB 80 MG 160 MG: 80 TABLET ORAL at 20:13

## 2020-03-23 RX ADMIN — ATORVASTATIN CALCIUM 20 MG: 10 TABLET, FILM COATED ORAL at 20:14

## 2020-03-23 RX ADMIN — MORPHINE SULFATE 15 MG: 15 TABLET, FILM COATED, EXTENDED RELEASE ORAL at 08:09

## 2020-03-23 RX ADMIN — SIMETHICONE CHEW TAB 80 MG 160 MG: 80 TABLET ORAL at 08:09

## 2020-03-23 RX ADMIN — SIMETHICONE CHEW TAB 80 MG 160 MG: 80 TABLET ORAL at 11:52

## 2020-03-23 RX ADMIN — GABAPENTIN 300 MG: 300 CAPSULE ORAL at 20:14

## 2020-03-23 RX ADMIN — MORPHINE SULFATE 15 MG: 15 TABLET, FILM COATED, EXTENDED RELEASE ORAL at 20:14

## 2020-03-23 RX ADMIN — CIPROFLOXACIN HYDROCHLORIDE 500 MG: 500 TABLET, FILM COATED ORAL at 08:09

## 2020-03-23 RX ADMIN — CIPROFLOXACIN HYDROCHLORIDE 500 MG: 500 TABLET, FILM COATED ORAL at 20:14

## 2020-03-23 RX ADMIN — Medication 1 CAPSULE: at 08:08

## 2020-03-23 RX ADMIN — ATENOLOL 100 MG: 25 TABLET ORAL at 05:29

## 2020-03-23 RX ADMIN — RIVAROXABAN 20 MG: 10 TABLET, FILM COATED ORAL at 17:37

## 2020-03-23 RX ADMIN — METFORMIN HYDROCHLORIDE 250 MG: 500 TABLET ORAL at 17:37

## 2020-03-23 ASSESSMENT — ENCOUNTER SYMPTOMS
COUGH: 0
POLYDIPSIA: 0
BRUISES/BLEEDS EASILY: 0
NAUSEA: 0
VOMITING: 0
ABDOMINAL PAIN: 0
PALPITATIONS: 0
SHORTNESS OF BREATH: 0
DIARRHEA: 0
CHILLS: 0
FEVER: 0
EYES NEGATIVE: 1

## 2020-03-23 NOTE — REHAB-COLLABORATIVE ONGOING IDT TEAM NOTE
Weekly Interdisciplinary Team Conference Note    Weekly Interdisciplinary Team Conference # 2  Date:  3/23/2020    Clinicians present and reporting at team conference include the following:   MD: Sophia Beck MD   RN:  Trisha De La Torre RN and Karen Tamayo RN   PT:   Cass Tabares PT, BSPT  OT:  Radha Pierre MS, OT/L   ST:  Not Applicable.   CM:  Pamela Medina RN [facility]  REC:  Not Applicable  RT:  None  RPh:  Lee Marsh [facility]  Other:   None  All reporting clinicians have a working knowledge of this patient's plan of care.    Targeted DC Date:  3/26/2020     Medical    Patient Active Problem List    Diagnosis Date Noted   • MELANIE (acute kidney injury) (Formerly [facility]) 03/18/2020   • Hypokalemia 03/18/2020   • Diarrhea 03/14/2020   • Phlebitis of left arm 03/11/2020   • Vitamin D deficiency 03/11/2020   • Impaired mobility and ADLs 03/11/2020   • Diabetes mellitus due to underlying condition with hyperglycemia (Formerly [facility]) 03/10/2020   • Peripheral neuropathy 03/10/2020   • Status post below-knee amputation of left lower extremity (Formerly [facility]) 03/10/2020   • Obstructive sleep apnea syndrome 03/10/2020   • Benign essential HTN 01/21/2020   • Controlled type 2 diabetes mellitus without complication, without long-term current use of insulin (Formerly [facility]) 01/21/2020   • Mixed hyperlipidemia 01/21/2020   • Type 2 diabetes mellitus with right diabetic foot ulcer (Formerly [facility]) 01/21/2020   • History of DVT (deep vein thrombosis) 01/21/2020   • Chronic anticoagulation 01/21/2020   • Lumbar radicular syndrome 01/21/2020     Results     Procedure Component Value Ref Range Date/Time    ESTIMATED GFR [[id number]] Collected:  03/23/20 0521    Order Status:  Completed Lab Status:  Final result Updated:  03/23/20 0620     GFR If African American >60 >60 mL/min/1.73 m 2      GFR If Non African American >60 >60 mL/min/1.73 m 2     Basic Metabolic Panel [[id number]]  (Abnormal) Collected:  03/23/20 0521    Order Status:  Completed Lab Status:  Final result Updated:  03/23/20 0619     Specimen:  Blood      Sodium 145 135 - 145 mmol/L      Potassium 3.8 3.6 - 5.5 mmol/L      Chloride 116 96 - 112 mmol/L      Co2 19 20 - 33 mmol/L      Glucose 112 65 - 99 mg/dL      Bun 12 8 - 22 mg/dL      Creatinine 1.09 0.50 - 1.40 mg/dL      Calcium 8.2 8.5 - 10.5 mg/dL      Anion Gap 10.0 7.0 - 16.0     CBC WITHOUT DIFFERENTIAL [[id number]]  (Abnormal) Collected:  03/23/20 0521    Order Status:  Completed Lab Status:  Final result Updated:  03/23/20 0550    Specimen:  Blood      WBC 10.7 4.8 - 10.8 K/uL      RBC 3.82 4.70 - 6.10 M/uL      Hemoglobin 10.8 14.0 - 18.0 g/dL      Hematocrit 33.7 42.0 - 52.0 %      MCV 88.2 81.4 - 97.8 fL      MCH 28.3 27.0 - 33.0 pg      MCHC 32.0 33.7 - 35.3 g/dL      RDW 46.2 35.9 - 50.0 fL      Platelet Count 284 164 - 446 K/uL      MPV 10.8 9.0 - 12.9 fL         Nursing  Diet/Nutrition:  Diabetic and Thin Liquids  Medication Administration:  Whole with Liquid Wash  % consumed at meals in last 24 hours:  Consumed % of meals per documentation.  Meal/Snack Consumption for the past 24 hrs:   Oral Nutrition   03/22/20 1200 Lunch;Between % Consumed   03/22/20 0800 Breakfast;Between % Consumed     Snack schedule:    Supplement:  Other: none  Appetite:  Good  Fluid Intake/Output in past 24 hours: In: 720 [P.O.:720]  Out: 200   Admit Weight:  Weight: (!) 150.6 kg (332 lb)  Weight Last 7 Days       Pain Issues:    Location:  Leg (03/22 0700)  Left;Lower (03/22 0700)         Severity:  Denies   Scheduled pain medications:  gabapentin (NEURONTIN)  and morphine SR (MS CONTIN)      PRN pain medications used in last 24 hours:  None   Non Pharmacologic Interventions:  repositioned and rest  Effectiveness of pain management plan:  good=patient states acceptable comfort after interventions    Bowel:    Bowel Assist Initial Score:  4 - Minimal Assistance  Bowel Assist Current Score:  4 - Minimal Assistance  Bowl Accidents in last 7 days:     Last bowel movement:  03/22/20  Stool Description: Medium, Soft     Usual bowel pattern:  daily  Scheduled bowel medications:  None  PRN bowel medications used in last 24 hours:  None  Nursing Interventions:  Increased time, Scheduled medication, Supervision, Brief, Positioning on commode/toilet  Effectiveness of bowel program:   good=regular, predictable, controlled emptying of bowel  Bladder:    Bladder Assist Initial Score:  4 - Minimal Assistance  Bladder Assist Current Score:  4 - Minimal Assistance  Bladder Accidents in last 7 days:  0  PVR range for past 24-48 hours: --  Intermittent Catheterization:  n/a  Medications affecting bladder:  None    Time void schedule/voiding pattern:  Voiding every 2-4 hours  Interventions:  Increased time, Supervision  Effectiveness of bladder training:  Good=regular, predictable, emptying of bladder, patient initiates time voiding    Ambrosio:    Type:     Patient Lines/Drains/Airways Status    Active Catheter     None              Date placed:          Justification:    Diabetes:  Blood Sugar Frequency:  None    Range of BS for last 48 hours:       Scheduled diabetic medications:  metformin (GLUCOPHAGE)   Sliding scale usage in past 24 hours:  No  Total Short acting insulin in the past 24 hours:  None  Total Long acting insulin in the past 24 hours:  None    Wound:         Patient Lines/Drains/Airways Status    Active Current Wounds     Name: Placement date: Placement time: Site: Days:    Wound 03/10/20 Left left BKA  03/10/20   1600   --  12    Wound 03/20/20 Abrasion Foot;Toe, 3rd;Toe, 4th Anterior Right open reddend with some drainage pt states he scraped his toes while in the wheel chair while outside. all toes have scab darkened areas and nail fungus  03/20/20   1500   Foot;Toe, 3rd;Toe, 4th  2                   Interventions:  Change dressings daily and every 48 hours, monitor every shift.  Effectiveness of intervention:  wound is improving     Wound Vac Location:  Not applicable  Dressing last  changed:  3/21/2020, 3/22/2020  Pump Mode Pressure Setting       Description of drainage:  scant serosanguinous    Sleep/wake cycle:   Average hours slept:  Sleeps 4-6 hours without waking  Sleep medication usage:  None    Patient/Family Training/Education:  Diabetes Management, Fall Prevention, General Self Care, Pain Management, Safe Transfers, Safety and Wound Care  Discharge Supply Recommendations:  Glucometer and Strips, Blood Pressure Monitor and Wound Care Supplies  Strengths: Alert and oriented, Willingly participates in therapeutic activities, Able to follow instructions, Motivated for self care and independence and Manages pain appropriately   Barriers:   Generalized weakness and Hypertension       Nursing Problems     Problem: Bowel/Gastric:     Description:     Goal: Normal bowel function is maintained or improved     Description:           Goal: Will not experience complications related to bowel motility     Description:                 Problem: Communication     Description:     Goal: The ability to communicate needs accurately and effectively will improve     Description:                 Problem: Discharge Barriers/Planning     Description:     Goal: Patient's continuum of care needs will be met     Description:                 Problem: Infection     Description:     Goal: Will remain free from infection     Description:                 Problem: Knowledge Deficit     Description:     Goal: Knowledge of disease process/condition, treatment plan, diagnostic tests, and medications will improve     Description:           Goal: Knowledge of the prescribed therapeutic regimen will improve     Description:                 Problem: Pain Management     Description:     Goal: Pain level will decrease to patient's comfort goal     Description:                 Problem: Psychosocial Needs:     Description:     Goal: Level of anxiety will decrease     Description:                 Problem: Respiratory:      "Description:     Goal: Respiratory status will improve     Description:                 Problem: Safety     Description:     Goal: Will remain free from injury     Description:           Goal: Will remain free from falls     Description:                 Problem: Skin Integrity     Description:     Goal: Risk for impaired skin integrity will decrease     Description:                 Problem: Venous Thromboembolism (VTW)/Deep Vein Thrombosis (DVT) Prevention:     Description:     Goal: Patient will participate in Venous Thrombosis (VTE)/Deep Vein Thrombosis (DVT)Prevention Measures     Description:                        Long Term Goals:   At discharge patient will be able to function safely at home and in the community with support.    Section completed by:  Jayshree Gold R.N.      Read and reviewed by Trisha De La Torre RN    Respiratory Therapy    Pt has been using his home CPAP unit at night.  He does not use oxygen and he has been on RA since admission.  SATS are in the low 90s.  Section completed by:  Ena Meza, RRT    Mobility  Bed mobility:   Modified independent  Bed /Chair/Wheelchair Transfer Initial:  4 - Minimal Assistance  Bed /Chair/Wheelchair Transfer Current:  5 - Standby Prompting/Supervision or Set-up   Bed/Chair/Wheelchair Transfer Description:  Adaptive equipment, Verbal cueing, Set-up of equipment  Walk Initial:  1 - Total Assistance  Walk Current:  1 - Total Assistance   Walk Description:  Two hand rails(SBA with // bars / wc follow 10 ft x 5 trials)  Wheelchair Initial:  5 - Standby Prompting/Supervision or Set-up  Wheelchair Current:  5 - Standby Prompting/Supervision or Set-up   Wheelchair Description:  Extra time  Stairs Initial:  0 - Not tested,unsafe activity  Stairs Current: 0 - Not tested,unsafe activity   Stairs Description:    Patient/Family Training/Education:  On-going transfers/ wc skills/ BKA care education, bariatric FWW  DME/DC Recommendations:  22\" wide x 20\" deep x 20\" " high lightweight wc, contoured cushion, L amputee board  Strengths:  Able to follow instructions, Good carryover of learning, Independent PLOF, Making steady progress towards goals, Manages pain appropriately, Motivated for self care and independence, Pleasant and cooperative, Supportive family and Willingly participates in therapeutic activities  Barriers:   Home accessibility, Limited mobility, Poor balance and Other: L BKA, obesity  # of short term goals set= 6  # of short term goals met=6  Physical Therapy Problems     Problem: PT-Long Term Goals     Dates: Start: 03/11/20       Description:     Goal: LTG-By discharge, patient will propel wheelchair     Dates: Start: 03/11/20       Description: 1) Individualized goal:  Modified independent 150 ft x 2 indoors/ level outdoors  2) Interventions:  PT Group Therapy, PT E Stim Attended, PT Orthotics Training, PT Prosthetic Training, PT Gait Training, PT Self Care/Home Eval, PT Therapeutic Exercises, PT TENS Application, PT Neuro Re-Ed/Balance, PT Therapeutic Activity, PT Manual Therapy and PT Evaluation             Goal: LTG-By discharge, patient will ambulate     Dates: Start: 03/11/20       Description: 1) Individualized goal:  SBA with FWW 25 ft x 2  2) Interventions:  PT Group Therapy, PT E Stim Attended, PT Orthotics Training, PT Prosthetic Training, PT Gait Training, PT Self Care/Home Eval, PT Therapeutic Exercises, PT TENS Application, PT Neuro Re-Ed/Balance, PT Therapeutic Activity, PT Manual Therapy and PT Evaluation           Goal: LTG-By discharge, patient will transfer one surface to another     Dates: Start: 03/11/20       Description: 1) Individualized goal:  Modified independent with FWW vs wc level  2) Interventions:  PT Group Therapy, PT E Stim Attended, PT Orthotics Training, PT Prosthetic Training, PT Gait Training, PT Self Care/Home Eval, PT Therapeutic Exercises, PT TENS Application, PT Neuro Re-Ed/Balance, PT Therapeutic Activity, PT Manual  Therapy and PT Evaluation             Goal: LTG-By discharge, patient will transfer in/out of a car     Dates: Start: 03/11/20       Description: 1) Individualized goal:  SPV/ sset-up with FWW vs wc level  2) Interventions:  PT Group Therapy, PT E Stim Attended, PT Orthotics Training, PT Prosthetic Training, PT Gait Training, PT Self Care/Home Eval, PT Therapeutic Exercises, PT TENS Application, PT Neuro Re-Ed/Balance, PT Therapeutic Activity, PT Manual Therapy and PT Evaluation                           Section completed by:  Jessica Tabares, BSPT, ATP    Activities of Daily Living  Eating Initial:  5 - Standby Prompting/Supervision or Set-up  Eating Current:  7 - Independent   Eating Description:  Supervision for safety, Verbal cueing, Set-up of equipment or meal/tube feeding, Increased time  Grooming Initial:  5 - Standby Prompting/Supervision or Set-up  Grooming Current:  7 - Independent   Grooming Description:  Increased time  Bathing Initial:  0 - Not tested, patient refused  Bathing Current:  6 - Modified Independent   Bathing Description:  Grab bar, Tub bench, Hand held shower, Increased time, Verbal cueing  Upper Body Dressing Initial:  5 - Standby Prompting/Supervision or Set-up  Upper Body Dressing Current:  6 - Modified Independent   Upper Body Dressing Description:  Increased time  Lower Body Dressing Initial:  2 - Max Assistance  Lower Body Dressing Current:  6 - Modified Independent   Lower Body Dressing Description:  6 - Modified Independent  Toileting Initial:  1 - Total Assistance  Toileting Current:  5 - Standby Prompting/Supervision or Set-up   Toileting Description:  Grab bar, Increased time, Supervision for safety  Toilet Transfer Initial:  4 - Minimal Assistance  Toilet Transfer Current:  5 - Standby Prompting/Supervision or Set-up   Toilet Transfer Description:  5 - Standby Prompting/Supervision or Set-up  Tub / Shower Transfer Initial:  4 - Minimal Assistance  Tub / Shower Transfer  Current:  5 - Standby Prompting/Supervision or Set-up   Tub / Shower Transfer Description:  Grab bar, Increased time, Supervision for safety(stand pivot w/c<>tub bench with GB)  IADL:  SBA for laundry at w/c level, will continue to focus on IADL tasks at w/c level this week  Family Training/Education:  TBD  - home evaluation tomorrow with family  DME/DC Recommendations: pt has walk in shower and GB's in shower. Recommend tub transfer bench, possibly commode over toilet, and GB's adjacent to toilet      Strengths:  Able to follow instructions, Adequate strength, Alert and oriented, Effective communication skills, Independent PLOF, Making steady progress towards goals, Motivated for self care and independence, Pleasant and cooperative, Supportive family and Willingly participates in therapeutic activities  Barriers:  Decreased endurance, Generalized weakness, Home accessibility, Poor activity tolerance and Poor balance     # of short term goals set= 3    # of short term goals met= 3/3      Occupational Therapy Goals     Problem: IADL's     Dates: Start: 03/23/20       Description:     Goal: STG-Within one week, patient will prepare a meal     Dates: Start: 03/23/20       Description: 1) Individualized Goal: With SBA using DME/AE as needed at w/c level  2) Interventions: OT Group Therapy, OT Self Care/ADL, OT Community Reintegration, OT Neuro Re-Ed/Balance, OT Therapeutic Activity and OT Therapeutic Exercise             Goal: STG-Within one week, patient will access kitchen area     Dates: Start: 03/23/20       Description: 1) Individualized Goal: With distant supervision using DME/AE as needed at w/c level  2) Interventions: OT Group Therapy, OT Self Care/ADL, OT Community Reintegration, OT Neuro Re-Ed/Balance, OT Therapeutic Activity and OT Therapeutic Exercise           Goal: STG-Within one week, patient will utilize washer and dryer     Dates: Start: 03/23/20       Description: 1) Individualized Goal: With distant  supervision using DME/AE as needed at w/c level  2) Interventions: OT Group Therapy, OT Self Care/ADL, OT Community Reintegration, OT Neuro Re-Ed/Balance, OT Therapeutic Activity and OT Therapeutic Exercise                   Problem: OT Long Term Goals     Dates: Start: 03/11/20       Description:     Goal: LTG-By discharge, patient will complete basic self care tasks     Dates: Start: 03/11/20       Description: 1) Individualized Goal: With Mod I using DME/AE as needed  2) Interventions: OT Group Therapy, OT Self Care/ADL, OT Community Reintegration, OT Neuro Re-Ed/Balance, OT Therapeutic Activity and OT Therapeutic Exercise             Goal: LTG-By discharge, patient will perform bathroom transfers     Dates: Start: 03/11/20       Description: 1) Individualized Goal: With Mod I using DME/AE as needed  2) Interventions: OT Group Therapy, OT Self Care/ADL, OT Community Reintegration, OT Neuro Re-Ed/Balance, OT Therapeutic Activity and OT Therapeutic Exercise                       Section completed by:  Radha Pierre MS,OTR/L          Nutrition  Dietary Problems (Active)      There are no active problems.            NUTRITION SERVICES: BMI - Pt with BMI >40 (=Body mass index is 45.03 kg/m².), morbid obesity. Weight loss counseling not appropriate in acute care setting. RECOMMEND - Referral to outpatient nutrition services for weight management after D/C.     Section completed by:  Gina Dunlap R.D.    REHAB-Pharmacy IDT Team Note by Lee Marsh RPH at 3/20/2020  3:33 PM  Version 1 of 1    Author:  Lee Marsh RPH Service:  -- Author Type:  Pharmacist    Filed:  3/20/2020  3:36 PM Date of Service:  3/20/2020  3:33 PM Status:  Signed    :  Lee Marsh RPH (Pharmacist)         Pharmacy   Pharmacy  Antibiotics/Antifungals/Antivirals:  Medication:      Active Orders (From admission, onward)    Ordered     Ordering Provider       Wed Mar 18, 2020  1:20 PM    03/18/20 2627   ciprofloxacin (CIPRO) tablet 500 mg  EVERY 12 HOURS      Lor Stockton M.D.        Route:         po  Stop Date:  3/25/2020  Reason: empiric Cipro  for possible colitis/enteritis  Antihypertensives/Cardiac:  Medication:    Orders (72h ago, onward)     Start     Ordered    03/19/20 0900  cholestyramine (QUESTRAN,PREVALITE) 4 GM powder 4 g  2 TIMES DAILY      03/19/20 0841    03/11/20 0600  atenolol (TENORMIN) tablet 100 mg  Q DAY      03/10/20 1445    03/10/20 2100  atorvastatin (LIPITOR) tablet 20 mg  EVERY EVENING      03/10/20 1445              Patient Vitals for the past 24 hrs:   BP Pulse   03/20/20 1340 112/63 65   03/20/20 0701 103/58 66   03/20/20 0538 119/66 65   03/19/20 1855 114/66 67     Anticoagulation:  Medication: Xarelto    Other key medications: A review of the medication list reveals no issues at this time. Patient is currently on antihypertensive(s). Recommend home blood pressure monitoring/recording if antihypertensive(s) regimen(s) continue. Patient is currently on diabetic medication(s) and/or Insulin(s). Recommend home blood glucose monitoring/recording if these regimen(s) continue.    Section completed by: Lee Marsh [facility][AW.1]     Attribution Key     AW.1 - Lee Marsh [facility] on 3/20/2020  3:33 PM                  DC Planning  DC destination/dispostion:  Single level home, 3 step entry, in [place] with family support.    Referrals: [facility] - PT/OT/RN    DC Needs: DME - has Single Point Cane, Wheelchair, Tub / Shower Seat, Grab Bar(s) In Tub / Shower. Needs FWW.   OT has recommended tub transfer bench, possibly commode over toilet, and GB's adjacent to toilet.  MD follow up appointments - PCP, surgery    Barriers to discharge:   Functional deficits.    Strengths: Motivated. PLOF -modified independent with cane. . Supportive family.     Section completed by:  Pamela Medina R.N.      Physician Summary  Sophia Beck MD participated and led team conference discussion.

## 2020-03-23 NOTE — THERAPY
"Occupational Therapy  Daily Treatment     Patient Name: Jose Mc  Age:  64 y.o., Sex:  male  Medical Record #: [id number]  Today's Date: 3/23/2020     Precautions  Precautions: Non Weight Bearing Left Lower Extremity  Comments: L residual limb rigid orthosis too large ([facility] from [facility] aware)    Safety   ADL Safety : Requires Supervision for Safety  Bathroom Safety: Requires Supervision for Safety  Comments: see FIM for toileting    Subjective    \"I need to continue to work on my shoulder strength\"     Objective       03/23/20 1431   Sitting Upper Body Exercises   Lat Press Bilateral;Weight (See Comments for lbs);3 sets of 10  (45# rickshaw)   Tricep Press 3 sets of 15;Bilateral;Weight (See Comments for lbs)  (45# rickshaw)   IADL Treatments   Home Management pt completed laundry activity at Mod I level using w/c and demo improve safety when reaching for items and manuvering w/c in appropriate positions   Interdisciplinary Plan of Care Collaboration   Patient Position at End of Therapy Seated;Call Light within Reach;Tray Table within Reach   OT Total Time Spent   OT Individual Total Time Spent (Mins) 30   OT Charge Group   OT Therapy Activity 1   OT Therapeutic Exercise  1       Assessment    Pt cont to demo improvement with w/c safety during laundry activity and completed UB therex to the best of his abilities without complaints    Plan    Cont overall strength/endurance and standing balance to improve ADL's and functional mobility; home eval tomorrow   "

## 2020-03-23 NOTE — REHAB-RESPIRATORY IDT TEAM NOTE
Respiratory Therapy   Respiratory Therapy    Pt has been using his home CPAP unit at night.  He does not use oxygen and he has been on RA since admission.  SATS are in the low 90s.  Section completed by:  Ena Meza, RRT

## 2020-03-23 NOTE — CARE PLAN
Problem: Safety  Goal: Will remain free from injury  Outcome: PROGRESSING AS EXPECTED  Note: Call light with in reach. Redirection to use call light for assistance. Upper siderails up x2 while in bed.

## 2020-03-23 NOTE — REHAB-OT IDT TEAM NOTE
Occupational Therapy   Activities of Daily Living  Eating Initial:  5 - Standby Prompting/Supervision or Set-up  Eating Current:  7 - Independent   Eating Description:  Supervision for safety, Verbal cueing, Set-up of equipment or meal/tube feeding, Increased time  Grooming Initial:  5 - Standby Prompting/Supervision or Set-up  Grooming Current:  7 - Independent   Grooming Description:  Increased time  Bathing Initial:  0 - Not tested, patient refused  Bathing Current:  6 - Modified Independent   Bathing Description:  Grab bar, Tub bench, Hand held shower, Increased time, Verbal cueing  Upper Body Dressing Initial:  5 - Standby Prompting/Supervision or Set-up  Upper Body Dressing Current:  6 - Modified Independent   Upper Body Dressing Description:  Increased time  Lower Body Dressing Initial:  2 - Max Assistance  Lower Body Dressing Current:  6 - Modified Independent   Lower Body Dressing Description:  6 - Modified Independent  Toileting Initial:  1 - Total Assistance  Toileting Current:  5 - Standby Prompting/Supervision or Set-up   Toileting Description:  Grab bar, Increased time, Supervision for safety  Toilet Transfer Initial:  4 - Minimal Assistance  Toilet Transfer Current:  5 - Standby Prompting/Supervision or Set-up   Toilet Transfer Description:  5 - Standby Prompting/Supervision or Set-up  Tub / Shower Transfer Initial:  4 - Minimal Assistance  Tub / Shower Transfer Current:  5 - Standby Prompting/Supervision or Set-up   Tub / Shower Transfer Description:  Grab bar, Increased time, Supervision for safety(stand pivot w/c<>tub bench with GB)  IADL:  SBA for laundry at w/c level, will continue to focus on IADL tasks at w/c level this week  Family Training/Education:  TBD - home evaluation tomorrow with family  DME/DC Recommendations: pt has walk in shower and GB's in shower. Recommend tub transfer bench, possibly commode over toilet, and GB's adjacent to toilet      Strengths:  Able to follow instructions,  Adequate strength, Alert and oriented, Effective communication skills, Independent PLOF, Making steady progress towards goals, Motivated for self care and independence, Pleasant and cooperative, Supportive family and Willingly participates in therapeutic activities  Barriers:  Decreased endurance, Generalized weakness, Home accessibility, Poor activity tolerance and Poor balance     # of short term goals set= 3    # of short term goals met= 3/3     Occupational Therapy Goals     Problem: IADL's     Dates: Start: 03/23/20       Description:     Goal: STG-Within one week, patient will prepare a meal     Dates: Start: 03/23/20       Description: 1) Individualized Goal: With SBA using DME/AE as needed at w/c level  2) Interventions: OT Group Therapy, OT Self Care/ADL, OT Community Reintegration, OT Neuro Re-Ed/Balance, OT Therapeutic Activity and OT Therapeutic Exercise             Goal: STG-Within one week, patient will access kitchen area     Dates: Start: 03/23/20       Description: 1) Individualized Goal: With distant supervision using DME/AE as needed at w/c level  2) Interventions: OT Group Therapy, OT Self Care/ADL, OT Community Reintegration, OT Neuro Re-Ed/Balance, OT Therapeutic Activity and OT Therapeutic Exercise           Goal: STG-Within one week, patient will utilize washer and dryer     Dates: Start: 03/23/20       Description: 1) Individualized Goal: With distant supervision using DME/AE as needed at w/c level  2) Interventions: OT Group Therapy, OT Self Care/ADL, OT Community Reintegration, OT Neuro Re-Ed/Balance, OT Therapeutic Activity and OT Therapeutic Exercise                   Problem: OT Long Term Goals     Dates: Start: 03/11/20       Description:     Goal: LTG-By discharge, patient will complete basic self care tasks     Dates: Start: 03/11/20       Description: 1) Individualized Goal: With Mod I using DME/AE as needed  2) Interventions: OT Group Therapy, OT Self Care/ADL, OT Community  Reintegration, OT Neuro Re-Ed/Balance, OT Therapeutic Activity and OT Therapeutic Exercise             Goal: LTG-By discharge, patient will perform bathroom transfers     Dates: Start: 03/11/20       Description: 1) Individualized Goal: With Mod I using DME/AE as needed  2) Interventions: OT Group Therapy, OT Self Care/ADL, OT Community Reintegration, OT Neuro Re-Ed/Balance, OT Therapeutic Activity and OT Therapeutic Exercise                       Section completed by:  Radha Pierre MS,OTR/L

## 2020-03-23 NOTE — THERAPY
"Physical Therapy   Daily Treatment     Patient Name: Jose Mc  Age:  64 y.o., Sex:  male  Medical Record #: [id number]  Today's Date: 3/23/2020     Precautions  Precautions: Non Weight Bearing Left Lower Extremity  Comments: L residual limb rigid orthosis too large [facility]    Subjective    Pt present and ready for PT     Objective       03/23/20 1501   Supine Lower Body Exercise   Bridges Two Legged;2 sets of 15   Hip Abduction Side Lying;2 sets of 15;Left   Hip Adduction  2 sets of 15  (FABRICIO squeeze)   Straight Leg Raises 2 sets of 15;Left   Knee to Chest 2 sets of 15;Left   Other Exercises quad sets/ glut sets 2 x 15. Prone lying hamstring curls/ hip extension 2 x 15   Sitting Lower Body Exercises   Long Arc Quad 2 sets of 15;Left   Hamstring Curl 2 sets of 15;Left   Nustep Resistance Level 2  (5 minutes for general cardio/endurance training)   Other Exercises Gentle manual overpressure for knee ROM/ stretching   PT Total Time Spent   PT Individual Total Time Spent (Mins) 60   PT Charge Group   PT Gait Training 1   PT Therapeutic Exercise 3       FIM Bed/Chair/Wheelchair Transfers Score: 5 - Standby Prompting/Supervision or Set-up  Bed/Chair/Wheelchair Transfers Description:  Adaptive equipment, Increased time, Set-up of equipment(pt able to complete wc<>therapy mat/ Nustep/ bed transfers with FWW for stand/pivot with SPV only, requires increased time for set-up. )    FIM Walking Score:  1 - Total Assistance  Walking Description:  (CGA with FWW x 10 ft. )    FIM Wheelchair Score:  5 - Standby Prompting/Supervision or Set-up  Wheelchair Description:  Extra time    Prone lying \"scaption\"  Exercise for \"Y\" and \"T\" position x 10 R/ L. Pt performs with each UE seperately due to limited spinal / neck flexibility in prone.     Assessment    Pt completes L BKA exercise program with set-up only, able to recall all exercises without assist. Continue to recommend SPV for transfers with FWW " for safety/ fall prevention.    Plan    Continue to progress FWW distance; w/c outdoors- ramps, uneven terrain; assess transfers for mod I status; review there-ex for LE/core strengthening

## 2020-03-23 NOTE — THERAPY
"Occupational Therapy  Daily Treatment     Patient Name: Jose Mc  Age:  64 y.o., Sex:  male  Medical Record #: [id number]  Today's Date: 3/23/2020     Precautions  Precautions: Non Weight Bearing Left Lower Extremity  Comments: L residual limb rigid orthosis too large ([facility] from [facility] P[facility] aware)    Safety   ADL Safety : Requires Supervision for Safety  Bathroom Safety: Requires Supervision for Safety  Comments: see FIM for toileting    Subjective    \"my mother and siblings will be there\" re: home eval     Objective       20 0831   Interdisciplinary Plan of Care Collaboration   Patient Position at End of Therapy Seated;Call Light within Reach;Tray Table within Reach   OT Total Time Spent   OT Individual Total Time Spent (Mins) 60   OT Charge Group   OT Self Care / ADL 4       FIM Eating Score:  7 - Independent  Eating Description:       FIM Grooming Score:  7 - Independent  Grooming Description:       FIM Bathing Score:  6 - Modified Independent  Bathing Description:       FIM Upper Body Dressin - Modified Independent  Upper Body Dressing Description:  Increased time    FIM Lower Body Dressing Score:  6 - Modified Independent  Lower Body Dressing Description:  Increased time, Supervision for safety, Verbal cueing, Set-up of equipment    FIM Bed/Chair/Wheelchair Transfers Score:    Bed/Chair/Wheelchair Transfers Description:       FIM Toilet Transfer Score:  5 - Standby Prompting/Supervision or Set-up  Toilet Transfer Description:       FIM Tub/Shower Transfers Score:  5 - Standby Prompting/Supervision or Set-up  Tub/Shower Transfers Description:  Grab bar, Increased time, Supervision for safety(stand pivot w/c<>tub bench with GB)      Assessment    Pt completed shower routine at supervision - Mod I overall using w/c, shower bench, GB's. Tx focused on ADL's, retrieving own items safely for set-up/clean-up, and complete shower cover over residual limb independently which pt cont to demo " improvement with routine. Pt's functional performance was impacted by obesity, impaired activity tolerance and standing balance.     Plan    Cont overall strength/endurance and standing balance to improve ADL's and functional mobility; Home eval tomorrow

## 2020-03-23 NOTE — CARE PLAN
Problem: Communication  Goal: The ability to communicate needs accurately and effectively will improve  Outcome: PROGRESSING AS EXPECTED   Patient understands plan of care.      Problem: Safety  Goal: Will remain free from injury  Outcome: PROGRESSING AS EXPECTED  Patient understands fall risk education and uses call light when in need of help.      Problem: Skin Integrity  Goal: Risk for impaired skin integrity will decrease  Outcome: PROGRESSING AS EXPECTED  Patient is free of infection.

## 2020-03-23 NOTE — PROGRESS NOTES
Hospital Medicine Daily Progress Note      Chief Complaint:  Hypertension  Diabetes  Leukocytosis  LUE swelling & erythema    Interval History:  Pt now reporting hard stools.  Labs reviewed.    Review of Systems  Review of Systems   Constitutional: Negative for chills and fever.   HENT: Negative.    Eyes: Negative.    Respiratory: Negative for cough and shortness of breath.    Cardiovascular: Negative for chest pain and palpitations.   Gastrointestinal: Negative for abdominal pain, diarrhea, nausea and vomiting.   Genitourinary: Negative.    Musculoskeletal:        Wound pain   Skin: Negative for itching and rash.   Endo/Heme/Allergies: Negative for polydipsia. Does not bruise/bleed easily.        Physical Exam  Temp:  [36 °C (96.8 °F)-36.6 °C (97.9 °F)] 36 °C (96.8 °F)  Pulse:  [60-70] 66  Resp:  [18] 18  BP: (107-142)/(56-78) 142/78  SpO2:  [95 %-98 %] 95 %    Physical Exam  Vitals signs reviewed.   Constitutional:       General: He is not in acute distress.     Appearance: Normal appearance. He is not ill-appearing.   HENT:      Head: Normocephalic and atraumatic.      Right Ear: External ear normal.      Left Ear: External ear normal.      Nose: Nose normal.      Mouth/Throat:      Pharynx: Oropharynx is clear.   Eyes:      General:         Right eye: No discharge.         Left eye: No discharge.      Extraocular Movements: Extraocular movements intact.      Conjunctiva/sclera: Conjunctivae normal.   Neck:      Musculoskeletal: Normal range of motion and neck supple.   Cardiovascular:      Rate and Rhythm: Normal rate and regular rhythm.   Pulmonary:      Effort: No respiratory distress.      Breath sounds: No wheezing.      Comments: Decreased BS  Abdominal:      General: Bowel sounds are normal. There is no distension.      Palpations: Abdomen is soft.      Tenderness: There is no abdominal tenderness.   Musculoskeletal:      Comments: Left BKA dressed   Skin:     General: Skin is warm and dry.   Neurological:       Mental Status: He is alert and oriented to person, place, and time.         Fluids    Intake/Output Summary (Last 24 hours) at 3/23/2020 1204  Last data filed at 3/23/2020 0900  Gross per 24 hour   Intake 360 ml   Output 200 ml   Net 160 ml       Laboratory  Recent Labs     03/23/20  0521   WBC 10.7   RBC 3.82*   HEMOGLOBIN 10.8*   HEMATOCRIT 33.7*   MCV 88.2   MCH 28.3   MCHC 32.0*   RDW 46.2   PLATELETCT 284   MPV 10.8     Recent Labs     03/23/20  0521   SODIUM 145   POTASSIUM 3.8   CHLORIDE 116*   CO2 19*   GLUCOSE 112*   BUN 12   CREATININE 1.09   CALCIUM 8.2*                   Assessment/Plan  * Status post below-knee amputation of left lower extremity (HCC)- (present on admission)  Assessment & Plan  Incision clean and dry w/ intact sutures but stump appeared swollen and fluctuant  U/S showed extensive soft tissue edema and phlegmonous change without evidence of well formed loculated fluid collection  F/U wound exam shows decreased swelling and fluctuance  Leukocytosis improving, continue empiric abx  Repeat soft tissue U/S now shows fluid collections  Consider aspiration of fluid but will defer to surgeon    MELANIE (acute kidney injury) ([facility])  Assessment & Plan  Metabolic acidosis better  Renal function improved w/ IVF  Encourage PO fluids    Diarrhea  Assessment & Plan  C Dif negative  Symptoms improved on Questran, Culturelle, and Simethicone  Change Questran to prn to avoid constipation  Continue Culturelle while on abx    Vitamin D deficiency- (present on admission)  Assessment & Plan  Vit D level 14  On supplementation    Phlebitis of left arm- (present on admission)  Assessment & Plan  U/S negative DVT  Suspect phlebitis from recent PICC  Completed Bactrim 3/16/20    Diabetes mellitus due to underlying condition with hyperglycemia ([facility])- (present on admission)  Assessment & Plan  HbA1c 6.8  Continue Metformin  Off FSBS and SSI  Outpt meds include Metformin 1000 mg bid    History of DVT (deep vein  thrombosis)- (present on admission)  Assessment & Plan  Anticoagulated on Xarelto    Mixed hyperlipidemia- (present on admission)  Assessment & Plan  On Lipitor    Benign essential HTN- (present on admission)  Assessment & Plan  Observe blood pressure trends on Atenolol    Full Code    Discussed w/ pt and Dr. Beck

## 2020-03-23 NOTE — CARE PLAN
Problem: Communication  Goal: The ability to communicate needs accurately and effectively will improve  Outcome: PROGRESSING AS EXPECTED  Very able to make needs known and converse with staff. Calls for assistance (sba-cga) to go to and from bathroom. No therapies today.     Problem: Safety  Goal: Will remain free from injury  Outcome: PROGRESSING AS EXPECTED  No injury no falls as above calls for assistance and waits for staff to arrive.      Problem: Venous Thromboembolism (VTW)/Deep Vein Thrombosis (DVT) Prevention:  Goal: Patient will participate in Venous Thrombosis (VTE)/Deep Vein Thrombosis (DVT)Prevention Measures  Outcome: PROGRESSING AS EXPECTED  U/S done today shows only soft tissue fluid collections, no DVT.      Problem: Bowel/Gastric:  Goal: Normal bowel function is maintained or improved  Outcome: PROGRESSING AS EXPECTED  BM times one today with threat of another, just gas. Feels better with more control if needed give lax with caution.

## 2020-03-23 NOTE — REHAB-PT IDT TEAM NOTE
"Physical Therapy   Mobility  Bed mobility:   Modified independent  Bed /Chair/Wheelchair Transfer Initial:  4 - Minimal Assistance  Bed /Chair/Wheelchair Transfer Current:  5 - Standby Prompting/Supervision or Set-up   Bed/Chair/Wheelchair Transfer Description:  Adaptive equipment, Verbal cueing, Set-up of equipment  Walk Initial:  1 - Total Assistance  Walk Current:  1 - Total Assistance   Walk Description:  Two hand rails(SBA with // bars / wc follow 10 ft x 5 trials)  Wheelchair Initial:  5 - Standby Prompting/Supervision or Set-up  Wheelchair Current:  5 - Standby Prompting/Supervision or Set-up   Wheelchair Description:  Extra time  Stairs Initial:  0 - Not tested,unsafe activity  Stairs Current: 0 - Not tested,unsafe activity   Stairs Description:    Patient/Family Training/Education:  On-going transfers/ wc skills/ BKA care education, bariatric FWW  DME/DC Recommendations:  22\" wide x 20\" deep x 20\" high lightweight wc, contoured cushion, L amputee board  Strengths:  Able to follow instructions, Good carryover of learning, Independent PLOF, Making steady progress towards goals, Manages pain appropriately, Motivated for self care and independence, Pleasant and cooperative, Supportive family and Willingly participates in therapeutic activities  Barriers:   Home accessibility, Limited mobility, Poor balance and Other: L BKA, obesity  # of short term goals set= 6  # of short term goals met=6  Physical Therapy Problems     Problem: PT-Long Term Goals     Dates: Start: 03/11/20       Description:     Goal: LTG-By discharge, patient will propel wheelchair     Dates: Start: 03/11/20       Description: 1) Individualized goal:  Modified independent 150 ft x 2 indoors/ level outdoors  2) Interventions:  PT Group Therapy, PT E Stim Attended, PT Orthotics Training, PT Prosthetic Training, PT Gait Training, PT Self Care/Home Eval, PT Therapeutic Exercises, PT TENS Application, PT Neuro Re-Ed/Balance, PT Therapeutic " Activity, PT Manual Therapy and PT Evaluation             Goal: LTG-By discharge, patient will ambulate     Dates: Start: 03/11/20       Description: 1) Individualized goal:  SBA with FWW 25 ft x 2  2) Interventions:  PT Group Therapy, PT E Stim Attended, PT Orthotics Training, PT Prosthetic Training, PT Gait Training, PT Self Care/Home Eval, PT Therapeutic Exercises, PT TENS Application, PT Neuro Re-Ed/Balance, PT Therapeutic Activity, PT Manual Therapy and PT Evaluation           Goal: LTG-By discharge, patient will transfer one surface to another     Dates: Start: 03/11/20       Description: 1) Individualized goal:  Modified independent with FWW vs wc level  2) Interventions:  PT Group Therapy, PT E Stim Attended, PT Orthotics Training, PT Prosthetic Training, PT Gait Training, PT Self Care/Home Eval, PT Therapeutic Exercises, PT TENS Application, PT Neuro Re-Ed/Balance, PT Therapeutic Activity, PT Manual Therapy and PT Evaluation             Goal: LTG-By discharge, patient will transfer in/out of a car     Dates: Start: 03/11/20       Description: 1) Individualized goal:  SPV/ sset-up with FWW vs wc level  2) Interventions:  PT Group Therapy, PT E Stim Attended, PT Orthotics Training, PT Prosthetic Training, PT Gait Training, PT Self Care/Home Eval, PT Therapeutic Exercises, PT TENS Application, PT Neuro Re-Ed/Balance, PT Therapeutic Activity, PT Manual Therapy and PT Evaluation                           Section completed by:  Jessica Tabares, BSPT, ATP

## 2020-03-23 NOTE — THERAPY
Physical Therapy   Daily Treatment     Patient Name: Jose Mc  Age:  64 y.o., Sex:  male  Medical Record #: [id number]  Today's Date: 3/23/2020     Precautions  Precautions: Non Weight Bearing Left Lower Extremity  Comments: L residual limb rigid orthosis too large ([facility] from [facility] P&O aware)    Subjective    Pt resting in bed, willing to participate     Objective       03/23/20 1001   Bed Mobility    Supine to Sit Modified Independent   Sit to Supine Modified Independent   Sit to Stand Supervised   Scooting Modified Independent   Rolling Modified Independent   PT Total Time Spent   PT Individual Total Time Spent (Mins) 30   PT Charge Group   PT Gait Training 1   PT Therapeutic Activities 1       FIM Bed/Chair/Wheelchair Transfers Score: 5 - Standby Prompting/Supervision or Set-up  Bed/Chair/Wheelchair Transfers Description:  Adaptive equipment, Verbal cueing, Set-up of equipment    FIM Walking Score:  1 - Total Assistance  Walking Description:  Two hand rails (SBA with // bars / wc follow 10 ft x 5 trials)    FIM Wheelchair Score:  5 - Standby Prompting/Supervision or Set-up  Wheelchair Description:  Extra time      Assessment    Pt able to complete sit<>stand/ pivot transfer with FWW and SPV only, able to complete set-up with increased time independent.     Plan    Continue to progress FWW distance; w/c outdoors- ramps, uneven terrain; assess transfers for mod I status; review there-ex for LE/core strengthening

## 2020-03-23 NOTE — REHAB-NURSING IDT TEAM NOTE
Nursing   Nursing  Diet/Nutrition:  Diabetic and Thin Liquids  Medication Administration:  Whole with Liquid Wash  % consumed at meals in last 24 hours:  Consumed % of meals per documentation.  Meal/Snack Consumption for the past 24 hrs:   Oral Nutrition   03/22/20 1200 Lunch;Between % Consumed   03/22/20 0800 Breakfast;Between % Consumed     Snack schedule:  HS  Supplement:  Other: none  Appetite:  Good  Fluid Intake/Output in past 24 hours: In: 720 [P.O.:720]  Out: 200   Admit Weight:  Weight: (!) 150.6 kg (332 lb)  Weight Last 7 Days       Pain Issues:    Location:  Leg (03/22 0700)  Left;Lower (03/22 0700)         Severity:  Denies   Scheduled pain medications:  gabapentin (NEURONTIN)  and morphine SR (MS CONTIN)      PRN pain medications used in last 24 hours:  None   Non Pharmacologic Interventions:  repositioned and rest  Effectiveness of pain management plan:  good=patient states acceptable comfort after interventions    Bowel:    Bowel Assist Initial Score:  4 - Minimal Assistance  Bowel Assist Current Score:  4 - Minimal Assistance  Bowl Accidents in last 7 days:     Last bowel movement: 03/22/20  Stool Description: Medium, Soft     Usual bowel pattern:  daily  Scheduled bowel medications:  None  PRN bowel medications used in last 24 hours:  None  Nursing Interventions:  Increased time, Scheduled medication, Supervision, Brief, Positioning on commode/toilet  Effectiveness of bowel program:   good=regular, predictable, controlled emptying of bowel  Bladder:    Bladder Assist Initial Score:  4 - Minimal Assistance  Bladder Assist Current Score:  4 - Minimal Assistance  Bladder Accidents in last 7 days:  0  PVR range for past 24-48 hours: --  Intermittent Catheterization:  n/a  Medications affecting bladder:  None    Time void schedule/voiding pattern:  Voiding every 2-4 hours  Interventions:  Increased time, Supervision  Effectiveness of bladder training:  Good=regular, predictable,  emptying of bladder, patient initiates time voiding    Ambrosio:    Type:     Patient Lines/Drains/Airways Status    Active Catheter     None              Date placed:          Justification:    Diabetes:  Blood Sugar Frequency:  None    Range of BS for last 48 hours:       Scheduled diabetic medications:  metformin (GLUCOPHAGE)   Sliding scale usage in past 24 hours:  No  Total Short acting insulin in the past 24 hours:  None  Total Long acting insulin in the past 24 hours:  None    Wound:         Patient Lines/Drains/Airways Status    Active Current Wounds     Name: Placement date: Placement time: Site: Days:    Wound 03/10/20 Left left BKA  03/10/20   1600   --  12    Wound 03/20/20 Abrasion Foot;Toe, 3rd;Toe, 4th Anterior Right open reddend with some drainage pt states he scraped his toes while in the wheel chair while outside. all toes have scab darkened areas and nail fungus  03/20/20   1500   Foot;Toe, 3rd;Toe, 4th  2                   Interventions:  Change dressings daily and every 48 hours, monitor every shift.  Effectiveness of intervention:  wound is improving     Wound Vac Location:  Not applicable  Dressing last changed:  3/21/2020, 3/22/2020  Pump Mode Pressure Setting       Description of drainage:  scant serosanguinous    Sleep/wake cycle:   Average hours slept:  Sleeps 4-6 hours without waking  Sleep medication usage:  None    Patient/Family Training/Education:  Diabetes Management, Fall Prevention, General Self Care, Pain Management, Safe Transfers, Safety and Wound Care  Discharge Supply Recommendations:  Glucometer and Strips, Blood Pressure Monitor and Wound Care Supplies  Strengths: Alert and oriented, Willingly participates in therapeutic activities, Able to follow instructions, Motivated for self care and independence and Manages pain appropriately   Barriers:   Generalized weakness and Hypertension       Nursing Problems     Problem: Bowel/Gastric:     Description:     Goal: Normal bowel  function is maintained or improved     Description:           Goal: Will not experience complications related to bowel motility     Description:                 Problem: Communication     Description:     Goal: The ability to communicate needs accurately and effectively will improve     Description:                 Problem: Discharge Barriers/Planning     Description:     Goal: Patient's continuum of care needs will be met     Description:                 Problem: Infection     Description:     Goal: Will remain free from infection     Description:                 Problem: Knowledge Deficit     Description:     Goal: Knowledge of disease process/condition, treatment plan, diagnostic tests, and medications will improve     Description:           Goal: Knowledge of the prescribed therapeutic regimen will improve     Description:                 Problem: Pain Management     Description:     Goal: Pain level will decrease to patient's comfort goal     Description:                 Problem: Psychosocial Needs:     Description:     Goal: Level of anxiety will decrease     Description:                 Problem: Respiratory:     Description:     Goal: Respiratory status will improve     Description:                 Problem: Safety     Description:     Goal: Will remain free from injury     Description:           Goal: Will remain free from falls     Description:                 Problem: Skin Integrity     Description:     Goal: Risk for impaired skin integrity will decrease     Description:                 Problem: Venous Thromboembolism (VTW)/Deep Vein Thrombosis (DVT) Prevention:     Description:     Goal: Patient will participate in Venous Thrombosis (VTE)/Deep Vein Thrombosis (DVT)Prevention Measures     Description:                        Long Term Goals:   At discharge patient will be able to function safely at home and in the community with support.    Section completed by:  Jayshree Gold R.N.      Read and reviewed  by Trisha De La Torre, RN

## 2020-03-23 NOTE — CARE PLAN
Problem: Mobility  Goal: STG-Within one week, patient will propel wheelchair community  Description: 1) Individualized goal:  Min A x 150 ft for outdoor terrain/ small ramps  2) Interventions:  PT Group Therapy, PT E Stim Attended, PT Orthotics Training, PT Prosthetic Training, PT Gait Training, PT Self Care/Home Eval, PT Therapeutic Exercises, PT TENS Application, PT Neuro Re-Ed/Balance, PT Therapeutic Activity, PT Manual Therapy and PT Evaluation     Outcome: MET  Goal: STG-Within one week, patient will ambulate household distance  Description: 1) Individualized goal:  Min A with FWW 10 ft x 2  2) Interventions:  PT Group Therapy, PT E Stim Attended, PT Orthotics Training, PT Prosthetic Training, PT Gait Training, PT Self Care/Home Eval, PT Therapeutic Exercises, PT TENS Application, PT Neuro Re-Ed/Balance, PT Therapeutic Activity, PT Manual Therapy and PT Evaluation     Outcome: MET     Problem: Mobility Transfers  Goal: STG-Within one week, patient will sit to stand  Description: 1) Individualized goal:  SBA with FWW  2) Interventions:  PT Group Therapy, PT E Stim Attended, PT Orthotics Training, PT Prosthetic Training, PT Gait Training, PT Self Care/Home Eval, PT Therapeutic Exercises, PT TENS Application, PT Neuro Re-Ed/Balance, PT Therapeutic Activity, PT Manual Therapy and PT Evaluation     Outcome: MET  Goal: STG-Within one week, patient will transfer bed to chair  Description: 1) Individualized goal:  SBA/ set-up with FWW vs wc level scoot  2) Interventions:  PT Group Therapy, PT E Stim Attended, PT Orthotics Training, PT Prosthetic Training, PT Gait Training, PT Self Care/Home Eval, PT Therapeutic Exercises, PT TENS Application, PT Neuro Re-Ed/Balance, PT Therapeutic Activity, PT Manual Therapy and PT Evaluation     Outcome: MET

## 2020-03-24 PROCEDURE — 99231 SBSQ HOSP IP/OBS SF/LOW 25: CPT | Performed by: PHYSICAL MEDICINE & REHABILITATION

## 2020-03-24 PROCEDURE — 700102 HCHG RX REV CODE 250 W/ 637 OVERRIDE(OP): Performed by: HOSPITALIST

## 2020-03-24 PROCEDURE — A9270 NON-COVERED ITEM OR SERVICE: HCPCS | Performed by: PHYSICAL MEDICINE & REHABILITATION

## 2020-03-24 PROCEDURE — 97535 SELF CARE MNGMENT TRAINING: CPT

## 2020-03-24 PROCEDURE — 94760 N-INVAS EAR/PLS OXIMETRY 1: CPT

## 2020-03-24 PROCEDURE — 770010 HCHG ROOM/CARE - REHAB SEMI PRIVAT*

## 2020-03-24 PROCEDURE — 97110 THERAPEUTIC EXERCISES: CPT

## 2020-03-24 PROCEDURE — 97537 COMMUNITY/WORK REINTEGRATION: CPT

## 2020-03-24 PROCEDURE — A9270 NON-COVERED ITEM OR SERVICE: HCPCS | Performed by: HOSPITALIST

## 2020-03-24 PROCEDURE — 97530 THERAPEUTIC ACTIVITIES: CPT

## 2020-03-24 PROCEDURE — 99232 SBSQ HOSP IP/OBS MODERATE 35: CPT | Performed by: HOSPITALIST

## 2020-03-24 PROCEDURE — 700102 HCHG RX REV CODE 250 W/ 637 OVERRIDE(OP): Performed by: PHYSICAL MEDICINE & REHABILITATION

## 2020-03-24 RX ADMIN — ATENOLOL 100 MG: 25 TABLET ORAL at 05:24

## 2020-03-24 RX ADMIN — Medication 1 CAPSULE: at 07:58

## 2020-03-24 RX ADMIN — CIPROFLOXACIN HYDROCHLORIDE 500 MG: 500 TABLET, FILM COATED ORAL at 20:29

## 2020-03-24 RX ADMIN — SIMETHICONE CHEW TAB 80 MG 160 MG: 80 TABLET ORAL at 07:57

## 2020-03-24 RX ADMIN — RIVAROXABAN 20 MG: 10 TABLET, FILM COATED ORAL at 17:25

## 2020-03-24 RX ADMIN — GABAPENTIN 300 MG: 300 CAPSULE ORAL at 07:56

## 2020-03-24 RX ADMIN — ATORVASTATIN CALCIUM 20 MG: 10 TABLET, FILM COATED ORAL at 20:29

## 2020-03-24 RX ADMIN — METFORMIN HYDROCHLORIDE 250 MG: 500 TABLET ORAL at 17:25

## 2020-03-24 RX ADMIN — MORPHINE SULFATE 15 MG: 15 TABLET, FILM COATED, EXTENDED RELEASE ORAL at 20:29

## 2020-03-24 RX ADMIN — MELATONIN 1000 UNITS: at 07:57

## 2020-03-24 RX ADMIN — METFORMIN HYDROCHLORIDE 250 MG: 500 TABLET ORAL at 07:57

## 2020-03-24 RX ADMIN — GABAPENTIN 300 MG: 300 CAPSULE ORAL at 14:17

## 2020-03-24 RX ADMIN — MORPHINE SULFATE 15 MG: 15 TABLET, FILM COATED, EXTENDED RELEASE ORAL at 07:58

## 2020-03-24 RX ADMIN — GABAPENTIN 300 MG: 300 CAPSULE ORAL at 20:29

## 2020-03-24 RX ADMIN — CIPROFLOXACIN HYDROCHLORIDE 500 MG: 500 TABLET, FILM COATED ORAL at 07:57

## 2020-03-24 ASSESSMENT — ENCOUNTER SYMPTOMS
SHORTNESS OF BREATH: 0
NERVOUS/ANXIOUS: 0
FEVER: 0
NAUSEA: 0
DIARRHEA: 0
ABDOMINAL PAIN: 0
CHILLS: 0
VOMITING: 0

## 2020-03-24 NOTE — THERAPY
"Occupational Therapy  Daily Treatment     Patient Name: Jose Mc  Age:  64 y.o., Sex:  male  Medical Record #: [id number]  Today's Date: 3/24/2020     Precautions  Precautions: Non Weight Bearing Left Lower Extremity  Comments: L residual limb rigid orthosis too large ([facility] from [facility] P&O aware)    Safety   ADL Safety : Requires Supervision for Safety  Bathroom Safety: Requires Supervision for Safety  Comments: see FIM for toileting    Subjective    \"Ill make sure to have everything changed and ready for when he comes home\" - brother Benoit     Objective    A home evaluation was completed to assess barriers in the patient's home environment in order to achieve the goals set by the patient and Interdisciplinary Team (IDT) for household function within the patient's home. The following information was compiled during the assessment:     Transportation  Hospital van with lift used for transport. Pt and brother discussed using '[employer]' for transportation that has lift for future MD appointments.  Exterior  Drive/Parking: parked van at flat street level. Steep driveway - pt required Mod A to push up driveway when entering home and CGA when propelling w/c down driveway.  Walkway: cemented walk-way with rocks and grass on each side. Pt got wheels stuck on grass edge which required Mod A to maneuver w/c back onto walkway.  Entrance: Recently built ramp with 5* slope, Min A to propel up ramp and SBA to propel down ramp.  Interior  Francisco Javier: carpeted, wood, laminate francisco javier  Bedroom: door width: 28.5 inches (w/c is unable to fit through doorway), bedroom door to bed using FWW: 76 inches with mild LOB, CGA for steady assist, bed height: 28.5 inches, bed transfer with FWW: SBA  Bathroom: door width: 25.5 inches.    - Toilet height: 16 inches, space around toilet width: 27 inches (bariatric commode was unable to fit over toilet), toilet transfer without DME over toilet: SBA however required several " attempts to come to a stand at FWW with use of pushing off from adjacent tub transfer bench, door frame adjacent to toilet, window ledge anterior to pt.    - Tub transfer bench in walk-in shower fits appropriately, shower transfer using FWW: SBA.  Kitchen: w/c fits with no issues  Living room: sofa will need to be moved against the wall for w/c to fit in between couches, couch height to floor: 19 inches    Recommendations  Based on the assessment, the following recommendations were made to achieve the goals set by the patient and interdisciplinary team:  1. Home modifications: Removing bathroom door to increase door width, installing grab bar at toilet  2. Equipment needs: (bariatric size for all items) - tub transfer bench for walk-in shower or tub/shower in bathroom #2, riser on toilet, grab bar next to toilet, FWW, w/c with residual limb board, chair or extra w/c at sinkside for seated grooming activities  3. Safety recommendations: move dresser away from bathroom doorway entrance to increase width, remove bathroom throw rugs, have brother present for set-up/SBA when transferring and hopping into bedroom, and pt is to assess foot and residual limb daily for skin integrity.        03/24/20 0901   Interdisciplinary Plan of Care Collaboration   Patient Position at End of Therapy In Bed;Call Light within Reach;Tray Table within Reach   OT Total Time Spent   OT Individual Total Time Spent (Mins) 90   OT Charge Group   OT Self Care / ADL 4   OT Community Reintegration 2       Assessment    Home evaluation completed successfully - pt required CGA - SBA overall using w/c and FWW. Brother and mother present for home evaluation. Family training completed with brother who is able to physically assist and demo appropriate safety awareness/body mechanics. Pt and brother agreeable to home modifications, equipment needs, and safety recommendations. Pt and brother feel comfortable with the upcoming d/c following home evaluation  and verbalized understanding of edu with no further questions/concerns.     Plan    Cont overall strength/endurance and standing balance to improve ADL's and functional mobility

## 2020-03-24 NOTE — DISCHARGE PLANNING
Per Martita at [facility], referral declined as they are not in network with patient's insurance.  CM notified.

## 2020-03-24 NOTE — CARE PLAN
Problem: Skin Integrity  Goal: Risk for impaired skin integrity will decrease  3/24/2020 1043 by Trisha De La Torre R.N.  Outcome: PROGRESSING AS EXPECTED  Patient's surgical site on left BKA is free of infection.      Problem: Safety  Goal: Will remain free from injury  3/24/2020 1043 by CHRISTIANO Galindo.N.  Outcome: PROGRESSING AS EXPECTED  Patient understands fall risk education and utilizes call light.       Problem: Pain Management  Goal: Pain level will decrease to patient's comfort goal  3/24/2020 1043 by CHRISTIANO Galindo.N.  Outcome: PROGRESSING AS EXPECTED  Patient is able to rate his pain.

## 2020-03-24 NOTE — PROGRESS NOTES
Rehab Progress Note     Date of Service: 3/24/2020  Chief Complaint: follow up BKA    Interval Events (Subjective)    Patient seen and examined today in his room.  He did a home evaluation with occupational therapy today.  Everything went well except his toilet is too low and will need an extension cover.  Wound or residual limb was examined at bedside today.  Patient is on track for discharge home on Thursday with follow-up with his surgeon on Friday.  He is currently denying any pain.  He reports his bowels feel much better and the diarrhea has resolved.  Patient reports he has a month supply of pain pills at home and will not need any at discharge.    Objective:  VITAL SIGNS: /59   Pulse 65   Temp 36.4 °C (97.5 °F) (Oral)   Resp 18   Ht 1.829 m (6')   Wt (!) 147 kg (324 lb 1.2 oz)   SpO2 95%   BMI 43.95 kg/m²   Gen: alert, no apparent distress, obese  CV: regular rate and rhythm, no murmurs, no peripheral edema  Resp: clear to ascultation bilaterally, normal respiratory effort  GI: soft, non-tender abdomen, bowel sounds present  Msk: Left residual limb with intact sutures, mild dependent edema much improved, no drainage    Recent Results (from the past 72 hour(s))   CBC WITHOUT DIFFERENTIAL    Collection Time: 03/23/20  5:21 AM   Result Value Ref Range    WBC 10.7 4.8 - 10.8 K/uL    RBC 3.82 (L) 4.70 - 6.10 M/uL    Hemoglobin 10.8 (L) 14.0 - 18.0 g/dL    Hematocrit 33.7 (L) 42.0 - 52.0 %    MCV 88.2 81.4 - 97.8 fL    MCH 28.3 27.0 - 33.0 pg    MCHC 32.0 (L) 33.7 - 35.3 g/dL    RDW 46.2 35.9 - 50.0 fL    Platelet Count 284 164 - 446 K/uL    MPV 10.8 9.0 - 12.9 fL   Basic Metabolic Panel    Collection Time: 03/23/20  5:21 AM   Result Value Ref Range    Sodium 145 135 - 145 mmol/L    Potassium 3.8 3.6 - 5.5 mmol/L    Chloride 116 (H) 96 - 112 mmol/L    Co2 19 (L) 20 - 33 mmol/L    Glucose 112 (H) 65 - 99 mg/dL    Bun 12 8 - 22 mg/dL    Creatinine 1.09 0.50 - 1.40 mg/dL    Calcium 8.2 (L) 8.5 - 10.5  mg/dL    Anion Gap 10.0 7.0 - 16.0   ESTIMATED GFR    Collection Time: 03/23/20  5:21 AM   Result Value Ref Range    GFR If African American >60 >60 mL/min/1.73 m 2    GFR If Non African American >60 >60 mL/min/1.73 m 2       Current Facility-Administered Medications   Medication Frequency   • cholestyramine (QUESTRAN,PREVALITE) 4 GM powder 4 g TID PRN   • vitamin D (cholecalciferol) tablet 1,000 Units DAILY   • ciprofloxacin (CIPRO) tablet 500 mg Q12HRS   • senna-docusate (PERICOLACE or SENOKOT S) 8.6-50 MG per tablet 2 Tab BID PRN    And   • polyethylene glycol/lytes (MIRALAX) PACKET 1 Packet QDAY PRN    And   • magnesium hydroxide (MILK OF MAGNESIA) suspension 30 mL QDAY PRN    And   • bisacodyl (DULCOLAX) suppository 10 mg QDAY PRN   • metFORMIN (GLUCOPHAGE) tablet 250 mg BID WITH MEALS   • glucose 4 g chewable tablet 16 g Q15 MIN PRN    And   • dextrose 50% (D50W) injection 50 mL Q15 MIN PRN   • Respiratory Therapy Consult Continuous RT   • Pharmacy Consult Request ...Pain Management Review 1 Each PHARMACY TO DOSE   • acetaminophen (TYLENOL) tablet 650 mg Q4HRS PRN   • artificial tears ophthalmic solution 1 Drop PRN   • benzocaine-menthol (CEPACOL) lozenge 1 Lozenge Q2HRS PRN   • mag hydrox-al hydrox-simeth (MAALOX PLUS ES or MYLANTA DS) suspension 20 mL Q2HRS PRN   • ondansetron (ZOFRAN ODT) dispertab 4 mg 4X/DAY PRN    Or   • ondansetron (ZOFRAN) syringe/vial injection 4 mg 4X/DAY PRN   • traZODone (DESYREL) tablet 50 mg QHS PRN   • sodium chloride (OCEAN) 0.65 % nasal spray 2 Spray PRN   • hydrOXYzine HCl (ATARAX) tablet 50 mg Q6HRS PRN   • melatonin tablet 3 mg HS PRN   • atenolol (TENORMIN) tablet 100 mg Q DAY   • atorvastatin (LIPITOR) tablet 20 mg Q EVENING   • gabapentin (NEURONTIN) capsule 300 mg TID   • tramadol (ULTRAM) 50 MG tablet 50 mg Q4HRS PRN   • oxyCODONE immediate-release (ROXICODONE) tablet 5 mg Q4HRS PRN   • lactulose 20 GM/30ML solution 30 mL QDAY PRN   • docusate sodium (ENEMEEZ) enema  283 mg QDAY PRN   • rivaroxaban (XARELTO) tablet 20 mg PM MEAL   • morphine ER (MS CONTIN) tablet 15 mg Q12HRS   • oxyCODONE-acetaminophen (PERCOCET) 5-325 MG per tablet 1 Tab Q4HRS PRN       Orders Placed This Encounter   Procedures   • Diet Order Diabetic     Standing Status:   Standing     Number of Occurrences:   1     Order Specific Question:   Diet:     Answer:   Diabetic [3]       Radiology    3/22/2020 3:20 PM     HISTORY/REASON FOR EXAM:  R/O bka stump fluid collection  Swelling. Pain.     TECHNIQUE/EXAM DESCRIPTION AND NUMBER OF VIEWS:  Ultrasound of the soft tissues of the left lower extremity     COMPARISON: 3/15/2020     FINDINGS:  There is edema within the soft tissues of the left lower extremity. There are fluid collections within the soft tissues measuring up to approximately 3.4 x 1 x 4.9 cm. Debris is seen within the fluid collection.     IMPRESSION:     Fluid collections within the soft tissues measuring up to 3.4 x 1 x 4.9 cm fluid may represent postsurgical seromas, hematomas or abscesses.     Edema within the soft tissues is noted.      Assessment:  Active Hospital Problems    Diagnosis   • *Status post below-knee amputation of left lower extremity (HCC)   • Leukocytosis   • Vitamin D deficiency   • Impaired mobility and ADLs   • Diabetes mellitus due to underlying condition with hyperglycemia (HCC)   • Peripheral neuropathy   • Obstructive sleep apnea syndrome   • Benign essential HTN   • Mixed hyperlipidemia   • History of DVT (deep vein thrombosis)     This patient is a 64 y.o. male admitted for acute inpatient rehabilitation with Status post below-knee amputation of left lower extremity (HCC).    I led and attended the weekly conference, and agree with the IDT conference documentation and plan of care as noted below.    Date of conference: 3/23/2020    Goals and barriers: See IDT note.    CM/social support: siblings supportive    Anticipated DC date: 3/26/2020    Home health:  PT/OT/RN    Equip: WC, bariatric FWW    Follow up: PCP, surgery     Medical Decision Making and Plan:    Left BKA  3/6 Dr. Ceja  Daily wound care  PT/OT, 1.5 hr each discipline, 5 days per week  Outpatient follow up with surgery 3/17 - no concern for infection, has another apt on 3/27  Repeat US with fluid collection, continue antibiotics, surgery follow up     Chronic opiate use  Continue home medications  Morphine ER 15 mg BID  Percocets 5-325 PRN  Patient has home supply, will not need new RX at discharge      Peripheral Neuropathy  Phantom sensation  Continue gabapentin  Currently well controlled     Opioid induced constipation, resolved  Loose stools, resolved  As needed bowel meds  Last BM 3/24    Bladder  Checked PVRs  Not retaining  ICP for over 400 cc  Scheduled toileting    DVT prophylaxis  Xarelto     Appreciate the assistance of hospitalist with his medical co-morbidities:     Hypertension, atenolol  Diabetes with hyperglycemia, metformin, SSI  Sleep apnea, CPAP  Hyperlipidemia, statin  History of DVT, Xarelto  Left arm erythema/edema/cellulitis, s/p Bactrim  Leukocytosis, resolved, US with fluid collection, s/p antibiotics  Anemia  Vit D deficiency, continue supplementation  Loose stools, resolved, thought secondary to antibiotic side effect, c diff negative    Total time:  16 minutes.  I spent greater than 50% of the time for patient care, counseling, and coordination on this date, including patient face-to face time, unit/floor time with review of records/pertinent lab data and studies, as well as discussing diagnostic evaluation/work up, planned therapeutic interventions, and future disposition of care, as per the interval events/subjective and the assessment and plan as noted above.    I have performed a physical exam, reviewed and updated ROS, as well as the assessment and plan today 3/24/2020. In review of note from 3/23/2020 there are no new changes except as documented above.    Sophia AGUILERA  ROHAN [employer].   Physical Medicine and Rehabilitation

## 2020-03-24 NOTE — THERAPY
Physical Therapy   Daily Treatment     Patient Name: Jose Mc  Age:  64 y.o., Sex:  male  Medical Record #: [id number]  Today's Date: 3/24/2020     Precautions  Precautions: Non Weight Bearing Left Lower Extremity  Comments: L residual limb rigid orthosis too large ([facility] from [facility] P&O aware)    Subjective    Pt resting in bed, willing to participate     Objective       03/24/20 1331   Supine Lower Body Exercise   Bridges Two Legged;2 sets of 15   Hip Flexion 2 sets of 15   Hip Abduction 2 sets of 15   Hip Adduction  2 sets of 15   Straight Leg Raises 2 sets of 15   Knee to Chest 2 sets of 15   Other Exercises quad sets/ glut sets 2 x 15. Prone lying hamstring curls/ hip extension 2 x 15   Sitting Lower Body Exercises   Long Arc Quad 2 sets of 15   Hamstring Curl 2 sets of 15   PT Total Time Spent   PT Individual Total Time Spent (Mins) 60   PT Charge Group   PT Therapeutic Exercise 3   PT Therapeutic Activities 1       FIM Bed/Chair/Wheelchair Transfers Score: 5 - Standby Prompting/Supervision or Set-up  Bed/Chair/Wheelchair Transfers Description:       FIM Walking Score:  1 - Total Assistance  Walking Description:       FIM Wheelchair Score:  5 - Standby Prompting/Supervision or Set-up  Wheelchair Description:         Assessment    Pt ready for d/c this week, aware of safety concerns but able to complete exercise program with set-up only    Plan    Continue to progress FWW distance; w/c outdoors- ramps, uneven terrain; assess transfers for mod I status; review there-ex for LE/core strengthening

## 2020-03-24 NOTE — PROGRESS NOTES
[facility] Medicine Daily Progress Note    Date of Service  3/24/2020    Chief Complaint:  Hypertension  Diabetes  Leukocytosis    Interval History:  No significant events or changes since last visit    Review of Systems  Review of Systems   Constitutional: Negative for chills and fever.   Respiratory: Negative for shortness of breath.    Cardiovascular: Negative for chest pain.   Gastrointestinal: Negative for abdominal pain, diarrhea, nausea and vomiting.   Psychiatric/Behavioral: The patient is not nervous/anxious.         Physical Exam  Temp:  [35.8 °C (96.4 °F)-36.4 °C (97.6 °F)] 35.9 °C (96.6 °F)  Pulse:  [59-68] 66  Resp:  [16-18] 18  BP: (117-135)/(59-75) 131/75  SpO2:  [94 %-97 %] 94 %    Physical Exam  Vitals signs and nursing note reviewed.   Constitutional:       Appearance: Normal appearance.   HENT:      Head: Atraumatic.   Eyes:      Conjunctiva/sclera: Conjunctivae normal.      Pupils: Pupils are equal, round, and reactive to light.   Neck:      Musculoskeletal: Normal range of motion.      Vascular: No carotid bruit.   Cardiovascular:      Rate and Rhythm: Normal rate and regular rhythm.      Heart sounds: No murmur.   Pulmonary:      Effort: Pulmonary effort is normal.      Breath sounds: No stridor. No wheezing or rales.   Abdominal:      General: There is no distension.      Palpations: Abdomen is soft.      Tenderness: There is no abdominal tenderness.   Musculoskeletal:      Right lower leg: No edema.      Comments: Has left BKA.  Surgical wound healing well.   Skin:     General: Skin is warm and dry.      Findings: No rash.   Neurological:      Mental Status: He is alert and oriented to person, place, and time.   Psychiatric:         Mood and Affect: Mood normal.         Behavior: Behavior normal.         Fluids    Intake/Output Summary (Last 24 hours) at 3/24/2020 0832  Last data filed at 3/24/2020 0552  Gross per 24 hour   Intake 480 ml   Output --   Net 480 ml       Laboratory  Recent Labs      03/23/20  0521   WBC 10.7   RBC 3.82*   HEMOGLOBIN 10.8*   HEMATOCRIT 33.7*   MCV 88.2   MCH 28.3   MCHC 32.0*   RDW 46.2   PLATELETCT 284   MPV 10.8     Recent Labs     03/23/20  0521   SODIUM 145   POTASSIUM 3.8   CHLORIDE 116*   CO2 19*   GLUCOSE 112*   BUN 12   CREATININE 1.09   CALCIUM 8.2*                   Imaging    Assessment/Plan  * Status post below-knee amputation of left lower extremity (HCC)- (present on admission)  Assessment & Plan  Incision clean and dry with intact sutures  U/S: showed extensive soft tissue edema and phlegmonous change without evidence of well formed loculated fluid collection  Repeat soft tissue U/S now shows fluid collections  S/P leukocytosis  On empiric Cipro (thru 3/24)  For f/u with Surgeon Friday    MELANIE (acute kidney injury) ([facility])  Assessment & Plan  Resolved  S/P IVF  Encourage oral intake  Monitor    Vitamin D deficiency- (present on admission)  Assessment & Plan  Vit D: 14  On supplements    Diabetes mellitus due to underlying condition with hyperglycemia ([facility])- (present on admission)  Assessment & Plan  Hba1c: 6.8  On Metformin: 250 mg bid  Off accuchecks  Note: home meds include Metformin 1000 mg bid    History of DVT (deep vein thrombosis)- (present on admission)  Assessment & Plan  On Xarelto    Mixed hyperlipidemia- (present on admission)  Assessment & Plan  On Lipitor    Benign essential HTN- (present on admission)  Assessment & Plan  BP ok  On Atenolol: 100 mg daily  Monitor

## 2020-03-24 NOTE — DISCHARGE PLANNING
Met with patient following Team Conference to relay progress, anticipated discharge date of 3/26/2020.  Patient's brother is currently here and will get patient to his orthopedic f/u appointment.   We discussed that insurance may not cover a FWW and a wc. Patient's brother is going to check at [facility] in [place] to see if they have a bariatric FWW. I will check at [facility].  WC to be ordered. Home safety evaluation is scheduled for Tuesday. Patient is upbeat and anxious to go home. States he has several siblings that will come stay assist with the care of his mother until he gets back on his feet. Patient's mother goes to dialysis M-W-F from 9AM to 1PM. Patient's medical appointments cannot be during that time period. I will check into transportation benefit through patient's insurance as a possible medical transport option for patient.

## 2020-03-24 NOTE — PROGRESS NOTES
Received shift report and assumed care of patient.  Patient awake, calm and stable, currently positioned in bed for comfort and safety; call light within reach.  Denies pain or discomfort at this time.  Will continue to monitor.      0800 Morning medication given prior to 0900 home evaluation appointment with OT. Patient had breakfast after taking meds and is looking forward to home evaluation appointment. Patient resting comfortably in bed with call light in place.

## 2020-03-24 NOTE — THERAPY
Physical Therapy   Daily Treatment     Patient Name: Jose Mc  Age:  64 y.o., Sex:  male  Medical Record #: [id number]  Today's Date: 3/24/2020     Precautions  Precautions: Non Weight Bearing Left Lower Extremity  Comments: L residual limb rigid orthosis too large (Howard from [facility] P[facility] aware)    Subjective    Pt sitting edge of bed, ready for PT     Objective       03/24/20 0831   Sitting Lower Body Exercises   Hip Flexion 3 sets of 10   Hip Abduction 3 sets of 10   Hip Adduction 3 sets of 10   Bed Mobility    Supine to Sit Modified Independent   Sit to Supine Modified Independent   Sit to Stand Supervised   PT Total Time Spent   PT Individual Total Time Spent (Mins) 30   PT Charge Group   PT Therapeutic Exercise 1   PT Therapeutic Activities 1     Pt completes bed<>wc transfer with FWW and SBA/ set-up.   Gait activity with // bars x 10 ft SBA.   Pt completed wc<>car transfer with FWW and min A for LLE positioning and sit>stand from low seat height.     Assessment    Pt required verbal cues for sequencing and seat set-up for car transfer but only min A to complete task. Good carryover for mobility skills.     Plan    Continue to progress FWW distance; w/c outdoors- ramps, uneven terrain; assess transfers for mod I status; review there-ex for LE/core strengthening

## 2020-03-24 NOTE — DISCHARGE PLANNING
Per Martita at [facility], referral accepted effective 4/1/20 as patient's insurance is changing at that time.  CM notified.

## 2020-03-25 PROCEDURE — 700102 HCHG RX REV CODE 250 W/ 637 OVERRIDE(OP): Performed by: HOSPITALIST

## 2020-03-25 PROCEDURE — 99232 SBSQ HOSP IP/OBS MODERATE 35: CPT | Performed by: HOSPITALIST

## 2020-03-25 PROCEDURE — 97110 THERAPEUTIC EXERCISES: CPT

## 2020-03-25 PROCEDURE — A9270 NON-COVERED ITEM OR SERVICE: HCPCS | Performed by: PHYSICAL MEDICINE & REHABILITATION

## 2020-03-25 PROCEDURE — 94760 N-INVAS EAR/PLS OXIMETRY 1: CPT

## 2020-03-25 PROCEDURE — A9270 NON-COVERED ITEM OR SERVICE: HCPCS | Performed by: HOSPITALIST

## 2020-03-25 PROCEDURE — 700102 HCHG RX REV CODE 250 W/ 637 OVERRIDE(OP): Performed by: PHYSICAL MEDICINE & REHABILITATION

## 2020-03-25 PROCEDURE — 97530 THERAPEUTIC ACTIVITIES: CPT

## 2020-03-25 PROCEDURE — 99231 SBSQ HOSP IP/OBS SF/LOW 25: CPT | Performed by: PHYSICAL MEDICINE & REHABILITATION

## 2020-03-25 PROCEDURE — 770010 HCHG ROOM/CARE - REHAB SEMI PRIVAT*

## 2020-03-25 RX ORDER — ATORVASTATIN CALCIUM 20 MG/1
20 TABLET, FILM COATED ORAL EVERY EVENING
Qty: 30 TAB | Refills: 0 | Status: SHIPPED | OUTPATIENT
Start: 2020-03-25 | End: 2020-07-20 | Stop reason: SDUPTHER

## 2020-03-25 RX ORDER — MORPHINE SULFATE 15 MG/1
15 TABLET, FILM COATED, EXTENDED RELEASE ORAL EVERY 12 HOURS
Qty: 60 TAB | Refills: 0
Start: 2020-03-25 | End: 2020-04-24

## 2020-03-25 RX ORDER — SIMETHICONE 80 MG
80 TABLET,CHEWABLE ORAL
Qty: 30 TAB | Refills: 3 | COMMUNITY
Start: 2020-03-25 | End: 2021-05-10

## 2020-03-25 RX ORDER — GABAPENTIN 300 MG/1
300 CAPSULE ORAL 3 TIMES DAILY
Qty: 90 CAP | Refills: 0 | Status: SHIPPED | OUTPATIENT
Start: 2020-03-25 | End: 2021-09-13 | Stop reason: SDUPTHER

## 2020-03-25 RX ORDER — SIMETHICONE 80 MG
80 TABLET,CHEWABLE ORAL
Status: DISCONTINUED | OUTPATIENT
Start: 2020-03-25 | End: 2020-03-26 | Stop reason: HOSPADM

## 2020-03-25 RX ORDER — ATENOLOL 100 MG/1
100 TABLET ORAL DAILY
Qty: 30 TAB | Refills: 0 | Status: SHIPPED | OUTPATIENT
Start: 2020-03-26 | End: 2020-12-01 | Stop reason: SDUPTHER

## 2020-03-25 RX ADMIN — ATORVASTATIN CALCIUM 20 MG: 10 TABLET, FILM COATED ORAL at 20:15

## 2020-03-25 RX ADMIN — OXYCODONE HYDROCHLORIDE AND ACETAMINOPHEN 1 TABLET: 5; 325 TABLET ORAL at 08:14

## 2020-03-25 RX ADMIN — GABAPENTIN 300 MG: 300 CAPSULE ORAL at 20:15

## 2020-03-25 RX ADMIN — MORPHINE SULFATE 15 MG: 15 TABLET, FILM COATED, EXTENDED RELEASE ORAL at 20:15

## 2020-03-25 RX ADMIN — ATENOLOL 100 MG: 25 TABLET ORAL at 05:57

## 2020-03-25 RX ADMIN — MORPHINE SULFATE 15 MG: 15 TABLET, FILM COATED, EXTENDED RELEASE ORAL at 08:14

## 2020-03-25 RX ADMIN — METFORMIN HYDROCHLORIDE 250 MG: 500 TABLET ORAL at 17:09

## 2020-03-25 RX ADMIN — METFORMIN HYDROCHLORIDE 250 MG: 500 TABLET ORAL at 08:13

## 2020-03-25 RX ADMIN — GABAPENTIN 300 MG: 300 CAPSULE ORAL at 08:13

## 2020-03-25 RX ADMIN — GABAPENTIN 300 MG: 300 CAPSULE ORAL at 14:39

## 2020-03-25 RX ADMIN — SIMETHICONE CHEW TAB 80 MG 80 MG: 80 TABLET ORAL at 17:10

## 2020-03-25 RX ADMIN — RIVAROXABAN 20 MG: 10 TABLET, FILM COATED ORAL at 17:10

## 2020-03-25 RX ADMIN — MELATONIN 1000 UNITS: at 08:13

## 2020-03-25 RX ADMIN — SIMETHICONE CHEW TAB 80 MG 80 MG: 80 TABLET ORAL at 11:16

## 2020-03-25 ASSESSMENT — ENCOUNTER SYMPTOMS
VOMITING: 0
BLURRED VISION: 0
DIZZINESS: 0
PALPITATIONS: 0
HALLUCINATIONS: 0
SHORTNESS OF BREATH: 0
FEVER: 0
NAUSEA: 0
HEADACHES: 0

## 2020-03-25 ASSESSMENT — ACTIVITIES OF DAILY LIVING (ADL)
SHOWER_TRANSFER_LEVEL_OF_ASSIST: REQUIRES SUPERVISION WITH SHOWER TRANSFER
TOILET_TRANSFER_LEVEL_OF_ASSIST: REQUIRES SUPERVISION WITH TOILET TRANSFER
TOILETING_LEVEL_OF_ASSIST: ABLE TO COMPLETE TOILETING WITHOUT ASSIST

## 2020-03-25 ASSESSMENT — PATIENT HEALTH QUESTIONNAIRE - PHQ9
2. FEELING DOWN, DEPRESSED, IRRITABLE, OR HOPELESS: NOT AT ALL
SUM OF ALL RESPONSES TO PHQ9 QUESTIONS 1 AND 2: 0
1. LITTLE INTEREST OR PLEASURE IN DOING THINGS: NOT AT ALL

## 2020-03-25 NOTE — DISCHARGE PLANNING
Message left for Juan J Vance at [facility] indicating clinical info sent yesterday, looking for continued stay auth to 3/26/2020, and [employer] is working on auth for patient's lightweight wc.

## 2020-03-25 NOTE — PROGRESS NOTES
[facility] Medicine Daily Progress Note    Date of Service  3/25/2020    Chief Complaint:  Hypertension  Diabetes  Leukocytosis    Interval History:  No significant events or changes since last visit    Review of Systems  Review of Systems   Constitutional: Negative for fever.   Eyes: Negative for blurred vision.   Respiratory: Negative for shortness of breath.    Cardiovascular: Negative for palpitations.   Gastrointestinal: Negative for nausea and vomiting.   Neurological: Negative for dizziness and headaches.   Psychiatric/Behavioral: Negative for hallucinations.        Physical Exam  Temp:  [35.8 °C (96.5 °F)-36.7 °C (98 °F)] 35.8 °C (96.5 °F)  Pulse:  [63-70] 63  Resp:  [18] 18  BP: (106-138)/(59-75) 125/72  SpO2:  [93 %-95 %] 93 %    Physical Exam  Vitals signs and nursing note reviewed.   Constitutional:       General: He is not in acute distress.  HENT:      Mouth/Throat:      Mouth: Mucous membranes are moist.      Pharynx: Oropharynx is clear.   Eyes:      General: No scleral icterus.  Neck:      Vascular: No carotid bruit.   Cardiovascular:      Rate and Rhythm: Normal rate and regular rhythm.      Heart sounds: No murmur.   Pulmonary:      Effort: Pulmonary effort is normal.      Breath sounds: Normal breath sounds. No stridor.   Abdominal:      General: There is no distension.      Palpations: Abdomen is soft.      Tenderness: There is no abdominal tenderness.   Musculoskeletal:      Right lower leg: No edema.      Comments: Has left BKA.  Surgical wound healing well.   Skin:     General: Skin is warm and dry.      Findings: No rash.   Neurological:      Mental Status: He is alert and oriented to person, place, and time.   Psychiatric:         Mood and Affect: Mood normal.         Behavior: Behavior normal.         Fluids    Intake/Output Summary (Last 24 hours) at 3/25/2020 0931  Last data filed at 3/25/2020 0840  Gross per 24 hour   Intake 360 ml   Output 600 ml   Net -240 ml       Laboratory  Recent  Labs     03/23/20  0521   WBC 10.7   RBC 3.82*   HEMOGLOBIN 10.8*   HEMATOCRIT 33.7*   MCV 88.2   MCH 28.3   MCHC 32.0*   RDW 46.2   PLATELETCT 284   MPV 10.8     Recent Labs     03/23/20  0521   SODIUM 145   POTASSIUM 3.8   CHLORIDE 116*   CO2 19*   GLUCOSE 112*   BUN 12   CREATININE 1.09   CALCIUM 8.2*                   Imaging    Assessment/Plan  * Status post below-knee amputation of left lower extremity (HCC)- (present on admission)  Assessment & Plan  Incision clean and dry with intact sutures  U/S (3/15): showed extensive soft tissue edema and phlegmonous change without evidence of well formed loculated fluid collection  U/S (3/22): fluid collections within the soft tissues measuring up to 3.4 x 1 x 4.9 cm fluid                     may represent postsurgical seromas, hematomas or abscesses  S/P leukocytosis  S/P empiric Cipro (3/24)  For f/u with Surgeon Friday    MELANIE (acute kidney injury) ([facility])  Assessment & Plan  Resolved  S/P IVF  Encourage oral intake  Monitor    Vitamin D deficiency- (present on admission)  Assessment & Plan  Vit D: 14  On supplements    Diabetes mellitus due to underlying condition with hyperglycemia (HCC)- (present on admission)  Assessment & Plan  Hba1c: 6.8  On Metformin: 250 mg bid  Off accuchecks  Note: home meds include Metformin 1000 mg bid    History of DVT (deep vein thrombosis)- (present on admission)  Assessment & Plan  On Xarelto    Mixed hyperlipidemia- (present on admission)  Assessment & Plan  On Lipitor    Benign essential HTN- (present on admission)  Assessment & Plan  BP ok  On Atenolol: 100 mg daily  Monitor

## 2020-03-25 NOTE — PROGRESS NOTES
Rehab Progress Note     Date of Service: 3/25/2020  Chief Complaint: follow up BKA    Interval Events (Subjective)    Patient seen and examined today in the therapy gym.  Reports increased gas since stopping the scheduled simethicone so he would like to continue this.  He currently is denying any pain in his residual limb.  He feels ready for discharge home tomorrow.  He has no new complaints.    Objective:  VITAL SIGNS: /72   Pulse 64   Temp 35.8 °C (96.5 °F) (Oral)   Resp 20   Ht 1.829 m (6')   Wt (!) 147 kg (324 lb 1.2 oz)   SpO2 97%   BMI 43.95 kg/m²   Gen: alert, no apparent distress, obese  CV: regular rate and rhythm, no murmurs, no peripheral edema  Resp: clear to ascultation bilaterally, normal respiratory effort  GI: soft, non-tender abdomen, bowel sounds present  Msk: left BKA    Recent Results (from the past 72 hour(s))   CBC WITHOUT DIFFERENTIAL    Collection Time: 03/23/20  5:21 AM   Result Value Ref Range    WBC 10.7 4.8 - 10.8 K/uL    RBC 3.82 (L) 4.70 - 6.10 M/uL    Hemoglobin 10.8 (L) 14.0 - 18.0 g/dL    Hematocrit 33.7 (L) 42.0 - 52.0 %    MCV 88.2 81.4 - 97.8 fL    MCH 28.3 27.0 - 33.0 pg    MCHC 32.0 (L) 33.7 - 35.3 g/dL    RDW 46.2 35.9 - 50.0 fL    Platelet Count 284 164 - 446 K/uL    MPV 10.8 9.0 - 12.9 fL   Basic Metabolic Panel    Collection Time: 03/23/20  5:21 AM   Result Value Ref Range    Sodium 145 135 - 145 mmol/L    Potassium 3.8 3.6 - 5.5 mmol/L    Chloride 116 (H) 96 - 112 mmol/L    Co2 19 (L) 20 - 33 mmol/L    Glucose 112 (H) 65 - 99 mg/dL    Bun 12 8 - 22 mg/dL    Creatinine 1.09 0.50 - 1.40 mg/dL    Calcium 8.2 (L) 8.5 - 10.5 mg/dL    Anion Gap 10.0 7.0 - 16.0   ESTIMATED GFR    Collection Time: 03/23/20  5:21 AM   Result Value Ref Range    GFR If African American >60 >60 mL/min/1.73 m 2    GFR If Non African American >60 >60 mL/min/1.73 m 2       Current Facility-Administered Medications   Medication Frequency   • simethicone (MYLICON) chewable tab 80 mg TID WITH  MEALS   • cholestyramine (QUESTRAN,PREVALITE) 4 GM powder 4 g TID PRN   • vitamin D (cholecalciferol) tablet 1,000 Units DAILY   • senna-docusate (PERICOLACE or SENOKOT S) 8.6-50 MG per tablet 2 Tab BID PRN    And   • polyethylene glycol/lytes (MIRALAX) PACKET 1 Packet QDAY PRN    And   • magnesium hydroxide (MILK OF MAGNESIA) suspension 30 mL QDAY PRN    And   • bisacodyl (DULCOLAX) suppository 10 mg QDAY PRN   • metFORMIN (GLUCOPHAGE) tablet 250 mg BID WITH MEALS   • glucose 4 g chewable tablet 16 g Q15 MIN PRN    And   • dextrose 50% (D50W) injection 50 mL Q15 MIN PRN   • Respiratory Therapy Consult Continuous RT   • Pharmacy Consult Request ...Pain Management Review 1 Each PHARMACY TO DOSE   • acetaminophen (TYLENOL) tablet 650 mg Q4HRS PRN   • artificial tears ophthalmic solution 1 Drop PRN   • benzocaine-menthol (CEPACOL) lozenge 1 Lozenge Q2HRS PRN   • mag hydrox-al hydrox-simeth (MAALOX PLUS ES or MYLANTA DS) suspension 20 mL Q2HRS PRN   • ondansetron (ZOFRAN ODT) dispertab 4 mg 4X/DAY PRN    Or   • ondansetron (ZOFRAN) syringe/vial injection 4 mg 4X/DAY PRN   • traZODone (DESYREL) tablet 50 mg QHS PRN   • sodium chloride (OCEAN) 0.65 % nasal spray 2 Spray PRN   • hydrOXYzine HCl (ATARAX) tablet 50 mg Q6HRS PRN   • melatonin tablet 3 mg HS PRN   • atenolol (TENORMIN) tablet 100 mg Q DAY   • atorvastatin (LIPITOR) tablet 20 mg Q EVENING   • gabapentin (NEURONTIN) capsule 300 mg TID   • tramadol (ULTRAM) 50 MG tablet 50 mg Q4HRS PRN   • oxyCODONE immediate-release (ROXICODONE) tablet 5 mg Q4HRS PRN   • lactulose 20 GM/30ML solution 30 mL QDAY PRN   • docusate sodium (ENEMEEZ) enema 283 mg QDAY PRN   • rivaroxaban (XARELTO) tablet 20 mg PM MEAL   • morphine ER (MS CONTIN) tablet 15 mg Q12HRS   • oxyCODONE-acetaminophen (PERCOCET) 5-325 MG per tablet 1 Tab Q4HRS PRN       Orders Placed This Encounter   Procedures   • Diet Order Diabetic     Standing Status:   Standing     Number of Occurrences:   1     Order  Specific Question:   Diet:     Answer:   Diabetic [3]       Radiology    3/22/2020 3:20 PM     HISTORY/REASON FOR EXAM:  R/O bka stump fluid collection  Swelling. Pain.     TECHNIQUE/EXAM DESCRIPTION AND NUMBER OF VIEWS:  Ultrasound of the soft tissues of the left lower extremity     COMPARISON: 3/15/2020     FINDINGS:  There is edema within the soft tissues of the left lower extremity. There are fluid collections within the soft tissues measuring up to approximately 3.4 x 1 x 4.9 cm. Debris is seen within the fluid collection.     IMPRESSION:     Fluid collections within the soft tissues measuring up to 3.4 x 1 x 4.9 cm fluid may represent postsurgical seromas, hematomas or abscesses.     Edema within the soft tissues is noted.      Assessment:  Active Hospital Problems    Diagnosis   • *Status post below-knee amputation of left lower extremity (HCC)   • Leukocytosis   • Vitamin D deficiency   • Impaired mobility and ADLs   • Diabetes mellitus due to underlying condition with hyperglycemia (HCC)   • Peripheral neuropathy   • Obstructive sleep apnea syndrome   • Benign essential HTN   • Mixed hyperlipidemia   • History of DVT (deep vein thrombosis)     This patient is a 64 y.o. male admitted for acute inpatient rehabilitation with Status post below-knee amputation of left lower extremity (HCC).    I led and attended the weekly conference, and agree with the IDT conference documentation and plan of care as noted below.    Date of conference: 3/23/2020    Goals and barriers: See IDT note.    CM/social support: siblings supportive    Anticipated DC date: 3/26/2020    Home health: PT/OT/RN    Equip: WC, bariatric FWW    Follow up: PCP, surgery     Medical Decision Making and Plan:    Left BKA  3/6 Dr. Ceja  Daily wound care  PT/OT, 1.5 hr each discipline, 5 days per week  Outpatient follow up with surgery 3/17 - no concern for infection, has another apt on 3/27  Repeat US with fluid collection, continue  antibiotics, surgery follow up     Chronic opiate use  Continue home medications  Morphine ER 15 mg BID  Percocets 5-325 PRN  Patient has home supply, will not need new RX at discharge      Peripheral Neuropathy  Phantom sensation  Continue gabapentin  Currently well controlled     Opioid induced constipation, resolved  Loose stools, resolved  As needed bowel meds  Last BM 3/25    Bladder  Checked PVRs  Not retaining  ICP for over 400 cc  Scheduled toileting    DVT prophylaxis  Xarelto     Appreciate the assistance of hospitalist with his medical co-morbidities:     Hypertension, atenolol  Diabetes with hyperglycemia, metformin, SSI  Sleep apnea, CPAP  Hyperlipidemia, statin  History of DVT, Xarelto  Left arm erythema/edema/cellulitis, s/p Bactrim  Leukocytosis, resolved, US with fluid collection, s/p antibiotics  Anemia  Vit D deficiency, continue supplementation  Loose stools, resolved, thought secondary to antibiotic side effect, c diff negative  Gas, restart simethicone    Total time:  17 minutes.  I spent greater than 50% of the time for patient care, counseling, and coordination on this date, including patient face-to face time, unit/floor time with review of records/pertinent lab data and studies, as well as discussing diagnostic evaluation/work up, planned therapeutic interventions, and future disposition of care, as per the interval events/subjective and the assessment and plan as noted above.    I have performed a physical exam, reviewed and updated ROS, as well as the assessment and plan today 3/25/2020. In review of note from 3/24/2020 there are no new changes except as documented above.        Sophia Beck M.D.   Physical Medicine and Rehabilitation

## 2020-03-25 NOTE — THERAPY
"Occupational Therapy  Daily Treatment     Patient Name: Jose Mc  Age:  64 y.o., Sex:  male  Medical Record #: [id number]  Today's Date: 3/25/2020     Precautions  Precautions: Non Weight Bearing Left Lower Extremity, Immobilizer Left Lower Extremity  Comments:  L residual limb rigid orthosis too [facility]    Safety   ADL Safety : Requires Supervision for Safety  Bathroom Safety: Requires Supervision for Safety  Comments: see FIM for toileting    Subjective    \"This wheelchair seems big but its fits just fine\"     Objective    New w/c arrived - pt was able to transfer to new w/c with supervision using FWW from bed. W/c dimensions are appropriate and pt reports good fit with the exception of floor to seat height which has increased. Pt prefers to have seat cushion removed in order for RLE to reach ground to assist with mobility and safety when reaching. Other w/c accessories - leg rest and residual limb board are appropriate.      03/25/20 1301   Sitting Upper Body Exercises   Lat Press 3 sets of 15;Bilateral;Weight (See Comments for lbs)  (50# rickshaw)   Tricep Press 3 sets of 15;Bilateral;Weight (See Comments for lbs)  (40# rickshaw)   Interdisciplinary Plan of Care Collaboration   Patient Position at End of Therapy Seated;Call Light within Reach;Tray Table within Reach   OT Total Time Spent   OT Individual Total Time Spent (Mins) 30   OT Charge Group   OT Therapeutic Exercise  2       Assessment    New w/c arrived today with good fit and was able to appropriately propel new w/c and apply/remove w/c components with no issues. Pt requested to complete UB therex which he completed with no issues. Pt with no further questions/concerns re: d/c tomorrow, home safety, bathroom DME/modifications    Plan    D/c tomorrow     "

## 2020-03-25 NOTE — THERAPY
Physical Therapy   Daily Treatment     Patient Name: Jose Mc  Age:  64 y.o., Sex:  male  Medical Record #: [id number]  Today's Date: 3/25/2020     Precautions  Precautions: Non Weight Bearing Left Lower Extremity, Immobilizer Left Lower Extremity  Comments: L residual limb rigid orthosis too large ([facility]ard from [facility] P[facility]O aware)    Subjective    Patient agreeable to PT.  Pt reports new w/c has arrived.  Pt reports Howard from [facility] was here earlier in week but that the patient was at an appointment out-of-bldg.     Objective       03/25/20 1501   Precautions   Precautions Non Weight Bearing Left Lower Extremity;Immobilizer Left Lower Extremity   Comments L residual limb rigid orth[facility] too [facility]   Vitals   O2 (LPM) 0   O2 Delivery Device None - Room Air   Supine Lower Body Exercise   Other Exercises Prone 3x15 of unilateral I's, T's, and Y's for shoulder and scapular strengthening; handout provided; discussed benefits, alterations, quality, and observations.   Bed Mobility    Supine to Sit Modified Independent   Sit to Supine Modified Independent   Sit to Stand Supervised   Scooting Modified Independent   Rolling Modified Independent   PT Total Time Spent   PT Individual Total Time Spent (Mins) 30   PT Charge Group   PT Therapeutic Exercise 1   PT Therapeutic Activities 1     Discussed pt's new w/c, adjustments, and safety.  Updated applicable [employer] from session.    FIM Bed/Chair/Wheelchair Transfers Score: 5 - Standby Prompting/Supervision or Set-up  Bed/Chair/Wheelchair Transfers Description:  (SPV, setup, FWW, rigid residual limb orthosis, increased time.  1 rep at hospital bed and 1 rep at therapy mat.)    FIM Wheelchair Score:  6 - Modified Independent  Wheelchair Description:  (Mod I, indoors for 2x 150 feet without limitations)      Assessment    Patient has great motivation, good participation, and safe mobility observed today; patient is ready for next level  of care.    Plan    FIMs, IR[employer]-[place], d/c tomorrow

## 2020-03-25 NOTE — PROGRESS NOTES
Received patient on bed. Conscious coherent not in cp distress. Patient verbalized no pain and no SOB. Patient said that his left BKA dressing was changed by AM nurse today. Patient was encouraged by this RN to increase fluid intake. Patient verbalized that he been drinking more fluids as instructed. Safety and fall precaution reinforced.

## 2020-03-25 NOTE — THERAPY
"wPhysical Therapy   Daily Treatment     Patient Name: Jose Mc  Age:  64 y.o., Sex:  male  Medical Record #: [id number]  Today's Date: 3/25/2020     Precautions  Precautions: Non Weight Bearing Left Lower Extremity, Immobilizer Left Lower Extremity  Comments:  L residual limb rigid orthosis too large (Howard from [facility] aware)    Subjective    \"I am really gonna miss you guys\"     Objective       03/25/20 1001   Precautions   Precautions Non Weight Bearing Left Lower Extremity;Immobilizer Left Lower Extremity   Comments  L residual limb rigid orthosis too large ([facility] aware)   Supine Lower Body Exercise   Supine Lower Body Exercises Yes   Bridges One Legged  (modified BKA positioning)   Hip Flexion 2 sets of 15   Hip Abduction Side Lying;2 sets of 15   Hip Adduction  2 sets of 15;Bilateral   Straight Leg Raises Front;2 sets of 15   Knee to Chest 2 sets of 15;Left   Other Exercises quad sets/ glut sets 2 x 15. seated EOM L kne flex/ext. Prone lying hamstring curls/ hip extension 2 x 15, addition of prone I's T's Y's with HO provided   Interdisciplinary Plan of Care Collaboration   IDT Collaboration with  Physical Therapist   Patient Position at End of Therapy Seated;Self Releasing Lap Belt Applied   Collaboration Comments HEP HO for I's, T's, Y's   PT Total Time Spent   PT Individual Total Time Spent (Mins) 60   PT Charge Group   PT Therapeutic Exercise 3   PT Therapeutic Activities 1   Supervising Physical Therapist Igor Sanchez       Assessment    Pt completed stand step transfer with FWW and SPV, pt is Indep with mat program    Plan    Continue to progress FWW distance; w/c outdoors- ramps, uneven terrain; assess transfers for mod I status; review there-ex for LE/core strengthening    "

## 2020-03-25 NOTE — CARE PLAN
Problem: Safety  Goal: Will remain free from injury  Outcome: PROGRESSING AS EXPECTED  Patient utilizes call light when he needs help and understands fall risk education pertaining to left BKA.       Problem: Infection  Goal: Will remain free from infection  Outcome: PROGRESSING AS EXPECTED  Patient is free of infection.      Problem: Skin Integrity  Goal: Risk for impaired skin integrity will decrease  Outcome: PROGRESSING AS EXPECTED  Patient understands infection prevention education and washes hands frequently. BKA surgical site is free of infection.

## 2020-03-25 NOTE — THERAPY
"Occupational Therapy  Daily Treatment     Patient Name: Jose Mc  Age:  64 y.o., Sex:  male  Medical Record #: [id number]  Today's Date: 3/25/2020     Precautions  Precautions: Non Weight Bearing Left Lower Extremity  Comments: L residual limb rigid orthosis too large (Howard from [facility] aware)    Safety   ADL Safety : Requires Supervision for Safety  Bathroom Safety: Requires Supervision for Safety  Comments: see FIM for toileting    Subjective    \"I would like to do some more exercises before I leave\"     Objective       03/25/20 0831   Sitting Upper Body Exercises   Lat Pull 3 sets of 15;Bilateral;Weight (See Comments for lbs)  (35# equalizer)   Lat Press 3 sets of 15;Bilateral;Weight (See Comments for lbs)  (40# rickshaw)   Bilateral Row 3 sets of 15;Bilateral;Weight (See Comments for lbs)  (35# equalizer)   Tricep Press 3 sets of 15;Bilateral;Weight (See Comments for lbs)  (40# rickshaw)   Upper Extremity Bike Level 5 Resistance  (BUE motomed 10 min, 0 RB, 1.89 mile)   Interdisciplinary Plan of Care Collaboration   Patient Position at End of Therapy Seated;Call Light within Reach;Tray Table within Reach   OT Total Time Spent   OT Individual Total Time Spent (Mins) 60   OT Charge Group   OT Therapeutic Exercise  4       Assessment    Pt completed UB therex to the best of their abilities with no complaints of pain    Plan    Cont overall strength/endurance and standing balance to improve ADL's and functional mobility; D/c tomorrow    "

## 2020-03-25 NOTE — PROGRESS NOTES
Received shift report and assumed care of patient.  Patient awake, calm and stable, currently positioned in bed for comfort and safety; call light within reach. Patient reports pain of a 5/ 10 and requests pain medication prior to therapy this morning. Will continue to monitor.

## 2020-03-25 NOTE — FLOWSHEET NOTE
03/24/20 1232   Vital Signs   Pulse 66   Respiration 18   Pulse Oximetry 96 %   $ Pulse Oximetry (Spot Check) Yes   Respiratory Assessment   Level of Consciousness Alert   Oxygen   O2 Delivery Device None - Room Air

## 2020-03-25 NOTE — DISCHARGE PLANNING
Case Management Discharge Instructions  Discharge Date March 26, 2020        Discharge Location: Home with Home Health     Agency Name / Phone: [facility] [phone]     Home Health: Registered Nurse, Physical Therapist, Occupational Therapist      DME Provider / Phone: [facility] [phone]     Medical Equipment Ordered: Wheelchair with cushion  Patient to purchase a bariatric FWW     NOTE: This information can be found in your final discharge packet that your nurse will give you.         Follow-up Information:    Jackson Castro M.D.  [address] ([address]ross from [facility]  [place], [place]  [phone]     On 3/27/2020  Friday, March 27, 2020 at 11:15 AM    Theo James M.D.  [address] [facility] [address]  [phone]    On 3/30/2020   Primary Care - On Monday, 3/30/2020 @ 11:15 AM

## 2020-03-26 VITALS
SYSTOLIC BLOOD PRESSURE: 135 MMHG | OXYGEN SATURATION: 94 % | HEART RATE: 60 BPM | TEMPERATURE: 97.9 F | HEIGHT: 72 IN | BODY MASS INDEX: 42.66 KG/M2 | DIASTOLIC BLOOD PRESSURE: 68 MMHG | RESPIRATION RATE: 16 BRPM | WEIGHT: 315 LBS

## 2020-03-26 PROCEDURE — 99231 SBSQ HOSP IP/OBS SF/LOW 25: CPT | Performed by: HOSPITALIST

## 2020-03-26 PROCEDURE — 700102 HCHG RX REV CODE 250 W/ 637 OVERRIDE(OP): Performed by: PHYSICAL MEDICINE & REHABILITATION

## 2020-03-26 PROCEDURE — 700102 HCHG RX REV CODE 250 W/ 637 OVERRIDE(OP): Performed by: HOSPITALIST

## 2020-03-26 PROCEDURE — A9270 NON-COVERED ITEM OR SERVICE: HCPCS | Performed by: PHYSICAL MEDICINE & REHABILITATION

## 2020-03-26 PROCEDURE — A9270 NON-COVERED ITEM OR SERVICE: HCPCS | Performed by: HOSPITALIST

## 2020-03-26 PROCEDURE — 99239 HOSP IP/OBS DSCHRG MGMT >30: CPT | Performed by: PHYSICAL MEDICINE & REHABILITATION

## 2020-03-26 RX ADMIN — SIMETHICONE CHEW TAB 80 MG 80 MG: 80 TABLET ORAL at 08:19

## 2020-03-26 RX ADMIN — ATENOLOL 100 MG: 25 TABLET ORAL at 05:16

## 2020-03-26 RX ADMIN — MELATONIN 1000 UNITS: at 08:19

## 2020-03-26 RX ADMIN — METFORMIN HYDROCHLORIDE 250 MG: 500 TABLET ORAL at 08:19

## 2020-03-26 RX ADMIN — MORPHINE SULFATE 15 MG: 15 TABLET, FILM COATED, EXTENDED RELEASE ORAL at 08:18

## 2020-03-26 RX ADMIN — SIMETHICONE CHEW TAB 80 MG 80 MG: 80 TABLET ORAL at 11:25

## 2020-03-26 RX ADMIN — GABAPENTIN 300 MG: 300 CAPSULE ORAL at 08:19

## 2020-03-26 ASSESSMENT — ENCOUNTER SYMPTOMS
NERVOUS/ANXIOUS: 0
FEVER: 0
DIARRHEA: 0
COUGH: 0
BLURRED VISION: 0
DIZZINESS: 0

## 2020-03-26 ASSESSMENT — PATIENT HEALTH QUESTIONNAIRE - PHQ9
1. LITTLE INTEREST OR PLEASURE IN DOING THINGS: NOT AT ALL
2. FEELING DOWN, DEPRESSED, IRRITABLE, OR HOPELESS: NOT AT ALL
SUM OF ALL RESPONSES TO PHQ9 QUESTIONS 1 AND 2: 0

## 2020-03-26 NOTE — DISCHARGE SUMMARY
Rehab Discharge Note    Admission: 3/10/2020    Discharge: 3/26/2020    Admission Diagnosis:   Active Hospital Problems    Diagnosis   • *Status post below-knee amputation of left lower extremity (HCC)   • MELANIE (acute kidney injury) (HCC)   • Hypokalemia   • Diarrhea   • Phlebitis of left arm   • Vitamin D deficiency   • Impaired mobility and ADLs   • Diabetes mellitus due to underlying condition with hyperglycemia (HCC)   • Peripheral neuropathy   • Obstructive sleep apnea syndrome   • Benign essential HTN   • Mixed hyperlipidemia   • History of DVT (deep vein thrombosis)       Discharge Diagnosis:  Active Hospital Problems    Diagnosis   • *Status post below-knee amputation of left lower extremity (HCC)   • MELANIE (acute kidney injury) (HCC)   • Hypokalemia   • Diarrhea   • Phlebitis of left arm   • Vitamin D deficiency   • Impaired mobility and ADLs   • Diabetes mellitus due to underlying condition with hyperglycemia (HCC)   • Peripheral neuropathy   • Obstructive sleep apnea syndrome   • Benign essential HTN   • Mixed hyperlipidemia   • History of DVT (deep vein thrombosis)       HPI prior to rehab  The patient is a 64 y.o. male with a past medical history of diabetes, peripheral neuropathy, hypertension, sleep apnea, DVT on Xarelto; now admitted for acute inpatient rehabilitation with severe functional debility after a left BKA.   On admission the patient and medical record report patient was admitted to the outside hospital with a non-healing left foot ulcer and osteomyelitis, for which he underwent a left transtibial amputation on 3/6 with Dr. Castro. He was restarted on his Xarelto on 3/8, which he takes for his history of DVT. His most recent HgbA1c was 6.5. He takes Metformin at home, no insulin. He is working with Howard from [employer].   Patient current reports residual limb pain. He reports prior to the surgery he was heel weightbearing and ambulating with a cane. His post-operative drain was  removed yesterday and did have a small amount of drainage. He is on chronic opiates at home, Morphine 15 mg twice daily, as well as Percocet 5 mg. He has had diabetes for 10 years. He has a history of peripheral neuropathy, with numbness in his feet. His DVT was in his LLE 2 years ago, and he's been on Xarelto ever since then. He has chronic constipation for which he takes laxatives and stool softeners at home. He denies any urinary symptoms.   Patient was evaluated by Rehab Medicine physician and Physical Therapy and Occupational Therapy and determined to be appropriate for acute inpatient rehab and was transferred to [facility] on 3/10/2020 2:38 PM.    Rehab Hospital Course    Left [facility]  3/6 Dr. Ceja  Daily wound care  Outpatient follow up with surgery 3/17 - no concern for infection, has another apt on 3/27  Repeat US with fluid collection, continue antibiotics, surgery follow up     Chronic opiate use  Continue home medications  Morphine ER 15 mg BID  Percocets 5-325 PRN  Patient has home supply, will not need new RX at discharge      Peripheral Neuropathy  Phantom sensation  Continue gabapentin  Currently well controlled     Opioid induced constipation, resolved  Loose stools, resolved  As needed bowel meds  Last BM 3/26     Bladder  Checked PVRs  Not retaining  ICP for over 400 cc  Scheduled toileting     DVT prophylaxis  Xarelto     Appreciated the assistance of hospitalist with his medical co-morbidities:     Hypertension, atenolol  Diabetes with hyperglycemia, metformin - 250 mg BID less than home dosing, SSI  Sleep apnea, CPAP  Hyperlipidemia, statin  History of DVT, Xarelto  Left arm erythema/edema/cellulitis, s/p Bactrim  Leukocytosis, resolved, US with fluid collection, s/p antibiotics  Anemia  Vit D deficiency, continue supplementation  Loose stools, resolved, thought secondary to antibiotic side effect, c diff negative  Gas, continue simethicone      Functional Status at  Discharge  Eatin - Independent  Eating Description:  Supervision for safety, Verbal cueing, Set-up of equipment or meal/tube feeding, Increased time  Groomin - Independent  Grooming Description:  Increased time  Bathin - Modified Independent  Bathing Description:  Grab bar, Tub bench, Hand held shower, Increased time, Verbal cueing  Upper Body Dressin - Modified Independent  Upper Body Dressing Description:  Increased time  Lower Body Dressin - Modified Independent  Lower Body Dressing Description:  6 - Modified Independent  Discharge Location : Home  Patient Discharging with Assist of: Family   Level of Supervision Required: Intermittent Supervision  Recommended Equipment for Discharge: Front-Wheeled Walker;Tub Transfer Bench;Grab Bars by Toilet;Hand Held Shower Head;Reacher;Other (See Comments);Raised Toilet Seat without Arms;Ramp;Manual Wheelchair(toileting aid)  Recommended Services Upon Discharge: Home Health Occupational Therapy  Long Term Goals Met: 2  Long Term Goals Not Met: 0  Criteria for Termination of Services: Maximum Function Achieved for Inpatient Rehabilitation  Walk:  1 - Total Assistance  Distance Walked:  Walks less than 50 feet  Walk Description:  (CGA with FWW x 10 ft. )  Wheelchair:  6 - Modified Independent  Distance Propelled:  Propels a minimum of 150 feet   Wheelchair Description:  (Mod I, indoors for 2x 150 feet without limitations)  Stairs 0 - Not tested,unsafe activity  Stairs Description      Comprehension Mode:  Auditory  Comprehension:  6 - Modified Independent  Comprehension Description:     Expression Mode:  Vocal  Expression:  6 - Modified Independent  Expression Description:     Social Interaction:  7 - Independent  Social Interaction Description:     Problem Solvin - Standby Prompting/Supervision or Set-up  Problem Solving Description:  Verbal cueing  Memory:  5 - Standby Prompting/Supervision or Set-up  Memory Description:          Discharge  Medication:     Medication List      START taking these medications      Instructions   Cholecalciferol 1000 UNIT Tabs  Commonly known as:  VITAMIND D3   Take 1 Tab by mouth every day.  Dose:  1,000 Units     gabapentin 300 MG Caps  Commonly known as:  NEURONTIN   Take 1 Cap by mouth 3 times a day.  Dose:  300 mg     morphine ER 15 MG Tbcr tablet  Commonly known as:  MS CONTIN   Take 1 Tab by mouth every 12 hours for 30 days.  Dose:  15 mg     simethicone 80 MG Chew  Commonly known as:  MYLICON   Take 1 Tab by mouth 3 times a day, with meals.  Dose:  80 mg        CHANGE how you take these medications      Instructions   atorvastatin 20 MG Tabs  What changed:    · medication strength  · how much to take  · when to take this  Commonly known as:  LIPITOR   Take 1 Tab by mouth every evening.  Dose:  20 mg     metFORMIN 500 MG Tabs  What changed:    · medication strength  · how much to take  · when to take this  Commonly known as:  GLUCOPHAGE   Take 0.5 Tabs by mouth 2 times a day, with meals.  Dose:  250 mg        CONTINUE taking these medications      Instructions   atenolol 100 MG Tabs  Commonly known as:  TENORMIN   Take 1 Tab by mouth every day.  Dose:  100 mg     oxyCODONE-acetaminophen 5-325 MG Tabs  Commonly known as:  PERCOCET   Take 1 Tab by mouth every morning.  Dose:  1 Tab     rivaroxaban 20 MG Tabs tablet  Commonly known as:  Xarelto   Take 1 Tab by mouth with dinner.  Dose:  20 mg        STOP taking these medications    docusate sodium 250 MG capsule  Commonly known as:  COLACE     hydroCHLOROthiazide 25 MG Tabs  Commonly known as:  HYDRODIURIL     lisinopril 40 MG tablet  Commonly known as:  PRINIVIL     Sennosides 17.2 MG Tabs          Discharge Location: Home with Home Health     Agency Name / Phone: [facility] [phone]     Home Health: Registered Nurse, Physical Therapist, Occupational Therapist      DME Provider / Phone: [facility] [phone]     Medical Equipment Ordered:  Wheelchair with cushion  Patient to purchase a bariatric FWW     NOTE: This information can be found in your final discharge packet that your nurse will give you.         Follow-up Information:     Jackson Castro M.D.  [address] (Across from [facility])-  [place], [place]  [phone]     On 3/27/2020  Friday, March 27, 2020 at 11:15 AM     Theo James M.D.  [address] [facility] [address]  [phone]     On 3/30/2020   Primary Care - On Monday, 3/30/2020 @ 11:15 AM    Condition on Discharge:  Good.    More than 32 minutes was spent on discharging this patient, including face-to-face time, prescription management, and the dictation of this note.

## 2020-03-26 NOTE — PROGRESS NOTES
Discharge instructions provided to patient & demonstrated good understanding. Left via own car with sister.

## 2020-03-26 NOTE — PROGRESS NOTES
[facility] Medicine Daily Progress Note    Date of Service  3/26/2020    Chief Complaint:  Hypertension  Diabetes  Leukocytosis    Interval History:  No significant events or changes since last visit    Review of Systems  Review of Systems   Constitutional: Negative for fever.   Eyes: Negative for blurred vision.   Respiratory: Negative for cough.    Cardiovascular: Negative for chest pain.   Gastrointestinal: Negative for diarrhea.   Musculoskeletal: Negative for joint pain.   Neurological: Negative for dizziness.   Psychiatric/Behavioral: The patient is not nervous/anxious.         Physical Exam  Temp:  [36.2 °C (97.2 °F)-36.8 °C (98.2 °F)] 36.6 °C (97.9 °F)  Pulse:  [60-71] 60  Resp:  [16-20] 16  BP: (111-144)/(57-78) 135/68  SpO2:  [94 %-97 %] 94 %    Physical Exam  Vitals signs and nursing note reviewed.   Constitutional:       Appearance: He is not diaphoretic.   HENT:      Mouth/Throat:      Pharynx: No oropharyngeal exudate or posterior oropharyngeal erythema.   Eyes:      Extraocular Movements: Extraocular movements intact.   Neck:      Vascular: No carotid bruit.   Cardiovascular:      Rate and Rhythm: Normal rate and regular rhythm.      Heart sounds: No murmur.   Pulmonary:      Effort: Pulmonary effort is normal.      Breath sounds: Normal breath sounds. No stridor.   Abdominal:      General: Bowel sounds are normal.      Palpations: Abdomen is soft.   Musculoskeletal:      Right lower leg: No edema.      Comments: Has left BKA.  Surgical wound healing well.   Skin:     General: Skin is warm and dry.      Findings: No rash.   Neurological:      Mental Status: He is alert and oriented to person, place, and time.   Psychiatric:         Mood and Affect: Mood normal.         Behavior: Behavior normal.         Fluids    Intake/Output Summary (Last 24 hours) at 3/26/2020 0823  Last data filed at 3/26/2020 0500  Gross per 24 hour   Intake 840 ml   Output 1000 ml   Net -160 ml       Laboratory                         Imaging    Assessment/Plan  * Status post below-knee amputation of left lower extremity (HCC)- (present on admission)  Assessment & Plan  Incision clean and dry with intact sutures  U/S (3/15): showed extensive soft tissue edema and phlegmonous change without evidence of well formed loculated fluid collection  U/S (3/22): fluid collections within the soft tissues measuring up to 3.4 x 1 x 4.9 cm fluid                     may represent postsurgical seromas, hematomas or abscesses  S/P leukocytosis  S/P empiric Cipro (3/24)  For f/u with Surgeon Friday    MELANIE (acute kidney injury) ([facility])  Assessment & Plan  Resolved  S/P IVF  Encourage oral intake  Monitor    Vitamin D deficiency- (present on admission)  Assessment & Plan  Vit D: 14  On supplements    Diabetes mellitus due to underlying condition with hyperglycemia ([facility])- (present on admission)  Assessment & Plan  Hba1c: 6.8  On Metformin: 250 mg bid  Off accuchecks  Note: home meds include Metformin 1000 mg bid    History of DVT (deep vein thrombosis)- (present on admission)  Assessment & Plan  On Xarelto    Mixed hyperlipidemia- (present on admission)  Assessment & Plan  On Lipitor    Benign essential HTN- (present on admission)  Assessment & Plan  BP ok  On Atenolol: 100 mg daily  Monitor

## 2020-03-26 NOTE — DISCHARGE PLANNING
Third voicemail message left for Juan J Vance, Concurrent Reviewer for [employer], requesting 2 additional days of authorization. Patient discharging today. Each time I have left a message, the greeting indicates it is Juan J Vance. Clinical information was faxed on 3/24/20.

## 2020-03-26 NOTE — DISCHARGE PLANNING
Received call from Juan J Vance, Concurrent Reviewer for [employer]. The remaining two days are approved. Fax notice from [employer] to follow.

## 2020-03-26 NOTE — DISCHARGE PLANNING
Case management Summary:   Met with patient and his sister prior to discharge.   Reviewed all follow up appointments.   Referral made to [facility]. They have accepted referral and are ready to follow.    [facility] has delivered a lightweight wc, cushion, residual limb board to patient.  Patient has ordered 2 bariatric FWW's.   During hospitalization, I have provided support and education and have been available for questions and information during hours of operation, communicated with therapy team and MD along with providing links/resources  to outside services.    Patient verbalizes agreement with all plans and has an understanding of the next steps within the post acute services.     Individualized Goals:   1. To get stronger  2. Be able to walk       Outcome:   1. Met  2. Not met

## 2020-03-26 NOTE — CARE PLAN
Problem: Communication  Goal: The ability to communicate needs accurately and effectively will improve  Outcome: PROGRESSING AS EXPECTED     Problem: Safety  Goal: Will remain free from injury  Outcome: PROGRESSING AS EXPECTED  Goal: Will remain free from falls  Outcome: PROGRESSING AS EXPECTED     Problem: Infection  Goal: Will remain free from infection  Outcome: PROGRESSING AS EXPECTED     Problem: Venous Thromboembolism (VTW)/Deep Vein Thrombosis (DVT) Prevention:  Goal: Patient will participate in Venous Thrombosis (VTE)/Deep Vein Thrombosis (DVT)Prevention Measures  Outcome: PROGRESSING AS EXPECTED     Problem: Bowel/Gastric:  Goal: Normal bowel function is maintained or improved  Outcome: PROGRESSING AS EXPECTED  Goal: Will not experience complications related to bowel motility  Outcome: PROGRESSING AS EXPECTED     Problem: Knowledge Deficit  Goal: Knowledge of disease process/condition, treatment plan, diagnostic tests, and medications will improve  Outcome: PROGRESSING AS EXPECTED  Goal: Knowledge of the prescribed therapeutic regimen will improve  Outcome: PROGRESSING AS EXPECTED     Problem: Discharge Barriers/Planning  Goal: Patient's continuum of care needs will be met  Outcome: PROGRESSING AS EXPECTED     Problem: Skin Integrity  Goal: Risk for impaired skin integrity will decrease  Outcome: PROGRESSING AS EXPECTED     Problem: Pain Management  Goal: Pain level will decrease to patient's comfort goal  Outcome: PROGRESSING AS EXPECTED     Problem: Respiratory:  Goal: Respiratory status will improve  Outcome: PROGRESSING AS EXPECTED     Problem: Psychosocial Needs:  Goal: Level of anxiety will decrease  Outcome: PROGRESSING AS EXPECTED

## 2020-03-26 NOTE — FLOWSHEET NOTE
03/25/20 0833   Events/Summary/Plan   Events/Summary/Plan 02 spot check   Vital Signs   Pulse 64   Respiration 20   Pulse Oximetry 97 %   $ Pulse Oximetry (Spot Check) Yes   Respiratory Assessment   Level of Consciousness Alert   Chest Exam   Chest Observation Barrel Chest   Oxygen   O2 Delivery Device None - Room Air   Non-Invasive Ventilation [employer]   Nocturnal CPAP or BIPAP CPAP - Home Unit

## 2020-03-26 NOTE — DISCHARGE INSTRUCTIONS
Fall Prevention in the Home  Introduction  Falls can cause injuries. They can happen to people of all ages. There are many things you can do to make your home safe and to help prevent falls.  What can I do on the outside of my home?  · Regularly fix the edges of walkways and driveways and fix any cracks.  · Remove anything that might make you trip as you walk through a door, such as a raised step or threshold.  · Trim any bushes or trees on the path to your home.  · Use bright outdoor lighting.  · Clear any walking paths of anything that might make someone trip, such as rocks or tools.  · Regularly check to see if handrails are loose or broken. Make sure that both sides of any steps have handrails.  · Any raised decks and porches should have guardrails on the edges.  · Have any leaves, snow, or ice cleared regularly.  · Use sand or salt on walking paths during winter.  · Clean up any spills in your garage right away. This includes oil or grease spills.  What can I do in the bathroom?  · Use night lights.  · Install grab bars by the toilet and in the tub and shower. Do not use towel bars as grab bars.  · Use non-skid mats or decals in the tub or shower.  · If you need to sit down in the shower, use a plastic, non-slip stool.  · Keep the floor dry. Clean up any water that spills on the floor as soon as it happens.  · Remove soap buildup in the tub or shower regularly.  · Attach bath mats securely with double-sided non-slip rug tape.  · Do not have throw rugs and other things on the floor that can make you trip.  What can I do in the bedroom?  · Use night lights.  · Make sure that you have a light by your bed that is easy to reach.  · Do not use any sheets or blankets that are too big for your bed. They should not hang down onto the floor.  · Have a firm chair that has side arms. You can use this for support while you get dressed.  · Do not have throw rugs and other things on the floor that can make you trip.  What can  I do in the kitchen?  · Clean up any spills right away.  · Avoid walking on wet floors.  · Keep items that you use a lot in easy-to-reach places.  · If you need to reach something above you, use a strong step stool that has a grab bar.  · Keep electrical cords out of the way.  · Do not use floor polish or wax that makes floors slippery. If you must use wax, use non-skid floor wax.  · Do not have throw rugs and other things on the floor that can make you trip.  What can I do with my stairs?  · Do not leave any items on the stairs.  · Make sure that there are handrails on both sides of the stairs and use them. Fix handrails that are broken or loose. Make sure that handrails are as long as the stairways.  · Check any carpeting to make sure that it is firmly attached to the stairs. Fix any carpet that is loose or worn.  · Avoid having throw rugs at the top or bottom of the stairs. If you do have throw rugs, attach them to the floor with carpet tape.  · Make sure that you have a light switch at the top of the stairs and the bottom of the stairs. If you do not have them, ask someone to add them for you.  What else can I do to help prevent falls?  · Wear shoes that:  ¨ Do not have high heels.  ¨ Have rubber bottoms.  ¨ Are comfortable and fit you well.  ¨ Are closed at the toe. Do not wear sandals.  · If you use a stepladder:  ¨ Make sure that it is fully opened. Do not climb a closed stepladder.  ¨ Make sure that both sides of the stepladder are locked into place.  ¨ Ask someone to hold it for you, if possible.  · Clearly nestor and make sure that you can see:  ¨ Any grab bars or handrails.  ¨ First and last steps.  ¨ Where the edge of each step is.  · Use tools that help you move around (mobility aids) if they are needed. These include:  ¨ Canes.  ¨ Walkers.  ¨ Scooters.  ¨ Crutches.  · Turn on the lights when you go into a dark area. Replace any light bulbs as soon as they burn out.  · Set up your furniture so you have a  clear path. Avoid moving your furniture around.  · If any of your floors are uneven, fix them.  · If there are any pets around you, be aware of where they are.  · Review your medicines with your doctor. Some medicines can make you feel dizzy. This can increase your chance of falling.  Ask your doctor what other things that you can do to help prevent falls.  This information is not intended to replace advice given to you by your health care provider. Make sure you discuss any questions you have with your health care provider.  Document Released: 10/14/2010 Document Revised: 05/25/2017 Document Reviewed: 01/22/2016  © 2017 Elsevier      Suicidal Feelings: How to Help Yourself  Suicide is the taking of one's own life. If you feel as though life is getting too tough to handle and are thinking about suicide, get help right away. To get help:  · Call your local emergency services (911 in the [place]  · Call a suicide hotline to speak with a trained counselor who understands how you are feeling. The following is a list of suicide hotlines in the [place]. For a list of hotlines in [place], visit www.suicide.org/hotlines/international/msvofn-hirtdng-hmbrqqkd.html.  ¨ 2-846-231-TALK (1-316.682.6783).  ¨ 6-299-LRIQBNO (1-316.598.2134).  ¨ 1-848.739.4535. This is a hotline for Citizen of [place] speakers.  ¨ 7-375-986-4TTY (1-283.230.5519). This is a hotline for TTY users.  ¨ 0-203-7-U-YO (1-114.123.6454). This is a hotline for lesbian, olivares, bisexual, transgender, or questioning youth.  · Contact a crisis center or a local suicide prevention center. To find a crisis center or suicide prevention center:  ¨ Call your local hospital, clinic, community service organization, mental health center, social service provider, or health department. Ask for assistance in connecting to a crisis center.  ¨ Visit www.suicidepreventionlifeline.org/getinvolved/ for a list of crisis centers in the [place], or visit  www.suicideprevention.ca/rreqqsuc-cgwxd-ylhkeeq/find-a-crisis-centre for a list of centers in [place].  · Visit the following websites:  ¨ National Suicide Prevention Lifeline: www.suicidepreventionlifeline.org  ¨ Hopeline: www.hopeline.com  ¨ American Foundation for Suicide Prevention: www.afsp.org  ¨ The Meek Project (for lesbian, olivares, bisexual, transgender, or questioning youth): www.thetrevorproject.org  How can I help myself feel better?  · Promise yourself that you will not do anything drastic when you have suicidal feelings. Remember, there is hope. Many people have gotten through suicidal thoughts and feelings, and you will, too. You may have gotten through them before, and this proves that you can get through them again.  · Let family, friends, teachers, or counselors know how you are feeling. Try not to isolate yourself from those who care about you. Remember, they will want to help you. Talk with someone every day, even if you do not feel sociable. Face-to-face conversation is best.  · Call a mental health professional and see one regularly.  · Visit your primary health care provider every year.  · Eat a well-balanced diet, and space your meals so you eat regularly.  · Get plenty of rest.  · Avoid alcohol and drugs, and remove them from your home. They will only make you feel worse.  · If you are thinking of taking a lot of medicine, give your medicine to someone who can give it to you one day at a time. If you are on antidepressants and are concerned you will overdose, let your health care provider know so he or she can give you safer medicines. Ask your mental health professional about the possible side effects of any medicines you are taking.  · Remove weapons, poisons, knives, and anything else that could harm you from your home.  · Try to stick to routines. Follow a schedule every day. Put self-care on your schedule.  · Make a list of realistic goals, and cross them off when you achieve them.  Accomplishments give a sense of worth.  · Wait until you are feeling better before doing the things you find difficult or unpleasant.  · Exercise if you are able. You will feel better if you exercise for even a half hour each day.  · Go out in the sun or into nature. This will help you recover from depression faster. If you have a favorite place to walk, go there.  · Do the things that have always given you pleasure. Play your favorite music, read a good book, paint a picture, play your favorite instrument, or do anything else that takes your mind off your depression if it is safe to do.  · Keep your living space well lit.  · When you are feeling well, write yourself a letter about tips and support that you can read when you are not feeling well.  · Remember that life’s difficulties can be sorted out with help. Conditions can be treated. You can work on thoughts and strategies that serve you well.  This information is not intended to replace advice given to you by your health care provider. Make sure you discuss any questions you have with your health care provider.  Document Released: 06/23/2004 Document Revised: 08/16/2017 Document Reviewed: 04/14/2015  [employer] Interactive Patient Education © 2017 [employer]  Occupational Therapy Discharge Instructions for Jose Mc    3/25/2020    Level of Assist Required for Eating: Able to Complete Eating without Assist  Level of Assist Required for Grooming: Able to Complete Grooming without Assist  Level of Assist Required for Dressing: Able to Complete Dressing without Assist  Level of Assist Required for Toileting: Able to Complete Toileting without Assist  Level of Assist Required for Toilet Transfer: Requires Supervision with Toilet Transfer  Equipment for Toilet Transfer: Raised Toilet Seat without Arms, Grab Bars by Toilet  Level of Assist Required for Bathing: Able to Complete Bathing without Assist  Equipment for Bathing: Tub Transfer Bench, Grab Bars in  Tub / Shower, Hand Held Shower Head, Other(toileting aid)  Level of Assist Required for Shower Transfer: Requires Supervision with Shower Transfer  Equipment for Shower Transfer: Grab Bars in Tub / Shower, Tub Transfer Bench  Level of Assist Required for Home Mgmt: Requires Supervision with Home Management  Level of Assist Required for Meal Prep: Requires Supervision with Meal Preparation  Driving: May not Drive, Please Contact Physician for Further Information    It was a pleasure working with you Jose. Take care and good luck in your continued recovery!   - Radha Pierre OT/L

## 2020-03-30 NOTE — DISCHARGE PLANNING
Received written confirmation of insurance authorization to 3/26/12. To [facility] for scanning.

## 2020-04-07 ENCOUNTER — OFFICE VISIT (OUTPATIENT)
Dept: MEDICAL GROUP | Facility: PHYSICIAN GROUP | Age: 65
End: 2020-04-07
Payer: MEDICARE

## 2020-04-07 VITALS
OXYGEN SATURATION: 91 % | TEMPERATURE: 97.5 F | DIASTOLIC BLOOD PRESSURE: 58 MMHG | HEIGHT: 72 IN | WEIGHT: 315 LBS | HEART RATE: 69 BPM | BODY MASS INDEX: 42.66 KG/M2 | SYSTOLIC BLOOD PRESSURE: 118 MMHG | RESPIRATION RATE: 16 BRPM

## 2020-04-07 DIAGNOSIS — Z86.718 HISTORY OF DVT (DEEP VEIN THROMBOSIS): ICD-10-CM

## 2020-04-07 DIAGNOSIS — E11.9 CONTROLLED TYPE 2 DIABETES MELLITUS WITHOUT COMPLICATION, WITHOUT LONG-TERM CURRENT USE OF INSULIN (HCC): ICD-10-CM

## 2020-04-07 DIAGNOSIS — Z78.9 IMPAIRED MOBILITY AND ADLS: ICD-10-CM

## 2020-04-07 DIAGNOSIS — Z89.512 STATUS POST BELOW-KNEE AMPUTATION OF LEFT LOWER EXTREMITY (HCC): ICD-10-CM

## 2020-04-07 DIAGNOSIS — Z74.09 IMPAIRED MOBILITY AND ADLS: ICD-10-CM

## 2020-04-07 DIAGNOSIS — Z86.19 HISTORY OF CLOSTRIDIOIDES DIFFICILE INFECTION: ICD-10-CM

## 2020-04-07 DIAGNOSIS — Z09 HOSPITAL DISCHARGE FOLLOW-UP: ICD-10-CM

## 2020-04-07 PROBLEM — E08.65 DIABETES MELLITUS DUE TO UNDERLYING CONDITION WITH HYPERGLYCEMIA (HCC): Status: RESOLVED | Noted: 2020-03-10 | Resolved: 2020-04-07

## 2020-04-07 PROBLEM — N17.9 AKI (ACUTE KIDNEY INJURY) (HCC): Status: RESOLVED | Noted: 2020-03-18 | Resolved: 2020-04-07

## 2020-04-07 PROBLEM — L97.519 TYPE 2 DIABETES MELLITUS WITH RIGHT DIABETIC FOOT ULCER (HCC): Status: RESOLVED | Noted: 2020-01-21 | Resolved: 2020-04-07

## 2020-04-07 PROBLEM — E11.621 TYPE 2 DIABETES MELLITUS WITH RIGHT DIABETIC FOOT ULCER (HCC): Status: RESOLVED | Noted: 2020-01-21 | Resolved: 2020-04-07

## 2020-04-07 PROBLEM — E87.6 HYPOKALEMIA: Status: RESOLVED | Noted: 2020-03-18 | Resolved: 2020-04-07

## 2020-04-07 PROBLEM — R19.7 DIARRHEA: Status: RESOLVED | Noted: 2020-03-14 | Resolved: 2020-04-07

## 2020-04-07 PROBLEM — I80.8 PHLEBITIS OF LEFT ARM: Status: RESOLVED | Noted: 2020-03-11 | Resolved: 2020-04-07

## 2020-04-07 PROCEDURE — 99214 OFFICE O/P EST MOD 30 MIN: CPT | Performed by: FAMILY MEDICINE

## 2020-04-07 PROCEDURE — 99358 PROLONG SERVICE W/O CONTACT: CPT | Performed by: FAMILY MEDICINE

## 2020-04-07 RX ORDER — VANCOMYCIN HYDROCHLORIDE 125 MG/1
CAPSULE ORAL
COMMUNITY
Start: 2020-04-02 | End: 2021-05-10

## 2020-04-07 ASSESSMENT — ENCOUNTER SYMPTOMS
EYES NEGATIVE: 1
DIARRHEA: 1
DIZZINESS: 0
RESPIRATORY NEGATIVE: 1
HEADACHES: 0
BLURRED VISION: 0
CARDIOVASCULAR NEGATIVE: 1
DEPRESSION: 0
HEMOPTYSIS: 0
MUSCULOSKELETAL NEGATIVE: 1
DOUBLE VISION: 0
PALPITATIONS: 0
NEUROLOGICAL NEGATIVE: 1
NAUSEA: 0
HEARTBURN: 0
CHILLS: 0
COUGH: 0
BRUISES/BLEEDS EASILY: 0
PSYCHIATRIC NEGATIVE: 1
FEVER: 0
CONSTITUTIONAL NEGATIVE: 1
MYALGIAS: 0
TINGLING: 0

## 2020-04-07 ASSESSMENT — FIBROSIS 4 INDEX: FIB4 SCORE: 1.2

## 2020-04-07 NOTE — PROGRESS NOTES
Subjective:      Jose Mc is a 64 y.o. male who presents with Follow-Up (left leg amputation and rehab stay) and Diarrhea (for 1.5 weeks believes its from abx)            In addition to the time for this e/m visit, another 35 minutes were spent in case management either reviewing old records or arranging for this patient's care and so an additional 38521 code is being used  The time spent were from 1200 to 1245          1. Hospital discharge follow-up  Recent admit for infected left diabetic foot, had a left BKA done and then went into rehab  Now back at home and getting HH and PT/OT. Seen by orthotics and to have compression of stump starting next week to get started on prosthesis    2. Status post below-knee amputation of left lower extremity (HCC)  Wound healing well today without infection    3. Controlled type 2 diabetes mellitus without complication, without long-term current use of insulin (formerly [facility])  Currently treated for DM, taking meds and checking bs at home, trying to do DM diet.controlled      4. History of Clostridioides difficile infection  Started on vanc yesterday for diarrhea that turned out to be + for c. Diff  Improving sx  - REFERRAL TO GASTROENTEROLOGY    5. History of DVT (deep vein thrombosis)  On coag    6. Impaired mobility and ADLs  In manual wc now, able to do adl's at home with family and HH    Past Medical History:  No date: At risk for sleep apnea  No date: Diabetes (HCC)  No date: Diabetic neuropathy (HCC)  No date: DVT (deep venous thrombosis) (formerly [facility])  No date: Hyperlipidemia  No date: Hypertension  No date: Sleep apnea  Past Surgical History:  No date: TOE AMPUTATION; Left  Social History    Tobacco Use      Smoking status: Former Smoker        Packs/day: 0.00        Quit date: 1980        Years since quittin.2      Smokeless tobacco: Never Used    Alcohol use: Not Currently      Frequency: Monthly or less      Comment: OCC    Drug use: Never    Review of patient's  family history indicates:  Problem: Cancer      Relation: Mother          Age of Onset: (Not Specified)  Problem: Cancer      Relation: Father          Age of Onset: (Not Specified)      Current Outpatient Medications: •  vancomycin (VANCOCIN) 125 MG capsule, TK 1 C PO Q 6 H FOR 10 DAYS, Disp: , Rfl: •  rivaroxaban (XARELTO) 20 MG Tab tablet, Take 1 Tab by mouth with dinner., Disp: 30 Tab, Rfl: 0•  atenolol (TENORMIN) 100 MG Tab, Take 1 Tab by mouth every day., Disp: 30 Tab, Rfl: 0•  atorvastatin (LIPITOR) 20 MG Tab, Take 1 Tab by mouth every evening., Disp: 30 Tab, Rfl: 0•  metFORMIN (GLUCOPHAGE) 500 MG Tab, Take 0.5 Tabs by mouth 2 times a day, with meals., Disp: 60 Tab, Rfl: 0•  gabapentin (NEURONTIN) 300 MG Cap, Take 1 Cap by mouth 3 times a day., Disp: 90 Cap, Rfl: 0•  morphine ER (MS CONTIN) 15 MG Tab CR tablet, Take 1 Tab by mouth every 12 hours for 30 days., Disp: 60 Tab, Rfl: 0•  oxyCODONE-acetaminophen (PERCOCET) 5-325 MG Tab, Take 1 Tab by mouth every morning., Disp: , Rfl: •  simethicone (MYLICON) 80 MG Chew Tab, Take 1 Tab by mouth 3 times a day, with meals., Disp: 30 Tab, Rfl: 3•  vitamin D (VITAMIND D3) 1000 UNIT Tab, Take 1 Tab by mouth every day., Disp: 60 Tab, Rfl:     Patient was instructed on the use of medications, either prescriptions or OTC and informed on when the appropriate follow up time period should be. In addition, patient was also instructed that should any acute worsening occur that they should notify this clinic asap or call 911.        Diarrhea    Pertinent negatives include no chills, coughing, fever, headaches or myalgias.       Review of Systems   Constitutional: Negative.  Negative for chills and fever.   HENT: Negative.  Negative for hearing loss.    Eyes: Negative.  Negative for blurred vision and double vision.   Respiratory: Negative.  Negative for cough and hemoptysis.    Cardiovascular: Negative.  Negative for chest pain and palpitations.   Gastrointestinal: Positive for  diarrhea. Negative for heartburn and nausea.   Genitourinary: Negative.  Negative for dysuria.   Musculoskeletal: Negative.  Negative for myalgias.   Skin: Negative.  Negative for rash.   Neurological: Negative.  Negative for dizziness, tingling and headaches.   Endo/Heme/Allergies: Negative.  Does not bruise/bleed easily.   Psychiatric/Behavioral: Negative.  Negative for depression and suicidal ideas.   All other systems reviewed and are negative.         Objective:     /58 (BP Location: Left arm, Patient Position: Sitting)   Pulse 69   Temp 36.4 °C (97.5 °F) (Tympanic)   Resp 16   Ht 1.829 m (6')   Wt (!) 147.4 kg (325 lb)   SpO2 91%   BMI 44.08 kg/m²      Physical Exam  Vitals signs and nursing note reviewed.   Constitutional:       General: He is not in acute distress.     Appearance: He is well-developed. He is not diaphoretic.   HENT:      Head: Normocephalic and atraumatic.      Mouth/Throat:      Pharynx: No oropharyngeal exudate.   Eyes:      Pupils: Pupils are equal, round, and reactive to light.   Cardiovascular:      Rate and Rhythm: Normal rate and regular rhythm.      Heart sounds: Normal heart sounds. No murmur. No friction rub. No gallop.    Pulmonary:      Effort: Pulmonary effort is normal. No respiratory distress.      Breath sounds: Normal breath sounds. No wheezing or rales.   Chest:      Chest wall: No tenderness.   Skin:     Comments: Left BKA site healing well, 2 cm by 5 mm open area in center where he bumped it last week but no signs of infection   Neurological:      Mental Status: He is alert and oriented to person, place, and time.   Psychiatric:         Behavior: Behavior normal.         Thought Content: Thought content normal.         Judgment: Judgment normal.                 Assessment/Plan:       1. Hospital discharge follow-up      2. Status post below-knee amputation of left lower extremity (HCC)      3. Controlled type 2 diabetes mellitus without complication, without  long-term current use of insulin (HCC)      4. History of Clostridioides difficile infection    - REFERRAL TO GASTROENTEROLOGY    5. History of DVT (deep vein thrombosis)      6. Impaired mobility and ADLs

## 2020-04-14 ENCOUNTER — TELEPHONE (OUTPATIENT)
Dept: MEDICAL GROUP | Facility: PHYSICIAN GROUP | Age: 65
End: 2020-04-14

## 2020-04-24 ENCOUNTER — PATIENT OUTREACH (OUTPATIENT)
Dept: HEALTH INFORMATION MANAGEMENT | Facility: OTHER | Age: 65
End: 2020-04-24

## 2020-05-05 ENCOUNTER — OFFICE VISIT (OUTPATIENT)
Dept: MEDICAL GROUP | Facility: PHYSICIAN GROUP | Age: 65
End: 2020-05-05
Payer: MEDICARE

## 2020-05-05 VITALS
TEMPERATURE: 97.8 F | RESPIRATION RATE: 18 BRPM | HEIGHT: 72 IN | HEART RATE: 67 BPM | WEIGHT: 315 LBS | SYSTOLIC BLOOD PRESSURE: 110 MMHG | DIASTOLIC BLOOD PRESSURE: 56 MMHG | OXYGEN SATURATION: 96 % | BODY MASS INDEX: 42.66 KG/M2

## 2020-05-05 DIAGNOSIS — Z86.19 HISTORY OF CLOSTRIDIOIDES DIFFICILE INFECTION: ICD-10-CM

## 2020-05-05 DIAGNOSIS — Z89.512 STATUS POST BELOW-KNEE AMPUTATION OF LEFT LOWER EXTREMITY (HCC): ICD-10-CM

## 2020-05-05 DIAGNOSIS — E11.9 CONTROLLED TYPE 2 DIABETES MELLITUS WITHOUT COMPLICATION, WITHOUT LONG-TERM CURRENT USE OF INSULIN (HCC): ICD-10-CM

## 2020-05-05 PROCEDURE — 99214 OFFICE O/P EST MOD 30 MIN: CPT | Performed by: FAMILY MEDICINE

## 2020-05-05 ASSESSMENT — FIBROSIS 4 INDEX: FIB4 SCORE: 1.22

## 2020-05-06 ASSESSMENT — ENCOUNTER SYMPTOMS
NEUROLOGICAL NEGATIVE: 1
PSYCHIATRIC NEGATIVE: 1
BRUISES/BLEEDS EASILY: 0
NAUSEA: 0
MYALGIAS: 0
PALPITATIONS: 0
EYES NEGATIVE: 1
MUSCULOSKELETAL NEGATIVE: 1
RESPIRATORY NEGATIVE: 1
CONSTITUTIONAL NEGATIVE: 1
DEPRESSION: 0
HEADACHES: 0
FEVER: 0
DOUBLE VISION: 0
DIARRHEA: 1
HEARTBURN: 0
DIZZINESS: 0
COUGH: 0
CARDIOVASCULAR NEGATIVE: 1
CHILLS: 0
HEMOPTYSIS: 0
TINGLING: 0
BLURRED VISION: 0

## 2020-05-06 NOTE — PROGRESS NOTES
Subjective:      Jose Mc is a 65 y.o. male who presents with Follow-Up (Dale Hernández notes in media, Cdiff)            1. History of Clostridioides difficile infection  Recent bout of cdiff from his hospitalization for his bka, took vanco for 2 weeks but then when he went home and stopped vanco diarrhea returned and he got dehydrated and weak, went back to er and at [facility] for several days for ivf and started vanco po again. Now on it for 6 weeks and has GI appt next week, should it return given lab order for testing and will f/u with gi, may need stool transplant in the future if this returns after stopping vanco   - C DIFFICILE TOXINS A+B, EIA    2. Status post below-knee amputation of left lower extremity (HCC)  Doing wound care HH    3. Controlled type 2 diabetes mellitus without complication, without long-term current use of insulin ([facility])  Currently treated for DM, taking meds and checking bs at home, trying to do DM diet.controlled      Past Medical History:  No date: At risk for sleep apnea  No date: Diabetes ([facility])  No date: Diabetic neuropathy ([facility])  No date: DVT (deep venous thrombosis) ([facility])  No date: Hyperlipidemia  No date: Hypertension  No date: Sleep apnea  Past Surgical History:  No date: TOE AMPUTATION; Left  Social History    Tobacco Use      Smoking status: Former Smoker        Packs/day: 0.00        Quit date: 1980        Years since quittin.3      Smokeless tobacco: Never Used    Alcohol use: Not Currently      Frequency: Monthly or less      Comment: OCC    Drug use: Never    Review of patient's family history indicates:  Problem: Cancer      Relation: Mother          Age of Onset: (Not Specified)  Problem: Cancer      Relation: Father          Age of Onset: (Not Specified)      Current Outpatient Medications: •  vancomycin (VANCOCIN) 125 MG capsule, TK 1 C PO Q 6 H FOR 10 DAYS, Disp: , Rfl: •  rivaroxaban (XARELTO) 20 MG Tab tablet, Take 1 Tab by mouth with dinner., Disp: 30  Tab, Rfl: 0•  atenolol (TENORMIN) 100 MG Tab, Take 1 Tab by mouth every day., Disp: 30 Tab, Rfl: 0•  atorvastatin (LIPITOR) 20 MG Tab, Take 1 Tab by mouth every evening., Disp: 30 Tab, Rfl: 0•  metFORMIN (GLUCOPHAGE) 500 MG Tab, Take 0.5 Tabs by mouth 2 times a day, with meals., Disp: 60 Tab, Rfl: 0•  gabapentin (NEURONTIN) 300 MG Cap, Take 1 Cap by mouth 3 times a day., Disp: 90 Cap, Rfl: 0•  vitamin D (VITAMIND D3) 1000 UNIT Tab, Take 1 Tab by mouth every day., Disp: 60 Tab, Rfl: •  oxyCODONE-acetaminophen (PERCOCET) 5-325 MG Tab, Take 1 Tab by mouth every morning., Disp: , Rfl: •  simethicone (MYLICON) 80 MG Chew Tab, Take 1 Tab by mouth 3 times a day, with meals., Disp: 30 Tab, Rfl: 3    Patient was instructed on the use of medications, either prescriptions or OTC and informed on when the appropriate follow up time period should be. In addition, patient was also instructed that should any acute worsening occur that they should notify this clinic asap or call 911.          Review of Systems   Constitutional: Negative.  Negative for chills and fever.   HENT: Negative.  Negative for hearing loss.    Eyes: Negative.  Negative for blurred vision and double vision.   Respiratory: Negative.  Negative for cough and hemoptysis.    Cardiovascular: Negative.  Negative for chest pain and palpitations.   Gastrointestinal: Positive for diarrhea. Negative for heartburn and nausea.   Genitourinary: Negative.  Negative for dysuria.   Musculoskeletal: Negative.  Negative for myalgias.   Skin: Negative.  Negative for rash.   Neurological: Negative.  Negative for dizziness, tingling and headaches.   Endo/Heme/Allergies: Negative.  Does not bruise/bleed easily.   Psychiatric/Behavioral: Negative.  Negative for depression and suicidal ideas.   All other systems reviewed and are negative.         Objective:     /56 (BP Location: Right arm, Patient Position: Sitting)   Pulse 67   Temp 36.6 °C (97.8 °F) (Tympanic)   Resp 18    Ht 1.829 m (6')   Wt (!) 147.4 kg (325 lb) Comment: pt stated  SpO2 96%   BMI 44.08 kg/m²      Physical Exam  Vitals signs and nursing note reviewed.   Constitutional:       General: He is not in acute distress.     Appearance: He is well-developed. He is not diaphoretic.   HENT:      Head: Normocephalic and atraumatic.      Mouth/Throat:      Pharynx: No oropharyngeal exudate.   Eyes:      Pupils: Pupils are equal, round, and reactive to light.   Cardiovascular:      Rate and Rhythm: Normal rate and regular rhythm.      Heart sounds: Normal heart sounds. No murmur. No friction rub. No gallop.    Pulmonary:      Effort: Pulmonary effort is normal. No respiratory distress.      Breath sounds: Normal breath sounds. No wheezing or rales.   Chest:      Chest wall: No tenderness.   Musculoskeletal:      Comments: Left bka site healing well   Neurological:      Mental Status: He is alert and oriented to person, place, and time.   Psychiatric:         Behavior: Behavior normal.         Thought Content: Thought content normal.         Judgment: Judgment normal.                 Assessment/Plan:       1. History of Clostridioides difficile infection    - C DIFFICILE TOXINS A+B, EIA    2. Status post below-knee amputation of left lower extremity ([facility])      3. Controlled type 2 diabetes mellitus without complication, without long-term current use of insulin ([facility])

## 2020-05-20 ENCOUNTER — TELEPHONE (OUTPATIENT)
Dept: MEDICAL GROUP | Facility: PHYSICIAN GROUP | Age: 65
End: 2020-05-20

## 2020-05-20 NOTE — TELEPHONE ENCOUNTER
Emmanuelle Melgoza called stating pts bp has been consistently high and they wanted to let you know and see if you wanted to do anything. I have scanned the vital report from [facility] into pts media. Please review and advise.

## 2020-05-22 ENCOUNTER — PATIENT OUTREACH (OUTPATIENT)
Dept: HEALTH INFORMATION MANAGEMENT | Facility: OTHER | Age: 65
End: 2020-05-22

## 2020-05-22 NOTE — PROGRESS NOTES
A 64-year-old male was an emergent admission to [facility] from 4/22/2020 to 4/23/2020 to treat clostridium difficile colitis. The patient was discharged home with [facility]. The patient's medical conditions included: amputation of his L foot, Hypertension, Diabetes, Hyperlipidemia, Deep Vain Thrombosis, and Peripheral Neuropathy. The patient was not under clinical case management.     The patient was discharged home to start the following medication: Vancomycin Hydrochloride. The patient successfully filled his medication and was taking as prescribed.     The patient was discharged to follow-up with his GI provider, his Primary Care Physician, and [facility]. The patient had the following appointments:  On 4/24/2020 @ 12:30 the patient had start of care with [facility] which was kept.   On 4/30/2020 @ 3:00 the patient had outpatient services with infectious disease at [facility] which was confirmed as kept.  On 5/5/2020 @ 1:45 the patient had a follow up appointment with his primary care doctor, Theo James, which was confirmed as kept.  On 5/12/2020 @ 1:30 with [facility] Consultants doctor, Herve Lindsay, which was confirmed as kept.  On 5/19/2020 @ 1:45 the patient had a wellness appointment with his primary care doctor, Theo James, which was confirmed as kept.     The Patient Navigator successfully communicated with the patient post discharge and throughout the case. The Patient Navigator provided patient  information about RTC Access after identifying patient had transportation barriers. Patient ultimately elected to wait to sign up for RTC Access at this time.

## 2020-05-28 ENCOUNTER — PATIENT OUTREACH (OUTPATIENT)
Dept: HEALTH INFORMATION MANAGEMENT | Facility: OTHER | Age: 65
End: 2020-05-28

## 2020-05-28 SDOH — ECONOMIC STABILITY: FOOD INSECURITY: WITHIN THE PAST 12 MONTHS, THE FOOD YOU BOUGHT JUST DIDN'T LAST AND YOU DIDN'T HAVE MONEY TO GET MORE.: NEVER TRUE

## 2020-05-28 SDOH — ECONOMIC STABILITY: FOOD INSECURITY: WITHIN THE PAST 12 MONTHS, YOU WORRIED THAT YOUR FOOD WOULD RUN OUT BEFORE YOU GOT MONEY TO BUY MORE.: NEVER TRUE

## 2020-05-28 SDOH — ECONOMIC STABILITY: TRANSPORTATION INSECURITY
IN THE PAST 12 MONTHS, HAS THE LACK OF TRANSPORTATION KEPT YOU FROM MEDICAL APPOINTMENTS OR FROM GETTING MEDICATIONS?: NO

## 2020-05-28 SDOH — ECONOMIC STABILITY: TRANSPORTATION INSECURITY
IN THE PAST 12 MONTHS, HAS LACK OF TRANSPORTATION KEPT YOU FROM MEETINGS, WORK, OR FROM GETTING THINGS NEEDED FOR DAILY LIVING?: NO

## 2020-05-29 ENCOUNTER — TELEPHONE (OUTPATIENT)
Dept: MEDICAL GROUP | Facility: PHYSICIAN GROUP | Age: 65
End: 2020-05-29

## 2020-05-29 NOTE — TELEPHONE ENCOUNTER
VOICEMAIL  1. Caller Name: Ayla @ [facility]                          Call Back Number: [phone]    2. Message: Pt still has the wound on his foot so Ayla has re-certified the home health for wound care for visits twice a week until the wound starts to look better. The pt's Pendayla has also returned but the GI doctor is already following it.   Ayla called just to make you aware of the situation     3. Patient approves office to leave a detailed voicemail/MyChart message: N\A

## 2020-07-10 ENCOUNTER — TELEPHONE (OUTPATIENT)
Dept: MEDICAL GROUP | Facility: PHYSICIAN GROUP | Age: 65
End: 2020-07-10

## 2020-07-10 NOTE — TELEPHONE ENCOUNTER
Emmanuelle hobson Luck called and is down to one visit per week but it sending an order for Jacob to sign to go back to twice a week for 4 weeks due to a wound that needs to heal

## 2020-07-17 ENCOUNTER — TELEPHONE (OUTPATIENT)
Dept: MEDICAL GROUP | Facility: PHYSICIAN GROUP | Age: 65
End: 2020-07-17

## 2020-07-17 NOTE — TELEPHONE ENCOUNTER
1. Caller Name: Jose Handley jazlyn Melgoza                            Call Back Number: [phone] (home)           How would the patient prefer to be contacted with a response: Phone call OK to leave a detailed message    Emmanuelle jazlyn [place] called with Jose on speaker phone and pt is reported a significant amount of blood in his urine everytime he uses the bathroom for almost 2 days now. No pain;no fever; regular heart rate; /90; already on Xarelto. Pt has a history of kidney Stones and doesn't know if that is what is going on right now or not. Pt & [place] Nurse want advise on how to proceed

## 2020-07-20 DIAGNOSIS — Z79.01 CHRONIC ANTICOAGULATION: ICD-10-CM

## 2020-07-20 DIAGNOSIS — Z86.718 HISTORY OF DVT (DEEP VEIN THROMBOSIS): ICD-10-CM

## 2020-07-20 RX ORDER — ATORVASTATIN CALCIUM 20 MG/1
20 TABLET, FILM COATED ORAL EVERY EVENING
Qty: 100 TAB | Refills: 0 | Status: SHIPPED | OUTPATIENT
Start: 2020-07-20 | End: 2021-01-07 | Stop reason: SDUPTHER

## 2020-07-20 NOTE — TELEPHONE ENCOUNTER
rivaroxaban  20 MG Tab  1 tab by mouth with dinner  Qty 90    Atorvastatin  20 MG Tab  1 Tab by mouth every evening  Qty 90

## 2020-07-30 ENCOUNTER — TELEPHONE (OUTPATIENT)
Dept: MEDICAL GROUP | Facility: PHYSICIAN GROUP | Age: 65
End: 2020-07-30

## 2020-07-30 NOTE — TELEPHONE ENCOUNTER
Emmanuelle Melgoza recertfied physical therapy and skilled nursing; was just at [facility] discharged 7/29/20 for a flare up of cdif

## 2020-08-03 ENCOUNTER — TELEMEDICINE (OUTPATIENT)
Dept: MEDICAL GROUP | Facility: PHYSICIAN GROUP | Age: 65
End: 2020-08-03
Payer: MEDICARE

## 2020-08-03 VITALS
HEIGHT: 72 IN | SYSTOLIC BLOOD PRESSURE: 158 MMHG | DIASTOLIC BLOOD PRESSURE: 82 MMHG | WEIGHT: 230 LBS | BODY MASS INDEX: 31.15 KG/M2 | HEART RATE: 71 BPM

## 2020-08-03 DIAGNOSIS — I10 BENIGN ESSENTIAL HTN: ICD-10-CM

## 2020-08-03 DIAGNOSIS — L97.529 TYPE 2 DIABETES MELLITUS WITH LEFT DIABETIC FOOT ULCER (HCC): ICD-10-CM

## 2020-08-03 DIAGNOSIS — E11.621 TYPE 2 DIABETES MELLITUS WITH LEFT DIABETIC FOOT ULCER (HCC): ICD-10-CM

## 2020-08-03 DIAGNOSIS — M25.421 SWELLING OF RIGHT ELBOW: ICD-10-CM

## 2020-08-03 PROCEDURE — 99214 OFFICE O/P EST MOD 30 MIN: CPT | Mod: 95,CR | Performed by: FAMILY MEDICINE

## 2020-08-03 ASSESSMENT — FIBROSIS 4 INDEX: FIB4 SCORE: 1.22

## 2020-08-03 NOTE — PROGRESS NOTES
Telemedicine Visit: Established Patient     This evaluation was conducted via Zoom, using secure and encrypted videoconferencing technology.  The patient was physical located at Home in [place], [place] and the physician was located in   The patient was presented by self, at home.  The patient's identity was confirmed and verbal consent for the telemedicine encounter was obtained.      Subjective:   CC: right elbow swelling  Jose Mc is a 65 y.o. male presenting for evaluation and management of:    Woke up Saturday with right elbow swelling, denies any recent trauma or change in meds, never had anything like this before. Has been using tylenol and ice and much improved. Would like to get labs to get checked for gout and RA    ROS   Denies any recent fevers or chills. No nausea or vomiting. No chest pains or shortness of breath.     Allergies   Allergen Reactions   • Pcn [Penicillins] Unspecified     Given in eye drops as a child, eyes swell   • Cefazolin Rash   • Zyvox [Linezolid]        Current medicines (including changes today)  Current Outpatient Medications   Medication Sig Dispense Refill   • metFORMIN (GLUCOPHAGE) 500 MG Tab Take 0.5 Tabs by mouth 2 times a day, with meals. 200 Tab 0   • rivaroxaban (XARELTO) 20 MG Tab tablet Take 1 Tab by mouth with dinner. 100 Tab 0   • atorvastatin (LIPITOR) 20 MG Tab Take 1 Tab by mouth every evening. 100 Tab 0   • vancomycin (VANCOCIN) 125 MG capsule TK 1 C PO Q 6 H FOR 10 DAYS     • atenolol (TENORMIN) 100 MG Tab Take 1 Tab by mouth every day. 30 Tab 0   • gabapentin (NEURONTIN) 300 MG Cap Take 1 Cap by mouth 3 times a day. 90 Cap 0   • simethicone (MYLICON) 80 MG Chew Tab Take 1 Tab by mouth 3 times a day, with meals. 30 Tab 3   • vitamin D (VITAMIND D3) 1000 UNIT Tab Take 1 Tab by mouth every day. 60 Tab    • oxyCODONE-acetaminophen (PERCOCET) 5-325 MG Tab Take 1 Tab by mouth every morning.       No current facility-administered medications for this visit.         Patient Active Problem List    Diagnosis Date Noted   • Vitamin D deficiency 03/11/2020   • Impaired mobility and ADLs 03/11/2020   • Peripheral neuropathy 03/10/2020   • Status post below-knee amputation of left lower extremity (HCC) 03/10/2020   • Obstructive sleep apnea syndrome 03/10/2020   • Benign essential HTN 01/21/2020   • Controlled type 2 diabetes mellitus without complication, without long-term current use of insulin (HCC) 01/21/2020   • Mixed hyperlipidemia 01/21/2020   • History of DVT (deep vein thrombosis) 01/21/2020   • Chronic anticoagulation 01/21/2020   • Lumbar radicular syndrome 01/21/2020       Family History   Problem Relation Age of Onset   • Cancer Mother    • Cancer Father        He  has a past medical history of At risk for sleep apnea, Diabetes (HCC), Diabetic neuropathy (HCC), DVT (deep venous thrombosis) (HCC), Hyperlipidemia, Hypertension, and Sleep apnea.  He  has a past surgical history that includes toe amputation (Left).       Objective:   /82 (BP Location: Left arm, Patient Position: Sitting)   Pulse 71   Ht 1.829 m (6')   Wt 104.3 kg (230 lb)   BMI 31.19 kg/m²     Physical Exam:  Constitutional: Alert, no distress, well-groomed.  Skin: No rashes in visible areas.  Eye: Round. Conjunctiva clear, lids normal. No icterus.   ENMT: Lips pink without lesions, good dentition, moist mucous membranes. Phonation normal.  Neck: No masses, no thyromegaly. Moves freely without pain.  CV: Pulse as reported by patient  Respiratory: Unlabored respiratory effort, no cough or audible wheeze  Psych: Alert and oriented x3, normal affect and mood.   Right elbow now normal sized    Assessment and Plan:   The following treatment plan was discussed:     1. Swelling of right elbow  - URIC A+LAURO+RA QN+CRP+ASO  - Sed Rate; Future  - Comp Metabolic Panel; Future  - CBC WITH DIFFERENTIAL; Future    2. Type 2 diabetes mellitus with left diabetic foot ulcer (HCC)    3. Benign essential  HTN        Follow-up: No follow-ups on file.

## 2020-08-05 ENCOUNTER — HOSPITAL ENCOUNTER (OUTPATIENT)
Facility: MEDICAL CENTER | Age: 65
End: 2020-08-05
Attending: FAMILY MEDICINE
Payer: MEDICARE

## 2020-08-05 DIAGNOSIS — M25.421 SWELLING OF RIGHT ELBOW: ICD-10-CM

## 2020-08-05 LAB
ALBUMIN SERPL BCP-MCNC: 3.7 G/DL (ref 3.2–4.9)
ALBUMIN/GLOB SERPL: 1.2 G/DL
ALP SERPL-CCNC: 91 U/L (ref 30–99)
ALT SERPL-CCNC: 12 U/L (ref 2–50)
ANION GAP SERPL CALC-SCNC: 18 MMOL/L (ref 7–16)
AST SERPL-CCNC: 15 U/L (ref 12–45)
BASOPHILS # BLD AUTO: 0.8 % (ref 0–1.8)
BASOPHILS # BLD: 0.1 K/UL (ref 0–0.12)
BILIRUB SERPL-MCNC: 0.2 MG/DL (ref 0.1–1.5)
BUN SERPL-MCNC: 20 MG/DL (ref 8–22)
CALCIUM SERPL-MCNC: 9.2 MG/DL (ref 8.5–10.5)
CHLORIDE SERPL-SCNC: 103 MMOL/L (ref 96–112)
CO2 SERPL-SCNC: 21 MMOL/L (ref 20–33)
CREAT SERPL-MCNC: 1.21 MG/DL (ref 0.5–1.4)
CRP SERPL HS-MCNC: 3.28 MG/DL (ref 0–0.75)
EOSINOPHIL # BLD AUTO: 0.29 K/UL (ref 0–0.51)
EOSINOPHIL NFR BLD: 2.3 % (ref 0–6.9)
ERYTHROCYTE [DISTWIDTH] IN BLOOD BY AUTOMATED COUNT: 43.3 FL (ref 35.9–50)
GLOBULIN SER CALC-MCNC: 3.1 G/DL (ref 1.9–3.5)
GLUCOSE SERPL-MCNC: 180 MG/DL (ref 65–99)
HCT VFR BLD AUTO: 45.7 % (ref 42–52)
HGB BLD-MCNC: 14.3 G/DL (ref 14–18)
IMM GRANULOCYTES # BLD AUTO: 0.41 K/UL (ref 0–0.11)
IMM GRANULOCYTES NFR BLD AUTO: 3.3 % (ref 0–0.9)
LYMPHOCYTES # BLD AUTO: 1.78 K/UL (ref 1–4.8)
LYMPHOCYTES NFR BLD: 14.2 % (ref 22–41)
MCH RBC QN AUTO: 25.8 PG (ref 27–33)
MCHC RBC AUTO-ENTMCNC: 31.3 G/DL (ref 33.7–35.3)
MCV RBC AUTO: 82.5 FL (ref 81.4–97.8)
MONOCYTES # BLD AUTO: 0.45 K/UL (ref 0–0.85)
MONOCYTES NFR BLD AUTO: 3.6 % (ref 0–13.4)
NEUTROPHILS # BLD AUTO: 9.53 K/UL (ref 1.82–7.42)
NEUTROPHILS NFR BLD: 75.8 % (ref 44–72)
NRBC # BLD AUTO: 0 K/UL
NRBC BLD-RTO: 0 /100 WBC
PLATELET # BLD AUTO: 311 K/UL (ref 164–446)
PMV BLD AUTO: 10.1 FL (ref 9–12.9)
POTASSIUM SERPL-SCNC: 3.8 MMOL/L (ref 3.6–5.5)
PROT SERPL-MCNC: 6.8 G/DL (ref 6–8.2)
RBC # BLD AUTO: 5.54 M/UL (ref 4.7–6.1)
RHEUMATOID FACT SER IA-ACNC: <10 IU/ML (ref 0–14)
SODIUM SERPL-SCNC: 142 MMOL/L (ref 135–145)
URATE SERPL-MCNC: 9.9 MG/DL (ref 2.5–8.3)
WBC # BLD AUTO: 12.6 K/UL (ref 4.8–10.8)

## 2020-08-05 PROCEDURE — 86060 ANTISTREPTOLYSIN O TITER: CPT

## 2020-08-05 PROCEDURE — 86038 ANTINUCLEAR ANTIBODIES: CPT

## 2020-08-05 PROCEDURE — 84550 ASSAY OF BLOOD/URIC ACID: CPT

## 2020-08-05 PROCEDURE — 86431 RHEUMATOID FACTOR QUANT: CPT

## 2020-08-05 PROCEDURE — 80053 COMPREHEN METABOLIC PANEL: CPT

## 2020-08-05 PROCEDURE — 85652 RBC SED RATE AUTOMATED: CPT

## 2020-08-05 PROCEDURE — 86140 C-REACTIVE PROTEIN: CPT

## 2020-08-05 PROCEDURE — 85025 COMPLETE CBC W/AUTO DIFF WBC: CPT

## 2020-08-06 LAB — ERYTHROCYTE [SEDIMENTATION RATE] IN BLOOD BY WESTERGREN METHOD: 2 MM/HOUR (ref 0–20)

## 2020-08-06 RX ORDER — ALLOPURINOL 100 MG/1
100 TABLET ORAL DAILY
Qty: 30 TAB | Refills: 6 | Status: SHIPPED | OUTPATIENT
Start: 2020-08-06 | End: 2020-11-30 | Stop reason: SDUPTHER

## 2020-08-07 LAB
ASO AB SERPL-ACNC: 95 IU/ML (ref 0–330)
NUCLEAR IGG SER QL IA: NORMAL

## 2020-08-13 RX ORDER — HYDROCHLOROTHIAZIDE 25 MG/1
25 TABLET ORAL DAILY
Qty: 90 TAB | Refills: 1 | Status: SHIPPED | OUTPATIENT
Start: 2020-08-13 | End: 2021-01-29

## 2020-08-13 RX ORDER — HYDROCHLOROTHIAZIDE 25 MG/1
TABLET ORAL
COMMUNITY
Start: 2019-09-25 | End: 2020-08-13 | Stop reason: SDUPTHER

## 2020-11-10 DIAGNOSIS — Z86.718 HISTORY OF DVT (DEEP VEIN THROMBOSIS): ICD-10-CM

## 2020-11-10 DIAGNOSIS — Z79.01 CHRONIC ANTICOAGULATION: ICD-10-CM

## 2020-11-30 RX ORDER — ALLOPURINOL 100 MG/1
100 TABLET ORAL DAILY
Qty: 30 TAB | Refills: 6 | Status: SHIPPED | OUTPATIENT
Start: 2020-11-30 | End: 2021-01-29

## 2020-11-30 NOTE — TELEPHONE ENCOUNTER
Patient called requesting a refill of his allopurinol 100mg tablets     Last PCP Visit: 05/05/2020

## 2020-12-01 RX ORDER — ATENOLOL 100 MG/1
100 TABLET ORAL DAILY
Qty: 30 TAB | Refills: 0 | Status: SHIPPED | OUTPATIENT
Start: 2020-12-01 | End: 2021-01-07 | Stop reason: SDUPTHER

## 2021-01-07 RX ORDER — ATORVASTATIN CALCIUM 20 MG/1
20 TABLET, FILM COATED ORAL EVERY EVENING
Qty: 100 TAB | Refills: 0 | Status: SHIPPED | OUTPATIENT
Start: 2021-01-07 | End: 2021-04-22 | Stop reason: SDUPTHER

## 2021-01-07 RX ORDER — ATENOLOL 100 MG/1
100 TABLET ORAL DAILY
Qty: 30 TAB | Refills: 0 | Status: SHIPPED | OUTPATIENT
Start: 2021-01-07 | End: 2021-03-09 | Stop reason: SDUPTHER

## 2021-01-07 NOTE — TELEPHONE ENCOUNTER
1. Name: Jose Mc      Call Back Number: [phone] (home)          Patient called office in regarding needing a refill on 2 medications. He wanted to get his atenolol and atorvastatin. He stated that he had none left and if we could send in a refill request to Dr. James.

## 2021-01-29 DIAGNOSIS — Z79.01 CHRONIC ANTICOAGULATION: ICD-10-CM

## 2021-01-29 DIAGNOSIS — Z86.718 HISTORY OF DVT (DEEP VEIN THROMBOSIS): ICD-10-CM

## 2021-01-29 RX ORDER — ALLOPURINOL 100 MG/1
100 TABLET ORAL DAILY
Qty: 30 TAB | Refills: 6 | Status: SHIPPED | OUTPATIENT
Start: 2021-01-29 | End: 2021-05-19 | Stop reason: SDUPTHER

## 2021-01-29 RX ORDER — HYDROCHLOROTHIAZIDE 25 MG/1
25 TABLET ORAL DAILY
Qty: 90 TAB | Refills: 1 | Status: SHIPPED | OUTPATIENT
Start: 2021-01-29 | End: 2021-04-22 | Stop reason: SDUPTHER

## 2021-02-12 ENCOUNTER — TELEPHONE (OUTPATIENT)
Dept: MEDICAL GROUP | Facility: PHYSICIAN GROUP | Age: 66
End: 2021-02-12

## 2021-02-12 NOTE — TELEPHONE ENCOUNTER
Patient called would like a medication refill on Atenolol 100MG. He usually gets a month supply. Thank you

## 2021-03-09 RX ORDER — ATENOLOL 100 MG/1
100 TABLET ORAL DAILY
Qty: 90 TABLET | Refills: 0 | Status: SHIPPED | OUTPATIENT
Start: 2021-03-09 | End: 2021-06-07 | Stop reason: SDUPTHER

## 2021-03-09 RX ORDER — LISINOPRIL 40 MG/1
40 TABLET ORAL DAILY
Qty: 90 TABLET | Refills: 0 | Status: SHIPPED | OUTPATIENT
Start: 2021-03-09 | End: 2021-06-07 | Stop reason: SDUPTHER

## 2021-03-09 NOTE — TELEPHONE ENCOUNTER
Received request via: Patient    Was the patient seen in the last year in this department? Yes    Does the patient have an active prescription (recently filled or refills available) for medication(s) requested? No       Requested Prescriptions     Pending Prescriptions Disp Refills   • atenolol (TENORMIN) 100 MG Tab 90 tablet 0     Sig: Take 1 tablet by mouth every day.   • lisinopril (PRINIVIL) 40 MG tablet 90 tablet 0     Sig: Take 1 tablet by mouth every day.

## 2021-04-22 NOTE — TELEPHONE ENCOUNTER
Patient called requesting a refill of the following medications:     -Atorvastatin 20mg tablets   -Hydrochlorothiazide 25mg tablets     Last office visit: 08/30/20  Next scheduled appointment: 04/28/21      Patient still uses the [facility] pharmacy on [address] here in [place]

## 2021-04-22 NOTE — TELEPHONE ENCOUNTER
.Received request via: Patient    Was the patient seen in the last year in this department? Yes    Does the patient have an active prescription (recently filled or refills available) for medication(s) requested? No     Requested Prescriptions     Pending Prescriptions Disp Refills   • atorvastatin (LIPITOR) 20 MG Tab 100 tablet 0     Sig: Take 1 tablet by mouth every evening.   • hydroCHLOROthiazide (HYDRODIURIL) 25 MG Tab 90 tablet 1     Sig: Take 1 tablet by mouth every day.

## 2021-04-23 RX ORDER — HYDROCHLOROTHIAZIDE 25 MG/1
25 TABLET ORAL DAILY
Qty: 90 TABLET | Refills: 1 | Status: SHIPPED | OUTPATIENT
Start: 2021-04-23 | End: 2021-11-05 | Stop reason: SDUPTHER

## 2021-04-23 RX ORDER — ATORVASTATIN CALCIUM 20 MG/1
20 TABLET, FILM COATED ORAL EVERY EVENING
Qty: 100 TABLET | Refills: 0 | Status: SHIPPED | OUTPATIENT
Start: 2021-04-23 | End: 2021-07-28

## 2021-04-29 DIAGNOSIS — Z79.01 CHRONIC ANTICOAGULATION: ICD-10-CM

## 2021-04-29 DIAGNOSIS — Z86.718 HISTORY OF DVT (DEEP VEIN THROMBOSIS): ICD-10-CM

## 2021-04-29 RX ORDER — RIVAROXABAN 20 MG/1
TABLET, FILM COATED ORAL
Qty: 100 TABLET | Refills: 0 | Status: SHIPPED | OUTPATIENT
Start: 2021-04-29 | End: 2021-07-28

## 2021-05-10 ENCOUNTER — OFFICE VISIT (OUTPATIENT)
Dept: MEDICAL GROUP | Facility: PHYSICIAN GROUP | Age: 66
End: 2021-05-10
Payer: MEDICARE

## 2021-05-10 VITALS
TEMPERATURE: 97.3 F | SYSTOLIC BLOOD PRESSURE: 130 MMHG | OXYGEN SATURATION: 96 % | BODY MASS INDEX: 42.66 KG/M2 | HEIGHT: 72 IN | RESPIRATION RATE: 20 BRPM | WEIGHT: 315 LBS | HEART RATE: 73 BPM | DIASTOLIC BLOOD PRESSURE: 82 MMHG

## 2021-05-10 DIAGNOSIS — Z12.12 SCREENING FOR COLORECTAL CANCER: ICD-10-CM

## 2021-05-10 DIAGNOSIS — Z12.11 SCREENING FOR COLORECTAL CANCER: ICD-10-CM

## 2021-05-10 DIAGNOSIS — Z78.9 IMPAIRED MOBILITY AND ADLS: ICD-10-CM

## 2021-05-10 DIAGNOSIS — Z74.09 IMPAIRED MOBILITY AND ADLS: ICD-10-CM

## 2021-05-10 DIAGNOSIS — E66.01 MORBID OBESITY WITH BMI OF 45.0-49.9, ADULT (HCC): ICD-10-CM

## 2021-05-10 DIAGNOSIS — Z89.512 STATUS POST BELOW-KNEE AMPUTATION OF LEFT LOWER EXTREMITY (HCC): ICD-10-CM

## 2021-05-10 DIAGNOSIS — E11.9 CONTROLLED TYPE 2 DIABETES MELLITUS WITHOUT COMPLICATION, WITHOUT LONG-TERM CURRENT USE OF INSULIN (HCC): ICD-10-CM

## 2021-05-10 DIAGNOSIS — I10 BENIGN ESSENTIAL HTN: ICD-10-CM

## 2021-05-10 PROCEDURE — 99214 OFFICE O/P EST MOD 30 MIN: CPT | Performed by: FAMILY MEDICINE

## 2021-05-10 RX ORDER — DOXYCYCLINE 100 MG/1
TABLET ORAL
COMMUNITY
Start: 2019-11-14 | End: 2021-05-10

## 2021-05-10 RX ORDER — SENNOSIDES A AND B 8.6 MG/1
TABLET, FILM COATED ORAL
COMMUNITY
End: 2021-05-10

## 2021-05-10 RX ORDER — MORPHINE SULFATE 10 MG/1
CAPSULE, EXTENDED RELEASE ORAL
COMMUNITY
Start: 2021-05-07 | End: 2022-07-27

## 2021-05-10 RX ORDER — LISINOPRIL 40 MG/1
TABLET ORAL
COMMUNITY
Start: 2019-05-16 | End: 2021-05-10

## 2021-05-10 RX ORDER — ATORVASTATIN CALCIUM 20 MG/1
TABLET, FILM COATED ORAL
COMMUNITY
Start: 2018-10-08 | End: 2021-05-10

## 2021-05-10 RX ORDER — HYDROCODONE BITARTRATE AND ACETAMINOPHEN 5; 325 MG/1; MG/1
TABLET ORAL
COMMUNITY
Start: 2021-03-29 | End: 2021-05-10

## 2021-05-10 RX ORDER — ATENOLOL 100 MG/1
TABLET ORAL
COMMUNITY
Start: 2019-09-25 | End: 2021-05-10

## 2021-05-10 ASSESSMENT — ENCOUNTER SYMPTOMS
PSYCHIATRIC NEGATIVE: 1
HEMOPTYSIS: 0
EYES NEGATIVE: 1
BRUISES/BLEEDS EASILY: 0
PALPITATIONS: 0
TINGLING: 0
COUGH: 0
BLURRED VISION: 0
GASTROINTESTINAL NEGATIVE: 1
HEADACHES: 0
DIZZINESS: 0
MYALGIAS: 0
DEPRESSION: 0
NEUROLOGICAL NEGATIVE: 1
MUSCULOSKELETAL NEGATIVE: 1
CARDIOVASCULAR NEGATIVE: 1
HEARTBURN: 0
RESPIRATORY NEGATIVE: 1
NAUSEA: 0
DOUBLE VISION: 0
CONSTITUTIONAL NEGATIVE: 1
CHILLS: 0
FEVER: 0

## 2021-05-10 ASSESSMENT — PATIENT HEALTH QUESTIONNAIRE - PHQ9: CLINICAL INTERPRETATION OF PHQ2 SCORE: 0

## 2021-05-10 ASSESSMENT — FIBROSIS 4 INDEX: FIB4 SCORE: 0.92

## 2021-05-10 NOTE — PROGRESS NOTES
Subjective:      Jose Mc is a 66 y.o. male who presents with Follow-Up (DM [facility] ) and Medication Refill            1. Status post below-knee amputation of left lower extremity (HCC)  Doing well with prosthesis and ambulating with cane    2. Controlled type 2 diabetes mellitus without complication, without long-term current use of insulin (HCC)  Currently treated for DM, taking meds and checking bs at home, trying to do DM diet.controlled  Increased metformin to 1000 mg bid and improved bs at home will check labs   - Comp Metabolic Panel; Future  - Hemoglobin A1c; Future  - Lipid Profile; Future  - Microalbumin Creat Ratio Urine - Lab Collect; Future  - REFERRAL TO OPHTHALMOLOGY    3. Benign essential HTN  Currently treated for HTN, taking meds with no CP or sob, monitors bp at home periodically. controlled      4. Morbid obesity with BMI of 45.0-49.9, adult ([facility])    - Patient identified as having weight management issue.  Appropriate orders and counseling given.    5. Impaired mobility and ADLs      6. Screening for colorectal cancer    - OCCULT BLOOD FECES IMMUNOASSAY (FIT); Future    Past Medical History:  No date: At risk for sleep apnea  No date: Diabetes ([facility])  No date: Diabetic neuropathy ([facility])  No date: DVT (deep venous thrombosis) ([facility])  No date: Hyperlipidemia  No date: Hypertension  No date: Sleep apnea  Past Surgical History:  No date: TOE AMPUTATION; Left  Social History    Tobacco Use      Smoking status: Former Smoker        Packs/day: 0.00        Quit date: 1980        Years since quittin.3      Smokeless tobacco: Never Used    Alcohol use: Not Currently      Comment: OCC    Drug use: Never    Review of patient's family history indicates:  Problem: Cancer      Relation: Mother          Age of Onset: (Not Specified)  Problem: Cancer      Relation: Father          Age of Onset: (Not Specified)      Current Outpatient Medications: •  Morphine Sulfate 10 MG CAPSULE SR 24 HR, TAKE 1  CAPSULE BY MOUTH EVERY DAY. DO NOT DRINK ALCOHOL OR DRIVE WHILE TAKING. MAY CAUSE SEDATION, Disp: , Rfl: •  metformin (GLUCOPHAGE) 1000 MG tablet, Take 1 tablet by mouth 2 times a day with food, Disp: 180 tablet, Rfl: 3•  XARELTO 20 MG Tab tablet, TAKE 1 TABLET BY MOUTH WITH DINNER, Disp: 100 tablet, Rfl: 0•  atorvastatin (LIPITOR) 20 MG Tab, Take 1 tablet by mouth every evening., Disp: 100 tablet, Rfl: 0•  hydroCHLOROthiazide (HYDRODIURIL) 25 MG Tab, Take 1 tablet by mouth every day., Disp: 90 tablet, Rfl: 1•  atenolol (TENORMIN) 100 MG Tab, Take 1 tablet by mouth every day., Disp: 90 tablet, Rfl: 0•  lisinopril (PRINIVIL) 40 MG tablet, Take 1 tablet by mouth every day., Disp: 90 tablet, Rfl: 0•  allopurinol (ZYLOPRIM) 100 MG Tab, Take 1 Tab by mouth every day., Disp: 30 Tab, Rfl: 6•  gabapentin (NEURONTIN) 300 MG Cap, Take 1 Cap by mouth 3 times a day., Disp: 90 Cap, Rfl: 0•  vitamin D (VITAMIND D3) 1000 UNIT Tab, Take 1 Tab by mouth every day., Disp: 60 Tab, Rfl: •  oxyCODONE-acetaminophen (PERCOCET) 5-325 MG Tab, Take 1 Tab by mouth every morning., Disp: , Rfl: •  doxycycline monohydrate (ADOXA) 100 MG tablet, Take  by mouth., Disp: , Rfl: •  HYDROcodone-acetaminophen (NORCO) 5-325 MG Tab per tablet, TAKE 1 TABLET BY MOUTH EVERY DAY AS NEEDED FOR SEVERE PAIN. DO NOT DRINK ALCOHOL OR DRIVE WHILE TAKING. MAY CAUSE SEDATION. FILL 3/29/21., Disp: , Rfl: •  sennosides (SENOKOT) 8.6 MG Tab, Take  by mouth., Disp: , Rfl: •  atenolol (TENORMIN) 100 MG Tab, Take  by mouth., Disp: , Rfl: •  atorvastatin (LIPITOR) 20 MG Tab, Take one-half tablet by mouth daily for cholesterol, Disp: , Rfl: •  lisinopril (PRINIVIL) 40 MG tablet, Take 1 tablet by mouth daily, Disp: , Rfl: •  vancomycin (VANCOCIN) 125 MG capsule, TK 1 C PO Q 6 H FOR 10 DAYS, Disp: , Rfl: •  simethicone (MYLICON) 80 MG Chew Tab, Take 1 Tab by mouth 3 times a day, with meals., Disp: 30 Tab, Rfl: 3    Patient was instructed on the use of medications, either  prescriptions or OTC and informed on when the appropriate follow up time period should be. In addition, patient was also instructed that should any acute worsening occur that they should notify this clinic asap or call 911.          Review of Systems   Constitutional: Negative.  Negative for chills and fever.   HENT: Negative.  Negative for hearing loss.    Eyes: Negative.  Negative for blurred vision and double vision.   Respiratory: Negative.  Negative for cough and hemoptysis.    Cardiovascular: Negative.  Negative for chest pain and palpitations.   Gastrointestinal: Negative.  Negative for heartburn and nausea.   Genitourinary: Negative.  Negative for dysuria.   Musculoskeletal: Negative.  Negative for myalgias.   Skin: Negative.  Negative for rash.   Neurological: Negative.  Negative for dizziness, tingling and headaches.   Endo/Heme/Allergies: Negative.  Does not bruise/bleed easily.   Psychiatric/Behavioral: Negative.  Negative for depression and suicidal ideas.   All other systems reviewed and are negative.         Objective:     /82 (BP Location: Right arm, Patient Position: Sitting, BP Cuff Size: Adult long)   Pulse 73   Temp 36.3 °C (97.3 °F) (Temporal)   Resp 20   Ht 1.829 m (6')   Wt (!) 166 kg (366 lb 9.6 oz)   SpO2 96%   BMI 49.72 kg/m²      Physical Exam  Vitals and nursing note reviewed.   Constitutional:       General: He is not in acute distress.     Appearance: He is well-developed. He is not diaphoretic.   HENT:      Head: Normocephalic and atraumatic.      Mouth/Throat:      Pharynx: No oropharyngeal exudate.   Eyes:      Pupils: Pupils are equal, round, and reactive to light.   Cardiovascular:      Rate and Rhythm: Normal rate and regular rhythm.      Heart sounds: Normal heart sounds. No murmur. No friction rub. No gallop.    Pulmonary:      Effort: Pulmonary effort is normal. No respiratory distress.      Breath sounds: Normal breath sounds. No wheezing or rales.   Chest:       Chest wall: No tenderness.   Musculoskeletal:      Comments: Left bka in place   Neurological:      Mental Status: He is alert and oriented to person, place, and time.   Psychiatric:         Behavior: Behavior normal.         Thought Content: Thought content normal.         Judgment: Judgment normal.                 Assessment/Plan:        1. Status post below-knee amputation of left lower extremity (HCC)      2. Controlled type 2 diabetes mellitus without complication, without long-term current use of insulin (HCC)    - Comp Metabolic Panel; Future  - Hemoglobin A1c; Future  - Lipid Profile; Future  - Microalbumin Creat Ratio Urine - Lab Collect; Future  - REFERRAL TO OPHTHALMOLOGY    3. Benign essential HTN      4. Morbid obesity with BMI of 45.0-49.9, adult ([facility])    - Patient identified as having weight management issue.  Appropriate orders and counseling given.    5. Impaired mobility and ADLs      6. Screening for colorectal cancer  - OCCULT BLOOD FECES IMMUNOASSAY (FIT); Future

## 2021-05-10 NOTE — NON-PROVIDER
Annual Health Assessment Questions:    1.  Are you currently engaging in any exercise or physical activity? Yes    2.  How would you describe your mood or emotional well-being today? good    3.  Have you had any falls in the last year? No    4.  Have you noticed any problems with your balance or had difficulty walking? No    5.  In the last six months have you experienced any leakage of urine? No    6. DPA/Advanced Directive: Patient does not have an Advanced Directive.  A packet and workshop information was given on Advanced Directives.     H/O: HTN (hypertension)    H/O: hypothyroidism

## 2021-05-18 ENCOUNTER — PATIENT MESSAGE (OUTPATIENT)
Dept: HEALTH INFORMATION MANAGEMENT | Facility: OTHER | Age: 66
End: 2021-05-18

## 2021-05-19 RX ORDER — ALLOPURINOL 100 MG/1
100 TABLET ORAL DAILY
Qty: 90 TABLET | Refills: 3 | Status: SHIPPED | OUTPATIENT
Start: 2021-05-19 | End: 2022-04-20

## 2021-05-19 NOTE — TELEPHONE ENCOUNTER
Received request via: Patient    Was the patient seen in the last year in this department? Yes    Does the patient have an active prescription (recently filled or refills available) for medication(s) requested? No     Requested Prescriptions     Pending Prescriptions Disp Refills   • allopurinol (ZYLOPRIM) 100 MG Tab 90 tablet 3     Sig: Take 1 tablet by mouth every day.

## 2021-06-07 ENCOUNTER — TELEPHONE (OUTPATIENT)
Dept: MEDICAL GROUP | Facility: PHYSICIAN GROUP | Age: 66
End: 2021-06-07

## 2021-06-07 DIAGNOSIS — I10 BENIGN ESSENTIAL HTN: ICD-10-CM

## 2021-06-07 RX ORDER — LISINOPRIL 40 MG/1
40 TABLET ORAL DAILY
Qty: 90 TABLET | Refills: 3 | Status: SHIPPED | OUTPATIENT
Start: 2021-06-07 | End: 2022-07-07 | Stop reason: SDUPTHER

## 2021-06-07 RX ORDER — ATENOLOL 100 MG/1
100 TABLET ORAL DAILY
Qty: 90 TABLET | Refills: 3 | Status: SHIPPED | OUTPATIENT
Start: 2021-06-07 | End: 2022-05-30

## 2021-06-07 NOTE — TELEPHONE ENCOUNTER
Patient called and would like a refill on Lisinopril 40 mg. Pharmacy on file is accurate. Also he is wondering if it would be possible to get a 3 months refill on this prescription he would appreciate it. Thank you

## 2021-07-20 ENCOUNTER — HOSPITAL ENCOUNTER (OUTPATIENT)
Dept: LAB | Facility: MEDICAL CENTER | Age: 66
End: 2021-07-20
Attending: FAMILY MEDICINE
Payer: MEDICARE

## 2021-07-20 ENCOUNTER — OFFICE VISIT (OUTPATIENT)
Dept: MEDICAL GROUP | Facility: PHYSICIAN GROUP | Age: 66
End: 2021-07-20
Payer: MEDICARE

## 2021-07-20 VITALS
SYSTOLIC BLOOD PRESSURE: 138 MMHG | HEIGHT: 72 IN | BODY MASS INDEX: 42.66 KG/M2 | WEIGHT: 315 LBS | RESPIRATION RATE: 24 BRPM | TEMPERATURE: 97.6 F | DIASTOLIC BLOOD PRESSURE: 80 MMHG | OXYGEN SATURATION: 94 % | HEART RATE: 92 BPM

## 2021-07-20 DIAGNOSIS — Z86.718 HISTORY OF DVT (DEEP VEIN THROMBOSIS): ICD-10-CM

## 2021-07-20 DIAGNOSIS — E66.01 MORBID OBESITY WITH BMI OF 45.0-49.9, ADULT (HCC): ICD-10-CM

## 2021-07-20 DIAGNOSIS — Z89.512 STATUS POST BELOW-KNEE AMPUTATION OF LEFT LOWER EXTREMITY (HCC): ICD-10-CM

## 2021-07-20 DIAGNOSIS — K62.5 RECTAL BLEEDING: ICD-10-CM

## 2021-07-20 DIAGNOSIS — Z79.01 CHRONIC ANTICOAGULATION: ICD-10-CM

## 2021-07-20 DIAGNOSIS — E11.51 DM (DIABETES MELLITUS) TYPE II, CONTROLLED, WITH PERIPHERAL VASCULAR DISORDER (HCC): ICD-10-CM

## 2021-07-20 LAB
ALBUMIN SERPL BCP-MCNC: 3.9 G/DL (ref 3.2–4.9)
ALBUMIN/GLOB SERPL: 1.3 G/DL
ALP SERPL-CCNC: 129 U/L (ref 30–99)
ALT SERPL-CCNC: 26 U/L (ref 2–50)
ANION GAP SERPL CALC-SCNC: 15 MMOL/L (ref 7–16)
AST SERPL-CCNC: 21 U/L (ref 12–45)
BASOPHILS # BLD AUTO: 0.7 % (ref 0–1.8)
BASOPHILS # BLD: 0.12 K/UL (ref 0–0.12)
BILIRUB SERPL-MCNC: 0.5 MG/DL (ref 0.1–1.5)
BUN SERPL-MCNC: 15 MG/DL (ref 8–22)
CALCIUM SERPL-MCNC: 9.2 MG/DL (ref 8.5–10.5)
CHLORIDE SERPL-SCNC: 104 MMOL/L (ref 96–112)
CO2 SERPL-SCNC: 20 MMOL/L (ref 20–33)
CREAT SERPL-MCNC: 1.27 MG/DL (ref 0.5–1.4)
EOSINOPHIL # BLD AUTO: 0.3 K/UL (ref 0–0.51)
EOSINOPHIL NFR BLD: 1.8 % (ref 0–6.9)
ERYTHROCYTE [DISTWIDTH] IN BLOOD BY AUTOMATED COUNT: 47 FL (ref 35.9–50)
GLOBULIN SER CALC-MCNC: 2.9 G/DL (ref 1.9–3.5)
GLUCOSE SERPL-MCNC: 268 MG/DL (ref 65–99)
HCT VFR BLD AUTO: 50.8 % (ref 42–52)
HGB BLD-MCNC: 16.7 G/DL (ref 14–18)
IMM GRANULOCYTES # BLD AUTO: 0.21 K/UL (ref 0–0.11)
IMM GRANULOCYTES NFR BLD AUTO: 1.3 % (ref 0–0.9)
LYMPHOCYTES # BLD AUTO: 1.9 K/UL (ref 1–4.8)
LYMPHOCYTES NFR BLD: 11.6 % (ref 22–41)
MCH RBC QN AUTO: 29.7 PG (ref 27–33)
MCHC RBC AUTO-ENTMCNC: 32.9 G/DL (ref 33.7–35.3)
MCV RBC AUTO: 90.4 FL (ref 81.4–97.8)
MONOCYTES # BLD AUTO: 0.89 K/UL (ref 0–0.85)
MONOCYTES NFR BLD AUTO: 5.5 % (ref 0–13.4)
NEUTROPHILS # BLD AUTO: 12.91 K/UL (ref 1.82–7.42)
NEUTROPHILS NFR BLD: 79.1 % (ref 44–72)
NRBC # BLD AUTO: 0 K/UL
NRBC BLD-RTO: 0 /100 WBC
PLATELET # BLD AUTO: 225 K/UL (ref 164–446)
PMV BLD AUTO: 11.6 FL (ref 9–12.9)
POTASSIUM SERPL-SCNC: 4.5 MMOL/L (ref 3.6–5.5)
PROT SERPL-MCNC: 6.8 G/DL (ref 6–8.2)
RBC # BLD AUTO: 5.62 M/UL (ref 4.7–6.1)
SODIUM SERPL-SCNC: 139 MMOL/L (ref 135–145)
WBC # BLD AUTO: 16.3 K/UL (ref 4.8–10.8)

## 2021-07-20 PROCEDURE — 36415 COLL VENOUS BLD VENIPUNCTURE: CPT

## 2021-07-20 PROCEDURE — 85025 COMPLETE CBC W/AUTO DIFF WBC: CPT

## 2021-07-20 PROCEDURE — 99214 OFFICE O/P EST MOD 30 MIN: CPT | Performed by: FAMILY MEDICINE

## 2021-07-20 PROCEDURE — 80053 COMPREHEN METABOLIC PANEL: CPT

## 2021-07-20 ASSESSMENT — ENCOUNTER SYMPTOMS
CONSTITUTIONAL NEGATIVE: 1
COUGH: 0
DEPRESSION: 0
CHILLS: 0
RESPIRATORY NEGATIVE: 1
PALPITATIONS: 0
MUSCULOSKELETAL NEGATIVE: 1
BRUISES/BLEEDS EASILY: 0
FEVER: 0
DIZZINESS: 0
PSYCHIATRIC NEGATIVE: 1
NEUROLOGICAL NEGATIVE: 1
BLURRED VISION: 0
HEARTBURN: 0
HEMOPTYSIS: 0
CARDIOVASCULAR NEGATIVE: 1
DOUBLE VISION: 0
TINGLING: 0
BLOOD IN STOOL: 1
NAUSEA: 0
EYES NEGATIVE: 1
HEADACHES: 0
MYALGIAS: 0

## 2021-07-20 ASSESSMENT — FIBROSIS 4 INDEX: FIB4 SCORE: 0.92

## 2021-07-20 NOTE — PROGRESS NOTES
Subjective:      Jose Mc is a 66 y.o. male who presents with Rectal Bleeding (x1 month )            Patient uses clamps to hold sanitary wipes to clean himself and 6 weeks ago sustained a rectal laceration from the clamps   Since then he has had some bloody drainage that seems to be getting better  On exam he has a healing lac near the right side of the anus that is the likely cause for his bleeding that undoubtedly has been worsened by his blood thinner use  Will check cbc and have him seen by to make sure not other cause could be present    1. Status post below-knee amputation of left lower extremity (HCC)    2. DM (diabetes mellitus) type II, controlled, with peripheral vascular disorder (HCC)  Currently treated for DM, taking meds and checking bs at home, trying to do DM diet.controlled      3. Chronic anticoagulation      4. History of DVT (deep vein thrombosis)      5. Rectal bleeding    - Comp Metabolic Panel; Future  - CBC WITH DIFFERENTIAL; Future  - REFERRAL TO GASTROENTEROLOGY    6. Morbid obesity with BMI of 45.0-49.9, adult ([facility])  Patient has a diagnosis of obesity. They were counseled today on increasing exercise and  extensively counseled on a low carb diet       Past Medical History:  No date: At risk for sleep apnea  No date: Diabetes ([facility])  No date: Diabetic neuropathy ([facility])  No date: DVT (deep venous thrombosis) ([facility])  No date: Hyperlipidemia  No date: Hypertension  No date: Sleep apnea  Past Surgical History:  No date: TOE AMPUTATION; Left  Social History    Tobacco Use      Smoking status: Former Smoker        Packs/day: 0.00        Quit date: 1980        Years since quittin.5      Smokeless tobacco: Never Used    Vaping Use      Vaping Use: Never used    Alcohol use: Not Currently      Comment: OCC    Drug use: Never    Review of patient's family history indicates:  Problem: Cancer      Relation: Mother          Age of Onset: (Not Specified)  Problem: Cancer      Relation:  Father          Age of Onset: (Not Specified)      Current Outpatient Medications: •  atenolol (TENORMIN) 100 MG Tab, Take 1 tablet by mouth every day., Disp: 90 tablet, Rfl: 3•  lisinopril (PRINIVIL) 40 MG tablet, Take 1 tablet by mouth every day., Disp: 90 tablet, Rfl: 3•  allopurinol (ZYLOPRIM) 100 MG Tab, Take 1 tablet by mouth every day., Disp: 90 tablet, Rfl: 3•  Morphine Sulfate 10 MG CAPSULE SR 24 HR, TAKE 1 CAPSULE BY MOUTH EVERY DAY. DO NOT DRINK ALCOHOL OR DRIVE WHILE TAKING. MAY CAUSE SEDATION, Disp: , Rfl: •  metformin (GLUCOPHAGE) 1000 MG tablet, Take 1 tablet by mouth 2 times a day with food, Disp: 180 tablet, Rfl: 3•  XARELTO 20 MG Tab tablet, TAKE 1 TABLET BY MOUTH WITH DINNER, Disp: 100 tablet, Rfl: 0•  atorvastatin (LIPITOR) 20 MG Tab, Take 1 tablet by mouth every evening., Disp: 100 tablet, Rfl: 0•  hydroCHLOROthiazide (HYDRODIURIL) 25 MG Tab, Take 1 tablet by mouth every day., Disp: 90 tablet, Rfl: 1•  gabapentin (NEURONTIN) 300 MG Cap, Take 1 Cap by mouth 3 times a day., Disp: 90 Cap, Rfl: 0•  vitamin D (VITAMIND D3) 1000 UNIT Tab, Take 1 Tab by mouth every day., Disp: 60 Tab, Rfl: •  oxyCODONE-acetaminophen (PERCOCET) 5-325 MG Tab, Take 1 Tab by mouth every morning., Disp: , Rfl:     Patient was instructed on the use of medications, either prescriptions or OTC and informed on when the appropriate follow up time period should be. In addition, patient was also instructed that should any acute worsening occur that they should notify this clinic asap or call 911.           Review of Systems   Constitutional: Negative.  Negative for chills and fever.   HENT: Negative.  Negative for hearing loss.    Eyes: Negative.  Negative for blurred vision and double vision.   Respiratory: Negative.  Negative for cough and hemoptysis.    Cardiovascular: Negative.  Negative for chest pain and palpitations.   Gastrointestinal: Positive for blood in stool. Negative for heartburn and nausea.   Genitourinary:  Negative.  Negative for dysuria.   Musculoskeletal: Negative.  Negative for myalgias.   Skin: Negative.  Negative for rash.   Neurological: Negative.  Negative for dizziness, tingling and headaches.   Endo/Heme/Allergies: Negative.  Does not bruise/bleed easily.   Psychiatric/Behavioral: Negative.  Negative for depression and suicidal ideas.   All other systems reviewed and are negative.         Objective:     /80 (BP Location: Left arm, Patient Position: Sitting, BP Cuff Size: Adult long)   Pulse 92   Temp 36.4 °C (97.6 °F) (Temporal)   Resp (!) 24   Ht 1.829 m (6')   Wt (!) 164 kg (362 lb)   SpO2 94%   BMI 49.10 kg/m²      Physical Exam  Vitals and nursing note reviewed.   Constitutional:       General: He is not in acute distress.     Appearance: He is well-developed. He is not diaphoretic.   HENT:      Head: Normocephalic and atraumatic.      Mouth/Throat:      Pharynx: No oropharyngeal exudate.   Eyes:      Pupils: Pupils are equal, round, and reactive to light.   Cardiovascular:      Rate and Rhythm: Normal rate and regular rhythm.      Heart sounds: Normal heart sounds. No murmur heard.   No friction rub. No gallop.    Pulmonary:      Effort: Pulmonary effort is normal. No respiratory distress.      Breath sounds: Normal breath sounds. No wheezing or rales.   Chest:      Chest wall: No tenderness.   Genitourinary:      Neurological:      Mental Status: He is alert and oriented to person, place, and time.   Psychiatric:         Behavior: Behavior normal.         Thought Content: Thought content normal.         Judgment: Judgment normal.                        Assessment/Plan:        1. Status post below-knee amputation of left lower extremity (HCC)      2. DM (diabetes mellitus) type II, controlled, with peripheral vascular disorder (HCC)      3. Chronic anticoagulation      4. History of DVT (deep vein thrombosis)      5. Rectal bleeding    - Comp Metabolic Panel; Future  - CBC WITH DIFFERENTIAL;  Future  - REFERRAL TO GASTROENTEROLOGY    6. Morbid obesity with BMI of 45.0-49.9, adult (HCC)

## 2021-07-23 ENCOUNTER — TELEPHONE (OUTPATIENT)
Dept: MEDICAL GROUP | Facility: PHYSICIAN GROUP | Age: 66
End: 2021-07-23

## 2021-07-23 NOTE — TELEPHONE ENCOUNTER
----- Message from Theo James M.D. sent at 7/21/2021  9:29 AM PDT -----  No anemia seen on labs, continue to f/u with GI

## 2021-07-28 DIAGNOSIS — Z86.718 HISTORY OF DVT (DEEP VEIN THROMBOSIS): ICD-10-CM

## 2021-07-28 DIAGNOSIS — Z79.01 CHRONIC ANTICOAGULATION: ICD-10-CM

## 2021-07-28 RX ORDER — ATORVASTATIN CALCIUM 20 MG/1
20 TABLET, FILM COATED ORAL EVERY EVENING
Qty: 100 TABLET | Refills: 0 | Status: SHIPPED | OUTPATIENT
Start: 2021-07-28 | End: 2021-11-05 | Stop reason: SDUPTHER

## 2021-07-28 NOTE — TELEPHONE ENCOUNTER
Received request via: Patient    Was the patient seen in the last year in this department? Yes    Does the patient have an active prescription (recently filled or refills available) for medication(s) requested? No    Last OV:07/20/21  Last Labs: 07/20/21    Current Outpatient Medications   Medication Sig Dispense Refill   • rivaroxaban (XARELTO) 20 MG Tab tablet Take 1 tablet by mouth with dinner. 100 tablet 0   • atenolol (TENORMIN) 100 MG Tab Take 1 tablet by mouth every day. 90 tablet 3   • lisinopril (PRINIVIL) 40 MG tablet Take 1 tablet by mouth every day. 90 tablet 3   • allopurinol (ZYLOPRIM) 100 MG Tab Take 1 tablet by mouth every day. 90 tablet 3   • Morphine Sulfate 10 MG CAPSULE SR 24 HR TAKE 1 CAPSULE BY MOUTH EVERY DAY. DO NOT DRINK ALCOHOL OR DRIVE WHILE TAKING. MAY CAUSE SEDATION     • metformin (GLUCOPHAGE) 1000 MG tablet Take 1 tablet by mouth 2 times a day with food 180 tablet 3   • atorvastatin (LIPITOR) 20 MG Tab Take 1 tablet by mouth every evening. 100 tablet 0   • hydroCHLOROthiazide (HYDRODIURIL) 25 MG Tab Take 1 tablet by mouth every day. 90 tablet 1   • gabapentin (NEURONTIN) 300 MG Cap Take 1 Cap by mouth 3 times a day. 90 Cap 0   • vitamin D (VITAMIND D3) 1000 UNIT Tab Take 1 Tab by mouth every day. 60 Tab    • oxyCODONE-acetaminophen (PERCOCET) 5-325 MG Tab Take 1 Tab by mouth every morning.       No current facility-administered medications for this visit.

## 2021-08-10 ENCOUNTER — OFFICE VISIT (OUTPATIENT)
Dept: MEDICAL GROUP | Facility: PHYSICIAN GROUP | Age: 66
End: 2021-08-10
Payer: MEDICARE

## 2021-08-10 VITALS
DIASTOLIC BLOOD PRESSURE: 88 MMHG | SYSTOLIC BLOOD PRESSURE: 148 MMHG | HEIGHT: 72 IN | BODY MASS INDEX: 42.66 KG/M2 | HEART RATE: 110 BPM | OXYGEN SATURATION: 96 % | WEIGHT: 315 LBS | TEMPERATURE: 97 F | RESPIRATION RATE: 28 BRPM

## 2021-08-10 DIAGNOSIS — Z89.512 STATUS POST BELOW-KNEE AMPUTATION OF LEFT LOWER EXTREMITY (HCC): ICD-10-CM

## 2021-08-10 DIAGNOSIS — E66.01 MORBID OBESITY WITH BMI OF 50.0-59.9, ADULT (HCC): ICD-10-CM

## 2021-08-10 DIAGNOSIS — L97.511 DIABETIC ULCER OF TOE OF RIGHT FOOT ASSOCIATED WITH DIABETES MELLITUS DUE TO UNDERLYING CONDITION, LIMITED TO BREAKDOWN OF SKIN (HCC): ICD-10-CM

## 2021-08-10 DIAGNOSIS — E11.51 DM (DIABETES MELLITUS) TYPE II, CONTROLLED, WITH PERIPHERAL VASCULAR DISORDER (HCC): ICD-10-CM

## 2021-08-10 DIAGNOSIS — G63 POLYNEUROPATHY ASSOCIATED WITH UNDERLYING DISEASE (HCC): ICD-10-CM

## 2021-08-10 DIAGNOSIS — E08.621 DIABETIC ULCER OF TOE OF RIGHT FOOT ASSOCIATED WITH DIABETES MELLITUS DUE TO UNDERLYING CONDITION, LIMITED TO BREAKDOWN OF SKIN (HCC): ICD-10-CM

## 2021-08-10 PROCEDURE — 99214 OFFICE O/P EST MOD 30 MIN: CPT | Performed by: FAMILY MEDICINE

## 2021-08-10 RX ORDER — DOXYCYCLINE HYCLATE 100 MG
100 TABLET ORAL 2 TIMES DAILY
Qty: 20 TABLET | Refills: 0 | Status: SHIPPED | OUTPATIENT
Start: 2021-08-10 | End: 2022-04-20

## 2021-08-10 ASSESSMENT — ENCOUNTER SYMPTOMS
RESPIRATORY NEGATIVE: 1
PALPITATIONS: 0
EYES NEGATIVE: 1
NEUROLOGICAL NEGATIVE: 1
NAUSEA: 0
COUGH: 0
HEMOPTYSIS: 0
GASTROINTESTINAL NEGATIVE: 1
HEADACHES: 0
TINGLING: 0
DIZZINESS: 0
BRUISES/BLEEDS EASILY: 0
CHILLS: 0
CARDIOVASCULAR NEGATIVE: 1
DEPRESSION: 0
MYALGIAS: 0
HEARTBURN: 0
PSYCHIATRIC NEGATIVE: 1
BLURRED VISION: 0
DOUBLE VISION: 0
FEVER: 0
CONSTITUTIONAL NEGATIVE: 1

## 2021-08-10 ASSESSMENT — FIBROSIS 4 INDEX: FIB4 SCORE: 1.21

## 2021-08-11 NOTE — PROGRESS NOTES
Subjective:      Jose Mc is a 66 y.o. male who presents with Toe Pain ((R) toe pain/infection )            1. Diabetic ulcer of toe of right foot associated with diabetes mellitus due to underlying condition, limited to breakdown of skin (HCC)  Patient with neuropathy due to dm  Had his right second toe dorsum rub on shoe and create blister last week. No d/c but not healing  On exam 1 by 1 cm ulcer on dip joint dorsally of right second toe, no fluctuance and no erythema  Will cover with abx prophylactic and have him see wound clinic for help with the healing   - doxycycline (VIBRAMYCIN) 100 MG Tab; Take 1 tablet by mouth 2 times a day.  Dispense: 20 tablet; Refill: 0  - REFERRAL TO WOUND CLINIC    2. Polyneuropathy associated with underlying disease ([facility])      3. DM (diabetes mellitus) type II, controlled, with peripheral vascular disorder ([facility])  Currently treated for DM, taking meds and checking bs at home, trying to do DM diet.controlled      4. Status post below-knee amputation of left lower extremity ([facility])      5. Morbid obesity with BMI of 50.0-59.9, adult ([facility])      Past Medical History:  No date: At risk for sleep apnea  No date: Diabetes ([facility])  No date: Diabetic neuropathy ([facility])  No date: DVT (deep venous thrombosis) ([facility])  No date: Hyperlipidemia  No date: Hypertension  No date: Sleep apnea  Past Surgical History:  No date: TOE AMPUTATION; Left  Social History    Tobacco Use      Smoking status: Former Smoker        Packs/day: 0.00        Quit date: 1980        Years since quittin.5      Smokeless tobacco: Never Used    Vaping Use      Vaping Use: Never used    Alcohol use: Not Currently      Comment: OCC    Drug use: Never    Review of patient's family history indicates:  Problem: Cancer      Relation: Mother          Age of Onset: (Not Specified)  Problem: Cancer      Relation: Father          Age of Onset: (Not Specified)      Current Outpatient Medications: •  doxycycline  (VIBRAMYCIN) 100 MG Tab, Take 1 tablet by mouth 2 times a day., Disp: 20 tablet, Rfl: 0•  rivaroxaban (XARELTO) 20 MG Tab tablet, Take 1 tablet by mouth with dinner., Disp: 100 tablet, Rfl: 0•  atorvastatin (LIPITOR) 20 MG Tab, TAKE 1 TABLET BY MOUTH EVERY EVENING, Disp: 100 tablet, Rfl: 0•  atenolol (TENORMIN) 100 MG Tab, Take 1 tablet by mouth every day., Disp: 90 tablet, Rfl: 3•  lisinopril (PRINIVIL) 40 MG tablet, Take 1 tablet by mouth every day., Disp: 90 tablet, Rfl: 3•  allopurinol (ZYLOPRIM) 100 MG Tab, Take 1 tablet by mouth every day., Disp: 90 tablet, Rfl: 3•  Morphine Sulfate 10 MG CAPSULE SR 24 HR, TAKE 1 CAPSULE BY MOUTH EVERY DAY. DO NOT DRINK ALCOHOL OR DRIVE WHILE TAKING. MAY CAUSE SEDATION, Disp: , Rfl: •  metformin (GLUCOPHAGE) 1000 MG tablet, Take 1 tablet by mouth 2 times a day with food, Disp: 180 tablet, Rfl: 3•  hydroCHLOROthiazide (HYDRODIURIL) 25 MG Tab, Take 1 tablet by mouth every day., Disp: 90 tablet, Rfl: 1•  gabapentin (NEURONTIN) 300 MG Cap, Take 1 Cap by mouth 3 times a day., Disp: 90 Cap, Rfl: 0•  vitamin D (VITAMIND D3) 1000 UNIT Tab, Take 1 Tab by mouth every day., Disp: 60 Tab, Rfl: •  oxyCODONE-acetaminophen (PERCOCET) 5-325 MG Tab, Take 1 Tab by mouth every morning., Disp: , Rfl:     Patient was instructed on the use of medications, either prescriptions or OTC and informed on when the appropriate follow up time period should be. In addition, patient was also instructed that should any acute worsening occur that they should notify this clinic asap or call 911.          Review of Systems   Constitutional: Negative.  Negative for chills and fever.   HENT: Negative.  Negative for hearing loss.    Eyes: Negative.  Negative for blurred vision and double vision.   Respiratory: Negative.  Negative for cough and hemoptysis.    Cardiovascular: Negative.  Negative for chest pain and palpitations.   Gastrointestinal: Negative.  Negative for heartburn and nausea.   Genitourinary:  Negative.  Negative for dysuria.   Musculoskeletal: Positive for joint pain. Negative for myalgias.   Skin: Negative.  Negative for rash.   Neurological: Negative.  Negative for dizziness, tingling and headaches.   Endo/Heme/Allergies: Negative.  Does not bruise/bleed easily.   Psychiatric/Behavioral: Negative.  Negative for depression and suicidal ideas.   All other systems reviewed and are negative.         Objective:     /88 (BP Location: Left arm, Patient Position: Sitting, BP Cuff Size: Large adult)   Pulse (!) 110   Temp 36.1 °C (97 °F) (Temporal)   Resp (!) 28   Ht 1.829 m (6')   Wt (!) 168 kg (370 lb 6.4 oz)   SpO2 96%   BMI 50.24 kg/m²      Physical Exam  Vitals and nursing note reviewed.   Constitutional:       General: He is not in acute distress.     Appearance: He is well-developed. He is not diaphoretic.   HENT:      Head: Normocephalic and atraumatic.      Mouth/Throat:      Pharynx: No oropharyngeal exudate.   Eyes:      Pupils: Pupils are equal, round, and reactive to light.   Cardiovascular:      Rate and Rhythm: Normal rate and regular rhythm.      Heart sounds: Normal heart sounds. No murmur heard.   No friction rub. No gallop.    Pulmonary:      Effort: Pulmonary effort is normal. No respiratory distress.      Breath sounds: Normal breath sounds. No wheezing or rales.   Chest:      Chest wall: No tenderness.   Musculoskeletal:        Feet:    Feet:      Right foot:      Skin integrity: Ulcer present.   Neurological:      Mental Status: He is alert and oriented to person, place, and time.   Psychiatric:         Behavior: Behavior normal.         Thought Content: Thought content normal.         Judgment: Judgment normal.                        Assessment/Plan:        1. Diabetic ulcer of toe of right foot associated with diabetes mellitus due to underlying condition, limited to breakdown of skin (HCC)    - doxycycline (VIBRAMYCIN) 100 MG Tab; Take 1 tablet by mouth 2 times a day.   Dispense: 20 tablet; Refill: 0  - REFERRAL TO WOUND CLINIC    2. Polyneuropathy associated with underlying disease ([facility])      3. DM (diabetes mellitus) type II, controlled, with peripheral vascular disorder ([facility])      4. Status post below-knee amputation of left lower extremity ([facility])      5. Morbid obesity with BMI of 50.0-59.9, adult ([facility])

## 2021-09-09 PROBLEM — M86.9 OSTEOMYELITIS OF RIGHT FOOT (HCC): Status: ACTIVE | Noted: 2021-09-09

## 2021-10-26 DIAGNOSIS — Z86.718 HISTORY OF DVT (DEEP VEIN THROMBOSIS): ICD-10-CM

## 2021-10-26 DIAGNOSIS — Z79.01 CHRONIC ANTICOAGULATION: ICD-10-CM

## 2021-10-26 RX ORDER — RIVAROXABAN 20 MG/1
TABLET, FILM COATED ORAL
Qty: 100 TABLET | Refills: 0 | Status: SHIPPED | OUTPATIENT
Start: 2021-10-26 | End: 2022-01-24

## 2021-11-05 DIAGNOSIS — I10 BENIGN ESSENTIAL HTN: ICD-10-CM

## 2021-11-05 NOTE — TELEPHONE ENCOUNTER
Patient called requesting a refill of the following medications:     -Hydrochlorothiazide 25mg tablets   -Atorvastatin 20mg tablets

## 2021-11-08 RX ORDER — HYDROCHLOROTHIAZIDE 25 MG/1
25 TABLET ORAL DAILY
Qty: 100 TABLET | Refills: 3 | Status: SHIPPED | OUTPATIENT
Start: 2021-11-08 | End: 2022-10-21

## 2021-11-08 RX ORDER — ATORVASTATIN CALCIUM 20 MG/1
20 TABLET, FILM COATED ORAL EVERY EVENING
Qty: 100 TABLET | Refills: 3 | Status: SHIPPED | OUTPATIENT
Start: 2021-11-08 | End: 2022-12-06

## 2022-01-24 DIAGNOSIS — Z79.01 CHRONIC ANTICOAGULATION: ICD-10-CM

## 2022-01-24 DIAGNOSIS — Z86.718 HISTORY OF DVT (DEEP VEIN THROMBOSIS): ICD-10-CM

## 2022-01-24 RX ORDER — RIVAROXABAN 20 MG/1
TABLET, FILM COATED ORAL
Qty: 100 TABLET | Refills: 0 | Status: SHIPPED | OUTPATIENT
Start: 2022-01-24 | End: 2022-07-25

## 2022-02-02 ENCOUNTER — OFFICE VISIT (OUTPATIENT)
Dept: MEDICAL GROUP | Facility: PHYSICIAN GROUP | Age: 67
End: 2022-02-02
Payer: MEDICARE

## 2022-02-02 VITALS
RESPIRATION RATE: 20 BRPM | HEART RATE: 71 BPM | OXYGEN SATURATION: 96 % | SYSTOLIC BLOOD PRESSURE: 130 MMHG | TEMPERATURE: 97.8 F | HEIGHT: 72 IN | BODY MASS INDEX: 42.66 KG/M2 | DIASTOLIC BLOOD PRESSURE: 88 MMHG | WEIGHT: 315 LBS

## 2022-02-02 DIAGNOSIS — E66.01 MORBID OBESITY WITH BMI OF 50.0-59.9, ADULT (HCC): ICD-10-CM

## 2022-02-02 DIAGNOSIS — G63 POLYNEUROPATHY ASSOCIATED WITH UNDERLYING DISEASE (HCC): ICD-10-CM

## 2022-02-02 DIAGNOSIS — E11.51 DM (DIABETES MELLITUS) TYPE II, CONTROLLED, WITH PERIPHERAL VASCULAR DISORDER (HCC): ICD-10-CM

## 2022-02-02 DIAGNOSIS — Z89.512 STATUS POST BELOW-KNEE AMPUTATION OF LEFT LOWER EXTREMITY (HCC): ICD-10-CM

## 2022-02-02 PROBLEM — M86.9 OSTEOMYELITIS OF RIGHT FOOT (HCC): Status: RESOLVED | Noted: 2021-09-09 | Resolved: 2022-02-02

## 2022-02-02 LAB
HBA1C MFR BLD: 9.7 % (ref 0–5.6)
INT CON NEG: NEGATIVE
INT CON POS: POSITIVE

## 2022-02-02 PROCEDURE — 99214 OFFICE O/P EST MOD 30 MIN: CPT | Performed by: FAMILY MEDICINE

## 2022-02-02 PROCEDURE — 83036 HEMOGLOBIN GLYCOSYLATED A1C: CPT | Performed by: FAMILY MEDICINE

## 2022-02-02 RX ORDER — GABAPENTIN 300 MG/1
300 CAPSULE ORAL
COMMUNITY
Start: 2021-08-13 | End: 2022-02-02

## 2022-02-02 RX ORDER — LORATADINE 10 MG/1
CAPSULE, LIQUID FILLED ORAL
Status: ON HOLD | COMMUNITY
End: 2023-11-08

## 2022-02-02 ASSESSMENT — ENCOUNTER SYMPTOMS
FEVER: 0
PALPITATIONS: 0
MYALGIAS: 0
HEARTBURN: 0
DOUBLE VISION: 0
CARDIOVASCULAR NEGATIVE: 1
NAUSEA: 0
MUSCULOSKELETAL NEGATIVE: 1
COUGH: 0
DIZZINESS: 0
TINGLING: 0
EYES NEGATIVE: 1
BLURRED VISION: 0
PSYCHIATRIC NEGATIVE: 1
DEPRESSION: 0
HEADACHES: 0
CHILLS: 0
CONSTITUTIONAL NEGATIVE: 1
RESPIRATORY NEGATIVE: 1
NEUROLOGICAL NEGATIVE: 1
HEMOPTYSIS: 0
BRUISES/BLEEDS EASILY: 0
GASTROINTESTINAL NEGATIVE: 1

## 2022-02-02 ASSESSMENT — FIBROSIS 4 INDEX: FIB4 SCORE: 1.21

## 2022-02-02 ASSESSMENT — PATIENT HEALTH QUESTIONNAIRE - PHQ9: CLINICAL INTERPRETATION OF PHQ2 SCORE: 0

## 2022-02-02 NOTE — PROGRESS NOTES
Subjective     Jose Castanon is a 66 y.o. male who presents with Blood Sugar Problem            1. DM (diabetes mellitus) type II, controlled, with peripheral vascular disorder (HCC)  a1c over 9   Only on metformin  Will add januvia and f/u poct in 3 mo  Counseled on diet   POCT Hemoglobin A1C   Referral to Ophthalmology   SITagliptin (JANUVIA) 50 MG Tab; Take 1 Tablet by mouth every day.  Dispense: 30 Tablet; Refill: 3    2. Status post below-knee amputation of left lower extremity ([facility])  Uses can with prosthetic    3. Polyneuropathy associated with underlying disease ([facility])  The patient's current medical issue is well controlled on the current therapy with no new symptoms or worsening      4. Morbid obesity with BMI of 50.0-59.9, adult ([facility])  Patient has a diagnosis of obesity. They were counseled today on increasing exercise and  extensively counseled on a low carb diet       Past Medical History:  No date: At risk for sleep apnea  No date: Diabetes ([facility])  No date: Diabetic neuropathy ([facility])  No date: DVT (deep venous thrombosis) ([facility])  No date: Hyperlipidemia  No date: Hypertension  No date: Sleep apnea  Past Surgical History:  9/15/2021: PB AMPUTATION TOE,MT-P JT; Right      Comment:  Procedure: RIGHT SECOND TOE AMPUTATION (30 MIN);                 Surgeon: Jackson Castro M.D.;  Location: [facility]                Orthopedic - [facility];  Service: Orthopedics  No date: TOE AMPUTATION; Left  Social History    Tobacco Use      Smoking status: Former Smoker        Packs/day: 0.00        Quit date: 1980        Years since quittin.0      Smokeless tobacco: Never Used    Vaping Use      Vaping Use: Never used    Alcohol use: Not Currently      Comment: OCC    Drug use: Never    Review of patient's family history indicates:  Problem: Cancer      Relation: Mother          Age of Onset: (Not Specified)  Problem: Cancer      Relation: Father          Age of Onset: (Not Specified)      Current  Outpatient Medications: •  SITagliptin (JANUVIA) 50 MG Tab, Take 1 Tablet by mouth every day., Disp: 30 Tablet, Rfl: 3•  Loratadine 10 MG Cap, Take  by mouth., Disp: , Rfl: •  XARELTO 20 MG Tab tablet, TAKE 1 TABLET BY MOUTH DAILY WITH DINNER, Disp: 100 Tablet, Rfl: 0•  atorvastatin (LIPITOR) 20 MG Tab, Take 1 Tablet by mouth every evening., Disp: 100 Tablet, Rfl: 3•  hydroCHLOROthiazide (HYDRODIURIL) 25 MG Tab, Take 1 Tablet by mouth every day., Disp: 100 Tablet, Rfl: 3•  gabapentin (NEURONTIN) 300 MG Cap, Take 1 Capsule by mouth 3 times a day., Disp: 90 Capsule, Rfl: 0•  doxycycline (VIBRAMYCIN) 100 MG Tab, Take 1 tablet by mouth 2 times a day., Disp: 20 tablet, Rfl: 0•  atenolol (TENORMIN) 100 MG Tab, Take 1 tablet by mouth every day., Disp: 90 tablet, Rfl: 3•  lisinopril (PRINIVIL) 40 MG tablet, Take 1 tablet by mouth every day., Disp: 90 tablet, Rfl: 3•  allopurinol (ZYLOPRIM) 100 MG Tab, Take 1 tablet by mouth every day., Disp: 90 tablet, Rfl: 3•  Morphine Sulfate 10 MG CAPSULE SR 24 HR, TAKE 1 CAPSULE BY MOUTH EVERY DAY. DO NOT DRINK ALCOHOL OR DRIVE WHILE TAKING. MAY CAUSE SEDATION, Disp: , Rfl: •  metformin (GLUCOPHAGE) 1000 MG tablet, Take 1 tablet by mouth 2 times a day with food, Disp: 180 tablet, Rfl: 3•  vitamin D (VITAMIND D3) 1000 UNIT Tab, Take 1 Tab by mouth every day., Disp: 60 Tab, Rfl: •  oxyCODONE-acetaminophen (PERCOCET) 5-325 MG Tab, Take 1 Tab by mouth every morning., Disp: , Rfl:     Patient was instructed on the use of medications, either prescriptions or OTC and informed on when the appropriate follow up time period should be. In addition, patient was also instructed that should any acute worsening occur that they should notify this clinic asap or call 911.          Review of Systems   Constitutional: Negative.  Negative for chills and fever.   HENT: Negative.  Negative for hearing loss.    Eyes: Negative.  Negative for blurred vision and double vision.   Respiratory: Negative.  Negative  for cough and hemoptysis.    Cardiovascular: Negative.  Negative for chest pain and palpitations.   Gastrointestinal: Negative.  Negative for heartburn and nausea.   Genitourinary: Negative.  Negative for dysuria.   Musculoskeletal: Negative.  Negative for myalgias.   Skin: Negative.  Negative for rash.   Neurological: Negative.  Negative for dizziness, tingling and headaches.   Endo/Heme/Allergies: Negative.  Does not bruise/bleed easily.   Psychiatric/Behavioral: Negative.  Negative for depression and suicidal ideas.   All other systems reviewed and are negative.             Objective     /88 (BP Location: Left arm, Patient Position: Sitting, BP Cuff Size: Large adult)   Pulse 71   Temp 36.6 °C (97.8 °F) (Temporal)   Resp 20   Ht 1.829 m (6')   Wt (!) 171 kg (377 lb)   SpO2 96%   BMI 51.13 kg/m²      Physical Exam  Vitals and nursing note reviewed.   Constitutional:       General: He is not in acute distress.     Appearance: He is well-developed. He is not diaphoretic.   HENT:      Head: Normocephalic and atraumatic.      Mouth/Throat:      Pharynx: No oropharyngeal exudate.   Eyes:      Pupils: Pupils are equal, round, and reactive to light.   Cardiovascular:      Rate and Rhythm: Normal rate and regular rhythm.      Heart sounds: Normal heart sounds. No murmur heard.  No friction rub. No gallop.    Pulmonary:      Effort: Pulmonary effort is normal. No respiratory distress.      Breath sounds: Normal breath sounds. No wheezing or rales.   Chest:      Chest wall: No tenderness.   Neurological:      Mental Status: He is alert and oriented to person, place, and time.   Psychiatric:         Behavior: Behavior normal.         Thought Content: Thought content normal.         Judgment: Judgment normal.                             Assessment & Plan        1. DM (diabetes mellitus) type II, controlled, with peripheral vascular disorder (HCC)    - POCT Hemoglobin A1C  - Referral to Ophthalmology  -  SITagliptin (JANUVIA) 50 MG Tab; Take 1 Tablet by mouth every day.  Dispense: 30 Tablet; Refill: 3    2. Status post below-knee amputation of left lower extremity (HCC)      3. Polyneuropathy associated with underlying disease ([facility])      4. Morbid obesity with BMI of 50.0-59.9, adult ([facility])

## 2022-02-03 ENCOUNTER — TELEPHONE (OUTPATIENT)
Dept: MEDICAL GROUP | Facility: PHYSICIAN GROUP | Age: 67
End: 2022-02-03

## 2022-02-03 RX ORDER — PIOGLITAZONEHYDROCHLORIDE 30 MG/1
30 TABLET ORAL DAILY
Qty: 30 TABLET | Refills: 11 | Status: SHIPPED | OUTPATIENT
Start: 2022-02-03 | End: 2022-02-07 | Stop reason: SDUPTHER

## 2022-02-03 NOTE — TELEPHONE ENCOUNTER
Patient called stating new medication Januvia is to expensive, wants to know if theres an affordable prescription instead.

## 2022-02-07 DIAGNOSIS — E11.51 DM (DIABETES MELLITUS) TYPE II, CONTROLLED, WITH PERIPHERAL VASCULAR DISORDER (HCC): ICD-10-CM

## 2022-02-07 RX ORDER — PIOGLITAZONEHYDROCHLORIDE 30 MG/1
30 TABLET ORAL DAILY
Qty: 100 TABLET | Refills: 3 | Status: SHIPPED | OUTPATIENT
Start: 2022-02-07 | End: 2023-03-23

## 2022-02-14 ENCOUNTER — PATIENT MESSAGE (OUTPATIENT)
Dept: HEALTH INFORMATION MANAGEMENT | Facility: OTHER | Age: 67
End: 2022-02-14
Payer: MEDICARE

## 2022-04-20 ENCOUNTER — OFFICE VISIT (OUTPATIENT)
Dept: MEDICAL GROUP | Facility: PHYSICIAN GROUP | Age: 67
End: 2022-04-20
Payer: MEDICARE

## 2022-04-20 VITALS
SYSTOLIC BLOOD PRESSURE: 140 MMHG | HEIGHT: 72 IN | TEMPERATURE: 97.4 F | DIASTOLIC BLOOD PRESSURE: 82 MMHG | WEIGHT: 315 LBS | HEART RATE: 76 BPM | RESPIRATION RATE: 20 BRPM | OXYGEN SATURATION: 94 % | BODY MASS INDEX: 42.66 KG/M2

## 2022-04-20 DIAGNOSIS — E66.01 MORBID OBESITY WITH BMI OF 50.0-59.9, ADULT (HCC): ICD-10-CM

## 2022-04-20 DIAGNOSIS — E11.51 DM (DIABETES MELLITUS) TYPE II, CONTROLLED, WITH PERIPHERAL VASCULAR DISORDER (HCC): ICD-10-CM

## 2022-04-20 DIAGNOSIS — I73.9 PVD (PERIPHERAL VASCULAR DISEASE) (HCC): ICD-10-CM

## 2022-04-20 DIAGNOSIS — M10.9 GOUT, UNSPECIFIED CAUSE, UNSPECIFIED CHRONICITY, UNSPECIFIED SITE: ICD-10-CM

## 2022-04-20 DIAGNOSIS — Z89.512 STATUS POST BELOW-KNEE AMPUTATION OF LEFT LOWER EXTREMITY (HCC): ICD-10-CM

## 2022-04-20 LAB
HBA1C MFR BLD: 6.9 % (ref 0–5.6)
INT CON NEG: NEGATIVE
INT CON POS: POSITIVE

## 2022-04-20 PROCEDURE — 99214 OFFICE O/P EST MOD 30 MIN: CPT | Performed by: FAMILY MEDICINE

## 2022-04-20 PROCEDURE — 83036 HEMOGLOBIN GLYCOSYLATED A1C: CPT | Mod: GZ | Performed by: FAMILY MEDICINE

## 2022-04-20 RX ORDER — ALLOPURINOL 100 MG/1
100 TABLET ORAL DAILY
Qty: 90 TABLET | Refills: 3 | Status: SHIPPED | OUTPATIENT
Start: 2022-04-20 | End: 2023-01-23

## 2022-04-20 ASSESSMENT — ENCOUNTER SYMPTOMS
DIZZINESS: 0
MYALGIAS: 0
RESPIRATORY NEGATIVE: 1
CHILLS: 0
NEUROLOGICAL NEGATIVE: 1
DEPRESSION: 0
PALPITATIONS: 0
PSYCHIATRIC NEGATIVE: 1
CARDIOVASCULAR NEGATIVE: 1
NAUSEA: 0
HEADACHES: 0
FEVER: 0
BRUISES/BLEEDS EASILY: 0
HEARTBURN: 0
CONSTITUTIONAL NEGATIVE: 1
COUGH: 0
HEMOPTYSIS: 0
EYES NEGATIVE: 1
TINGLING: 0
GASTROINTESTINAL NEGATIVE: 1
BLURRED VISION: 0
MUSCULOSKELETAL NEGATIVE: 1
DOUBLE VISION: 0

## 2022-04-20 ASSESSMENT — FIBROSIS 4 INDEX: FIB4 SCORE: 1.21

## 2022-04-20 NOTE — PROGRESS NOTES
Subjective     Jose Castanon is a 66 y.o. male who presents with Paperwork (Prosthetic/diabetic shoe paperwork ) and Diabetes (A1C)            1. Status post below-knee amputation of left lower extremity (HCC)  Paperwork for supplies filled out    2. DM (diabetes mellitus) type II, controlled, with peripheral vascular disorder (HCC)  a1c of 6.9  Currently treated for DM, taking meds and checking bs at home, trying to do DM diet.controlled     Referral to Ophthalmology    3. Morbid obesity with BMI of 50.0-59.9, adult ([facility])  Patient has a diagnosis of obesity. They were counseled today on increasing exercise and  extensively counseled on a low carb diet       4. PVD (peripheral vascular disease) ([facility])  On statin and thinner    Past Medical History:  No date: At risk for sleep apnea  No date: Diabetes ([facility])  No date: Diabetic neuropathy ([facility])  No date: DVT (deep venous thrombosis) ([facility])  No date: Hyperlipidemia  No date: Hypertension  No date: Sleep apnea  Past Surgical History:  9/15/2021: PB AMPUTATION TOE,MT-P JT; Right      Comment:  Procedure: RIGHT SECOND TOE AMPUTATION (30 MIN);                 Surgeon: Jackson Castro M.D.;  Location: [facility]                Orthopedic formerly [facility];  Service: Orthopedics  No date: TOE AMPUTATION; Left  Social History    Tobacco Use      Smoking status: Former Smoker        Packs/day: 0.00        Quit date: 1980        Years since quittin.2      Smokeless tobacco: Never Used    Vaping Use      Vaping Use: Never used    Alcohol use: Not Currently      Comment: OCC    Drug use: Never    Review of patient's family history indicates:  Problem: Cancer      Relation: Mother          Age of Onset: (Not Specified)  Problem: Cancer      Relation: Father          Age of Onset: (Not Specified)      Current Outpatient Medications: •  allopurinol (ZYLOPRIM) 100 MG Tab, TAKE 1 TABLET BY MOUTH EVERY DAY., Disp: 90 Tablet, Rfl: 3•  pioglitazone (ACTOS) 30 MG Tab,  Take 1 Tablet by mouth every day., Disp: 100 Tablet, Rfl: 3•  metformin (GLUCOPHAGE) 1000 MG tablet, Take 1 tablet by mouth 2 times a day with food, Disp: 200 Tablet, Rfl: 3•  Loratadine 10 MG Cap, Take  by mouth., Disp: , Rfl: •  XARELTO 20 MG Tab tablet, TAKE 1 TABLET BY MOUTH DAILY WITH DINNER, Disp: 100 Tablet, Rfl: 0•  atorvastatin (LIPITOR) 20 MG Tab, Take 1 Tablet by mouth every evening., Disp: 100 Tablet, Rfl: 3•  hydroCHLOROthiazide (HYDRODIURIL) 25 MG Tab, Take 1 Tablet by mouth every day., Disp: 100 Tablet, Rfl: 3•  gabapentin (NEURONTIN) 300 MG Cap, Take 1 Capsule by mouth 3 times a day., Disp: 90 Capsule, Rfl: 0•  atenolol (TENORMIN) 100 MG Tab, Take 1 tablet by mouth every day., Disp: 90 tablet, Rfl: 3•  lisinopril (PRINIVIL) 40 MG tablet, Take 1 tablet by mouth every day., Disp: 90 tablet, Rfl: 3•  Morphine Sulfate 10 MG CAPSULE SR 24 HR, TAKE 1 CAPSULE BY MOUTH EVERY DAY. DO NOT DRINK ALCOHOL OR DRIVE WHILE TAKING. MAY CAUSE SEDATION, Disp: , Rfl: •  vitamin D (VITAMIND D3) 1000 UNIT Tab, Take 1 Tab by mouth every day., Disp: 60 Tab, Rfl: •  oxyCODONE-acetaminophen (PERCOCET) 5-325 MG Tab, Take 1 Tab by mouth every morning., Disp: , Rfl:     Patient was instructed on the use of medications, either prescriptions or OTC and informed on when the appropriate follow up time period should be. In addition, patient was also instructed that should any acute worsening occur that they should notify this clinic asap or call 911.          Review of Systems   Constitutional: Negative.  Negative for chills and fever.   HENT: Negative.  Negative for hearing loss.    Eyes: Negative.  Negative for blurred vision and double vision.   Respiratory: Negative.  Negative for cough and hemoptysis.    Cardiovascular: Negative.  Negative for chest pain and palpitations.   Gastrointestinal: Negative.  Negative for heartburn and nausea.   Genitourinary: Negative.  Negative for dysuria.   Musculoskeletal: Negative.  Negative for  myalgias.   Skin: Negative.  Negative for rash.   Neurological: Negative.  Negative for dizziness, tingling and headaches.   Endo/Heme/Allergies: Negative.  Does not bruise/bleed easily.   Psychiatric/Behavioral: Negative.  Negative for depression and suicidal ideas.   All other systems reviewed and are negative.             Objective     /82 (BP Location: Left arm, Patient Position: Sitting, BP Cuff Size: Large adult)   Pulse 76   Temp 36.3 °C (97.4 °F) (Temporal)   Resp 20   Ht 1.829 m (6')   Wt (!) 172 kg (379 lb)   SpO2 94%   BMI 51.40 kg/m²      Physical Exam  Vitals and nursing note reviewed.   Constitutional:       General: He is not in acute distress.     Appearance: He is well-developed. He is not diaphoretic.   HENT:      Head: Normocephalic and atraumatic.      Mouth/Throat:      Pharynx: No oropharyngeal exudate.   Eyes:      Pupils: Pupils are equal, round, and reactive to light.   Cardiovascular:      Rate and Rhythm: Normal rate and regular rhythm.      Heart sounds: Normal heart sounds. No murmur heard.    No friction rub. No gallop.   Pulmonary:      Effort: Pulmonary effort is normal. No respiratory distress.      Breath sounds: Normal breath sounds. No wheezing or rales.   Chest:      Chest wall: No tenderness.   Musculoskeletal:      Comments: Left bka present   Neurological:      Mental Status: He is alert and oriented to person, place, and time.   Psychiatric:         Behavior: Behavior normal.         Thought Content: Thought content normal.         Judgment: Judgment normal.                             Assessment & Plan        1. Status post below-knee amputation of left lower extremity ([facility])      2. DM (diabetes mellitus) type II, controlled, with peripheral vascular disorder ([facility])    - Referral to Ophthalmology    3. Morbid obesity with BMI of 50.0-59.9, adult ([facility])      4. PVD (peripheral vascular disease) ([facility])

## 2022-04-20 NOTE — PROGRESS NOTES
Annual Health Assessment Questions:    1.  Are you currently engaging in any exercise or physical activity? Yes-gets out to get mail-lives alone so is completing daily tasks alone    2.  How would you describe your mood or emotional well-being today? good    3.  Have you had any falls in the last year? No    4.  Have you noticed any problems with your balance or had difficulty walking? No    5.  In the last six months have you experienced any leakage of urine? No    6. DPA/Advanced Directive: Patient does not have an Advanced Directive.  A packet and workshop information was given on Advanced Directives.

## 2022-05-26 DIAGNOSIS — I10 BENIGN ESSENTIAL HTN: ICD-10-CM

## 2022-05-30 RX ORDER — ATENOLOL 100 MG/1
100 TABLET ORAL DAILY
Qty: 90 TABLET | Refills: 3 | Status: SHIPPED | OUTPATIENT
Start: 2022-05-30 | End: 2023-05-25

## 2022-07-11 RX ORDER — LISINOPRIL 40 MG/1
40 TABLET ORAL DAILY
Qty: 90 TABLET | Refills: 3 | Status: SHIPPED | OUTPATIENT
Start: 2022-07-11 | End: 2023-06-26

## 2022-07-23 DIAGNOSIS — Z86.718 HISTORY OF DVT (DEEP VEIN THROMBOSIS): ICD-10-CM

## 2022-07-23 DIAGNOSIS — Z79.01 CHRONIC ANTICOAGULATION: ICD-10-CM

## 2022-07-25 RX ORDER — RIVAROXABAN 20 MG/1
TABLET, FILM COATED ORAL
Qty: 100 TABLET | Refills: 0 | Status: SHIPPED | OUTPATIENT
Start: 2022-07-25 | End: 2022-10-24

## 2022-07-27 ENCOUNTER — OFFICE VISIT (OUTPATIENT)
Dept: MEDICAL GROUP | Facility: PHYSICIAN GROUP | Age: 67
End: 2022-07-27
Payer: MEDICARE

## 2022-07-27 VITALS
DIASTOLIC BLOOD PRESSURE: 82 MMHG | TEMPERATURE: 97.1 F | HEIGHT: 72 IN | OXYGEN SATURATION: 95 % | HEART RATE: 75 BPM | BODY MASS INDEX: 42.66 KG/M2 | SYSTOLIC BLOOD PRESSURE: 138 MMHG | WEIGHT: 315 LBS | RESPIRATION RATE: 16 BRPM

## 2022-07-27 DIAGNOSIS — E66.01 MORBID OBESITY WITH BMI OF 50.0-59.9, ADULT (HCC): ICD-10-CM

## 2022-07-27 DIAGNOSIS — Z89.512 STATUS POST BELOW-KNEE AMPUTATION OF LEFT LOWER EXTREMITY (HCC): ICD-10-CM

## 2022-07-27 DIAGNOSIS — E11.51 DM (DIABETES MELLITUS) TYPE II, CONTROLLED, WITH PERIPHERAL VASCULAR DISORDER (HCC): ICD-10-CM

## 2022-07-27 LAB
HBA1C MFR BLD: 6.5 % (ref 0–5.6)
INT CON NEG: NEGATIVE
INT CON POS: POSITIVE

## 2022-07-27 PROCEDURE — 83036 HEMOGLOBIN GLYCOSYLATED A1C: CPT | Performed by: FAMILY MEDICINE

## 2022-07-27 PROCEDURE — 99214 OFFICE O/P EST MOD 30 MIN: CPT | Performed by: FAMILY MEDICINE

## 2022-07-27 ASSESSMENT — ENCOUNTER SYMPTOMS
PALPITATIONS: 0
HEARTBURN: 0
DEPRESSION: 0
MUSCULOSKELETAL NEGATIVE: 1
NEUROLOGICAL NEGATIVE: 1
CHILLS: 0
GASTROINTESTINAL NEGATIVE: 1
FEVER: 0
RESPIRATORY NEGATIVE: 1
DIZZINESS: 0
BLURRED VISION: 0
BRUISES/BLEEDS EASILY: 0
HEADACHES: 0
MYALGIAS: 0
EYES NEGATIVE: 1
CARDIOVASCULAR NEGATIVE: 1
HEMOPTYSIS: 0
COUGH: 0
NAUSEA: 0
DOUBLE VISION: 0
CONSTITUTIONAL NEGATIVE: 1
TINGLING: 0
PSYCHIATRIC NEGATIVE: 1

## 2022-07-27 ASSESSMENT — FIBROSIS 4 INDEX: FIB4 SCORE: 1.23

## 2022-07-27 NOTE — PROGRESS NOTES
Subjective     Jose Castanon is a 67 y.o. male who presents with Diabetes Follow-up ([employer] )            1. DM (diabetes mellitus) type II, controlled, with peripheral vascular disorder (HCC)  a1c 6.3  Currently treated for DM, taking meds and checking bs at home, trying to do DM diet.controlled     POCT Hemoglobin A1C   Comp Metabolic Panel; Future   Hemoglobin A1c; Future   Lipid Profile; Future   Microalbumin Creat Ratio Urine - Lab Collect; Future    2. Morbid obesity with BMI of 50.0-59.9, adult (HCC)  Patient has a diagnosis of obesity. They were counseled today on increasing exercise and  extensively counseled on a low carb diet       3. Status post below-knee amputation of left lower extremity ([facility])  Well fitted prosthesis    Past Medical History:  No date: At risk for sleep apnea  No date: Diabetes ([facility])  No date: Diabetic neuropathy ([facility])  No date: DVT (deep venous thrombosis) ([facility])  No date: Hyperlipidemia  No date: Hypertension  No date: Sleep apnea  Past Surgical History:  9/15/2021: PB AMPUTATION TOE,MT-P JT; Right      Comment:  Procedure: RIGHT SECOND TOE AMPUTATION (30 MIN);                 Surgeon: Jackson Castro M.D.;  Location: [place]                [facility];  Service: Orthopedics  No date: TOE AMPUTATION; Left  Social History    Tobacco Use      Smoking status: Former Smoker        Packs/day: 0.00        Quit date: 1980        Years since quittin.5      Smokeless tobacco: Never Used    Vaping Use      Vaping Use: Never used    Alcohol use: Not Currently      Comment: OCC    Drug use: Never    Review of patient's family history indicates:  Problem: Cancer      Relation: Mother          Age of Onset: (Not Specified)  Problem: Cancer      Relation: Father          Age of Onset: (Not Specified)      Current Outpatient Medications: •  XARELTO 20 MG Tab tablet, TAKE 1 TABLET BY MOUTH DAILY WITH DINNER, Disp: 100 Tablet, Rfl: 0•  lisinopril  (PRINIVIL) 40 MG tablet, Take 1 Tablet by mouth every day., Disp: 90 Tablet, Rfl: 3•  atenolol (TENORMIN) 100 MG Tab, TAKE 1 TABLET BY MOUTH EVERY DAY, Disp: 90 Tablet, Rfl: 3•  allopurinol (ZYLOPRIM) 100 MG Tab, TAKE 1 TABLET BY MOUTH EVERY DAY., Disp: 90 Tablet, Rfl: 3•  pioglitazone (ACTOS) 30 MG Tab, Take 1 Tablet by mouth every day., Disp: 100 Tablet, Rfl: 3•  metformin (GLUCOPHAGE) 1000 MG tablet, Take 1 tablet by mouth 2 times a day with food, Disp: 200 Tablet, Rfl: 3•  Loratadine 10 MG Cap, Take  by mouth., Disp: , Rfl: •  atorvastatin (LIPITOR) 20 MG Tab, Take 1 Tablet by mouth every evening., Disp: 100 Tablet, Rfl: 3•  hydroCHLOROthiazide (HYDRODIURIL) 25 MG Tab, Take 1 Tablet by mouth every day., Disp: 100 Tablet, Rfl: 3•  gabapentin (NEURONTIN) 300 MG Cap, Take 1 Capsule by mouth 3 times a day., Disp: 90 Capsule, Rfl: 0•  vitamin D (VITAMIND D3) 1000 UNIT Tab, Take 1 Tab by mouth every day., Disp: 60 Tab, Rfl:     Patient was instructed on the use of medications, either prescriptions or OTC and informed on when the appropriate follow up time period should be. In addition, patient was also instructed that should any acute worsening occur that they should notify this clinic asap or call 911.          Review of Systems   Constitutional: Negative.  Negative for chills and fever.   HENT: Negative.  Negative for hearing loss.    Eyes: Negative.  Negative for blurred vision and double vision.   Respiratory: Negative.  Negative for cough and hemoptysis.    Cardiovascular: Negative.  Negative for chest pain and palpitations.   Gastrointestinal: Negative.  Negative for heartburn and nausea.   Genitourinary: Negative.  Negative for dysuria.   Musculoskeletal: Negative.  Negative for myalgias.   Skin: Negative.  Negative for rash.   Neurological: Negative.  Negative for dizziness, tingling and headaches.   Endo/Heme/Allergies: Negative.  Does not bruise/bleed easily.   Psychiatric/Behavioral: Negative.  Negative for  depression and suicidal ideas.   All other systems reviewed and are negative.             Objective     /82 (BP Location: Right arm, Patient Position: Sitting, BP Cuff Size: Large adult)   Pulse 75   Temp 36.2 °C (97.1 °F) (Temporal)   Resp 16   Ht 1.829 m (6')   Wt (!) 175 kg (386 lb)   SpO2 95%   BMI 52.35 kg/m²      Physical Exam  Vitals and nursing note reviewed.   Constitutional:       General: He is not in acute distress.     Appearance: He is well-developed. He is not diaphoretic.   HENT:      Head: Normocephalic and atraumatic.      Mouth/Throat:      Pharynx: No oropharyngeal exudate.   Eyes:      Pupils: Pupils are equal, round, and reactive to light.   Cardiovascular:      Rate and Rhythm: Normal rate and regular rhythm.      Heart sounds: Normal heart sounds. No murmur heard.    No friction rub. No gallop.   Pulmonary:      Effort: Pulmonary effort is normal. No respiratory distress.      Breath sounds: Normal breath sounds. No wheezing or rales.   Chest:      Chest wall: No tenderness.   Neurological:      Mental Status: He is alert and oriented to person, place, and time.   Psychiatric:         Behavior: Behavior normal.         Thought Content: Thought content normal.         Judgment: Judgment normal.                             Assessment & Plan        1. DM (diabetes mellitus) type II, controlled, with peripheral vascular disorder (HCC)    - POCT Hemoglobin A1C  - Comp Metabolic Panel; Future  - Hemoglobin A1c; Future  - Lipid Profile; Future  - Microalbumin Creat Ratio Urine - Lab Collect; Future    2. Morbid obesity with BMI of 50.0-59.9, adult ([facility])      3. Status post below-knee amputation of left lower extremity ([facility])

## 2022-08-11 ENCOUNTER — TELEPHONE (OUTPATIENT)
Dept: HEALTH INFORMATION MANAGEMENT | Facility: OTHER | Age: 67
End: 2022-08-11

## 2022-09-09 PROBLEM — E11.69 HYPERLIPIDEMIA ASSOCIATED WITH TYPE 2 DIABETES MELLITUS (HCC): Status: ACTIVE | Noted: 2022-09-09

## 2022-09-09 PROBLEM — G54.8 PHANTOM PAIN: Status: ACTIVE | Noted: 2022-09-09

## 2022-09-09 PROBLEM — E11.42 DIABETIC POLYNEUROPATHY ASSOCIATED WITH TYPE 2 DIABETES MELLITUS (HCC): Status: ACTIVE | Noted: 2022-09-09

## 2022-09-09 PROBLEM — E11.21 DIABETIC NEPHROPATHY ASSOCIATED WITH TYPE 2 DIABETES MELLITUS (HCC): Status: ACTIVE | Noted: 2022-09-09

## 2022-09-09 PROBLEM — E11.65 UNCONTROLLED TYPE 2 DIABETES MELLITUS WITH HYPERGLYCEMIA (HCC): Status: ACTIVE | Noted: 2022-09-09

## 2022-09-09 PROBLEM — R52 PHANTOM PAIN: Status: ACTIVE | Noted: 2022-09-09

## 2022-09-09 PROBLEM — N18.31 STAGE 3A CHRONIC KIDNEY DISEASE: Status: ACTIVE | Noted: 2022-09-09

## 2022-09-09 PROBLEM — G54.6 PHANTOM PAIN: Status: ACTIVE | Noted: 2022-09-09

## 2022-09-09 PROBLEM — E11.622 DIABETIC ULCER OF RIGHT CALF (HCC): Status: ACTIVE | Noted: 2022-09-09

## 2022-09-09 PROBLEM — E11.9 DIABETES MELLITUS WITH COINCIDENT HYPERTENSION (HCC): Status: ACTIVE | Noted: 2022-09-09

## 2022-09-09 PROBLEM — I10 DIABETES MELLITUS WITH COINCIDENT HYPERTENSION (HCC): Status: ACTIVE | Noted: 2022-09-09

## 2022-09-09 PROBLEM — E78.5 HYPERLIPIDEMIA ASSOCIATED WITH TYPE 2 DIABETES MELLITUS (HCC): Status: ACTIVE | Noted: 2022-09-09

## 2022-09-09 PROBLEM — L97.219 DIABETIC ULCER OF RIGHT CALF (HCC): Status: ACTIVE | Noted: 2022-09-09

## 2022-09-09 PROBLEM — I73.9 PAD (PERIPHERAL ARTERY DISEASE) (HCC): Status: ACTIVE | Noted: 2022-09-09

## 2022-09-28 ENCOUNTER — OFFICE VISIT (OUTPATIENT)
Dept: MEDICAL GROUP | Facility: PHYSICIAN GROUP | Age: 67
End: 2022-09-28
Payer: MEDICARE

## 2022-09-28 ENCOUNTER — NON-PROVIDER VISIT (OUTPATIENT)
Dept: URGENT CARE | Facility: CLINIC | Age: 67
End: 2022-09-28
Payer: MEDICARE

## 2022-09-28 ENCOUNTER — APPOINTMENT (OUTPATIENT)
Dept: RADIOLOGY | Facility: IMAGING CENTER | Age: 67
End: 2022-09-28
Attending: PHYSICIAN ASSISTANT
Payer: MEDICARE

## 2022-09-28 VITALS
HEIGHT: 72 IN | SYSTOLIC BLOOD PRESSURE: 146 MMHG | WEIGHT: 315 LBS | OXYGEN SATURATION: 97 % | TEMPERATURE: 98 F | DIASTOLIC BLOOD PRESSURE: 88 MMHG | HEART RATE: 85 BPM | BODY MASS INDEX: 42.66 KG/M2 | RESPIRATION RATE: 18 BRPM

## 2022-09-28 DIAGNOSIS — Z23 NEED FOR VACCINATION: ICD-10-CM

## 2022-09-28 DIAGNOSIS — E11.621 DIABETIC ULCER OF TOE OF RIGHT FOOT ASSOCIATED WITH TYPE 2 DIABETES MELLITUS, WITH FAT LAYER EXPOSED (HCC): ICD-10-CM

## 2022-09-28 DIAGNOSIS — L97.512 DIABETIC ULCER OF TOE OF RIGHT FOOT ASSOCIATED WITH TYPE 2 DIABETES MELLITUS, WITH FAT LAYER EXPOSED (HCC): ICD-10-CM

## 2022-09-28 PROCEDURE — 73620 X-RAY EXAM OF FOOT: CPT | Mod: TC,RT | Performed by: RADIOLOGY

## 2022-09-28 PROCEDURE — 99213 OFFICE O/P EST LOW 20 MIN: CPT | Performed by: PHYSICIAN ASSISTANT

## 2022-09-28 RX ORDER — DOXYCYCLINE HYCLATE 100 MG
100 TABLET ORAL 2 TIMES DAILY
Qty: 20 TABLET | Refills: 0 | Status: SHIPPED | OUTPATIENT
Start: 2022-09-28 | End: 2022-10-08

## 2022-09-28 ASSESSMENT — FIBROSIS 4 INDEX: FIB4 SCORE: 1.23

## 2022-09-28 NOTE — PROGRESS NOTES
CC:  Chief Complaint   Patient presents with    Other     Bleeding toes (R) foot        HISTORY OF PRESENT ILLNESS: Patient is a 67 y.o. male established patient presenting with issue below.   Pt woke up 2 days ago with bleeding and erythema of 1st and 3rd toes over his R foot. Has hx of L leg below the knee amputation and R 2nd toe amputation.      No problem-specific Assessment & Plan notes found for this encounter.      Patient Active Problem List    Diagnosis Date Noted    Diabetes mellitus with coincident hypertension ([facility]) 09/09/2022    Hyperlipidemia associated with type 2 diabetes mellitus ([facility]) 09/09/2022    Diabetic polyneuropathy associated with type 2 diabetes mellitus ([facility]) 09/09/2022    Diabetic nephropathy associated with type 2 diabetes mellitus ([facility]) 09/09/2022    Uncontrolled type 2 diabetes mellitus with hyperglycemia ([facility]) 09/09/2022    Phantom pain ([facility]) 09/09/2022    Diabetic ulcer of right calf ([facility]) 09/09/2022    PAD (peripheral artery disease) ([facility]) 09/09/2022    Stage 3a chronic kidney disease ([facility]) 09/09/2022    BMI 50.0-59.9, adult ([facility]) 08/10/2021    DM (diabetes mellitus) type II, controlled, with peripheral vascular disorder ([facility]) 07/20/2021    Morbid obesity ([facility]) 07/20/2021    Vitamin D deficiency 03/11/2020    Impaired mobility and ADLs 03/11/2020    Peripheral neuropathy 03/10/2020    Acquired absence of left leg below knee ([facility]) 03/10/2020    Obstructive sleep apnea syndrome 03/10/2020    Benign essential HTN 01/21/2020    Mixed hyperlipidemia 01/21/2020    History of DVT (deep vein thrombosis) 01/21/2020    Long term (current) use of anticoagulants 01/21/2020    Lumbar radicular syndrome 01/21/2020      Allergies:Penicillins, Bee venom, Cefazolin, Linezolid, and Chicken-derived products    Current Outpatient Medications   Medication Sig Dispense Refill    XARELTO 20 MG Tab tablet TAKE 1 TABLET BY MOUTH DAILY WITH DINNER 100 Tablet 0    lisinopril (PRINIVIL) 40 MG tablet Take 1  Tablet by mouth every day. 90 Tablet 3    atenolol (TENORMIN) 100 MG Tab TAKE 1 TABLET BY MOUTH EVERY DAY 90 Tablet 3    allopurinol (ZYLOPRIM) 100 MG Tab TAKE 1 TABLET BY MOUTH EVERY DAY. 90 Tablet 3    pioglitazone (ACTOS) 30 MG Tab Take 1 Tablet by mouth every day. 100 Tablet 3    metformin (GLUCOPHAGE) 1000 MG tablet Take 1 tablet by mouth 2 times a day with food 200 Tablet 3    Loratadine 10 MG Cap Take  by mouth.      atorvastatin (LIPITOR) 20 MG Tab Take 1 Tablet by mouth every evening. 100 Tablet 3    hydroCHLOROthiazide (HYDRODIURIL) 25 MG Tab Take 1 Tablet by mouth every day. 100 Tablet 3    gabapentin (NEURONTIN) 300 MG Cap Take 1 Capsule by mouth 3 times a day. 90 Capsule 0    vitamin D (VITAMIND D3) 1000 UNIT Tab Take 1 Tab by mouth every day. 60 Tab      No current facility-administered medications for this visit.       Social History     Tobacco Use    Smoking status: Former     Packs/day: 0.00     Types: Cigarettes     Quit date: 1980     Years since quittin.7    Smokeless tobacco: Never   Vaping Use    Vaping Use: Never used   Substance Use Topics    Alcohol use: Not Currently     Comment: OCC    Drug use: Never     Social History     Social History Narrative    Not on file       Family History   Problem Relation Age of Onset    Cancer Mother     Cancer Father         ROS:     - Constitutional:  Negative for fever, chills, unexpected weight change, and fatigue/generalized weakness.     - Skin:Positive for erythema and skin ulceration. Negative for rash, itching, cyanotic skin color change.     - Neurological: Positive for sensory loss.  Negative for dizziness, tingling, tremors, focal weakness and headaches.       Exam:    BP (!) 146/88   Pulse 85   Temp 36.7 °C (98 °F) (Temporal)   Resp 18   Ht 1.829 m (6')   Wt (!) 175 kg (386 lb 3.2 oz)   SpO2 97%  Body mass index is 52.38 kg/m².    General:  Well nourished, well developed male in NAD  Head is grossly normal.  Neck: Supple.  Thyroid is not enlarged.  Podiatric: Positive for R distal foot erythema with ulcerations over 1st and 3rd distal toes with fat layer exposed on both  Skin: Warm and dry. No obvious lesions  Neuro: Normal muscle tone. Gait normal. Alert and oriented.  Psych: Normal mood and affect      Please note that this dictation was created using voice recognition software. I have made every reasonable attempt to correct obvious errors, but I expect that there are errors of grammar and possibly content that I did not discover before finalizing the note.        Assessment/Plan:    1. Need for vaccination      2. Diabetic ulcer of toe of right foot associated with type 2 diabetes mellitus, with fat layer exposed (HCC)  Urgent referral to wound clinic placed. Will get xray and f/u with results. Start abx.   - Referral to Wound Clinic  - DX-FOOT-2- RIGHT; Future  - doxycycline (VIBRAMYCIN) 100 MG Tab; Take 1 Tablet by mouth 2 times a day for 10 days.  Dispense: 20 Tablet; Refill: 0

## 2022-10-21 DIAGNOSIS — I10 BENIGN ESSENTIAL HTN: ICD-10-CM

## 2022-10-21 DIAGNOSIS — Z86.718 HISTORY OF DVT (DEEP VEIN THROMBOSIS): ICD-10-CM

## 2022-10-21 DIAGNOSIS — Z79.01 CHRONIC ANTICOAGULATION: ICD-10-CM

## 2022-10-21 RX ORDER — HYDROCHLOROTHIAZIDE 25 MG/1
25 TABLET ORAL DAILY
Qty: 100 TABLET | Refills: 3 | Status: ON HOLD | OUTPATIENT
Start: 2022-10-21 | End: 2023-11-08 | Stop reason: SDUPTHER

## 2022-11-09 ENCOUNTER — DOCUMENTATION (OUTPATIENT)
Dept: HEALTH INFORMATION MANAGEMENT | Facility: OTHER | Age: 67
End: 2022-11-09
Payer: MEDICARE

## 2022-12-05 DIAGNOSIS — I10 BENIGN ESSENTIAL HTN: ICD-10-CM

## 2022-12-06 RX ORDER — ATORVASTATIN CALCIUM 20 MG/1
20 TABLET, FILM COATED ORAL EVERY EVENING
Qty: 100 TABLET | Refills: 3 | Status: ON HOLD | OUTPATIENT
Start: 2022-12-06 | End: 2023-11-08 | Stop reason: SDUPTHER

## 2022-12-06 NOTE — TELEPHONE ENCOUNTER
Received request via: Pharmacy    Was the patient seen in the last year in this department? Yes    Does the patient have an active prescription (recently filled or refills available) for medication(s) requested? No    Does the patient have alf Plus and need 100 day supply (blood pressure, diabetes and cholesterol meds only)? Yes, quantity updated to 100 days

## 2023-01-23 DIAGNOSIS — M10.9 GOUT, UNSPECIFIED CAUSE, UNSPECIFIED CHRONICITY, UNSPECIFIED SITE: ICD-10-CM

## 2023-01-23 RX ORDER — ALLOPURINOL 100 MG/1
100 TABLET ORAL DAILY
Qty: 90 TABLET | Refills: 3 | Status: ON HOLD | OUTPATIENT
Start: 2023-01-23 | End: 2023-11-08 | Stop reason: SDUPTHER

## 2023-01-23 NOTE — TELEPHONE ENCOUNTER
Received request via: Pharmacy    Was the patient seen in the last year in this department? Yes    Does the patient have an active prescription (recently filled or refills available) for medication(s) requested? No    Does the patient have longterm Plus and need 100 day supply (blood pressure, diabetes and cholesterol meds only)? Medication is not for cholesterol, blood pressure or diabetes

## 2023-01-25 ENCOUNTER — PATIENT MESSAGE (OUTPATIENT)
Dept: HEALTH INFORMATION MANAGEMENT | Facility: OTHER | Age: 68
End: 2023-01-25

## 2023-01-25 ENCOUNTER — DOCUMENTATION (OUTPATIENT)
Dept: HEALTH INFORMATION MANAGEMENT | Facility: OTHER | Age: 68
End: 2023-01-25
Payer: MEDICARE

## 2023-03-10 DIAGNOSIS — Z79.01 CHRONIC ANTICOAGULATION: ICD-10-CM

## 2023-03-10 DIAGNOSIS — Z86.718 HISTORY OF DVT (DEEP VEIN THROMBOSIS): ICD-10-CM

## 2023-03-12 RX ORDER — RIVAROXABAN 20 MG/1
TABLET, FILM COATED ORAL
Qty: 100 TABLET | Refills: 0 | Status: SHIPPED | OUTPATIENT
Start: 2023-03-12 | End: 2023-06-21

## 2023-03-23 DIAGNOSIS — E11.51 DM (DIABETES MELLITUS) TYPE II, CONTROLLED, WITH PERIPHERAL VASCULAR DISORDER (HCC): ICD-10-CM

## 2023-03-23 RX ORDER — PIOGLITAZONEHYDROCHLORIDE 30 MG/1
30 TABLET ORAL DAILY
Qty: 100 TABLET | Refills: 3 | Status: ON HOLD | OUTPATIENT
Start: 2023-03-23 | End: 2024-03-07

## 2023-04-24 DIAGNOSIS — E11.51 DM (DIABETES MELLITUS) TYPE II, CONTROLLED, WITH PERIPHERAL VASCULAR DISORDER (HCC): ICD-10-CM

## 2023-05-24 ENCOUNTER — TELEPHONE (OUTPATIENT)
Dept: HEALTH INFORMATION MANAGEMENT | Facility: OTHER | Age: 68
End: 2023-05-24

## 2023-05-24 DIAGNOSIS — I10 BENIGN ESSENTIAL HTN: ICD-10-CM

## 2023-05-25 RX ORDER — ATENOLOL 100 MG/1
100 TABLET ORAL DAILY
Qty: 100 TABLET | Refills: 2 | Status: ON HOLD | OUTPATIENT
Start: 2023-05-25 | End: 2023-11-08

## 2023-06-07 ENCOUNTER — TELEPHONE (OUTPATIENT)
Dept: MEDICAL GROUP | Facility: PHYSICIAN GROUP | Age: 68
End: 2023-06-07
Payer: MEDICARE

## 2023-06-14 ENCOUNTER — OFFICE VISIT (OUTPATIENT)
Dept: MEDICAL GROUP | Facility: PHYSICIAN GROUP | Age: 68
End: 2023-06-14
Payer: MEDICARE

## 2023-06-14 VITALS
WEIGHT: 315 LBS | DIASTOLIC BLOOD PRESSURE: 70 MMHG | TEMPERATURE: 98.3 F | HEART RATE: 86 BPM | RESPIRATION RATE: 18 BRPM | BODY MASS INDEX: 42.66 KG/M2 | HEIGHT: 72 IN | OXYGEN SATURATION: 91 % | SYSTOLIC BLOOD PRESSURE: 108 MMHG

## 2023-06-14 DIAGNOSIS — E66.01 MORBID (SEVERE) OBESITY DUE TO EXCESS CALORIES (HCC): ICD-10-CM

## 2023-06-14 DIAGNOSIS — N18.31 STAGE 3A CHRONIC KIDNEY DISEASE: ICD-10-CM

## 2023-06-14 DIAGNOSIS — E11.51 DM (DIABETES MELLITUS) TYPE II, CONTROLLED, WITH PERIPHERAL VASCULAR DISORDER (HCC): ICD-10-CM

## 2023-06-14 DIAGNOSIS — Z23 NEED FOR VACCINATION: ICD-10-CM

## 2023-06-14 DIAGNOSIS — I73.9 PAD (PERIPHERAL ARTERY DISEASE) (HCC): ICD-10-CM

## 2023-06-14 DIAGNOSIS — Z89.512 ACQUIRED ABSENCE OF LEFT LEG BELOW KNEE (HCC): ICD-10-CM

## 2023-06-14 PROBLEM — E11.42 DIABETIC POLYNEUROPATHY ASSOCIATED WITH TYPE 2 DIABETES MELLITUS (HCC): Status: RESOLVED | Noted: 2022-09-09 | Resolved: 2023-06-14

## 2023-06-14 PROBLEM — R52 PHANTOM PAIN: Status: RESOLVED | Noted: 2022-09-09 | Resolved: 2023-06-14

## 2023-06-14 PROBLEM — E11.9 DIABETES MELLITUS WITH COINCIDENT HYPERTENSION (HCC): Status: RESOLVED | Noted: 2022-09-09 | Resolved: 2023-06-14

## 2023-06-14 PROBLEM — E11.21 DIABETIC NEPHROPATHY ASSOCIATED WITH TYPE 2 DIABETES MELLITUS (HCC): Status: RESOLVED | Noted: 2022-09-09 | Resolved: 2023-06-14

## 2023-06-14 PROBLEM — G54.8 PHANTOM PAIN: Status: RESOLVED | Noted: 2022-09-09 | Resolved: 2023-06-14

## 2023-06-14 PROBLEM — E11.622 DIABETIC ULCER OF RIGHT CALF (HCC): Status: RESOLVED | Noted: 2022-09-09 | Resolved: 2023-06-14

## 2023-06-14 PROBLEM — L97.219 DIABETIC ULCER OF RIGHT CALF (HCC): Status: RESOLVED | Noted: 2022-09-09 | Resolved: 2023-06-14

## 2023-06-14 PROBLEM — I10 DIABETES MELLITUS WITH COINCIDENT HYPERTENSION (HCC): Status: RESOLVED | Noted: 2022-09-09 | Resolved: 2023-06-14

## 2023-06-14 PROBLEM — E78.5 HYPERLIPIDEMIA ASSOCIATED WITH TYPE 2 DIABETES MELLITUS (HCC): Status: RESOLVED | Noted: 2022-09-09 | Resolved: 2023-06-14

## 2023-06-14 PROBLEM — E11.69 HYPERLIPIDEMIA ASSOCIATED WITH TYPE 2 DIABETES MELLITUS (HCC): Status: RESOLVED | Noted: 2022-09-09 | Resolved: 2023-06-14

## 2023-06-14 PROBLEM — E11.65 UNCONTROLLED TYPE 2 DIABETES MELLITUS WITH HYPERGLYCEMIA (HCC): Status: RESOLVED | Noted: 2022-09-09 | Resolved: 2023-06-14

## 2023-06-14 PROBLEM — G54.6 PHANTOM PAIN: Status: RESOLVED | Noted: 2022-09-09 | Resolved: 2023-06-14

## 2023-06-14 PROCEDURE — 99214 OFFICE O/P EST MOD 30 MIN: CPT | Mod: 25 | Performed by: FAMILY MEDICINE

## 2023-06-14 PROCEDURE — 3074F SYST BP LT 130 MM HG: CPT | Performed by: FAMILY MEDICINE

## 2023-06-14 PROCEDURE — 90715 TDAP VACCINE 7 YRS/> IM: CPT | Performed by: FAMILY MEDICINE

## 2023-06-14 PROCEDURE — 3078F DIAST BP <80 MM HG: CPT | Performed by: FAMILY MEDICINE

## 2023-06-14 PROCEDURE — 90677 PCV20 VACCINE IM: CPT | Performed by: FAMILY MEDICINE

## 2023-06-14 PROCEDURE — 90472 IMMUNIZATION ADMIN EACH ADD: CPT | Performed by: FAMILY MEDICINE

## 2023-06-14 PROCEDURE — G0009 ADMIN PNEUMOCOCCAL VACCINE: HCPCS | Performed by: FAMILY MEDICINE

## 2023-06-14 RX ORDER — DOXYCYCLINE HYCLATE 100 MG
TABLET ORAL
Status: ON HOLD | COMMUNITY
End: 2023-11-08

## 2023-06-14 ASSESSMENT — ENCOUNTER SYMPTOMS
MYALGIAS: 0
CONSTITUTIONAL NEGATIVE: 1
BLURRED VISION: 0
NAUSEA: 0
FEVER: 0
NEUROLOGICAL NEGATIVE: 1
COUGH: 0
HEMOPTYSIS: 0
DIZZINESS: 0
CHILLS: 0
RESPIRATORY NEGATIVE: 1
DEPRESSION: 0
BRUISES/BLEEDS EASILY: 0
PSYCHIATRIC NEGATIVE: 1
MUSCULOSKELETAL NEGATIVE: 1
EYES NEGATIVE: 1
PALPITATIONS: 0
DOUBLE VISION: 0
HEARTBURN: 0
GASTROINTESTINAL NEGATIVE: 1
HEADACHES: 0
CARDIOVASCULAR NEGATIVE: 1
TINGLING: 0

## 2023-06-14 ASSESSMENT — FIBROSIS 4 INDEX: FIB4 SCORE: 1.388044187577134256

## 2023-06-14 ASSESSMENT — PATIENT HEALTH QUESTIONNAIRE - PHQ9: CLINICAL INTERPRETATION OF PHQ2 SCORE: 0

## 2023-06-14 NOTE — PROGRESS NOTES
Subjective     Jose Castanon is a 68 y.o. male who presents with Lab Results            A1c 6.1  Currently treated for DM, taking meds and checking bs at home, trying to do DM diet.controlled    HCC Gap Form    Diagnosis: E66.01 - Morbid (severe) obesity due to excess calories (HCC)  Z68.43 - Body mass index (BMI) 50.0-59.9, adult (HCC)  The current BMI is 52.43 kg/m2 as of 06/14/23 10:33 PDT  Assessment and plan: Chronic, stable. Encouraged healthy diet and physical   activity changes with a goal of weight loss. Follow up at least annually.  Diagnosis to address: E11.51 - DM (diabetes mellitus) type II, controlled,   with peripheral vascular disorder (HCC)  Assessment and plan: Chronic, stable. Continue with current defined   treatment plan: The patient's current medical issue is well controlled on   the current therapy with no new symptoms or worsening  . Follow-up at least annually.  Diagnosis: I73.9 - PAD (peripheral artery disease) (HCC)  Assessment and plan: Chronic, stable. Continue with current defined   treatment plan: The patient's current medical issue is well controlled on   the current therapy with no new symptoms or worsening  . Follow-up at least annually.  Diagnosis: N18.31 - Stage 3a chronic kidney disease (HCC)  Assessment and plan: Chronic, stable. Continue with current defined   treatment plan: Patient is currently being followed for chronic renal   insufficiency. They are avoiding nsaids and maintaining hydration and   following renal diet. Taking all appropriate meds. No new change in urine   frequency or volume.  . Follow-up at least annually.  Diagnosis: Z89.512 - Acquired absence of left leg below knee (HCC)  Assessment and plan: Chronic, stable. Continue w.ith current defined   treatment plan: The patient's current medical issue is well controlled on   the current therapy with no new symptoms or worsening  . Follow-up at least annually.  Last edited 06/14/23 10:34 PDT by Theo GARNER  ROHAN James.     1. Need for vaccination     Pneumococcal Conjugate Vaccine 20-Valent (19 yrs+)   Tdap Vaccine =>6YO IM   POCT Retinal Eye Exam    2. DM (diabetes mellitus) type II, controlled, with peripheral vascular disorder (HCC)     Pneumococcal Conjugate Vaccine 20-Valent (19 yrs+)   Tdap Vaccine =>6YO IM   POCT Retinal Eye Exam    3. Morbid (severe) obesity due to excess calories (HCC)      4. Body mass index (BMI) 50.0-59.9, adult (HCC)      5. PAD (peripheral artery disease) (HCC)      6. Stage 3a chronic kidney disease (HCC)      7. Acquired absence of left leg below knee (HCC)      Past Medical History:  No date: At risk for sleep apnea  No date: Diabetes ([facility])  No date: Diabetic neuropathy ([facility])  No date: DVT (deep venous thrombosis) ([facility])  No date: Hyperlipidemia  No date: Hypertension  No date: Sleep apnea  Past Surgical History:  9/15/2021: PB AMPUTATION TOE,MT-P JT; Right      Comment:  Procedure: RIGHT SECOND TOE AMPUTATION (30 MIN);                 Surgeon: Jackson Castro M.D.;  Location: [place]                [facility]opedic [facility];  Service: Orthopedics  No date: TOE AMPUTATION; Left  Social History    Tobacco Use      Smoking status: Former        Packs/day: 0.00        Types: Cigarettes        Quit date: 1980        Years since quittin.4      Smokeless tobacco: Never    Vaping Use      Vaping Use: Never used    Alcohol use: Not Currently      Comment: OCC    Drug use: Never    Review of patient's family history indicates:  Problem: Cancer      Relation: Mother          Age of Onset: (Not Specified)  Problem: Cancer      Relation: Father          Age of Onset: (Not Specified)      Current Outpatient Medications: ·  atenolol (TENORMIN) 100 MG Tab, TAKE 1 TABLET BY MOUTH EVERY DAY, Disp: 100 Tablet, Rfl: 2·  metformin (GLUCOPHAGE) 1000 MG tablet, TAKE 1 TABLET BY MOUTH TWICE DAILY WITH FOOD, Disp: 200 Tablet, Rfl: 3·  pioglitazone (ACTOS) 30 MG Tab, TAKE 1 TABLET BY  MOUTH EVERY DAY, Disp: 100 Tablet, Rfl: 3·  XARELTO 20 MG Tab tablet, TAKE 1 TABLET BY MOUTH EVERY DAY WITH DINNER, Disp: 100 Tablet, Rfl: 0·  allopurinol (ZYLOPRIM) 100 MG Tab, TAKE 1 TABLET BY MOUTH EVERY DAY, Disp: 90 Tablet, Rfl: 3·  atorvastatin (LIPITOR) 20 MG Tab, TAKE 1 TABLET BY MOUTH EVERY EVENING, Disp: 100 Tablet, Rfl: 3·  hydroCHLOROthiazide (HYDRODIURIL) 25 MG Tab, Take 1 Tablet by mouth every day., Disp: 100 Tablet, Rfl: 3·  lisinopril (PRINIVIL) 40 MG tablet, Take 1 Tablet by mouth every day., Disp: 90 Tablet, Rfl: 3·  Loratadine 10 MG Cap, Take  by mouth., Disp: , Rfl: ·  gabapentin (NEURONTIN) 300 MG Cap, Take 1 Capsule by mouth 3 times a day., Disp: 90 Capsule, Rfl: 0·  vitamin D (VITAMIND D3) 1000 UNIT Tab, Take 1 Tab by mouth every day., Disp: 60 Tab, Rfl: ·  doxycycline (VIBRAMYCIN) 100 MG Tab, doxycycline hyclate 100 mg tablet  TAKE 1 TABLET BY MOUTH TWICE DAILY FOR 10 DAYS (Patient not taking: Reported on 6/14/2023), Disp: , Rfl:     Patient was instructed on the use of medications, either prescriptions or OTC and informed on when the appropriate follow up time period should be. In addition, patient was also instructed that should any acute worsening occur that they should notify this clinic asap or call 911.              Review of Systems   Constitutional: Negative.  Negative for chills and fever.   HENT: Negative.  Negative for hearing loss.    Eyes: Negative.  Negative for blurred vision and double vision.   Respiratory: Negative.  Negative for cough and hemoptysis.    Cardiovascular: Negative.  Negative for chest pain and palpitations.   Gastrointestinal: Negative.  Negative for heartburn and nausea.   Genitourinary: Negative.  Negative for dysuria.   Musculoskeletal: Negative.  Negative for myalgias.   Skin: Negative.  Negative for rash.   Neurological: Negative.  Negative for dizziness, tingling and headaches.   Endo/Heme/Allergies: Negative.  Does not bruise/bleed easily.    Psychiatric/Behavioral: Negative.  Negative for depression and suicidal ideas.    All other systems reviewed and are negative.             Objective     /70 (BP Location: Left arm, Patient Position: Sitting, BP Cuff Size: Large adult)   Pulse 86   Temp 36.8 °C (98.3 °F) (Temporal)   Resp 18   Ht 1.829 m (6')   Wt (!) 175 kg (386 lb 9.6 oz)   SpO2 91%   BMI 52.43 kg/m²      Physical Exam  Vitals and nursing note reviewed.   Constitutional:       General: He is not in acute distress.     Appearance: He is well-developed. He is not diaphoretic.   HENT:      Head: Normocephalic and atraumatic.      Mouth/Throat:      Pharynx: No oropharyngeal exudate.   Eyes:      Pupils: Pupils are equal, round, and reactive to light.   Cardiovascular:      Rate and Rhythm: Normal rate and regular rhythm.      Heart sounds: Normal heart sounds. No murmur heard.     No friction rub. No gallop.   Pulmonary:      Effort: Pulmonary effort is normal. No respiratory distress.      Breath sounds: Normal breath sounds. No wheezing or rales.   Chest:      Chest wall: No tenderness.   Neurological:      Mental Status: He is alert and oriented to person, place, and time.   Psychiatric:         Behavior: Behavior normal.         Thought Content: Thought content normal.         Judgment: Judgment normal.                             Assessment & Plan        1. Need for vaccination    - Pneumococcal Conjugate Vaccine 20-Valent (19 yrs+)  - Tdap Vaccine =>6YO IM  - POCT Retinal Eye Exam    2. DM (diabetes mellitus) type II, controlled, with peripheral vascular disorder (HCC)    - Pneumococcal Conjugate Vaccine 20-Valent (19 yrs+)  - Tdap Vaccine =>6YO IM  - POCT Retinal Eye Exam    3. Morbid (severe) obesity due to excess calories ([facility])      4. Body mass index (BMI) 50.0-59.9, adult ([facility])      5. PAD (peripheral artery disease) ([facility])      6. Stage 3a chronic kidney disease ([facility])      7. Acquired absence of left leg below knee ([facility])

## 2023-06-21 DIAGNOSIS — Z79.01 CHRONIC ANTICOAGULATION: ICD-10-CM

## 2023-06-21 DIAGNOSIS — Z86.718 HISTORY OF DVT (DEEP VEIN THROMBOSIS): ICD-10-CM

## 2023-06-21 RX ORDER — RIVAROXABAN 20 MG/1
TABLET, FILM COATED ORAL
Qty: 100 TABLET | Refills: 0 | Status: ON HOLD | OUTPATIENT
Start: 2023-06-21 | End: 2023-11-08

## 2023-06-26 RX ORDER — LISINOPRIL 40 MG/1
40 TABLET ORAL DAILY
Qty: 90 TABLET | Refills: 3 | Status: ON HOLD | OUTPATIENT
Start: 2023-06-26 | End: 2023-11-08

## 2023-07-10 ENCOUNTER — TELEPHONE (OUTPATIENT)
Dept: MEDICAL GROUP | Facility: PHYSICIAN GROUP | Age: 68
End: 2023-07-10
Payer: MEDICARE

## 2023-07-10 NOTE — TELEPHONE ENCOUNTER
Patient left a voicemail stating he needs a hardship letter in order for his mail to be delivered to his house since its getting more difficult with one leg. Thank you

## 2023-10-24 ENCOUNTER — HOSPITAL ENCOUNTER (INPATIENT)
Facility: REHABILITATION | Age: 68
LOS: 16 days | DRG: 074 | End: 2023-11-09
Attending: PHYSICAL MEDICINE & REHABILITATION | Admitting: PHYSICAL MEDICINE & REHABILITATION
Payer: MEDICARE

## 2023-10-24 DIAGNOSIS — M86.671 OTHER CHRONIC OSTEOMYELITIS OF RIGHT FOOT (HCC): ICD-10-CM

## 2023-10-24 DIAGNOSIS — M10.9 GOUT, UNSPECIFIED CAUSE, UNSPECIFIED CHRONICITY, UNSPECIFIED SITE: ICD-10-CM

## 2023-10-24 DIAGNOSIS — L89.610 PRESSURE INJURY OF RIGHT HEEL, UNSTAGEABLE (HCC): ICD-10-CM

## 2023-10-24 DIAGNOSIS — I48.91 ATRIAL FIBRILLATION, UNSPECIFIED TYPE (HCC): ICD-10-CM

## 2023-10-24 DIAGNOSIS — L02.215 ABSCESS OF PERINEUM: ICD-10-CM

## 2023-10-24 DIAGNOSIS — E11.51 DM (DIABETES MELLITUS) TYPE II, CONTROLLED, WITH PERIPHERAL VASCULAR DISORDER (HCC): ICD-10-CM

## 2023-10-24 DIAGNOSIS — I10 BENIGN ESSENTIAL HTN: ICD-10-CM

## 2023-10-24 PROBLEM — G62.81 CRITICAL ILLNESS POLYNEUROPATHY (HCC): Status: ACTIVE | Noted: 2023-10-24

## 2023-10-24 PROBLEM — N49.3 FOURNIER GANGRENE: Status: ACTIVE | Noted: 2023-10-24

## 2023-10-24 LAB
GLUCOSE BLD STRIP.AUTO-MCNC: 159 MG/DL (ref 65–99)
GLUCOSE BLD STRIP.AUTO-MCNC: 190 MG/DL (ref 65–99)
GLUCOSE BLD STRIP.AUTO-MCNC: 193 MG/DL (ref 65–99)

## 2023-10-24 PROCEDURE — 94760 N-INVAS EAR/PLS OXIMETRY 1: CPT

## 2023-10-24 PROCEDURE — 87635 SARS-COV-2 COVID-19 AMP PRB: CPT

## 2023-10-24 PROCEDURE — 770010 HCHG ROOM/CARE - REHAB SEMI PRIVAT*

## 2023-10-24 PROCEDURE — A9270 NON-COVERED ITEM OR SERVICE: HCPCS | Performed by: PHYSICAL MEDICINE & REHABILITATION

## 2023-10-24 PROCEDURE — 700102 HCHG RX REV CODE 250 W/ 637 OVERRIDE(OP): Performed by: PHYSICAL MEDICINE & REHABILITATION

## 2023-10-24 PROCEDURE — 82962 GLUCOSE BLOOD TEST: CPT | Mod: 91

## 2023-10-24 PROCEDURE — 94660 CPAP INITIATION&MGMT: CPT

## 2023-10-24 PROCEDURE — 99223 1ST HOSP IP/OBS HIGH 75: CPT | Performed by: PHYSICAL MEDICINE & REHABILITATION

## 2023-10-24 RX ORDER — LANOLIN ALCOHOL/MO/W.PET/CERES
3 CREAM (GRAM) TOPICAL
Status: DISCONTINUED | OUTPATIENT
Start: 2023-10-24 | End: 2023-11-09 | Stop reason: HOSPADM

## 2023-10-24 RX ORDER — TRAZODONE HYDROCHLORIDE 50 MG/1
50 TABLET ORAL
Status: DISCONTINUED | OUTPATIENT
Start: 2023-10-24 | End: 2023-11-09 | Stop reason: HOSPADM

## 2023-10-24 RX ORDER — DEXTROSE MONOHYDRATE 25 G/50ML
25 INJECTION, SOLUTION INTRAVENOUS
Status: DISCONTINUED | OUTPATIENT
Start: 2023-10-24 | End: 2023-10-25

## 2023-10-24 RX ORDER — OMEPRAZOLE 20 MG/1
20 CAPSULE, DELAYED RELEASE ORAL DAILY
Status: DISCONTINUED | OUTPATIENT
Start: 2023-10-24 | End: 2023-11-09 | Stop reason: HOSPADM

## 2023-10-24 RX ORDER — HYDRALAZINE HYDROCHLORIDE 25 MG/1
25 TABLET, FILM COATED ORAL EVERY 8 HOURS PRN
Status: DISCONTINUED | OUTPATIENT
Start: 2023-10-24 | End: 2023-11-09 | Stop reason: HOSPADM

## 2023-10-24 RX ORDER — OXYCODONE HYDROCHLORIDE 5 MG/1
5 TABLET ORAL
Status: DISCONTINUED | OUTPATIENT
Start: 2023-10-24 | End: 2023-11-09 | Stop reason: HOSPADM

## 2023-10-24 RX ORDER — FUROSEMIDE 40 MG/1
40 TABLET ORAL
Status: DISCONTINUED | OUTPATIENT
Start: 2023-10-24 | End: 2023-10-25

## 2023-10-24 RX ORDER — AMIODARONE HYDROCHLORIDE 200 MG/1
200 TABLET ORAL DAILY
Status: DISCONTINUED | OUTPATIENT
Start: 2023-10-25 | End: 2023-11-09 | Stop reason: HOSPADM

## 2023-10-24 RX ORDER — ALUMINA, MAGNESIA, AND SIMETHICONE 2400; 2400; 240 MG/30ML; MG/30ML; MG/30ML
20 SUSPENSION ORAL
Status: DISCONTINUED | OUTPATIENT
Start: 2023-10-24 | End: 2023-11-09 | Stop reason: HOSPADM

## 2023-10-24 RX ORDER — GABAPENTIN 400 MG/1
400 CAPSULE ORAL 3 TIMES DAILY
Status: DISCONTINUED | OUTPATIENT
Start: 2023-10-24 | End: 2023-11-09 | Stop reason: HOSPADM

## 2023-10-24 RX ORDER — ECHINACEA PURPUREA EXTRACT 125 MG
2 TABLET ORAL PRN
Status: DISCONTINUED | OUTPATIENT
Start: 2023-10-24 | End: 2023-11-09 | Stop reason: HOSPADM

## 2023-10-24 RX ORDER — ONDANSETRON 4 MG/1
4 TABLET, ORALLY DISINTEGRATING ORAL 4 TIMES DAILY PRN
Status: DISCONTINUED | OUTPATIENT
Start: 2023-10-24 | End: 2023-11-09 | Stop reason: HOSPADM

## 2023-10-24 RX ORDER — OXYCODONE HYDROCHLORIDE 10 MG/1
10 TABLET ORAL
Status: DISCONTINUED | OUTPATIENT
Start: 2023-10-24 | End: 2023-11-09 | Stop reason: HOSPADM

## 2023-10-24 RX ORDER — AMOXICILLIN 250 MG
2 CAPSULE ORAL 2 TIMES DAILY
Status: DISCONTINUED | OUTPATIENT
Start: 2023-10-24 | End: 2023-10-25

## 2023-10-24 RX ORDER — POLYETHYLENE GLYCOL 3350 17 G/17G
1 POWDER, FOR SOLUTION ORAL
Status: DISCONTINUED | OUTPATIENT
Start: 2023-10-24 | End: 2023-10-25

## 2023-10-24 RX ORDER — BISACODYL 10 MG
10 SUPPOSITORY, RECTAL RECTAL
Status: DISCONTINUED | OUTPATIENT
Start: 2023-10-24 | End: 2023-10-25

## 2023-10-24 RX ORDER — ALLOPURINOL 100 MG/1
100 TABLET ORAL DAILY
Status: DISCONTINUED | OUTPATIENT
Start: 2023-10-24 | End: 2023-11-09 | Stop reason: HOSPADM

## 2023-10-24 RX ORDER — TRAMADOL HYDROCHLORIDE 50 MG/1
50 TABLET ORAL EVERY 4 HOURS PRN
Status: DISCONTINUED | OUTPATIENT
Start: 2023-10-24 | End: 2023-11-09 | Stop reason: HOSPADM

## 2023-10-24 RX ORDER — ONDANSETRON 2 MG/ML
4 INJECTION INTRAMUSCULAR; INTRAVENOUS 4 TIMES DAILY PRN
Status: DISCONTINUED | OUTPATIENT
Start: 2023-10-24 | End: 2023-11-09 | Stop reason: HOSPADM

## 2023-10-24 RX ORDER — ACETAMINOPHEN 325 MG/1
650 TABLET ORAL EVERY 4 HOURS PRN
Status: DISCONTINUED | OUTPATIENT
Start: 2023-10-24 | End: 2023-11-09 | Stop reason: HOSPADM

## 2023-10-24 RX ORDER — ATORVASTATIN CALCIUM 10 MG/1
20 TABLET, FILM COATED ORAL EVERY EVENING
Status: DISCONTINUED | OUTPATIENT
Start: 2023-10-24 | End: 2023-11-09 | Stop reason: HOSPADM

## 2023-10-24 RX ADMIN — TRAMADOL HYDROCHLORIDE 50 MG: 50 TABLET, COATED ORAL at 23:22

## 2023-10-24 RX ADMIN — INSULIN HUMAN 1 UNITS: 100 INJECTION, SOLUTION PARENTERAL at 17:22

## 2023-10-24 RX ADMIN — ATORVASTATIN CALCIUM 20 MG: 10 TABLET, FILM COATED ORAL at 22:14

## 2023-10-24 RX ADMIN — GABAPENTIN 400 MG: 400 CAPSULE ORAL at 22:14

## 2023-10-24 RX ADMIN — DILTIAZEM HYDROCHLORIDE 30 MG: 30 TABLET, FILM COATED ORAL at 13:39

## 2023-10-24 RX ADMIN — APIXABAN 5 MG: 5 TABLET, FILM COATED ORAL at 22:15

## 2023-10-24 RX ADMIN — INSULIN HUMAN 1 UNITS: 100 INJECTION, SOLUTION PARENTERAL at 22:18

## 2023-10-24 RX ADMIN — ALLOPURINOL 100 MG: 100 TABLET ORAL at 13:39

## 2023-10-24 RX ADMIN — OXYCODONE HYDROCHLORIDE 5 MG: 5 TABLET ORAL at 22:14

## 2023-10-24 RX ADMIN — GABAPENTIN 400 MG: 400 CAPSULE ORAL at 13:40

## 2023-10-24 RX ADMIN — DILTIAZEM HYDROCHLORIDE 30 MG: 30 TABLET, FILM COATED ORAL at 22:14

## 2023-10-24 RX ADMIN — OMEPRAZOLE 20 MG: 20 CAPSULE, DELAYED RELEASE ORAL at 13:40

## 2023-10-24 RX ADMIN — INSULIN GLARGINE-YFGN 10 UNITS: 100 INJECTION, SOLUTION SUBCUTANEOUS at 22:21

## 2023-10-24 RX ADMIN — FUROSEMIDE 40 MG: 40 TABLET ORAL at 17:23

## 2023-10-24 RX ADMIN — TRAZODONE HYDROCHLORIDE 50 MG: 50 TABLET ORAL at 22:15

## 2023-10-24 ASSESSMENT — LIFESTYLE VARIABLES
CONSUMPTION TOTAL: NEGATIVE
TOTAL SCORE: 0
AVERAGE NUMBER OF DAYS PER WEEK YOU HAVE A DRINK CONTAINING ALCOHOL: 0
HOW MANY TIMES IN THE PAST YEAR HAVE YOU HAD 5 OR MORE DRINKS IN A DAY: 0
EVER HAD A DRINK FIRST THING IN THE MORNING TO STEADY YOUR NERVES TO GET RID OF A HANGOVER: NO
ALCOHOL_USE: NO
TOTAL SCORE: 0
EVER FELT BAD OR GUILTY ABOUT YOUR DRINKING: NO
HAVE YOU EVER FELT YOU SHOULD CUT DOWN ON YOUR DRINKING: NO
EVER_SMOKED: YES
ON A TYPICAL DAY WHEN YOU DRINK ALCOHOL HOW MANY DRINKS DO YOU HAVE: 0
HAVE PEOPLE ANNOYED YOU BY CRITICIZING YOUR DRINKING: NO
TOTAL SCORE: 0

## 2023-10-24 ASSESSMENT — PAIN DESCRIPTION - PAIN TYPE
TYPE: ACUTE PAIN
TYPE: ACUTE PAIN

## 2023-10-24 ASSESSMENT — PATIENT HEALTH QUESTIONNAIRE - PHQ9
SUM OF ALL RESPONSES TO PHQ9 QUESTIONS 1 AND 2: 0
1. LITTLE INTEREST OR PLEASURE IN DOING THINGS: NOT AT ALL
2. FEELING DOWN, DEPRESSED, IRRITABLE, OR HOPELESS: NOT AT ALL
SUM OF ALL RESPONSES TO PHQ9 QUESTIONS 1 AND 2: 0
2. FEELING DOWN, DEPRESSED, IRRITABLE, OR HOPELESS: NOT AT ALL
1. LITTLE INTEREST OR PLEASURE IN DOING THINGS: NOT AT ALL

## 2023-10-24 ASSESSMENT — FIBROSIS 4 INDEX: FIB4 SCORE: 1.28

## 2023-10-24 NOTE — PROGRESS NOTES
[id number] Pt arrived at [facility] from [facility] via transport.  to follow for diagnosis of polyneuropathy. Initial assessment initiated. Pt oriented to room and facility routine and safety measures; pt education binder provided and discussed. Pt A/O x 4, continent of bowel , grace in. Max  assist for transfers. All wounds photographed and documented; photos uploaded to . Pt denies pain or discomfort at this time. Pt positioned for comfort in bed. Call light within reach, safety measures in place. Will continue to monitor.      Wound consult placed, moon boot for R-foot, FSBS ACHS, 3L O2.

## 2023-10-24 NOTE — PROGRESS NOTES
4 Eyes Skin Assessment Completed by Becca RN and KAYCEE Zacarias.    Head WDL  Ears WDL  Nose WDL  Mouth WDL  Neck WDL  Breast/Chest WDL  Shoulder Blades WDL  Spine rash  (R) Arm/Elbow/Hand BrusingBruising  (L) Arm/Elbow/Hand WDL  Abdomen Redness  Groin Redness  Scrotum/Coccyx/Buttocks Redness, incision  (R) Leg Redness and Scab  (L) Leg amputation   (R) Heel/Foot/Toe Pressure ulcer, scab  (L) Heel/Foot/Toe amputation        Devices In Places Oxygen mask      Interventions In Place Bariatric bed ordered    Possible Skin Injury Yes    Pictures Uploaded Into Epic Yes  Wound Consult Placed Yes  RN Wound Prevention Protocol Ordered Yes

## 2023-10-24 NOTE — H&P
Physical Medicine & Rehabilitation  History and Physical (H&P)  &     Post Admission Physician Evaluation (DELONTE)       Date of Admission: 10/24/2023  Date of Service: 10/24/2023   Jose Castanon  [id number]    [facility] Code to Support Admission: [facility] Code / Diagnosis to Support: 0003.3 - Neurologic Conditions: Polyneuropathy  Etiologic Diagnosis: Critical illness polyneuropathy (HCC)    _______________________________________________    Chief Complaint: Decreased mobility    History of Present Illness:  Patient is a 68 y.o. 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 Patient reportedly initially presented to [facility] and had debridement and antibiotics for the infection. ID was consulted and he was placed on IV antibiotics which he has since completed. He had a wound vac placed and he was eventually transferred to [facility] for ongoing care.  His hospitalization was complicated by A fib which he was placed on amiodarone, digoxin and Eliquis.  Most recent intervention he underwent I&D with Dr. Higuera on 9/29/23 and has since had wound vac removed with packing.     Patient tolerated transfer to [facility]. He reports he is motivated to get stronger. He reports diffuse lower extremity weakness; he reports being weaker than he thought. Discussed about critical illness disease. He brought all of his equipment to see what he will need. He reports he gets winded very easily, using breathing mask. Denies NVD. He reports pain is controlled. Denies fever or chills.     Review of Systems:     Comprehensive 14 point ROS was reviewed and all were negative except as noted elsewhere in this document.     Past Medical History:  Past Medical History:   Diagnosis Date    At risk for sleep apnea     Diabetes (HCC)     Diabetic neuropathy (HCC)     DVT (deep venous thrombosis) (HCC)     Hyperlipidemia     Hypertension     Sleep apnea         Past Surgical History:  Past Surgical History:   Procedure Laterality Date    PB AMPUTATION TOE,MT-P JT Right 9/15/2021    Procedure: RIGHT SECOND TOE AMPUTATION (30 MIN);  Surgeon: Jackson Castro M.D.;  Location: [facility];  Service: Orthopedics    TOE AMPUTATION Left        Family History:  Family History   Problem Relation Age of Onset    Cancer Mother     Cancer Father        Medications:  Current Facility-Administered Medications   Medication Dose    allopurinol (Zyloprim) tablet 100 mg  100 mg    atorvastatin (Lipitor) tablet 20 mg  20 mg    gabapentin (Neurontin) capsule 400 mg  400 mg    apixaban (Eliquis) tablet 5 mg  5 mg    [START ON 10/25/2023] amiodarone (Cordarone) tablet 200 mg  200 mg    dilTIAZem (Cardizem) tablet 30 mg  30 mg    furosemide (Lasix) tablet 40 mg  40 mg    insulin GLARGINE (Lantus,Semglee) injection  10 Units    melatonin tablet 3 mg  3 mg    Respiratory Therapy Consult      Pharmacy Consult Request ...Pain Management Review 1 Each  1 Each    hydrALAZINE (Apresoline) tablet 25 mg  25 mg    acetaminophen (Tylenol) tablet 650 mg  650 mg    senna-docusate (Pericolace Or Senokot S) 8.6-50 MG per tablet 2 Tablet  2 Tablet    And    polyethylene glycol/lytes (Miralax) PACKET 1 Packet  1 Packet    And    magnesium hydroxide (Milk Of Magnesia) suspension 30 mL  30 mL    And    bisacodyl (Dulcolax) suppository 10 mg  10 mg    omeprazole (PriLOSEC) capsule 20 mg  20 mg    mag hydrox-al hydrox-simeth (Maalox Plus Es Or Mylanta Ds) suspension 20 mL  20 mL    ondansetron (Zofran ODT) dispertab 4 mg  4 mg    Or    ondansetron (Zofran) syringe/vial injection 4 mg  4 mg    traZODone (Desyrel) tablet 50 mg  50 mg    sodium chloride (Ocean) 0.65 % nasal spray 2 Spray  2 Spray    oxyCODONE immediate-release (Roxicodone) tablet 5 mg  5 mg    Or    oxyCODONE immediate release (Roxicodone) tablet 10 mg  10 mg    traMADol (Ultram) 50 MG tablet 50 mg  50 mg     insulin regular (HumuLIN R,NovoLIN R) injection  1-6 Units    And    dextrose 50% (D50W) injection 25 g  25 g       Allergies:  Penicillins, Bee venom, Cefazolin, Linezolid, and Chicken-derived products    Psychosocial History:  Living Site:  Home  Living With:  self  Caregiver's availability:  Not Applicable  Number of stairs:   2    Level of Function Prior to Disability:  Pal with crutch and prosthetic    Level of Function Prior to Admission to [facility]:  Liz for transfer, gait  Min-maxA for ADLs    CURRENT LEVEL OF FUNCTION:   Same as level of function prior to admission to [facility]    Physical Examination:     VITAL SIGNS:   height is 1.829 m (6') and weight is 162 kg (357 lb 12.8 oz) (abnormal). His oral temperature is 36.8 °C (98.3 °F). His blood pressure is 120/60 and his pulse is 84. His respiration is 18 and oxygen saturation is 95%.   GENERAL: No apparent distress  HEENT: Normocephalic/atraumatic, EOMI, and PERRL  CARDIAC: Regular rate and rhythm, normal S1, S2   LUNGS: Clear to auscultation   ABDOMINAL: bowel sounds present, soft, and nontender    EXTREMITIES: no contractures, spasticity, or edema.  NEURO:  Mental status:  A&Ox4 (person, place, date, situation) answers questions appropriately  Speech: fluent, no aphasia or dysarthria  Motor:    4/5 BUE  4/5 R HF/ 4-/5 R KE, otherwise 4/5 RLE  L HF 3/5  Sensory: decreased RLE    Radiology:  No recent images    Laboratory Values:         Pending admission labs        Primary Rehabilitation Diagnosis:    This patient is a 68 y.o. male admitted for acute inpatient rehabilitation with Critical illness polyneuropathy (HCC).    Impairments:   ADLs/IADLs  Mobility    Secondary Diagnosis/Medical Co-morbidities Affecting Function  HTN/A fib  DM2 with hyperglycemia  Morbid Obesity  PAD  CKD3a  Gout  Neuropathy  L BKA    Relevant Changes Since Preadmission Evaluation:    Status unchanged    The patient's rehabilitation  potential is Good  The patient's medical prognosis is fair    Rehabilitation Plan:   Discussion and Recommendations:   1. The patient requires an acute inpatient rehabilitation program with a coordinated program of care at an intensity and frequency not available at a lower level of care. This recommendation is substantiated by the patient's medical physicians who recommend that the patient's intervention and assessment of medical issues needs to be done at an acute level of care for patient's safety and maximum outcome.   2. A coordinated program of care will be supplied by an interdisciplinary team of physical therapy, occupational therapy, rehab physician, rehab nursing, and, if needed, speech therapy and rehab psychology. Rehab team presents a patient-specific rehabilitation and education program concentrating on prevention of future problems related to accessibility, mobility, skin, bowel, bladder, sexuality, and psychosocial and medical/surgical problems.   3. Need for Rehabilitation Physician: The rehab physician will be evaluating the patient on a multi-weekly basis to help coordinate the program of care. The rehab physician communicates between medical physicians, therapists, and nurses to maximize the patient's potential outcome. Specific areas in which the rehab physician will be providing daily assessment include the following:   A. Assessing the patient's heart rate and blood pressure response (vitals monitoring) to activity and making adjustments in medications or conservative measures as needed.   B. The rehab physician will be assessing the frequency at which the program can be increased to allow the patient to reach optimal functional outcome.   C. The rehab physician will also provide assessments in daily skin care, especially in light of patient's impairments in mobility.   D. The rehab physician will provide special expertise in understanding how to work with functional impairment and recommend  appropriate interventions, compensatory techniques, and education that will facilitate the patient's outcome.   4. Rehab R.N.   The rehab RN will be working with patient to carry over in room mobility and activities of daily living when the patient is not in 3 hours of skilled therapy. Rehab nursing will be working in conjunction with rehab physician to address all the medical issues above and continue to assess laboratory work and discuss abnormalities with the treating physicians, assess vitals, and response to activity, and discuss and report abnormalities with the rehab physician. Rehab RN will also continue daily skin care, supervise bladder/bowel program, instruct in medication administration, and ensure patient safety.   5. Rehab Therapy: Therapies to treat at intensity and frequency of (may change after completion of evaluation by all therapeutic disciplines):       PT:  Physical therapy to address mobility, transfer, gait training and evaluation for adaptive equipment needs 1 and 1/2 hour/day at least 5 days/week for the duration of the ELOS (see below)       OT:  Occupational therapy to address ADLs, self-care, home management training, functional mobility/transfers and assistive device evaluation, and community re-integration 1 and 1/2 hour/day at least 5 days/week for the duration of the ELOS (see below).    Medical management / Rehabilitation Issues/ Adverse Potential as part of rehabilitation plan     Rehabilitation Issues/Adverse Potential  1.  Critical illness polyneuropathy - Patient with Darion's gangrene with prolonged hospitalization with significant increase in weakness concern for CIPN as well as complicated by A fib. Patient demonstrates functional deficits in strength, balance, coordination, and ADL's. Patient is admitted to [facility] for comprehensive rehabilitation therapy as described below.   Rehabilitation nursing monitors bowel and bladder control, educates on  medication administration, co-morbidities and monitors patient safety.    2.  Neurostimulants: None at this time but continue to assess daily for need to initiate should status change.    3.  DVT prophylaxis:  Patient is on Eliquis for anticoagulation upon transfer. Encourage OOB. Monitor daily for signs and symptoms of DVT including but not limited to swelling and pain to prevent the development of DVT that may interfere with therapies.    4.  GI prophylaxis:  On prilosec to prevent gastritis/dyspepsia which may interfere with therapies.    5.  Pain: No issues with pain currently / Controlled with APAP/Tramadol/Oxycodone    6.  Nutrition/Dysphagia: Dietician monitors nutrient intake, recommend supplements prn and provide nutrition education to pt/family to promote optimal nutrition for wound healing/recovery.     7.  Bladder/bowel:  Start bowel and bladder program as described below, to prevent constipation, urinary retention (which may lead to UTI), and urinary incontinence (which will impact upon pt's functional independence).   - Post void bladder scans, I&O cath for PVRs >400  - up to commode after meal     8.  Skin/dermal ulcer prophylaxis: Monitor for new skin conditions with q.2 h. turns as required to prevent the development of skin breakdown.     9.  Cognition/Behavior: As needed psychologist provides adjustment counseling to illness and psychosocial barriers that may be potential barriers to rehabilitation.     10. Respiratory therapy: RT performs O2 management prn, breathing retraining, pulmonary hygiene and bronchospasm management prn to optimize participation in therapies.     Medical Co-Morbidities/Adverse Potential Affecting Function:    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 Per guidelines, 15 hours per week between PT,  OT and/or SLP.  -Follow-up PCP    Darion's gangrene - Wound consult as currently packing after multiple I&Ds.     HTN/A fib - Patient now on Amiodarone 200 mg daily, Lasix 40 mg BID, and Eliquis 5 mg BID    DM2 with hyperglycemia - Patient on Glargine 10 U BID and SSI.     PAD - Patient on Eliquis    CKD3a - Avoid nephrotoxic agents. Check AM CMP    Gout - Patient on Allopurinol 100 mg  daily    Neuropathy - Patient on Gabapentin 400 mg TID    L BKA - Patient with L BKA. To bring in prosthetic and crutches    Morbid Obesity due to excess calories - Patient with BMI of 48.5 on admission, meets medical criteria. Dietitian to consult    Pain - Patient on PRN Tylenol, PRN Oxycodone, and PRN Tramadol. Also on Gabapentin 400 mg TID    Skin - Patient at risk for skin breakdown due to debility in areas including sacrum, achilles, elbows and head in addition to other sites. Nursing to assess skin daily.     GI Ppx - Patient on Prilosec for GERD prophylaxis. Patient on Senna-docusate for constipation prophylaxis.        DVT Ppx - Patient Eliquis on transfer.    I personally performed a complete drug regimen review and no potential clinically significant medication issues were identified.     Goals/Expected Level of Function Based on Current Medical and Functional Status:  (may change based on patient's medical status and rate of impairment recovery)  Transfers:   Modified Independent  Mobility/Gait:   Modified Independent  ADL's:   Modified Independent    DISPOSITION: Discharge to pre-morbid independent living setting with the supportive care of patient's community resources.    ELOS: 10-14 days  ____________________________________    T. Clay Contreras MD/PhD  [facility] - Physical Medicine & Rehabilitation   [facility] - Brain Injury Medicine   ____________________________________    Pt was seen today for 75 min, and entire time spent in face-to-face contact was >50% in counseling and coordination of care as detailed in A/P above.

## 2023-10-25 ENCOUNTER — APPOINTMENT (OUTPATIENT)
Dept: OCCUPATIONAL THERAPY | Facility: REHABILITATION | Age: 68
DRG: 074 | End: 2023-10-25
Attending: PHYSICAL MEDICINE & REHABILITATION
Payer: MEDICARE

## 2023-10-25 ENCOUNTER — APPOINTMENT (OUTPATIENT)
Dept: PHYSICAL THERAPY | Facility: REHABILITATION | Age: 68
DRG: 074 | End: 2023-10-25
Attending: PHYSICAL MEDICINE & REHABILITATION
Payer: MEDICARE

## 2023-10-25 ENCOUNTER — APPOINTMENT (OUTPATIENT)
Dept: RADIOLOGY | Facility: REHABILITATION | Age: 68
DRG: 074 | End: 2023-10-25
Attending: HOSPITALIST
Payer: MEDICARE

## 2023-10-25 PROBLEM — I48.91 A-FIB (HCC): Status: ACTIVE | Noted: 2023-10-25

## 2023-10-25 PROBLEM — D72.829 LEUKOCYTOSIS: Status: ACTIVE | Noted: 2023-10-25

## 2023-10-25 PROBLEM — D64.9 ANEMIA: Status: ACTIVE | Noted: 2023-10-25

## 2023-10-25 PROBLEM — R94.6 BORDERLINE ABNORMAL TFTS: Status: ACTIVE | Noted: 2023-10-25

## 2023-10-25 PROBLEM — M10.9 GOUT: Status: ACTIVE | Noted: 2023-10-25

## 2023-10-25 LAB
25(OH)D3 SERPL-MCNC: 46 NG/ML (ref 30–100)
ALBUMIN SERPL BCP-MCNC: 3.2 G/DL (ref 3.2–4.9)
ALBUMIN/GLOB SERPL: 0.9 G/DL
ALP SERPL-CCNC: 103 U/L (ref 30–99)
ALT SERPL-CCNC: 15 U/L (ref 2–50)
ANION GAP SERPL CALC-SCNC: 11 MMOL/L (ref 7–16)
APPEARANCE UR: CLEAR
AST SERPL-CCNC: 17 U/L (ref 12–45)
BACTERIA #/AREA URNS HPF: NEGATIVE /HPF
BASOPHILS # BLD AUTO: 0.5 % (ref 0–1.8)
BASOPHILS # BLD: 0.06 K/UL (ref 0–0.12)
BILIRUB SERPL-MCNC: 0.3 MG/DL (ref 0.1–1.5)
BILIRUB UR QL STRIP.AUTO: NEGATIVE
BUN SERPL-MCNC: 31 MG/DL (ref 8–22)
CALCIUM ALBUM COR SERPL-MCNC: 9.4 MG/DL (ref 8.5–10.5)
CALCIUM SERPL-MCNC: 8.8 MG/DL (ref 8.5–10.5)
CHLORIDE SERPL-SCNC: 98 MMOL/L (ref 96–112)
CO2 SERPL-SCNC: 33 MMOL/L (ref 20–33)
COLOR UR: YELLOW
CREAT SERPL-MCNC: 1.4 MG/DL (ref 0.5–1.4)
EOSINOPHIL # BLD AUTO: 0.4 K/UL (ref 0–0.51)
EOSINOPHIL NFR BLD: 3.3 % (ref 0–6.9)
EPI CELLS #/AREA URNS HPF: ABNORMAL /HPF
ERYTHROCYTE [DISTWIDTH] IN BLOOD BY AUTOMATED COUNT: 51.9 FL (ref 35.9–50)
EST. AVERAGE GLUCOSE BLD GHB EST-MCNC: 148 MG/DL
GFR SERPLBLD CREATININE-BSD FMLA CKD-EPI: 55 ML/MIN/1.73 M 2
GLOBULIN SER CALC-MCNC: 3.4 G/DL (ref 1.9–3.5)
GLUCOSE BLD STRIP.AUTO-MCNC: 124 MG/DL (ref 65–99)
GLUCOSE BLD STRIP.AUTO-MCNC: 180 MG/DL (ref 65–99)
GLUCOSE BLD STRIP.AUTO-MCNC: 192 MG/DL (ref 65–99)
GLUCOSE BLD STRIP.AUTO-MCNC: 231 MG/DL (ref 65–99)
GLUCOSE SERPL-MCNC: 147 MG/DL (ref 65–99)
GLUCOSE UR STRIP.AUTO-MCNC: NEGATIVE MG/DL
HBA1C MFR BLD: 6.8 % (ref 4–5.6)
HCT VFR BLD AUTO: 36 % (ref 42–52)
HGB BLD-MCNC: 11.2 G/DL (ref 14–18)
HYALINE CASTS #/AREA URNS LPF: ABNORMAL /LPF
IMM GRANULOCYTES # BLD AUTO: 0.12 K/UL (ref 0–0.11)
IMM GRANULOCYTES NFR BLD AUTO: 1 % (ref 0–0.9)
KETONES UR STRIP.AUTO-MCNC: NEGATIVE MG/DL
LEUKOCYTE ESTERASE UR QL STRIP.AUTO: ABNORMAL
LYMPHOCYTES # BLD AUTO: 1.37 K/UL (ref 1–4.8)
LYMPHOCYTES NFR BLD: 11.4 % (ref 22–41)
MCH RBC QN AUTO: 28.1 PG (ref 27–33)
MCHC RBC AUTO-ENTMCNC: 31.1 G/DL (ref 32.3–36.5)
MCV RBC AUTO: 90.5 FL (ref 81.4–97.8)
MICRO URNS: ABNORMAL
MONOCYTES # BLD AUTO: 0.62 K/UL (ref 0–0.85)
MONOCYTES NFR BLD AUTO: 5.2 % (ref 0–13.4)
NEUTROPHILS # BLD AUTO: 9.4 K/UL (ref 1.82–7.42)
NEUTROPHILS NFR BLD: 78.6 % (ref 44–72)
NITRITE UR QL STRIP.AUTO: NEGATIVE
NRBC # BLD AUTO: 0 K/UL
NRBC BLD-RTO: 0 /100 WBC (ref 0–0.2)
PH UR STRIP.AUTO: 6 [PH] (ref 5–8)
PLATELET # BLD AUTO: 373 K/UL (ref 164–446)
PMV BLD AUTO: 10.4 FL (ref 9–12.9)
POTASSIUM SERPL-SCNC: 3.5 MMOL/L (ref 3.6–5.5)
PROT SERPL-MCNC: 6.6 G/DL (ref 6–8.2)
PROT UR QL STRIP: 30 MG/DL
RBC # BLD AUTO: 3.98 M/UL (ref 4.7–6.1)
RBC # URNS HPF: ABNORMAL /HPF
RBC UR QL AUTO: ABNORMAL
SARS-COV-2 RNA RESP QL NAA+PROBE: NOTDETECTED
SODIUM SERPL-SCNC: 142 MMOL/L (ref 135–145)
SP GR UR STRIP.AUTO: 1.02
SPECIMEN SOURCE: NORMAL
T4 FREE SERPL-MCNC: 1.15 NG/DL (ref 0.93–1.7)
TSH SERPL DL<=0.005 MIU/L-ACNC: 10 UIU/ML (ref 0.38–5.33)
UROBILINOGEN UR STRIP.AUTO-MCNC: 0.2 MG/DL
WBC # BLD AUTO: 12 K/UL (ref 4.8–10.8)
WBC #/AREA URNS HPF: ABNORMAL /HPF

## 2023-10-25 PROCEDURE — 97530 THERAPEUTIC ACTIVITIES: CPT

## 2023-10-25 PROCEDURE — 97162 PT EVAL MOD COMPLEX 30 MIN: CPT

## 2023-10-25 PROCEDURE — 80053 COMPREHEN METABOLIC PANEL: CPT

## 2023-10-25 PROCEDURE — A9270 NON-COVERED ITEM OR SERVICE: HCPCS | Mod: JZ | Performed by: HOSPITALIST

## 2023-10-25 PROCEDURE — 97535 SELF CARE MNGMENT TRAINING: CPT

## 2023-10-25 PROCEDURE — 71045 X-RAY EXAM CHEST 1 VIEW: CPT

## 2023-10-25 PROCEDURE — 36415 COLL VENOUS BLD VENIPUNCTURE: CPT

## 2023-10-25 PROCEDURE — 94760 N-INVAS EAR/PLS OXIMETRY 1: CPT

## 2023-10-25 PROCEDURE — 700102 HCHG RX REV CODE 250 W/ 637 OVERRIDE(OP): Performed by: PHYSICAL MEDICINE & REHABILITATION

## 2023-10-25 PROCEDURE — 84439 ASSAY OF FREE THYROXINE: CPT

## 2023-10-25 PROCEDURE — 81001 URINALYSIS AUTO W/SCOPE: CPT

## 2023-10-25 PROCEDURE — 83036 HEMOGLOBIN GLYCOSYLATED A1C: CPT

## 2023-10-25 PROCEDURE — 770010 HCHG ROOM/CARE - REHAB SEMI PRIVAT*

## 2023-10-25 PROCEDURE — 82962 GLUCOSE BLOOD TEST: CPT | Mod: 91

## 2023-10-25 PROCEDURE — 99222 1ST HOSP IP/OBS MODERATE 55: CPT | Performed by: HOSPITALIST

## 2023-10-25 PROCEDURE — 99233 SBSQ HOSP IP/OBS HIGH 50: CPT | Performed by: PHYSICAL MEDICINE & REHABILITATION

## 2023-10-25 PROCEDURE — 82306 VITAMIN D 25 HYDROXY: CPT

## 2023-10-25 PROCEDURE — 97597 DBRDMT OPN WND 1ST 20 CM/<: CPT

## 2023-10-25 PROCEDURE — 97166 OT EVAL MOD COMPLEX 45 MIN: CPT

## 2023-10-25 PROCEDURE — 85025 COMPLETE CBC W/AUTO DIFF WBC: CPT

## 2023-10-25 PROCEDURE — A9270 NON-COVERED ITEM OR SERVICE: HCPCS | Performed by: PHYSICAL MEDICINE & REHABILITATION

## 2023-10-25 PROCEDURE — 700102 HCHG RX REV CODE 250 W/ 637 OVERRIDE(OP): Mod: JZ | Performed by: HOSPITALIST

## 2023-10-25 PROCEDURE — 84443 ASSAY THYROID STIM HORMONE: CPT

## 2023-10-25 RX ORDER — BISACODYL 10 MG
10 SUPPOSITORY, RECTAL RECTAL
Status: DISCONTINUED | OUTPATIENT
Start: 2023-10-25 | End: 2023-10-30

## 2023-10-25 RX ORDER — DEXTROSE MONOHYDRATE 25 G/50ML
25 INJECTION, SOLUTION INTRAVENOUS
Status: DISCONTINUED | OUTPATIENT
Start: 2023-10-25 | End: 2023-11-09 | Stop reason: HOSPADM

## 2023-10-25 RX ORDER — AMOXICILLIN 250 MG
1 CAPSULE ORAL 2 TIMES DAILY
Status: DISCONTINUED | OUTPATIENT
Start: 2023-10-25 | End: 2023-10-30

## 2023-10-25 RX ORDER — POTASSIUM CHLORIDE 20 MEQ/1
40 TABLET, EXTENDED RELEASE ORAL ONCE
Status: COMPLETED | OUTPATIENT
Start: 2023-10-25 | End: 2023-10-25

## 2023-10-25 RX ORDER — POLYETHYLENE GLYCOL 3350 17 G/17G
1 POWDER, FOR SOLUTION ORAL
Status: DISCONTINUED | OUTPATIENT
Start: 2023-10-25 | End: 2023-10-30

## 2023-10-25 RX ORDER — FUROSEMIDE 40 MG/1
40 TABLET ORAL
Status: DISCONTINUED | OUTPATIENT
Start: 2023-10-26 | End: 2023-11-06

## 2023-10-25 RX ADMIN — INSULIN HUMAN 1 UNITS: 100 INJECTION, SOLUTION PARENTERAL at 08:44

## 2023-10-25 RX ADMIN — OXYCODONE HYDROCHLORIDE 5 MG: 5 TABLET ORAL at 21:29

## 2023-10-25 RX ADMIN — INSULIN HUMAN 1 UNITS: 100 INJECTION, SOLUTION PARENTERAL at 11:41

## 2023-10-25 RX ADMIN — GABAPENTIN 400 MG: 400 CAPSULE ORAL at 08:47

## 2023-10-25 RX ADMIN — POTASSIUM CHLORIDE 40 MEQ: 1500 TABLET, EXTENDED RELEASE ORAL at 14:35

## 2023-10-25 RX ADMIN — INSULIN GLARGINE-YFGN 10 UNITS: 100 INJECTION, SOLUTION SUBCUTANEOUS at 08:44

## 2023-10-25 RX ADMIN — INSULIN GLARGINE-YFGN 10 UNITS: 100 INJECTION, SOLUTION SUBCUTANEOUS at 21:36

## 2023-10-25 RX ADMIN — FUROSEMIDE 40 MG: 40 TABLET ORAL at 05:18

## 2023-10-25 RX ADMIN — APIXABAN 5 MG: 5 TABLET, FILM COATED ORAL at 08:47

## 2023-10-25 RX ADMIN — DILTIAZEM HYDROCHLORIDE 30 MG: 30 TABLET, FILM COATED ORAL at 14:35

## 2023-10-25 RX ADMIN — Medication 3 MG: at 21:29

## 2023-10-25 RX ADMIN — OMEPRAZOLE 20 MG: 20 CAPSULE, DELAYED RELEASE ORAL at 08:47

## 2023-10-25 RX ADMIN — AMIODARONE HYDROCHLORIDE 200 MG: 200 TABLET ORAL at 08:47

## 2023-10-25 RX ADMIN — GABAPENTIN 400 MG: 400 CAPSULE ORAL at 14:35

## 2023-10-25 RX ADMIN — SENNOSIDES AND DOCUSATE SODIUM 1 TABLET: 50; 8.6 TABLET ORAL at 21:29

## 2023-10-25 RX ADMIN — APIXABAN 5 MG: 5 TABLET, FILM COATED ORAL at 21:29

## 2023-10-25 RX ADMIN — DILTIAZEM HYDROCHLORIDE 30 MG: 30 TABLET, FILM COATED ORAL at 05:18

## 2023-10-25 RX ADMIN — DILTIAZEM HYDROCHLORIDE 30 MG: 30 TABLET, FILM COATED ORAL at 21:29

## 2023-10-25 RX ADMIN — ALLOPURINOL 100 MG: 100 TABLET ORAL at 08:47

## 2023-10-25 RX ADMIN — ATORVASTATIN CALCIUM 20 MG: 10 TABLET, FILM COATED ORAL at 21:29

## 2023-10-25 RX ADMIN — GABAPENTIN 400 MG: 400 CAPSULE ORAL at 21:29

## 2023-10-25 SDOH — ECONOMIC STABILITY: TRANSPORTATION INSECURITY
IN THE PAST 12 MONTHS, HAS LACK OF RELIABLE TRANSPORTATION KEPT YOU FROM MEDICAL APPOINTMENTS, MEETINGS, WORK OR FROM GETTING THINGS NEEDED FOR DAILY LIVING?: NO

## 2023-10-25 ASSESSMENT — ENCOUNTER SYMPTOMS
VOMITING: 0
CHILLS: 0
BRUISES/BLEEDS EASILY: 0
PALPITATIONS: 0
SHORTNESS OF BREATH: 0
ABDOMINAL PAIN: 0
POLYDIPSIA: 0
COUGH: 0
NAUSEA: 0
EYES NEGATIVE: 1
FEVER: 0

## 2023-10-25 ASSESSMENT — BRIEF INTERVIEW FOR MENTAL STATUS (BIMS)
BIMS SUMMARY SCORE: 15
ASKED TO RECALL BED: YES, NO CUE REQUIRED
WHAT YEAR IS IT: CORRECT
ASKED TO RECALL BLUE: YES, NO CUE REQUIRED
WHAT DAY OF THE WEEK IS IT: CORRECT
WHAT MONTH IS IT: ACCURATE WITHIN 5 DAYS
ASKED TO RECALL SOCK: YES, NO CUE REQUIRED
INITIAL REPETITION OF BED BLUE SOCK - FIRST ATTEMPT: 3

## 2023-10-25 ASSESSMENT — ACTIVITIES OF DAILY LIVING (ADL)
BED_CHAIR_WHEELCHAIR_TRANSFER_DESCRIPTION: ADAPTIVE EQUIPMENT
TOILET_TRANSFER_DESCRIPTION: GRAB BAR;ADAPTIVE EQUIPMENT;INCREASED TIME;INITIAL PREPARATION FOR TASK;SET-UP OF EQUIPMENT;SUPERVISION FOR SAFETY;VERBAL CUEING
TOILETING: INDEPENDENT

## 2023-10-25 ASSESSMENT — PAIN DESCRIPTION - PAIN TYPE
TYPE: ACUTE PAIN
TYPE: ACUTE PAIN

## 2023-10-25 ASSESSMENT — GAIT ASSESSMENTS: GAIT LEVEL OF ASSIST: UNABLE TO PARTICIPATE

## 2023-10-25 NOTE — THERAPY
Physical Therapy   Initial Evaluation     Patient Name: Jose Castanon  Age:  68 y.o., Sex:  male  Medical Record #: [id number]  Today's Date: 10/25/2023     Subjective    Pt reported 7/10 pain in rear (wound), and in abdomen.      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 He uses the door frame, 1 forearm crutch and door knob)   Steps In Home 0   Rail None   Equipment Owned Front-Wheel Walker;Wheelchair;M[place]onian ([place]) Crutches  (wc inside bedroom, wc outside bedroom, FWW to use between door frame wc <> wc, forearm crutche(s) outdoors + prosthetic. Furnature walks mostly per report.)   Lives with - Patient's Self Care Capacity Alone and Able to Care For Self   Prior Level of Functional Mobility   Bed Mobility Independent   Transfer Status Independent   Ambulation Independent   Distance Ambulation (Feet) 88   Assistive Devices Used [place] ([place]) Crutches   Wheelchair Independent   Stairs Independent   Prior Functioning: Everyday Activities   Self Care Independent   Indoor Mobility (Ambulation) Independent   Stairs Independent   Functional Cognition Independent   Prior Device Use Orthotics/Prosthetics;Manual wheelchair;Walker   Pain 0 - 10 Group   Location   (wound, posterior)   Therapist Pain Assessment 7   Passive ROM Lower Body   Rt Ankle Dorsiflexion Degrees 0   Active ROM Lower Body    Comments R ankle DF ~ -5-10 deg via visual estimation   Strength Lower Body   Rt Hip Flexion Strength 3+ (F+)   Rt Knee Flexion Strength 4- (G-)   Rt Knee Extension Strength 4- (G-)   Rt Ankle Dorsiflexion Strength 2+ (P+)   Rt Ankle Plantar Flexion Strength 4-  (G-)   Lt Hip Flexion Strength 5 (N)   Lt Knee Flexion Strength 5 (N)   Lt Knee Extension Strength 5 (N)   Sensation Lower Body   Lower Extremity Sensation   Not Tested   Comments Neuropathy present- R foot requires further assessment. Of note: wound present on R medial heel, and L lateral foot.   Lower Body Muscle Tone   Lower Body Muscle Tone  Not Tested   Balance Assessment   Sitting Balance (Static) Fair   Sitting Balance (Dynamic) Fair -   Comments BUE support on FWW for standing balance + prosthetic   Bed Mobility    Supine to Sit Standby Assist   Sit to Stand Contact Guard Assist   Scooting Standby Assist   Rolling Standby Assist   Neurological Concerns   Neurological Concerns Yes   Paresthesia Present   Coordination Lower Body    Coordination Lower Body  Not Tested   Roll Left and Right   Assistance Needed Supervision   CARE Score - Roll Left and Right 4   Roll Left and Right Discharge Goal   Discharge Goal 6   Sit to Lying   Assistance Needed Supervision   CARE Score - Sit to Lying 4   Sit to Lying Discharge Goal   Discharge Goal 6   Lying to Sitting on Side of Bed   Assistance Needed Supervision   CARE Score - Lying to Sitting on Side of Bed 4   Lying to Sitting on Side of Bed Discharge Goal   Discharge Goal 6   Sit to Stand   Assistance Needed Incidental touching;Adaptive equipment   CARE Score - Sit to Stand 4   Sit to Stand Discharge Goal   Discharge Goal 6   Chair/Bed-to-Chair Transfer   Assistance Needed Incidental touching;Adaptive equipment   CARE Score - Chair/Bed-to-Chair Transfer 4   Chair/Bed-to-Chair Transfer Discharge Goal   Discharge Goal 6   Car Transfer   Reason if not Attempted Safety concerns   CARE Score - Car Transfer 88   Car Transfer Discharge Goal   Discharge Goal 5   Walk 10 Feet   Reason if not Attempted Safety concerns  (awaiting prosthetist to come look at current set up for modifications vs new prosthetic ([place]))   CARE Score - Walk 10 Feet 88   Walk 10 Feet Discharge Goal  "  Discharge Goal 6   Walk 50 Feet with Two Turns   Reason if not Attempted Safety concerns   CARE Score - Walk 50 Feet with Two Turns 88   Walk 50 Feet with Two Turns Discharge Goal   Discharge Goal 5   Walk 150 Feet   Reason if not Attempted Activity not applicable  (was walker ~\"88 ft\" prior)   CARE Score - Walk 150 Feet 9   Walk 150 Feet Discharge Goal   Discharge Goal 9   Walking 10 Feet on Uneven Surfaces   Reason if not Attempted Safety concerns   CARE Score - Walking 10 Feet on Uneven Surfaces 88   Walking 10 Feet on Uneven Surfaces Discharge Goal   Discharge Goal 4   1 Step (Curb)   Reason if not Attempted Safety concerns   CARE Score - 1 Step (Curb) 88   1 Step (Curb) Discharge Goal   Discharge Goal 5   4 Steps   Reason if not Attempted Activity not applicable  (2 NENA home (pt has 3 NENA through front, uses garage))   CARE Score - 4 Steps 9   4 Steps Discharge Goal   Discharge Goal 9   12 Steps   Reason if not Attempted Activity not applicable   CARE Score - 12 Steps 9   12 Steps Discharge Goal   Discharge Goal 9   Picking Up Object   Reason if not Attempted Safety concerns   CARE Score - Picking Up Object 88   Picking Up Object Discharge Goal   Discharge Goal 5   Wheel 50 Feet with Two Turns   Assistance Needed Supervision   CARE Score - Wheel 50 Feet with Two Turns 4   Type of Wheelchair/Scooter Manual   Wheel 50 Feet with Two Turns Discharge Goal   Discharge Goal 6   Wheel 150 Feet   Assistance Needed Supervision   CARE Score - Wheel 150 Feet 4   Type of Wheelchair/Scooter Manual   Wheel 150 Feet Discharge Goal   Discharge Goal 6   Gait Functional Level of Assist    Gait Level Of Assist Unable to Participate  (TBD, patient reported that [facility] needs to come out and re-evaluate prosthetic fit.)   Wheelchair Functional Level of Assist   Wheelchair Assist Supervised   Distance Wheelchair (Feet or Distance) 150   Stairs Functional Level of Assist   Level of Assist with Stairs Unable to " Participate   Transfer Functional Level of Assist   Bed, Chair, Wheelchair Transfer Contact Guard Assist   Bed Chair Wheelchair Transfer Description Adaptive equipment  (SPT FWW + prosthetic)   Problem List    Problems Pain;Impaired Transfers;Impaired Ambulation;Functional Strength Deficit;Impaired Balance;Decreased Activity Tolerance;Other (Comments)  (Clear anal drainage per pt report, posterior wound, R foot wounds x2)   Precautions   Precautions Fall Risk   Comments monitor HR and O2, hx of BKA LLE with prosthetic, wound near sacrum, medial heel wound RLE, lateral foot wound RLE   Current Discharge Plan   Current Discharge Plan Return to Prior Living Situation   Interdisciplinary Plan of Care Collaboration   IDT Collaboration with  Nursing;Physician;Occupational Therapist  (+ hospitalist)   Patient Position at End of Therapy Seated;Call Light within Reach;Tray Table within Reach;Phone within Reach   Collaboration Comments POC, assessment of R foot wound- bandaged. CLOF.   PT DME Recommendations   Wheelchair   (Pt has 2 wheelchairs reported to be in good condition)   Assistive Device   (Pt has FWW and forearm crutches)   Benefit   Therapy Benefit Patient Would Benefit from Inpatient Rehabilitation Physical Therapy to Maximize Functional [place] with ADLs, IADLs and Mobility.   Strengths & Barriers   Strengths Effective communication skills;Good insight into deficits/needs;Independent prior level of function;Motivated for self care and independence;Pleasant and cooperative;Willingly participates in therapeutic activities   Barriers Decreased endurance;Fatigue;Home accessibility;Impaired activity tolerance;Impaired balance;Limited mobility;Pain     Pt was educated on rehab expectations and Passport.   Pt will ask sister to bring Myriam Horner in prior to D/C for practice.   Stair rail(s) recommended.  Pt educated on need for regular skin/ fot checks d/t neuropathy, and DM.      Assessment  Patient is 68 y.o.  "male with a diagnosis of CIPN, complicated by A fib, after presenting on 9/8/23 with perirectal abscess and Darion's ganggrene.  Additional factors influencing patient status / progress (ie: cognitive factors, co-morbidities, social support, etc): L BKA 2020, HDL, CAD, morbid obesity, lives alone, requires adjustment vs new prosthetic LLE d/t impaired fit per pt report (uses [facility]).      Plan  Recommend Physical Therapy  minutes per day 5-7 days per week for 2 weeks for the following treatments:  PT Group Therapy, PT E Stim Attended, PT Prosthetic Training, PT Gait Training, PT Self Care/Home Eval, PT Therapeutic Exercises, PT Neuro Re-Ed/Balance, PT Therapeutic Activity, PT Manual Therapy, and PT Evaluation.    Passport items to be completed:  Get in/out of bed safely, in/out of a vehicle, safely use mobility device, walk or wheel around home/community, navigate up and down stairs, show how to get up/down from the ground, ensure home is accessible, demonstrate HEP, complete caregiver training    Goals:  Long term and short term goals have been discussed with patient and they are in agreement.    Physical Therapy Problems (Active)       Problem: Mobility       Dates: Start:  10/25/23         Goal: STG-Within one week, patient will propel wheelchair community >150 ft mod I indoors       Dates: Start:  10/25/23            Goal: STG-Within one week, patient will ambulate household distance in // bars 10ft x 2 (awaiting prosthetic alterations)       Dates: Start:  10/25/23            Goal: STG-Within one week, patient will ambulate up/down a 2\" step in // bars min A       Dates: Start:  10/25/23               Problem: Mobility Transfers       Dates: Start:  10/25/23         Goal: STG-Within one week, patient will transfer bed to chair SPT FWW SBA/SPV       Dates: Start:  10/25/23               Problem: PT-Long Term Goals       Dates: Start:  10/25/23         Goal: LTG-By discharge, patient will propel " wheelchair mod I over various surfaces >150 ft       Dates: Start:  10/25/23            Goal: LTG-By discharge, patient will ambulate with FWW >50 ft mod I        Dates: Start:  10/25/23            Goal: LTG-By discharge, patient will transfer one surface to another mod I with FWW + prosthetic        Dates: Start:  10/25/23            Goal: LTG-By discharge, patient will ambulate up/down 2 stairs with (newly installed railings- recommended) mod I       Dates: Start:  10/25/23

## 2023-10-25 NOTE — ASSESSMENT & PLAN NOTE
Pt afebrile and non-toxic appearing  PCT 0.17  UA 10-20 w/ negative bacteria  UCx negative  CXR small left eff vs atx, continue IS  Follow WBC to resolution  Check F/U labs in AM

## 2023-10-25 NOTE — CARE PLAN
The patient is Stable - Low risk of patient condition declining or worsening    Problem: Knowledge Deficit - Standard  Goal: Patient and family/care givers will demonstrate understanding of plan of care, disease process/condition, diagnostic tests and medications  Outcome: Progressing. Reviewed POC, all questions answered.        Problem: Fall Risk - Rehab  Goal: Patient will remain free from falls  Outcome: Progressing. Call light within reach, pt educated to use for assistance for safe transferring.      Shift Goals  Clinical Goals: Safety  Patient Goals: Participate in theray

## 2023-10-25 NOTE — DIETARY
Nutrition services: Day 1 of admit.  Jose Castanon is a 68 y.o. male with admitting DX of critical illness polyneuropathy   Consult received for MST score of 2 indicating 14-23 lb wt loss over 1 month and high nutritional risk dx of diabetic ulcer     RD met with pt at bedside, pt appears nourished. Pt states he was taking Jony BID for wound healing at previous facility. RD able to discuss specific nutrients to optimize wound healing including adequate protein intake. Pt able to demonstrate good understanding and knowledge regarding nutrition supplements.   Pt does not have any specific nutrition related questions / concerns at this time and emphasized his motivation to improve health status.     Assessment:  Height: 182.9 cm (6')  Weight: (!) 162 kg (357 lb 12.8 oz) via bed scale   Body mass index is 48.53 kg/m²., BMI classification: obese  Diet/Intake: Consistent CHO, PO intake % x1 meal     Evaluation:   Labs: BUN 31, GFR 55, A1c 6.8, POC glucose 159-193, TSH 10  Medications: Lipitor, Lasix, gabapentin, Lantus, Humulin, melatonin (refused), Prilosec, bowel regimen (PRN), Zofran (PRN)  LBM 10/25/23  Skin: Left foot ulcer, Right heel ulcer per flowsheet. Wound care note pending, RD reviewed wound images in chart review.   Fluid accumulation: Pt receiving diuresis, no edema noted during assessment. Pt reports h/o pulmonary edema.   Wt hx: Records indicate 13 kg (7.4%) non-severe wt loss over 4 months. Fluid shifts are likely a contributing factor  Wt Readings from Last 3 Encounters:   10/24/23 (!) 162 kg (357 lb 12.8 oz)   06/14/23 (!) 175 kg (386 lb 9.6 oz)   09/28/22 (!) 175 kg (386 lb 3.2 oz)       Malnutrition Risk: pt does not meet ASPEN criteria for malnutrition     Recommendations/Plan:  Continue current diet order, Consistent CHO  Addition of ONS, Arginaid (or Jony) BID, Glucerna QD    Encourage intake of meals/ONS  Document intake of all meals/ONS  as % taken in ADL's to provide  interdisciplinary communication across all shifts.   Achieve consistent PO intake > 50% of meals and 100% of ONS   Monitor weight.  Achieve Blood glucose control 100-180  Nutrition rep will continue to see patient for ongoing meal and snack preferences.     RD following

## 2023-10-25 NOTE — PROGRESS NOTES
NURSING DAILY NOTE    Name: Jose Castanon   Date of Admission: 10/24/2023   Admitting Diagnosis: Critical illness polyneuropathy (HCC)  Attending Physician: Sylvester Contreras M.d.  Allergies: Penicillins, Bee venom, Cefazolin, Linezolid, and Chicken-derived products    Safety  Patient Assist     Patient Precautions     Precaution Comments     Bed Transfer Status     Toilet Transfer Status      Assistive Devices     Oxygen     Diet/Therapeutic Dining  Current Diet Order   Procedures    Diet Order Diet: Consistent CHO (Diabetic)     Pill Administration  whole  Agitated Behavioral Scale     ABS Level of Severity       Fall Risk  Has the patient had a fall this admission?      Lulu Aguilera Fall Risk Scoring  13, MODERATE RISK  Fall Risk Safety Measures  bed alarm, chair alarm, poor balance, and low vision/ hearing    Vitals  Temperature: 36.8 °C (98.3 °F)  Temp src: Oral  Pulse: 84  Respiration: 18  Blood Pressure : 120/60  Blood Pressure MAP (Calculated): 80 MM HG  BP Location: Right, Upper Arm  Patient BP Position: Supine     Oxygen  Pulse Oximetry: 95 %    Bowel and Bladder  Last Bowel Movement  10/23/23 (per report from [facility])  Stool Type     Bowel Device     Continent  Bladder: Did not void   Bowel: No movement  Bladder Function     Genitourinary Assessment        Skin  Des Score   17  Sensory Interventions      Moisture Interventions  Moisturizers/Barriers: Moisturizer       Pain  Pain Rating Scale  0 - No Pain  Pain Location     Pain Location Orientation     Pain Interventions        ADLs    Bathing      Linen Change      Personal Hygiene     Chlorhexidine Bath      Oral Care     Teeth/Dentures     Shave     Nutrition Percentage Eaten     Environmental Precautions     Patient Turns/Positioning  Patient Turns Self from Side to Side  Patient Turns Assistance/Tolerance     Bed Positions     Head of Bed Elevated         Psychosocial/Neurologic  Assessment  Psychosocial Assessment  Psychosocial (WDL):  Within Defined Limits  Neurologic Assessment  Orientation Level: Oriented X4  EENT (WDL):  WDL Except    Cardio/Pulmonary Assessment  Edema      Respiratory Breath Sounds     Cardiac Assessment

## 2023-10-25 NOTE — THERAPY
"Occupational Therapy   Initial Evaluation     Patient Name: Jose Castanon  Age:  68 y.o., Sex:  male  Medical Record #: [id number]  Today's Date: 10/25/2023     Subjective    \"I'm not even sure how weak I am\"     Objective       10/25/23 0701   OT Charge Group   Charges Yes   OT Self Care / ADL (Units) 3   OT Evaluation OT Evaluation Mod   OT Total Time Spent   OT Individual Total Time Spent (Mins) 90   Prior Living Situation   Prior Services None   Housing / Facility 1 Story House   Steps Into Home 2  (3STE front porch, 2 NENA garage - typically uses garage)   Steps In Home 0   Rail None   Bathroom Set up Walk In Shower;Shower Curtain;Grab Bars;Tub Transfer Bench   Equipment Owned Front-Wheel Walker;Wheelchair;Lower Extremity Prosthesis (L.E Prosthesis);Reacher  (x2 2ww; x2 w/c; forearm crutches)   Lives with - Patient's Self Care Capacity Alone and Able to Care For Self   Comments Pt lives at home alone in a one story home with 2STE at usual enterance. No rails at steps, reports he holds onto door handle/door frame for support. Pt reports using mostly w/c in home (keeps one w/c in bedroom and one in rest of house as it does not fit through the door frames). Uses forearm crutches for outside/community use, and walker for transporting things in home (has basket attatched) otherwise, pt \"furniture surfs\" for transfers and short distances in home)   Prior Level of ADL Function   Self Feeding Independent   Grooming / Hygiene Independent   Bathing Independent   Dressing Independent   Toileting Independent   Prior Level of IADL Function   Medication Management Independent   Laundry Independent   Kitchen Mobility Independent   Finances Independent   Home Management Independent   Shopping Other (Comments)  (groceries delivered)   Prior Level Of Mobility Independent Without Device in Community;Independent With Steps in Community;Independent With Device in Home;Independent With Steps in Home;Uses Wheel Chair for in Home " "Mobility   Driving / Transportation Driving Independent   Occupation (Pre-Hospital Vocational) Not Employed   Prior Functioning: Everyday Activities   Self Care Independent   Indoor Mobility (Ambulation) Independent   Stairs Independent   Functional Cognition Independent   Prior Device Use Manual wheelchair;Orthotics/Prosthetics;Walker  (forearm crutches)   Vitals   Pulse (!) 120   Patient BP Position Sitting   Pulse Oximetry 92 %   O2 (LPM) 0   O2 Delivery Device None - Room Air   Vitals Comments Pt had taken 3 L oxymask off at approach, O2sats then at 96% on RA. Above vitals taken at end of session with slight SOB from distal reaching and LBD. Able to raise sats with verbal cueing for pursed lip breathing   Cognition    Cognition / Consciousness WDL   Level of Consciousness Alert   ABS (Agitated Behavior Scale)   Agitated Behavior Scale Performed No   Cognitive Pattern Assessment   Cognitive Pattern Assessment Used BIMS   Brief Interview for Mental Status (BIMS)   Repetition of Three Words (First Attempt) 3   Temporal Orientation: Year Correct   Temporal Orientation: Month Accurate within 5 days   Temporal Orientation: Day Correct   Recall: \"Sock\" Yes, no cue required   Recall: \"Blue\" Yes, no cue required   Recall: \"Bed\" Yes, no cue required   BIMS Summary Score 15   Confusion Assessment Method (CAM)   Is there evidence of an acute change in mental status from the patient's baseline? No   Inattention Behavior not present   Disorganized thinking Behavior not present   Altered level of consciousness Behavior not present   Vision Screen   Vision Not tested  (at baseline, needs reading glasses)   Passive ROM Upper Body   Passive ROM Upper Body WDL   Active ROM Upper Body   Active ROM Upper Body  WDL   Strength Upper Body   Upper Body Strength  WDL   Sensation Upper Body   Upper Extremity Sensation  WDL   Upper Body Muscle Tone   Upper Body Muscle Tone  WDL   Balance Assessment   Sitting Balance (Static) Fair + "   Sitting Balance (Dynamic) Fair   Standing Balance (Static) Poor +   Standing Balance (Dynamic) Poor   Weight Shift Sitting Fair   Weight Shift Standing Poor   Bed Mobility    Supine to Sit Standby Assist   Sit to Stand Minimal Assist   Scooting Minimal Assist   Rolling Standby Assist   Coordination Upper Body   Coordination WDL   Eating   Assistance Needed Set-up / clean-up   Physical Assistance Level No physical assistance   CARE Score - Eating 5   Eating Discharge Goal   Discharge Goal 6   Oral Hygiene   Assistance Needed Set-up / clean-up   Physical Assistance Level No physical assistance   CARE Score - Oral Hygiene 5   Oral Hygiene Discharge Goal   Discharge Goal 6   Shower/Bathe Self   Assistance Needed Set-up / clean-up;Adaptive equipment   Physical Assistance Level No physical assistance   CARE Score - Shower/Bathe Self 5   Shower/Bathe Self Discharge Goal   Discharge Goal 6   Upper Body Dressing   Assistance Needed Set-up / clean-up   Physical Assistance Level No physical assistance   CARE Score - Upper Body Dressing 5   Upper Body Dressing Discharge Goal   Discharge Goal 6   Lower Body Dressing   Assistance Needed Physical assistance   Physical Assistance Level 76% or more   CARE Score - Lower Body Dressing 2   Lower Body Dressing Discharge Goal   Discharge Goal 5   Putting On/Taking Off Footwear   Assistance Needed Physical assistance   Physical Assistance Level 51%-75%   CARE Score - Putting On/Taking Off Footwear 2   Putting On/Taking Off Footwear Discharge Goal   Discharge Goal 6   Toileting Hygiene   Assistance Needed Physical assistance   Physical Assistance Level 76% or more   CARE Score - Toileting Hygiene 2   Toileting Hygiene Discharge Goal   Discharge Goal 5   Toilet Transfer   Assistance Needed Physical assistance   Physical Assistance Level 26%-50%   CARE Score - Toilet Transfer 3   Toilet Transfer Discharge Goal   Discharge Goal 6   Hearing, Speech, and Vision   Ability to Hear Adequate    Ability to See in Adequate Light Adequate   Expression of Ideas and Wants Without difficulty   Understanding Verbal and Non-Verbal Content Understands   Functional Level of Assist   Eating Independent   Eating Description Set-up of equipment or meal/tube feeding   Grooming Independent   Grooming Description Seated in wheelchair at sink;Set-up of equipment;Supervision for safety   Bathing Minimal Assist   Bathing Description Adaptive equipment;Grab bar;Hand rails;Tub bench;Assit with back;Increased time;Initial preparation for task   Upper Body Dressing Modified Independent   Upper Body Dressing Description Increased time;Initial preparation for task  (seated)   Lower Body Dressing Maximal Assist   Lower Body Dressing Description Assist with threading into pant leg;Assistive devices;Initial preparation for task;Set-up of equipment;Supervision for safety  (thread BLE and manage around waist. Assitance donning prosthetic)   Toileting Maximal Assist   Toileting Description Assist to pull pants up;Assist to pull pants down;Assist for standing balance;Assist for hygiene   Bed, Chair, Wheelchair Transfer Minimal Assist   Bed Chair Wheelchair Transfer Description Adaptive equipment;Assist with one limb;Increased time;Initial preparation for task;Set-up of equipment;Squat pivot transfer to wheelchair   Toilet Transfers Minimal Assist   Toilet Transfer Description Grab bar;Adaptive equipment;Increased time;Initial preparation for task;Set-up of equipment;Supervision for safety;Verbal cueing   Tub / Shower Transfers Minimal Assist   Tub Shower Transfer Description Adaptive equipment;Grab bar;Shower bench;Increased time;Initial preparation for task;Set-up of equipment   Problem List   Problem List Decreased Active Daily Living Skills;Decreased Functional Mobility;Decreased Activity Tolerance;Impaired Postural Control / Balance   Precautions   Precautions Fall Risk;Other (See Comments)   Comments monitor HR and O2, hx of BKA  LLE with prosthetic, wound near sacrum   Current Discharge Plan   Current Discharge Plan Return to Prior Living Situation   Benefit    Therapy Benefit Patient Would Benefit from Inpatient Rehab Occupational Therapy to Maximize [place] with ADLs, IADLs and Functional Mobility.   Interdisciplinary Plan of Care Collaboration   IDT Collaboration with  Nursing;Physical Therapist   Patient Position at End of Therapy Seated;Chair Alarm On;Call Light within Reach;Tray Table within Reach   Collaboration Comments wounds, CLOF   Strengths & Barriers   Strengths Able to follow instructions;Alert and oriented;Effective communication skills;Good carryover of learning;Good insight into deficits/needs;Motivated for self care and independence;Pleasant and cooperative   Barriers Decreased endurance;Bowel incontinence;Fatigue;Generalized weakness;Impaired activity tolerance   Occupational Therapist Assigned   Assigned OT / Treatment Time / Comments Flakita Adamasuman; 60/90   Extended time for dressing and bathing secondary to slow movement and decreased mobility.     Assessment  Patient is a 68 y.o. 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 Patient reportedly initially presented to [facility] and had debridement and antibiotics for the infection. ID was consulted and he was placed on IV antibiotics which he has since completed. He had a wound vac placed and he was eventually transferred to [facility] for ongoing care.  His hospitalization was complicated by A fib which he was placed on amiodarone, digoxin and Eliquis.  Most recent intervention he underwent I&D with Dr. Higuera on 9/29/23 and has since had wound vac removed with packing.      Patient tolerated transfer to [facility]. He reports he is motivated to get stronger. He reports diffuse lower extremity weakness; he reports being weaker than he thought. Discussed about  critical illness disease. He brought all of his equipment to see what he will need. He reports he gets winded very easily, using breathing mask. Denies NVD. He reports pain is controlled. Denies fever or chills.     Pt tolerated 90 minute session this AM for initial OT evaluation and subsequent treatment. Pt limited by the aforementioned barriers and deficits, however is pleasant and motivated to regain PLOF. Pt with barriers to mobility at baseline, requiring prosthetics and DME. Skilled OT intervention necessary for return to previous level of function.     Plan  Recommend Occupational Therapy  minutes per day 5-7 days per week for 10-14 days for the following treatments:  OT E Stim Attended, OT Self Care/ADL, OT Community Reintegration, OT Neuro Re-Ed/Balance, OT Therapeutic Activity, OT Evaluation, and OT Therapeutic Exercise.    Passport items to be completed:  Perform bathroom transfers, complete dressing, complete feeding, get ready for the day, prepare a simple meal, participate in household tasks, adapt home for safety needs, demonstrate home exercise program, complete caregiver training     Goals:  Long term and short term goals have been discussed with patient and they are in agreement.    Occupational Therapy Goals (Active)       Problem: Bathing       Dates: Start:  10/25/23         Goal: STG-Within one week, patient will bathe with CGA and LRD       Dates: Start:  10/25/23               Problem: Dressing       Dates: Start:  10/25/23         Goal: STG-Within one week, patient will dress LB Min A and LRD       Dates: Start:  10/25/23               Problem: Functional Transfers       Dates: Start:  10/25/23         Goal: STG-Within one week, patient will transfer to toilet CGA and LRD       Dates: Start:  10/25/23            Goal: STG-Within one week, patient will transfer to step in shower with CGA and LRD       Dates: Start:  10/25/23               Problem: OT Long Term Goals       Dates: Start:   10/25/23         Goal: LTG-By discharge, patient will complete basic self care tasks SBA to SUP       Dates: Start:  10/25/23            Goal: LTG-By discharge, patient will perform bathroom transfers SBA to SUP       Dates: Start:  10/25/23            Goal: LTG-By discharge, patient will complete basic home management SBA to SUP       Dates: Start:  10/25/23               Problem: Toileting       Dates: Start:  10/25/23         Goal: STG-Within one week, patient will complete toileting tasks with Min A and LRD       Dates: Start:  10/25/23

## 2023-10-25 NOTE — FLOWSHEET NOTE
10/24/23 1831   Events/Summary/Plan   Events/Summary/Plan RT Assessment, Set up [facility] BiPAP at bedside   Vital Signs   Pulse 86   Respiration 18   Pulse Oximetry 94 %   $ Pulse Oximetry (Spot Check) Yes   Respiratory Assessment   Respiratory Pattern Within Normal Limits   Level of Consciousness Alert   Chest Exam   Chest Observation Barrel Chest   Work Of Breathing / Effort Mild   Oxygen   O2 (LPM) 3   O2 Delivery Device Oxymask   Non-Invasive Ventilation [employer]   Nocturnal CPAP or BIPAP BIPAP - [facility] Unit   $ System Evaluation Yes   Settings (If Known) VAUTO 16/6 PS-4   Settings Adjusted Yes   FiO2 or LPM 3   Smoking History   Have you ever smoked Yes   Have you smoked in the last 12 months No   Confirm Quit Date   (Quit 40 years ago)

## 2023-10-25 NOTE — ASSESSMENT & PLAN NOTE
Has had prolonged hospitalization since 8/26/23  Initially presented to [facility], then hospitals, now Rehab  S/P I+D, IV Abx, and Wound Vac  Wound care and pain control per Physiatry

## 2023-10-25 NOTE — CONSULTS
HOSPITAL MEDICINE CONSULTATION    Requesting Physician:  Dr. Contreras    Reason for Consult:  Hypertension, Diabetes, Chronic Kidney Disease    History of Present Illness:  The patient is a 68-year-old  male with past medical history significant for hypertension, non-insulin dependent diabetes mellitus, stage three chronic kidney disease, peripheral arterial disease status post left below knee amputation, and gout.  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  The patient underwent incision and drainage of the perineal area on 8/27/23, and has completed intravenous antimicrobial therapy and wound vacuum assisted closure.  He was transferred to [facility] term acute care on 9/8/23.  While at [facility], the patient developed atrial fibrillation and was placed on Amiodarone and Eliquis.  Due to his ongoing functional debility, the patient was transferred to [facility] on 10/24/23.  Hospital Medicine consultation is requested to assist in the management of this patient's HTN, DM, and CKD.  He is also noted to have leukocytosis, anemia, hypokalemia, and abnormal thyroid function tests.    Review of Systems:  Review of Systems   Constitutional:  Negative for chills and fever.   HENT: Negative.     Eyes: Negative.    Respiratory:  Negative for cough and shortness of breath.    Cardiovascular:  Negative for chest pain and palpitations.   Gastrointestinal:  Negative for abdominal pain, nausea and vomiting.   Musculoskeletal:         Wound pain   Skin:  Negative for itching and rash.   Endo/Heme/Allergies:  Negative for polydipsia. Does not bruise/bleed easily.   All other systems reviewed and are negative.      Allergies:  Allergies   Allergen Reactions    Penicillins Unspecified and Swelling     Given in eye drops as a child, eyes  andie  2003-06-09;ITCH   Create Date: [id number] Create User Name: Juan J Castellanos Update Date: [id number] Update User Name: Juan J Castellanos    Bee Venom Swelling    Cefazolin Rash, Itching and Swelling    Linezolid Unspecified     Caused unk lab value to increase.    Chicken-Derived Products Diarrhea       Medications:    Current Facility-Administered Medications:     senna-docusate (Pericolace Or Senokot S) 8.6-50 MG per tablet 1 Tablet, 1 Tablet, Oral, BID **AND** polyethylene glycol/lytes (Miralax) PACKET 1 Packet, 1 Packet, Oral, QDAY PRN **AND** magnesium hydroxide (Milk Of Magnesia) suspension 30 mL, 30 mL, Oral, QDAY PRN **AND** bisacodyl (Dulcolax) suppository 10 mg, 10 mg, Rectal, QDAY PRN, Sylvester Contreras M.D.    insulin regular (HumuLIN R,NovoLIN R) injection, 2-12 Units, Subcutaneous, 4X/DAY ACHS **AND** POC blood glucose manual result, , , Q AC AND BEDTIME(S) **AND** NOTIFY MD and PharmD, , , Once **AND** Administer 20 grams of glucose (approximately 8 ounces of fruit juice) every 15 minutes PRN FSBG less than 70 mg/dL, , , PRN **AND** dextrose 50% (D50W) injection 25 g, 25 g, Intravenous, Q15 MIN PRN, Lor Stockton M.D.    [START ON 10/26/2023] furosemide (Lasix) tablet 40 mg, 40 mg, Oral, Q DAY, Lor Stockton M.D.    potassium chloride SA (Kdur) tablet 40 mEq, 40 mEq, Oral, Once, Lor Stockton M.D.    allopurinol (Zyloprim) tablet 100 mg, 100 mg, Oral, DAILY, Sylvester Contreras M.D., 100 mg at 10/25/23 0847    atorvastatin (Lipitor) tablet 20 mg, 20 mg, Oral, Q EVENING, Sylvester Contreras M.D., 20 mg at 10/24/23 2214    gabapentin (Neurontin) capsule 400 mg, 400 mg, Oral, TID, Sylvester Contreras M.D., 400 mg at 10/25/23 0847    apixaban (Eliquis) tablet 5 mg, 5 mg, Oral, BID, Sylvester Contreras M.D., 5 mg at 10/25/23 0847    amiodarone (Cordarone) tablet 200 mg, 200 mg, Oral, DAILY, Sylvester Contreras M.D., 200 mg at 10/25/23 08    dilTIAZem (Cardizem) tablet 30 mg, 30 mg, Oral,  Q8HRS, Sylvester Contreras M.D., 30 mg at 10/25/23 0518    insulin GLARGINE (Lantus,Semglee) injection, 10 Units, Subcutaneous, BID, Sylvester Contreras M.D., 10 Units at 10/25/23 0844    melatonin tablet 3 mg, 3 mg, Oral, QHS, Sylvester Contreras M.D.    Respiratory Therapy Consult, , Nebulization, Continuous RT, Sylvester Contreras M.D.    hydrALAZINE (Apresoline) tablet 25 mg, 25 mg, Oral, Q8HRS PRN, Sylvester Contreras M.D.    acetaminophen (Tylenol) tablet 650 mg, 650 mg, Oral, Q4HRS PRN, Sylvester Contreras M.D.    omeprazole (PriLOSEC) capsule 20 mg, 20 mg, Oral, DAILY, Sylvester Contreras M.D., 20 mg at 10/25/23 0847    mag hydrox-al hydrox-simeth (Maalox Plus Es Or Mylanta Ds) suspension 20 mL, 20 mL, Oral, Q2HRS PRN, Sylvester Contreras M.D.    ondansetron (Zofran ODT) dispertab 4 mg, 4 mg, Oral, 4X/DAY PRN **OR** ondansetron (Zofran) syringe/vial injection 4 mg, 4 mg, Intramuscular, 4X/DAY PRN, Sylvester Contreras M.D.    traZODone (Desyrel) tablet 50 mg, 50 mg, Oral, QHS PRN, Sylvester Contreras M.D., 50 mg at 10/24/23 2215    sodium chloride (Ocean) 0.65 % nasal spray 2 Spray, 2 Spray, Nasal, PRN, Sylvester Contreras M.D.    oxyCODONE immediate-release (Roxicodone) tablet 5 mg, 5 mg, Oral, Q3HRS PRN, 5 mg at 10/24/23 2214 **OR** oxyCODONE immediate release (Roxicodone) tablet 10 mg, 10 mg, Oral, Q3HRS PRN, Sylvester Contreras M.D.    traMADol (Ultram) 50 MG tablet 50 mg, 50 mg, Oral, Q4HRS PRN, Sylvester Contreras M.D., 50 mg at 10/24/23 3473    Past Medical/Surgical History:  Past Medical History:   Diagnosis Date    At risk for sleep apnea     Diabetes (HCC)     Diabetic neuropathy (HCC)     DVT (deep venous thrombosis) (HCC)     Hyperlipidemia     Hypertension     Sleep apnea      Past Surgical History:   Procedure Laterality Date    PB AMPUTATION TOE,MT-P JT Right 9/15/2021    Procedure: RIGHT SECOND TOE AMPUTATION (30 MIN);  Surgeon:  Jackson Castro M.D.;  Location: [facility] - External Facilities;  Service: Orthopedics    TOE AMPUTATION Left        Social History:  Social History     Socioeconomic History    Marital status:      Spouse name: Not on file    Number of children: Not on file    Years of education: Not on file    Highest education level: Not on file   Occupational History    Occupation: retired    Tobacco Use    Smoking status: Former     Current packs/day: 0.00     Types: Cigarettes     Quit date: 1980     Years since quittin.7    Smokeless tobacco: Never   Vaping Use    Vaping Use: Never used   Substance and Sexual Activity    Alcohol use: Not Currently     Comment: OCC    Drug use: Never    Sexual activity: Not Currently     Partners: Female   Other Topics Concern    Not on file   Social History Narrative    Not on file     Social Determinants of Health     Financial Resource Strain: Not on file   Food Insecurity: No Food Insecurity (2020)    Hunger Vital Sign     Worried About Running Out of Food in the Last Year: Never true     Ran Out of Food in the Last Year: Never true   Transportation Needs: No Transportation Needs (2020)    PRAPARE - Transportation     Lack of Transportation (Medical): No     Lack of Transportation (Non-Medical): No   Physical Activity: Not on file   Stress: Not on file   Social Connections: Not on file   Intimate Partner Violence: Not on file   Housing Stability: Not on file       Family History:  Family History   Problem Relation Age of Onset    Cancer Mother     Cancer Father        Physical Examination:   Vitals:    10/25/23 0014 10/25/23 0518 10/25/23 0628 10/25/23 0701   BP:  132/72 126/75    Pulse:  82 76 (!) 120   Resp:  18 16    Temp:   36.9 °C (98.5 °F)    TempSrc:   Oral    SpO2: 96%  96% 92%   Weight:       Height:           Physical Exam  Vitals reviewed.   Constitutional:       General: He is not in acute distress.     Appearance: Normal  appearance. He is not ill-appearing.   HENT:      Head: Normocephalic and atraumatic.      Right Ear: External ear normal.      Left Ear: External ear normal.      Nose: Nose normal.      Mouth/Throat:      Pharynx: Oropharynx is clear.   Eyes:      General:         Right eye: No discharge.         Left eye: No discharge.      Extraocular Movements: Extraocular movements intact.      Conjunctiva/sclera: Conjunctivae normal.   Cardiovascular:      Rate and Rhythm: Normal rate and regular rhythm.   Pulmonary:      Effort: No respiratory distress.      Breath sounds: Normal breath sounds. No wheezing.   Abdominal:      General: Bowel sounds are normal. There is no distension.      Palpations: Abdomen is soft.      Tenderness: There is no abdominal tenderness.   Genitourinary:     Comments: 10/24/23 wound photos reviewed  Musculoskeletal:      Cervical back: Normal range of motion and neck supple.      Right lower leg: No edema.      Comments: L BKA   Skin:     General: Skin is warm and dry.   Neurological:      Mental Status: He is alert and oriented to person, place, and time.         Laboratory Data:  Recent Labs     10/25/23  0536   WBC 12.0*   RBC 3.98*   HEMOGLOBIN 11.2*   HEMATOCRIT 36.0*   MCV 90.5   MCH 28.1   MCHC 31.1*   RDW 51.9*   PLATELETCT 373   MPV 10.4     Recent Labs     10/25/23  0536   SODIUM 142   POTASSIUM 3.5*   CHLORIDE 98   CO2 33   GLUCOSE 147*   BUN 31*   CREATININE 1.40   CALCIUM 8.8             Impressions/Recommendations:  Darion gangrene  Has had prolonged hospitalization since 8/26/23  Initially presented to [facility], then [facility], now Rehab  S/P I+D, IV Abx, and Wound Vac  Wound care and pain control per Physiatry    Stage 3a chronic kidney disease (HCC)  Decrease Lasix  Check BNP and CXR  Avoid nephrotoxins  Renal dose all meds  Monitor electrolytes  Outpt Nephrology F/U    Benign essential HTN  On Diltiazem and Lasix  Observe blood pressure trends    Borderline abnormal TFTs  TSH 10.1 and  FT4 1.15  May have subclinical hypothyroidism  Needs outpt F/U    Gout  On Allopurinol    Diabetes (HCC)  HbA1c 6.8  Continue Lantus bid for now  Increase SSI  Outpt meds include Metformin 1000 mg bid and Actos 30 mg qd    Leukocytosis  Check PCT, UA, and PCXR  Follow Temp and WBC    A-fib (HCC)  On Amiodarone  Anticoagulated on Eliquis    Hypokalemia  2/2 Lasix  Replete K+  Will also check Mg++ and Phos    Anemia  Has normocytic indices  Check Fe Panel  Follow H/H    PAD (peripheral artery disease) (HCC)  H/O L BKA  On Eliquis and Lipitor    Full Code    Thank you for the opportunity to assist in this patient's care.  We will continue to follow along with you.

## 2023-10-25 NOTE — ASSESSMENT & PLAN NOTE
Has normocytic indices  Fe 31, may start supplement if no contraindications  Follow H/H  Check F/U labs in AM

## 2023-10-25 NOTE — DISCHARGE PLANNING
CASE MANAGEMENT INITIAL ASSESSMENT    Admit Date:  10/24/2023     I met with patient to discuss role of case management / discharge planning / team conference.   Patient is a  68 y.o. male transferred from [facility].    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]    Co-morbidities:   Patient Active Problem List    Diagnosis Date Noted    Borderline abnormal TFTs 10/25/2023    Gout 10/25/2023    Leukocytosis 10/25/2023    A-fib (Formerly [facility]) 10/25/2023    Anemia 10/25/2023    Darion gangrene 10/24/2023    Critical illness polyneuropathy (Formerly [facility]) 10/24/2023    Diabetes (Formerly [facility]) 09/09/2022    Hyperlipidemia associated with type 2 diabetes mellitus (Formerly [facility]) 09/09/2022    Diabetic ulcer of right calf (Formerly [facility]) 09/09/2022    PAD (peripheral artery disease) (Formerly [facility]) 09/09/2022    Stage 3a chronic kidney disease (Formerly [facility]) 09/09/2022    BMI 50.0-59.9, adult (Formerly [facility]) 08/10/2021    DM (diabetes mellitus) type II, controlled, with peripheral vascular disorder (Formerly [facility]) 07/20/2021    Morbid obesity (Formerly [facility]) 07/20/2021    Hypokalemia 03/18/2020    Vitamin D deficiency 03/11/2020    Impaired mobility and ADLs 03/11/2020    Peripheral neuropathy 03/10/2020    Hx of BKA, left (Formerly [facility]) 03/10/2020    Obstructive sleep apnea syndrome 03/10/2020    Benign essential HTN 01/21/2020    Mixed hyperlipidemia 01/21/2020    History of DVT (deep vein thrombosis) 01/21/2020    Long term (current) use of anticoagulants 01/21/2020    Lumbar radicular syndrome 01/21/2020     Prior Living Situation:  Housing / Facility: 1 Story House  Lives with - Patient's Self Care Capacity: Alone and Able to Care For Self    Prior Level of Function:  Medication Management: Independent  Finances: Independent  Home Management: Independent  Shopping: groceries delivered  Prior Level Of Mobility: Independent Without Device in Community, Independent With Steps in Community, Independent With Device in Home, Independent With Steps in Home, Uses Wheel Chair for in Home Mobility  Driving / Transportation: Driving  Independent    Support Systems:  Primary : Jose, Son, Nephew who lives in [place], [place] and sister who lives in [place], [place].    Previous Services Utilized:   Equipment Owned: Front-Wheel Walker, Wheelchair, [place] Crutches, hand  crutches.  Prior Services: Grocery and mail delivery    Other Information:  Occupation: Not Employed     Primary Payor Source: [employer]  Primary Care Practitioner : Theo James MD    Patient / Family Goal:  Patient / Family Goal: DC home alone with resume W/C, FWW, CPAP,  crutches, arm crutches, and CPAP machine via sister transport. Pt previously had [facility], choice is to have again if recommended.     Plan:  1. Continue to follow patient through hospitalization and provide discharge planning in collaboration with patient, family, physicians and ancillary services.     2. Utilize community resources to ensure a safe discharge.

## 2023-10-25 NOTE — FLOWSHEET NOTE
10/24/23 1921   Protocol Assessment   Initial Assessment Yes   Patient History   Pulmonary Diagnosis ÓSCAR   Procedures Relevant to Respiratory Status None   Home O2 No   Nocturnal CPAP Yes   Nocturnal CPAP Pressures Unknown   Nocturnal CPAP Oxygen liter flow Unknown   Home Treatments/Frequency No   Sleep Apnea Screening   Have you had a sleep study? Yes   Have you been diagnosed with sleep apnea? Yes   Do you use a CPAP/BIPAP/AUTOPAP? Yes   Machine Home Setting   (Unknown)   Protocol Pathways   Protocol Pathways None

## 2023-10-25 NOTE — CARE PLAN
"  Problem: Knowledge Deficit - Standard  Goal: Patient and family/care givers will demonstrate understanding of plan of care, disease process/condition, diagnostic tests and medications  Outcome: Progressing  Note: Pt agrees with plan of care tonight regarding medications and safety.  Will continue to monitor patient.      Problem: Fall Risk - Rehab  Goal: Patient will remain free from falls  Outcome: Progressing  Note: Lulu Aguilera Fall risk Assessment Score: 13      Moderate fall risk Interventions  - Bed and strip alarm   - Yellow sign by the door   - Yellow wrist band \"Fall risk\"  - Room near to the nurse station  - Do not leave patient unattended in the bathroom  - Fall risk education provided           Problem: Pain - Standard  Goal: Alleviation of pain or a reduction in pain to the patient’s comfort goal  Outcome: Progressing  Note: C/o generalized pain, medicated with scheduled Gabapentin, prn Oxycodone and Tramadol.  Has + relief.  See MAR and doc flow sheet.  Will continue to monitor patient.     The patient is Stable - Low risk of patient condition declining or worsening    Shift Goals  Clinical Goals: Safety  Patient Goals: Sleep well    Progress made toward(s) clinical / shift goals:  progressing    "

## 2023-10-25 NOTE — ASSESSMENT & PLAN NOTE
HbA1c 6.8  Continue Metformin  Now off Lantus and SSI  Outpt meds include Metformin 1000 mg bid and Actos 30 mg qd  Resume Actos per PCP discretion

## 2023-10-25 NOTE — PROGRESS NOTES
Physical Medicine & Rehabilitation Progress Note    Encounter Date: 10/25/2023    Chief Complaint: Decreased mobility, pain    Interval Events (Subjective):  Patient sitting up in room. He reports ongoing pain while going to restroom. He reports he would like to reduce the stool softeners. Reviewed AM labs and anemia, hypokalemia, and elevated blood sugars. Discussed consult for hospitalist.     Objective:  VITAL SIGNS: /75   Pulse (!) 120   Temp 36.9 °C (98.5 °F) (Oral)   Resp 16   Ht 1.829 m (6')   Wt (!) 162 kg (357 lb 12.8 oz)   SpO2 92%   BMI 48.53 kg/m²   Gen: NAD  Psych: Mood and affect appropriate  CV: RRR, 0 edema  Resp: CTAB, no upper airway sounds  Abd: NTND  Neuro: AOx4, following commands    Laboratory Values:  Recent Results (from the past 72 hour(s))   POCT glucose device results    Collection Time: 10/24/23 12:13 PM   Result Value Ref Range    POC Glucose, Blood 193 (H) 65 - 99 mg/dL   POCT glucose device results    Collection Time: 10/24/23  5:19 PM   Result Value Ref Range    POC Glucose, Blood 159 (H) 65 - 99 mg/dL   POCT glucose device results    Collection Time: 10/24/23 10:10 PM   Result Value Ref Range    POC Glucose, Blood 190 (H) 65 - 99 mg/dL   SARS-CoV-2, PCR (In-House)    Collection Time: 10/24/23 10:15 PM    Specimen: Nasal; Respirate   Result Value Ref Range    SARS-CoV-2 Source NP Swab     SARS-CoV-2 by PCR NotDetected    CBC with Differential    Collection Time: 10/25/23  5:36 AM   Result Value Ref Range    WBC 12.0 (H) 4.8 - 10.8 K/uL    RBC 3.98 (L) 4.70 - 6.10 M/uL    Hemoglobin 11.2 (L) 14.0 - 18.0 g/dL    Hematocrit 36.0 (L) 42.0 - 52.0 %    MCV 90.5 81.4 - 97.8 fL    MCH 28.1 27.0 - 33.0 pg    MCHC 31.1 (L) 32.3 - 36.5 g/dL    RDW 51.9 (H) 35.9 - 50.0 fL    Platelet Count 373 164 - 446 K/uL    MPV 10.4 9.0 - 12.9 fL    Neutrophils-Polys 78.60 (H) 44.00 - 72.00 %    Lymphocytes 11.40 (L) 22.00 - 41.00 %    Monocytes 5.20 0.00 - 13.40 %    Eosinophils 3.30 0.00 -  6.90 %    Basophils 0.50 0.00 - 1.80 %    Immature Granulocytes 1.00 (H) 0.00 - 0.90 %    Nucleated RBC 0.00 0.00 - 0.20 /100 WBC    Neutrophils (Absolute) 9.40 (H) 1.82 - 7.42 K/uL    Lymphs (Absolute) 1.37 1.00 - 4.80 K/uL    Monos (Absolute) 0.62 0.00 - 0.85 K/uL    Eos (Absolute) 0.40 0.00 - 0.51 K/uL    Baso (Absolute) 0.06 0.00 - 0.12 K/uL    Immature Granulocytes (abs) 0.12 (H) 0.00 - 0.11 K/uL    NRBC (Absolute) 0.00 K/uL   Comp Metabolic Panel (CMP)    Collection Time: 10/25/23  5:36 AM   Result Value Ref Range    Sodium 142 135 - 145 mmol/L    Potassium 3.5 (L) 3.6 - 5.5 mmol/L    Chloride 98 96 - 112 mmol/L    Co2 33 20 - 33 mmol/L    Anion Gap 11.0 7.0 - 16.0    Glucose 147 (H) 65 - 99 mg/dL    Bun 31 (H) 8 - 22 mg/dL    Creatinine 1.40 0.50 - 1.40 mg/dL    Calcium 8.8 8.5 - 10.5 mg/dL    Correct Calcium 9.4 8.5 - 10.5 mg/dL    AST(SGOT) 17 12 - 45 U/L    ALT(SGPT) 15 2 - 50 U/L    Alkaline Phosphatase 103 (H) 30 - 99 U/L    Total Bilirubin 0.3 0.1 - 1.5 mg/dL    Albumin 3.2 3.2 - 4.9 g/dL    Total Protein 6.6 6.0 - 8.2 g/dL    Globulin 3.4 1.9 - 3.5 g/dL    A-G Ratio 0.9 g/dL   HEMOGLOBIN A1C    Collection Time: 10/25/23  5:36 AM   Result Value Ref Range    Glycohemoglobin 6.8 (H) 4.0 - 5.6 %    Est Avg Glucose 148 mg/dL   TSH with Reflex to FT4    Collection Time: 10/25/23  5:36 AM   Result Value Ref Range    TSH 10.000 (H) 0.380 - 5.330 uIU/mL   Vitamin D, 25-hydroxy (blood)    Collection Time: 10/25/23  5:36 AM   Result Value Ref Range    25-Hydroxy   Vitamin D 25 46 30 - 100 ng/mL   ESTIMATED GFR    Collection Time: 10/25/23  5:36 AM   Result Value Ref Range    GFR (CKD-EPI) 55 (A) >60 mL/min/1.73 m 2   FREE THYROXINE    Collection Time: 10/25/23  5:36 AM   Result Value Ref Range    Free T-4 1.15 0.93 - 1.70 ng/dL   POCT glucose device results    Collection Time: 10/25/23  7:54 AM   Result Value Ref Range    POC Glucose, Blood 180 (H) 65 - 99 mg/dL   POCT glucose device results    Collection Time:  10/25/23 11:40 AM   Result Value Ref Range    POC Glucose, Blood 192 (H) 65 - 99 mg/dL       Medications:  Scheduled Medications   Medication Dose Frequency    senna-docusate  1 Tablet BID    insulin regular  2-12 Units 4X/DAY ACHS    [START ON 10/26/2023] furosemide  40 mg Q DAY    potassium chloride SA  40 mEq Once    allopurinol  100 mg DAILY    atorvastatin  20 mg Q EVENING    gabapentin  400 mg TID    apixaban  5 mg BID    amiodarone  200 mg DAILY    dilTIAZem  30 mg Q8HRS    insulin GLARGINE  10 Units BID    melatonin  3 mg QHS    omeprazole  20 mg DAILY     PRN medications: senna-docusate **AND** polyethylene glycol/lytes **AND** magnesium hydroxide **AND** bisacodyl, insulin regular **AND** POC blood glucose manual result **AND** NOTIFY MD and PharmD **AND** Administer 20 grams of glucose (approximately 8 ounces of fruit juice) every 15 minutes PRN FSBG less than 70 mg/dL **AND** dextrose bolus, Respiratory Therapy Consult, hydrALAZINE, acetaminophen, mag hydrox-al hydrox-simeth, ondansetron **OR** ondansetron, traZODone, sodium chloride, oxyCODONE immediate-release **OR** oxyCODONE immediate-release, traMADol    Diet:  Current Diet Order   Procedures    Diet Order Diet: Consistent CHO (Diabetic)       Medical Decision Making and Plan:   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 Per guidelines, 15 hours per week between PT, OT and/or SLP.  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      HTN/A fib - Patient now on Amiodarone 200 mg daily, Lasix 40 mg BID, and Eliquis 5 mg BID     DM2 with hyperglycemia - Patient on Glargine 10 U BID and SSI. Consult hospitalist     PAD - Patient on Eliquis     CKD3a - Avoid nephrotoxic agents. Check AM CMP - Cr stable     Anemia - 11.2 on  admission. Monitor    Hypokalemia - 3.5 on admission, consult hospitalist, on Lasix.    Gout - Patient on Allopurinol 100 mg  daily     Neuropathy - Patient on Gabapentin 400 mg TID. Continue Gabapentin     L BKA - Patient with L BKA. To bring in prosthetic and crutches     Morbid Obesity due to excess calories - Patient with BMI of 48.5 on admission, meets medical criteria. Dietitian to consult     Pain - Patient on PRN Tylenol, PRN Oxycodone, and PRN Tramadol. Also on Gabapentin 400 mg TID     Skin - Patient at risk for skin breakdown due to debility in areas including sacrum, achilles, elbows and head in addition to other sites. Nursing to assess skin daily.      GI Ppx - Patient on Prilosec for GERD prophylaxis. Patient on Senna-docusate for constipation prophylaxis.      DVT Ppx - Patient Eliquis on transfer.  ____________________________________    T. Clay Contreras MD/PhD  [facility] - Physical Medicine & Rehabilitation   [facility] - Brain Injury Medicine   ____________________________________    Total time:  50 minutes. Time spent included pre-rounding review of vitals and tests, unit/floor time, face-to-face time with the patient including physical examination, care coordination, counseling of patient and/or family, ordering medications/procedures/tests, discussion with CM, PT, OT, SLP and/or other healthcare providers, and documentation in the electronic medical record. Topics discussed included admission labs, anemia, diarrhea, reduce stool softener, hypokalemia, and consult hospitalist. Patient's case was discussed face to face with Hospitalist on [facility] floor.

## 2023-10-25 NOTE — PROGRESS NOTES
NURSING DAILY NOTE    Name: Jose Castnaon   Date of Admission: 10/24/2023   Admitting Diagnosis: Critical illness polyneuropathy (HCC)  Attending Physician: Sylvester Contreras M.d.  Allergies: Penicillins, Bee venom, Cefazolin, Linezolid, and Chicken-derived products    Safety  Patient Assist  max 2  Patient Precautions     Precaution Comments     Bed Transfer Status     Toilet Transfer Status      Assistive Devices     Oxygen  BIPAP  Diet/Therapeutic Dining  Current Diet Order   Procedures    Diet Order Diet: Consistent CHO (Diabetic)     Pill Administration  whole  Agitated Behavioral Scale     ABS Level of Severity       Fall Risk  Has the patient had a fall this admission?   No  Lulu Aguilera Fall Risk Scoring  13, MODERATE RISK  Fall Risk Safety Measures  bed alarm and bed strip alarm    Vitals  Temperature: 36.9 °C (98.4 °F)  Temp src: Oral  Pulse: 82  Respiration: 18  Blood Pressure : 132/72  Blood Pressure MAP (Calculated): 92 MM HG  BP Location: Right, Upper Arm  Patient BP Position: Supine     Oxygen  Pulse Oximetry: 92 %  O2 (LPM): 3  O2 Delivery Device: BIPAP    Bowel and Bladder  Last Bowel Movement  10/24/23  Stool Type  Type 6: Fluffy pieces with ragged edges, a mushy stool  Bowel Device  Diaper  Continent  Bladder: Did not void   Bowel: No movement  Bladder Function  Urine Color: Yellow  Urine Clarity: Hazy (Able to see Through)  Genitourinary Assessment   Bladder Assessment (WDL):  WDL Except  Ambrosio Care: Given with Soap and Water  Urinary Elimination: Catheter (Document on LDA)  Urine Color: Yellow  Urine Clarity: Hazy (Able to see Through)  Bladder Device: Indwelling Catheter    Skin  Des Score   17  Sensory Interventions   Bed Types: Standard/Trauma Mattress  Skin Preventative Measures: Pillows in Use for Support / Positioning, Heel Float Boots in Use   Moisture Interventions  Moisturizers/Barriers: Barrier  Wipes      Pain  Pain Rating Scale  6 - Hard to ignore, avoid usual activities  Pain Location  Generalized  Pain Location Orientation     Pain Interventions   Rest    ADLs    Bathing      Linen Change      Personal Hygiene  Change Ankita Pads, Moist Ankita Wipes, Perineal Care  Chlorhexidine Bath      Oral Care     Teeth/Dentures     Shave     Nutrition Percentage Eaten     Environmental Precautions  Bed in Low Position, Treaded Slipper Socks on Patient  Patient Turns/Positioning  Patient Turns Self from Side to Side  Patient Turns Assistance/Tolerance     Bed Positions  Bed Controls On  Head of Bed Elevated  Self regulated      Psychosocial/Neurologic Assessment  Psychosocial Assessment  Psychosocial (WDL):  Within Defined Limits  Neurologic Assessment  Neuro (WDL): Within Defined Limits  Level of Consciousness: Alert  Orientation Level: Oriented X4  EENT (WDL):  WDL Except    Cardio/Pulmonary Assessment  Edema      Respiratory Breath Sounds     Cardiac Assessment   Cardiac (WDL):  WDL Except (H/O Afib, HTN.)

## 2023-10-25 NOTE — PROGRESS NOTES
Received bedside shift report from Gina CHAVEZ RN regarding patient and assumed care. Patient awake, calm and stable, currently positioned in bed for comfort and safety; call light within reach. Denies pain or discomfort at this time. Will continue to monitor.

## 2023-10-26 ENCOUNTER — APPOINTMENT (OUTPATIENT)
Dept: OCCUPATIONAL THERAPY | Facility: REHABILITATION | Age: 68
DRG: 074 | End: 2023-10-26
Attending: PHYSICAL MEDICINE & REHABILITATION
Payer: MEDICARE

## 2023-10-26 ENCOUNTER — APPOINTMENT (OUTPATIENT)
Dept: PHYSICAL THERAPY | Facility: REHABILITATION | Age: 68
DRG: 074 | End: 2023-10-26
Attending: PHYSICAL MEDICINE & REHABILITATION
Payer: MEDICARE

## 2023-10-26 ENCOUNTER — APPOINTMENT (OUTPATIENT)
Dept: RADIOLOGY | Facility: REHABILITATION | Age: 68
DRG: 074 | End: 2023-10-26
Attending: PHYSICAL MEDICINE & REHABILITATION
Payer: MEDICARE

## 2023-10-26 ENCOUNTER — ANCILLARY PROCEDURE (OUTPATIENT)
Dept: CARDIOLOGY | Facility: REHABILITATION | Age: 68
DRG: 074 | End: 2023-10-26
Attending: PHYSICAL MEDICINE & REHABILITATION
Payer: MEDICARE

## 2023-10-26 LAB
ANION GAP SERPL CALC-SCNC: 11 MMOL/L (ref 7–16)
BUN SERPL-MCNC: 27 MG/DL (ref 8–22)
CALCIUM SERPL-MCNC: 8.7 MG/DL (ref 8.5–10.5)
CHLORIDE SERPL-SCNC: 100 MMOL/L (ref 96–112)
CO2 SERPL-SCNC: 30 MMOL/L (ref 20–33)
CREAT SERPL-MCNC: 1.08 MG/DL (ref 0.5–1.4)
ERYTHROCYTE [DISTWIDTH] IN BLOOD BY AUTOMATED COUNT: 52.1 FL (ref 35.9–50)
GFR SERPLBLD CREATININE-BSD FMLA CKD-EPI: 75 ML/MIN/1.73 M 2
GLUCOSE BLD STRIP.AUTO-MCNC: 148 MG/DL (ref 65–99)
GLUCOSE BLD STRIP.AUTO-MCNC: 161 MG/DL (ref 65–99)
GLUCOSE BLD STRIP.AUTO-MCNC: 164 MG/DL (ref 65–99)
GLUCOSE BLD STRIP.AUTO-MCNC: 207 MG/DL (ref 65–99)
GLUCOSE SERPL-MCNC: 159 MG/DL (ref 65–99)
HCT VFR BLD AUTO: 38.7 % (ref 42–52)
HGB BLD-MCNC: 11.6 G/DL (ref 14–18)
IRON SATN MFR SERPL: 15 % (ref 15–55)
IRON SERPL-MCNC: 31 UG/DL (ref 50–180)
MAGNESIUM SERPL-MCNC: 1.8 MG/DL (ref 1.5–2.5)
MCH RBC QN AUTO: 27.6 PG (ref 27–33)
MCHC RBC AUTO-ENTMCNC: 30 G/DL (ref 32.3–36.5)
MCV RBC AUTO: 91.9 FL (ref 81.4–97.8)
NT-PROBNP SERPL IA-MCNC: 156 PG/ML (ref 0–125)
PHOSPHATE SERPL-MCNC: 3.6 MG/DL (ref 2.5–4.5)
PLATELET # BLD AUTO: 319 K/UL (ref 164–446)
PMV BLD AUTO: 10 FL (ref 9–12.9)
POTASSIUM SERPL-SCNC: 3.4 MMOL/L (ref 3.6–5.5)
PROCALCITONIN SERPL-MCNC: 0.17 NG/ML
RBC # BLD AUTO: 4.21 M/UL (ref 4.7–6.1)
SODIUM SERPL-SCNC: 141 MMOL/L (ref 135–145)
TIBC SERPL-MCNC: 211 UG/DL (ref 250–450)
UIBC SERPL-MCNC: 180 UG/DL (ref 110–370)
WBC # BLD AUTO: 12.8 K/UL (ref 4.8–10.8)

## 2023-10-26 PROCEDURE — 99233 SBSQ HOSP IP/OBS HIGH 50: CPT | Performed by: PHYSICAL MEDICINE & REHABILITATION

## 2023-10-26 PROCEDURE — A9270 NON-COVERED ITEM OR SERVICE: HCPCS | Performed by: PHYSICAL MEDICINE & REHABILITATION

## 2023-10-26 PROCEDURE — 82962 GLUCOSE BLOOD TEST: CPT | Mod: 91

## 2023-10-26 PROCEDURE — 83880 ASSAY OF NATRIURETIC PEPTIDE: CPT

## 2023-10-26 PROCEDURE — 97535 SELF CARE MNGMENT TRAINING: CPT

## 2023-10-26 PROCEDURE — 36415 COLL VENOUS BLD VENIPUNCTURE: CPT

## 2023-10-26 PROCEDURE — 93926 LOWER EXTREMITY STUDY: CPT | Mod: RT

## 2023-10-26 PROCEDURE — 770010 HCHG ROOM/CARE - REHAB SEMI PRIVAT*

## 2023-10-26 PROCEDURE — 94760 N-INVAS EAR/PLS OXIMETRY 1: CPT

## 2023-10-26 PROCEDURE — A9270 NON-COVERED ITEM OR SERVICE: HCPCS | Performed by: HOSPITALIST

## 2023-10-26 PROCEDURE — 97116 GAIT TRAINING THERAPY: CPT

## 2023-10-26 PROCEDURE — 83735 ASSAY OF MAGNESIUM: CPT

## 2023-10-26 PROCEDURE — 83550 IRON BINDING TEST: CPT

## 2023-10-26 PROCEDURE — 97530 THERAPEUTIC ACTIVITIES: CPT

## 2023-10-26 PROCEDURE — 80048 BASIC METABOLIC PNL TOTAL CA: CPT

## 2023-10-26 PROCEDURE — 99232 SBSQ HOSP IP/OBS MODERATE 35: CPT | Performed by: HOSPITALIST

## 2023-10-26 PROCEDURE — 84145 PROCALCITONIN (PCT): CPT

## 2023-10-26 PROCEDURE — 87086 URINE CULTURE/COLONY COUNT: CPT

## 2023-10-26 PROCEDURE — 83540 ASSAY OF IRON: CPT

## 2023-10-26 PROCEDURE — 700102 HCHG RX REV CODE 250 W/ 637 OVERRIDE(OP): Performed by: HOSPITALIST

## 2023-10-26 PROCEDURE — 85027 COMPLETE CBC AUTOMATED: CPT

## 2023-10-26 PROCEDURE — 84100 ASSAY OF PHOSPHORUS: CPT

## 2023-10-26 PROCEDURE — 700102 HCHG RX REV CODE 250 W/ 637 OVERRIDE(OP): Performed by: PHYSICAL MEDICINE & REHABILITATION

## 2023-10-26 RX ORDER — SIMETHICONE 125 MG
125 TABLET,CHEWABLE ORAL
Status: DISCONTINUED | OUTPATIENT
Start: 2023-10-26 | End: 2023-11-09 | Stop reason: HOSPADM

## 2023-10-26 RX ORDER — POTASSIUM CHLORIDE 20 MEQ/1
20 TABLET, EXTENDED RELEASE ORAL DAILY
Status: DISCONTINUED | OUTPATIENT
Start: 2023-10-26 | End: 2023-10-31

## 2023-10-26 RX ADMIN — OXYCODONE HYDROCHLORIDE 10 MG: 10 TABLET ORAL at 20:39

## 2023-10-26 RX ADMIN — GABAPENTIN 400 MG: 400 CAPSULE ORAL at 20:27

## 2023-10-26 RX ADMIN — Medication 3 MG: at 20:27

## 2023-10-26 RX ADMIN — TRAMADOL HYDROCHLORIDE 50 MG: 50 TABLET, COATED ORAL at 21:24

## 2023-10-26 RX ADMIN — SIMETHICONE 125 MG: 125 TABLET, CHEWABLE ORAL at 11:22

## 2023-10-26 RX ADMIN — APIXABAN 5 MG: 5 TABLET, FILM COATED ORAL at 08:50

## 2023-10-26 RX ADMIN — ATORVASTATIN CALCIUM 20 MG: 10 TABLET, FILM COATED ORAL at 20:27

## 2023-10-26 RX ADMIN — METFORMIN HYDROCHLORIDE 500 MG: 500 TABLET ORAL at 11:23

## 2023-10-26 RX ADMIN — DILTIAZEM HYDROCHLORIDE 30 MG: 30 TABLET, FILM COATED ORAL at 17:07

## 2023-10-26 RX ADMIN — ALLOPURINOL 100 MG: 100 TABLET ORAL at 08:50

## 2023-10-26 RX ADMIN — DILTIAZEM HYDROCHLORIDE 30 MG: 30 TABLET, FILM COATED ORAL at 21:23

## 2023-10-26 RX ADMIN — SIMETHICONE 125 MG: 125 TABLET, CHEWABLE ORAL at 17:30

## 2023-10-26 RX ADMIN — DILTIAZEM HYDROCHLORIDE 30 MG: 30 TABLET, FILM COATED ORAL at 05:15

## 2023-10-26 RX ADMIN — AMIODARONE HYDROCHLORIDE 200 MG: 200 TABLET ORAL at 08:50

## 2023-10-26 RX ADMIN — GABAPENTIN 400 MG: 400 CAPSULE ORAL at 14:03

## 2023-10-26 RX ADMIN — APIXABAN 5 MG: 5 TABLET, FILM COATED ORAL at 20:27

## 2023-10-26 RX ADMIN — GABAPENTIN 400 MG: 400 CAPSULE ORAL at 08:51

## 2023-10-26 RX ADMIN — SIMETHICONE 125 MG: 125 TABLET, CHEWABLE ORAL at 20:28

## 2023-10-26 RX ADMIN — OMEPRAZOLE 20 MG: 20 CAPSULE, DELAYED RELEASE ORAL at 08:50

## 2023-10-26 RX ADMIN — FUROSEMIDE 40 MG: 40 TABLET ORAL at 05:15

## 2023-10-26 RX ADMIN — METFORMIN HYDROCHLORIDE 500 MG: 500 TABLET ORAL at 17:30

## 2023-10-26 RX ADMIN — INSULIN GLARGINE-YFGN 10 UNITS: 100 INJECTION, SOLUTION SUBCUTANEOUS at 20:32

## 2023-10-26 RX ADMIN — INSULIN GLARGINE-YFGN 10 UNITS: 100 INJECTION, SOLUTION SUBCUTANEOUS at 08:52

## 2023-10-26 RX ADMIN — POTASSIUM CHLORIDE 20 MEQ: 1500 TABLET, EXTENDED RELEASE ORAL at 11:22

## 2023-10-26 ASSESSMENT — GAIT ASSESSMENTS
ASSISTIVE DEVICE: 4 WHEEL WALKER;FRONT WHEEL WALKER
DISTANCE (FEET): 10
GAIT LEVEL OF ASSIST: MINIMAL ASSIST

## 2023-10-26 ASSESSMENT — ENCOUNTER SYMPTOMS
NAUSEA: 0
FEVER: 0
VOMITING: 0
PALPITATIONS: 0
COUGH: 0
BRUISES/BLEEDS EASILY: 0
EYES NEGATIVE: 1
SHORTNESS OF BREATH: 0
POLYDIPSIA: 0
ABDOMINAL PAIN: 0
CHILLS: 0

## 2023-10-26 ASSESSMENT — PATIENT HEALTH QUESTIONNAIRE - PHQ9
1. LITTLE INTEREST OR PLEASURE IN DOING THINGS: NOT AT ALL
2. FEELING DOWN, DEPRESSED, IRRITABLE, OR HOPELESS: NOT AT ALL
2. FEELING DOWN, DEPRESSED, IRRITABLE, OR HOPELESS: NOT AT ALL
SUM OF ALL RESPONSES TO PHQ9 QUESTIONS 1 AND 2: 0
SUM OF ALL RESPONSES TO PHQ9 QUESTIONS 1 AND 2: 0
1. LITTLE INTEREST OR PLEASURE IN DOING THINGS: NOT AT ALL

## 2023-10-26 ASSESSMENT — ACTIVITIES OF DAILY LIVING (ADL): BED_CHAIR_WHEELCHAIR_TRANSFER_DESCRIPTION: ADAPTIVE EQUIPMENT;INCREASED TIME

## 2023-10-26 NOTE — THERAPY
Missed Therapy    Patient Name: Jose Castanon  Age:  68 y.o., Sex:  male  Medical Record #: [id number]  Today's Date: 10/26/2023    Discussed missed therapy with scheduling to make-up at a later time.        10/26/23 1301   Therapy Missed   Missed Therapy (Minutes) 30   Reason For Missed Therapy Non-Medical - Other (Please Comment)  (patient awaiting nursing to arrive to assist with bowel incontinence and to assess rear wound.)

## 2023-10-26 NOTE — THERAPY
Missed Therapy    Patient Name: Jose Castanon  Age:  68 y.o., Sex:  male  Medical Record #: [id number]  Today's Date: 10/26/2023    Discussed missed therapy with scheduling for make-up as pt is not appropriate for skilled therapy at this scheduled time.        10/26/23 1301   Therapy Missed   Missed Therapy (Minutes) 30   Reason For Missed Therapy Non-Medical - Other (Please Comment)  (patient indicated awaiting nursing to arrive to assist with bowel incontinence supine in bed and to assess perineal wound for cleaniness. Nursing arrived at 13:02 to assit.)

## 2023-10-26 NOTE — CARE PLAN
Problem: Bathing  Goal: STG-Within one week, patient will bathe with CGA and LRD  Outcome: Progressing     Problem: Dressing  Goal: STG-Within one week, patient will dress LB Min A and LRD  Outcome: Progressing     Problem: Toileting  Goal: STG-Within one week, patient will complete toileting tasks with Min A and LRD  Outcome: Progressing     Problem: Functional Transfers  Goal: STG-Within one week, patient will transfer to toilet CGA and LRD  Outcome: Progressing  Goal: STG-Within one week, patient will transfer to step in shower with CGA and LRD  Outcome: Progressing

## 2023-10-26 NOTE — PROGRESS NOTES
NURSING DAILY NOTE    Name: Jose Castanon   Date of Admission: 10/24/2023   Admitting Diagnosis: Critical illness polyneuropathy (HCC)  Attending Physician: Sylvester Contreras M.d.  Allergies: Penicillins, Bee venom, Cefazolin, Linezolid, and Chicken-derived products    Safety  Patient Assist  max 2  Patient Precautions  Fall Risk  Precaution Comments  monitor HR and O2, hx of BKA LLE with prosthetic, wound near sacrum, medial heel wound RLE, lateral foot wound RLE  Bed Transfer Status  Contact Guard Assist  Toilet Transfer Status   Minimal Assist  Assistive Devices  Walker - front wheel  Oxygen  Room air w/o2 available  Diet/Therapeutic Dining  Current Diet Order   Procedures    Diet Order Diet: Consistent CHO (Diabetic)     Pill Administration  whole  Agitated Behavioral Scale     ABS Level of Severity       Fall Risk  Has the patient had a fall this admission?   No  Lulu Aguilera Fall Risk Scoring  13, MODERATE RISK  Fall Risk Safety Measures  bed alarm, chair alarm, poor balance, and low vision/ hearing    Vitals  Temperature: 37.2 °C (98.9 °F)  Temp src: Temporal  Pulse: 90  Respiration: 18  Blood Pressure : 103/69  Blood Pressure MAP (Calculated): 80 MM HG  BP Location: Right, Upper Arm  Patient BP Position: Sitting     Oxygen  Pulse Oximetry: 92 %  O2 (LPM): 0  O2 Delivery Device: Room air w/o2 available    Bowel and Bladder  Last Bowel Movement  10/25/23  Stool Type  Type 7: Watery, no solid pieces-entirely liquid  Bowel Device  Diaper, Other (Comment) (Bowel Meds)  Continent  Bladder: Did not void   Bowel: No movement  Bladder Function  Urine Color: Yellow  Urine Clarity: Hazy (Able to see Through)  Genitourinary Assessment   Bladder Assessment (WDL):  WDL Except  Ambrosio Care: Given with Soap and Water  Urinary Elimination: Catheter (Document on LDA)  Urine Color: Yellow  Urine Clarity: Hazy (Able to see Through)  Bladder Device: Indwelling  Catheter    Skin  Des Score   17  Sensory Interventions   Bed Types: Standard/Trauma Mattress  Skin Preventative Measures: Pillows in Use for Support / Positioning  Moisture Interventions  Moisturizers/Barriers: Barrier Wipes      Pain  Pain Rating Scale  0 - No Pain  Pain Location   (wound, posterior)  Pain Location Orientation     Pain Interventions   Rest    ADLs    Bathing      Linen Change   Partial  Personal Hygiene  Change Ankita Pads, Perineal Care  Chlorhexidine Bath      Oral Care     Teeth/Dentures     Shave     Nutrition Percentage Eaten  Lunch, Between % Consumed  Environmental Precautions  Bed in Low Position, Treaded Slipper Socks on Patient  Patient Turns/Positioning  Patient Turns Self from Side to Side  Patient Turns Assistance/Tolerance     Bed Positions  Bed Controls On  Head of Bed Elevated  Self regulated      Psychosocial/Neurologic Assessment  Psychosocial Assessment  Psychosocial (WDL):  Within Defined Limits  Neurologic Assessment  Neuro (WDL): Within Defined Limits  Level of Consciousness: Alert  Orientation Level: Oriented X4  EENT (WDL):  WDL Except    Cardio/Pulmonary Assessment  Edema      Respiratory Breath Sounds     Cardiac Assessment   Cardiac (WDL):  WDL Except (H/O Afib, HTN.)

## 2023-10-26 NOTE — PROGRESS NOTES
Physical Medicine & Rehabilitation Progress Note    Encounter Date: 10/26/2023    Chief Complaint: Decreased mobility, pain    Interval Events (Subjective):  Patient sitting up in room. He required medical hold for US. Wound care recommended ABIs.  He reports pain is controlled. Denies NVD. Discussed would have IDT later today.     _____________________________________  Interdisciplinary Team Conference   Most recent IDT on 10/26/2023    ISylvester M.D./Ph.D., was present and led the interdisciplinary team conference on 10/26/2023.  I led the IDT conference and agree with the IDT conference documentation and plan of care as noted below.     Nursing:  Diet Current Diet Order   Procedures    Diet Order Diet: Consistent CHO (Diabetic)       Eating ADL Independent  Set-up of equipment or meal/tube feeding   % of Last Meal  Oral Nutrition: Breakfast, Between % Consumed   Sleep    Bowel Last BM: 10/26/23   Bladder Ambrosio   Barriers to Discharge Home:  Wound  Needs packing  Low SBP - meds held    Physical Therapy:  Bed Mobility    Transfers Contact Guard Assist  Adaptive equipment (SPT FWW + prosthetic)   Mobility Unable to Participate (TBD, patient reported that [facility] [employer] needs to come out and re-evaluate prosthetic fit.)   Stairs    Barriers to Discharge Home:  Limited by pain    Occupational Therapy:  Grooming Independent   Bathing Minimal Assist   UB Dressing Modified Independent   LB Dressing Maximal Assist   Toileting Maximal Assist   Shower & Transfer    Barriers to Discharge Home:  Slow moving but motivated  Limited support  Labile HR    Case Management:  Continues to work on disposition and DME needs.      Discharge Date/Disposition:  11/14/23  _____________________________________    Objective:  VITAL SIGNS: /75   Pulse 88   Temp 37 °C (98.6 °F) (Oral)   Resp 16   Ht 1.829 m (6')   Wt (!) 162 kg (357 lb 12.8 oz)   SpO2 92%   BMI 48.53 kg/m²   Gen: NAD  Psych: Mood  and affect appropriate  CV: RRR, 0 edema  Resp: CTAB, no upper airway sounds  Abd: NTND  Neuro: AOx4, following commands  Unchanged from 10/25/23    Laboratory Values:  Recent Results (from the past 72 hour(s))   POCT glucose device results    Collection Time: 10/24/23 12:13 PM   Result Value Ref Range    POC Glucose, Blood 193 (H) 65 - 99 mg/dL   POCT glucose device results    Collection Time: 10/24/23  5:19 PM   Result Value Ref Range    POC Glucose, Blood 159 (H) 65 - 99 mg/dL   POCT glucose device results    Collection Time: 10/24/23 10:10 PM   Result Value Ref Range    POC Glucose, Blood 190 (H) 65 - 99 mg/dL   SARS-CoV-2, PCR (In-House)    Collection Time: 10/24/23 10:15 PM    Specimen: Nasal; Respirate   Result Value Ref Range    SARS-CoV-2 Source NP Swab     SARS-CoV-2 by PCR NotDetected    CBC with Differential    Collection Time: 10/25/23  5:36 AM   Result Value Ref Range    WBC 12.0 (H) 4.8 - 10.8 K/uL    RBC 3.98 (L) 4.70 - 6.10 M/uL    Hemoglobin 11.2 (L) 14.0 - 18.0 g/dL    Hematocrit 36.0 (L) 42.0 - 52.0 %    MCV 90.5 81.4 - 97.8 fL    MCH 28.1 27.0 - 33.0 pg    MCHC 31.1 (L) 32.3 - 36.5 g/dL    RDW 51.9 (H) 35.9 - 50.0 fL    Platelet Count 373 164 - 446 K/uL    MPV 10.4 9.0 - 12.9 fL    Neutrophils-Polys 78.60 (H) 44.00 - 72.00 %    Lymphocytes 11.40 (L) 22.00 - 41.00 %    Monocytes 5.20 0.00 - 13.40 %    Eosinophils 3.30 0.00 - 6.90 %    Basophils 0.50 0.00 - 1.80 %    Immature Granulocytes 1.00 (H) 0.00 - 0.90 %    Nucleated RBC 0.00 0.00 - 0.20 /100 WBC    Neutrophils (Absolute) 9.40 (H) 1.82 - 7.42 K/uL    Lymphs (Absolute) 1.37 1.00 - 4.80 K/uL    Monos (Absolute) 0.62 0.00 - 0.85 K/uL    Eos (Absolute) 0.40 0.00 - 0.51 K/uL    Baso (Absolute) 0.06 0.00 - 0.12 K/uL    Immature Granulocytes (abs) 0.12 (H) 0.00 - 0.11 K/uL    NRBC (Absolute) 0.00 K/uL   Comp Metabolic Panel (CMP)    Collection Time: 10/25/23  5:36 AM   Result Value Ref Range    Sodium 142 135 - 145 mmol/L    Potassium 3.5 (L) 3.6  - 5.5 mmol/L    Chloride 98 96 - 112 mmol/L    Co2 33 20 - 33 mmol/L    Anion Gap 11.0 7.0 - 16.0    Glucose 147 (H) 65 - 99 mg/dL    Bun 31 (H) 8 - 22 mg/dL    Creatinine 1.40 0.50 - 1.40 mg/dL    Calcium 8.8 8.5 - 10.5 mg/dL    Correct Calcium 9.4 8.5 - 10.5 mg/dL    AST(SGOT) 17 12 - 45 U/L    ALT(SGPT) 15 2 - 50 U/L    Alkaline Phosphatase 103 (H) 30 - 99 U/L    Total Bilirubin 0.3 0.1 - 1.5 mg/dL    Albumin 3.2 3.2 - 4.9 g/dL    Total Protein 6.6 6.0 - 8.2 g/dL    Globulin 3.4 1.9 - 3.5 g/dL    A-G Ratio 0.9 g/dL   HEMOGLOBIN A1C    Collection Time: 10/25/23  5:36 AM   Result Value Ref Range    Glycohemoglobin 6.8 (H) 4.0 - 5.6 %    Est Avg Glucose 148 mg/dL   TSH with Reflex to FT4    Collection Time: 10/25/23  5:36 AM   Result Value Ref Range    TSH 10.000 (H) 0.380 - 5.330 uIU/mL   Vitamin D, 25-hydroxy (blood)    Collection Time: 10/25/23  5:36 AM   Result Value Ref Range    25-Hydroxy   Vitamin D 25 46 30 - 100 ng/mL   ESTIMATED GFR    Collection Time: 10/25/23  5:36 AM   Result Value Ref Range    GFR (CKD-EPI) 55 (A) >60 mL/min/1.73 m 2   FREE THYROXINE    Collection Time: 10/25/23  5:36 AM   Result Value Ref Range    Free T-4 1.15 0.93 - 1.70 ng/dL   POCT glucose device results    Collection Time: 10/25/23  7:54 AM   Result Value Ref Range    POC Glucose, Blood 180 (H) 65 - 99 mg/dL   POCT glucose device results    Collection Time: 10/25/23 11:40 AM   Result Value Ref Range    POC Glucose, Blood 192 (H) 65 - 99 mg/dL   URINALYSIS    Collection Time: 10/25/23  3:15 PM    Specimen: Urine, Cath   Result Value Ref Range    Color Yellow     Character Clear     Specific Gravity 1.019 <1.035    Ph 6.0 5.0 - 8.0    Glucose Negative Negative mg/dL    Ketones Negative Negative mg/dL    Protein 30 (A) Negative mg/dL    Bilirubin Negative Negative    Urobilinogen, Urine 0.2 Negative    Nitrite Negative Negative    Leukocyte Esterase Small (A) Negative    Occult Blood Moderate (A) Negative    Micro Urine Req  Microscopic    URINE MICROSCOPIC (W/UA)    Collection Time: 10/25/23  3:15 PM   Result Value Ref Range    WBC 10-20 (A) /hpf    RBC 10-20 (A) /hpf    Bacteria Negative None /hpf    Epithelial Cells Few /hpf    Hyaline Cast 3-5 (A) /lpf   POCT glucose device results    Collection Time: 10/25/23  5:04 PM   Result Value Ref Range    POC Glucose, Blood 124 (H) 65 - 99 mg/dL   POCT glucose device results    Collection Time: 10/25/23  9:32 PM   Result Value Ref Range    POC Glucose, Blood 231 (H) 65 - 99 mg/dL   proBrain Natriuretic Peptide, NT    Collection Time: 10/26/23  7:07 AM   Result Value Ref Range    NT-proBNP 156 (H) 0 - 125 pg/mL   MAGNESIUM    Collection Time: 10/26/23  7:07 AM   Result Value Ref Range    Magnesium 1.8 1.5 - 2.5 mg/dL   PHOSPHORUS    Collection Time: 10/26/23  7:07 AM   Result Value Ref Range    Phosphorus 3.6 2.5 - 4.5 mg/dL   IRON/TOTAL IRON BIND    Collection Time: 10/26/23  7:07 AM   Result Value Ref Range    Iron 31 (L) 50 - 180 ug/dL    Total Iron Binding 211 (L) 250 - 450 ug/dL    Unsat Iron Binding 180 110 - 370 ug/dL    % Saturation 15 15 - 55 %   PROCALCITONIN    Collection Time: 10/26/23  7:07 AM   Result Value Ref Range    Procalcitonin 0.17 <0.25 ng/mL   CBC WITHOUT DIFFERENTIAL    Collection Time: 10/26/23  7:07 AM   Result Value Ref Range    WBC 12.8 (H) 4.8 - 10.8 K/uL    RBC 4.21 (L) 4.70 - 6.10 M/uL    Hemoglobin 11.6 (L) 14.0 - 18.0 g/dL    Hematocrit 38.7 (L) 42.0 - 52.0 %    MCV 91.9 81.4 - 97.8 fL    MCH 27.6 27.0 - 33.0 pg    MCHC 30.0 (L) 32.3 - 36.5 g/dL    RDW 52.1 (H) 35.9 - 50.0 fL    Platelet Count 319 164 - 446 K/uL    MPV 10.0 9.0 - 12.9 fL   Basic Metabolic Panel    Collection Time: 10/26/23  7:07 AM   Result Value Ref Range    Sodium 141 135 - 145 mmol/L    Potassium 3.4 (L) 3.6 - 5.5 mmol/L    Chloride 100 96 - 112 mmol/L    Co2 30 20 - 33 mmol/L    Glucose 159 (H) 65 - 99 mg/dL    Bun 27 (H) 8 - 22 mg/dL    Creatinine 1.08 0.50 - 1.40 mg/dL    Calcium 8.7 8.5  - 10.5 mg/dL    Anion Gap 11.0 7.0 - 16.0   ESTIMATED GFR    Collection Time: 10/26/23  7:07 AM   Result Value Ref Range    GFR (CKD-EPI) 75 >60 mL/min/1.73 m 2   POCT glucose device results    Collection Time: 10/26/23  7:57 AM   Result Value Ref Range    POC Glucose, Blood 161 (H) 65 - 99 mg/dL   POCT glucose device results    Collection Time: 10/26/23 11:25 AM   Result Value Ref Range    POC Glucose, Blood 207 (H) 65 - 99 mg/dL       Medications:  Scheduled Medications   Medication Dose Frequency    simethicone  125 mg 4X/DAY WITH MEALS + NIGHTLY    metFORMIN  500 mg BID WITH MEALS    potassium chloride SA  20 mEq DAILY    senna-docusate  1 Tablet BID    insulin regular  2-12 Units 4X/DAY ACHS    furosemide  40 mg Q DAY    allopurinol  100 mg DAILY    atorvastatin  20 mg Q EVENING    gabapentin  400 mg TID    apixaban  5 mg BID    amiodarone  200 mg DAILY    dilTIAZem  30 mg Q8HRS    insulin GLARGINE  10 Units BID    melatonin  3 mg QHS    omeprazole  20 mg DAILY     PRN medications: senna-docusate **AND** polyethylene glycol/lytes **AND** magnesium hydroxide **AND** bisacodyl, insulin regular **AND** POC blood glucose manual result **AND** NOTIFY MD and PharmD **AND** Administer 20 grams of glucose (approximately 8 ounces of fruit juice) every 15 minutes PRN FSBG less than 70 mg/dL **AND** dextrose bolus, Respiratory Therapy Consult, hydrALAZINE, acetaminophen, mag hydrox-al hydrox-simeth, ondansetron **OR** ondansetron, traZODone, sodium chloride, oxyCODONE immediate-release **OR** oxyCODONE immediate-release, traMADol    Diet:  Current Diet Order   Procedures    Diet Order Diet: Consistent CHO (Diabetic)       Medical Decision Making and Plan:   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 Per guidelines, 15 hours per week between PT, OT  and/or SLP.  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      HTN/A fib - Patient now on Amiodarone 200 mg daily, Lasix 40 mg BID, and Eliquis 5 mg BID     DM2 with hyperglycemia - Patient on Glargine 10 U BID and SSI. Consult hospitalist     PAD - Patient on Eliquis. Wound care recommending ABIs on 10/26/23, pending US     CKD3a - Avoid nephrotoxic agents. Check AM CMP - Cr stable     Anemia - 11.2 on admission. Monitor    Hypokalemia - 3.5 on admission, consult hospitalist, on Lasix. Started on 20 mEq, continue K supplement    Gout - Patient on Allopurinol 100 mg  daily     Neuropathy - Patient on Gabapentin 400 mg TID. Continue Gabapentin     L BKA - Patient with L BKA. To bring in prosthetic and crutches     Morbid Obesity due to excess calories - Patient with BMI of 48.5 on admission, meets medical criteria. Dietitian to consult     Pain - Patient on PRN Tylenol, PRN Oxycodone, and PRN Tramadol. Also on Gabapentin 400 mg TID     Skin - Patient at risk for skin breakdown due to debility in areas including sacrum, achilles, elbows and head in addition to other sites. Nursing to assess skin daily.      GI Ppx - Patient on Prilosec for GERD prophylaxis. Patient on Senna-docusate for constipation prophylaxis.      DVT Ppx - Patient Eliquis on transfer.  ____________________________________    T. Clay Contreras MD/PhD  [facility] - Physical Medicine & Rehabilitation   [facility] - Brain Injury Medicine   ____________________________________    Total time:  50 minutes. Time spent included pre-rounding review of vitals and tests, unit/floor time, face-to-face time with the patient including physical examination, care coordination, counseling of patient and/or family, ordering medications/procedures/tests, discussion with CM, PT, OT, SLP and/or other healthcare providers, and documentation in the electronic medical record. Topics discussed included discharge planning, wound  care consult, ongoing hypokalemia, elevated blood sugars and discussed case with hospitalist. Patient was discussed separately in IDT today; please see details above.

## 2023-10-26 NOTE — CARE PLAN
"  Problem: Mobility  Goal: STG-Within one week, patient will propel wheelchair community >150 ft mod I indoors  Outcome: Not Met  Goal: STG-Within one week, patient will ambulate household distance in // bars 10ft x 2 (awaiting prosthetic alterations)  Outcome: Not Met  Goal: STG-Within one week, patient will ambulate up/down a 2\" step in // bars min A  Outcome: Not Met     Problem: Mobility Transfers  Goal: STG-Within one week, patient will transfer bed to chair SPT FWW SBA/SPV  Outcome: Not Met     "

## 2023-10-26 NOTE — WOUND TEAM
[facility]  Inpatient Services  Initial Wound and Skin Care Evaluation    Admission Date: 10/24/2023     Last order of IP CONSULT TO WOUND CARE was found on 10/24/2023 from Hospital Encounter on 10/23/2023     HPI, PMH, SH: Reviewed    Past Surgical History:   Procedure Laterality Date    PB AMPUTATION TOE,MT-P JT Right 9/15/2021    Procedure: RIGHT SECOND TOE AMPUTATION (30 MIN);  Surgeon: Jackson Castro M.D.;  Location: [facility];  Service: Orthopedics    TOE AMPUTATION Left      Social History     Tobacco Use    Smoking status: Former     Current packs/day: 0.00     Types: Cigarettes     Quit date: 1980     Years since quittin.7    Smokeless tobacco: Never   Substance Use Topics    Alcohol use: Not Currently     Comment: OCC     No chief complaint on file.    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 Contact wound team if area fails to progress, deteriorates or with any questions/concerns if something comes up before next scheduled follow up (See below as to whether wound is following and frequency of wound follow up)   Weekly -      WOUND HISTORY:   'Patient is a 68 y.o. 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 Patient reportedly initially presented to [facility] and had debridement and antibiotics for the infection. ID was consulted and he was placed on IV antibiotics which he has since completed. He had a wound vac placed and he was eventually transferred to [facility] for ongoing care.  His hospitalization was complicated by A fib which he was placed on amiodarone, digoxin and Eliquis.  Most recent intervention he  underwent I&D with Dr. Higuera on 9/29/23 and has since had wound vac removed with packing.      Patient tolerated transfer to [facility]. He reports he is motivated to get stronger. He reports diffuse lower extremity weakness; he reports being weaker than he thought. Discussed about critical illness disease. He brought all of his equipment to see what he will need. He reports he gets winded very easily, using breathing mask. Denies NVD. He reports pain is controlled. Denies fever or chills.'     Patient had right 2nd toe amputation 9/21/23 with MD Castro.           WOUND ASSESSMENT/LDA  Wound 10/24/23 Perineum Midline Incision (Active)   Date First Assessed/Time First Assessed: 10/24/23 1200   Location: Perineum  Wound Orientation: Midline  Wound Description (Comments): Incision      Assessments 10/25/2023  4:30 PM   Wound Image      Site Assessment Red;Yellow;Drainage;Edema;Tunneling   Periwound Assessment Red;Pink;Denuded   Margins Defined edges;Unattached edges   Closure Secondary intention   Drainage Amount Small   Drainage Description Yellow;Serosanguineous;Sanguineous   Treatments Cleansed;Nonselective debridement;Site care   Wound Cleansing Approved Wound Cleanser   Periwound Protectant No-sting Skin Prep   Dressing Status Removed   Dressing Changed Changed   Dressing Cleansing/Solutions Not Applicable   Dressing Options Iodoform Strip Packing;Hydrofiber Silver;Transparent Film   Dressing Change/Treatment Frequency Daily, and As Needed   NEXT Dressing Change/Treatment Date 10/26/23   NEXT Weekly Photo (Inpatient Only) 11/01/23   Wound Team Following Weekly   Wound Length (cm) 10 cm   Wound Width (cm) 2 cm   Wound Depth (cm) 3 cm   Wound Surface Area (cm^2) 20 cm^2   Wound Volume (cm^3) 60 cm^3   Tunneling (cm) 8 cm   Tunneling Clock Position of Wound 7 o'clock   Shape irregular x2   Wound Odor None   Pulses N/A   WOUND NURSE ONLY - Time Spent with Patient (mins) 60       Wound 10/24/23 Diabetic Ulcer Foot  Lateral Right Lateral L- Foot Ulcer (Active)   Date First Assessed/Time First Assessed: 10/24/23 1200   Present on Original Admission: Yes  Hand Hygiene Completed: Yes  Primary Wound Type: Diabetic Ulcer  Location: Foot  Wound Orientation: Lateral  Laterality: Right  Wound Description (Comments): ...      Assessments 10/25/2023  4:30 PM   Wound Image     Site Assessment Pink;Red;Yellow;Drainage;Dry   Periwound Assessment Dry;Intact;Flaky;Warm;Edema   Margins Attached edges;Defined edges   Closure Secondary intention   Drainage Amount Small   Drainage Description Yellow;Serosanguineous;Sanguineous   Treatments Cleansed;Nonselective debridement;Site care;Offloading   Offloading/DME Other (comment)   Wound Cleansing Approved Wound Cleanser   Periwound Protectant No-sting Skin Prep   Dressing Status Removed   Dressing Changed Changed   Dressing Cleansing/Solutions Not Applicable   Dressing Options Hydrofiber Silver;Offloading Dressing - Heel   Dressing Change/Treatment Frequency Every 48 hrs, and As Needed   NEXT Dressing Change/Treatment Date 10/27/23   NEXT Weekly Photo (Inpatient Only) 11/01/23   Wound Team Following Weekly   Non-staged Wound Description Full thickness   Wound Length (cm) 3 cm   Wound Width (cm) 0.5 cm   Wound Depth (cm) 0.2 cm   Wound Surface Area (cm^2) 1.5 cm^2   Wound Volume (cm^3) 0.3 cm^3   Wound Bed Granulation (%) 10 %   Wound Bed Epithelium (%) 10 %   Wound Bed Slough (%) 80 %   Shape irregular oval   Wound Odor None       Wound 10/24/23 Pressure Injury Heel Right POA Unstageable (Active)   Date First Assessed/Time First Assessed: 10/24/23 1200   Present on Original Admission: Yes  Hand Hygiene Completed: Yes  Primary Wound Type: Pressure Injury  Location: Heel  Laterality: Right  Wound Description (Comments): POA Unstageable      Assessments 10/25/2023  4:30 PM   Wound Image      Site Assessment Black;Eschar;Edema   Periwound Assessment Dry;Intact;Flaky;Edema   Margins Attached  "edges;Defined edges   Closure Open to air   Drainage Amount None   Treatments Cleansed;Site care;Offloading   Wound Cleansing Povidone-Iodine   Periwound Protectant Not Applicable   Dressing Status Open to Air   NEXT Weekly Photo (Inpatient Only) 23   Wound Team Following Weekly   Pressure Injury Stage Unstageable   Wound Length (cm) 3.5 cm   Wound Width (cm) 2.5 cm   Wound Surface Area (cm^2) 8.75 cm^2   Wound Bed Eschar (%) 100 %   Shape oval   Wound Odor None   WOUND NURSE ONLY - Time Spent with Patient (mins) 60        Vascular:    COOPER:   Ordering for right LE    Lab Values:    Lab Results   Component Value Date/Time    WBC 12.8 (H) 10/26/2023 07:07 AM    RBC 4.21 (L) 10/26/2023 07:07 AM    HEMOGLOBIN 11.6 (L) 10/26/2023 07:07 AM    HEMATOCRIT 38.7 (L) 10/26/2023 07:07 AM    CREACTPROT 3.28 (H) 2020 10:15 AM    SEDRATEWES 2 2020 10:15 AM    HBA1C 6.8 (H) 10/25/2023 05:36 AM         Culture Results show:  No results found for this or any previous visit (from the past 720 hour(s)).    Pain Level/Medicated:  Patient denies pain       INTERVENTIONS BY WOUND TEAM:  Chart and images reviewed. Discussed with bedside RN. All areas of concern (based on picture review, LDA review and discussion with bedside RN) have been thoroughly assessed. Documentation of areas based on significant findings. This RN in to assess patient. Performed standard wound care which includes appropriate positioning, dressing removal and non-selective debridement. Pictures and measurements obtained weekly if/when required.    Wound:  PERINEUM  Preparation for Dressing removal: Removed without difficulty  Cleansed/Non-selectively Debrided with:  Wound cleanser, Gauze, and Moist warm washcloth  Ankita wound: Cleansed with Wound cleanser, Gauze, and Moist warm washcloth, Prepped with No Sting  Primary Dressin/2\" iodoform packing strip x2  Secondary (Outer) Dressing: aquacel ag+, drape     Wound:  RIGHT HEEL  Preparation for " "Dressing removal: Open to air  Cleansed/Non-selectively Debrided with:  Wound cleanser, Gauze, and Moist warm washcloth  Ankita wound: Cleansed with Wound cleanser, Gauze, and Moist warm washcloth, Prepped with N/A  Primary Dressing:  betadine, open to air     Wound:  RIGHT LATERAL FOOT  Preparation for Dressing removal: Removed without difficulty  Cleansed/Non-selectively Debrided with:  Wound cleanser, Gauze, and Moist warm washcloth  Non-Excisional Conservative Sharp debridement: Slough debrided away using curette < 20cm2 debrided down to non-viable/devitalized tissue.  Scant bleeding noted, controlled with manual pressure.  Ankita wound: Cleansed with Wound cleanser, Gauze, and Moist warm washcloth, Prepped with No Sting  Primary Dressing:  aquacel ag+  Secondary (Outer) Dressing: offloading foam dressing    Advanced Wound Care Discharge Planning  Number of Clinicians necessary to complete wound care: 2  Is patient requiring IV pain medications for dressing changes:  No   Length of time for dressing change 60 min. (This does not include chart review, pre-medication time, set up, clean up or time spent charting.)    Interdisciplinary consultation: Patient, Provider MD Contreras .      EVALUATION / RATIONALE FOR TREATMENT:     Date:  10/26/23  Wound Status:  Initial evaluation    Patient with partially open I&D surgical incision to perineum with areas of intact sutures, two open wound beds packed with hydrofera blue ready and a bridge of superficial tissue underneath which the two wound openings communicate. Orders received from MD Higuera to remove sutures and pack wound bed with iodoform. 8cm tunnel at 7 o'clock in wound opening closer to rectum, no structures palpated when gently probed with cotton tipped applicator. Small amount of yellow drainage initially, scant serosanguinous and sanguinous drainage after cleansing, no odor. Ankita-wound is pink, firm and denuded at this time. Wound bed packed with 1/2\" iodoform " packing strip x2, covered with aquacel ag+ and secured with drape. Patient with active loose stool at this time, consider vac placement when loose stool resolved.     Right heel with 100% firm intact black eschar, painted with betadine and left open to air. Xrays and ABIs to be ordered per MD Contreras.     Right lateral foot with non-healing wound (previous surgical site, scar tissue visible). Wound bed majority loose yellow slough, debrided with curette to depth of firmly adhered yellow slough. Rim of red granular tissue around perimeter. Ankita-wound is pink, flaky and edematous. Aquacel Ag Hydrofiber applied to manage bioburden, absorb exudate, and maintain a moist wound environment without laterally wicking exudate therefore reducing ankita-wound maceration. Covered with foam dressing and offloaded with pillow. Xrays and ABIs to be ordered per MD Contreras.          Goals: Steady decrease in wound area and depth weekly.    NURSING PLAN OF CARE ORDERS:  Dressing changes: See Dressing Care orders  Skin care: See Skin Care orders    NUTRITION RECOMMENDATIONS   Wound Team Recommendations:  Diabetes Education consult    DIET ORDERS (From admission to next 24h)       Start     Ordered    10/25/23 1156  Supplements  ALL MEALS        Comments: Aginaid/Jony with breakfast/lunch; Boost GC/Glucerna with dinner   Question:  Which Supplement  Answer:  OTHER (see comments)  Comment:  Arginaid BID, Boost Glucose Control QD    10/25/23 1156    10/24/23 1159  Diet Order Diet: Consistent CHO (Diabetic)  ALL MEALS        Question:  Diet:  Answer:  Consistent CHO (Diabetic)    10/24/23 1158                    PREVENTATIVE INTERVENTIONS:    Q shift Des - performed per nursing policy  Q shift pressure point assessments - performed per nursing policy    Surface/Positioning  Bariatric SANCHO - Currently in Place  TAPs Turning system - Currently in Place    Offloading/Redistribution  Heel offloading dressing (Silicone dressing) - Currently  in Place  Float Heels off Bed with Pillows - Currently in Place           Containment/Moisture Prevention    Barrier paste - Ordered    Anticipated discharge plans:  TBD        Vac Discharge Needs:  Vac Discharge plan is purely a recommendation from wound team and not a requirement for discharge unless otherwise stated by physician.  Not Applicable Pt not on a wound vac

## 2023-10-26 NOTE — WOUND TEAM
Patient assessed by Wound Care RN. Photos in chart. Dressing orders in place. Full note to follow.

## 2023-10-26 NOTE — FLOWSHEET NOTE
10/26/23 0732   Events/Summary/Plan   Events/Summary/Plan RA pulse ox check   Vital Signs   Pulse 88   Respiration 16   Pulse Oximetry 92 %   $ Pulse Oximetry (Spot Check) Yes   Respiratory Assessment   Level of Consciousness Alert   Chest Exam   Work Of Breathing / Effort Within Normal Limits   Oxygen   O2 Delivery Device Room air w/o2 available

## 2023-10-26 NOTE — PROGRESS NOTES
Hospital Medicine Daily Progress Note      Chief Complaint  Hypertension  Diabetes  Chronic Kidney Disease    Interval Problem Update  Pt c/o gas, will start Simethicone.  Labs reviewed.    Review of Systems  Review of Systems   Constitutional:  Negative for chills and fever.   HENT: Negative.     Eyes: Negative.    Respiratory:  Negative for cough and shortness of breath.    Cardiovascular:  Negative for chest pain and palpitations.   Gastrointestinal:  Negative for abdominal pain, nausea and vomiting.   Musculoskeletal:         Wound pain   Skin:  Negative for itching and rash.   Endo/Heme/Allergies:  Negative for polydipsia. Does not bruise/bleed easily.        Physical Exam  Temp:  [36.9 °C (98.5 °F)-37.2 °C (98.9 °F)] 37 °C (98.6 °F)  Pulse:  [] 88  Resp:  [16-18] 16  BP: (100-121)/(44-75) 121/75  SpO2:  [92 %-98 %] 92 %    Physical Exam  Vitals reviewed.   Constitutional:       General: He is not in acute distress.     Appearance: Normal appearance. He is not ill-appearing.   HENT:      Head: Normocephalic and atraumatic.      Right Ear: External ear normal.      Left Ear: External ear normal.      Nose: Nose normal.      Mouth/Throat:      Pharynx: Oropharynx is clear.   Eyes:      General:         Right eye: No discharge.         Left eye: No discharge.      Extraocular Movements: Extraocular movements intact.      Conjunctiva/sclera: Conjunctivae normal.   Cardiovascular:      Rate and Rhythm: Normal rate and regular rhythm.   Pulmonary:      Effort: Pulmonary effort is normal. No respiratory distress.      Breath sounds: Normal breath sounds. No wheezing.   Abdominal:      General: Bowel sounds are normal. There is no distension.      Palpations: Abdomen is soft.      Tenderness: There is no abdominal tenderness.   Musculoskeletal:      Cervical back: Normal range of motion and neck supple.      Right lower leg: No edema.      Comments: L BKA   Skin:     General: Skin is warm and dry.   Neurological:       Mental Status: He is alert and oriented to person, place, and time.         Fluids    Intake/Output Summary (Last 24 hours) at 10/26/2023 0853  Last data filed at 10/26/2023 0450  Gross per 24 hour   Intake 822 ml   Output 900 ml   Net -78 ml       Laboratory  Recent Labs     10/25/23  0536 10/26/23  0707   WBC 12.0* 12.8*   RBC 3.98* 4.21*   HEMOGLOBIN 11.2* 11.6*   HEMATOCRIT 36.0* 38.7*   MCV 90.5 91.9   MCH 28.1 27.6   MCHC 31.1* 30.0*   RDW 51.9* 52.1*   PLATELETCT 373 319   MPV 10.4 10.0     Recent Labs     10/25/23  0536 10/26/23  0707   SODIUM 142 141   POTASSIUM 3.5* 3.4*   CHLORIDE 98 100   CO2 33 30   GLUCOSE 147* 159*   BUN 31* 27*   CREATININE 1.40 1.08   CALCIUM 8.8 8.7                 Assessment/Plan  Anemia  Assessment & Plan  Has normocytic indices  Fe 31, may start supplement if no contraindications  Follow H/H    A-fib ([facility])  Assessment & Plan  On Amiodarone  Anticoagulated on Eliquis    Leukocytosis  Assessment & Plan  Pt afebrile and non-toxic appearing  PCT 0.17  UA 10-20 w/ negative bacteria  CXR small left eff vs atx, start IS  Follow WBC to resolution    Gout  Assessment & Plan  On Allopurinol    Borderline abnormal TFTs  Assessment & Plan  TSH 10.1 and FT4 1.15  May have subclinical hypothyroidism  Needs outpt F/U    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)  Assessment & Plan  Has had prolonged hospitalization since 8/26/23  [facility], then [facility], now Rehab  S/P I+D, IV Abx, and Wound Vac  Wound care and pain control per Physiatry    Stage 3a chronic kidney disease (HCC)- (present on admission)  Assessment & Plan    CXR small left eff vs atx, start IS  Azotemia improved w/ decreased Lasix  Avoid nephrotoxins  Renal dose all meds  Monitor electrolytes  Outpt Nephrology F/U    PAD (peripheral artery disease) ([facility])- (present on admission)  Assessment & Plan  H/O L BKA  On Eliquis and Lipitor    Diabetes ([facility])- (present on admission)  Assessment & Plan  HbA1c  6.8  Restart low dose Metformin  Continue Lantus bid and SSI for now  Outpt meds include Metformin 1000 mg bid and Actos 30 mg qd    Hypokalemia  Assessment & Plan  2/2 Lasix  Start daily supplement  Mg++ normal    Benign essential HTN- (present on admission)  Assessment & Plan  On Diltiazem and Lasix  Observe blood pressure trends       Full Code

## 2023-10-26 NOTE — THERAPY
Missed Therapy    Patient Name: Jose Castanon  Age:  68 y.o., Sex:  male  Medical Record #: [id number]  Today's Date: 10/26/2023    Discussed missed therapy with MD    Pt actively receiving ultrasound and not appropriate for therapy. 30 MM

## 2023-10-26 NOTE — THERAPY
Physical Therapy   Daily Treatment     Patient Name: Jose Castanon  Age:  68 y.o., Sex:  male  Medical Record #: [id number]  Today's Date: 10/26/2023     Precautions  Precautions: (P) Fall Risk  Comments: monitor HR and O2, hx of BKA LLE w/ prosthetic, wound near sacrum, medial heal wound RLE, lateral foot wound RLE    Subjective    Pt is pleasant and cooperative. He requests assistance with dressing.        Objective     10/26/23 1600   PT Charge Group   Charges Yes   PT Gait Training (Units) 2   PT Therapeutic Activities (Units) 2   PT Total Time Spent   PT Individual Total Time Spent (Mins) 60   Precautions   Precautions Fall Risk   Vitals   Pulse (!) 101   Blood Pressure  139/68   Pulse Oximetry 94 %   Gait Functional Level of Assist    Gait Level Of Assist Minimal Assist   Assistive Device 4 Wheel Walker;Front Wheel Walker   Distance (Feet) 10   # of Times Distance was Traveled 3   Transfer Functional Level of Assist   Bed, Chair, Wheelchair Transfer Minimal Assist   Bed Mobility    Supine to Sit Standby Assist   Sit to Supine Standby Assist   Sit to Stand Minimal Assist   Interdisciplinary Plan of Care Collaboration   Patient Position at End of Therapy Seated;Chair Alarm On;Phone within Reach;Tray Table within Reach;Call Light within Reach     Pt supine in bed. Supine to sit with SBA. Pt performed lower body dressing and donning of L prosthetic with set up assistance. Pt required almost 20 minutes to complete bed mobility, dressing and donning. Stand pivot transfer to w/c with MIN A and FWW.     Pt self propelled w/c to therapy gym.     Pt ambulated 3 bouts of 10' with FWW and MIN A with w/c follow. Pt ambulates with significantly increased reliance on BUE support, shuffled gait. No significant losses of balance noted.     Post activities including dressing and ambulation HR at 136-140bpm. Pt requires seated rest between activity for HR recovery.     Pt performed 2 bouts of static standing x 45s with CGA  "and FWW.     Pt was brought back to his room via w/c and remained seated with chair alarm on and all needs in reach.     Assessment    Pt tolerated session fairly. He is limited by decreased cardiovascular endurance.   Strengths: Effective communication skills, Good insight into deficits/needs, Independent prior level of function, Motivated for self care and independence, Pleasant and cooperative, Willingly participates in therapeutic activities  Barriers: Decreased endurance, Fatigue, Home accessibility, Impaired activity tolerance, Impaired balance, Limited mobility, Pain    Plan    Continue to progress pt's cardiovascular endurance and functional mobility as per plan of care.     DME  PT DME Recommendations  Wheelchair:  (Pt has 2 wheelchairs reported to be in good condition)  Assistive Device:  (Pt has FWW and forearm crutches)    Passport items to be completed:  Get in/out of bed safely, in/out of a vehicle, safely use mobility device, walk or wheel around home/community, navigate up and down stairs, show how to get up/down from the ground, ensure home is accessible, demonstrate HEP, complete caregiver training    Physical Therapy Problems (Active)       Problem: Mobility       Dates: Start:  10/25/23         Goal: STG-Within one week, patient will propel wheelchair community >150 ft mod I indoors       Dates: Start:  10/25/23            Goal: STG-Within one week, patient will ambulate household distance in // bars 10ft x 2 (awaiting prosthetic alterations)       Dates: Start:  10/25/23            Goal: STG-Within one week, patient will ambulate up/down a 2\" step in // bars min A       Dates: Start:  10/25/23               Problem: Mobility Transfers       Dates: Start:  10/25/23         Goal: STG-Within one week, patient will transfer bed to chair SPT FWW SBA/SPV       Dates: Start:  10/25/23               Problem: PT-Long Term Goals       Dates: Start:  10/25/23         Goal: LTG-By discharge, patient will " propel wheelchair mod I over various surfaces >150 ft       Dates: Start:  10/25/23            Goal: LTG-By discharge, patient will ambulate with FWW >50 ft mod I        Dates: Start:  10/25/23            Goal: LTG-By discharge, patient will transfer one surface to another mod I with FWW + prosthetic        Dates: Start:  10/25/23            Goal: LTG-By discharge, patient will ambulate up/down 2 stairs with (newly installed railings- recommended) mod I       Dates: Start:  10/25/23

## 2023-10-26 NOTE — FLOWSHEET NOTE
10/25/23 1803   Events/Summary/Plan   Events/Summary/Plan BiPAP check   Vital Signs   Pulse 90   Respiration 18   Pulse Oximetry 92 %   $ Pulse Oximetry (Spot Check) Yes   Oxygen   O2 Delivery Device Room air w/o2 available   Non-Invasive Ventilation [employer]   Nocturnal CPAP or BIPAP BIPAP - [facility]  (3:45 hrs BiPAP use last night.)   Settings (If Known) VAUTO 15/6, PS-4   Settings Adjusted Yes   FiO2 or LPM 3

## 2023-10-26 NOTE — PROGRESS NOTES
NURSING DAILY NOTE    Name: Jose Castanon   Date of Admission: 10/24/2023   Admitting Diagnosis: Critical illness polyneuropathy (HCC)  Attending Physician: Sylvester Contreras M.d.  Allergies: Penicillins, Bee venom, Cefazolin, Linezolid, and Chicken-derived products    Safety  Patient Assist  max 2  Patient Precautions  (P) Fall Risk  Precaution Comments  monitor HR and O2, hx of BKA LLE w/ prosthetic, wound near sacrum, medial heal wound RLE, lateral foot wound RLE  Bed Transfer Status  (P) Minimal Assist  Toilet Transfer Status   Minimal Assist  Assistive Devices  Walker - front wheel  Oxygen  Room air w/o2 available  Diet/Therapeutic Dining  Current Diet Order   Procedures    Diet Order Diet: Consistent CHO (Diabetic)     Pill Administration  whole  Agitated Behavioral Scale     ABS Level of Severity       Fall Risk  Has the patient had a fall this admission?   No  Lulu Aguilera Fall Risk Scoring  13, MODERATE RISK  Fall Risk Safety Measures  bed alarm, chair alarm, and seatbelt alarm    Vitals  Temperature: 37.5 °C (99.5 °F)  Temp src: Oral  Pulse: (!) (P) 101  Respiration: 18  Blood Pressure : (P) 139/68  Blood Pressure MAP (Calculated): (P) 92 MM HG  BP Location: Right, Upper Arm  Patient BP Position: Supine     Oxygen  Pulse Oximetry: (P) 94 %  O2 (LPM): 0  O2 Delivery Device: Room air w/o2 available    Bowel and Bladder  Last Bowel Movement  10/26/23  Stool Type  Type 6: Fluffy pieces with ragged edges, a mushy stool  Bowel Device  Diaper, Other (Comment) (Bowel Meds)  Continent  Bladder: Did not void   Bowel: No movement  Bladder Function  Urine Color: Brown  Urine Clarity: Hazy (Able to see Through)  Genitourinary Assessment   Bladder Assessment (WDL):  WDL Except  Ambrosio Care: Given with Soap and Water  Urinary Elimination: Catheter (Document on LDA)  Urine Color: Brown  Urine Clarity: Hazy (Able to see Through)  Bladder Device:  Indwelling Catheter    Skin  Des Score   17  Sensory Interventions   Bed Types: Bariatric Low Airloss  Skin Preventative Measures: Pillows in Use for Support / Positioning  Moisture Interventions  Moisturizers/Barriers: Barrier Wipes      Pain  Pain Rating Scale  3 - Sometimes distracts me  Pain Location  Incision  Pain Location Orientation  Posterior  Pain Interventions   Emotional Support    ADLs    Bathing      Linen Change   Complete  Personal Hygiene  Change Ankita Pads  Chlorhexidine Bath      Oral Care     Teeth/Dentures     Shave     Nutrition Percentage Eaten  Lunch, Between % Consumed  Environmental Precautions  Bed in Low Position  Patient Turns/Positioning  Patient Turns Self from Side to Side  Patient Turns Assistance/Tolerance     Bed Positions  Bed Controls On  Head of Bed Elevated  Self regulated      Psychosocial/Neurologic Assessment  Psychosocial Assessment  Psychosocial (WDL):  Within Defined Limits  Neurologic Assessment  Neuro (WDL): Within Defined Limits  Level of Consciousness: Alert  Orientation Level: Oriented X4  EENT (WDL):  WDL Except    Cardio/Pulmonary Assessment  Edema      Respiratory Breath Sounds  RUL Breath Sounds: Clear  RML Breath Sounds: Diminished  RLL Breath Sounds: Diminished  MIGUELINA Breath Sounds: Clear  LLL Breath Sounds: Diminished  Cardiac Assessment   Cardiac (WDL):  Within Defined Limits

## 2023-10-26 NOTE — CARE PLAN
Problem: Fall Risk - Rehab  Goal: Patient will remain free from falls  Outcome: Progressing  Patient assisted with needs/transfers.     Problem: Self Care  Goal: Patient will have the ability to perform ADLs independently or with assistance  Outcome: Progressing  Patient requires minimal assist with self care needs.   The patient is Stable - Low risk of patient condition declining or worsening    Shift Goals  Clinical Goals: Safety, stable VS  Patient Goals: Pain control, sleep  Family Goals: lucia

## 2023-10-26 NOTE — THERAPY
"Occupational Therapy  Daily Treatment     Patient Name: Jose Castanon  Age:  68 y.o., Sex:  male  Medical Record #: [id number]  Today's Date: 10/26/2023     Precautions  Precautions: (P) Fall Risk  Comments: (P) monitor HR and O2, hx of BKA LLE w/ prosthetic, wound near sacrum, medial heal wound RLE, lateral foot wound RLE         Subjective    \"I would like to get dressed so I can sit in my w/c\"     Objective       10/26/23 0931   OT Charge Group   Charges Yes   OT Self Care / ADL (Units) 4   OT Total Time Spent   OT Individual Total Time Spent (Mins) 60   Precautions   Precautions Fall Risk   Comments monitor HR and O2, hx of BKA LLE w/ prosthetic, wound near sacrum, medial heal wound RLE, lateral foot wound RLE   Vitals   Pulse (!) 142   Patient BP Position Sitting   Vitals Comments in between theract   Functional Level of Assist   Lower Body Dressing Minimal Assist  (see comments in note for further expansion)   Lower Body Dressing Description Reacher   Bed, Chair, Wheelchair Transfer Contact Guard Assist   Bed Chair Wheelchair Transfer Description Adaptive equipment;Increased time  (ileana walker)   Bed Mobility    Supine to Sit Standby Assist   Sit to Supine Standby Assist   Interdisciplinary Plan of Care Collaboration   IDT Collaboration with  Certified Nursing Assistant;Physical Therapist   Patient Position at End of Therapy In Bed;Call Light within Reach   Collaboration Comments Back in bed for US     Dressing task took full hour as pt requires max RB for slowing of HR and resp rate. Pt donned LLE prosthetic with set up assist. Utilized reacher to don boxers and shorts with 4 STS from EOB to ileana walker needed for hiking up over hips. Once fully dressed, CNA approached, reporting pt has US in 30 minutes in which he needs to be in bed with LBD doffed. Pt used remaining 30 min to reverse LBD process with reacher and doff prosthetic. Returns to supine with SBA and Mod A x2 needed for scooting up in bed, while " pt assisting with trapeze handle.     Assessment    Pt making slow progressing towards OT POC. He is highly motivated to be Mod ind, however demos hypokinesia and requires frequent rest breaks d/t fatigue.     Strengths: Able to follow instructions, Alert and oriented, Effective communication skills, Good carryover of learning, Good insight into deficits/needs, Motivated for self care and independence, Pleasant and cooperative  Barriers: Decreased endurance, Bowel incontinence, Fatigue, Generalized weakness, Impaired activity tolerance    Plan    Continue POC. Progress ADLs and strength    DME         Occupational Therapy Goals (Active)       Problem: Bathing       Dates: Start:  10/25/23         Goal: STG-Within one week, patient will bathe with CGA and LRD       Dates: Start:  10/25/23               Problem: Dressing       Dates: Start:  10/25/23         Goal: STG-Within one week, patient will dress LB Min A and LRD       Dates: Start:  10/25/23               Problem: Functional Transfers       Dates: Start:  10/25/23         Goal: STG-Within one week, patient will transfer to toilet CGA and LRD       Dates: Start:  10/25/23            Goal: STG-Within one week, patient will transfer to step in shower with CGA and LRD       Dates: Start:  10/25/23               Problem: OT Long Term Goals       Dates: Start:  10/25/23         Goal: LTG-By discharge, patient will complete basic self care tasks SBA to SUP       Dates: Start:  10/25/23            Goal: LTG-By discharge, patient will perform bathroom transfers SBA to SUP       Dates: Start:  10/25/23            Goal: LTG-By discharge, patient will complete basic home management SBA to SUP       Dates: Start:  10/25/23               Problem: Toileting       Dates: Start:  10/25/23         Goal: STG-Within one week, patient will complete toileting tasks with Min A and LRD       Dates: Start:  10/25/23

## 2023-10-26 NOTE — CARE PLAN
The patient is Stable - Low risk of patient condition declining or worsening    Shift Goals  Clinical Goals: Safety, stable VS  Patient Goals: Pain control, sleep  Family Goals: lucia    Progress made toward(s) clinical / shift goals:      Problem: Knowledge Deficit - Standard  Goal: Patient and family/care givers will demonstrate understanding of plan of care, disease process/condition, diagnostic tests and medications  Outcome: Progressing     Problem: Pain - Standard  Goal: Alleviation of pain or a reduction in pain to the patient’s comfort goal  Outcome: Progressing

## 2023-10-27 ENCOUNTER — APPOINTMENT (OUTPATIENT)
Dept: PHYSICAL THERAPY | Facility: REHABILITATION | Age: 68
DRG: 074 | End: 2023-10-27
Attending: PHYSICAL MEDICINE & REHABILITATION
Payer: MEDICARE

## 2023-10-27 ENCOUNTER — APPOINTMENT (OUTPATIENT)
Dept: OCCUPATIONAL THERAPY | Facility: REHABILITATION | Age: 68
DRG: 074 | End: 2023-10-27
Attending: PHYSICAL MEDICINE & REHABILITATION
Payer: MEDICARE

## 2023-10-27 LAB
GLUCOSE BLD STRIP.AUTO-MCNC: 139 MG/DL (ref 65–99)
GLUCOSE BLD STRIP.AUTO-MCNC: 142 MG/DL (ref 65–99)
GLUCOSE BLD STRIP.AUTO-MCNC: 161 MG/DL (ref 65–99)
GLUCOSE BLD STRIP.AUTO-MCNC: 163 MG/DL (ref 65–99)

## 2023-10-27 PROCEDURE — 97530 THERAPEUTIC ACTIVITIES: CPT

## 2023-10-27 PROCEDURE — 700102 HCHG RX REV CODE 250 W/ 637 OVERRIDE(OP): Performed by: PHYSICAL MEDICINE & REHABILITATION

## 2023-10-27 PROCEDURE — 97116 GAIT TRAINING THERAPY: CPT

## 2023-10-27 PROCEDURE — 97110 THERAPEUTIC EXERCISES: CPT

## 2023-10-27 PROCEDURE — 97112 NEUROMUSCULAR REEDUCATION: CPT

## 2023-10-27 PROCEDURE — 99232 SBSQ HOSP IP/OBS MODERATE 35: CPT | Performed by: PHYSICAL MEDICINE & REHABILITATION

## 2023-10-27 PROCEDURE — 97535 SELF CARE MNGMENT TRAINING: CPT

## 2023-10-27 PROCEDURE — 73630 X-RAY EXAM OF FOOT: CPT | Mod: RT

## 2023-10-27 PROCEDURE — 94760 N-INVAS EAR/PLS OXIMETRY 1: CPT

## 2023-10-27 PROCEDURE — 770010 HCHG ROOM/CARE - REHAB SEMI PRIVAT*

## 2023-10-27 PROCEDURE — 82962 GLUCOSE BLOOD TEST: CPT | Mod: 91

## 2023-10-27 PROCEDURE — 700102 HCHG RX REV CODE 250 W/ 637 OVERRIDE(OP): Performed by: HOSPITALIST

## 2023-10-27 PROCEDURE — 99232 SBSQ HOSP IP/OBS MODERATE 35: CPT | Performed by: HOSPITALIST

## 2023-10-27 PROCEDURE — A9270 NON-COVERED ITEM OR SERVICE: HCPCS | Performed by: HOSPITALIST

## 2023-10-27 PROCEDURE — A9270 NON-COVERED ITEM OR SERVICE: HCPCS | Performed by: PHYSICAL MEDICINE & REHABILITATION

## 2023-10-27 RX ADMIN — INSULIN GLARGINE-YFGN 10 UNITS: 100 INJECTION, SOLUTION SUBCUTANEOUS at 20:51

## 2023-10-27 RX ADMIN — DILTIAZEM HYDROCHLORIDE 30 MG: 30 TABLET, FILM COATED ORAL at 15:26

## 2023-10-27 RX ADMIN — Medication 3 MG: at 20:45

## 2023-10-27 RX ADMIN — DILTIAZEM HYDROCHLORIDE 30 MG: 30 TABLET, FILM COATED ORAL at 20:45

## 2023-10-27 RX ADMIN — SIMETHICONE 125 MG: 125 TABLET, CHEWABLE ORAL at 17:02

## 2023-10-27 RX ADMIN — INSULIN GLARGINE-YFGN 10 UNITS: 100 INJECTION, SOLUTION SUBCUTANEOUS at 07:45

## 2023-10-27 RX ADMIN — SIMETHICONE 125 MG: 125 TABLET, CHEWABLE ORAL at 11:00

## 2023-10-27 RX ADMIN — METFORMIN HYDROCHLORIDE 500 MG: 500 TABLET ORAL at 08:02

## 2023-10-27 RX ADMIN — ATORVASTATIN CALCIUM 20 MG: 10 TABLET, FILM COATED ORAL at 20:43

## 2023-10-27 RX ADMIN — APIXABAN 5 MG: 5 TABLET, FILM COATED ORAL at 08:02

## 2023-10-27 RX ADMIN — GABAPENTIN 400 MG: 400 CAPSULE ORAL at 08:02

## 2023-10-27 RX ADMIN — OMEPRAZOLE 20 MG: 20 CAPSULE, DELAYED RELEASE ORAL at 08:02

## 2023-10-27 RX ADMIN — DILTIAZEM HYDROCHLORIDE 30 MG: 30 TABLET, FILM COATED ORAL at 05:13

## 2023-10-27 RX ADMIN — GABAPENTIN 400 MG: 400 CAPSULE ORAL at 20:43

## 2023-10-27 RX ADMIN — POTASSIUM CHLORIDE 20 MEQ: 1500 TABLET, EXTENDED RELEASE ORAL at 08:02

## 2023-10-27 RX ADMIN — METFORMIN HYDROCHLORIDE 500 MG: 500 TABLET ORAL at 17:02

## 2023-10-27 RX ADMIN — FUROSEMIDE 40 MG: 40 TABLET ORAL at 05:13

## 2023-10-27 RX ADMIN — APIXABAN 5 MG: 5 TABLET, FILM COATED ORAL at 20:45

## 2023-10-27 RX ADMIN — SIMETHICONE 125 MG: 125 TABLET, CHEWABLE ORAL at 20:46

## 2023-10-27 RX ADMIN — GABAPENTIN 400 MG: 400 CAPSULE ORAL at 15:26

## 2023-10-27 RX ADMIN — SIMETHICONE 125 MG: 125 TABLET, CHEWABLE ORAL at 08:02

## 2023-10-27 RX ADMIN — AMIODARONE HYDROCHLORIDE 200 MG: 200 TABLET ORAL at 08:02

## 2023-10-27 RX ADMIN — ALLOPURINOL 100 MG: 100 TABLET ORAL at 08:02

## 2023-10-27 RX ADMIN — SENNOSIDES AND DOCUSATE SODIUM 1 TABLET: 50; 8.6 TABLET ORAL at 08:02

## 2023-10-27 RX ADMIN — SENNOSIDES AND DOCUSATE SODIUM 1 TABLET: 50; 8.6 TABLET ORAL at 20:45

## 2023-10-27 ASSESSMENT — ENCOUNTER SYMPTOMS
VOMITING: 0
COUGH: 0
EYES NEGATIVE: 1
NAUSEA: 0
SHORTNESS OF BREATH: 0
POLYDIPSIA: 0
ABDOMINAL PAIN: 0
PALPITATIONS: 0
FEVER: 0
CHILLS: 0
BRUISES/BLEEDS EASILY: 0

## 2023-10-27 ASSESSMENT — GAIT ASSESSMENTS
DISTANCE (FEET): 20
GAIT LEVEL OF ASSIST: CONTACT GUARD ASSIST
ASSISTIVE DEVICE: FRONT WHEEL WALKER
DEVIATION: ANTALGIC;BRADYKINETIC

## 2023-10-27 ASSESSMENT — ACTIVITIES OF DAILY LIVING (ADL)
BED_CHAIR_WHEELCHAIR_TRANSFER_DESCRIPTION: INCREASED TIME;INITIAL PREPARATION FOR TASK;SET-UP OF EQUIPMENT;SUPERVISION FOR SAFETY
BED_CHAIR_WHEELCHAIR_TRANSFER_DESCRIPTION: INCREASED TIME;INITIAL PREPARATION FOR TASK;VERBAL CUEING
BED_CHAIR_WHEELCHAIR_TRANSFER_DESCRIPTION: INCREASED TIME;INITIAL PREPARATION FOR TASK;SET-UP OF EQUIPMENT;SUPERVISION FOR SAFETY;VERBAL CUEING

## 2023-10-27 NOTE — DISCHARGE PLANNING
Weekly Interdisciplinary Team Conference Note  All reporting clinicians have a working knowledge of this patient's plan of care.    Targeted DC Date:  11/14/2023    Discharge Recommendations: Per therapy team TBD DME recs, anticipate HHC rec. Pt recently arrived, with continued pain, motivated, no assist at home, pt choice to DC independently to home.    Discussed IDT with pt at bedside, agreeable with DC date at this time. Pt choice is resumption of [facility] at DC, anticipates no DME recs as per pt has all needed DME at home. Sister and brother will visit pt as they are able. Pt declines additional needs.     Physician Summary  AUGUSTO Contreras MD  participated and led team conference discussion.

## 2023-10-27 NOTE — THERAPY
"Physical Therapy   Daily Treatment     Patient Name: Jose Castanon  Age:  68 y.o., Sex:  male  Medical Record #: [id number]  Today's Date: 10/27/2023     Precautions  Precautions: Fall Risk  Comments: monitor HR and O2, hx of BKA LLE w/ prosthetic, wound near sacrum, medial heal wound RLE, lateral foot wound RLE    Subjective    \"I need to work on my leg strength. I weighed 380 lbs and I could furniture walk at home, but I've been in bed for 2 months.\" Pt in w/c at arrival, agreeable to PT tx.      Objective       10/27/23 1431   PT Charge Group   PT Therapeutic Exercise (Units) 1   PT Neuromuscular Re-Education / Balance (Units) 1   PT Total Time Spent   PT Individual Total Time Spent (Mins) 30   Vitals   Pulse (!) 101   Pulse Oximetry 95 %   O2 Delivery Device None - Room Air   Transfer Functional Level of Assist   Bed, Chair, Wheelchair Transfer Contact Guard Assist  (up to ModA without device; CGA c/ FWW)   Bed Chair Wheelchair Transfer Description Increased time;Initial preparation for task;Verbal cueing  (Stand step around c/ FWW)   Sitting Lower Body Exercises   Sitting Lower Body Exercises Yes   Long Arc Quad 1 set of 10;Bilateral  (concentric hold x 3\", cues for slow eccentric phase)   Other Exercises Serial STS from high perch ~ 26\" mat with UE on thighs 3 x 3, static hold x 3\" at top of movement   Neuro-Muscular Treatments   Neuro-Muscular Treatments Anterior weight shift;Co-Contraction;Postural Changes;Postural Facilitation;Weight Shift Left;Weight Shift Right   Comments dynamic standing c/ dual task reaching to retrieve small objects from wall c/ BUE, 2 x 30\", 4-6 reaches each effort   Physical Therapist Assigned   Assigned PT / Treatment Time / Comments Samira. 30/60 okay.     Session focused on progression of LE strength and postural control as above. Seated rest breaks btw efforts.     Assessment    Jose heller/ good participation, limited endurance for standing activities 30-60\" with HR increase ~ " "25-30 from baseline. Typically recovers with 2-3 min seated rest. Pt c/ good insight to effort levels but would benefit from continued education for safe progression of activity/CV endurance. Will benefit from continued interval based activity training.     Strengths: Effective communication skills, Good insight into deficits/needs, Independent prior level of function, Motivated for self care and independence, Pleasant and cooperative, Willingly participates in therapeutic activities  Barriers: Decreased endurance, Fatigue, Home accessibility, Impaired activity tolerance, Impaired balance, Limited mobility, Pain    Plan  STS and LE strength, CV endurance (Bryan NuStep) vs interval based activities, standing balance, transfers and gait c/ FWW. Monitor HR c/ activity, pt c/ hx of A.fib, but good insight into HR response to activity.     DME  PT DME Recommendations  Wheelchair:  (Pt has 2 wheelchairs reported to be in good condition)  Assistive Device:  (Pt has FWW and forearm crutches)    Passport items to be completed:  Get in/out of bed safely, in/out of a vehicle, safely use mobility device, walk or wheel around home/community, navigate up and down stairs, show how to get up/down from the ground, ensure home is accessible, demonstrate HEP, complete caregiver training    Physical Therapy Problems (Active)       Problem: Mobility       Dates: Start:  10/25/23         Goal: STG-Within one week, patient will propel wheelchair community >150 ft mod I indoors       Dates: Start:  10/25/23            Goal: STG-Within one week, patient will ambulate household distance in // bars 10ft x 2 (awaiting prosthetic alterations)       Dates: Start:  10/25/23            Goal: STG-Within one week, patient will ambulate up/down a 2\" step in // bars min A       Dates: Start:  10/25/23               Problem: Mobility Transfers       Dates: Start:  10/25/23         Goal: STG-Within one week, patient will transfer bed to chair SPT FWW " SBA/SPV       Dates: Start:  10/25/23               Problem: PT-Long Term Goals       Dates: Start:  10/25/23         Goal: LTG-By discharge, patient will propel wheelchair mod I over various surfaces >150 ft       Dates: Start:  10/25/23            Goal: LTG-By discharge, patient will ambulate with FWW >50 ft mod I        Dates: Start:  10/25/23            Goal: LTG-By discharge, patient will transfer one surface to another mod I with FWW + prosthetic        Dates: Start:  10/25/23            Goal: LTG-By discharge, patient will ambulate up/down 2 stairs with (newly installed railings- recommended) mod I       Dates: Start:  10/25/23

## 2023-10-27 NOTE — CARE PLAN
"The patient is Watcher - Medium risk of patient condition declining or worsening    Shift Goals  Clinical Goals: safety, skin integrity    Problem: Neurogenic Bladder  Goal: Patient will demonstrate ability to take care of indwelling catheter  Note: Patient with indwelling catheter in place draining adequate amounts of clear yellow urine to gravity; skin intact; no leakage.      Problem: Fall Risk - Rehab  Goal: Patient will remain free from falls  Note: Lulu Aguilera Fall risk Assessment Score: 21    High fall risk Interventions   - Bed and strip alarm   - Yellow sign by the door   - Yellow wrist band \"Fall risk\"  - Room near to the nurse station  - Do not leave patient unattended in the bathroom  - Fall risk education provided     "

## 2023-10-27 NOTE — PROGRESS NOTES
Hospital Medicine Daily Progress Note      Chief Complaint  Hypertension  Diabetes  Chronic Kidney Disease    Interval Problem Update  Pt reports abdomen feels less bloated on Simethicone.    Review of Systems  Review of Systems   Constitutional:  Negative for chills and fever.   HENT: Negative.     Eyes: Negative.    Respiratory:  Negative for cough and shortness of breath.    Cardiovascular:  Negative for chest pain and palpitations.   Gastrointestinal:  Negative for abdominal pain, nausea and vomiting.   Musculoskeletal:         Wound pain   Skin:  Negative for itching and rash.   Endo/Heme/Allergies:  Negative for polydipsia. Does not bruise/bleed easily.        Physical Exam  Temp:  [36.2 °C (97.2 °F)-37.5 °C (99.5 °F)] 36.2 °C (97.2 °F)  Pulse:  [] 133  Resp:  [16-18] 16  BP: ()/(60-78) 118/73  SpO2:  [90 %-94 %] 90 %    Physical Exam  Vitals reviewed.   Constitutional:       General: He is not in acute distress.     Appearance: Normal appearance. He is not ill-appearing.   HENT:      Head: Normocephalic and atraumatic.      Right Ear: External ear normal.      Left Ear: External ear normal.      Nose: Nose normal.      Mouth/Throat:      Pharynx: Oropharynx is clear.   Eyes:      General:         Right eye: No discharge.         Left eye: No discharge.      Extraocular Movements: Extraocular movements intact.      Conjunctiva/sclera: Conjunctivae normal.   Cardiovascular:      Rate and Rhythm: Normal rate and regular rhythm.   Pulmonary:      Effort: Pulmonary effort is normal. No respiratory distress.      Breath sounds: Normal breath sounds. No wheezing.   Abdominal:      General: Bowel sounds are normal. There is no distension.      Palpations: Abdomen is soft.      Tenderness: There is no abdominal tenderness.   Musculoskeletal:      Cervical back: Normal range of motion and neck supple.      Right lower leg: No edema.      Comments: L BKA   Skin:     General: Skin is warm and dry.    Neurological:      Mental Status: He is alert and oriented to person, place, and time.         Fluids    Intake/Output Summary (Last 24 hours) at 10/27/2023 1419  Last data filed at 10/27/2023 1121  Gross per 24 hour   Intake 360 ml   Output 1400 ml   Net -1040 ml       Laboratory  Recent Labs     10/25/23  0536 10/26/23  0707   WBC 12.0* 12.8*   RBC 3.98* 4.21*   HEMOGLOBIN 11.2* 11.6*   HEMATOCRIT 36.0* 38.7*   MCV 90.5 91.9   MCH 28.1 27.6   MCHC 31.1* 30.0*   RDW 51.9* 52.1*   PLATELETCT 373 319   MPV 10.4 10.0     Recent Labs     10/25/23  0536 10/26/23  0707   SODIUM 142 141   POTASSIUM 3.5* 3.4*   CHLORIDE 98 100   CO2 33 30   GLUCOSE 147* 159*   BUN 31* 27*   CREATININE 1.40 1.08   CALCIUM 8.8 8.7                 Assessment/Plan  Anemia  Assessment & Plan  Has normocytic indices  Fe 31, may start supplement if no contraindications  Follow H/H  Check F/U labs in AM    A-fib (HCC)  Assessment & Plan  On Amiodarone  Anticoagulated on Eliquis    Leukocytosis  Assessment & Plan  Pt afebrile and non-toxic appearing  PCT 0.17  UA 10-20 w/ negative bacteria  Request UCx  CXR small left eff vs atx, continue IS  Follow WBC to resolution  Check F/U labs in AM    Gout  Assessment & Plan  On Allopurinol    Borderline abnormal TFTs  Assessment & Plan  TSH 10.1 and FT4 1.15  May have subclinical hypothyroidism  Needs outpt F/U    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)- (present on admission)  Assessment & Plan  H/O L BKA  On Eliquis and  Lipitor    Diabetes (HCC)- (present on admission)  Assessment & Plan  HbA1c 6.8  Resumed Metformin  Continue Lantus bid and SSI for now  Outpt meds include Metformin 1000 mg bid and Actos 30 mg qd    Hypokalemia  Assessment & Plan  2/2 Lasix  Continue daily supplement  Mg++ normal    Benign essential HTN- (present on admission)  Assessment & Plan  On Diltiazem and Lasix  Observe blood pressure trends       Full Code

## 2023-10-27 NOTE — CARE PLAN
"  Problem: Skin Integrity  Goal: Skin integrity is maintained or improved  Outcome: Progressing  Note: Ankita rectal abscess dressing changed , right heel scab blackish .      Problem: Fall Risk - Rehab  Goal: Patient will remain free from falls  Outcome: Progressing  Note: Lulu Aguilera Fall risk Assessment Score: 13    Moderate fall risk Interventions  - Bed and strip alarm   - Yellow sign by the door   - Yellow wrist band \"Fall risk\"  - Room near to the nurse station  - Do not leave patient unattended in the bathroom  - Fall risk education provided         The patient is Stable - Low risk of patient condition declining or worsening    Shift Goals  Clinical Goals: Safety, stable VS  Patient Goals: Pain control, sleep  Family Goals: lucia    Progress made toward(s) clinical / shift goals:  Pt free from fall and injury .        "

## 2023-10-27 NOTE — FLOWSHEET NOTE
10/27/23 0702   Events/Summary/Plan   Events/Summary/Plan 02 pulse ox check   Vital Signs   Pulse 90   Respiration 18   Pulse Oximetry 92 %   $ Pulse Oximetry (Spot Check) Yes   Respiratory Assessment   Level of Consciousness Alert   Chest Exam   Work Of Breathing / Effort Within Normal Limits   Oxygen   O2 (LPM) 3   O2 Delivery Device Oxymask

## 2023-10-27 NOTE — THERAPY
"Occupational Therapy  Daily Treatment     Patient Name: Jose Castanon  Age:  68 y.o., Sex:  male  Medical Record #: [id number]  Today's Date: 10/27/2023     Precautions  Precautions: Fall Risk  Comments: monitor HR and O2, hx of BKA LLE w/ prosthetic, wound near sacrum, medial heal wound RLE, lateral foot wound RLE    Subjective    \"You're working my upper body, that's what I need\"    Pt pleasant and motivated for OT     Objective     10/27/23 1001 10/27/23 1331   OT Charge Group   Charges Yes Yes   OT Self Care / ADL (Units)  --  2   OT Therapy Activity (Units) 1  --    OT Therapeutic Exercise (Units) 1 2   OT Total Time Spent   OT Individual Total Time Spent (Mins) 30 60   Precautions   Precautions Fall Risk Fall Risk   Comments monitor HR and O2, hx of BKA LLE w/ prosthetic, wound near sacrum, medial heal wound RLE, lateral foot wound RLE monitor HR and O2, hx of BKA LLE w/ prosthetic, wound near sacrum, medial heal wound RLE, lateral foot wound RLE   Vitals   Pulse  --  (!) 107   Pulse Oximetry  --  92 %   O2 Delivery Device None - Room Air None - Room Air   Pain   Intervention Declines Declines   Cognition    Level of Consciousness Alert Alert   ABS (Agitated Behavior Scale)   Agitated Behavior Scale Performed No No   Sleep/Wake Cycle   Sleep & Rest Awake;Out of bed Resting   Functional Level of Assist   Lower Body Dressing  --  Minimal Assist   Lower Body Dressing Description  --  Reacher;Application of orthotic or brace;Increased time;Initial preparation for task;Set-up of equipment;Supervision for safety;Verbal cueing   Bed, Chair, Wheelchair Transfer Contact Guard Assist Standby Assist   Bed Chair Wheelchair Transfer Description Increased time;Initial preparation for task;Set-up of equipment;Supervision for safety Increased time;Initial preparation for task;Set-up of equipment;Supervision for safety;Verbal cueing   Sitting Upper Body Exercises   Sitting Upper Body Exercises Yes Yes   Chest Press  --  3 " sets of 15;Bilateral;Weight (See Comments for lbs)  (5# yves)   Front Arm Raise  --  3 sets of 15;Bilateral;Weight (See Comments for lbs)  (5# yves)   Shoulder Press  --  3 sets of 15;Bilateral;Medium Resistance Theraband  (5# yves)   Bicep Curls  --  3 sets of 15;Bilateral;Weight (See Comments for lbs)  (5# yves)   Tricep Press  --  3 sets of 15;Bilateral;Weight (See Comments for lbs)  (5# yves)   Upper Extremity Bike Level 3 Resistance  (Pt initially began on level 5; however, decreased resistance d/t fatigue. Pt tolerated 10 minutes w/o rest break.)  --    IADL Treatments   IADL Treatments Home management  --    Home Management Pt tolerated w/c level laundry activity. Provided min A for partial stand/squat to retrieve clothing from dryer. Pt able to open/close dryer door and fold clothing while seated w/ no assist.  --    Bed Mobility    Supine to Sit  --  Standby Assist   Sit to Supine Standby Assist  --    Scooting Standby Assist Standby Assist   Rolling Standby Assist Standby Assist   Skilled Intervention  --    (Trapeze)   Interdisciplinary Plan of Care Collaboration   IDT Collaboration with  Respiratory Therapist Nursing;Certified Nursing Assistant;Physical Therapist   Patient Position at End of Therapy In Bed;Call Light within Reach;Tray Table within Reach;Phone within Reach Seated;Chair Alarm On  (PT hand off)   Collaboration Comments RT checking humidity levels upon arrival RN changed wound bandage; CNA took vitals; PT hand off   Strengths & Barriers   Strengths Able to follow instructions;Alert and oriented;Effective communication skills;Good carryover of learning;Good insight into deficits/needs;Motivated for self care and independence;Pleasant and cooperative Able to follow instructions;Alert and oriented;Effective communication skills;Good carryover of learning;Good insight into deficits/needs;Motivated for self care and independence;Pleasant and cooperative   Barriers Decreased endurance;Bowel  incontinence;Fatigue;Generalized weakness;Impaired activity tolerance Decreased endurance;Bowel incontinence;Fatigue;Generalized weakness;Impaired activity tolerance     Assessment    Pt seen twice for tx, addressing home management, UE exercise, functional transfers, and LB dressing. Pt tolerated activity well. Pt progressing towards goals. Continue OT POC.    Strengths: Able to follow instructions, Alert and oriented, Effective communication skills, Good carryover of learning, Good insight into deficits/needs, Motivated for self care and independence, Pleasant and cooperative    Barriers: Decreased endurance, Bowel incontinence, Fatigue, Generalized weakness, Impaired activity tolerance    Plan    ADLs  Strength  Functional mobility  Balance/Endurance    DME       Passport items to be completed:  Perform bathroom transfers, complete dressing, complete feeding, get ready for the day, prepare a simple meal, participate in household tasks, adapt home for safety needs, demonstrate home exercise program, complete caregiver training     Occupational Therapy Goals (Active)       Problem: Bathing       Dates: Start:  10/25/23         Goal: STG-Within one week, patient will bathe with CGA and LRD       Dates: Start:  10/25/23               Problem: Dressing       Dates: Start:  10/25/23         Goal: STG-Within one week, patient will dress LB Min A and LRD       Dates: Start:  10/25/23               Problem: Functional Transfers       Dates: Start:  10/25/23         Goal: STG-Within one week, patient will transfer to toilet CGA and LRD       Dates: Start:  10/25/23            Goal: STG-Within one week, patient will transfer to step in shower with CGA and LRD       Dates: Start:  10/25/23               Problem: OT Long Term Goals       Dates: Start:  10/25/23         Goal: LTG-By discharge, patient will complete basic self care tasks SBA to SUP       Dates: Start:  10/25/23            Goal: LTG-By discharge, patient will  perform bathroom transfers SBA to SUP       Dates: Start:  10/25/23            Goal: LTG-By discharge, patient will complete basic home management SBA to SUP       Dates: Start:  10/25/23               Problem: Toileting       Dates: Start:  10/25/23         Goal: STG-Within one week, patient will complete toileting tasks with Min A and LRD       Dates: Start:  10/25/23

## 2023-10-27 NOTE — PROGRESS NOTES
NURSING DAILY NOTE    Name: Jose Castanon   Date of Admission: 10/24/2023   Admitting Diagnosis: Critical illness polyneuropathy (HCC)  Attending Physician: Sylvester Contreras M.d.  Allergies: Penicillins, Bee venom, Cefazolin, Linezolid, and Chicken-derived products    Safety  Patient Assist  max 2  Patient Precautions  Fall Risk  Precaution Comments  monitor HR and O2, hx of BKA LLE w/ prosthetic, wound near sacrum, medial heal wound RLE, lateral foot wound RLE  Bed Transfer Status  Minimal Assist  Toilet Transfer Status   Minimal Assist  Assistive Devices  Walker - front wheel  Oxygen  Room air w/o2 available  Diet/Therapeutic Dining  Current Diet Order   Procedures    Diet Order Diet: Consistent CHO (Diabetic)     Pill Administration  whole  Agitated Behavioral Scale     ABS Level of Severity       Fall Risk  Has the patient had a fall this admission?   No  Lulu Aguilera Fall Risk Scoring  13, MODERATE RISK  Fall Risk Safety Measures  bed alarm, chair alarm, and poor balance    Vitals  Temperature: 36.6 °C (97.9 °F)  Temp src: Temporal  Pulse: 88  Respiration: 18  Blood Pressure : 126/78  Blood Pressure MAP (Calculated): 94 MM HG  BP Location: Right, Upper Arm  Patient BP Position: Robledo's Position     Oxygen  Pulse Oximetry: 92 %  O2 (LPM): 3 (3L mask at night)  O2 Delivery Device: Room air w/o2 available    Bowel and Bladder  Last Bowel Movement  10/26/23  Stool Type  Type 6: Fluffy pieces with ragged edges, a mushy stool  Bowel Device  Diaper, Other (Comment) (Bowel Meds)  Continent  Bladder: Did not void   Bowel: No movement  Bladder Function  Urine Color: Brown  Urine Clarity: Hazy (Able to see Through)  Genitourinary Assessment   Bladder Assessment (WDL):  WDL Except  Ambrosio Care: Given with Soap and Water  Urinary Elimination: Catheter (Document on LDA)  Urine Color: Brown  Urine Clarity: Hazy (Able to see Through)  Bladder Device:  Indwelling Catheter    Skin  Des Score   17  Sensory Interventions   Bed Types: Bariatric Low Airloss  Skin Preventative Measures: Pillows in Use for Support / Positioning  Moisture Interventions  Moisturizers/Barriers: Barrier Wipes      Pain  Pain Rating Scale  0 - No Pain  Pain Location  Incision, Scrotum  Pain Location Orientation  Proximal  Pain Interventions   Medication (see MAR)    ADLs    Bathing      Linen Change   Complete  Personal Hygiene  Change Ankita Pads  Chlorhexidine Bath      Oral Care     Teeth/Dentures     Shave     Nutrition Percentage Eaten  Dinner, Between % Consumed  Environmental Precautions  Bed in Low Position  Patient Turns/Positioning  Patient Turns Self from Side to Side  Patient Turns Assistance/Tolerance     Bed Positions  Bed Locked, Bed Controls On  Head of Bed Elevated  Self regulated      Psychosocial/Neurologic Assessment  Psychosocial Assessment  Psychosocial (WDL):  Within Defined Limits  Neurologic Assessment  Neuro (WDL): Within Defined Limits  Level of Consciousness: Alert  Orientation Level: Oriented X4  EENT (WDL):  WDL Except    Cardio/Pulmonary Assessment  Edema      Respiratory Breath Sounds  RUL Breath Sounds: Clear  RML Breath Sounds: Diminished  RLL Breath Sounds: Diminished  MIGUELINA Breath Sounds: Clear  LLL Breath Sounds: Diminished  Cardiac Assessment   Cardiac (WDL):  Within Defined Limits

## 2023-10-27 NOTE — PROGRESS NOTES
Physical Medicine & Rehabilitation Progress Note    Encounter Date: 10/27/2023    Chief Complaint: Decreased mobility, pain    Interval Events (Subjective):  Patient sitting up in room. He reports he was able to stand for 2 minutes. He reports improving endurance. Denies NVD.     _____________________________________  Interdisciplinary Team Conference   Most recent IDT on 10/26/2023    Discharge Date/Disposition:  11/14/23  _____________________________________    Objective:  VITAL SIGNS: /73   Pulse 86   Temp 36.2 °C (97.2 °F) (Temporal)   Resp 16   Ht 1.829 m (6')   Wt (!) 162 kg (357 lb 12.8 oz)   SpO2 90%   BMI 48.53 kg/m²   Gen: NAD  Psych: Mood and affect appropriate  CV: RRR, 0 edema  Resp: CTAB, no upper airway sounds  Abd: NTND  Neuro: AOx4, standing with prosthetic and walker for 60 seconds    Laboratory Values:  Recent Results (from the past 72 hour(s))   POCT glucose device results    Collection Time: 10/24/23 12:13 PM   Result Value Ref Range    POC Glucose, Blood 193 (H) 65 - 99 mg/dL   POCT glucose device results    Collection Time: 10/24/23  5:19 PM   Result Value Ref Range    POC Glucose, Blood 159 (H) 65 - 99 mg/dL   POCT glucose device results    Collection Time: 10/24/23 10:10 PM   Result Value Ref Range    POC Glucose, Blood 190 (H) 65 - 99 mg/dL   SARS-CoV-2, PCR (In-House)    Collection Time: 10/24/23 10:15 PM    Specimen: Nasal; Respirate   Result Value Ref Range    SARS-CoV-2 Source NP Swab     SARS-CoV-2 by PCR NotDetected    CBC with Differential    Collection Time: 10/25/23  5:36 AM   Result Value Ref Range    WBC 12.0 (H) 4.8 - 10.8 K/uL    RBC 3.98 (L) 4.70 - 6.10 M/uL    Hemoglobin 11.2 (L) 14.0 - 18.0 g/dL    Hematocrit 36.0 (L) 42.0 - 52.0 %    MCV 90.5 81.4 - 97.8 fL    MCH 28.1 27.0 - 33.0 pg    MCHC 31.1 (L) 32.3 - 36.5 g/dL    RDW 51.9 (H) 35.9 - 50.0 fL    Platelet Count 373 164 - 446 K/uL    MPV 10.4 9.0 - 12.9 fL    Neutrophils-Polys 78.60 (H) 44.00 - 72.00 %     Lymphocytes 11.40 (L) 22.00 - 41.00 %    Monocytes 5.20 0.00 - 13.40 %    Eosinophils 3.30 0.00 - 6.90 %    Basophils 0.50 0.00 - 1.80 %    Immature Granulocytes 1.00 (H) 0.00 - 0.90 %    Nucleated RBC 0.00 0.00 - 0.20 /100 WBC    Neutrophils (Absolute) 9.40 (H) 1.82 - 7.42 K/uL    Lymphs (Absolute) 1.37 1.00 - 4.80 K/uL    Monos (Absolute) 0.62 0.00 - 0.85 K/uL    Eos (Absolute) 0.40 0.00 - 0.51 K/uL    Baso (Absolute) 0.06 0.00 - 0.12 K/uL    Immature Granulocytes (abs) 0.12 (H) 0.00 - 0.11 K/uL    NRBC (Absolute) 0.00 K/uL   Comp Metabolic Panel (CMP)    Collection Time: 10/25/23  5:36 AM   Result Value Ref Range    Sodium 142 135 - 145 mmol/L    Potassium 3.5 (L) 3.6 - 5.5 mmol/L    Chloride 98 96 - 112 mmol/L    Co2 33 20 - 33 mmol/L    Anion Gap 11.0 7.0 - 16.0    Glucose 147 (H) 65 - 99 mg/dL    Bun 31 (H) 8 - 22 mg/dL    Creatinine 1.40 0.50 - 1.40 mg/dL    Calcium 8.8 8.5 - 10.5 mg/dL    Correct Calcium 9.4 8.5 - 10.5 mg/dL    AST(SGOT) 17 12 - 45 U/L    ALT(SGPT) 15 2 - 50 U/L    Alkaline Phosphatase 103 (H) 30 - 99 U/L    Total Bilirubin 0.3 0.1 - 1.5 mg/dL    Albumin 3.2 3.2 - 4.9 g/dL    Total Protein 6.6 6.0 - 8.2 g/dL    Globulin 3.4 1.9 - 3.5 g/dL    A-G Ratio 0.9 g/dL   HEMOGLOBIN A1C    Collection Time: 10/25/23  5:36 AM   Result Value Ref Range    Glycohemoglobin 6.8 (H) 4.0 - 5.6 %    Est Avg Glucose 148 mg/dL   TSH with Reflex to FT4    Collection Time: 10/25/23  5:36 AM   Result Value Ref Range    TSH 10.000 (H) 0.380 - 5.330 uIU/mL   Vitamin D, 25-hydroxy (blood)    Collection Time: 10/25/23  5:36 AM   Result Value Ref Range    25-Hydroxy   Vitamin D 25 46 30 - 100 ng/mL   ESTIMATED GFR    Collection Time: 10/25/23  5:36 AM   Result Value Ref Range    GFR (CKD-EPI) 55 (A) >60 mL/min/1.73 m 2   FREE THYROXINE    Collection Time: 10/25/23  5:36 AM   Result Value Ref Range    Free T-4 1.15 0.93 - 1.70 ng/dL   POCT glucose device results    Collection Time: 10/25/23  7:54 AM   Result Value Ref  Range    POC Glucose, Blood 180 (H) 65 - 99 mg/dL   POCT glucose device results    Collection Time: 10/25/23 11:40 AM   Result Value Ref Range    POC Glucose, Blood 192 (H) 65 - 99 mg/dL   URINALYSIS    Collection Time: 10/25/23  3:15 PM    Specimen: Urine, Cath   Result Value Ref Range    Color Yellow     Character Clear     Specific Gravity 1.019 <1.035    Ph 6.0 5.0 - 8.0    Glucose Negative Negative mg/dL    Ketones Negative Negative mg/dL    Protein 30 (A) Negative mg/dL    Bilirubin Negative Negative    Urobilinogen, Urine 0.2 Negative    Nitrite Negative Negative    Leukocyte Esterase Small (A) Negative    Occult Blood Moderate (A) Negative    Micro Urine Req Microscopic    URINE MICROSCOPIC (W/UA)    Collection Time: 10/25/23  3:15 PM   Result Value Ref Range    WBC 10-20 (A) /hpf    RBC 10-20 (A) /hpf    Bacteria Negative None /hpf    Epithelial Cells Few /hpf    Hyaline Cast 3-5 (A) /lpf   POCT glucose device results    Collection Time: 10/25/23  5:04 PM   Result Value Ref Range    POC Glucose, Blood 124 (H) 65 - 99 mg/dL   POCT glucose device results    Collection Time: 10/25/23  9:32 PM   Result Value Ref Range    POC Glucose, Blood 231 (H) 65 - 99 mg/dL   proBrain Natriuretic Peptide, NT    Collection Time: 10/26/23  7:07 AM   Result Value Ref Range    NT-proBNP 156 (H) 0 - 125 pg/mL   MAGNESIUM    Collection Time: 10/26/23  7:07 AM   Result Value Ref Range    Magnesium 1.8 1.5 - 2.5 mg/dL   PHOSPHORUS    Collection Time: 10/26/23  7:07 AM   Result Value Ref Range    Phosphorus 3.6 2.5 - 4.5 mg/dL   IRON/TOTAL IRON BIND    Collection Time: 10/26/23  7:07 AM   Result Value Ref Range    Iron 31 (L) 50 - 180 ug/dL    Total Iron Binding 211 (L) 250 - 450 ug/dL    Unsat Iron Binding 180 110 - 370 ug/dL    % Saturation 15 15 - 55 %   PROCALCITONIN    Collection Time: 10/26/23  7:07 AM   Result Value Ref Range    Procalcitonin 0.17 <0.25 ng/mL   CBC WITHOUT DIFFERENTIAL    Collection Time: 10/26/23  7:07 AM    Result Value Ref Range    WBC 12.8 (H) 4.8 - 10.8 K/uL    RBC 4.21 (L) 4.70 - 6.10 M/uL    Hemoglobin 11.6 (L) 14.0 - 18.0 g/dL    Hematocrit 38.7 (L) 42.0 - 52.0 %    MCV 91.9 81.4 - 97.8 fL    MCH 27.6 27.0 - 33.0 pg    MCHC 30.0 (L) 32.3 - 36.5 g/dL    RDW 52.1 (H) 35.9 - 50.0 fL    Platelet Count 319 164 - 446 K/uL    MPV 10.0 9.0 - 12.9 fL   Basic Metabolic Panel    Collection Time: 10/26/23  7:07 AM   Result Value Ref Range    Sodium 141 135 - 145 mmol/L    Potassium 3.4 (L) 3.6 - 5.5 mmol/L    Chloride 100 96 - 112 mmol/L    Co2 30 20 - 33 mmol/L    Glucose 159 (H) 65 - 99 mg/dL    Bun 27 (H) 8 - 22 mg/dL    Creatinine 1.08 0.50 - 1.40 mg/dL    Calcium 8.7 8.5 - 10.5 mg/dL    Anion Gap 11.0 7.0 - 16.0   ESTIMATED GFR    Collection Time: 10/26/23  7:07 AM   Result Value Ref Range    GFR (CKD-EPI) 75 >60 mL/min/1.73 m 2   POCT glucose device results    Collection Time: 10/26/23  7:57 AM   Result Value Ref Range    POC Glucose, Blood 161 (H) 65 - 99 mg/dL   POCT glucose device results    Collection Time: 10/26/23 11:25 AM   Result Value Ref Range    POC Glucose, Blood 207 (H) 65 - 99 mg/dL   POCT glucose device results    Collection Time: 10/26/23  5:01 PM   Result Value Ref Range    POC Glucose, Blood 164 (H) 65 - 99 mg/dL   POCT glucose device results    Collection Time: 10/26/23  8:30 PM   Result Value Ref Range    POC Glucose, Blood 148 (H) 65 - 99 mg/dL   POCT glucose device results    Collection Time: 10/27/23  7:43 AM   Result Value Ref Range    POC Glucose, Blood 142 (H) 65 - 99 mg/dL       Medications:  Scheduled Medications   Medication Dose Frequency    simethicone  125 mg 4X/DAY WITH MEALS + NIGHTLY    metFORMIN  500 mg BID WITH MEALS    potassium chloride SA  20 mEq DAILY    senna-docusate  1 Tablet BID    insulin regular  2-12 Units 4X/DAY ACHS    furosemide  40 mg Q DAY    allopurinol  100 mg DAILY    atorvastatin  20 mg Q EVENING    gabapentin  400 mg TID    apixaban  5 mg BID    amiodarone   200 mg DAILY    dilTIAZem  30 mg Q8HRS    insulin GLARGINE  10 Units BID    melatonin  3 mg QHS    omeprazole  20 mg DAILY     PRN medications: senna-docusate **AND** polyethylene glycol/lytes **AND** magnesium hydroxide **AND** bisacodyl, insulin regular **AND** POC blood glucose manual result **AND** NOTIFY MD and PharmD **AND** Administer 20 grams of glucose (approximately 8 ounces of fruit juice) every 15 minutes PRN FSBG less than 70 mg/dL **AND** dextrose bolus, Respiratory Therapy Consult, hydrALAZINE, acetaminophen, mag hydrox-al hydrox-simeth, ondansetron **OR** ondansetron, traZODone, sodium chloride, oxyCODONE immediate-release **OR** oxyCODONE immediate-release, traMADol    Diet:  Current Diet Order   Procedures    Diet Order Diet: Consistent CHO (Diabetic)       Medical Decision Making and Plan:   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 Per guidelines, 15 hours per week between PT, OT and/or SLP.  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      HTN/A fib - Patient now on Amiodarone 200 mg daily, Lasix 40 mg BID, and Eliquis 5 mg BID     DM2 with hyperglycemia - Patient on Glargine 10 U BID and SSI. Consult hospitalist. Started on Metformin     PAD - Patient on Eliquis. Wound care recommending ABIs/US on 10/26/23 - no significant stenosis     CKD3a - Avoid nephrotoxic agents. Check AM CMP - Cr stable     Leukocytosis - 12 on 10/25/23    Anemia - 11.2 on admission. Monitor    Hypokalemia - 3.5 on admission, consult hospitalist, on Lasix. Started on 20 mEq, continue K supplement    Gout - Patient on Allopurinol 100 mg  daily     Neuropathy - Patient on Gabapentin 400 mg TID. Continue Gabapentin 400 mg TID     L BKA - Patient with L BKA. To bring in prosthetic and crutches      Morbid Obesity due to excess calories - Patient with BMI of 48.5 on admission, meets medical criteria. Dietitian to consult     Pain - Patient on PRN Tylenol, PRN Oxycodone, and PRN Tramadol. Also on Gabapentin 400 mg TID     Skin - Patient at risk for skin breakdown due to debility in areas including sacrum, achilles, elbows and head in addition to other sites. Nursing to assess skin daily.      GI Ppx - Patient on Prilosec for GERD prophylaxis. Patient on Senna-docusate for constipation prophylaxis.      DVT Ppx - Patient Eliquis on transfer. Continue Eliquis as limited ambulator and A Fib  ____________________________________    T. Clay Contreras MD/PhD  [facility] - Physical Medicine & Rehabilitation   [facility] - Brain Injury Medicine   ____________________________________

## 2023-10-27 NOTE — THERAPY
Physical Therapy   Daily Treatment     Patient Name: Jose Castanon  Age:  68 y.o., Sex:  male  Medical Record #: [id number]  Today's Date: 10/27/2023     Precautions  Precautions: Fall Risk  Comments: monitor HR and O2, hx of BKA LLE w/ prosthetic, wound near sacrum, medial heal wound RLE, lateral foot wound RLE    Subjective    Patient is found in bed, agreeable to participate with therapy. Willing to work in room with door closed wearing hospital gown due to personal laundry being in washer/dryer so he doesn't don dirty clothing.      Objective       10/27/23 0831   PT Charge Group   PT Gait Training (Units) 2   PT Therapeutic Activities (Units) 2   PT Total Time Spent   PT Individual Total Time Spent (Mins) 60   Vitals   Pulse (!) 133  (with standing x 2 min)   Gait Functional Level of Assist    Gait Level Of Assist Contact Guard Assist   Assistive Device Front Wheel Walker  (wheelchair follow in room due to clothing being in laundry)   Distance (Feet) 20   # of Times Distance was Traveled 3   Deviation Antalgic;Bradykinetic  (forward flexed posture)   Standing Lower Body Exercises   Other Exercises standing tolerance x 3: 1:36, 2 min, 1 min with seated rest breaks between. HR max 133   Bed Mobility    Sit to Stand Contact Guard Assist  (x8)   Interdisciplinary Plan of Care Collaboration   IDT Collaboration with  Occupational Therapist   Patient Position at End of Therapy Call Light within Reach;Tray Table within Reach;Phone within Reach;Seated;Chair Alarm On;Self Releasing Lap Belt Applied   Collaboration Comments coord c OT re: laundry in dryer         Assessment    Patient tolerates ambulating with FWW, transfer from bed to chair, and improves standing tolerance today. He is making good gains, is motivated to return to home independently.     Strengths: Effective communication skills, Good insight into deficits/needs, Independent prior level of function, Motivated for self care and independence, Pleasant  "and cooperative, Willingly participates in therapeutic activities  Barriers: Decreased endurance, Fatigue, Home accessibility, Impaired activity tolerance, Impaired balance, Limited mobility, Pain    Plan    Gait/ activity tolerance, strengthening, balance    DME  PT DME Recommendations  Wheelchair:  (Pt has 2 wheelchairs reported to be in good condition)  Assistive Device:  (Pt has FWW and forearm crutches)    Passport items to be completed:  Get in/out of bed safely, in/out of a vehicle, safely use mobility device, walk or wheel around home/community, navigate up and down stairs, show how to get up/down from the ground, ensure home is accessible, demonstrate HEP, complete caregiver training    Physical Therapy Problems (Active)       Problem: Mobility       Dates: Start:  10/25/23         Goal: STG-Within one week, patient will propel wheelchair community >150 ft mod I indoors       Dates: Start:  10/25/23            Goal: STG-Within one week, patient will ambulate household distance in // bars 10ft x 2 (awaiting prosthetic alterations)       Dates: Start:  10/25/23            Goal: STG-Within one week, patient will ambulate up/down a 2\" step in // bars min A       Dates: Start:  10/25/23               Problem: Mobility Transfers       Dates: Start:  10/25/23         Goal: STG-Within one week, patient will transfer bed to chair SPT FWW SBA/SPV       Dates: Start:  10/25/23               Problem: PT-Long Term Goals       Dates: Start:  10/25/23         Goal: LTG-By discharge, patient will propel wheelchair mod I over various surfaces >150 ft       Dates: Start:  10/25/23            Goal: LTG-By discharge, patient will ambulate with FWW >50 ft mod I        Dates: Start:  10/25/23            Goal: LTG-By discharge, patient will transfer one surface to another mod I with FWW + prosthetic        Dates: Start:  10/25/23            Goal: LTG-By discharge, patient will ambulate up/down 2 stairs with (newly installed " chivo- recommended) mod I       Dates: Start:  10/25/23

## 2023-10-28 ENCOUNTER — APPOINTMENT (OUTPATIENT)
Dept: PHYSICAL THERAPY | Facility: REHABILITATION | Age: 68
DRG: 074 | End: 2023-10-28
Attending: PHYSICAL MEDICINE & REHABILITATION
Payer: MEDICARE

## 2023-10-28 LAB
ANION GAP SERPL CALC-SCNC: 12 MMOL/L (ref 7–16)
BUN SERPL-MCNC: 33 MG/DL (ref 8–22)
CALCIUM SERPL-MCNC: 8.8 MG/DL (ref 8.5–10.5)
CHLORIDE SERPL-SCNC: 97 MMOL/L (ref 96–112)
CO2 SERPL-SCNC: 28 MMOL/L (ref 20–33)
CREAT SERPL-MCNC: 1.3 MG/DL (ref 0.5–1.4)
ERYTHROCYTE [DISTWIDTH] IN BLOOD BY AUTOMATED COUNT: 51.7 FL (ref 35.9–50)
GFR SERPLBLD CREATININE-BSD FMLA CKD-EPI: 60 ML/MIN/1.73 M 2
GLUCOSE BLD STRIP.AUTO-MCNC: 124 MG/DL (ref 65–99)
GLUCOSE BLD STRIP.AUTO-MCNC: 133 MG/DL (ref 65–99)
GLUCOSE BLD STRIP.AUTO-MCNC: 134 MG/DL (ref 65–99)
GLUCOSE BLD STRIP.AUTO-MCNC: 138 MG/DL (ref 65–99)
GLUCOSE SERPL-MCNC: 137 MG/DL (ref 65–99)
HCT VFR BLD AUTO: 33.4 % (ref 42–52)
HGB BLD-MCNC: 10.2 G/DL (ref 14–18)
MCH RBC QN AUTO: 27.6 PG (ref 27–33)
MCHC RBC AUTO-ENTMCNC: 30.5 G/DL (ref 32.3–36.5)
MCV RBC AUTO: 90.5 FL (ref 81.4–97.8)
PLATELET # BLD AUTO: 298 K/UL (ref 164–446)
PMV BLD AUTO: 10.9 FL (ref 9–12.9)
POTASSIUM SERPL-SCNC: 3.4 MMOL/L (ref 3.6–5.5)
RBC # BLD AUTO: 3.69 M/UL (ref 4.7–6.1)
SODIUM SERPL-SCNC: 137 MMOL/L (ref 135–145)
WBC # BLD AUTO: 11.5 K/UL (ref 4.8–10.8)

## 2023-10-28 PROCEDURE — 97530 THERAPEUTIC ACTIVITIES: CPT

## 2023-10-28 PROCEDURE — A9270 NON-COVERED ITEM OR SERVICE: HCPCS | Performed by: PHYSICAL MEDICINE & REHABILITATION

## 2023-10-28 PROCEDURE — 700102 HCHG RX REV CODE 250 W/ 637 OVERRIDE(OP): Performed by: PHYSICAL MEDICINE & REHABILITATION

## 2023-10-28 PROCEDURE — 82962 GLUCOSE BLOOD TEST: CPT

## 2023-10-28 PROCEDURE — 80048 BASIC METABOLIC PNL TOTAL CA: CPT

## 2023-10-28 PROCEDURE — 770010 HCHG ROOM/CARE - REHAB SEMI PRIVAT*

## 2023-10-28 PROCEDURE — 85027 COMPLETE CBC AUTOMATED: CPT

## 2023-10-28 PROCEDURE — 97112 NEUROMUSCULAR REEDUCATION: CPT

## 2023-10-28 PROCEDURE — 99232 SBSQ HOSP IP/OBS MODERATE 35: CPT | Performed by: HOSPITALIST

## 2023-10-28 PROCEDURE — 36415 COLL VENOUS BLD VENIPUNCTURE: CPT

## 2023-10-28 PROCEDURE — 94760 N-INVAS EAR/PLS OXIMETRY 1: CPT

## 2023-10-28 PROCEDURE — A9270 NON-COVERED ITEM OR SERVICE: HCPCS | Mod: JZ | Performed by: HOSPITALIST

## 2023-10-28 PROCEDURE — 700102 HCHG RX REV CODE 250 W/ 637 OVERRIDE(OP): Mod: JZ | Performed by: HOSPITALIST

## 2023-10-28 RX ORDER — POTASSIUM CHLORIDE 20 MEQ/1
40 TABLET, EXTENDED RELEASE ORAL ONCE
Status: COMPLETED | OUTPATIENT
Start: 2023-10-28 | End: 2023-10-28

## 2023-10-28 RX ORDER — GUAIFENESIN/DEXTROMETHORPHAN 100-10MG/5
5 SYRUP ORAL EVERY 6 HOURS PRN
Status: DISCONTINUED | OUTPATIENT
Start: 2023-10-28 | End: 2023-11-09 | Stop reason: HOSPADM

## 2023-10-28 RX ADMIN — APIXABAN 5 MG: 5 TABLET, FILM COATED ORAL at 21:19

## 2023-10-28 RX ADMIN — FUROSEMIDE 40 MG: 40 TABLET ORAL at 05:22

## 2023-10-28 RX ADMIN — DILTIAZEM HYDROCHLORIDE 30 MG: 30 TABLET, FILM COATED ORAL at 21:18

## 2023-10-28 RX ADMIN — INSULIN GLARGINE-YFGN 10 UNITS: 100 INJECTION, SOLUTION SUBCUTANEOUS at 21:19

## 2023-10-28 RX ADMIN — SIMETHICONE 125 MG: 125 TABLET, CHEWABLE ORAL at 21:19

## 2023-10-28 RX ADMIN — POTASSIUM CHLORIDE 20 MEQ: 1500 TABLET, EXTENDED RELEASE ORAL at 08:12

## 2023-10-28 RX ADMIN — SIMETHICONE 125 MG: 125 TABLET, CHEWABLE ORAL at 17:02

## 2023-10-28 RX ADMIN — GABAPENTIN 400 MG: 400 CAPSULE ORAL at 08:12

## 2023-10-28 RX ADMIN — METFORMIN HYDROCHLORIDE 850 MG: 850 TABLET ORAL at 17:02

## 2023-10-28 RX ADMIN — SIMETHICONE 125 MG: 125 TABLET, CHEWABLE ORAL at 11:10

## 2023-10-28 RX ADMIN — SIMETHICONE 125 MG: 125 TABLET, CHEWABLE ORAL at 08:12

## 2023-10-28 RX ADMIN — DILTIAZEM HYDROCHLORIDE 30 MG: 30 TABLET, FILM COATED ORAL at 05:21

## 2023-10-28 RX ADMIN — GUAIFENESIN SYRUP AND DEXTROMETHORPHAN 5 ML: 100; 10 SYRUP ORAL at 17:37

## 2023-10-28 RX ADMIN — GABAPENTIN 400 MG: 400 CAPSULE ORAL at 21:19

## 2023-10-28 RX ADMIN — ALLOPURINOL 100 MG: 100 TABLET ORAL at 08:12

## 2023-10-28 RX ADMIN — METFORMIN HYDROCHLORIDE 500 MG: 500 TABLET ORAL at 08:12

## 2023-10-28 RX ADMIN — OMEPRAZOLE 20 MG: 20 CAPSULE, DELAYED RELEASE ORAL at 08:12

## 2023-10-28 RX ADMIN — DILTIAZEM HYDROCHLORIDE 30 MG: 30 TABLET, FILM COATED ORAL at 15:27

## 2023-10-28 RX ADMIN — GABAPENTIN 400 MG: 400 CAPSULE ORAL at 15:27

## 2023-10-28 RX ADMIN — AMIODARONE HYDROCHLORIDE 200 MG: 200 TABLET ORAL at 08:12

## 2023-10-28 RX ADMIN — ATORVASTATIN CALCIUM 20 MG: 10 TABLET, FILM COATED ORAL at 21:18

## 2023-10-28 RX ADMIN — POTASSIUM CHLORIDE 40 MEQ: 1500 TABLET, EXTENDED RELEASE ORAL at 13:20

## 2023-10-28 RX ADMIN — APIXABAN 5 MG: 5 TABLET, FILM COATED ORAL at 08:12

## 2023-10-28 RX ADMIN — INSULIN GLARGINE-YFGN 10 UNITS: 100 INJECTION, SOLUTION SUBCUTANEOUS at 07:44

## 2023-10-28 ASSESSMENT — PAIN DESCRIPTION - PAIN TYPE: TYPE: ACUTE PAIN;SURGICAL PAIN

## 2023-10-28 ASSESSMENT — GAIT ASSESSMENTS
DEVIATION: ANTALGIC;BRADYKINETIC;INCREASED BASE OF SUPPORT
DISTANCE (FEET): 20
GAIT LEVEL OF ASSIST: CONTACT GUARD ASSIST
ASSISTIVE DEVICE: FRONT WHEEL WALKER

## 2023-10-28 ASSESSMENT — ENCOUNTER SYMPTOMS
CHILLS: 0
ABDOMINAL PAIN: 0
FEVER: 0
COUGH: 0
SHORTNESS OF BREATH: 0
NAUSEA: 0
EYES NEGATIVE: 1
BRUISES/BLEEDS EASILY: 0
POLYDIPSIA: 0
VOMITING: 0
PALPITATIONS: 0

## 2023-10-28 ASSESSMENT — CHA2DS2 SCORE
PRIOR STROKE OR TIA OR THROMBOEMBOLISM: NO
VASCULAR DISEASE: YES
DIABETES: YES
AGE 65 TO 74: YES
CHA2DS2 VASC SCORE: 4
CHF OR LEFT VENTRICULAR DYSFUNCTION: NO
SEX: MALE
HYPERTENSION: YES
AGE 75 OR GREATER: NO

## 2023-10-28 ASSESSMENT — ACTIVITIES OF DAILY LIVING (ADL)
BED_CHAIR_WHEELCHAIR_TRANSFER_DESCRIPTION: INCREASED TIME;SUPERVISION FOR SAFETY;VERBAL CUEING;INITIAL PREPARATION FOR TASK

## 2023-10-28 NOTE — FLOWSHEET NOTE
10/28/23 0740   Events/Summary/Plan   Events/Summary/Plan RA pulse ox check. Stated he used the bipap   Vital Signs   Pulse 83   Respiration 18   Pulse Oximetry 91 %   $ Pulse Oximetry (Spot Check) Yes   Respiratory Assessment   Level of Consciousness Alert   Chest Exam   Work Of Breathing / Effort Within Normal Limits   Oxygen   O2 Delivery Device Room air w/o2 available

## 2023-10-28 NOTE — PROGRESS NOTES
NURSING DAILY NOTE    Name: Jose Castanon   Date of Admission: 10/24/2023   Admitting Diagnosis: Critical illness polyneuropathy (HCC)  Attending Physician: Sylvester Contreras M.d.  Allergies: Penicillins, Bee venom, Cefazolin, Linezolid, and Chicken-derived products    Safety  Patient Assist  max 2  Patient Precautions  Fall Risk  Precaution Comments  monitor HR and O2, hx of BKA LLE w/ prosthetic, wound near sacrum, medial heal wound RLE, lateral foot wound RLE  Bed Transfer Status  Contact Guard Assist (up to ModA without device; CGA c/ FWW)  Toilet Transfer Status   Minimal Assist  Assistive Devices  Walker - front wheel  Oxygen  None - Room Air  Diet/Therapeutic Dining  Current Diet Order   Procedures    Diet Order Diet: Consistent CHO (Diabetic)     Pill Administration  whole  Agitated Behavioral Scale     ABS Level of Severity       Fall Risk  Has the patient had a fall this admission?   No  Lulu Aguilera Fall Risk Scoring  21, HIGH RISK  Fall Risk Safety Measures  chair alarm and poor balance    Vitals  Temperature: 36.6 °C (97.8 °F)  Temp src: Temporal  Pulse: (!) 128  Respiration: 14  Blood Pressure : 116/65  Blood Pressure MAP (Calculated): 82 MM HG  BP Location: Right, Upper Arm  Patient BP Position: Supine     Oxygen  Pulse Oximetry: 92 %  O2 (LPM): 3  O2 Delivery Device: None - Room Air    Bowel and Bladder  Last Bowel Movement  10/27/23  Stool Type  Type 5: Soft blob with clear cut edges (passed easily)  Bowel Device  Diaper, Other (Comment)  Continent  Bladder: Did not void   Bowel: No movement  Bladder Function  Urine Color: Yellow  Urine Clarity: Hazy (Able to see Through)  Genitourinary Assessment   Bladder Assessment (WDL):  WDL Except  Ambrosio Care: Given with Soap and Water  Urinary Elimination: Catheter (Document on LDA)  Urine Color: Yellow  Urine Clarity: Hazy (Able to see Through)  Bladder Device: Indwelling  Catheter    Skin  Des Score   17  Sensory Interventions   Bed Types: Bariatric Low Airloss  Skin Preventative Measures: Pillows in Use for Support / Positioning  Moisture Interventions  Moisturizers/Barriers: Barrier Wipes      Pain  Pain Rating Scale  0 - No Pain  Pain Location  Incision, Scrotum  Pain Location Orientation  Proximal  Pain Interventions   Declines    ADLs    Bathing      Linen Change   Complete  Personal Hygiene  Change Ankita Pads  Chlorhexidine Bath      Oral Care     Teeth/Dentures     Shave     Nutrition Percentage Eaten  Dinner, Between % Consumed  Environmental Precautions  Bed in Low Position  Patient Turns/Positioning  Patient Turns Self from Side to Side  Patient Turns Assistance/Tolerance     Bed Positions  Bed Locked, Bed Controls On  Head of Bed Elevated  Self regulated      Psychosocial/Neurologic Assessment  Psychosocial Assessment  Psychosocial (WDL):  Within Defined Limits  Neurologic Assessment  Neuro (WDL): Within Defined Limits  Level of Consciousness: Alert  Orientation Level: Oriented X4  Cognition: Follows commands, Appropriate attention/concentration, Appropriate judgement, Appropriate safety awareness  Speech: Clear  Motor Function/Sensation Assessment: Motor strength  Muscle Strength Right Arm: Good Strength Against Gravity and Moderate Resistance  Muscle Strength Left Arm: Good Strength Against Gravity and Moderate Resistance  Muscle Strength Right Leg: Good Strength Against Gravity and Moderate Resistance  Muscle Strength Left Leg: Good Strength Against Gravity and Moderate Resistance  EENT (WDL):  WDL Except    Cardio/Pulmonary Assessment  Edema   RLE Edema: Generalized  Respiratory Breath Sounds  RUL Breath Sounds: Clear  RML Breath Sounds: Clear  RLL Breath Sounds: Diminished  MIGUELINA Breath Sounds: Clear  LLL Breath Sounds: Diminished  Cardiac Assessment   Cardiac (WDL):  WDL Except

## 2023-10-28 NOTE — CARE PLAN
"The patient is Watcher - Medium risk of patient condition declining or worsening    Shift Goals  Clinical Goals: safety, skin integrity    Problem: Bowel Elimination  Goal: Patient will participate in bowel management program  Outcome: Progressing     Problem: Fall Risk - Rehab  Goal: Patient will remain free from falls  Note: Lulu Aguilera Fall risk Assessment Score: 20    High fall risk Interventions   - Bed and strip alarm   - Yellow sign by the door   - Yellow wrist band \"Fall risk\"  - Room near to the nurse station  - Do not leave patient unattended in the bathroom  - Fall risk education provided     "

## 2023-10-28 NOTE — THERAPY
Physical Therapy   Daily Treatment     Patient Name: Jose Castanon  Age:  68 y.o., Sex:  male  Medical Record #: [id number]  Today's Date: 10/28/2023     Precautions  Precautions: Fall Risk  Comments: monitor HR and O2, hx of BKA LLE w/ prosthetic, wound near sacrum, medial heal wound RLE, lateral foot wound RLE    Subjective    Patient is found in bed, is agreeable to participate. Would like to get out of his room, willing to work in parallel bars     Objective       10/28/23 1415   PT Charge Group   PT Neuromuscular Re-Education / Balance (Units) 2   PT Therapeutic Activities (Units) 2   PT Total Time Spent   PT Individual Total Time Spent (Mins) 60   Gait Functional Level of Assist    Gait Level Of Assist Contact Guard Assist   Assistive Device Front Wheel Walker   Distance (Feet) 20   # of Times Distance was Traveled 1   Deviation Antalgic;Bradykinetic;Increased Base Of Support  (forward flexed, wide base of support)   Wheelchair Functional Level of Assist   Wheelchair Assist Supervised   Distance Wheelchair (Feet or Distance) 250   Wheelchair Description Extra time;Supervision for safety;Leg rest management;Verbal cueing   Transfer Functional Level of Assist   Bed, Chair, Wheelchair Transfer Contact Guard Assist   Bed Chair Wheelchair Transfer Description Increased time;Supervision for safety;Verbal cueing;Initial preparation for task   Sitting Lower Body Exercises   Sit to Stand 1 set of 10  (1x5 without UE assist from wheelchair, 1x5 with UE assist in parallel bars)   Neuro-Muscular Treatments   Neuro-Muscular Treatments Anterior weight shift;Weight Shift Left;Weight Shift Right   Comments semi tandem stance wide VERONICA no UE support with each foot leading x 30 sec each with seated rest break between. Standing weight shifts with no UE support 2x10 with seated rest break between bouts.   Interdisciplinary Plan of Care Collaboration   IDT Collaboration with  Family / Caregiver   Patient Position at End of  "Therapy Seated;Family / Friend in Room   Collaboration Comments patient's sister arrived mid session, pushes patient from gym back to room, patient agreeable to sit up in chair         Assessment    Patient is able to demonstrate safe sit to stand from wheelchair without UE assist. Demonstrates improving standing tolerance and balance with weight shifts.     Strengths: Effective communication skills, Good insight into deficits/needs, Independent prior level of function, Motivated for self care and independence, Pleasant and cooperative, Willingly participates in therapeutic activities  Barriers: Decreased endurance, Fatigue, Home accessibility, Impaired activity tolerance, Impaired balance, Limited mobility, Pain    Plan    Gait with FWW, balance, strengthening    DME  PT DME Recommendations  Wheelchair:  (Pt has 2 wheelchairs reported to be in good condition)  Assistive Device:  (Pt has FWW and forearm crutches)    Passport items to be completed:  Get in/out of bed safely, in/out of a vehicle, safely use mobility device, walk or wheel around home/community, navigate up and down stairs, show how to get up/down from the ground, ensure home is accessible, demonstrate HEP, complete caregiver training    Physical Therapy Problems (Active)       Problem: Mobility       Dates: Start:  10/25/23         Goal: STG-Within one week, patient will propel wheelchair community >150 ft mod I indoors       Dates: Start:  10/25/23            Goal: STG-Within one week, patient will ambulate household distance in // bars 10ft x 2 (awaiting prosthetic alterations)       Dates: Start:  10/25/23            Goal: STG-Within one week, patient will ambulate up/down a 2\" step in // bars min A       Dates: Start:  10/25/23               Problem: Mobility Transfers       Dates: Start:  10/25/23         Goal: STG-Within one week, patient will transfer bed to chair SPT FWW SBA/SPV       Dates: Start:  10/25/23               Problem: PT-Long Term " Goals       Dates: Start:  10/25/23         Goal: LTG-By discharge, patient will propel wheelchair mod I over various surfaces >150 ft       Dates: Start:  10/25/23            Goal: LTG-By discharge, patient will ambulate with FWW >50 ft mod I        Dates: Start:  10/25/23            Goal: LTG-By discharge, patient will transfer one surface to another mod I with FWW + prosthetic        Dates: Start:  10/25/23            Goal: LTG-By discharge, patient will ambulate up/down 2 stairs with (newly installed railings- recommended) mod I       Dates: Start:  10/25/23

## 2023-10-28 NOTE — PROGRESS NOTES
Hospital Medicine Daily Progress Note      Chief Complaint  Hypertension  Diabetes  Chronic Kidney Disease    Interval Problem Update  Doing well.  Labs reviewed and discussed.    Review of Systems  Review of Systems   Constitutional:  Negative for chills and fever.   HENT: Negative.     Eyes: Negative.    Respiratory:  Negative for cough and shortness of breath.    Cardiovascular:  Negative for chest pain and palpitations.   Gastrointestinal:  Negative for abdominal pain, nausea and vomiting.   Musculoskeletal:         Wound pain   Skin:  Negative for itching and rash.   Endo/Heme/Allergies:  Negative for polydipsia. Does not bruise/bleed easily.        Physical Exam  Temp:  [36.6 °C (97.8 °F)-36.9 °C (98.4 °F)] 36.6 °C (97.9 °F)  Pulse:  [] 83  Resp:  [14-18] 18  BP: (108-119)/(65-74) 108/71  SpO2:  [91 %-95 %] 91 %    Physical Exam  Vitals reviewed.   Constitutional:       General: He is not in acute distress.     Appearance: Normal appearance. He is not ill-appearing.   HENT:      Head: Normocephalic and atraumatic.      Right Ear: External ear normal.      Left Ear: External ear normal.      Nose: Nose normal.      Mouth/Throat:      Pharynx: Oropharynx is clear.   Eyes:      General:         Right eye: No discharge.         Left eye: No discharge.      Extraocular Movements: Extraocular movements intact.      Conjunctiva/sclera: Conjunctivae normal.   Cardiovascular:      Rate and Rhythm: Normal rate and regular rhythm.   Pulmonary:      Effort: Pulmonary effort is normal. No respiratory distress.      Breath sounds: Normal breath sounds. No wheezing.   Abdominal:      General: Bowel sounds are normal. There is no distension.      Palpations: Abdomen is soft.      Tenderness: There is no abdominal tenderness.   Musculoskeletal:      Cervical back: Normal range of motion and neck supple.      Right lower leg: No edema.      Comments: L BKA   Skin:     General: Skin is warm and dry.   Neurological:       Mental Status: He is alert and oriented to person, place, and time.         Fluids    Intake/Output Summary (Last 24 hours) at 10/28/2023 1207  Last data filed at 10/28/2023 0900  Gross per 24 hour   Intake 780 ml   Output 800 ml   Net -20 ml       Laboratory  Recent Labs     10/26/23  0707 10/28/23  0517   WBC 12.8* 11.5*   RBC 4.21* 3.69*   HEMOGLOBIN 11.6* 10.2*   HEMATOCRIT 38.7* 33.4*   MCV 91.9 90.5   MCH 27.6 27.6   MCHC 30.0* 30.5*   RDW 52.1* 51.7*   PLATELETCT 319 298   MPV 10.0 10.9     Recent Labs     10/26/23  0707 10/28/23  0517   SODIUM 141 137   POTASSIUM 3.4* 3.4*   CHLORIDE 100 97   CO2 30 28   GLUCOSE 159* 137*   BUN 27* 33*   CREATININE 1.08 1.30   CALCIUM 8.7 8.8                 Assessment/Plan  Anemia  Assessment & Plan  Has normocytic indices  Fe 31, may start supplement if no contraindications  Follow H/H    A-fib ([facility])  Assessment & Plan  On Amiodarone  Anticoagulated on Eliquis    Leukocytosis  Assessment & Plan  Pt afebrile and non-toxic appearing  PCT 0.17  UA 10-20 w/ negative bacteria  UCx pending  CXR small left eff vs atx, continue IS  Follow Temp and WBC    Gout  Assessment & Plan  On Allopurinol    Borderline abnormal TFTs  Assessment & Plan  TSH 10.1 and FT4 1.15  May have subclinical hypothyroidism  Needs outpt F/U    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)  Assessment & Plan  Has had prolonged hospitalization since 8/26/23  Initially presented to [facility], then [facility], now [facility]hab  S/P I+D, IV Abx, and Wound Vac  Wound care and pain control per Physiatry    Stage 3a chronic kidney disease (HCC)- (present on admission)  Assessment & Plan    CXR small left eff vs atx, continue IS  Continue Lasix as tolerated  Avoid nephrotoxins  Renal dose all meds  Monitor electrolytes  Outpt Nephrology F/U    PAD (peripheral artery disease) ([facility])- (present on admission)  Assessment & Plan  H/O L BKA  On Eliquis and Lipitor    Diabetes ([facility])- (present on admission)  Assessment & Plan  HbA1c  6.8  Increase Metformin  Continue Lantus bid and SSI for now  Outpt meds include Metformin 1000 mg bid and Actos 30 mg qd    Hypokalemia  Assessment & Plan  2/2 Lasix  Replete K+  Continue daily supplement    Benign essential HTN- (present on admission)  Assessment & Plan  On Diltiazem and Lasix  Observe blood pressure trends       Full Code

## 2023-10-28 NOTE — PROGRESS NOTES
NURSING DAILY NOTE    Name: Jose Castanon   Date of Admission: 10/24/2023   Admitting Diagnosis: Critical illness polyneuropathy (HCC)  Attending Physician: Sylvester Contreras M.d.  Allergies: Penicillins, Bee venom, Cefazolin, Linezolid, and Chicken-derived products    Safety  Patient Assist  max 2  Patient Precautions  Fall Risk  Precaution Comments  monitor HR and O2, hx of BKA LLE w/ prosthetic, wound near sacrum, medial heal wound RLE, lateral foot wound RLE  Bed Transfer Status  Contact Guard Assist (up to ModA without device; CGA c/ FWW)  Toilet Transfer Status   Minimal Assist  Assistive Devices  Walker - front wheel  Oxygen  None - Room Air  Diet/Therapeutic Dining  Current Diet Order   Procedures    Diet Order Diet: Consistent CHO (Diabetic)     Pill Administration  whole  Agitated Behavioral Scale     ABS Level of Severity       Fall Risk  Has the patient had a fall this admission?   No  Lulu Aguilera Fall Risk Scoring  21, HIGH RISK  Fall Risk Safety Measures  chair alarm and poor balance    Vitals  Temperature: 36.9 °C (98.4 °F)  Temp src: Temporal  Pulse: 96  Respiration: 18  Blood Pressure : 113/72  Blood Pressure MAP (Calculated): 86 MM HG  BP Location: Right, Upper Arm  Patient BP Position: Supine     Oxygen  Pulse Oximetry: 94 %  O2 (LPM): 0  O2 Delivery Device: None - Room Air    Bowel and Bladder  Last Bowel Movement  10/27/23  Stool Type  Type 5: Soft blob with clear cut edges (passed easily)  Bowel Device  Diaper, Other (Comment)  Continent  Bladder: Did not void   Bowel: No movement  Bladder Function  Urine Color: Yellow  Urine Clarity: Clear  Genitourinary Assessment   Bladder Assessment (WDL):  WDL Except  Ambrosio Care: Given with Soap and Water  Urinary Elimination: Catheter (Document on LDA)  Urine Color: Yellow  Urine Clarity: Clear  Bladder Device: Indwelling Catheter    Skin  Des Score   17  Sensory Interventions   Bed  Types: Bariatric Low Airloss  Skin Preventative Measures: Pillows in Use for Support / Positioning  Moisture Interventions  Moisturizers/Barriers: Barrier Wipes      Pain  Pain Rating Scale  0 - No Pain  Pain Location  Incision, Scrotum  Pain Location Orientation  Proximal  Pain Interventions   Declines    ADLs    Bathing      Linen Change   Complete  Personal Hygiene  Change Ankita Pads  Chlorhexidine Bath      Oral Care     Teeth/Dentures     Shave     Nutrition Percentage Eaten  Dinner, Between % Consumed  Environmental Precautions  Bed in Low Position  Patient Turns/Positioning  Patient Turns Self from Side to Side  Patient Turns Assistance/Tolerance     Bed Positions  Bed Locked, Bed Controls On  Head of Bed Elevated  Self regulated      Psychosocial/Neurologic Assessment  Psychosocial Assessment  Psychosocial (WDL):  Within Defined Limits  Neurologic Assessment  Neuro (WDL): Within Defined Limits  Level of Consciousness: Alert  Orientation Level: Oriented X4  Cognition: Follows commands, Appropriate attention/concentration, Appropriate judgement, Appropriate safety awareness  Speech: Clear  Motor Function/Sensation Assessment: Motor strength  Muscle Strength Right Arm: Good Strength Against Gravity and Moderate Resistance  Muscle Strength Left Arm: Good Strength Against Gravity and Moderate Resistance  Muscle Strength Right Leg: Good Strength Against Gravity and Moderate Resistance  Muscle Strength Left Leg: Good Strength Against Gravity and Moderate Resistance  EENT (WDL):  WDL Except    Cardio/Pulmonary Assessment  Edema   RLE Edema: Generalized  Respiratory Breath Sounds  RUL Breath Sounds: Clear  RML Breath Sounds: Clear  RLL Breath Sounds: Diminished  MIGUELINA Breath Sounds: Clear  LLL Breath Sounds: Diminished  Cardiac Assessment   Cardiac (WDL):  WDL Except

## 2023-10-28 NOTE — CARE PLAN
The patient is Watcher - Medium risk of patient condition declining or worsening    Shift Goals  Clinical Goals: safety, skin integrity  Patient Goals: Pain control, sleep  Family Goals: lucia    Progress made toward(s) clinical / shift goals:    Problem: Skin Integrity  Goal: Skin integrity is maintained or improved  10/28/2023 0101 by Ansley Solitario R.N.  Note: Patient's skin free from new or accidental injury this shift. Wound care rendered as ordered. Will continue to monitor.     Problem: Fall Risk - Rehab  Goal: Patient will remain free from falls  10/28/2023 0101 by Ansley Solitario R.N.  Note: Pt uses call light consistently and appropriately. Waits for assistance does not attempt self transfer this shift. Able to verbalize needs.   10/28/2023 0053 by Ansley Solitario R.N.  Outcome: Progressing

## 2023-10-29 ENCOUNTER — APPOINTMENT (OUTPATIENT)
Dept: PHYSICAL THERAPY | Facility: REHABILITATION | Age: 68
DRG: 074 | End: 2023-10-29
Attending: PHYSICAL MEDICINE & REHABILITATION
Payer: MEDICARE

## 2023-10-29 ENCOUNTER — APPOINTMENT (OUTPATIENT)
Dept: OCCUPATIONAL THERAPY | Facility: REHABILITATION | Age: 68
DRG: 074 | End: 2023-10-29
Attending: PHYSICAL MEDICINE & REHABILITATION
Payer: MEDICARE

## 2023-10-29 LAB
BACTERIA UR CULT: NORMAL
GLUCOSE BLD STRIP.AUTO-MCNC: 109 MG/DL (ref 65–99)
GLUCOSE BLD STRIP.AUTO-MCNC: 122 MG/DL (ref 65–99)
GLUCOSE BLD STRIP.AUTO-MCNC: 138 MG/DL (ref 65–99)
GLUCOSE BLD STRIP.AUTO-MCNC: 142 MG/DL (ref 65–99)
SIGNIFICANT IND 70042: NORMAL
SITE SITE: NORMAL
SOURCE SOURCE: NORMAL

## 2023-10-29 PROCEDURE — 700102 HCHG RX REV CODE 250 W/ 637 OVERRIDE(OP): Performed by: PHYSICAL MEDICINE & REHABILITATION

## 2023-10-29 PROCEDURE — 97110 THERAPEUTIC EXERCISES: CPT

## 2023-10-29 PROCEDURE — 97530 THERAPEUTIC ACTIVITIES: CPT

## 2023-10-29 PROCEDURE — A9270 NON-COVERED ITEM OR SERVICE: HCPCS | Performed by: HOSPITALIST

## 2023-10-29 PROCEDURE — A9270 NON-COVERED ITEM OR SERVICE: HCPCS | Performed by: PHYSICAL MEDICINE & REHABILITATION

## 2023-10-29 PROCEDURE — 94760 N-INVAS EAR/PLS OXIMETRY 1: CPT

## 2023-10-29 PROCEDURE — 82962 GLUCOSE BLOOD TEST: CPT

## 2023-10-29 PROCEDURE — 97535 SELF CARE MNGMENT TRAINING: CPT

## 2023-10-29 PROCEDURE — 97116 GAIT TRAINING THERAPY: CPT

## 2023-10-29 PROCEDURE — 770010 HCHG ROOM/CARE - REHAB SEMI PRIVAT*

## 2023-10-29 PROCEDURE — 99231 SBSQ HOSP IP/OBS SF/LOW 25: CPT | Performed by: HOSPITALIST

## 2023-10-29 PROCEDURE — 700102 HCHG RX REV CODE 250 W/ 637 OVERRIDE(OP): Performed by: HOSPITALIST

## 2023-10-29 RX ADMIN — GUAIFENESIN SYRUP AND DEXTROMETHORPHAN 5 ML: 100; 10 SYRUP ORAL at 11:34

## 2023-10-29 RX ADMIN — AMIODARONE HYDROCHLORIDE 200 MG: 200 TABLET ORAL at 08:44

## 2023-10-29 RX ADMIN — FUROSEMIDE 40 MG: 40 TABLET ORAL at 05:31

## 2023-10-29 RX ADMIN — SIMETHICONE 125 MG: 125 TABLET, CHEWABLE ORAL at 11:33

## 2023-10-29 RX ADMIN — SIMETHICONE 125 MG: 125 TABLET, CHEWABLE ORAL at 20:36

## 2023-10-29 RX ADMIN — POTASSIUM CHLORIDE 20 MEQ: 1500 TABLET, EXTENDED RELEASE ORAL at 08:45

## 2023-10-29 RX ADMIN — GABAPENTIN 400 MG: 400 CAPSULE ORAL at 20:36

## 2023-10-29 RX ADMIN — DILTIAZEM HYDROCHLORIDE 30 MG: 30 TABLET, FILM COATED ORAL at 05:31

## 2023-10-29 RX ADMIN — DILTIAZEM HYDROCHLORIDE 30 MG: 30 TABLET, FILM COATED ORAL at 16:13

## 2023-10-29 RX ADMIN — METFORMIN HYDROCHLORIDE 850 MG: 850 TABLET ORAL at 17:37

## 2023-10-29 RX ADMIN — GABAPENTIN 400 MG: 400 CAPSULE ORAL at 08:44

## 2023-10-29 RX ADMIN — SIMETHICONE 125 MG: 125 TABLET, CHEWABLE ORAL at 17:37

## 2023-10-29 RX ADMIN — ALLOPURINOL 100 MG: 100 TABLET ORAL at 08:44

## 2023-10-29 RX ADMIN — APIXABAN 5 MG: 5 TABLET, FILM COATED ORAL at 20:36

## 2023-10-29 RX ADMIN — OMEPRAZOLE 20 MG: 20 CAPSULE, DELAYED RELEASE ORAL at 08:44

## 2023-10-29 RX ADMIN — INSULIN GLARGINE-YFGN 10 UNITS: 100 INJECTION, SOLUTION SUBCUTANEOUS at 08:31

## 2023-10-29 RX ADMIN — SIMETHICONE 125 MG: 125 TABLET, CHEWABLE ORAL at 08:45

## 2023-10-29 RX ADMIN — GABAPENTIN 400 MG: 400 CAPSULE ORAL at 16:13

## 2023-10-29 RX ADMIN — APIXABAN 5 MG: 5 TABLET, FILM COATED ORAL at 08:45

## 2023-10-29 RX ADMIN — DILTIAZEM HYDROCHLORIDE 30 MG: 30 TABLET, FILM COATED ORAL at 20:37

## 2023-10-29 RX ADMIN — ATORVASTATIN CALCIUM 20 MG: 10 TABLET, FILM COATED ORAL at 20:36

## 2023-10-29 RX ADMIN — METFORMIN HYDROCHLORIDE 850 MG: 850 TABLET ORAL at 08:45

## 2023-10-29 ASSESSMENT — GAIT ASSESSMENTS
DISTANCE (FEET): 50
DEVIATION: BRADYKINETIC;INCREASED BASE OF SUPPORT
ASSISTIVE DEVICE: CANADIAN (LOFSTRAND) CRUTCHES
GAIT LEVEL OF ASSIST: CONTACT GUARD ASSIST

## 2023-10-29 ASSESSMENT — ACTIVITIES OF DAILY LIVING (ADL): BED_CHAIR_WHEELCHAIR_TRANSFER_DESCRIPTION: ADAPTIVE EQUIPMENT;INCREASED TIME;SET-UP OF EQUIPMENT;SUPERVISION FOR SAFETY

## 2023-10-29 ASSESSMENT — ENCOUNTER SYMPTOMS
CHILLS: 0
SHORTNESS OF BREATH: 0
NAUSEA: 0
PALPITATIONS: 0
EYES NEGATIVE: 1
BRUISES/BLEEDS EASILY: 0
VOMITING: 0
COUGH: 0
FEVER: 0
POLYDIPSIA: 0
ABDOMINAL PAIN: 0

## 2023-10-29 ASSESSMENT — PAIN DESCRIPTION - PAIN TYPE
TYPE: SURGICAL PAIN;ACUTE PAIN
TYPE: SURGICAL PAIN;ACUTE PAIN
TYPE: SURGICAL PAIN
TYPE: SURGICAL PAIN;ACUTE PAIN

## 2023-10-29 ASSESSMENT — PATIENT HEALTH QUESTIONNAIRE - PHQ9
SUM OF ALL RESPONSES TO PHQ9 QUESTIONS 1 AND 2: 0
1. LITTLE INTEREST OR PLEASURE IN DOING THINGS: NOT AT ALL
2. FEELING DOWN, DEPRESSED, IRRITABLE, OR HOPELESS: NOT AT ALL

## 2023-10-29 NOTE — THERAPY
"Physical Therapy   Daily Treatment     Patient Name: Jose Castanon  Age:  68 y.o., Sex:  male  Medical Record #: [id number]  Today's Date: 10/29/2023     Precautions  Precautions: Fall Risk, Other (See Comments)  Comments: monitor HR and O2, hx of BKA LLE w/ prosthetic, wound near sacrum, medial heal wound RLE, lateral foot wound RLE    Subjective    \"I'm ready\"     Objective       10/29/23 1431   PT Charge Group   PT Gait Training (Units) 1   PT Therapeutic Exercise (Units) 1   PT Therapeutic Activities (Units) 2   PT Total Time Spent   PT Individual Total Time Spent (Mins) 60   Gait Functional Level of Assist    Gait Level Of Assist Contact Guard Assist   Assistive Device [place]) Crutches   Distance (Feet) 50   # of Times Distance was Traveled 2   Deviation Bradykinetic;Increased Base Of Support  (forward flexed posture)   Stairs Functional Level of Assist   Level of Assist with Stairs Contact Guard Assist   # of Stairs Climbed 6  (4\" steps with B HR)   Stairs Description Extra time;Assist device/equipment;Hand rails;Limited by fatigue;Verbal cueing;Supervision for safety   Transfer Functional Level of Assist   Bed, Chair, Wheelchair Transfer Contact Guard Assist   Bed Chair Wheelchair Transfer Description Assist with two limbs;Increased time;Verbal cueing;Supervision for safety  (SPT with FWW)   Standing Lower Body Exercises   Hip Flexion 1 set of 10;Bilateral  (SLR)   Hip Extension 1 set of 10;Bilateral    Hip Abduction 1 set of 10;Bilateral   Marching 1 set of 10  (BLE)   Heel Rise 1 set of 10;Bilateral   Comments performed in // bars with seated rest between each therex   Bed Mobility    Sit to Supine Standby Assist   Sit to Stand Contact Guard Assist  (to [place] crutches, increased time and multiple attempts required)   Interdisciplinary Plan of Care Collaboration   Patient Position at End of Therapy In Bed;Call Light within Reach;Tray Table within Reach       Pt performs lower body " "dressing with SV at end of session, independent with prosthetic.     Assessment    Pt participatory and motivated, pt happy with progression to [place] crutches.     Strengths: Effective communication skills, Good insight into deficits/needs, Independent prior level of function, Motivated for self care and independence, Pleasant and cooperative, Willingly participates in therapeutic activities  Barriers: Decreased endurance, Fatigue, Home accessibility, Impaired activity tolerance, Impaired balance, Limited mobility, Pain    Plan    Gait with forearm crutches, balance, strengthening, activity tolerance.     DME  PT DME Recommendations  Wheelchair:  (Pt has 2 wheelchairs reported to be in good condition)  Assistive Device:  (Pt has FWW and forearm crutches)    Passport items to be completed:  Get in/out of bed safely, in/out of a vehicle, safely use mobility device, walk or wheel around home/community, navigate up and down stairs, show how to get up/down from the ground, ensure home is accessible, demonstrate HEP, complete caregiver training    Physical Therapy Problems (Active)       Problem: Mobility       Dates: Start:  10/25/23         Goal: STG-Within one week, patient will propel wheelchair community >150 ft mod I indoors       Dates: Start:  10/25/23            Goal: STG-Within one week, patient will ambulate household distance in // bars 10ft x 2 (awaiting prosthetic alterations)       Dates: Start:  10/25/23            Goal: STG-Within one week, patient will ambulate up/down a 2\" step in // bars min A       Dates: Start:  10/25/23               Problem: Mobility Transfers       Dates: Start:  10/25/23         Goal: STG-Within one week, patient will transfer bed to chair SPT FWW SBA/SPV       Dates: Start:  10/25/23               Problem: PT-Long Term Goals       Dates: Start:  10/25/23         Goal: LTG-By discharge, patient will propel wheelchair mod I over various surfaces >150 ft       Dates: Start:  " 10/25/23            Goal: LTG-By discharge, patient will ambulate with FWW >50 ft mod I        Dates: Start:  10/25/23            Goal: LTG-By discharge, patient will transfer one surface to another mod I with FWW + prosthetic        Dates: Start:  10/25/23            Goal: LTG-By discharge, patient will ambulate up/down 2 stairs with (newly installed railings- recommended) mod I       Dates: Start:  10/25/23

## 2023-10-29 NOTE — CARE PLAN
The patient is Stable - Low risk of patient condition declining or worsening    Shift Goals  Clinical Goals: maintain skin integrity, wound care PRN  Patient Goals: sleep  Family Goals: lucia    Progress made toward(s) clinical / shift goals:  Pt demonstrates ability to turn self in bed, SANCHO ileana bed in use, interdry in place to keep skin dry in between folds, patient is checked for wetness and stool frequently, wound dressing changed PRN soiled, fluids encouraged, wound care followed per orders, dressings changed this shift, R heel floated on pillow, mepilex applied    Patient is not progressing towards the following goals:

## 2023-10-29 NOTE — PROGRESS NOTES
NURSING DAILY NOTE    Name: Jose Castanon   Date of Admission: 10/24/2023   Admitting Diagnosis: Critical illness polyneuropathy (HCC)  Attending Physician: Sylvester Contreras M.d.  Allergies: Penicillins, Bee venom, Cefazolin, and Linezolid    Safety  Patient Assist  max 2  Patient Precautions  Fall Risk  Precaution Comments  monitor HR and O2, hx of BKA LLE w/ prosthetic, wound near sacrum, medial heal wound RLE, lateral foot wound RLE  Bed Transfer Status  Contact Guard Assist  Toilet Transfer Status   Minimal Assist  Assistive Devices  Walker - front wheel  Oxygen  Room air w/o2 available  Diet/Therapeutic Dining  Current Diet Order   Procedures    Diet Order Diet: Consistent CHO (Diabetic)     Pill Administration  whole  Agitated Behavioral Scale     ABS Level of Severity       Fall Risk  Has the patient had a fall this admission?   No  Lulu Aguilera Fall Risk Scoring  20, HIGH RISK  Fall Risk Safety Measures  chair alarm and poor balance    Vitals  Temperature: 36.4 °C (97.6 °F)  Temp src: Oral  Pulse: 80  Respiration: 17  Blood Pressure : 122/71  Blood Pressure MAP (Calculated): 88 MM HG  BP Location: Right, Upper Arm  Patient BP Position: Supine     Oxygen  Pulse Oximetry: 93 %  O2 (LPM): 0  O2 Delivery Device: Room air w/o2 available    Bowel and Bladder  Last Bowel Movement  10/28/23  Stool Type  Type 5: Soft blob with clear cut edges (passed easily)  Bowel Device  Diaper, Other (Comment)  Continent  Bladder: Did not void   Bowel: No movement  Bladder Function  Urine Color: Yellow  Urine Clarity: Clear  Genitourinary Assessment   Bladder Assessment (WDL):  WDL Except  Ambrosio Care: Given with Soap and Water  Urinary Elimination: Catheter (Document on LDA)  Urine Color: Yellow  Urine Clarity: Clear  Bladder Device: Indwelling Catheter    Skin  Des Score   17  Sensory Interventions   Bed Types: Bariatric Low Airloss  Skin Preventative  Measures: Pillows in Use for Support / Positioning, Pillows in Use to Float Heels  Moisture Interventions  Moisturizers/Barriers: Barrier Wipes, Containment Devices  Containment Devices: Indwelling Catheter      Pain  Pain Rating Scale  0 - No Pain  Pain Location  Incision, Scrotum  Pain Location Orientation  Proximal  Pain Interventions   Declines    ADLs    Bathing      Linen Change   Complete  Personal Hygiene  Perineal Care  Chlorhexidine Bath      Oral Care     Teeth/Dentures     Shave     Nutrition Percentage Eaten  *  * Meal *  *, Lunch, Between % Consumed  Environmental Precautions  Bed in Low Position  Patient Turns/Positioning  Patient Turns Self from Side to Side  Patient Turns Assistance/Tolerance     Bed Positions  Bed Locked, Bed Controls On  Head of Bed Elevated  Self regulated      Psychosocial/Neurologic Assessment  Psychosocial Assessment  Psychosocial (WDL):  Within Defined Limits  Neurologic Assessment  Neuro (WDL): Exceptions to WDL  Level of Consciousness: Alert  Orientation Level: Oriented X4  Cognition: Follows commands, Appropriate attention/concentration, Appropriate judgement, Appropriate safety awareness  Speech: Clear  Motor Function/Sensation Assessment: Motor strength  Muscle Strength Right Arm: Good Strength Against Gravity and Moderate Resistance  Muscle Strength Left Arm: Good Strength Against Gravity and Moderate Resistance  Muscle Strength Right Leg: Good Strength Against Gravity and Moderate Resistance  Muscle Strength Left Leg: Good Strength Against Gravity and Moderate Resistance  EENT (WDL):  WDL Except    Cardio/Pulmonary Assessment  Edema   RLE Edema: Generalized  Respiratory Breath Sounds  RUL Breath Sounds: Clear  RML Breath Sounds: Clear  RLL Breath Sounds: Diminished  MIGUELINA Breath Sounds: Clear  LLL Breath Sounds: Diminished  Cardiac Assessment   Cardiac (WDL):  WDL Except

## 2023-10-29 NOTE — PROGRESS NOTES
Hospital Medicine Daily Progress Note      Chief Complaint  Hypertension  Diabetes  Chronic Kidney Disease    Interval Problem Update  Blood sugars under good control.  Microbiology reviewed.    Review of Systems  Review of Systems   Constitutional:  Negative for chills and fever.   HENT: Negative.     Eyes: Negative.    Respiratory:  Negative for cough and shortness of breath.    Cardiovascular:  Negative for chest pain and palpitations.   Gastrointestinal:  Negative for abdominal pain, nausea and vomiting.   Musculoskeletal:         Wound pain   Skin:  Negative for itching and rash.   Endo/Heme/Allergies:  Negative for polydipsia. Does not bruise/bleed easily.        Physical Exam  Temp:  [36.4 °C (97.5 °F)-36.9 °C (98.5 °F)] 36.7 °C (98 °F)  Pulse:  [] 92  Resp:  [16-18] 18  BP: (122-151)/(71-87) 128/75  SpO2:  [92 %-94 %] 93 %    Physical Exam  Vitals reviewed.   Constitutional:       General: He is not in acute distress.     Appearance: Normal appearance. He is not ill-appearing.   HENT:      Head: Normocephalic and atraumatic.      Right Ear: External ear normal.      Left Ear: External ear normal.      Nose: Nose normal.      Mouth/Throat:      Pharynx: Oropharynx is clear.   Eyes:      General:         Right eye: No discharge.         Left eye: No discharge.      Extraocular Movements: Extraocular movements intact.      Conjunctiva/sclera: Conjunctivae normal.   Cardiovascular:      Rate and Rhythm: Normal rate and regular rhythm.   Pulmonary:      Effort: Pulmonary effort is normal. No respiratory distress.      Breath sounds: Normal breath sounds. No wheezing.   Abdominal:      General: Bowel sounds are normal. There is no distension.      Palpations: Abdomen is soft.      Tenderness: There is no abdominal tenderness.   Musculoskeletal:      Cervical back: Normal range of motion and neck supple.      Right lower leg: No edema.      Comments: L BKA   Skin:     General: Skin is warm and dry.    Neurological:      Mental Status: He is alert and oriented to person, place, and time.         Fluids    Intake/Output Summary (Last 24 hours) at 10/29/2023 1358  Last data filed at 10/29/2023 1329  Gross per 24 hour   Intake 1160 ml   Output 3250 ml   Net -2090 ml       Laboratory  Recent Labs     10/28/23  0517   WBC 11.5*   RBC 3.69*   HEMOGLOBIN 10.2*   HEMATOCRIT 33.4*   MCV 90.5   MCH 27.6   MCHC 30.5*   RDW 51.7*   PLATELETCT 298   MPV 10.9     Recent Labs     10/28/23  0517   SODIUM 137   POTASSIUM 3.4*   CHLORIDE 97   CO2 28   GLUCOSE 137*   BUN 33*   CREATININE 1.30   CALCIUM 8.8                 Assessment/Plan  Anemia  Assessment & Plan  Has normocytic indices  Fe 31, may start supplement if no contraindications  Follow H/H    A-fib ([facility])  Assessment & Plan  On Amiodarone  Anticoagulated on Eliquis    Leukocytosis  Assessment & Plan  Pt afebrile and non-toxic appearing  PCT 0.17  UA 10-20 w/ negative bacteria  UCx negative  CXR small left eff vs atx, continue IS  Follow WBC to resolution    Gout  Assessment & Plan  On Allopurinol    Borderline abnormal TFTs  Assessment & Plan  TSH 10.1 and FT4 1.15  May have subclinical hypothyroidism  Needs outpt F/U    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)  Assessment & Plan  Has had prolonged hospitalization since 8/26/23  Initially presented to [facility], then [facility] now Rehab  S/P I+D, IV Abx, and Wound Vac  Wound care and pain control per Physiatry    Stage 3a chronic kidney disease ([facility])- (present on admission)  Assessment & Plan    CXR small left eff vs atx, continue IS  Continue Lasix as tolerated by blood pressure and renal function  Avoid nephrotoxins  Renal dose all meds  Monitor electrolytes  Outpt Nephrology F/U    PAD (peripheral artery disease) ([facility])- (present on admission)  Assessment & Plan  H/O L BKA  On Eliquis and Lipitor    Diabetes ([facility])- (present on admission)  Assessment & Plan  HbA1c 6.8  Increased Metformin  Will decrease bid Lantus to  qam dosing  Continue SSI for now  Outpt meds include Metformin 1000 mg bid and Actos 30 mg qd    Hypokalemia  Assessment & Plan  2/2 Lasix  Repleted K+  Continue daily supplement    Benign essential HTN- (present on admission)  Assessment & Plan  On Diltiazem and Lasix  Observe blood pressure trends       Full Code

## 2023-10-29 NOTE — PROGRESS NOTES
Pt c/o pain 8/10 but refuses pain meds and bowel meds for fear of loose stools or diarrhea. Repacked scrotum open area x3 today.  BM x3 today.

## 2023-10-29 NOTE — FLOWSHEET NOTE
10/29/23 0649   Events/Summary/Plan   Events/Summary/Plan RA pulse ox check   Vital Signs   Pulse 81   Respiration 16   Pulse Oximetry 92 %   $ Pulse Oximetry (Spot Check) Yes   O2 Alarms Set & Reviewed Yes   Respiratory Assessment   Level of Consciousness Alert   Chest Exam   Work Of Breathing / Effort Within Normal Limits   Oxygen   O2 Delivery Device Room air w/o2 available

## 2023-10-29 NOTE — PROGRESS NOTES
NURSING DAILY NOTE    Name: Jose Castanon   Date of Admission: 10/24/2023   Admitting Diagnosis: Critical illness polyneuropathy (HCC)  Attending Physician: Sylvester Contreras M.d.  Allergies: Penicillins, Bee venom, Cefazolin, and Linezolid    Safety  Patient Assist  max 1  Patient Precautions  Fall Risk  Precaution Comments  monitor HR and O2, hx of BKA LLE w/ prosthetic, wound near sacrum, medial heal wound RLE, lateral foot wound RLE  Bed Transfer Status  Contact Guard Assist  Toilet Transfer Status   Minimal Assist  Assistive Devices  Rails  Oxygen  Room air w/o2 available  Diet/Therapeutic Dining  Current Diet Order   Procedures    Diet Order Diet: Consistent CHO (Diabetic)     Pill Administration  whole  Agitated Behavioral Scale     ABS Level of Severity       Fall Risk  Has the patient had a fall this admission?   No  Lulu Aguilera Fall Risk Scoring  14, MODERATE RISK  Fall Risk Safety Measures  bed alarm, chair alarm, and poor balance    Vitals  Temperature: 36.9 °C (98.5 °F)  Temp src: Oral  Pulse: 88  Respiration: 17  Blood Pressure : (!) 147/87  Blood Pressure MAP (Calculated): 107 MM HG  BP Location: Right, Upper Arm  Patient BP Position: Supine     Oxygen  Pulse Oximetry: 94 %  O2 (LPM): 0  O2 Delivery Device: Room air w/o2 available    Bowel and Bladder  Last Bowel Movement  10/28/23  Stool Type  Type 5: Soft blob with clear cut edges (passed easily)  Bowel Device  Diaper  Continent  Bladder: Did not void   Bowel: No movement  Bladder Function  Urine Color: Yellow  Urine Clarity: Clear  Genitourinary Assessment   Bladder Assessment (WDL):  WDL Except  Ambrosio Catheter: Present with Active Order  Ambrosio Care: Given with Soap and Water  Urinary Elimination: Catheter (Document on LDA)  Urine Color: Yellow  Urine Clarity: Clear  Bladder Device: Indwelling Catheter    Skin  Des Score   16  Sensory Interventions   Bed Types: Bariatric Low  Airloss  Skin Preventative Measures: Pillows in Use for Support / Positioning  Moisture Interventions  Protocols: Pressure Ulcer Prevention / Intervention Protocol in Place, Appropriate Wound Protocols in Place  Moisturizers/Barriers: Barrier Cream, Barrier Wipes, Containment Devices  Containment Devices: Indwelling Catheter      Pain  Pain Rating Scale  8 - Awful, hard to do anything  Pain Location  Incision, Scrotum  Pain Location Orientation  Proximal  Pain Interventions   Declines    ADLs    Bathing      Linen Change   Complete  Personal Hygiene  Change Ankita Pads, Moist Ankita Wipes, Perineal Care  Chlorhexidine Bath      Oral Care     Teeth/Dentures     Shave     Nutrition Percentage Eaten  *  * Meal *  *, Lunch, Between % Consumed  Environmental Precautions  Bed in Low Position, Treaded Slipper Socks on Patient  Patient Turns/Positioning  Patient Turns Self from Side to Side  Patient Turns Assistance/Tolerance     Bed Positions  Bed Controls On  Head of Bed Elevated  Self regulated      Psychosocial/Neurologic Assessment  Psychosocial Assessment  Psychosocial (WDL):  Within Defined Limits  Neurologic Assessment  Neuro (WDL): Exceptions to WDL  Level of Consciousness: Alert  Orientation Level: Oriented X4  Cognition: Follows commands, Appropriate attention/concentration, Appropriate judgement, Appropriate safety awareness  Speech: Clear  Motor Function/Sensation Assessment: Motor strength  Muscle Strength Right Arm: Good Strength Against Gravity and Moderate Resistance  Muscle Strength Left Arm: Good Strength Against Gravity and Moderate Resistance  RLE Sensation: Numbness, Tingling (baseline neuropathy)  Muscle Strength Right Leg: Good Strength Against Gravity and Moderate Resistance  LLE Sensation: Numbness, Tingling (baseline neuropathy)  Muscle Strength Left Leg: Good Strength Against Gravity and Moderate Resistance  EENT (WDL):  WDL Except    Cardio/Pulmonary Assessment  Edema   RLE Edema:  Generalized  Respiratory Breath Sounds  RUL Breath Sounds: Clear  RML Breath Sounds: Clear  RLL Breath Sounds: Diminished  MIGUELINA Breath Sounds: Clear  LLL Breath Sounds: Diminished  Cardiac Assessment   Cardiac (WDL):  WDL Except

## 2023-10-29 NOTE — CARE PLAN
The patient is Stable - Low risk of patient condition declining or worsening    Shift Goals  Clinical Goals: wound care, safety  Patient Goals: endurance  Family Goals: lucia    Progress made toward(s) clinical / shift goals:      Problem: Skin Integrity  Goal: Skin integrity is maintained or improved  Outcome: Progressing     Problem: Fall Risk - Rehab  Goal: Patient will remain free from falls  Outcome: Progressing       Patient is not progressing towards the following goals:none

## 2023-10-30 ENCOUNTER — APPOINTMENT (OUTPATIENT)
Dept: PHYSICAL THERAPY | Facility: REHABILITATION | Age: 68
DRG: 074 | End: 2023-10-30
Attending: PHYSICAL MEDICINE & REHABILITATION
Payer: MEDICARE

## 2023-10-30 ENCOUNTER — APPOINTMENT (OUTPATIENT)
Dept: OCCUPATIONAL THERAPY | Facility: REHABILITATION | Age: 68
DRG: 074 | End: 2023-10-30
Attending: PHYSICAL MEDICINE & REHABILITATION
Payer: MEDICARE

## 2023-10-30 LAB
GLUCOSE BLD STRIP.AUTO-MCNC: 102 MG/DL (ref 65–99)
GLUCOSE BLD STRIP.AUTO-MCNC: 105 MG/DL (ref 65–99)
GLUCOSE BLD STRIP.AUTO-MCNC: 120 MG/DL (ref 65–99)
GLUCOSE BLD STRIP.AUTO-MCNC: 95 MG/DL (ref 65–99)

## 2023-10-30 PROCEDURE — 97530 THERAPEUTIC ACTIVITIES: CPT

## 2023-10-30 PROCEDURE — 97530 THERAPEUTIC ACTIVITIES: CPT | Mod: CO

## 2023-10-30 PROCEDURE — 97116 GAIT TRAINING THERAPY: CPT

## 2023-10-30 PROCEDURE — 97110 THERAPEUTIC EXERCISES: CPT | Mod: CO

## 2023-10-30 PROCEDURE — A9270 NON-COVERED ITEM OR SERVICE: HCPCS | Performed by: PHYSICAL MEDICINE & REHABILITATION

## 2023-10-30 PROCEDURE — 97110 THERAPEUTIC EXERCISES: CPT

## 2023-10-30 PROCEDURE — 82962 GLUCOSE BLOOD TEST: CPT | Mod: 91

## 2023-10-30 PROCEDURE — 700102 HCHG RX REV CODE 250 W/ 637 OVERRIDE(OP): Performed by: HOSPITALIST

## 2023-10-30 PROCEDURE — 700102 HCHG RX REV CODE 250 W/ 637 OVERRIDE(OP): Performed by: PHYSICAL MEDICINE & REHABILITATION

## 2023-10-30 PROCEDURE — 770010 HCHG ROOM/CARE - REHAB SEMI PRIVAT*

## 2023-10-30 PROCEDURE — 99231 SBSQ HOSP IP/OBS SF/LOW 25: CPT | Performed by: HOSPITALIST

## 2023-10-30 PROCEDURE — 97112 NEUROMUSCULAR REEDUCATION: CPT

## 2023-10-30 PROCEDURE — A9270 NON-COVERED ITEM OR SERVICE: HCPCS | Performed by: HOSPITALIST

## 2023-10-30 PROCEDURE — 99232 SBSQ HOSP IP/OBS MODERATE 35: CPT | Performed by: PHYSICAL MEDICINE & REHABILITATION

## 2023-10-30 RX ORDER — AMOXICILLIN 250 MG
1 CAPSULE ORAL DAILY
Status: DISCONTINUED | OUTPATIENT
Start: 2023-10-31 | End: 2023-10-31

## 2023-10-30 RX ORDER — BISACODYL 10 MG
10 SUPPOSITORY, RECTAL RECTAL
Status: DISCONTINUED | OUTPATIENT
Start: 2023-10-30 | End: 2023-10-31

## 2023-10-30 RX ORDER — POLYETHYLENE GLYCOL 3350 17 G/17G
1 POWDER, FOR SOLUTION ORAL
Status: DISCONTINUED | OUTPATIENT
Start: 2023-10-30 | End: 2023-10-31

## 2023-10-30 RX ADMIN — ALLOPURINOL 100 MG: 100 TABLET ORAL at 08:27

## 2023-10-30 RX ADMIN — SIMETHICONE 125 MG: 125 TABLET, CHEWABLE ORAL at 12:04

## 2023-10-30 RX ADMIN — GABAPENTIN 400 MG: 400 CAPSULE ORAL at 08:27

## 2023-10-30 RX ADMIN — APIXABAN 5 MG: 5 TABLET, FILM COATED ORAL at 08:27

## 2023-10-30 RX ADMIN — APIXABAN 5 MG: 5 TABLET, FILM COATED ORAL at 20:24

## 2023-10-30 RX ADMIN — GABAPENTIN 400 MG: 400 CAPSULE ORAL at 15:13

## 2023-10-30 RX ADMIN — OMEPRAZOLE 20 MG: 20 CAPSULE, DELAYED RELEASE ORAL at 08:27

## 2023-10-30 RX ADMIN — GUAIFENESIN SYRUP AND DEXTROMETHORPHAN 5 ML: 100; 10 SYRUP ORAL at 19:42

## 2023-10-30 RX ADMIN — SIMETHICONE 125 MG: 125 TABLET, CHEWABLE ORAL at 08:26

## 2023-10-30 RX ADMIN — GUAIFENESIN SYRUP AND DEXTROMETHORPHAN 5 ML: 100; 10 SYRUP ORAL at 09:51

## 2023-10-30 RX ADMIN — DILTIAZEM HYDROCHLORIDE 30 MG: 30 TABLET, FILM COATED ORAL at 21:33

## 2023-10-30 RX ADMIN — SIMETHICONE 125 MG: 125 TABLET, CHEWABLE ORAL at 20:24

## 2023-10-30 RX ADMIN — GABAPENTIN 400 MG: 400 CAPSULE ORAL at 20:24

## 2023-10-30 RX ADMIN — METFORMIN HYDROCHLORIDE 850 MG: 850 TABLET ORAL at 16:43

## 2023-10-30 RX ADMIN — DILTIAZEM HYDROCHLORIDE 30 MG: 30 TABLET, FILM COATED ORAL at 05:38

## 2023-10-30 RX ADMIN — POTASSIUM CHLORIDE 20 MEQ: 1500 TABLET, EXTENDED RELEASE ORAL at 08:27

## 2023-10-30 RX ADMIN — DILTIAZEM HYDROCHLORIDE 30 MG: 30 TABLET, FILM COATED ORAL at 15:13

## 2023-10-30 RX ADMIN — SIMETHICONE 125 MG: 125 TABLET, CHEWABLE ORAL at 16:44

## 2023-10-30 RX ADMIN — FUROSEMIDE 40 MG: 40 TABLET ORAL at 05:38

## 2023-10-30 RX ADMIN — ATORVASTATIN CALCIUM 20 MG: 10 TABLET, FILM COATED ORAL at 20:24

## 2023-10-30 RX ADMIN — SENNOSIDES AND DOCUSATE SODIUM 1 TABLET: 50; 8.6 TABLET ORAL at 08:27

## 2023-10-30 RX ADMIN — METFORMIN HYDROCHLORIDE 850 MG: 850 TABLET ORAL at 08:27

## 2023-10-30 RX ADMIN — AMIODARONE HYDROCHLORIDE 200 MG: 200 TABLET ORAL at 08:27

## 2023-10-30 ASSESSMENT — GAIT ASSESSMENTS
DISTANCE (FEET): 20
ASSISTIVE DEVICE: CANADIAN (LOFSTRAND) CRUTCHES
ASSISTIVE DEVICE: CANADIAN (LOFSTRAND) CRUTCHES
DISTANCE (FEET): 50
GAIT LEVEL OF ASSIST: CONTACT GUARD ASSIST
DEVIATION: BRADYKINETIC;DECREASED HEEL STRIKE;DECREASED TOE OFF
DEVIATION: BRADYKINETIC;DECREASED HEEL STRIKE;DECREASED TOE OFF
DISTANCE (FEET): 50
ASSISTIVE DEVICE: CANADIAN (LOFSTRAND) CRUTCHES
GAIT LEVEL OF ASSIST: CONTACT GUARD ASSIST
DEVIATION: BRADYKINETIC;DECREASED HEEL STRIKE;DECREASED TOE OFF
GAIT LEVEL OF ASSIST: CONTACT GUARD ASSIST

## 2023-10-30 ASSESSMENT — ENCOUNTER SYMPTOMS
NAUSEA: 0
BRUISES/BLEEDS EASILY: 0
VOMITING: 0
ABDOMINAL PAIN: 0
CHILLS: 0
COUGH: 0
PALPITATIONS: 0
FEVER: 0
EYES NEGATIVE: 1
POLYDIPSIA: 0
SHORTNESS OF BREATH: 0

## 2023-10-30 ASSESSMENT — ACTIVITIES OF DAILY LIVING (ADL)
BED_CHAIR_WHEELCHAIR_TRANSFER_DESCRIPTION: ADAPTIVE EQUIPMENT
BED_CHAIR_WHEELCHAIR_TRANSFER_DESCRIPTION: ADAPTIVE EQUIPMENT

## 2023-10-30 NOTE — THERAPY
Physical Therapy   Daily Treatment     Patient Name: Jose Castanon  Age:  68 y.o., Sex:  male  Medical Record #: [id number]  Today's Date: 10/30/2023     Precautions  Precautions: (P) Fall Risk, Other (See Comments)  Comments: (P) monitor HR and O2, hx of BKA LLE w/ prosthetic, wound near sacrum, medial heal wound RLE, lateral foot wound RLE    Subjective    Pt reported fatigue from previous sessions, but agreeable to POC.      Objective       10/30/23 0831 10/30/23 1401   PT Charge Group   PT Gait Training (Units) 1  --    PT Neuromuscular Re-Education / Balance (Units) 2 1   PT Therapeutic Activities (Units) 1 1   PT Total Time Spent   PT Individual Total Time Spent (Mins) 60 30   Precautions   Precautions Fall Risk;Other (See Comments) Fall Risk;Other (See Comments)   Comments monitor HR and O2, hx of BKA LLE w/ prosthetic, wound near sacrum, medial heal wound RLE, lateral foot wound RLE monitor HR and O2, hx of BKA LLE w/ prosthetic, wound near sacrum, medial heal wound RLE, lateral foot wound RLE   Vitals   Pulse (!) 113  --    Patient BP Position Sitting  (after bout of ambulation)  --    Room Air Oximetry 96  --    Gait Functional Level of Assist    Gait Level Of Assist Contact Guard Assist Contact Guard Assist   Assistive Device [place] (Lofstrand) Crutches [place] (Lofstrand) Crutches   Distance (Feet) 50  (+ unilateral [place] crutch in boundaries of // bars CGA 20 ft x2, with improved posture) 20   # of Times Distance was Traveled 1 1   Deviation Bradykinetic;Decreased Heel Strike;Decreased Toe Off Bradykinetic;Decreased Heel Strike;Decreased Toe Off  (trial for crutch height adjustment)   Transfer Functional Level of Assist   Bed, Chair, Wheelchair Transfer Standby Assist Contact Guard Assist   Bed Chair Wheelchair Transfer Description Adaptive equipment  (SPT FWW) Adaptive equipment  (SPT [place] cructches)   Bed Mobility    Supine to Sit Supervised  (trapezes)  --    Sit to Supine  --   Supervised   Sit to Stand Standby Assist  --    Scooting Supervised Supervised   Rolling Supervised Supervised   Neuro-Muscular Treatments   Neuro-Muscular Treatments Weight Shift Right;Weight Shift Left;Verbal Cuing;Tactile Cuing;Sequencing Weight Shift Right;Weight Shift Left   Comments Seated EOB with adaptive equipment, pt retreived clothing, prothetic, and dressed with SPV balance. Standing at grab bar in bathroom for RN to examine and change wound dressing- SBA. Pt provided with pressure relieving wc air cushion. [place] crutches adjusted up one notch to improve standing posture. EOB seated balance to remove shorts, and doff prosthetic, SBA/SPV/ set up.   Interdisciplinary Plan of Care Collaboration   IDT Collaboration with  Certified Nursing Assistant;Nursing Nursing;Physical Therapist   Patient Position at End of Therapy Seated  (handoff to RN for further assessment of wound needs) In Bed;Tray Table within Reach;Call Light within Reach;Phone within Reach   Collaboration Comments Ambrosio was emptied for CNA. RN inspected wound/ changed dressing. POC, handoff form previous PT session. RN notifed that wound may be draining (residue on bedding), heel wound may be leaking (residue in sock), and concerns over Ambrosio.         Assessment    In session 1, pt participated in amb training with 2, and 1 [place] crutches, per PLOF. Pt continues to demonstrated good self awareness of when rest in warranted.  Pt was limited by dec activity tolerance, and inc HR with activity.     In session2, pt's [place] crutches were adjusted one notch to assist with improved dynamic posture, and trialed in room. Pt was limited by fatigue, but remains highly motivated.     Strengths: Effective communication skills, Good insight into deficits/needs, Independent prior level of function, Motivated for self care and independence, Pleasant and cooperative, Willingly participates in therapeutic activities  Barriers: Decreased endurance,  "Fatigue, Home accessibility, Impaired activity tolerance, Impaired balance, Limited mobility, Pain    Plan    Gait with forearm crutches, balance, strengthening, activity tolerance.     DME  PT DME Recommendations  Wheelchair:  (Pt has 2 wheelchairs reported to be in good condition)  Assistive Device:  (Pt has FWW and forearm crutches)    Passport items to be completed:  Get in/out of bed safely, in/out of a vehicle, safely use mobility device, walk or wheel around home/community, navigate up and down stairs, show how to get up/down from the ground, ensure home is accessible, demonstrate HEP, complete caregiver training    Physical Therapy Problems (Active)       Problem: Mobility       Dates: Start:  10/25/23         Goal: STG-Within one week, patient will propel wheelchair community >150 ft mod I indoors       Dates: Start:  10/25/23            Goal: STG-Within one week, patient will ambulate household distance in // bars 10ft x 2 (awaiting prosthetic alterations)       Dates: Start:  10/25/23            Goal: STG-Within one week, patient will ambulate up/down a 2\" step in // bars min A       Dates: Start:  10/25/23               Problem: Mobility Transfers       Dates: Start:  10/25/23         Goal: STG-Within one week, patient will transfer bed to chair SPT FWW SBA/SPV       Dates: Start:  10/25/23               Problem: PT-Long Term Goals       Dates: Start:  10/25/23         Goal: LTG-By discharge, patient will propel wheelchair mod I over various surfaces >150 ft       Dates: Start:  10/25/23            Goal: LTG-By discharge, patient will ambulate with FWW >50 ft mod I        Dates: Start:  10/25/23            Goal: LTG-By discharge, patient will transfer one surface to another mod I with FWW + prosthetic        Dates: Start:  10/25/23            Goal: LTG-By discharge, patient will ambulate up/down 2 stairs with (newly installed railings- recommended) mod I       Dates: Start:  10/25/23              "

## 2023-10-30 NOTE — CARE PLAN
"  Problem: Skin Integrity  Goal: Skin integrity is maintained or improved  Note:   Johan Score  13  Pt's skin remains Intact and free from new or accidental injury this shift, with no s/s of infection.  RN wound protocol checked.  Encouraged hydration and educated about the importance of adequate nutrition to maintain skin integrity.  Will continue to monitor.      Problem: Fall Risk - Rehab  Goal: Patient will remain free from falls  Note: Lulu Aguilera Fall risk Assessment Score: 14      Moderate fall risk Interventions  - Bed and strip alarm  - Yellow sign by the door  - Yellow wrist band \"Fall risk\"  - Room near to the nurse station  - Do not leave patient unattended in the bathroom  - Fall risk education provided     The patient is Stable - Low risk of patient condition declining or worsening    Shift Goals  Clinical Goals: Wound care  Patient Goals: Sleep well  Family Goals: lucia    Progress made toward(s) clinical / shift goals:      Patient is not progressing towards the following goals:      "

## 2023-10-30 NOTE — THERAPY
"Physical Therapy   Daily Treatment     Patient Name: Jose Castanon  Age:  68 y.o., Sex:  male  Medical Record #: [id number]  Today's Date: 10/30/2023     Precautions  Precautions: Fall Risk, Other (See Comments)  Comments: monitor HR and O2, hx of BKA LLE w/ prosthetic, wound near sacrum, medial heal wound RLE, lateral foot wound RLE    Subjective    \"I'm good\"     Objective       10/30/23 1301   PT Charge Group   PT Gait Training (Units) 1   PT Therapeutic Exercise (Units) 1   PT Therapeutic Activities (Units) 2   PT Total Time Spent   PT Individual Total Time Spent (Mins) 60   Pain 0 - 10 Group   Pain Rating Scale (NPRS)   (pt reports bearable pain at groin)   Gait Functional Level of Assist    Gait Level Of Assist Contact Guard Assist   Assistive Device [place] (Lofstrand) Crutches  (RUE support only)   Distance (Feet) 50   # of Times Distance was Traveled 2   Deviation Bradykinetic;Decreased Heel Strike;Decreased Toe Off   Stairs Functional Level of Assist   Level of Assist with Stairs Contact Guard Assist   # of Stairs Climbed 4  (6\" steps with step to gait, B HR)   Stairs Description Extra time;Hand rails;Limited by fatigue;Verbal cueing;Safety concerns   Transfer Functional Level of Assist   Bed, Chair, Wheelchair Transfer Standby Assist  (lofstrand)   Bed Chair Wheelchair Transfer Description Assist with two limbs;Adaptive equipment;Increased time;Set-up of equipment   Standing Lower Body Exercises   Other Exercises 2x laps of 15ft, 20ft side steps at wall bar with CG/SBA   Bed Mobility    Sit to Stand Standby Assist  (lofstrand crutch(es), occasionally requires multiple attempts, cues for scooting to edge of chair)   Interdisciplinary Plan of Care Collaboration   Patient Position at End of Therapy Seated;Chair Alarm On  (with PT)       2x 30ft retroambulation with L wall bar and R lofstrand crutch, SBA         Assessment    Pt tolerated session well, limited by poor activity tolerance. Multiple, " "prolonged rest breaks.     Strengths: Effective communication skills, Good insight into deficits/needs, Independent prior level of function, Motivated for self care and independence, Pleasant and cooperative, Willingly participates in therapeutic activities  Barriers: Decreased endurance, Fatigue, Home accessibility, Impaired activity tolerance, Impaired balance, Limited mobility, Pain    Plan    Gait with forearm crutches, balance, strengthening, activity tolerance.     DME  PT DME Recommendations  Wheelchair:  (Pt has 2 wheelchairs reported to be in good condition)  Assistive Device:  (Pt has FWW and forearm crutches)    Passport items to be completed:  Get in/out of bed safely, in/out of a vehicle, safely use mobility device, walk or wheel around home/community, navigate up and down stairs, show how to get up/down from the ground, ensure home is accessible, demonstrate HEP, complete caregiver training    Physical Therapy Problems (Active)       Problem: Mobility       Dates: Start:  10/25/23         Goal: STG-Within one week, patient will propel wheelchair community >150 ft mod I indoors       Dates: Start:  10/25/23            Goal: STG-Within one week, patient will ambulate household distance in // bars 10ft x 2 (awaiting prosthetic alterations)       Dates: Start:  10/25/23            Goal: STG-Within one week, patient will ambulate up/down a 2\" step in // bars min A       Dates: Start:  10/25/23               Problem: Mobility Transfers       Dates: Start:  10/25/23         Goal: STG-Within one week, patient will transfer bed to chair SPT FWW SBA/SPV       Dates: Start:  10/25/23               Problem: PT-Long Term Goals       Dates: Start:  10/25/23         Goal: LTG-By discharge, patient will propel wheelchair mod I over various surfaces >150 ft       Dates: Start:  10/25/23            Goal: LTG-By discharge, patient will ambulate with FWW >50 ft mod I        Dates: Start:  10/25/23            Goal: LTG-By " discharge, patient will transfer one surface to another mod I with FWW + prosthetic        Dates: Start:  10/25/23            Goal: LTG-By discharge, patient will ambulate up/down 2 stairs with (newly installed railings- recommended) mod I       Dates: Start:  10/25/23

## 2023-10-30 NOTE — DIETARY
Nutrition Update:    Day 6 of admit.  Jose Castanon is a 68 y.o. 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  Patient being followed to optimize nutrition.    Current Diet: consistent CHO (diabetic) diet w/ Arginaid BID and Boost Glucose Control 1x daily    Recorded PO has been good at %. Pt reports that he is drinking his Arginaid BID and Boost GC 1x daily. He is motivated to get better.     Problem: Nutritional:  Goal: Achieve adequate nutritional intake  Description: Patient will consume >50% of meals and supplements  Outcome: met    RD will follow weekly or PRN.

## 2023-10-30 NOTE — PROGRESS NOTES
Physical Medicine & Rehabilitation Progress Note    Encounter Date: 10/30/2023    Chief Complaint: Decreased mobility, pain    Interval Events (Subjective):  Patient sitting up in therapy gym. He reports therapy is going well. Denies NVD, would like bowel medications reduced to daily. Denies SOB.     _____________________________________  Interdisciplinary Team Conference   Most recent IDT on 10/26/2023    Discharge Date/Disposition:  11/14/23  _____________________________________    Objective:  VITAL SIGNS: /69   Pulse 73   Temp 36.3 °C (97.3 °F)   Resp 18   Ht 1.829 m (6')   Wt (!) 162 kg (357 lb 12.8 oz)   SpO2 95%   BMI 48.53 kg/m²   Gen: NAD  Psych: Mood and affect appropriate  CV: RRR, 1+ RLE edema  Resp: CTAB, no upper airway sounds  Abd: NTND  Neuro: AOx4, following commands    Laboratory Values:  Recent Results (from the past 72 hour(s))   POCT glucose device results    Collection Time: 10/27/23  4:59 PM   Result Value Ref Range    POC Glucose, Blood 139 (H) 65 - 99 mg/dL   POCT glucose device results    Collection Time: 10/27/23  8:42 PM   Result Value Ref Range    POC Glucose, Blood 161 (H) 65 - 99 mg/dL   CBC WITHOUT DIFFERENTIAL    Collection Time: 10/28/23  5:17 AM   Result Value Ref Range    WBC 11.5 (H) 4.8 - 10.8 K/uL    RBC 3.69 (L) 4.70 - 6.10 M/uL    Hemoglobin 10.2 (L) 14.0 - 18.0 g/dL    Hematocrit 33.4 (L) 42.0 - 52.0 %    MCV 90.5 81.4 - 97.8 fL    MCH 27.6 27.0 - 33.0 pg    MCHC 30.5 (L) 32.3 - 36.5 g/dL    RDW 51.7 (H) 35.9 - 50.0 fL    Platelet Count 298 164 - 446 K/uL    MPV 10.9 9.0 - 12.9 fL   Basic Metabolic Panel    Collection Time: 10/28/23  5:17 AM   Result Value Ref Range    Sodium 137 135 - 145 mmol/L    Potassium 3.4 (L) 3.6 - 5.5 mmol/L    Chloride 97 96 - 112 mmol/L    Co2 28 20 - 33 mmol/L    Glucose 137 (H) 65 - 99 mg/dL    Bun 33 (H) 8 - 22 mg/dL    Creatinine 1.30 0.50 - 1.40 mg/dL    Calcium 8.8 8.5 - 10.5 mg/dL    Anion Gap 12.0 7.0 - 16.0   ESTIMATED GFR     Collection Time: 10/28/23  5:17 AM   Result Value Ref Range    GFR (CKD-EPI) 60 >60 mL/min/1.73 m 2   POCT glucose device results    Collection Time: 10/28/23  7:42 AM   Result Value Ref Range    POC Glucose, Blood 133 (H) 65 - 99 mg/dL   POCT glucose device results    Collection Time: 10/28/23 11:08 AM   Result Value Ref Range    POC Glucose, Blood 134 (H) 65 - 99 mg/dL   POCT glucose device results    Collection Time: 10/28/23  5:01 PM   Result Value Ref Range    POC Glucose, Blood 124 (H) 65 - 99 mg/dL   POCT glucose device results    Collection Time: 10/28/23  9:15 PM   Result Value Ref Range    POC Glucose, Blood 138 (H) 65 - 99 mg/dL   POCT glucose device results    Collection Time: 10/29/23  8:26 AM   Result Value Ref Range    POC Glucose, Blood 142 (H) 65 - 99 mg/dL   POCT glucose device results    Collection Time: 10/29/23 11:21 AM   Result Value Ref Range    POC Glucose, Blood 122 (H) 65 - 99 mg/dL   POCT glucose device results    Collection Time: 10/29/23  5:11 PM   Result Value Ref Range    POC Glucose, Blood 109 (H) 65 - 99 mg/dL   POCT glucose device results    Collection Time: 10/29/23  8:41 PM   Result Value Ref Range    POC Glucose, Blood 138 (H) 65 - 99 mg/dL   POCT glucose device results    Collection Time: 10/30/23  7:26 AM   Result Value Ref Range    POC Glucose, Blood 120 (H) 65 - 99 mg/dL       Medications:  Scheduled Medications   Medication Dose Frequency    insulin GLARGINE  10 Units QAM INSULIN    metFORMIN  850 mg BID WITH MEALS    simethicone  125 mg 4X/DAY WITH MEALS + NIGHTLY    potassium chloride SA  20 mEq DAILY    senna-docusate  1 Tablet BID    insulin regular  2-12 Units 4X/DAY ACHS    furosemide  40 mg Q DAY    allopurinol  100 mg DAILY    atorvastatin  20 mg Q EVENING    gabapentin  400 mg TID    apixaban  5 mg BID    amiodarone  200 mg DAILY    dilTIAZem  30 mg Q8HRS    melatonin  3 mg QHS    omeprazole  20 mg DAILY     PRN medications: guaiFENesin dextromethorphan,  senna-docusate **AND** polyethylene glycol/lytes **AND** magnesium hydroxide **AND** bisacodyl, insulin regular **AND** POC blood glucose manual result **AND** NOTIFY MD and PharmD **AND** Administer 20 grams of glucose (approximately 8 ounces of fruit juice) every 15 minutes PRN FSBG less than 70 mg/dL **AND** dextrose bolus, Respiratory Therapy Consult, hydrALAZINE, acetaminophen, mag hydrox-al hydrox-simeth, ondansetron **OR** ondansetron, traZODone, sodium chloride, oxyCODONE immediate-release **OR** oxyCODONE immediate-release, traMADol    Diet:  Current Diet Order   Procedures    Diet Order Diet: Consistent CHO (Diabetic)       Medical Decision Making and Plan:   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 Per guidelines, 15 hours per week between PT, OT and/or SLP.  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      HTN/A fib - Patient now on Amiodarone 200 mg daily, Lasix 40 mg BID, and Eliquis 5 mg BID     DM2 with hyperglycemia - Patient on Glargine 10 U BID and SSI. Consult hospitalist. Started on Metformin -> Metformin 850 mg BID, continue Metformin blood sugars improved     PAD - Patient on Eliquis. Wound care recommending ABIs/US on 10/26/23 - no significant stenosis     CKD3a - Avoid nephrotoxic agents. Check AM CMP - Cr stable     Leukocytosis - 12 on 10/25/23    Anemia - 11.2 on admission. Monitor    Hypokalemia - 3.5 on admission, consult hospitalist, on Lasix. Started on 20 mEq, continue K supplement    Gout - Patient on Allopurinol 100 mg  daily     Neuropathy - Patient on Gabapentin 400 mg TID. Continue Gabapentin 400 mg TID,      L BKA - Patient with L BKA. To bring in prosthetic and crutches     Morbid Obesity due to excess calories - Patient with BMI of 48.5 on  admission, meets medical criteria. Dietitian to consult     Pain - Patient on PRN Tylenol, PRN Oxycodone, and PRN Tramadol. Also on Gabapentin 400 mg TID     Skin - Patient at risk for skin breakdown due to debility in areas including sacrum, achilles, elbows and head in addition to other sites. Nursing to assess skin daily.      GI Ppx - Patient on Prilosec for GERD prophylaxis. Patient on Senna-docusate for constipation prophylaxis.      DVT Ppx - Patient Eliquis on transfer. Continue Eliquis as limited ambulation  ____________________________________    T. Clay Contreras MD/PhD  [facility] - Physical Medicine & Rehabilitation   [facility] - Brain Injury Medicine   ____________________________________

## 2023-10-30 NOTE — PROGRESS NOTES
NURSING DAILY NOTE    Name: Jose Castanon   Date of Admission: 10/24/2023   Admitting Diagnosis: Critical illness polyneuropathy (HCC)  Attending Physician: Sylvester Contreras M.d.  Allergies: Penicillins, Bee venom, Cefazolin, and Linezolid    Safety  Patient Assist  max 2  Patient Precautions  Fall Risk, Other (See Comments)  Precaution Comments  monitor HR and O2, hx of BKA LLE w/ prosthetic, wound near sacrum, medial heal wound RLE, lateral foot wound RLE  Bed Transfer Status  Contact Guard Assist  Toilet Transfer Status   Minimal Assist  Assistive Devices  Rails  Oxygen  Room air w/o2 available  Diet/Therapeutic Dining  Current Diet Order   Procedures    Diet Order Diet: Consistent CHO (Diabetic)     Pill Administration  whole  Agitated Behavioral Scale     ABS Level of Severity       Fall Risk  Has the patient had a fall this admission?   No  Lulu Aguilera Fall Risk Scoring  14, MODERATE RISK  Fall Risk Safety Measures  bed alarm, chair alarm, poor balance, and low vision/ hearing    Vitals  Temperature: 37 °C (98.6 °F)  Temp src: Oral  Pulse: (!) 102  Respiration: 18  Blood Pressure : 134/78  Blood Pressure MAP (Calculated): 97 MM HG  BP Location: Right, Upper Arm  Patient BP Position: Supine     Oxygen  Pulse Oximetry: 95 %  O2 (LPM): 0  O2 Delivery Device: Room air w/o2 available    Bowel and Bladder  Last Bowel Movement  10/29/23  Stool Type  Type 5: Soft blob with clear cut edges (passed easily)  Bowel Device  Diaper  Continent  Bladder: Did not void   Bowel: No movement  Bladder Function  Urine Color: Yellow  Urine Clarity: Clear  Genitourinary Assessment   Bladder Assessment (WDL):  WDL Except  Ambrosio Catheter: Present with Active Order  Ambrosio Care: Ambrosio Observed, Cleaning Not Indicated  Urinary Elimination: Catheter (Document on LDA)  Urine Color: Yellow  Urine Clarity: Clear  Bladder Device: Indwelling Catheter    Skin  Des Score    13  Sensory Interventions   Bed Types: Bariatric Low Airloss  Skin Preventative Measures: Pillows in Use for Support / Positioning  Moisture Interventions  Protocols: Pressure Ulcer Prevention / Intervention Protocol in Place, Appropriate Wound Protocols in Place  Moisturizers/Barriers: Barrier Cream, Barrier Wipes  Containment Devices: Indwelling Catheter      Pain  Pain Rating Scale  0 - No Pain  Pain Location  Incision, Scrotum  Pain Location Orientation  Proximal  Pain Interventions   Declines    ADLs    Bathing   Full Bed Bath  Linen Change   Complete  Personal Hygiene  Change Ankita Pads, Moist Ankita Wipes, Perineal Care  Chlorhexidine Bath      Oral Care     Teeth/Dentures     Shave     Nutrition Percentage Eaten  *  * Meal *  *, Lunch, Between % Consumed  Environmental Precautions  Bed in Low Position, Treaded Slipper Socks on Patient  Patient Turns/Positioning  Patient Turns Self from Side to Side  Patient Turns Assistance/Tolerance     Bed Positions  Bed Controls On  Head of Bed Elevated  Self regulated      Psychosocial/Neurologic Assessment  Psychosocial Assessment  Psychosocial (WDL):  Within Defined Limits  Neurologic Assessment  Neuro (WDL): Exceptions to WDL  Level of Consciousness: Alert  Orientation Level: Oriented X4  Cognition: Follows commands, Appropriate attention/concentration, Appropriate judgement, Appropriate safety awareness  Speech: Clear  Motor Function/Sensation Assessment: Motor strength  Muscle Strength Right Arm: Good Strength Against Gravity and Moderate Resistance  Muscle Strength Left Arm: Good Strength Against Gravity and Moderate Resistance  RLE Sensation: Numbness, Tingling (baseline neuropathy)  Muscle Strength Right Leg: Good Strength Against Gravity and Moderate Resistance  LLE Sensation: Numbness, Tingling (baseline neuropathy)  Muscle Strength Left Leg: Good Strength Against Gravity and Moderate Resistance  EENT (WDL):  WDL Except    Cardio/Pulmonary Assessment  Edema    RLE Edema: Generalized  Respiratory Breath Sounds  RUL Breath Sounds: Clear  RML Breath Sounds: Clear  RLL Breath Sounds: Diminished  MIGUELINA Breath Sounds: Clear  LLL Breath Sounds: Diminished  Cardiac Assessment   Cardiac (WDL):  WDL Except

## 2023-10-30 NOTE — PROGRESS NOTES
Hospital Medicine Daily Progress Note      Chief Complaint  Hypertension  Diabetes  Chronic Kidney Disease    Interval Problem Update  Doing well.  Last 24 hour blood sugars have been under 150.    Review of Systems  Review of Systems   Constitutional:  Negative for chills and fever.   HENT: Negative.     Eyes: Negative.    Respiratory:  Negative for cough and shortness of breath.    Cardiovascular:  Negative for chest pain and palpitations.   Gastrointestinal:  Negative for abdominal pain, nausea and vomiting.   Musculoskeletal:         Wound pain   Skin:  Negative for itching and rash.   Endo/Heme/Allergies:  Negative for polydipsia. Does not bruise/bleed easily.        Physical Exam  Temp:  [36.3 °C (97.3 °F)-37 °C (98.6 °F)] 36.3 °C (97.3 °F)  Pulse:  [] 113  Resp:  [18] 18  BP: (108-146)/(69-79) 108/69  SpO2:  [93 %-95 %] 95 %    Physical Exam  Vitals reviewed.   Constitutional:       General: He is not in acute distress.     Appearance: Normal appearance. He is not ill-appearing.   HENT:      Head: Normocephalic and atraumatic.      Right Ear: External ear normal.      Left Ear: External ear normal.      Nose: Nose normal.      Mouth/Throat:      Pharynx: Oropharynx is clear.   Eyes:      General:         Right eye: No discharge.         Left eye: No discharge.      Extraocular Movements: Extraocular movements intact.      Conjunctiva/sclera: Conjunctivae normal.   Cardiovascular:      Rate and Rhythm: Normal rate and regular rhythm.   Pulmonary:      Effort: Pulmonary effort is normal. No respiratory distress.      Breath sounds: Normal breath sounds. No wheezing.   Abdominal:      General: Bowel sounds are normal. There is no distension.      Palpations: Abdomen is soft.      Tenderness: There is no abdominal tenderness.   Musculoskeletal:      Cervical back: Normal range of motion and neck supple.      Right lower leg: No edema.      Comments: L BKA   Skin:     General: Skin is warm and dry.    Neurological:      Mental Status: He is alert and oriented to person, place, and time.         Fluids    Intake/Output Summary (Last 24 hours) at 10/30/2023 1220  Last data filed at 10/30/2023 0532  Gross per 24 hour   Intake 460 ml   Output 1000 ml   Net -540 ml       Laboratory  Recent Labs     10/28/23  0517   WBC 11.5*   RBC 3.69*   HEMOGLOBIN 10.2*   HEMATOCRIT 33.4*   MCV 90.5   MCH 27.6   MCHC 30.5*   RDW 51.7*   PLATELETCT 298   MPV 10.9     Recent Labs     10/28/23  0517   SODIUM 137   POTASSIUM 3.4*   CHLORIDE 97   CO2 28   GLUCOSE 137*   BUN 33*   CREATININE 1.30   CALCIUM 8.8                 Assessment/Plan  Anemia  Assessment & Plan  Has normocytic indices  Fe 31, may start supplement if no contraindications  Follow H/H  Check F/U labs in AM    A-fib ([facility])  Assessment & Plan  On Amiodarone  Anticoagulated on Eliquis    Leukocytosis  Assessment & Plan  Pt afebrile and non-toxic appearing  PCT 0.17  UA 10-20 w/ negative bacteria  UCx negative  CXR small left eff vs atx, continue IS  Follow WBC to resolution  Check F/U labs in AM    Gout  Assessment & Plan  On Allopurinol    Borderline abnormal TFTs  Assessment & Plan  TSH 10.1 and FT4 1.15  May have subclinical hypothyroidism  Needs outpt F/U    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)  Assessment & Plan  Has had prolonged hospitalization since 8/26/23  [facility]  S/P I+D, IV Abx, and Wound Vac  Wound care and pain control per Physiatry    Stage 3a chronic kidney disease (HCC)- (present on admission)  Assessment & Plan    CXR small left eff vs atx, continue IS  Continue Lasix as tolerated by blood pressure and renal function  Avoid nephrotoxins  Renal dose all meds  Monitor electrolytes  Outpt Nephrology F/U  Check F/U labs in AM    PAD (peripheral artery disease) ([facility])- (present on admission)  Assessment & Plan  H/O L BKA  On Eliquis and Lipitor    Diabetes ([facility])- (present on admission)  Assessment &  Plan  HbA1c 6.8  Discontinue Lantus  Continue SSI for now  Continue to titrate up Metformin to home dose  Outpt meds include Metformin 1000 mg bid and Actos 30 mg qd    Hypokalemia  Assessment & Plan  2/2 Lasix  Repleted K+  Continue daily supplement  Check F/U labs in AM    Benign essential HTN- (present on admission)  Assessment & Plan  On Diltiazem and Lasix  Observe blood pressure trends       Full Code

## 2023-10-30 NOTE — PROGRESS NOTES
NURSING DAILY NOTE    Name: Jose Castanon   Date of Admission: 10/24/2023   Admitting Diagnosis: Critical illness polyneuropathy (HCC)  Attending Physician: Sylvester Contreras M.d.  Allergies: Penicillins, Bee venom, Cefazolin, and Linezolid    Safety  Patient Assist  max 2  Patient Precautions  Fall Risk, Other (See Comments)  Precaution Comments  monitor HR and O2, hx of BKA LLE w/ prosthetic, wound near sacrum, medial heal wound RLE, lateral foot wound RLE  Bed Transfer Status  Contact Guard Assist  Toilet Transfer Status   Minimal Assist  Assistive Devices  Wheelchair  Oxygen  Room air w/o2 available  Diet/Therapeutic Dining  Current Diet Order   Procedures    Diet Order Diet: Consistent CHO (Diabetic)     Pill Administration  whole and one at a time   Agitated Behavioral Scale     ABS Level of Severity       Fall Risk  Has the patient had a fall this admission?   No  Lulu Aguilera Fall Risk Scoring  14, MODERATE RISK  Fall Risk Safety Measures  bed alarm, chair alarm, and seatbelt alarm    Vitals  Temperature: 36.7 °C (98 °F)  Temp src: Oral  Pulse: 92  Respiration: 18  Blood Pressure : 128/75  Blood Pressure MAP (Calculated): 93 MM HG  BP Location: Right, Upper Arm  Patient BP Position: Sitting     Oxygen  Pulse Oximetry: 93 %  O2 (LPM): 0  O2 Delivery Device: Room air w/o2 available    Bowel and Bladder  Last Bowel Movement  10/29/23  Stool Type  Type 5: Soft blob with clear cut edges (passed easily)  Bowel Device  Diaper  Continent  Bladder: Did not void   Bowel: No movement  Bladder Function  Urine Color: Yellow  Urine Clarity: Clear  Genitourinary Assessment   Bladder Assessment (WDL):  WDL Except  Ambrosio Catheter: Present with Active Order  Ambrosio Care: Given with Soap and Water  Urinary Elimination: Catheter (Document on LDA)  Urine Color: Yellow  Urine Clarity: Clear  Bladder Device: Indwelling Catheter    Skin  Des Score   16  Sensory  Interventions   Bed Types: Bariatric Low Airloss  Skin Preventative Measures: Pillows in Use for Support / Positioning  Moisture Interventions  Protocols: Pressure Ulcer Prevention / Intervention Protocol in Place, Appropriate Wound Protocols in Place  Moisturizers/Barriers: Barrier Cream, Barrier Wipes, Containment Devices  Containment Devices: Indwelling Catheter      Pain  Pain Rating Scale  8 - Awful, hard to do anything  Pain Location  Incision, Scrotum  Pain Location Orientation  Proximal  Pain Interventions   Declines    ADLs    Bathing      Linen Change   Complete  Personal Hygiene  Change Ankita Pads, Moist Ankita Wipes, Perineal Care  Chlorhexidine Bath      Oral Care     Teeth/Dentures     Shave     Nutrition Percentage Eaten  *  * Meal *  *, Lunch, Between % Consumed  Environmental Precautions  Report Given to Other Health Care Providers Regarding Fall Risk, Treaded Slipper Socks on Patient  Patient Turns/Positioning  Patient Turns Self from Side to Side  Patient Turns Assistance/Tolerance     Bed Positions  Bed Controls On  Head of Bed Elevated  Self regulated      Psychosocial/Neurologic Assessment  Psychosocial Assessment  Psychosocial (WDL):  Within Defined Limits  Neurologic Assessment  Neuro (WDL): Exceptions to WDL  Level of Consciousness: Alert  Orientation Level: Oriented X4  Cognition: Follows commands, Appropriate attention/concentration, Appropriate judgement, Appropriate safety awareness  Speech: Clear  Motor Function/Sensation Assessment: Motor strength  Muscle Strength Right Arm: Good Strength Against Gravity and Moderate Resistance  Muscle Strength Left Arm: Good Strength Against Gravity and Moderate Resistance  RLE Sensation: Numbness, Tingling (baseline neuropathy)  Muscle Strength Right Leg: Good Strength Against Gravity and Moderate Resistance  LLE Sensation: Numbness, Tingling (baseline neuropathy)  Muscle Strength Left Leg: Good Strength Against Gravity and Moderate  Resistance  EENT (WDL):  WDL Except    Cardio/Pulmonary Assessment  Edema   RLE Edema: Generalized  Respiratory Breath Sounds  RUL Breath Sounds: Clear  RML Breath Sounds: Clear  RLL Breath Sounds: Diminished  MIGUELINA Breath Sounds: Clear  LLL Breath Sounds: Diminished  Cardiac Assessment   Cardiac (WDL):  WDL Except

## 2023-10-31 ENCOUNTER — APPOINTMENT (OUTPATIENT)
Dept: PHYSICAL THERAPY | Facility: REHABILITATION | Age: 68
DRG: 074 | End: 2023-10-31
Attending: PHYSICAL MEDICINE & REHABILITATION
Payer: MEDICARE

## 2023-10-31 ENCOUNTER — APPOINTMENT (OUTPATIENT)
Dept: OCCUPATIONAL THERAPY | Facility: REHABILITATION | Age: 68
DRG: 074 | End: 2023-10-31
Attending: PHYSICAL MEDICINE & REHABILITATION
Payer: MEDICARE

## 2023-10-31 LAB
ANION GAP SERPL CALC-SCNC: 14 MMOL/L (ref 7–16)
BUN SERPL-MCNC: 23 MG/DL (ref 8–22)
CALCIUM SERPL-MCNC: 8.8 MG/DL (ref 8.5–10.5)
CHLORIDE SERPL-SCNC: 103 MMOL/L (ref 96–112)
CO2 SERPL-SCNC: 25 MMOL/L (ref 20–33)
CREAT SERPL-MCNC: 1.22 MG/DL (ref 0.5–1.4)
ERYTHROCYTE [DISTWIDTH] IN BLOOD BY AUTOMATED COUNT: 51.1 FL (ref 35.9–50)
GFR SERPLBLD CREATININE-BSD FMLA CKD-EPI: 64 ML/MIN/1.73 M 2
GLUCOSE BLD STRIP.AUTO-MCNC: 106 MG/DL (ref 65–99)
GLUCOSE BLD STRIP.AUTO-MCNC: 124 MG/DL (ref 65–99)
GLUCOSE BLD STRIP.AUTO-MCNC: 126 MG/DL (ref 65–99)
GLUCOSE BLD STRIP.AUTO-MCNC: 134 MG/DL (ref 65–99)
GLUCOSE SERPL-MCNC: 116 MG/DL (ref 65–99)
HCT VFR BLD AUTO: 38.5 % (ref 42–52)
HGB BLD-MCNC: 11.8 G/DL (ref 14–18)
MCH RBC QN AUTO: 27.9 PG (ref 27–33)
MCHC RBC AUTO-ENTMCNC: 30.6 G/DL (ref 32.3–36.5)
MCV RBC AUTO: 91 FL (ref 81.4–97.8)
PLATELET # BLD AUTO: 273 K/UL (ref 164–446)
PMV BLD AUTO: 10.9 FL (ref 9–12.9)
POTASSIUM SERPL-SCNC: 3.6 MMOL/L (ref 3.6–5.5)
RBC # BLD AUTO: 4.23 M/UL (ref 4.7–6.1)
SODIUM SERPL-SCNC: 142 MMOL/L (ref 135–145)
WBC # BLD AUTO: 9.2 K/UL (ref 4.8–10.8)

## 2023-10-31 PROCEDURE — 99232 SBSQ HOSP IP/OBS MODERATE 35: CPT | Performed by: PHYSICAL MEDICINE & REHABILITATION

## 2023-10-31 PROCEDURE — A9270 NON-COVERED ITEM OR SERVICE: HCPCS | Performed by: HOSPITALIST

## 2023-10-31 PROCEDURE — 80048 BASIC METABOLIC PNL TOTAL CA: CPT

## 2023-10-31 PROCEDURE — 97110 THERAPEUTIC EXERCISES: CPT

## 2023-10-31 PROCEDURE — 700102 HCHG RX REV CODE 250 W/ 637 OVERRIDE(OP): Performed by: HOSPITALIST

## 2023-10-31 PROCEDURE — 85027 COMPLETE CBC AUTOMATED: CPT

## 2023-10-31 PROCEDURE — 36415 COLL VENOUS BLD VENIPUNCTURE: CPT

## 2023-10-31 PROCEDURE — 82962 GLUCOSE BLOOD TEST: CPT | Mod: 91

## 2023-10-31 PROCEDURE — 97530 THERAPEUTIC ACTIVITIES: CPT

## 2023-10-31 PROCEDURE — 97116 GAIT TRAINING THERAPY: CPT

## 2023-10-31 PROCEDURE — 770010 HCHG ROOM/CARE - REHAB SEMI PRIVAT*

## 2023-10-31 PROCEDURE — A9270 NON-COVERED ITEM OR SERVICE: HCPCS | Mod: JZ | Performed by: HOSPITALIST

## 2023-10-31 PROCEDURE — 97112 NEUROMUSCULAR REEDUCATION: CPT

## 2023-10-31 PROCEDURE — 99232 SBSQ HOSP IP/OBS MODERATE 35: CPT | Performed by: HOSPITALIST

## 2023-10-31 PROCEDURE — 700102 HCHG RX REV CODE 250 W/ 637 OVERRIDE(OP): Mod: JZ | Performed by: HOSPITALIST

## 2023-10-31 PROCEDURE — A9270 NON-COVERED ITEM OR SERVICE: HCPCS | Performed by: PHYSICAL MEDICINE & REHABILITATION

## 2023-10-31 PROCEDURE — 700102 HCHG RX REV CODE 250 W/ 637 OVERRIDE(OP): Performed by: PHYSICAL MEDICINE & REHABILITATION

## 2023-10-31 RX ORDER — POLYETHYLENE GLYCOL 3350 17 G/17G
1 POWDER, FOR SOLUTION ORAL
Status: DISCONTINUED | OUTPATIENT
Start: 2023-10-31 | End: 2023-11-09 | Stop reason: HOSPADM

## 2023-10-31 RX ORDER — AMOXICILLIN 250 MG
1 CAPSULE ORAL
Status: DISCONTINUED | OUTPATIENT
Start: 2023-10-31 | End: 2023-11-09 | Stop reason: HOSPADM

## 2023-10-31 RX ORDER — BISACODYL 10 MG
10 SUPPOSITORY, RECTAL RECTAL
Status: DISCONTINUED | OUTPATIENT
Start: 2023-10-31 | End: 2023-11-09 | Stop reason: HOSPADM

## 2023-10-31 RX ORDER — POTASSIUM CHLORIDE 20 MEQ/1
30 TABLET, EXTENDED RELEASE ORAL DAILY
Status: DISCONTINUED | OUTPATIENT
Start: 2023-11-01 | End: 2023-11-07

## 2023-10-31 RX ORDER — INSULIN LISPRO 100 [IU]/ML
2-12 INJECTION, SOLUTION INTRAVENOUS; SUBCUTANEOUS
Status: DISCONTINUED | OUTPATIENT
Start: 2023-10-31 | End: 2023-11-05

## 2023-10-31 RX ORDER — POTASSIUM CHLORIDE 20 MEQ/1
20 TABLET, EXTENDED RELEASE ORAL ONCE
Status: COMPLETED | OUTPATIENT
Start: 2023-10-31 | End: 2023-10-31

## 2023-10-31 RX ADMIN — OMEPRAZOLE 20 MG: 20 CAPSULE, DELAYED RELEASE ORAL at 08:24

## 2023-10-31 RX ADMIN — POTASSIUM CHLORIDE 20 MEQ: 1500 TABLET, EXTENDED RELEASE ORAL at 13:03

## 2023-10-31 RX ADMIN — AMIODARONE HYDROCHLORIDE 200 MG: 200 TABLET ORAL at 08:24

## 2023-10-31 RX ADMIN — DILTIAZEM HYDROCHLORIDE 30 MG: 30 TABLET, FILM COATED ORAL at 05:10

## 2023-10-31 RX ADMIN — POTASSIUM CHLORIDE 20 MEQ: 1500 TABLET, EXTENDED RELEASE ORAL at 08:24

## 2023-10-31 RX ADMIN — FUROSEMIDE 40 MG: 40 TABLET ORAL at 05:10

## 2023-10-31 RX ADMIN — GABAPENTIN 400 MG: 400 CAPSULE ORAL at 14:44

## 2023-10-31 RX ADMIN — SIMETHICONE 125 MG: 125 TABLET, CHEWABLE ORAL at 21:16

## 2023-10-31 RX ADMIN — SIMETHICONE 125 MG: 125 TABLET, CHEWABLE ORAL at 16:56

## 2023-10-31 RX ADMIN — ALLOPURINOL 100 MG: 100 TABLET ORAL at 08:24

## 2023-10-31 RX ADMIN — METFORMIN HYDROCHLORIDE 850 MG: 850 TABLET ORAL at 16:56

## 2023-10-31 RX ADMIN — SIMETHICONE 125 MG: 125 TABLET, CHEWABLE ORAL at 08:23

## 2023-10-31 RX ADMIN — GABAPENTIN 400 MG: 400 CAPSULE ORAL at 08:24

## 2023-10-31 RX ADMIN — ATORVASTATIN CALCIUM 20 MG: 10 TABLET, FILM COATED ORAL at 21:16

## 2023-10-31 RX ADMIN — GABAPENTIN 400 MG: 400 CAPSULE ORAL at 21:17

## 2023-10-31 RX ADMIN — TRAZODONE HYDROCHLORIDE 50 MG: 50 TABLET ORAL at 21:16

## 2023-10-31 RX ADMIN — SIMETHICONE 125 MG: 125 TABLET, CHEWABLE ORAL at 13:03

## 2023-10-31 RX ADMIN — DOCUSATE SODIUM 50MG AND SENNOSIDES 8.6MG 1 TABLET: 8.6; 5 TABLET, FILM COATED ORAL at 08:24

## 2023-10-31 RX ADMIN — DILTIAZEM HYDROCHLORIDE 30 MG: 30 TABLET, FILM COATED ORAL at 14:44

## 2023-10-31 RX ADMIN — METFORMIN HYDROCHLORIDE 850 MG: 850 TABLET ORAL at 08:24

## 2023-10-31 RX ADMIN — APIXABAN 5 MG: 5 TABLET, FILM COATED ORAL at 08:24

## 2023-10-31 RX ADMIN — DILTIAZEM HYDROCHLORIDE 30 MG: 30 TABLET, FILM COATED ORAL at 21:20

## 2023-10-31 RX ADMIN — APIXABAN 5 MG: 5 TABLET, FILM COATED ORAL at 21:17

## 2023-10-31 ASSESSMENT — PAIN DESCRIPTION - PAIN TYPE
TYPE: ACUTE PAIN
TYPE: ACUTE PAIN

## 2023-10-31 ASSESSMENT — GAIT ASSESSMENTS
DISTANCE (FEET): 160
GAIT LEVEL OF ASSIST: CONTACT GUARD ASSIST
ASSISTIVE DEVICE: CANADIAN (LOFSTRAND) CRUTCHES
DEVIATION: BRADYKINETIC;DECREASED HEEL STRIKE;DECREASED TOE OFF;INCREASED BASE OF SUPPORT

## 2023-10-31 ASSESSMENT — ENCOUNTER SYMPTOMS
DIARRHEA: 0
NAUSEA: 0
SHORTNESS OF BREATH: 0
CHILLS: 0
FEVER: 0
ABDOMINAL PAIN: 0
VOMITING: 0
NERVOUS/ANXIOUS: 0

## 2023-10-31 ASSESSMENT — ACTIVITIES OF DAILY LIVING (ADL)
BED_CHAIR_WHEELCHAIR_TRANSFER_DESCRIPTION: ADAPTIVE EQUIPMENT;INCREASED TIME;SET-UP OF EQUIPMENT
BED_CHAIR_WHEELCHAIR_TRANSFER_DESCRIPTION: INCREASED TIME;SUPERVISION FOR SAFETY

## 2023-10-31 NOTE — THERAPY
"Occupational Therapy  Daily Treatment     Patient Name: Jose Castanon  Age:  68 y.o., Sex:  male  Medical Record #: [id number]  Today's Date: 10/31/2023     Precautions  Precautions: Fall Risk, Other (See Comments)  Comments: monitor HR and O2, hx of BKA LLE w/ prosthetic, wound near sacrum, medial heal wound RLE, lateral foot wound RLE         Subjective    \" I need to pull my pants up .\"     Objective       10/30/23 1001   OT Charge Group   OT Therapy Activity (Units) 1   OT Therapeutic Exercise (Units) 1   OT Total Time Spent   OT Individual Total Time Spent (Mins) 30   Precautions   Precautions Fall Risk;Other (See Comments)   Comments monitor HR and O2, hx of BKA LLE w/ prosthetic, wound near sacrum, medial heal wound RLE, lateral foot wound RLE   Functional Level of Assist   Bed, Chair, Wheelchair Transfer Standby Assist  (FWW bed to w/c)   Sitting Upper Body Exercises   Lat Pull 3 sets of 10;Bilateral  (weighted pulley  35lbs)   Bilateral Row 3 sets of 15  (weighted pulley  35lbs)   Upper Extremity Bike Level 5 Resistance  (x 5 minutes  motomed)   Interdisciplinary Plan of Care Collaboration   Patient Position at End of Therapy Seated;Call Light within Reach;Tray Table within Reach     Stand from edge of bed pulls pants up supervision     Assessment    Participates to the best of his ability    Limited by decreased strength / endurance     Strengths: Able to follow instructions, Alert and oriented, Effective communication skills, Good carryover of learning, Good insight into deficits/needs, Motivated for self care and independence, Pleasant and cooperative  Barriers: Decreased endurance, Bowel incontinence, Fatigue, Generalized weakness, Impaired activity tolerance    Plan    ADLs  Strength  Functional mobility  Balance/Endurance    DME           Occupational Therapy Goals (Active)       Problem: Bathing       Dates: Start:  10/25/23         Goal: STG-Within one week, patient will bathe with CGA and LRD  "      Dates: Start:  10/25/23               Problem: Dressing       Dates: Start:  10/25/23         Goal: STG-Within one week, patient will dress LB Min A and LRD       Dates: Start:  10/25/23               Problem: Functional Transfers       Dates: Start:  10/25/23         Goal: STG-Within one week, patient will transfer to toilet CGA and LRD       Dates: Start:  10/25/23            Goal: STG-Within one week, patient will transfer to step in shower with CGA and LRD       Dates: Start:  10/25/23               Problem: OT Long Term Goals       Dates: Start:  10/25/23         Goal: LTG-By discharge, patient will complete basic self care tasks SBA to SUP       Dates: Start:  10/25/23            Goal: LTG-By discharge, patient will perform bathroom transfers SBA to SUP       Dates: Start:  10/25/23            Goal: LTG-By discharge, patient will complete basic home management SBA to SUP       Dates: Start:  10/25/23               Problem: Toileting       Dates: Start:  10/25/23         Goal: STG-Within one week, patient will complete toileting tasks with Min A and LRD       Dates: Start:  10/25/23

## 2023-10-31 NOTE — THERAPY
"Physical Therapy   Daily Treatment     Patient Name: Jose Castanon  Age:  68 y.o., Sex:  male  Medical Record #: [id number]  Today's Date: 10/31/2023     Precautions  Precautions: (P) Fall Risk, Other (See Comments)  Comments: (P) monitor HR and O2, hx of BKA LLE w/ prosthetic, wound near sacrum, medial heal wound RLE, lateral foot wound RLE    Subjective    Pt feels that with 2 \"socks\" his prosthetic is fitting better, and he feels more stable.      Objective       10/31/23 0831   PT Charge Group   PT Neuromuscular Re-Education / Balance (Units) 2   PT Therapeutic Activities (Units) 2   PT Total Time Spent   PT Individual Total Time Spent (Mins) 60   Precautions   Precautions Fall Risk;Other (See Comments)   Comments monitor HR and O2, hx of BKA LLE w/ prosthetic, wound near sacrum, medial heal wound RLE, lateral foot wound RLE   Vitals   Pulse 94   Patient BP Position Sitting   Room Air Oximetry 95   Wheelchair Functional Level of Assist   Wheelchair Assist Supervised   Distance Wheelchair (Feet or Distance) 100  (x4, encouraged to slow down and pace himself, demonstrated SOB quickly)   Transfer Functional Level of Assist   Bed, Chair, Wheelchair Transfer Standby Assist   Bed Chair Wheelchair Transfer Description Adaptive equipment;Increased time;Set-up of equipment  (SBA SPT FWW + prosthetic)   Sitting Lower Body Exercises   Sit to Stand 1 set of 10   Standing Lower Body Exercises   Other Exercises Glute squeezes 10 x2   Comments Scap squeezes 10x 2   Bed Mobility    Supine to Sit Modified Independent  (trapeze/ bed rails)   Sit to Stand Standby Assist  (FWW while pulling up pants  after dressing bedside 2x, in // bars 10x)   Scooting Supervised   Rolling Modified Independent   Neuro-Muscular Treatments   Neuro-Muscular Treatments Weight Shift Left;Weight Shift Right;Verbal Cuing;Sequencing;Tactile Cuing   Comments EOB balance while donning underwear/ shorts and prosthetic with SPV for dynamic balance; use of " "AE and set up. Standing in // bars, static, no UE support between each rep of sit <> stand, SBA balance.         Assessment    Pt participated in self pacing activities including wc mobility, and sit <> stands, d/t tendency to move quickly and tire fast. Pt demonstrated SOB, and dec activity tolerance. Pt continues to be highly motivated toward independence and progress.      Strengths: Effective communication skills, Good insight into deficits/needs, Independent prior level of function, Motivated for self care and independence, Pleasant and cooperative, Willingly participates in therapeutic activities  Barriers: Decreased endurance, Fatigue, Home accessibility, Impaired activity tolerance, Impaired balance, Limited mobility, Pain    Plan    Gait with forearm crutches, balance, strengthening, activity tolerance.     DME  PT DME Recommendations  Wheelchair:  (Pt has 2 wheelchairs reported to be in good condition)  Assistive Device:  (Pt has FWW and forearm crutches)    Passport items to be completed:  Get in/out of bed safely, in/out of a vehicle, safely use mobility device, walk or wheel around home/community, navigate up and down stairs, show how to get up/down from the ground, ensure home is accessible, demonstrate HEP, complete caregiver training    Physical Therapy Problems (Active)       Problem: Mobility       Dates: Start:  10/25/23         Goal: STG-Within one week, patient will propel wheelchair community >150 ft mod I indoors       Dates: Start:  10/25/23            Goal: STG-Within one week, patient will ambulate household distance in // bars 10ft x 2 (awaiting prosthetic alterations)       Dates: Start:  10/25/23            Goal: STG-Within one week, patient will ambulate up/down a 2\" step in // bars min A       Dates: Start:  10/25/23               Problem: Mobility Transfers       Dates: Start:  10/25/23         Goal: STG-Within one week, patient will transfer bed to chair SPT FWW SBA/SPV       Dates: " Start:  10/25/23               Problem: PT-Long Term Goals       Dates: Start:  10/25/23         Goal: LTG-By discharge, patient will propel wheelchair mod I over various surfaces >150 ft       Dates: Start:  10/25/23            Goal: LTG-By discharge, patient will ambulate with FWW >50 ft mod I        Dates: Start:  10/25/23            Goal: LTG-By discharge, patient will transfer one surface to another mod I with FWW + prosthetic        Dates: Start:  10/25/23            Goal: LTG-By discharge, patient will ambulate up/down 2 stairs with (newly installed railings- recommended) mod I       Dates: Start:  10/25/23

## 2023-10-31 NOTE — CARE PLAN
The patient is Stable - Low risk of patient condition declining or worsening    Shift Goals  Clinical Goals: Safety, pain control  Patient Goals: Safety  Family Goals: Education    Progress made toward(s) clinical / shift goals: Pt's VSS, denies pain, able to participate in therapy, peritoneal dressing changed, last BM 10/30, grace in place.

## 2023-10-31 NOTE — CARE PLAN
"  Problem: Knowledge Deficit - Standard  Goal: Patient and family/care givers will demonstrate understanding of plan of care, disease process/condition, diagnostic tests and medications  Outcome: Progressing  Patient verbalized understanding plan of care.                Problem: Fall Risk - Rehab  Goal: Patient will remain free from falls  Outcome: Progressing     Lulu Aguilera Fall risk Assessment Score: 14        Moderate fall risk Interventions  - Bed and strip alarm   - Yellow sign by the door   - Yellow wrist band \"Fall risk\"  - Room near to the nurse station  - Do not leave patient unattended in the bathroom  - Fall risk education provided            "

## 2023-10-31 NOTE — PROGRESS NOTES
Hospital Medicine Daily Progress Note      Chief Complaint  Hypertension  Diabetes  Chronic Kidney Disease    Interval Problem Update  Pt going home today.    Review of Systems  Review of Systems   Constitutional:  Negative for chills and fever.   Respiratory:  Negative for shortness of breath.    Cardiovascular:  Negative for chest pain.   Gastrointestinal:  Negative for abdominal pain, diarrhea, nausea and vomiting.   Psychiatric/Behavioral:  The patient is not nervous/anxious.         Physical Exam  Temp:  [36.5 °C (97.7 °F)-36.9 °C (98.5 °F)] 36.5 °C (97.7 °F)  Pulse:  [73-88] 73  Resp:  [18] 18  BP: (110-123)/(66-79) 119/73  SpO2:  [92 %-95 %] 94 %    Physical Exam  Vitals and nursing note reviewed.   Constitutional:       Appearance: Normal appearance.   HENT:      Head: Atraumatic.   Eyes:      Conjunctiva/sclera: Conjunctivae normal.      Pupils: Pupils are equal, round, and reactive to light.   Cardiovascular:      Rate and Rhythm: Normal rate and regular rhythm.      Heart sounds: No murmur heard.  Pulmonary:      Effort: Pulmonary effort is normal.      Breath sounds: No stridor. No wheezing or rales.   Abdominal:      General: There is no distension.      Palpations: Abdomen is soft.      Tenderness: There is no abdominal tenderness.   Musculoskeletal:      Cervical back: Normal range of motion and neck supple.      Right lower leg: No edema.      Left lower leg: No edema.      Comments: Has left BKA   Skin:     General: Skin is warm and dry.      Findings: No rash.   Neurological:      Mental Status: He is alert and oriented to person, place, and time.   Psychiatric:         Mood and Affect: Mood normal.         Behavior: Behavior normal.         Fluids    Intake/Output Summary (Last 24 hours) at 10/31/2023 0813  Last data filed at 10/31/2023 0823  Gross per 24 hour   Intake 240 ml   Output 2900 ml   Net -2660 ml         Laboratory  Recent Labs     10/31/23  0553   WBC 9.2   RBC 4.23*   HEMOGLOBIN 11.8*    HEMATOCRIT 38.5*   MCV 91.0   MCH 27.9   MCHC 30.6*   RDW 51.1*   PLATELETCT 273   MPV 10.9       Recent Labs     10/31/23  0553   SODIUM 142   POTASSIUM 3.6   CHLORIDE 103   CO2 25   GLUCOSE 116*   BUN 23*   CREATININE 1.22   CALCIUM 8.8                   Assessment/Plan  A-fib ([facility])  Assessment & Plan  HR ok  Cont Amio  Cont Cardizem  Cont Eliquis  Cont Lasix  Cont KCL 20 meq daily --> will increase to 30 meq daily  K+: 3.4 --> 3.6 (10/31)  Cont to monitor    Gout  Assessment & Plan  Cont Allopurinol    Borderline abnormal TFTs  Assessment & Plan  TSH: 10.1  FT4: 1.15  ? 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)  Assessment & Plan  Has had prolonged hospitalization since 8/26/23  Initially presented to [facility], then [facility], now Rehab  S/P I+D, IV Abx, and Wound Vac     Stage 3a chronic kidney disease (HCC)- (present on admission)  Assessment & Plan  Bun: 33 --> 23   Cr: wnl  Cont Lasix  Cont to monitor    PAD (peripheral artery disease) ([facility])- (present on admission)  Assessment & Plan  Hx of left BKA     Diabetes ([facility])- (present on admission)  Assessment & Plan  Hba1c: 6.8 (10/25)  BS:   Off Glargine (last dose 10/30)  Cont Metformin  Will consider stopping accuchecks soon  Note: home meds include Metformin 1000 mg bid and Actos 30 mg qd  Cont to monitor    Benign essential HTN- (present on admission)  Assessment & Plan  BP ok  Cont Cardizem  Note: on Lasix  Cont to monitor

## 2023-10-31 NOTE — THERAPY
Physical Therapy   Daily Treatment     Patient Name: Jose Castanon  Age:  68 y.o., Sex:  male  Medical Record #: [id number]  Today's Date: 10/31/2023     Precautions  Precautions: Fall Risk, Other (See Comments)  Comments: monitor HR and O2, hx of BKA LLE w/ prosthetic, wound near sacrum, medial heal wound RLE, lateral foot wound RLE    Subjective    Patient is agreeable to participate, states he is getting stronger and doing more. Is otherwise frustrated at how short of breath he gets.      Objective       10/31/23 1231 10/31/23 1501   PT Charge Group   PT Gait Training (Units) 2  --    PT Therapeutic Exercise (Units)  --  4   PT Total Time Spent   PT Individual Total Time Spent (Mins) 30 60   Gait Functional Level of Assist    Gait Level Of Assist Contact Guard Assist  --    Assistive Device [place] ([place]) Crutches  --    Distance (Feet) 160  (80, 40, 40 with seated rest breaks between bouts)  --    Deviation Bradykinetic;Decreased Heel Strike;Decreased Toe Off;Increased Base Of Support  --    Wheelchair Functional Level of Assist   Wheelchair Assist Supervised  --    Distance Wheelchair (Feet or Distance) 150  --    Wheelchair Description Extra time  --    Transfer Functional Level of Assist   Bed, Chair, Wheelchair Transfer  --  Standby Assist   Bed Chair Wheelchair Transfer Description  --  Increased time;Supervision for safety   Sitting Lower Body Exercises   Sit to Stand  --    (1x5 with no UE support)   Standing Lower Body Exercises   Hamstring Curl  --  1 set of 10;Bilateral    Hip Flexion  --  1 set of 10;Bilateral   Hip Extension  --  1 set of 10;Bilateral    Hip Abduction  --  1 set of 10;Bilateral   Other Exercises  --  static stand 10 x 30 sec each with no UE support, seated rest breaks between, no losses of balance   Bed Mobility    Supine to Sit Modified Independent  --    Sit to Supine Modified Independent  --    Sit to Stand Standby Assist  --    Interdisciplinary Plan of Care  "Collaboration   Patient Position at End of Therapy Seated;Call Light within Reach;Tray Table within Reach;Phone within Reach  --          Assessment    Patient is tolerating ambulating longer distances between rest breaks, tolerates standing unsupported with better balance, performs transfers with more independence. He is making good gains, is expected to continue making progress.     Strengths: Effective communication skills, Good insight into deficits/needs, Independent prior level of function, Motivated for self care and independence, Pleasant and cooperative, Willingly participates in therapeutic activities  Barriers: Decreased endurance, Fatigue, Home accessibility, Impaired activity tolerance, Impaired balance, Limited mobility, Pain    Plan    Gait with crutches, strengthening, balance    DME  PT DME Recommendations  Wheelchair:  (Pt has 2 wheelchairs reported to be in good condition)  Assistive Device:  (Pt has FWW and forearm crutches)    Passport items to be completed:  Get in/out of bed safely, in/out of a vehicle, safely use mobility device, walk or wheel around home/community, navigate up and down stairs, show how to get up/down from the ground, ensure home is accessible, demonstrate HEP, complete caregiver training    Physical Therapy Problems (Active)       Problem: Mobility       Dates: Start:  10/25/23         Goal: STG-Within one week, patient will propel wheelchair community >150 ft mod I indoors       Dates: Start:  10/25/23            Goal: STG-Within one week, patient will ambulate household distance in // bars 10ft x 2 (awaiting prosthetic alterations)       Dates: Start:  10/25/23            Goal: STG-Within one week, patient will ambulate up/down a 2\" step in // bars min A       Dates: Start:  10/25/23               Problem: Mobility Transfers       Dates: Start:  10/25/23         Goal: STG-Within one week, patient will transfer bed to chair SPT FWW SBA/SPV       Dates: Start:  10/25/23    "            Problem: PT-Long Term Goals       Dates: Start:  10/25/23         Goal: LTG-By discharge, patient will propel wheelchair mod I over various surfaces >150 ft       Dates: Start:  10/25/23            Goal: LTG-By discharge, patient will ambulate with FWW >50 ft mod I        Dates: Start:  10/25/23            Goal: LTG-By discharge, patient will transfer one surface to another mod I with FWW + prosthetic        Dates: Start:  10/25/23            Goal: LTG-By discharge, patient will ambulate up/down 2 stairs with (newly installed railings- recommended) mod I       Dates: Start:  10/25/23

## 2023-10-31 NOTE — PROGRESS NOTES
NURSING DAILY NOTE    Name: Jose Castanon   Date of Admission: 10/24/2023   Admitting Diagnosis: Critical illness polyneuropathy (HCC)  Attending Physician: Sylvester Contreras M.d.  Allergies: Penicillins, Bee venom, Cefazolin, and Linezolid    Safety  Patient Assist  mod to max 2  Patient Precautions  Fall Risk, Other (See Comments)  Precaution Comments  monitor HR and O2, hx of BKA LLE w/ prosthetic, wound near sacrum, medial heal wound RLE, lateral foot wound RLE  Bed Transfer Status  Contact Guard Assist  Toilet Transfer Status   Minimal Assist  Assistive Devices  Rails, Wheelchair  Oxygen  Room air w/o2 available  Diet/Therapeutic Dining  Current Diet Order   Procedures    Diet Order Diet: Consistent CHO (Diabetic)     Pill Administration  whole  Agitated Behavioral Scale     ABS Level of Severity       Fall Risk  Has the patient had a fall this admission?   No  Lulu Aguilera Fall Risk Scoring  14, MODERATE RISK  Fall Risk Safety Measures  bed alarm and chair alarm    Vitals  Temperature: 36.9 °C (98.5 °F)  Temp src: Oral  Pulse: 80  Respiration: 18  Blood Pressure : 120/79  Blood Pressure MAP (Calculated): 93 MM HG  BP Location: Right, Upper Arm  Patient BP Position: Supine     Oxygen  Pulse Oximetry: 92 %  O2 (LPM): 0  O2 Delivery Device: Room air w/o2 available    Bowel and Bladder  Last Bowel Movement  10/30/23  Stool Type  Type 5: Soft blob with clear cut edges (passed easily)  Bowel Device  Bathroom  Continent  Bladder: Did not void   Bowel: No movement  Bladder Function  Urine Color: Yellow  Urine Clarity: Clear  Genitourinary Assessment   Bladder Assessment (WDL):  WDL Except  Ambrosio Catheter: Present with Active Order  Ambrosio Care: Given with Soap and Water  Urinary Elimination: Catheter (Document on LDA)  Urine Color: Yellow  Urine Clarity: Clear  Bladder Device: Indwelling Catheter    Skin  Des Score   13  Sensory Interventions    Bed Types: Bariatric Low Airloss  Skin Preventative Measures: Pillows in Use for Support / Positioning  Moisture Interventions  Protocols: Pressure Ulcer Prevention / Intervention Protocol in Place  Moisturizers/Barriers: Moisturizer   Containment Devices: Indwelling Catheter      Pain  Pain Rating Scale  0 - No Pain  Pain Location  Incision, Scrotum  Pain Location Orientation  Proximal  Pain Interventions   Declines    ADLs    Bathing   Full Bed Bath  Linen Change   Complete  Personal Hygiene  Change Ankita Pads, Moist Ankita Wipes, Perineal Care  Chlorhexidine Bath      Oral Care     Teeth/Dentures     Shave     Nutrition Percentage Eaten  *  * Meal *  *, Lunch, Between % Consumed  Environmental Precautions  Treaded Slipper Socks on Patient, Bed in Low Position  Patient Turns/Positioning  Patient Turns Self from Side to Side  Patient Turns Assistance/Tolerance     Bed Positions  Bed Controls On  Head of Bed Elevated  Self regulated      Psychosocial/Neurologic Assessment  Psychosocial Assessment  Psychosocial (WDL):  Within Defined Limits  Neurologic Assessment  Neuro (WDL): Exceptions to WDL  Level of Consciousness: Alert  Orientation Level: Oriented X4  Cognition: Follows commands, Appropriate attention/concentration, Appropriate judgement, Appropriate safety awareness  Speech: Clear  Motor Function/Sensation Assessment: Motor strength  RUE Sensation: No numbness  Muscle Strength Right Arm: Good Strength Against Gravity and Moderate Resistance  LUE Sensation: No numbness  Muscle Strength Left Arm: Good Strength Against Gravity and Moderate Resistance  RLE Sensation: Numbness, Tingling (BKA)  Muscle Strength Right Leg: Good Strength Against Gravity and Moderate Resistance  LLE Sensation: Numbness, Tingling  Muscle Strength Left Leg: Good Strength Against Gravity and Moderate Resistance  EENT (WDL):  WDL Except    Cardio/Pulmonary Assessment  Edema   RLE Edema: Generalized  Respiratory Breath Sounds  RUL Breath  Sounds: Clear  RML Breath Sounds: Clear  RLL Breath Sounds: Diminished  MIGUELINA Breath Sounds: Clear  LLL Breath Sounds: Diminished  Cardiac Assessment   Cardiac (WDL):  WDL Except

## 2023-10-31 NOTE — PROGRESS NOTES
NURSING DAILY NOTE    Name: Jose Castanon   Date of Admission: 10/24/2023   Admitting Diagnosis: Critical illness polyneuropathy (HCC)  Attending Physician: Sylvester Contreras M.d.  Allergies: Penicillins, Bee venom, Cefazolin, and Linezolid    Safety  Patient Assist  max 2  Patient Precautions  Fall Risk, Other (See Comments)  Precaution Comments  monitor HR and O2, hx of BKA LLE w/ prosthetic, wound near sacrum, medial heal wound RLE, lateral foot wound RLE  Bed Transfer Status  Contact Guard Assist  Toilet Transfer Status   Minimal Assist  Assistive Devices  Rails  Oxygen  Room air w/o2 available  Diet/Therapeutic Dining  Current Diet Order   Procedures    Diet Order Diet: Consistent CHO (Diabetic)     Pill Administration  whole  Agitated Behavioral Scale     ABS Level of Severity       Fall Risk  Has the patient had a fall this admission?   No  Lulu Aguilera Fall Risk Scoring  14, MODERATE RISK  Fall Risk Safety Measures  bed alarm, chair alarm, and low vision/ hearing    Vitals  Temperature: 36.7 °C (98.1 °F)  Temp src: Oral  Pulse: 80  Respiration: 18  Blood Pressure : 110/70  Blood Pressure MAP (Calculated): 83 MM HG  BP Location: Right, Upper Arm  Patient BP Position: Sitting (after bout of ambulation)     Oxygen  Pulse Oximetry: 95 %  O2 (LPM): 0  O2 Delivery Device: Room air w/o2 available    Bowel and Bladder  Last Bowel Movement  10/30/23  Stool Type  Type 5: Soft blob with clear cut edges (passed easily)  Bowel Device  Bathroom  Continent  Bladder: Did not void   Bowel: No movement  Bladder Function  Urine Color:  (1000)  Urine Clarity: Clear  Genitourinary Assessment   Bladder Assessment (WDL):  WDL Except  Ambrosio Catheter: Present with Active Order  Ambrosio Care: Given with Soap and Water  Urinary Elimination: Catheter (Document on LDA)  Urine Color:  (1000)  Urine Clarity: Clear  Bladder Device: Indwelling Catheter    Skin  Des  Score   13  Sensory Interventions   Bed Types: Bariatric Low Airloss  Skin Preventative Measures: Pillows in Use for Support / Positioning  Moisture Interventions  Protocols: Pressure Ulcer Prevention / Intervention Protocol in Place  Moisturizers/Barriers: Barrier Cream  Containment Devices: Indwelling Catheter      Pain  Pain Rating Scale   (pt reports bearable pain at groin)  Pain Location  Incision, Scrotum  Pain Location Orientation  Proximal  Pain Interventions   Declines    ADLs    Bathing   Full Bed Bath  Linen Change   Complete  Personal Hygiene  Change Ankita Pads, Moist Ankita Wipes, Perineal Care  Chlorhexidine Bath      Oral Care     Teeth/Dentures     Shave     Nutrition Percentage Eaten  *  * Meal *  *, Lunch, Between % Consumed  Environmental Precautions  Treaded Slipper Socks on Patient  Patient Turns/Positioning  Patient Turns Self from Side to Side  Patient Turns Assistance/Tolerance     Bed Positions  Bed Controls On  Head of Bed Elevated  Self regulated      Psychosocial/Neurologic Assessment  Psychosocial Assessment  Psychosocial (WDL):  Within Defined Limits  Neurologic Assessment  Neuro (WDL): Exceptions to WDL  Level of Consciousness: Alert  Orientation Level: Oriented X4  Cognition: Follows commands, Appropriate attention/concentration, Appropriate judgement, Appropriate safety awareness  Speech: Clear  Motor Function/Sensation Assessment: Motor strength  Muscle Strength Right Arm: Good Strength Against Gravity and Moderate Resistance  Muscle Strength Left Arm: Good Strength Against Gravity and Moderate Resistance  RLE Sensation: Numbness, Tingling (baseline neuropathy)  Muscle Strength Right Leg: Good Strength Against Gravity and Moderate Resistance  LLE Sensation: Numbness, Tingling (baseline neuropathy)  Muscle Strength Left Leg: Good Strength Against Gravity and Moderate Resistance  EENT (WDL):  WDL Except    Cardio/Pulmonary Assessment  Edema   RLE Edema: Generalized  Respiratory  Breath Sounds  RUL Breath Sounds: Clear  RML Breath Sounds: Clear  RLL Breath Sounds: Diminished  MIGUELINA Breath Sounds: Clear  LLL Breath Sounds: Diminished  Cardiac Assessment   Cardiac (WDL):  WDL Except

## 2023-10-31 NOTE — PROGRESS NOTES
Physical Medicine & Rehabilitation Progress Note    Encounter Date: 10/31/2023    Chief Complaint: Decreased mobility, pain    Interval Events (Subjective):  Patient sitting up in room. He reports he would like bowel medications changed to PRN. Discussed would make the change. Otherwise ongoing azotemia. Per hospitalist should continue K supplement.     _____________________________________  Interdisciplinary Team Conference   Most recent IDT on 10/26/2023    Discharge Date/Disposition:  11/14/23  _____________________________________    Objective:  VITAL SIGNS: /73   Pulse 94   Temp 36.5 °C (97.7 °F) (Oral)   Resp 18   Ht 1.829 m (6')   Wt (!) 162 kg (357 lb 12.8 oz)   SpO2 94%   BMI 48.53 kg/m²   Gen: NAD  Psych: Mood and affect appropriate  CV: RRR, 1+ RLE edema  Resp: CTAB, no upper airway sounds  Abd: NTND  Neuro: AOx4, following commands  Unchanged from 10/30/23    Laboratory Values:  Recent Results (from the past 72 hour(s))   POCT glucose device results    Collection Time: 10/28/23  5:01 PM   Result Value Ref Range    POC Glucose, Blood 124 (H) 65 - 99 mg/dL   POCT glucose device results    Collection Time: 10/28/23  9:15 PM   Result Value Ref Range    POC Glucose, Blood 138 (H) 65 - 99 mg/dL   POCT glucose device results    Collection Time: 10/29/23  8:26 AM   Result Value Ref Range    POC Glucose, Blood 142 (H) 65 - 99 mg/dL   POCT glucose device results    Collection Time: 10/29/23 11:21 AM   Result Value Ref Range    POC Glucose, Blood 122 (H) 65 - 99 mg/dL   POCT glucose device results    Collection Time: 10/29/23  5:11 PM   Result Value Ref Range    POC Glucose, Blood 109 (H) 65 - 99 mg/dL   POCT glucose device results    Collection Time: 10/29/23  8:41 PM   Result Value Ref Range    POC Glucose, Blood 138 (H) 65 - 99 mg/dL   POCT glucose device results    Collection Time: 10/30/23  7:26 AM   Result Value Ref Range    POC Glucose, Blood 120 (H) 65 - 99 mg/dL   POCT glucose device results     Collection Time: 10/30/23 11:20 AM   Result Value Ref Range    POC Glucose, Blood 105 (H) 65 - 99 mg/dL   POCT glucose device results    Collection Time: 10/30/23  4:48 PM   Result Value Ref Range    POC Glucose, Blood 102 (H) 65 - 99 mg/dL   POCT glucose device results    Collection Time: 10/30/23  8:11 PM   Result Value Ref Range    POC Glucose, Blood 95 65 - 99 mg/dL   CBC WITHOUT DIFFERENTIAL    Collection Time: 10/31/23  5:53 AM   Result Value Ref Range    WBC 9.2 4.8 - 10.8 K/uL    RBC 4.23 (L) 4.70 - 6.10 M/uL    Hemoglobin 11.8 (L) 14.0 - 18.0 g/dL    Hematocrit 38.5 (L) 42.0 - 52.0 %    MCV 91.0 81.4 - 97.8 fL    MCH 27.9 27.0 - 33.0 pg    MCHC 30.6 (L) 32.3 - 36.5 g/dL    RDW 51.1 (H) 35.9 - 50.0 fL    Platelet Count 273 164 - 446 K/uL    MPV 10.9 9.0 - 12.9 fL   Basic Metabolic Panel    Collection Time: 10/31/23  5:53 AM   Result Value Ref Range    Sodium 142 135 - 145 mmol/L    Potassium 3.6 3.6 - 5.5 mmol/L    Chloride 103 96 - 112 mmol/L    Co2 25 20 - 33 mmol/L    Glucose 116 (H) 65 - 99 mg/dL    Bun 23 (H) 8 - 22 mg/dL    Creatinine 1.22 0.50 - 1.40 mg/dL    Calcium 8.8 8.5 - 10.5 mg/dL    Anion Gap 14.0 7.0 - 16.0   ESTIMATED GFR    Collection Time: 10/31/23  5:53 AM   Result Value Ref Range    GFR (CKD-EPI) 64 >60 mL/min/1.73 m 2   POCT glucose device results    Collection Time: 10/31/23  7:26 AM   Result Value Ref Range    POC Glucose, Blood 124 (H) 65 - 99 mg/dL       Medications:  Scheduled Medications   Medication Dose Frequency    insulin lispro  2-12 Units 4X/DAY ACHS    [START ON 11/1/2023] potassium chloride SA  30 mEq DAILY    potassium chloride SA  20 mEq Once    metFORMIN  850 mg BID WITH MEALS    simethicone  125 mg 4X/DAY WITH MEALS + NIGHTLY    furosemide  40 mg Q DAY    allopurinol  100 mg DAILY    atorvastatin  20 mg Q EVENING    gabapentin  400 mg TID    apixaban  5 mg BID    amiodarone  200 mg DAILY    dilTIAZem  30 mg Q8HRS    melatonin  3 mg QHS    omeprazole  20 mg DAILY      PRN medications: senna-docusate **AND** polyethylene glycol/lytes **AND** magnesium hydroxide **AND** bisacodyl, guaiFENesin dextromethorphan, [DISCONTINUED] insulin regular **AND** POC blood glucose manual result **AND** NOTIFY MD and PharmD **AND** Administer 20 grams of glucose (approximately 8 ounces of fruit juice) every 15 minutes PRN FSBG less than 70 mg/dL **AND** dextrose bolus, Respiratory Therapy Consult, hydrALAZINE, acetaminophen, mag hydrox-al hydrox-simeth, ondansetron **OR** ondansetron, traZODone, sodium chloride, oxyCODONE immediate-release **OR** oxyCODONE immediate-release, traMADol    Diet:  Current Diet Order   Procedures    Diet Order Diet: Consistent CHO (Diabetic)       Medical Decision Making and Plan:   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 Per guidelines, 15 hours per week between PT, OT and/or SLP.  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      HTN/A fib - Patient now on Amiodarone 200 mg daily, Lasix 40 mg BID, and Eliquis 5 mg BID     DM2 with hyperglycemia - Patient on Glargine 10 U BID and SSI. Consult hospitalist. Started on Metformin -> Metformin 850 mg BID, continue Metformin blood sugars improved. Continue Metformin 850 mg, blood sugars more stable.      PAD - Patient on Eliquis. Wound care recommending ABIs/US on 10/26/23 - no significant stenosis     CKD3a - Avoid nephrotoxic agents. Check AM CMP - Cr stable     Leukocytosis - 12 on 10/25/23    Anemia - 11.2 on admission. Monitor    Hypokalemia - 3.5 on admission, consult hospitalist, on Lasix. Continue K supplement 30 mEq per hospitalist    Gout - Patient on Allopurinol 100 mg  daily     Neuropathy - Patient on Gabapentin 400 mg TID. Continue Gabapentin, monitor Cr      L BKA - Patient  with MIRANDA TURCIOS. To bring in prosthetic and crutches     Morbid Obesity due to excess calories - Patient with BMI of 48.5 on admission, meets medical criteria. Dietitian to consult     Pain - Patient on PRN Tylenol, PRN Oxycodone, and PRN Tramadol. Also on Gabapentin 400 mg TID     Skin - Patient at risk for skin breakdown due to debility in areas including sacrum, achilles, elbows and head in addition to other sites. Nursing to assess skin daily.      GI Ppx - Patient on Prilosec for GERD prophylaxis. Patient on Senna-docusate for constipation prophylaxis.  -Having multiple BMs, change bowel medications to PRN      DVT Ppx - Patient Eliquis on transfer. Continue Eliquis as limited ambulation  ____________________________________    T. Clay Contreras MD/PhD  [facility] - Physical Medicine & Rehabilitation   [facility]   ____________________________________

## 2023-11-01 ENCOUNTER — APPOINTMENT (OUTPATIENT)
Dept: OCCUPATIONAL THERAPY | Facility: REHABILITATION | Age: 68
DRG: 074 | End: 2023-11-01
Attending: PHYSICAL MEDICINE & REHABILITATION
Payer: MEDICARE

## 2023-11-01 ENCOUNTER — APPOINTMENT (OUTPATIENT)
Dept: PHYSICAL THERAPY | Facility: REHABILITATION | Age: 68
DRG: 074 | End: 2023-11-01
Attending: PHYSICAL MEDICINE & REHABILITATION
Payer: MEDICARE

## 2023-11-01 LAB
GLUCOSE BLD STRIP.AUTO-MCNC: 105 MG/DL (ref 65–99)
GLUCOSE BLD STRIP.AUTO-MCNC: 106 MG/DL (ref 65–99)
GLUCOSE BLD STRIP.AUTO-MCNC: 110 MG/DL (ref 65–99)
GLUCOSE BLD STRIP.AUTO-MCNC: 86 MG/DL (ref 65–99)

## 2023-11-01 PROCEDURE — 700102 HCHG RX REV CODE 250 W/ 637 OVERRIDE(OP): Performed by: PHYSICAL MEDICINE & REHABILITATION

## 2023-11-01 PROCEDURE — 770010 HCHG ROOM/CARE - REHAB SEMI PRIVAT*

## 2023-11-01 PROCEDURE — 82962 GLUCOSE BLOOD TEST: CPT

## 2023-11-01 PROCEDURE — 99232 SBSQ HOSP IP/OBS MODERATE 35: CPT | Performed by: PHYSICAL MEDICINE & REHABILITATION

## 2023-11-01 PROCEDURE — 700102 HCHG RX REV CODE 250 W/ 637 OVERRIDE(OP): Mod: JZ | Performed by: HOSPITALIST

## 2023-11-01 PROCEDURE — 97110 THERAPEUTIC EXERCISES: CPT | Mod: CO

## 2023-11-01 PROCEDURE — 94760 N-INVAS EAR/PLS OXIMETRY 1: CPT

## 2023-11-01 PROCEDURE — 97110 THERAPEUTIC EXERCISES: CPT

## 2023-11-01 PROCEDURE — A9270 NON-COVERED ITEM OR SERVICE: HCPCS | Performed by: PHYSICAL MEDICINE & REHABILITATION

## 2023-11-01 PROCEDURE — A9270 NON-COVERED ITEM OR SERVICE: HCPCS | Mod: JZ | Performed by: HOSPITALIST

## 2023-11-01 PROCEDURE — A9270 NON-COVERED ITEM OR SERVICE: HCPCS | Performed by: HOSPITALIST

## 2023-11-01 PROCEDURE — 97112 NEUROMUSCULAR REEDUCATION: CPT

## 2023-11-01 PROCEDURE — 97535 SELF CARE MNGMENT TRAINING: CPT

## 2023-11-01 PROCEDURE — 700102 HCHG RX REV CODE 250 W/ 637 OVERRIDE(OP): Performed by: HOSPITALIST

## 2023-11-01 PROCEDURE — 97530 THERAPEUTIC ACTIVITIES: CPT

## 2023-11-01 PROCEDURE — 97116 GAIT TRAINING THERAPY: CPT

## 2023-11-01 RX ADMIN — APIXABAN 5 MG: 5 TABLET, FILM COATED ORAL at 09:57

## 2023-11-01 RX ADMIN — METFORMIN HYDROCHLORIDE 850 MG: 850 TABLET ORAL at 09:57

## 2023-11-01 RX ADMIN — GUAIFENESIN SYRUP AND DEXTROMETHORPHAN 5 ML: 100; 10 SYRUP ORAL at 11:33

## 2023-11-01 RX ADMIN — DILTIAZEM HYDROCHLORIDE 30 MG: 30 TABLET, FILM COATED ORAL at 21:05

## 2023-11-01 RX ADMIN — FUROSEMIDE 40 MG: 40 TABLET ORAL at 05:58

## 2023-11-01 RX ADMIN — SIMETHICONE 125 MG: 125 TABLET, CHEWABLE ORAL at 09:57

## 2023-11-01 RX ADMIN — GABAPENTIN 400 MG: 400 CAPSULE ORAL at 21:03

## 2023-11-01 RX ADMIN — DILTIAZEM HYDROCHLORIDE 30 MG: 30 TABLET, FILM COATED ORAL at 14:53

## 2023-11-01 RX ADMIN — SIMETHICONE 125 MG: 125 TABLET, CHEWABLE ORAL at 11:33

## 2023-11-01 RX ADMIN — SIMETHICONE 125 MG: 125 TABLET, CHEWABLE ORAL at 17:55

## 2023-11-01 RX ADMIN — AMIODARONE HYDROCHLORIDE 200 MG: 200 TABLET ORAL at 09:57

## 2023-11-01 RX ADMIN — POTASSIUM CHLORIDE 30 MEQ: 1500 TABLET, EXTENDED RELEASE ORAL at 09:57

## 2023-11-01 RX ADMIN — DILTIAZEM HYDROCHLORIDE 30 MG: 30 TABLET, FILM COATED ORAL at 05:58

## 2023-11-01 RX ADMIN — SIMETHICONE 125 MG: 125 TABLET, CHEWABLE ORAL at 21:03

## 2023-11-01 RX ADMIN — ATORVASTATIN CALCIUM 20 MG: 10 TABLET, FILM COATED ORAL at 21:03

## 2023-11-01 RX ADMIN — APIXABAN 5 MG: 5 TABLET, FILM COATED ORAL at 21:03

## 2023-11-01 RX ADMIN — OMEPRAZOLE 20 MG: 20 CAPSULE, DELAYED RELEASE ORAL at 09:57

## 2023-11-01 RX ADMIN — GABAPENTIN 400 MG: 400 CAPSULE ORAL at 14:53

## 2023-11-01 RX ADMIN — GABAPENTIN 400 MG: 400 CAPSULE ORAL at 09:57

## 2023-11-01 RX ADMIN — ALLOPURINOL 100 MG: 100 TABLET ORAL at 09:57

## 2023-11-01 RX ADMIN — METFORMIN HYDROCHLORIDE 850 MG: 850 TABLET ORAL at 17:55

## 2023-11-01 ASSESSMENT — ACTIVITIES OF DAILY LIVING (ADL)
BED_CHAIR_WHEELCHAIR_TRANSFER_DESCRIPTION: ADAPTIVE EQUIPMENT;SET-UP OF EQUIPMENT
TUB_SHOWER_TRANSFER_DESCRIPTION: ADAPTIVE EQUIPMENT;GRAB BAR;SHOWER BENCH;INCREASED TIME;INITIAL PREPARATION FOR TASK;SET-UP OF EQUIPMENT;SUPERVISION FOR SAFETY
BED_CHAIR_WHEELCHAIR_TRANSFER_DESCRIPTION: ADAPTIVE EQUIPMENT;INCREASED TIME;INITIAL PREPARATION FOR TASK;SUPERVISION FOR SAFETY

## 2023-11-01 ASSESSMENT — GAIT ASSESSMENTS
DISTANCE (FEET): 80
GAIT LEVEL OF ASSIST: CONTACT GUARD ASSIST
DEVIATION: BRADYKINETIC;DECREASED HEEL STRIKE;DECREASED TOE OFF;OTHER (COMMENT)
ASSISTIVE DEVICE: CANADIAN (LOFSTRAND) CRUTCHES

## 2023-11-01 ASSESSMENT — PAIN DESCRIPTION - PAIN TYPE: TYPE: ACUTE PAIN

## 2023-11-01 NOTE — CARE PLAN
The patient is Stable - Low risk of patient condition declining or worsening    Shift Goals  Clinical Goals: Safety, pain control  Patient Goals: Safety  Family Goals: Education    Progress made toward(s) clinical / shift goals: Pt's VSS, denies pain, able to participate in therapy, peritoneal dressing changed, last BM 10/31, grace in place.

## 2023-11-01 NOTE — THERAPY
"Occupational Therapy  Daily Treatment     Patient Name: Jose Castanon  Age:  68 y.o., Sex:  male  Medical Record #: [id number]  Today's Date: 11/1/2023     Precautions  Precautions: (P) Fall Risk, Other (See Comments)  Comments: (P) monitor HR and O2, hx of BKA LLE with prosthetic, wound near sacrum, medial heal wound RLE, lateral foot wound RLE         Subjective    \"I would like to shower\"     Objective       11/01/23 1401   OT Charge Group   Charges Yes   OT Self Care / ADL (Units) 4   OT Total Time Spent   OT Individual Total Time Spent (Mins) 60   Precautions   Precautions Fall Risk;Other (See Comments)   Comments monitor HR and O2, hx of BKA LLE with prosthetic, wound near sacrum, medial heal wound RLE, lateral foot wound RLE   Functional Level of Assist   Grooming Independent   Grooming Description Seated in wheelchair at sink;Supervision for safety   Bathing Standby Assist   Bathing Description Adaptive equipment;Grab bar;Hand rails;Hand held shower;Tub bench;Increased time;Initial preparation for task;Set-up of equipment;Supervision for safety   Upper Body Dressing Independent   Upper Body Dressing Description Increased time   Lower Body Dressing Minimal Assist   Lower Body Dressing Description Other (comment)  (assistance with prosthetic only, able to complete all other LBD with AE)   Bed, Chair, Wheelchair Transfer Standby Assist   Bed Chair Wheelchair Transfer Description Adaptive equipment;Increased time;Initial preparation for task;Supervision for safety   Tub / Shower Transfers Standby Assist   Tub Shower Transfer Description Adaptive equipment;Grab bar;Shower bench;Increased time;Initial preparation for task;Set-up of equipment;Supervision for safety   Bed Mobility    Supine to Sit Modified Independent   Sit to Supine Modified Independent   Scooting Supervised   Rolling Modified Independent   Interdisciplinary Plan of Care Collaboration   IDT Collaboration with  Nursing   Patient Position at End " of Therapy In Bed;Call Light within Reach   Collaboration Comments hand off to wound care         Assessment    Pt with good progression to OT POC on this date and great motivation to continue to increase independence levels with ADLs and functional transfers. Pt requiring intermittent assistance for prosthetic pending set up and functional reach, but otherwise demos competency with donnng and doffing all LBD with AE and extended time. Act tolerance improving, although still fatigues quickly.     Strengths: Able to follow instructions, Alert and oriented, Effective communication skills, Good carryover of learning, Good insight into deficits/needs, Motivated for self care and independence, Pleasant and cooperative  Barriers: Decreased endurance, Bowel incontinence, Fatigue, Generalized weakness, Impaired activity tolerance    Plan    Continue to progress POC    DME         Occupational Therapy Goals (Active)       Problem: Bathing       Dates: Start:  10/25/23         Goal: STG-Within one week, patient will bathe with CGA and LRD       Dates: Start:  10/25/23               Problem: Dressing       Dates: Start:  10/25/23         Goal: STG-Within one week, patient will dress LB Min A and LRD       Dates: Start:  10/25/23               Problem: Functional Transfers       Dates: Start:  10/25/23         Goal: STG-Within one week, patient will transfer to toilet CGA and LRD       Dates: Start:  10/25/23            Goal: STG-Within one week, patient will transfer to step in shower with CGA and LRD       Dates: Start:  10/25/23               Problem: OT Long Term Goals       Dates: Start:  10/25/23         Goal: LTG-By discharge, patient will complete basic self care tasks SBA to SUP       Dates: Start:  10/25/23            Goal: LTG-By discharge, patient will perform bathroom transfers SBA to SUP       Dates: Start:  10/25/23            Goal: LTG-By discharge, patient will complete basic home management SBA to SUP        Dates: Start:  10/25/23               Problem: Toileting       Dates: Start:  10/25/23         Goal: STG-Within one week, patient will complete toileting tasks with Min A and LRD       Dates: Start:  10/25/23

## 2023-11-01 NOTE — THERAPY
Occupational Therapy  Daily Treatment     Patient Name: Jose Castanon  Age:  68 y.o., Sex:  male  Medical Record #: [id number]  Today's Date: 11/1/2023     Precautions  Precautions: (P) Fall Risk, Other (See Comments)  Comments: (P) monitor HR and O2, hx of BKA LLE w prosthetic, wound near sacrum, medial heal wound RLE, lateral foot wound RLE         Subjective    Pt seated in WC in room upon arrival. Pt agreeable and motivated for tx session.     Objective       11/01/23 0931   OT Charge Group   OT Therapeutic Exercise (Units) 2   OT Total Time Spent   OT Individual Total Time Spent (Mins) 30   Precautions   Precautions Fall Risk;Other (See Comments)   Comments monitor HR and O2, hx of BKA LLE w prosthetic, wound near sacrum, medial heal wound RLE, lateral foot wound RLE   Sitting Upper Body Exercises   Bilateral Row 2 sets of 10;Bilateral  (Equilizer 35Ib)   Bicep Curls 2 sets of 10;Bilateral  (Equilizer 15 Ib)   Upper Extremity Bike Level 5 Resistance  (Motomed x10 minutes)   Interdisciplinary Plan of Care Collaboration   IDT Collaboration with  Physical Therapist   Patient Position at End of Therapy Seated   Collaboration Comments Handoff to RN in therapy gym. PT notified pt waiting in gym for next tx session         Assessment    Pt completed UB therex to improve strength and endurance needed for functional mobility and ADLs. Pt demo goos participation and motivation. Pt required several rest breaks d/t fatigue and weakness.   Strengths: Able to follow instructions, Alert and oriented, Effective communication skills, Good carryover of learning, Good insight into deficits/needs, Motivated for self care and independence, Pleasant and cooperative  Barriers: Decreased endurance, Bowel incontinence, Fatigue, Generalized weakness, Impaired activity tolerance    Plan    ADLs  Strength  Functional mobility  Balance/Endurance    DME      Occupational Therapy Goals (Active)       Problem: Bathing       Dates: Start:   10/25/23         Goal: STG-Within one week, patient will bathe with CGA and LRD       Dates: Start:  10/25/23               Problem: Dressing       Dates: Start:  10/25/23         Goal: STG-Within one week, patient will dress LB Min A and LRD       Dates: Start:  10/25/23               Problem: Functional Transfers       Dates: Start:  10/25/23         Goal: STG-Within one week, patient will transfer to toilet CGA and LRD       Dates: Start:  10/25/23            Goal: STG-Within one week, patient will transfer to step in shower with CGA and LRD       Dates: Start:  10/25/23               Problem: OT Long Term Goals       Dates: Start:  10/25/23         Goal: LTG-By discharge, patient will complete basic self care tasks SBA to SUP       Dates: Start:  10/25/23            Goal: LTG-By discharge, patient will perform bathroom transfers SBA to SUP       Dates: Start:  10/25/23            Goal: LTG-By discharge, patient will complete basic home management SBA to SUP       Dates: Start:  10/25/23               Problem: Toileting       Dates: Start:  10/25/23         Goal: STG-Within one week, patient will complete toileting tasks with Min A and LRD       Dates: Start:  10/25/23

## 2023-11-01 NOTE — PROGRESS NOTES
NURSING DAILY NOTE    Name: Jose Castanon   Date of Admission: 10/24/2023   Admitting Diagnosis: Critical illness polyneuropathy (HCC)  Attending Physician: Sylvester Contreras M.d.  Allergies: Penicillins, Bee venom, Cefazolin, and Linezolid    Safety  Patient Assist  max 2  Patient Precautions  Fall Risk, Other (See Comments)  Precaution Comments  monitor HR and O2, hx of BKA LLE w prosthetic, wound near sacrum, medial heal wound RLE, lateral foot wound RLE  Bed Transfer Status  Standby Assist  Toilet Transfer Status   Minimal Assist  Assistive Devices  Rails, Wheelchair  Oxygen  None - Room Air  Diet/Therapeutic Dining  Current Diet Order   Procedures    Diet Order Diet: Consistent CHO (Diabetic)     Pill Administration  whole  Agitated Behavioral Scale     ABS Level of Severity       Fall Risk  Has the patient had a fall this admission?   No  Lulu Aguilera Fall Risk Scoring  14, MODERATE RISK  Fall Risk Safety Measures  chair alarm, poor balance, and low vision/ hearing    Vitals  Temperature: 36.7 °C (98 °F)  Temp src: Oral  Pulse: 87  Respiration: 17  Blood Pressure : 117/74  Blood Pressure MAP (Calculated): 88 MM HG  BP Location: Right, Upper Arm  Patient BP Position: Supine     Oxygen  Pulse Oximetry: 93 %  O2 (LPM): 0  O2 Delivery Device: None - Room Air    Bowel and Bladder  Last Bowel Movement  10/31/23  Stool Type  Type 5: Soft blob with clear cut edges (passed easily)  Bowel Device  Bathroom  Continent  Bladder: Did not void   Bowel: No movement  Bladder Function  Urine Color: Yellow  Urine Clarity: Clear  Genitourinary Assessment   Bladder Assessment (WDL):  WDL Except  Ambrosio Catheter: Present with Active Order  Ambrosio Care: Given with Soap and Water  Urinary Elimination: Catheter (Document on LDA)  Urine Color: Yellow  Urine Clarity: Clear  Bladder Device: Indwelling Catheter    Skin  Des Score   13  Sensory Interventions   Bed  Types: Bariatric Low Airloss  Skin Preventative Measures: Pillows in Use for Support / Positioning  Moisture Interventions  Protocols: Pressure Ulcer Prevention / Intervention Protocol in Place  Moisturizers/Barriers: Moisturizer   Containment Devices: Indwelling Catheter      Pain  Pain Rating Scale  0 - No Pain  Pain Location  Incision, Scrotum  Pain Location Orientation  Proximal  Pain Interventions   Declines    ADLs    Bathing   Full Bed Bath  Linen Change   Complete  Personal Hygiene  Change Ankita Pads  Chlorhexidine Bath      Oral Care     Teeth/Dentures     Shave     Nutrition Percentage Eaten  Lunch, Between % Consumed  Environmental Precautions  Treaded Slipper Socks on Patient, Bed in Low Position  Patient Turns/Positioning  Patient Turns Self from Side to Side  Patient Turns Assistance/Tolerance  Tolerates Well  Bed Positions  Bed Controls On  Head of Bed Elevated  Self regulated      Psychosocial/Neurologic Assessment  Psychosocial Assessment  Psychosocial (WDL):  Within Defined Limits  Neurologic Assessment  Neuro (WDL): Exceptions to WDL  Level of Consciousness: Alert  Orientation Level: Oriented X4  Cognition: Follows commands, Appropriate attention/concentration, Appropriate judgement, Appropriate safety awareness  Speech: Clear  Motor Function/Sensation Assessment: Motor strength  RUE Sensation: No numbness  Muscle Strength Right Arm: Good Strength Against Gravity and Moderate Resistance  LUE Sensation: No numbness  Muscle Strength Left Arm: Good Strength Against Gravity and Moderate Resistance  RLE Sensation: Numbness, Tingling (BKA)  Muscle Strength Right Leg: Good Strength Against Gravity and Moderate Resistance  LLE Sensation: Numbness, Tingling  Muscle Strength Left Leg: Good Strength Against Gravity and Moderate Resistance  EENT (WDL):  WDL Except    Cardio/Pulmonary Assessment  Edema   RLE Edema: Generalized  Respiratory Breath Sounds  RUL Breath Sounds: Clear  RML Breath Sounds:  Clear  RLL Breath Sounds: Diminished  MIGUELINA Breath Sounds: Clear  LLL Breath Sounds: Diminished  Cardiac Assessment   Cardiac (WDL):  WDL Except

## 2023-11-01 NOTE — PROGRESS NOTES
Physical Medicine & Rehabilitation Progress Note    Encounter Date: 11/1/2023    Chief Complaint: Decreased mobility, pain    Interval Events (Subjective):  Patient sitting up in room. He reports therapy is going well. He reports they are working on an alternative to an AFO. Pain is controlled, he is avoiding opiates as his bowel movements have been variable.     _____________________________________  Interdisciplinary Team Conference   Most recent IDT on 10/26/2023    Discharge Date/Disposition:  11/14/23  _____________________________________    Objective:  VITAL SIGNS: /76   Pulse 73   Temp 36.8 °C (98.2 °F) (Oral)   Resp 15   Ht 1.829 m (6')   Wt (!) 162 kg (357 lb 12.8 oz)   SpO2 95%   BMI 48.53 kg/m²   Gen: NAD  Psych: Mood and affect appropriate  CV: RRR, 0 edema  Resp: CTAB, no upper airway sounds  Abd: NTND  Neuro: AOx4, following commands    Laboratory Values:  Recent Results (from the past 72 hour(s))   POCT glucose device results    Collection Time: 10/29/23  5:11 PM   Result Value Ref Range    POC Glucose, Blood 109 (H) 65 - 99 mg/dL   POCT glucose device results    Collection Time: 10/29/23  8:41 PM   Result Value Ref Range    POC Glucose, Blood 138 (H) 65 - 99 mg/dL   POCT glucose device results    Collection Time: 10/30/23  7:26 AM   Result Value Ref Range    POC Glucose, Blood 120 (H) 65 - 99 mg/dL   POCT glucose device results    Collection Time: 10/30/23 11:20 AM   Result Value Ref Range    POC Glucose, Blood 105 (H) 65 - 99 mg/dL   POCT glucose device results    Collection Time: 10/30/23  4:48 PM   Result Value Ref Range    POC Glucose, Blood 102 (H) 65 - 99 mg/dL   POCT glucose device results    Collection Time: 10/30/23  8:11 PM   Result Value Ref Range    POC Glucose, Blood 95 65 - 99 mg/dL   CBC WITHOUT DIFFERENTIAL    Collection Time: 10/31/23  5:53 AM   Result Value Ref Range    WBC 9.2 4.8 - 10.8 K/uL    RBC 4.23 (L) 4.70 - 6.10 M/uL    Hemoglobin 11.8 (L) 14.0 - 18.0 g/dL     Hematocrit 38.5 (L) 42.0 - 52.0 %    MCV 91.0 81.4 - 97.8 fL    MCH 27.9 27.0 - 33.0 pg    MCHC 30.6 (L) 32.3 - 36.5 g/dL    RDW 51.1 (H) 35.9 - 50.0 fL    Platelet Count 273 164 - 446 K/uL    MPV 10.9 9.0 - 12.9 fL   Basic Metabolic Panel    Collection Time: 10/31/23  5:53 AM   Result Value Ref Range    Sodium 142 135 - 145 mmol/L    Potassium 3.6 3.6 - 5.5 mmol/L    Chloride 103 96 - 112 mmol/L    Co2 25 20 - 33 mmol/L    Glucose 116 (H) 65 - 99 mg/dL    Bun 23 (H) 8 - 22 mg/dL    Creatinine 1.22 0.50 - 1.40 mg/dL    Calcium 8.8 8.5 - 10.5 mg/dL    Anion Gap 14.0 7.0 - 16.0   ESTIMATED GFR    Collection Time: 10/31/23  5:53 AM   Result Value Ref Range    GFR (CKD-EPI) 64 >60 mL/min/1.73 m 2   POCT glucose device results    Collection Time: 10/31/23  7:26 AM   Result Value Ref Range    POC Glucose, Blood 124 (H) 65 - 99 mg/dL   POCT glucose device results    Collection Time: 10/31/23  1:05 PM   Result Value Ref Range    POC Glucose, Blood 106 (H) 65 - 99 mg/dL   POCT glucose device results    Collection Time: 10/31/23  4:55 PM   Result Value Ref Range    POC Glucose, Blood 134 (H) 65 - 99 mg/dL   POCT glucose device results    Collection Time: 10/31/23  9:14 PM   Result Value Ref Range    POC Glucose, Blood 126 (H) 65 - 99 mg/dL   POCT glucose device results    Collection Time: 11/01/23  7:50 AM   Result Value Ref Range    POC Glucose, Blood 105 (H) 65 - 99 mg/dL   POCT glucose device results    Collection Time: 11/01/23 11:21 AM   Result Value Ref Range    POC Glucose, Blood 106 (H) 65 - 99 mg/dL       Medications:  Scheduled Medications   Medication Dose Frequency    insulin lispro  2-12 Units 4X/DAY ACHS    potassium chloride SA  30 mEq DAILY    metFORMIN  850 mg BID WITH MEALS    simethicone  125 mg 4X/DAY WITH MEALS + NIGHTLY    furosemide  40 mg Q DAY    allopurinol  100 mg DAILY    atorvastatin  20 mg Q EVENING    gabapentin  400 mg TID    apixaban  5 mg BID    amiodarone  200 mg DAILY    dilTIAZem  30 mg  Q8HRS    melatonin  3 mg QHS    omeprazole  20 mg DAILY     PRN medications: senna-docusate **AND** polyethylene glycol/lytes **AND** magnesium hydroxide **AND** bisacodyl, guaiFENesin dextromethorphan, [DISCONTINUED] insulin regular **AND** POC blood glucose manual result **AND** NOTIFY MD and PharmD **AND** Administer 20 grams of glucose (approximately 8 ounces of fruit juice) every 15 minutes PRN FSBG less than 70 mg/dL **AND** dextrose bolus, Respiratory Therapy Consult, hydrALAZINE, acetaminophen, mag hydrox-al hydrox-simeth, ondansetron **OR** ondansetron, traZODone, sodium chloride, oxyCODONE immediate-release **OR** oxyCODONE immediate-release, traMADol    Diet:  Current Diet Order   Procedures    Diet Order Diet: Consistent CHO (Diabetic)       Medical Decision Making and Plan:   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 Per guidelines, 15 hours per week between PT, OT and/or SLP.  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      HTN/A fib - Patient now on Amiodarone 200 mg daily, Diltiazem 30 mg TID, Lasix 40 mg BID, and Eliquis 5 mg BID. Continue Amiodarone 200 mg and Lasix 40 mg -> reduced to daily     DM2 with hyperglycemia - Patient on Glargine 10 U BID and SSI. Consult hospitalist. Started on Metformin -> Metformin 850 mg BID, continue Metformin blood sugars improved. Continue Metformin, good control on SSI and Metformin, will monitor     PAD - Patient on Eliquis. Wound care recommending ABIs/US on 10/26/23 - no significant stenosis     CKD3a - Avoid nephrotoxic agents. Check AM CMP - Cr stable     Leukocytosis - 12 on 10/25/23    Anemia - 11.2 on admission. Monitor    Hypokalemia - 3.5 on admission, consult hospitalist, on Lasix. Continue K supplement 30 mEq per  hospitalist    Gout - Patient on Allopurinol 100 mg  daily     Neuropathy - Patient on Gabapentin 400 mg TID. Continue Gabapentin 400 mg TID     L BKA - Patient with L BKA with prosthetic     Morbid Obesity due to excess calories - Patient with BMI of 48.5 on admission, meets medical criteria. Dietitian to consult     Pain - Patient on PRN Tylenol, PRN Oxycodone, and PRN Tramadol. Also on Gabapentin 400 mg TID     Skin - Patient at risk for skin breakdown due to debility in areas including sacrum, achilles, elbows and head in addition to other sites. Nursing to assess skin daily.      GI Ppx - Patient on Prilosec for GERD prophylaxis. Patient on Senna-docusate for constipation prophylaxis.  -Having multiple BMs, change bowel medications to PRN      DVT Ppx - Patient Eliquis on transfer. Continue Eliquis  ____________________________________    T. Clay Contreras MD/PhD  [facility] - Physical Medicine & Rehabilitation   [facility] - Brain Injury Medicine   ____________________________________

## 2023-11-01 NOTE — CARE PLAN
"  Problem: Knowledge Deficit - Standard  Goal: Patient and family/care givers will demonstrate understanding of plan of care, disease process/condition, diagnostic tests and medications  Outcome: Progressing  Note: Pt agrees with plan of care tonight regarding medications and safety.  Will continue to monitor patient.      Problem: Fall Risk - Rehab  Goal: Patient will remain free from falls  Outcome: Progressing  Note: Lulu Aguilera Fall risk Assessment Score:  14      Moderate fall risk Interventions  - Bed and strip alarm   - Yellow sign by the door   - Yellow wrist band \"Fall risk\"  - Room near to the nurse station  - Do not leave patient unattended in the bathroom  - Fall risk education provided             The patient is Stable - Low risk of patient condition declining or worsening    Shift Goals  Clinical Goals: Safety  Patient Goals: Sleep well  Family Goals: Education    Progress made toward(s) clinical / shift goals:  progressing        "

## 2023-11-01 NOTE — THERAPY
Physical Therapy   Daily Treatment     Patient Name: Jose Castanon  Age:  68 y.o., Sex:  male  Medical Record #: [id number]  Today's Date: 11/1/2023     Precautions  Precautions: (P) Fall Risk, Other (See Comments)  Comments: (P) monitor HR and O2, hx of BKA LLE w prosthetic, wound near sacrum, medial heal wound RLE, lateral foot wound RLE    Subjective    Pt reported readiness for PT in session 1.  In session 2, pt reported liking the idea of a Toe Up device for home use.      Objective       11/01/23 0831 11/01/23 1001   PT Charge Group   PT Gait Training (Units)  --  1   PT Therapeutic Exercise (Units)  --  1   PT Neuromuscular Re-Education / Balance (Units) 1 1   PT Therapeutic Activities (Units) 1 1   PT Total Time Spent   PT Individual Total Time Spent (Mins) 30 60   Precautions   Precautions Fall Risk;Other (See Comments) Fall Risk;Other (See Comments)   Comments monitor HR and O2, hx of BKA LLE w prosthetic, wound near sacrum, medial heal wound RLE, lateral foot wound RLE monitor HR and O2, hx of BKA LLE w prosthetic, wound near sacrum, medial heal wound RLE, lateral foot wound RLE   Vitals   Pulse  --  (!) 122   Patient BP Position  --  Sitting  (after 2nd bout of 80 ft amb, recovered to <100bpm after ~2 min)   Room Air Oximetry  --  97   Gait Functional Level of Assist    Gait Level Of Assist  --  Contact Guard Assist   Assistive Device  --  [place] ([facility]) Crutches   Distance (Feet)  --  80   # of Times Distance was Traveled  --  2   Deviation  --  Bradykinetic;Decreased Heel Strike;Decreased Toe Off;Other (Comment)  (L prosthetic, fwd trunk lean, trial of Toe Up brace for RLE foot clearance)   Wheelchair Functional Level of Assist   Wheelchair Assist Supervised  --    Distance Wheelchair (Feet or Distance) 100  (2x)  --    Wheelchair Description Extra time;Limited by fatigue  --    Transfer Functional Level of Assist   Bed, Chair, Wheelchair Transfer Standby Assist  --    Bed Chair Wheelchair  Transfer Description Adaptive equipment;Set-up of equipment  (SPT FWW)  --    Sitting Lower Body Exercises   Bilateral Row  --  1 set of 10;Light Resistance Theraband   Hip Adduction  --  1 set of 10;Bilateral  (pillow squeezes to facilitate core recruitment, improve tendency for excessive ER)   Comments  --  BUE ER with light resistance TB. SElf pec stretching, and education about posture and muscular imbalance/ strengthening.   Bed Mobility    Supine to Sit Modified Independent  --    Sit to Stand Standby Assist Standby Assist  (bilateral Lofstrand crutches)   Scooting Supervised  --    Rolling Modified Independent  --    Neuro-Muscular Treatments   Neuro-Muscular Treatments Weight Shift Right;Weight Shift Left;Verbal Cuing;Sequencing;Tactile Cuing Weight Shift Left;Weight Shift Right;Verbal Cuing;Tactile Cuing;Sequencing   Comments EOB dynamic balance during dressing and donning prosthetic SPV/ set up, limited by dec activity tolerance. Pt introduced to Toe Up brace for intermittent dec foot clearance, and looked up online for price and how to buy.   Interdisciplinary Plan of Care Collaboration   IDT Collaboration with  Certified Nursing Assistant Occupational Therapist;Physical Therapist   Patient Position at End of Therapy Seated;Call Light within Reach Seated;Call Light within Reach   Collaboration Comments POC Handoff from OT     Standing balance EOB with FWW to pull up underwear and pants, with unilateral to no UE support SBA.     Assessment    In session 1, patient participated in seated balance during dressing of underwear and shorts, and donning of prosthetic and R shoe. Pt continues to demonstrate dec activity tolerance, requiring reminders to pace himself, and consciously breath. Pt demonstrated recall of transfer set up and safety, using FWW. Pt required further reminders to pace himself during wc mobility, due to tendency for SOB after short duration activity.     In session 2, Pt trialed Toe Up brace  "for intermittent dec foot clearance, and amb 80 ft x 2 with alek Lofstrand crutches. Pt would likely not be a safe candidate at this time for an AFO d/t two wounds, and impaired sensation, on RLE. Toe Up brace appeared effective, and pt was introduced on how to obtain one. Pt was receptive to postural education/ HEP.     Strengths: Effective communication skills, Good insight into deficits/needs, Independent prior level of function, Motivated for self care and independence, Pleasant and cooperative, Willingly participates in therapeutic activities  Barriers: Decreased endurance, Fatigue, Home accessibility, Impaired activity tolerance, Impaired balance, Limited mobility, Pain    Plan    Gait with crutches, strengthening, balance, postural HEP progression, activity tolerance/ endurance.     DME  PT DME Recommendations  Wheelchair:  (Pt has 2 wheelchairs reported to be in good condition)  Assistive Device:  (Pt has FWW and forearm crutches)    Passport items to be completed:  Get in/out of bed safely, in/out of a vehicle, safely use mobility device, walk or wheel around home/community, navigate up and down stairs, show how to get up/down from the ground, ensure home is accessible, demonstrate HEP, complete caregiver training    Physical Therapy Problems (Active)       Problem: Mobility       Dates: Start:  10/25/23         Goal: STG-Within one week, patient will propel wheelchair community >150 ft mod I indoors       Dates: Start:  10/25/23            Goal: STG-Within one week, patient will ambulate household distance in // bars 10ft x 2 (awaiting prosthetic alterations)       Dates: Start:  10/25/23            Goal: STG-Within one week, patient will ambulate up/down a 2\" step in // bars min A       Dates: Start:  10/25/23               Problem: Mobility Transfers       Dates: Start:  10/25/23         Goal: STG-Within one week, patient will transfer bed to chair SPT FWW SBA/SPV       Dates: Start:  10/25/23          "      Problem: PT-Long Term Goals       Dates: Start:  10/25/23         Goal: LTG-By discharge, patient will propel wheelchair mod I over various surfaces >150 ft       Dates: Start:  10/25/23            Goal: LTG-By discharge, patient will ambulate with FWW >50 ft mod I        Dates: Start:  10/25/23            Goal: LTG-By discharge, patient will transfer one surface to another mod I with FWW + prosthetic        Dates: Start:  10/25/23            Goal: LTG-By discharge, patient will ambulate up/down 2 stairs with (newly installed railings- recommended) mod I       Dates: Start:  10/25/23

## 2023-11-01 NOTE — PROGRESS NOTES
NURSING DAILY NOTE    Name: Jose Castanon   Date of Admission: 10/24/2023   Admitting Diagnosis: Critical illness polyneuropathy (HCC)  Attending Physician: Sylvester Contreras M.d.  Allergies: Penicillins, Bee venom, Cefazolin, and Linezolid    Safety  Patient Assist  cga  Patient Precautions  Fall Risk, Other (See Comments)  Precaution Comments  monitor HR and O2, hx of BKA LLE w prosthetic, wound near sacrum, medial heal wound RLE, lateral foot wound RLE  Bed Transfer Status  Standby Assist  Toilet Transfer Status   Minimal Assist  Assistive Devices  Rails, Wheelchair  Oxygen  None - Room Air  Diet/Therapeutic Dining  Current Diet Order   Procedures    Diet Order Diet: Consistent CHO (Diabetic)     Pill Administration  whole  Agitated Behavioral Scale     ABS Level of Severity       Fall Risk  Has the patient had a fall this admission?   No  Lulu Aguilera Fall Risk Scoring  14, MODERATE RISK  Fall Risk Safety Measures  Bed strip alarm    Vitals  Temperature: 36.7 °C (98.1 °F)  Temp src: Oral  Pulse: 78  Respiration: 18  Blood Pressure : 132/72  Blood Pressure MAP (Calculated): 92 MM HG  BP Location: Right, Upper Arm  Patient BP Position: Supine     Oxygen  Pulse Oximetry: 94 %  O2 (LPM): 0  O2 Delivery Device: None - Room Air    Bowel and Bladder  Last Bowel Movement  11/01/23  Stool Type  Type 5: Soft blob with clear cut edges (passed easily)  Bowel Device  Diaper  Continent  Bladder: Did not void   Bowel: No movement  Bladder Function  Urine Color: Yellow  Urine Clarity: Clear  Genitourinary Assessment   Bladder Assessment (WDL):  WDL Except  Ambrosio Catheter: Present with Active Order  Ambrosio Care: Given with Soap and Water  Urinary Elimination: Catheter (Document on LDA)  Urine Color: Yellow  Urine Clarity: Clear  Bladder Device: Indwelling Catheter    Skin  Des Score   13  Sensory Interventions   Bed Types: Bariatric Low Airloss  Skin  Preventative Measures: Pillows in Use for Support / Positioning  Moisture Interventions  Protocols: Pressure Ulcer Prevention / Intervention Protocol in Place  Moisturizers/Barriers: Moisturizer   Containment Devices: Indwelling Catheter      Pain  Pain Rating Scale  3 - Sometimes distracts me  Pain Location  Incision, Scrotum  Pain Location Orientation  Proximal  Pain Interventions   Declines    ADLs    Bathing   Full Bed Bath  Linen Change   Complete  Personal Hygiene  Change Ankita Pads, Moist Ankita Wipes, Perineal Care  Chlorhexidine Bath      Oral Care     Teeth/Dentures     Shave     Nutrition Percentage Eaten  Dinner, Between % Consumed  Environmental Precautions  Treaded Slipper Socks on Patient, Bed in Low Position  Patient Turns/Positioning  Patient Turns Self from Side to Side  Patient Turns Assistance/Tolerance  Assistance of One, Assistance of Two or More, General Weakness  Bed Positions  Bed Controls On  Head of Bed Elevated  Self regulated      Psychosocial/Neurologic Assessment  Psychosocial Assessment  Psychosocial (WDL):  Within Defined Limits  Neurologic Assessment  Neuro (WDL): Exceptions to WDL  Level of Consciousness: Alert  Orientation Level: Oriented X4  Cognition: Follows commands, Appropriate attention/concentration, Appropriate judgement, Appropriate safety awareness  Speech: Clear  Motor Function/Sensation Assessment: Motor strength  RUE Sensation: No numbness  Muscle Strength Right Arm: Good Strength Against Gravity and Moderate Resistance  LUE Sensation: No numbness  Muscle Strength Left Arm: Good Strength Against Gravity and Moderate Resistance  RLE Sensation: Numbness, Tingling (BKA)  Muscle Strength Right Leg: Good Strength Against Gravity and Moderate Resistance  LLE Sensation: Numbness, Tingling  Muscle Strength Left Leg: Good Strength Against Gravity and Moderate Resistance  EENT (WDL):  WDL Except    Cardio/Pulmonary Assessment  Edema   RLE Edema: Generalized  Respiratory  Breath Sounds  RUL Breath Sounds: Clear  RML Breath Sounds: Clear  RLL Breath Sounds: Clear, Diminished  MIGUELINA Breath Sounds: Clear  LLL Breath Sounds: Clear, Diminished  Cardiac Assessment   Cardiac (WDL):  WDL Except (H/O HTN, CAD.)

## 2023-11-01 NOTE — PROGRESS NOTES
Received bedside shift report from Kendall MICHAELS RN regarding patient and assumed care. Patient awake, calm and stable, currently positioned in bed for comfort and safety; call light within reach. Denies pain or discomfort at this time. Will continue to monitor.

## 2023-11-01 NOTE — FLOWSHEET NOTE
11/01/23 0656   Events/Summary/Plan   Events/Summary/Plan RA pulse ox check   Vital Signs   Pulse 74   Respiration 16   Pulse Oximetry 93 %   $ Pulse Oximetry (Spot Check) Yes   Respiratory Assessment   Level of Consciousness Alert   Chest Exam   Work Of Breathing / Effort Within Normal Limits   Oxygen   O2 Delivery Device Room air w/o2 available

## 2023-11-01 NOTE — THERAPY
"Occupational Therapy  Daily Treatment     Patient Name: Jose Castanon  Age:  68 y.o., Sex:  male  Medical Record #: [id number]  Today's Date: 10/31/2023     Precautions  Precautions: (P) Fall Risk, Other (See Comments)  Comments: (P) monitor HR and O2, hx of BKA LLE w prosthetic, wound near sacrum, medial heal wound RLE, lateral foot wound RLE         Subjective    \"I need to do some laundry\"     Objective       10/31/23 1001   OT Charge Group   Charges Yes   OT Therapy Activity (Units) 2   OT Total Time Spent   OT Individual Total Time Spent (Mins) 30   Precautions   Precautions Fall Risk;Other (See Comments)   Comments monitor HR and O2, hx of BKA LLE w prosthetic, wound near sacrum, medial heal wound RLE, lateral foot wound RLE   Interdisciplinary Plan of Care Collaboration   IDT Collaboration with  Nursing;Physical Therapist   Patient Position at End of Therapy Seated;Chair Alarm On;Call Light within Reach;Tray Table within Reach   Collaboration Comments hand off to NSG     Theract: Pt participated in education of home set up simulation for bathroom and bedroom mobility in ADL suite. Pt demod ability to complete mobility with CGA to SBA at bed level and shower transfer    Assessment    Pt progressing towards OT POC. He is limited on this date by fatigue, but remains motivated for participation and progress.     Strengths: Able to follow instructions, Alert and oriented, Effective communication skills, Good carryover of learning, Good insight into deficits/needs, Motivated for self care and independence, Pleasant and cooperative  Barriers: Decreased endurance, Bowel incontinence, Fatigue, Generalized weakness, Impaired activity tolerance    Plan    Continue POC.    DME         Occupational Therapy Goals (Active)       Problem: Bathing       Dates: Start:  10/25/23         Goal: STG-Within one week, patient will bathe with CGA and LRD       Dates: Start:  10/25/23               Problem: Dressing       Dates: " Start:  10/25/23         Goal: STG-Within one week, patient will dress LB Min A and LRD       Dates: Start:  10/25/23               Problem: Functional Transfers       Dates: Start:  10/25/23         Goal: STG-Within one week, patient will transfer to toilet CGA and LRD       Dates: Start:  10/25/23            Goal: STG-Within one week, patient will transfer to step in shower with CGA and LRD       Dates: Start:  10/25/23               Problem: OT Long Term Goals       Dates: Start:  10/25/23         Goal: LTG-By discharge, patient will complete basic self care tasks SBA to SUP       Dates: Start:  10/25/23            Goal: LTG-By discharge, patient will perform bathroom transfers SBA to SUP       Dates: Start:  10/25/23            Goal: LTG-By discharge, patient will complete basic home management SBA to SUP       Dates: Start:  10/25/23               Problem: Toileting       Dates: Start:  10/25/23         Goal: STG-Within one week, patient will complete toileting tasks with Min A and LRD       Dates: Start:  10/25/23

## 2023-11-01 NOTE — FLOWSHEET NOTE
10/31/23 1817   Events/Summary/Plan   Events/Summary/Plan BiPAP check   Non-Invasive Ventilation [facility]   Nocturnal CPAP or BIPAP BIPAP - [facility]  (2:02 hrs use last night)   FiO2 or LPM 3

## 2023-11-02 ENCOUNTER — APPOINTMENT (OUTPATIENT)
Dept: PHYSICAL THERAPY | Facility: REHABILITATION | Age: 68
DRG: 074 | End: 2023-11-02
Attending: PHYSICAL MEDICINE & REHABILITATION
Payer: MEDICARE

## 2023-11-02 ENCOUNTER — APPOINTMENT (OUTPATIENT)
Dept: OCCUPATIONAL THERAPY | Facility: REHABILITATION | Age: 68
DRG: 074 | End: 2023-11-02
Attending: PHYSICAL MEDICINE & REHABILITATION
Payer: MEDICARE

## 2023-11-02 LAB
GLUCOSE BLD STRIP.AUTO-MCNC: 102 MG/DL (ref 65–99)
GLUCOSE BLD STRIP.AUTO-MCNC: 113 MG/DL (ref 65–99)
GLUCOSE BLD STRIP.AUTO-MCNC: 136 MG/DL (ref 65–99)
GLUCOSE BLD STRIP.AUTO-MCNC: 94 MG/DL (ref 65–99)

## 2023-11-02 PROCEDURE — 700102 HCHG RX REV CODE 250 W/ 637 OVERRIDE(OP): Performed by: HOSPITALIST

## 2023-11-02 PROCEDURE — 97110 THERAPEUTIC EXERCISES: CPT | Mod: CQ

## 2023-11-02 PROCEDURE — 770010 HCHG ROOM/CARE - REHAB SEMI PRIVAT*

## 2023-11-02 PROCEDURE — 97110 THERAPEUTIC EXERCISES: CPT

## 2023-11-02 PROCEDURE — 97530 THERAPEUTIC ACTIVITIES: CPT

## 2023-11-02 PROCEDURE — 99232 SBSQ HOSP IP/OBS MODERATE 35: CPT | Performed by: PHYSICAL MEDICINE & REHABILITATION

## 2023-11-02 PROCEDURE — 94760 N-INVAS EAR/PLS OXIMETRY 1: CPT

## 2023-11-02 PROCEDURE — A9270 NON-COVERED ITEM OR SERVICE: HCPCS | Performed by: HOSPITALIST

## 2023-11-02 PROCEDURE — 82962 GLUCOSE BLOOD TEST: CPT | Mod: 91

## 2023-11-02 PROCEDURE — A9270 NON-COVERED ITEM OR SERVICE: HCPCS | Mod: JZ | Performed by: HOSPITALIST

## 2023-11-02 PROCEDURE — 700102 HCHG RX REV CODE 250 W/ 637 OVERRIDE(OP): Performed by: PHYSICAL MEDICINE & REHABILITATION

## 2023-11-02 PROCEDURE — 97112 NEUROMUSCULAR REEDUCATION: CPT | Mod: CQ

## 2023-11-02 PROCEDURE — A9270 NON-COVERED ITEM OR SERVICE: HCPCS | Performed by: PHYSICAL MEDICINE & REHABILITATION

## 2023-11-02 PROCEDURE — 97116 GAIT TRAINING THERAPY: CPT

## 2023-11-02 PROCEDURE — 700102 HCHG RX REV CODE 250 W/ 637 OVERRIDE(OP): Mod: JZ | Performed by: HOSPITALIST

## 2023-11-02 RX ADMIN — OMEPRAZOLE 20 MG: 20 CAPSULE, DELAYED RELEASE ORAL at 08:09

## 2023-11-02 RX ADMIN — DILTIAZEM HYDROCHLORIDE 30 MG: 30 TABLET, FILM COATED ORAL at 21:07

## 2023-11-02 RX ADMIN — APIXABAN 5 MG: 5 TABLET, FILM COATED ORAL at 08:11

## 2023-11-02 RX ADMIN — GABAPENTIN 400 MG: 400 CAPSULE ORAL at 14:37

## 2023-11-02 RX ADMIN — POTASSIUM CHLORIDE 30 MEQ: 1500 TABLET, EXTENDED RELEASE ORAL at 08:09

## 2023-11-02 RX ADMIN — AMIODARONE HYDROCHLORIDE 200 MG: 200 TABLET ORAL at 08:09

## 2023-11-02 RX ADMIN — GABAPENTIN 400 MG: 400 CAPSULE ORAL at 08:09

## 2023-11-02 RX ADMIN — SIMETHICONE 125 MG: 125 TABLET, CHEWABLE ORAL at 21:08

## 2023-11-02 RX ADMIN — APIXABAN 5 MG: 5 TABLET, FILM COATED ORAL at 21:07

## 2023-11-02 RX ADMIN — DILTIAZEM HYDROCHLORIDE 30 MG: 30 TABLET, FILM COATED ORAL at 05:23

## 2023-11-02 RX ADMIN — ALLOPURINOL 100 MG: 100 TABLET ORAL at 08:10

## 2023-11-02 RX ADMIN — METFORMIN HYDROCHLORIDE 850 MG: 850 TABLET ORAL at 16:59

## 2023-11-02 RX ADMIN — GABAPENTIN 400 MG: 400 CAPSULE ORAL at 21:08

## 2023-11-02 RX ADMIN — DILTIAZEM HYDROCHLORIDE 30 MG: 30 TABLET, FILM COATED ORAL at 14:37

## 2023-11-02 RX ADMIN — FUROSEMIDE 40 MG: 40 TABLET ORAL at 05:23

## 2023-11-02 RX ADMIN — SIMETHICONE 125 MG: 125 TABLET, CHEWABLE ORAL at 11:30

## 2023-11-02 RX ADMIN — ATORVASTATIN CALCIUM 20 MG: 10 TABLET, FILM COATED ORAL at 21:07

## 2023-11-02 RX ADMIN — SIMETHICONE 125 MG: 125 TABLET, CHEWABLE ORAL at 08:08

## 2023-11-02 RX ADMIN — SIMETHICONE 125 MG: 125 TABLET, CHEWABLE ORAL at 16:59

## 2023-11-02 RX ADMIN — METFORMIN HYDROCHLORIDE 850 MG: 850 TABLET ORAL at 08:09

## 2023-11-02 ASSESSMENT — GAIT ASSESSMENTS
ASSISTIVE DEVICE: CANADIAN (LOFSTRAND) CRUTCHES
GAIT LEVEL OF ASSIST: CONTACT GUARD ASSIST
DISTANCE (FEET): 20
DEVIATION: ANTALGIC;INCREASED BASE OF SUPPORT
GAIT LEVEL OF ASSIST: CONTACT GUARD ASSIST
DISTANCE (FEET): 55
DEVIATION: BRADYKINETIC;OTHER (COMMENT)

## 2023-11-02 ASSESSMENT — PAIN DESCRIPTION - PAIN TYPE: TYPE: ACUTE PAIN

## 2023-11-02 ASSESSMENT — PATIENT HEALTH QUESTIONNAIRE - PHQ9
SUM OF ALL RESPONSES TO PHQ9 QUESTIONS 1 AND 2: 0
2. FEELING DOWN, DEPRESSED, IRRITABLE, OR HOPELESS: NOT AT ALL
1. LITTLE INTEREST OR PLEASURE IN DOING THINGS: NOT AT ALL

## 2023-11-02 ASSESSMENT — ACTIVITIES OF DAILY LIVING (ADL): BED_CHAIR_WHEELCHAIR_TRANSFER_DESCRIPTION: ASSIST WITH TWO LIMBS;SUPERVISION FOR SAFETY

## 2023-11-02 NOTE — THERAPY
"Physical Therapy   Daily Treatment     Patient Name: Jose Castanon  Age:  68 y.o., Sex:  male  Medical Record #: [id number]  Today's Date: 11/2/2023     Precautions  Precautions: Fall Risk, Other (See Comments)  Comments: monitor HR and O2, hx of BKA LLE with prosthetic, wound near sacrum, medial heal wound RLE, lateral foot wound RLE    Subjective    I already worked on posture stuff yesterday     Objective       11/02/23 0701   PT Charge Group   PT Gait Training (Units) 1   PT Therapeutic Exercise (Units) 1   PT Neuromuscular Re-Education / Balance (Units) 1   PT Therapeutic Activities (Units) 1   Supervising Physical Therapist Samira Byrd   PT Total Time Spent   PT Individual Total Time Spent (Mins) 60   Gait Functional Level of Assist    Gait Level Of Assist Contact Guard Assist   Assistive Device [employer] ([place]) Crutches   Distance (Feet) 55   # of Times Distance was Traveled 2   Deviation Bradykinetic;Other (Comment)  (L prosthetic, fwd trunk lean, 2 point pattern)   Bed Mobility    Sit to Stand Standby Assist   Scooting   (B loftstrand crutches)   Neuro-Muscular Treatments   Neuro-Muscular Treatments Weight Shift Right;Weight Shift Left;Anterior weight shift;Sequencing   Comments ladderball activity using reciprocal tossing (ie: step L LE/toss RUE) x 3 rounds of 6 tosses each with 3 to 4 minute seated rest btwn rounds. static standing balance activity standing in // no UE support 30\" x 5 trials, seated rest of 1 minute btwn bouts   Interdisciplinary Plan of Care Collaboration   Patient Position at End of Therapy Seated;Self Releasing Lap Belt Applied;Call Light within Reach;Tray Table within Reach         Assessment    The patient is progressing from CGA to close SBA with amb using [place] crutches for HH distances +. Declines use of toe up device for R foot this session, one foor catch with L prosthetic during 1st bout of amb, pt self corrected with no gross LOB noted. Pt self initiates seated rest " "breaks and is able to monitor himself for RPE  Strengths: Effective communication skills, Good insight into deficits/needs, Independent prior level of function, Motivated for self care and independence, Pleasant and cooperative, Willingly participates in therapeutic activities  Barriers: Decreased endurance, Fatigue, Home accessibility, Impaired activity tolerance, Impaired balance, Limited mobility, Pain    Plan    Gait with crutches, strengthening, balance, postural HEP progression, activity tolerance/ endurance.     DME  PT DME Recommendations  Wheelchair:  (Pt has 2 wheelchairs reported to be in good condition)  Assistive Device:  (Pt has FWW and forearm crutches)    Passport items to be completed:  Get in/out of bed safely, in/out of a vehicle, safely use mobility device, walk or wheel around home/community, navigate up and down stairs, show how to get up/down from the ground, ensure home is accessible, demonstrate HEP, complete caregiver training    Physical Therapy Problems (Active)       Problem: Mobility       Dates: Start:  10/25/23         Goal: STG-Within one week, patient will propel wheelchair community >150 ft mod I indoors       Dates: Start:  10/25/23            Goal: STG-Within one week, patient will ambulate household distance in // bars 10ft x 2 (awaiting prosthetic alterations)       Dates: Start:  10/25/23            Goal: STG-Within one week, patient will ambulate up/down a 2\" step in // bars min A       Dates: Start:  10/25/23               Problem: Mobility Transfers       Dates: Start:  10/25/23         Goal: STG-Within one week, patient will transfer bed to chair SPT FWW SBA/SPV       Dates: Start:  10/25/23               Problem: PT-Long Term Goals       Dates: Start:  10/25/23         Goal: LTG-By discharge, patient will propel wheelchair mod I over various surfaces >150 ft       Dates: Start:  10/25/23            Goal: LTG-By discharge, patient will ambulate with FWW >50 ft mod I        " Dates: Start:  10/25/23            Goal: LTG-By discharge, patient will transfer one surface to another mod I with FWW + prosthetic        Dates: Start:  10/25/23            Goal: LTG-By discharge, patient will ambulate up/down 2 stairs with (newly installed railings- recommended) mod I       Dates: Start:  10/25/23

## 2023-11-02 NOTE — PROGRESS NOTES
Physical Medicine & Rehabilitation Progress Note    Encounter Date: 11/2/2023    Chief Complaint: Decreased mobility, pain    Interval Events (Subjective):  Patient sitting up in room. He reports he is doing OK. He reports good blood sugar control. Discussed about IDT later today for discharge planning. .     _____________________________________  Interdisciplinary Team Conference   Most recent IDT on 11/2/2023    ISylvester M.D./Ph.D., was present and led the interdisciplinary team conference on 11/2/2023.  I led the IDT conference and agree with the IDT conference documentation and plan of care as noted below.     Nursing:  Diet Current Diet Order   Procedures    Diet Order Diet: Consistent CHO (Diabetic)       Eating ADL Independent  Set-up of equipment or meal/tube feeding   % of Last Meal  Oral Nutrition: Breakfast, Between % Consumed   Sleep    Bowel Last BM: 11/01/23   Bladder Ambrosio   Barriers to Discharge Home:  Minimal pain  Wounds    Physical Therapy:  Bed Mobility    Transfers Standby Assist  Assist with two limbs, Supervision for safety (SPT with FWW)   Mobility Contact Guard Assist   Stairs Needs to try stairs   Barriers to Discharge Home:  Pain    Occupational Therapy:  Grooming Independent   Bathing Standby Assist   UB Dressing Independent   LB Dressing Minimal Assist   Toileting Maximal Assist   Shower & Transfer    Barriers to Discharge Home:  Prosthetic setup      Case Management:  Continues to work on disposition and DME needs.      Discharge Date/Disposition:  11/14/23 -> 11/8/23  _____________________________________    Objective:  VITAL SIGNS: /60   Pulse 87   Temp 36.7 °C (98.1 °F)   Resp 18   Ht 1.829 m (6')   Wt (!) 162 kg (357 lb 12.8 oz)   SpO2 97%   BMI 48.53 kg/m²   Gen: NAD  Psych: Mood and affect appropriate  CV: RRR, 0 edema  Resp: CTAB, no upper airway sounds  Abd: NTND  Neuro: AOx4, following commands  Unchanged from 11/1/23    Laboratory  Values:  Recent Results (from the past 72 hour(s))   POCT glucose device results    Collection Time: 10/30/23  4:48 PM   Result Value Ref Range    POC Glucose, Blood 102 (H) 65 - 99 mg/dL   POCT glucose device results    Collection Time: 10/30/23  8:11 PM   Result Value Ref Range    POC Glucose, Blood 95 65 - 99 mg/dL   CBC WITHOUT DIFFERENTIAL    Collection Time: 10/31/23  5:53 AM   Result Value Ref Range    WBC 9.2 4.8 - 10.8 K/uL    RBC 4.23 (L) 4.70 - 6.10 M/uL    Hemoglobin 11.8 (L) 14.0 - 18.0 g/dL    Hematocrit 38.5 (L) 42.0 - 52.0 %    MCV 91.0 81.4 - 97.8 fL    MCH 27.9 27.0 - 33.0 pg    MCHC 30.6 (L) 32.3 - 36.5 g/dL    RDW 51.1 (H) 35.9 - 50.0 fL    Platelet Count 273 164 - 446 K/uL    MPV 10.9 9.0 - 12.9 fL   Basic Metabolic Panel    Collection Time: 10/31/23  5:53 AM   Result Value Ref Range    Sodium 142 135 - 145 mmol/L    Potassium 3.6 3.6 - 5.5 mmol/L    Chloride 103 96 - 112 mmol/L    Co2 25 20 - 33 mmol/L    Glucose 116 (H) 65 - 99 mg/dL    Bun 23 (H) 8 - 22 mg/dL    Creatinine 1.22 0.50 - 1.40 mg/dL    Calcium 8.8 8.5 - 10.5 mg/dL    Anion Gap 14.0 7.0 - 16.0   ESTIMATED GFR    Collection Time: 10/31/23  5:53 AM   Result Value Ref Range    GFR (CKD-EPI) 64 >60 mL/min/1.73 m 2   POCT glucose device results    Collection Time: 10/31/23  7:26 AM   Result Value Ref Range    POC Glucose, Blood 124 (H) 65 - 99 mg/dL   POCT glucose device results    Collection Time: 10/31/23  1:05 PM   Result Value Ref Range    POC Glucose, Blood 106 (H) 65 - 99 mg/dL   POCT glucose device results    Collection Time: 10/31/23  4:55 PM   Result Value Ref Range    POC Glucose, Blood 134 (H) 65 - 99 mg/dL   POCT glucose device results    Collection Time: 10/31/23  9:14 PM   Result Value Ref Range    POC Glucose, Blood 126 (H) 65 - 99 mg/dL   POCT glucose device results    Collection Time: 11/01/23  7:50 AM   Result Value Ref Range    POC Glucose, Blood 105 (H) 65 - 99 mg/dL   POCT glucose device results    Collection Time:  11/01/23 11:21 AM   Result Value Ref Range    POC Glucose, Blood 106 (H) 65 - 99 mg/dL   POCT glucose device results    Collection Time: 11/01/23  5:35 PM   Result Value Ref Range    POC Glucose, Blood 86 65 - 99 mg/dL   POCT glucose device results    Collection Time: 11/01/23  9:01 PM   Result Value Ref Range    POC Glucose, Blood 110 (H) 65 - 99 mg/dL   POCT glucose device results    Collection Time: 11/02/23  8:06 AM   Result Value Ref Range    POC Glucose, Blood 136 (H) 65 - 99 mg/dL   POCT glucose device results    Collection Time: 11/02/23 11:29 AM   Result Value Ref Range    POC Glucose, Blood 102 (H) 65 - 99 mg/dL       Medications:  Scheduled Medications   Medication Dose Frequency    insulin lispro  2-12 Units 4X/DAY ACHS    potassium chloride SA  30 mEq DAILY    metFORMIN  850 mg BID WITH MEALS    simethicone  125 mg 4X/DAY WITH MEALS + NIGHTLY    furosemide  40 mg Q DAY    allopurinol  100 mg DAILY    atorvastatin  20 mg Q EVENING    gabapentin  400 mg TID    apixaban  5 mg BID    amiodarone  200 mg DAILY    dilTIAZem  30 mg Q8HRS    melatonin  3 mg QHS    omeprazole  20 mg DAILY     PRN medications: senna-docusate **AND** polyethylene glycol/lytes **AND** magnesium hydroxide **AND** bisacodyl, guaiFENesin dextromethorphan, [DISCONTINUED] insulin regular **AND** POC blood glucose manual result **AND** NOTIFY MD and PharmD **AND** Administer 20 grams of glucose (approximately 8 ounces of fruit juice) every 15 minutes PRN FSBG less than 70 mg/dL **AND** dextrose bolus, Respiratory Therapy Consult, hydrALAZINE, acetaminophen, mag hydrox-al hydrox-simeth, ondansetron **OR** ondansetron, traZODone, sodium chloride, oxyCODONE immediate-release **OR** oxyCODONE immediate-release, traMADol    Diet:  Current Diet Order   Procedures    Diet Order Diet: Consistent CHO (Diabetic)       Medical Decision Making and Plan:   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 Per guidelines, 15 hours per week between PT, OT and/or SLP.  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      HTN/A fib - Patient now on Amiodarone 200 mg daily, Diltiazem 30 mg TID, Lasix 40 mg BID, and Eliquis 5 mg BID. Continue Amiodarone 200 mg and Lasix 40 mg -> reduced to daily     DM2 with hyperglycemia - Patient on Glargine 10 U BID and SSI. Consult hospitalist. Started on Metformin -> Metformin 850 mg BID, continue Metformin blood sugars improved. Continue Metformin 850 mg BID     PAD - Patient on Eliquis. Wound care recommending ABIs/US on 10/26/23 - no significant stenosis     CKD3a - Avoid nephrotoxic agents. Check AM CMP - Cr stable     Leukocytosis - 12 on 10/25/23    Anemia - 11.2 on admission. Monitor    Hypokalemia - 3.5 on admission, consult hospitalist, on Lasix. Continue K supplement 30 mEq per hospitalist    Gout - Patient on Allopurinol 100 mg  daily     Neuropathy - Patient on Gabapentin 400 mg TID. Continue Gabapentin 400 mg TID     L BKA - Patient with L BKA with prosthetic     Morbid Obesity due to excess calories - Patient with BMI of 48.5 on admission, meets medical criteria. Dietitian to consult     Pain - Patient on PRN Tylenol, PRN Oxycodone, and PRN Tramadol. Also on Gabapentin 400 mg TID     Skin - Patient at risk for skin breakdown due to debility in areas including sacrum, achilles, elbows and head in addition to other sites. Nursing to assess skin daily.      GI Ppx - Patient on Prilosec for GERD prophylaxis. Patient on Senna-docusate for constipation prophylaxis.  -Having multiple BMs, change bowel medications to PRN      DVT Ppx - Patient Eliquis on transfer. Continue Eliquis  ____________________________________    T. Clay Contreras MD/PhD  [facility] - Physical Medicine & Rehabilitation    [facility] - Brain Injury Medicine   ____________________________________

## 2023-11-02 NOTE — THERAPY
"Physical Therapy   Daily Treatment     Patient Name: Jose Castanon  Age:  68 y.o., Sex:  male  Medical Record #: [id number]  Today's Date: 11/2/2023     Precautions  Precautions: Fall Risk, Other (See Comments)  Comments: monitor HR and O2; hx of BKE with LLE prosthetic, wound near sacrum, wound on medial RLE heal, lateral foot wound RLE    Subjective    \"I want to try walking without UE support\"     Objective       11/02/23 1101   PT Charge Group   PT Gait Training (Units) 1   PT Therapeutic Activities (Units) 1   PT Total Time Spent   PT Individual Total Time Spent (Mins) 30   Pain 0 - 10 Group   Pain Rating Scale (NPRS)   (pt reports pain is manageable)   Gait Functional Level of Assist    Gait Level Of Assist Contact Guard Assist   Assistive Device None  (BUE hovered over // bars)   Distance (Feet) 20   # of Times Distance was Traveled 2   Deviation Antalgic;Increased Base Of Support  (increased weight shifting)   Transfer Functional Level of Assist   Bed, Chair, Wheelchair Transfer Standby Assist   Bed Chair Wheelchair Transfer Description Assist with two limbs;Supervision for safety  (SPT with FWW)   Bed Mobility    Sit to Supine Modified Independent   Sit to Stand Standby Assist  (in // bars and at FWW)   Scooting Modified Independent   Interdisciplinary Plan of Care Collaboration   Patient Position at End of Therapy Seated;Call Light within Reach;Tray Table within Reach       Lower body and upper body undressing, sitting EOB with SBA and FWW. Set-up assistance. Increased time required.     Pt removes prosthetic independently. Pt with redness at distal aspect of tibia which does not show appropriate perfusion, pt states this redness has been there since initial surgery.     Assessment    Pt demonstrates poor insight into falls risk despite education. Continues to be motivated and working toward independence.     Strengths: Effective communication skills, Good insight into deficits/needs, Independent prior " "level of function, Motivated for self care and independence, Pleasant and cooperative, Willingly participates in therapeutic activities  Barriers: Decreased endurance, Fatigue, Home accessibility, Impaired activity tolerance, Impaired balance, Limited mobility, Pain    Plan    Gait with crutches, strengthening, balance, postural HEP progression, activity tolerance/ endurance.     DME  PT DME Recommendations  Wheelchair:  (Pt has 2 wheelchairs reported to be in good condition)  Assistive Device:  (Pt has FWW and forearm crutches)    Passport items to be completed:  Get in/out of bed safely, in/out of a vehicle, safely use mobility device, walk or wheel around home/community, navigate up and down stairs, show how to get up/down from the ground, ensure home is accessible, demonstrate HEP, complete caregiver training    Physical Therapy Problems (Active)       Problem: Mobility       Dates: Start:  10/25/23         Goal: STG-Within one week, patient will propel wheelchair community >150 ft mod I indoors       Dates: Start:  10/25/23            Goal: STG-Within one week, patient will ambulate household distance in // bars 10ft x 2 (awaiting prosthetic alterations)       Dates: Start:  10/25/23            Goal: STG-Within one week, patient will ambulate up/down a 2\" step in // bars min A       Dates: Start:  10/25/23               Problem: Mobility Transfers       Dates: Start:  10/25/23         Goal: STG-Within one week, patient will transfer bed to chair SPT FWW SBA/SPV       Dates: Start:  10/25/23               Problem: PT-Long Term Goals       Dates: Start:  10/25/23         Goal: LTG-By discharge, patient will propel wheelchair mod I over various surfaces >150 ft       Dates: Start:  10/25/23            Goal: LTG-By discharge, patient will ambulate with FWW >50 ft mod I        Dates: Start:  10/25/23            Goal: LTG-By discharge, patient will transfer one surface to another mod I with FWW + prosthetic        " Dates: Start:  10/25/23            Goal: LTG-By discharge, patient will ambulate up/down 2 stairs with (newly installed railings- recommended) mod I       Dates: Start:  10/25/23

## 2023-11-02 NOTE — PROGRESS NOTES
NURSING DAILY NOTE    Name: Jose Castanon   Date of Admission: 10/24/2023   Admitting Diagnosis: Critical illness polyneuropathy (HCC)  Attending Physician: Sylvester Contreras M.d.  Allergies: Penicillins, Bee venom, Cefazolin, and Linezolid    Safety  Patient Assist  cga  Patient Precautions  Fall Risk, Other (See Comments)  Precaution Comments  monitor HR and O2, hx of BKA LLE with prosthetic, wound near sacrum, medial heal wound RLE, lateral foot wound RLE  Bed Transfer Status  Standby Assist  Toilet Transfer Status   Minimal Assist  Assistive Devices  Rails, Wheelchair  Oxygen  None - Room Air  Diet/Therapeutic Dining  Current Diet Order   Procedures    Diet Order Diet: Consistent CHO (Diabetic)     Pill Administration  whole  Agitated Behavioral Scale     ABS Level of Severity       Fall Risk  Has the patient had a fall this admission?   No  Lulu Aguilera Fall Risk Scoring  14, MODERATE RISK  Fall Risk Safety Measures  bed alarm, chair alarm, and poor balance    Vitals  Temperature: 36.8 °C (98.2 °F)  Temp src: Oral  Pulse: 84  Respiration: 15  Blood Pressure : 111/70  Blood Pressure MAP (Calculated): 84 MM HG  BP Location: Right, Upper Arm  Patient BP Position: Supine     Oxygen  Pulse Oximetry: 93 %  O2 (LPM): 0  O2 Delivery Device: None - Room Air    Bowel and Bladder  Last Bowel Movement  11/01/23  Stool Type  Type 5: Soft blob with clear cut edges (passed easily)  Bowel Device  Diaper  Continent  Bladder: Did not void   Bowel: No movement  Bladder Function  Urine Color: Yellow  Urine Clarity: Clear  Genitourinary Assessment   Bladder Assessment (WDL):  WDL Except  Ambrosio Catheter: Present with Active Order  Ambrosio Care: Given with Soap and Water  Urinary Elimination: Catheter (Document on LDA)  Urine Color: Yellow  Urine Clarity: Clear  Bladder Device: Indwelling Catheter    Skin  Des Score   13  Sensory Interventions   Bed Types:  Bariatric Low Airloss  Skin Preventative Measures: Pillows in Use for Support / Positioning  Moisture Interventions  Protocols: Pressure Ulcer Prevention / Intervention Protocol in Place  Moisturizers/Barriers: Moisturizer   Containment Devices: Indwelling Catheter      Pain  Pain Rating Scale  9 - Can't bear the pain, unable to do anything  Pain Location  Incision, Scrotum  Pain Location Orientation  Proximal  Pain Interventions   Declines (edu given declines pain meds)    ADLs    Bathing   Full Bed Bath  Linen Change   Complete  Personal Hygiene  Change Ankita Pads  Chlorhexidine Bath      Oral Care     Teeth/Dentures     Shave     Nutrition Percentage Eaten  Breakfast, Between % Consumed  Environmental Precautions  Treaded Slipper Socks on Patient, Bed in Low Position  Patient Turns/Positioning  Patient Turns Self from Side to Side  Patient Turns Assistance/Tolerance  Assistance of One  Bed Positions  Bed Controls On  Head of Bed Elevated  Self regulated      Psychosocial/Neurologic Assessment  Psychosocial Assessment  Psychosocial (WDL):  Within Defined Limits  Neurologic Assessment  Neuro (WDL): Exceptions to WDL  Level of Consciousness: Alert  Orientation Level: Oriented X4  Cognition: Follows commands, Appropriate attention/concentration, Appropriate judgement, Appropriate safety awareness  Speech: Clear  Motor Function/Sensation Assessment: Motor strength  RUE Sensation: No numbness  Muscle Strength Right Arm: Good Strength Against Gravity and Moderate Resistance  LUE Sensation: No numbness  Muscle Strength Left Arm: Good Strength Against Gravity and Moderate Resistance  RLE Sensation: Numbness, Tingling (BKA)  Muscle Strength Right Leg: Good Strength Against Gravity and Moderate Resistance  LLE Sensation: Numbness, Tingling  Muscle Strength Left Leg: Good Strength Against Gravity and Moderate Resistance  EENT (WDL):  WDL Except    Cardio/Pulmonary Assessment  Edema   RLE Edema: Generalized  Respiratory  Breath Sounds  RUL Breath Sounds: Clear  RML Breath Sounds: Clear  RLL Breath Sounds: Clear, Diminished  MIGUELINA Breath Sounds: Clear  LLL Breath Sounds: Clear, Diminished  Cardiac Assessment   Cardiac (WDL):  WDL Except (H/O HTN, CAD.)

## 2023-11-02 NOTE — THERAPY
Occupational Therapy  Daily Treatment     Patient Name: Jose Castanon  Age:  68 y.o., Sex:  male  Medical Record #: [id number]  Today's Date: 11/2/2023     Precautions  Precautions: (P) Fall Risk, Other (See Comments)  Comments: (P) monitor HR and O2; hx of BKE with LLE prosthetic, wound near sacrum, wound on medial RLE heal, lateral foot wound RLE         Subjective    Pt agreeable for an OT tx session.     Objective       11/02/23 1001   OT Charge Group   OT Therapeutic Exercise (Units) 4   OT Total Time Spent   OT Individual Total Time Spent (Mins) 60   Precautions   Precautions Fall Risk;Other (See Comments)   Comments monitor HR and O2; hx of BKE with LLE prosthetic, wound near sacrum, wound on medial RLE heal, lateral foot wound RLE   Pain   Intervention Declines   Functional Level of Assist   Upper Body Dressing   (Pt declined a change of clothing reported that he changed clothing earlier.)   Lower Body Dressing   (Pt declined a change of clothing reported that he changed clothing earlier.)   Toileting   (Pt declined need.)   Sitting Upper Body Exercises   Sitting Upper Body Exercises Yes   Chest Press 3 sets of 15;Bilateral;Weight (See Comments for lbs)  (3 lb dumbbells.)   Front Arm Raise 3 sets of 15;Bilateral;Weight (See Comments for lbs)  (3 lb dumbbells.)   Bicep Curls 3 sets of 15;Bilateral;Weight (See Comments for lbs)  (3 lb dumbbells.)   Tricep Press 3 sets of 15;Bilateral;Weight (See Comments for lbs)  (3 lb dumbbells.)   Pronation / Supination 3 sets of 15;Bilateral;Weight (See Comments for lbs)  (3 lb dumbbells.)   Wrist Flexion / Extension 3 sets of 15;Bilateral;Weight (See Comments for lbs)  (3 lb dumbbells.)   Upper Extremity Bike Level 5 Resistance  (10 min.)   Interdisciplinary Plan of Care Collaboration   IDT Collaboration with  Physical Therapist   Patient Position at End of Therapy Seated;Chair Alarm On;Other (Comments)  (Pt handed off to Physical therapist in the gym.)     Pt  performed therapeutic ex's to increase strength/endurance for improved ADL's and functional t/f's/tasks.    Assessment    Pt tolerated OT tx well with rest break as needed.  Strengths: Able to follow instructions, Alert and oriented, Effective communication skills, Good carryover of learning, Good insight into deficits/needs, Motivated for self care and independence, Pleasant and cooperative  Barriers: Decreased endurance, Bowel incontinence, Fatigue, Generalized weakness, Impaired activity tolerance    Plan       ADLs  Strength  Functional mobility  Balance/Endurance    DME       Passport items to be completed:      Occupational Therapy Goals (Active)       Problem: Bathing       Dates: Start:  10/25/23         Goal: STG-Within one week, patient will bathe with SBA and LRD       Dates: Start:  11/02/23       Description:             Problem: Dressing       Dates: Start:  10/25/23         Goal: STG-Within one week, patient will dress LB SBA and LRD       Dates: Start:  11/02/23       Description:             Problem: Functional Transfers       Dates: Start:  10/25/23         Goal: STG-Within one week, patient will transfer to toilet SUP and LRD       Dates: Start:  11/02/23       Description:          Goal: STG-Within one week, patient will transfer to step in shower with SUP and LRD       Dates: Start:  11/02/23       Description:             Problem: OT Long Term Goals       Dates: Start:  10/25/23         Goal: LTG-By discharge, patient will complete basic self care tasks SBA to SUP       Dates: Start:  10/25/23            Goal: LTG-By discharge, patient will perform bathroom transfers SBA to SUP       Dates: Start:  10/25/23            Goal: LTG-By discharge, patient will complete basic home management SBA to SUP       Dates: Start:  10/25/23               Problem: Toileting       Dates: Start:  10/25/23         Goal: STG-Within one week, patient will complete toileting tasks with Min A and LRD       Dates:  Start:  10/25/23

## 2023-11-02 NOTE — CARE PLAN
"    Problem: Fall Risk - Rehab  Goal: Patient will remain free from falls  Outcome: Progressing   Lulu Willy Fall risk Assessment Score: 14    Moderate fall risk Interventions  - Bed and strip alarm   - Yellow sign by the door   - Yellow wrist band \"Fall risk\"  - Room near to the nurse station  - Do not leave patient unattended in the bathroom  - Fall risk education provided           Problem: Pain - Standard  Goal: Alleviation of pain or a reduction in pain to the patient’s comfort goal  Outcome: Progressing   Patient is able to rate pain on a scale of 1-10.   "

## 2023-11-02 NOTE — CARE PLAN
"  Problem: Mobility  Goal: STG-Within one week, patient will ambulate up/down a 2\" step in // bars min A  Outcome: Not Met     Problem: PT-Long Term Goals  Goal: LTG-By discharge, patient will ambulate with FWW >50 ft mod I   Outcome: Not Met  Goal: LTG-By discharge, patient will transfer one surface to another mod I with FWW + prosthetic   Outcome: Not Met  Goal: LTG-By discharge, patient will ambulate up/down 2 stairs with (newly installed railings- recommended) mod I  Outcome: Not Met     Problem: Mobility  Goal: STG-Within one week, patient will propel wheelchair community >150 ft mod I indoors  Outcome: Met  Goal: STG-Within one week, patient will ambulate household distance in // bars 10ft x 2 (awaiting prosthetic alterations)  Outcome: Met     Problem: Mobility Transfers  Goal: STG-Within one week, patient will transfer bed to chair SPT FWW SBA/SPV  Outcome: Met     Problem: PT-Long Term Goals  Goal: LTG-By discharge, patient will propel wheelchair mod I over various surfaces >150 ft  Outcome: Met     "

## 2023-11-02 NOTE — FLOWSHEET NOTE
11/02/23 0702   Events/Summary/Plan   Events/Summary/Plan RA pulse ox check   Vital Signs   Pulse 87   Respiration 18   Pulse Oximetry 97 %   $ Pulse Oximetry (Spot Check) Yes   Respiratory Assessment   Level of Consciousness Alert   Chest Exam   Work Of Breathing / Effort Within Normal Limits   Oxygen   O2 Delivery Device Room air w/o2 available

## 2023-11-02 NOTE — THERAPY
"Occupational Therapy  Daily Treatment     Patient Name: Jose Castanon  Age:  68 y.o., Sex:  male  Medical Record #: [id number]  Today's Date: 11/2/2023     Precautions  Precautions: (P) Fall Risk, Other (See Comments)  Comments: (P) monitor HR and O2; hx of BKE with LLE prosthetic, wound near sacrum, wound on medial RLE heal, lateral foot wound RLE         Subjective    \"I am learning how breathe properly during exercises\"     Objective       11/02/23 0831   OT Charge Group   Charges Yes   OT Therapeutic Exercise (Units) 2   OT Total Time Spent   OT Individual Total Time Spent (Mins) 30   Precautions   Precautions Fall Risk;Other (See Comments)   Comments monitor HR and O2; hx of BKE with LLE prosthetic, wound near sacrum, wound on medial RLE heal, lateral foot wound RLE   Sitting Upper Body Exercises   Sitting Upper Body Exercises Yes   Chest Press 3 sets of 10;Heavy Resistance Theraband  (modified)   Lat Pull 3 sets of 10  (40#)   Bilateral Row 3 sets of 10  (30#)   Other Exercise 3x10 close  chest row @ equalizer w/ 35#   Interdisciplinary Plan of Care Collaboration   IDT Collaboration with  Physical Therapist   Patient Position at End of Therapy Seated;Call Light within Reach;Tray Table within Reach   Collaboration Comments left upright in room       Assessment    Pt with good progression towards OT POC. He has good understanding of therex and HEP and demos competency with performing independently. Continues to be limited by fatigue, needing rest breaks.     Strengths: Able to follow instructions, Alert and oriented, Effective communication skills, Good carryover of learning, Good insight into deficits/needs, Motivated for self care and independence, Pleasant and cooperative  Barriers: Decreased endurance, Bowel incontinence, Fatigue, Generalized weakness, Impaired activity tolerance    Plan    Continue strengthening and progressing ADL independence    DME       Passport items to be completed:  Perform " bathroom transfers, complete dressing, complete feeding, get ready for the day, prepare a simple meal, participate in household tasks, adapt home for safety needs, demonstrate home exercise program, complete caregiver training     Occupational Therapy Goals (Active)       Problem: Bathing       Dates: Start:  10/25/23         Goal: STG-Within one week, patient will bathe with CGA and LRD       Dates: Start:  10/25/23               Problem: Dressing       Dates: Start:  10/25/23         Goal: STG-Within one week, patient will dress LB Min A and LRD       Dates: Start:  10/25/23               Problem: Functional Transfers       Dates: Start:  10/25/23         Goal: STG-Within one week, patient will transfer to toilet CGA and LRD       Dates: Start:  10/25/23            Goal: STG-Within one week, patient will transfer to step in shower with CGA and LRD       Dates: Start:  10/25/23               Problem: OT Long Term Goals       Dates: Start:  10/25/23         Goal: LTG-By discharge, patient will complete basic self care tasks SBA to SUP       Dates: Start:  10/25/23            Goal: LTG-By discharge, patient will perform bathroom transfers SBA to SUP       Dates: Start:  10/25/23            Goal: LTG-By discharge, patient will complete basic home management SBA to SUP       Dates: Start:  10/25/23               Problem: Toileting       Dates: Start:  10/25/23         Goal: STG-Within one week, patient will complete toileting tasks with Min A and LRD       Dates: Start:  10/25/23

## 2023-11-02 NOTE — CARE PLAN
Problem: Bathing  Goal: STG-Within one week, patient will bathe with CGA and LRD  Outcome: Met  Updated to: STG-Within one week, patient will bathe with SBA and LRD (Reason: goal met)     Problem: Dressing  Goal: STG-Within one week, patient will dress LB Min A and LRD  Outcome: Met  Updated to: STG-Within one week, patient will dress LB SBA and LRD (Reason: goal met)     Problem: Functional Transfers  Goal: STG-Within one week, patient will transfer to toilet CGA and LRD  Outcome: Met  Updated to: STG-Within one week, patient will transfer to toilet SUP and LRD (Reason: goal met)  Goal: STG-Within one week, patient will transfer to step in shower with CGA and LRD  Outcome: Met  Updated to: STG-Within one week, patient will transfer to step in shower with SUP and LRD (Reason: goal met)     Problem: Dressing  Goal: STG-Within one week, patient will dress LB SBA and LRD  Outcome: Progressing     Problem: Toileting  Goal: STG-Within one week, patient will complete toileting tasks with Min A and LRD  Outcome: Progressing     Problem: Functional Transfers  Goal: STG-Within one week, patient will transfer to toilet SUP and LRD  Outcome: Progressing  Goal: STG-Within one week, patient will transfer to step in shower with SUP and LRD  Outcome: Progressing

## 2023-11-02 NOTE — PROGRESS NOTES
NURSING DAILY NOTE    Name: Jose Castanon   Date of Admission: 10/24/2023   Admitting Diagnosis: Critical illness polyneuropathy (HCC)  Attending Physician: Sylvester Contreras M.d.  Allergies: Penicillins, Bee venom, Cefazolin, and Linezolid    Safety  Patient Assist  Min-Max  Patient Precautions  Fall Risk, Other (See Comments)  Precaution Comments  monitor HR and O2, hx of BKA LLE with prosthetic, wound near sacrum, medial heal wound RLE, lateral foot wound RLE  Bed Transfer Status  Standby Assist  Toilet Transfer Status   Minimal Assist  Assistive Devices  Rails, Wheelchair  Oxygen  None - Room Air  Diet/Therapeutic Dining  Current Diet Order   Procedures    Diet Order Diet: Consistent CHO (Diabetic)     Pill Administration  whole  Agitated Behavioral Scale     ABS Level of Severity       Fall Risk  Has the patient had a fall this admission?   No  Lulu Aguilera Fall Risk Scoring  14, MODERATE RISK  Fall Risk Safety Measures  bed alarm and chair alarm    Vitals  Temperature: 36.7 °C (98 °F)  Temp src: Oral  Pulse: 86  Respiration: 18  Blood Pressure : (!) 141/81  Blood Pressure MAP (Calculated): 101 MM HG  BP Location: Right, Upper Arm  Patient BP Position: Supine     Oxygen  Pulse Oximetry: 93 %  O2 (LPM): 0  O2 Delivery Device: None - Room Air    Bowel and Bladder  Last Bowel Movement  11/01/23  Stool Type  Type 5: Soft blob with clear cut edges (passed easily)  Bowel Device  Diaper  Continent  Bladder: Did not void   Bowel: No movement  Bladder Function  Urine Color: Yellow  Urine Clarity: Clear  Genitourinary Assessment   Bladder Assessment (WDL):  WDL Except  Ambrosio Catheter: Present with Active Order  Ambrosio Care: Given with Soap and Water  Urinary Elimination: Catheter (Document on LDA)  Urine Color: Yellow  Urine Clarity: Clear  Bladder Device: Indwelling Catheter    Skin  Des Score   13  Sensory Interventions   Bed Types: Bariatric Low  Airloss  Skin Preventative Measures: Pillows in Use for Support / Positioning  Moisture Interventions  Protocols: Pressure Ulcer Prevention / Intervention Protocol in Place  Moisturizers/Barriers: Moisturizer   Containment Devices: Indwelling Catheter      Pain  Pain Rating Scale  0 - No Pain  Pain Location  Incision, Scrotum  Pain Location Orientation  Proximal  Pain Interventions   Declines (edu given declines pain meds)    ADLs    Bathing   Full Bed Bath  Linen Change   Complete  Personal Hygiene  Change Ankita Pads  Chlorhexidine Bath      Oral Care     Teeth/Dentures     Shave     Nutrition Percentage Eaten  Breakfast, Between % Consumed  Environmental Precautions  Treaded Slipper Socks on Patient, Bed in Low Position  Patient Turns/Positioning  Patient Turns Self from Side to Side  Patient Turns Assistance/Tolerance  Assistance of One  Bed Positions  Bed Controls On, Bed Locked  Head of Bed Elevated  Self regulated      Psychosocial/Neurologic Assessment  Psychosocial Assessment  Psychosocial (WDL):  Within Defined Limits  Neurologic Assessment  Neuro (WDL): Exceptions to WDL  Level of Consciousness: Alert  Orientation Level: Oriented X4  Cognition: Follows commands, Appropriate attention/concentration, Appropriate judgement, Appropriate safety awareness  Speech: Clear  Motor Function/Sensation Assessment: Motor strength  RUE Sensation: No numbness  Muscle Strength Right Arm: Good Strength Against Gravity and Moderate Resistance  LUE Sensation: No numbness  Muscle Strength Left Arm: Good Strength Against Gravity and Moderate Resistance  RLE Sensation: Numbness, Tingling  Muscle Strength Right Leg: Good Strength Against Gravity and Moderate Resistance  LLE Sensation: Numbness, Tingling  Muscle Strength Left Leg: Good Strength Against Gravity and Moderate Resistance  EENT (WDL):  WDL Except    Cardio/Pulmonary Assessment  Edema   RLE Edema: Generalized  Respiratory Breath Sounds  RUL Breath Sounds:  Clear  RML Breath Sounds: Clear  RLL Breath Sounds: Clear, Diminished  MIGUELINA Breath Sounds: Clear  LLL Breath Sounds: Clear, Diminished  Cardiac Assessment   Cardiac (WDL):  WDL Except (H/O HTN, CAC)

## 2023-11-03 ENCOUNTER — APPOINTMENT (OUTPATIENT)
Dept: PHYSICAL THERAPY | Facility: REHABILITATION | Age: 68
DRG: 074 | End: 2023-11-03
Attending: PHYSICAL MEDICINE & REHABILITATION
Payer: MEDICARE

## 2023-11-03 ENCOUNTER — APPOINTMENT (OUTPATIENT)
Dept: OCCUPATIONAL THERAPY | Facility: REHABILITATION | Age: 68
DRG: 074 | End: 2023-11-03
Attending: PHYSICAL MEDICINE & REHABILITATION
Payer: MEDICARE

## 2023-11-03 LAB
GLUCOSE BLD STRIP.AUTO-MCNC: 103 MG/DL (ref 65–99)
GLUCOSE BLD STRIP.AUTO-MCNC: 103 MG/DL (ref 65–99)
GLUCOSE BLD STRIP.AUTO-MCNC: 106 MG/DL (ref 65–99)
GLUCOSE BLD STRIP.AUTO-MCNC: 107 MG/DL (ref 65–99)

## 2023-11-03 PROCEDURE — 82962 GLUCOSE BLOOD TEST: CPT

## 2023-11-03 PROCEDURE — 97116 GAIT TRAINING THERAPY: CPT

## 2023-11-03 PROCEDURE — 770010 HCHG ROOM/CARE - REHAB SEMI PRIVAT*

## 2023-11-03 PROCEDURE — A9270 NON-COVERED ITEM OR SERVICE: HCPCS | Mod: JZ | Performed by: HOSPITALIST

## 2023-11-03 PROCEDURE — A9270 NON-COVERED ITEM OR SERVICE: HCPCS | Performed by: HOSPITALIST

## 2023-11-03 PROCEDURE — 99232 SBSQ HOSP IP/OBS MODERATE 35: CPT | Performed by: HOSPITALIST

## 2023-11-03 PROCEDURE — 97535 SELF CARE MNGMENT TRAINING: CPT

## 2023-11-03 PROCEDURE — 700102 HCHG RX REV CODE 250 W/ 637 OVERRIDE(OP): Mod: JZ | Performed by: HOSPITALIST

## 2023-11-03 PROCEDURE — 97530 THERAPEUTIC ACTIVITIES: CPT

## 2023-11-03 PROCEDURE — 700102 HCHG RX REV CODE 250 W/ 637 OVERRIDE(OP): Performed by: PHYSICAL MEDICINE & REHABILITATION

## 2023-11-03 PROCEDURE — 97110 THERAPEUTIC EXERCISES: CPT

## 2023-11-03 PROCEDURE — 700102 HCHG RX REV CODE 250 W/ 637 OVERRIDE(OP): Performed by: HOSPITALIST

## 2023-11-03 PROCEDURE — 99232 SBSQ HOSP IP/OBS MODERATE 35: CPT | Performed by: PHYSICAL MEDICINE & REHABILITATION

## 2023-11-03 PROCEDURE — A9270 NON-COVERED ITEM OR SERVICE: HCPCS | Performed by: PHYSICAL MEDICINE & REHABILITATION

## 2023-11-03 RX ADMIN — SIMETHICONE 125 MG: 125 TABLET, CHEWABLE ORAL at 08:30

## 2023-11-03 RX ADMIN — APIXABAN 5 MG: 5 TABLET, FILM COATED ORAL at 21:55

## 2023-11-03 RX ADMIN — GABAPENTIN 400 MG: 400 CAPSULE ORAL at 21:55

## 2023-11-03 RX ADMIN — GABAPENTIN 400 MG: 400 CAPSULE ORAL at 14:08

## 2023-11-03 RX ADMIN — DILTIAZEM HYDROCHLORIDE 30 MG: 30 TABLET, FILM COATED ORAL at 21:55

## 2023-11-03 RX ADMIN — SIMETHICONE 125 MG: 125 TABLET, CHEWABLE ORAL at 11:38

## 2023-11-03 RX ADMIN — SIMETHICONE 125 MG: 125 TABLET, CHEWABLE ORAL at 17:17

## 2023-11-03 RX ADMIN — METFORMIN HYDROCHLORIDE 850 MG: 850 TABLET ORAL at 08:31

## 2023-11-03 RX ADMIN — DILTIAZEM HYDROCHLORIDE 30 MG: 30 TABLET, FILM COATED ORAL at 14:08

## 2023-11-03 RX ADMIN — APIXABAN 5 MG: 5 TABLET, FILM COATED ORAL at 08:30

## 2023-11-03 RX ADMIN — AMIODARONE HYDROCHLORIDE 200 MG: 200 TABLET ORAL at 08:31

## 2023-11-03 RX ADMIN — ALLOPURINOL 100 MG: 100 TABLET ORAL at 08:30

## 2023-11-03 RX ADMIN — POTASSIUM CHLORIDE 30 MEQ: 1500 TABLET, EXTENDED RELEASE ORAL at 08:31

## 2023-11-03 RX ADMIN — OMEPRAZOLE 20 MG: 20 CAPSULE, DELAYED RELEASE ORAL at 08:31

## 2023-11-03 RX ADMIN — ATORVASTATIN CALCIUM 20 MG: 10 TABLET, FILM COATED ORAL at 21:55

## 2023-11-03 RX ADMIN — FUROSEMIDE 40 MG: 40 TABLET ORAL at 05:24

## 2023-11-03 RX ADMIN — SIMETHICONE 125 MG: 125 TABLET, CHEWABLE ORAL at 21:55

## 2023-11-03 RX ADMIN — DILTIAZEM HYDROCHLORIDE 30 MG: 30 TABLET, FILM COATED ORAL at 05:24

## 2023-11-03 RX ADMIN — METFORMIN HYDROCHLORIDE 750 MG: 500 TABLET ORAL at 17:16

## 2023-11-03 RX ADMIN — GABAPENTIN 400 MG: 400 CAPSULE ORAL at 08:33

## 2023-11-03 ASSESSMENT — GAIT ASSESSMENTS
ASSISTIVE DEVICE: CANADIAN (LOFSTRAND) CRUTCHES
DEVIATION: BRADYKINETIC;INCREASED BASE OF SUPPORT
DISTANCE (FEET): 75
DEVIATION: INCREASED BASE OF SUPPORT;BRADYKINETIC
GAIT LEVEL OF ASSIST: STANDBY ASSIST
GAIT LEVEL OF ASSIST: STANDBY ASSIST

## 2023-11-03 ASSESSMENT — ENCOUNTER SYMPTOMS
SHORTNESS OF BREATH: 0
PALPITATIONS: 0
FEVER: 0
BLURRED VISION: 0
VOMITING: 0
HALLUCINATIONS: 0
HEADACHES: 0
DIZZINESS: 0
NAUSEA: 0

## 2023-11-03 ASSESSMENT — ACTIVITIES OF DAILY LIVING (ADL)
BED_CHAIR_WHEELCHAIR_TRANSFER_DESCRIPTION: INCREASED TIME;INITIAL PREPARATION FOR TASK;SET-UP OF EQUIPMENT;SUPERVISION FOR SAFETY

## 2023-11-03 ASSESSMENT — PAIN DESCRIPTION - PAIN TYPE
TYPE: ACUTE PAIN
TYPE: ACUTE PAIN

## 2023-11-03 NOTE — PROGRESS NOTES
NURSING DAILY NOTE    Name: Jose Castanon   Date of Admission: 10/24/2023   Admitting Diagnosis: Critical illness polyneuropathy (HCC)  Attending Physician: Sylvester Contreras M.d.  Allergies: Penicillins, Bee venom, Cefazolin, and Linezolid    Safety  Patient Assist  Min-Max  Patient Precautions  Fall Risk, Other (See Comments)  Precaution Comments  monitor HR and O2; hx of BKE with LLE prosthetic, wound near sacrum, wound on medial RLE heal, lateral foot wound RLE  Bed Transfer Status  Standby Assist  Toilet Transfer Status   Minimal Assist  Assistive Devices  Rails, Wheelchair  Oxygen  Room air w/o2 available  Diet/Therapeutic Dining  Current Diet Order   Procedures    Diet Order Diet: Consistent CHO (Diabetic)     Pill Administration  whole  Agitated Behavioral Scale  14  ABS Level of Severity  No Agitation    Fall Risk  Has the patient had a fall this admission?   No  Lulu Aguilera Fall Risk Scoring  14, MODERATE RISK  Fall Risk Safety Measures  bed alarm, chair alarm, poor balance, and low vision/ hearing    Vitals  Temperature: 36.7 °C (98.1 °F)  Temp src: Oral  Pulse: 88  Respiration: 18  Blood Pressure : 106/71  Blood Pressure MAP (Calculated): 83 MM HG  BP Location: Right, Upper Arm  Patient BP Position: Supine     Oxygen  Pulse Oximetry: 95 %  O2 (LPM): 0  O2 Delivery Device: Room air w/o2 available    Bowel and Bladder  Last Bowel Movement  11/02/23  Stool Type  Type 5: Soft blob with clear cut edges (passed easily)  Bowel Device  Bedpan  Continent  Bladder: Did not void   Bowel: No movement  Bladder Function  Urine Void (mL): 1000 ml  Urine Color: Yellow  Urine Clarity: Clear  Genitourinary Assessment   Bladder Assessment (WDL):  WDL Except  Ambrosio Catheter: Present with Active Order  Ambrosio Care: Given with Soap and Water  Urinary Elimination: Catheter (Document on LDA)  Urine Color: Yellow  Urine Clarity: Clear  Bladder Device:  Indwelling Catheter    Skin  Des Score   13  Sensory Interventions   Bed Types: Bariatric Low Airloss  Skin Preventative Measures: Pillows in Use for Support / Positioning  Moisture Interventions  Protocols: Pressure Ulcer Prevention / Intervention Protocol in Place  Moisturizers/Barriers: Barrier Wipes, Moisturizer   Containment Devices: Indwelling Catheter      Pain  Pain Rating Scale   (pt reports pain is manageable)  Pain Location  Incision, Scrotum  Pain Location Orientation  Proximal  Pain Interventions   Declines    ADLs    Bathing   Full Bed Bath  Linen Change   Complete  Personal Hygiene  Change Ankita Pads  Chlorhexidine Bath      Oral Care     Teeth/Dentures     Shave     Nutrition Percentage Eaten  Breakfast, Between % Consumed  Environmental Precautions  Treaded Slipper Socks on Patient, Personal Belongings, Wastebasket, Call Bell etc. in Easy Reach, Bed in Low Position  Patient Turns/Positioning  Patient Turns Self from Side to Side  Patient Turns Assistance/Tolerance  Assistance of One  Bed Positions  Bed Controls On  Head of Bed Elevated  Self regulated      Psychosocial/Neurologic Assessment  Psychosocial Assessment  Psychosocial (WDL):  Within Defined Limits  Neurologic Assessment  Neuro (WDL): Exceptions to WDL  Level of Consciousness: Alert  Orientation Level: Oriented X4  Cognition: Follows commands, Appropriate attention/concentration, Appropriate judgement, Appropriate safety awareness  Speech: Clear  Motor Function/Sensation Assessment: Motor strength  RUE Sensation: No numbness  Muscle Strength Right Arm: Good Strength Against Gravity and Moderate Resistance  LUE Sensation: No numbness  Muscle Strength Left Arm: Good Strength Against Gravity and Moderate Resistance  RLE Sensation: Numbness, Tingling (BKA)  Muscle Strength Right Leg: Good Strength Against Gravity and Moderate Resistance  LLE Sensation: Numbness, Tingling  Muscle Strength Left Leg: Good Strength Against Gravity and  Moderate Resistance  EENT (WDL):  WDL Except    Cardio/Pulmonary Assessment  Edema   RLE Edema: Generalized  Respiratory Breath Sounds  RUL Breath Sounds: Clear  RML Breath Sounds: Clear  RLL Breath Sounds: Clear, Diminished  MIGUELINA Breath Sounds: Clear  LLL Breath Sounds: Clear, Diminished  Cardiac Assessment   Cardiac (WDL):  WDL Except (H/O HTN, CAD.)

## 2023-11-03 NOTE — THERAPY
"Physical Therapy   Daily Treatment     Patient Name: Jose Castanon  Age:  68 y.o., Sex:  male  Medical Record #: [id number]  Today's Date: 11/3/2023     Precautions  Precautions: Fall Risk, Other (See Comments)  Comments: monitor HR and O2; hx of BKE with LLE prosthetic, wound near sacrum, wound on medial RLE heal, lateral foot wound RLE    Subjective    \"I have to get dressed\"     Objective       11/03/23 0831   PT Charge Group   PT Gait Training (Units) 1   PT Therapeutic Activities (Units) 1   PT Total Time Spent   PT Individual Total Time Spent (Mins) 30   Gait Functional Level of Assist    Gait Level Of Assist Standby Assist   Assistive Device [place] ([place]) Crutches   Distance (Feet) 75   # of Times Distance was Traveled 1   Deviation Bradykinetic;Increased Base Of Support  (increased weight shifting)   Transfer Functional Level of Assist   Bed, Chair, Wheelchair Transfer Standby Assist  (SPT with FWW)   Bed Chair Wheelchair Transfer Description Assist with two limbs;Set-up of equipment;Supervision for safety   Bed Mobility    Supine to Sit Modified Independent   Sit to Stand Standby Assist   Interdisciplinary Plan of Care Collaboration   Patient Position at End of Therapy Seated;Call Light within Reach;Tray Table within Reach     Pt performs upper and lower body dressing with set-up assistance and SV, using reacher. Increased time required, performed sitting EOB with FWW.     Pt starts washing machine with dirty load of laundry, performed at w/c level with 1x stand and SV.    Assessment    Pt performs dressing this session with SV and set-up assistance. Pt progressing with ambulation.     Strengths: Effective communication skills, Good insight into deficits/needs, Independent prior level of function, Motivated for self care and independence, Pleasant and cooperative, Willingly participates in therapeutic activities  Barriers: Decreased endurance, Fatigue, Home accessibility, Impaired activity " "tolerance, Impaired balance, Limited mobility, Pain    Plan    Gait with crutches, strengthening, balance, postural HEP progression, activity tolerance/ endurance.     DME  PT DME Recommendations  Wheelchair:  (Pt has 2 wheelchairs reported to be in good condition)  Assistive Device:  (Pt has FWW and forearm crutches)    Passport items to be completed:  Get in/out of bed safely, in/out of a vehicle, safely use mobility device, walk or wheel around home/community, navigate up and down stairs, show how to get up/down from the ground, ensure home is accessible, demonstrate HEP, complete caregiver training    Physical Therapy Problems (Active)       Problem: Mobility       Dates: Start:  10/25/23         Goal: STG-Within one week, patient will ambulate up/down a 2\" step in // bars min A       Dates: Start:  10/25/23    Expected End:  11/08/23               Problem: PT-Long Term Goals       Dates: Start:  10/25/23         Goal: LTG-By discharge, patient will ambulate with FWW >50 ft mod I        Dates: Start:  10/25/23    Expected End:  11/08/23            Goal: LTG-By discharge, patient will transfer one surface to another mod I with FWW + prosthetic        Dates: Start:  10/25/23    Expected End:  11/08/23            Goal: LTG-By discharge, patient will ambulate up/down 2 stairs with (newly installed railings- recommended) mod I       Dates: Start:  10/25/23    Expected End:  11/08/23              "

## 2023-11-03 NOTE — CARE PLAN
Problem: Skin Integrity  Goal: Skin integrity is maintained or improved  Outcome: Not Met     Problem: Knowledge Deficit - Standard  Goal: Patient and family/care givers will demonstrate understanding of plan of care, disease process/condition, diagnostic tests and medications  Outcome: Progressing   The patient is Watcher - Medium risk of patient condition declining or worsening    Shift Goals  Clinical Goals: Safety  Patient Goals: rest  Family Goals: Education

## 2023-11-03 NOTE — FLOWSHEET NOTE
11/03/23 1000   Events/Summary/Plan   Events/Summary/Plan BiPAP check   Non-Invasive Ventilation [facility]   Nocturnal CPAP or BIPAP BIPAP - [facility]  (6:23 hrs BiPAP use last night)   FiO2 or LPM 3

## 2023-11-03 NOTE — CARE PLAN
Problem: Knowledge Deficit - Standard  Goal: Patient and family/care givers will demonstrate understanding of plan of care, disease process/condition, diagnostic tests and medications  Outcome: Progressing     Problem: Fall Risk - Rehab  Goal: Patient will remain free from falls  Note: Pt uses call light consistently and appropriately. Waits for assistance does not attempt self transfer this shift. Able to verbalize needs. Will continue to monitor.    The patient is Stable - Low risk of patient condition declining or worsening    Shift Goals  Clinical Goals: Safety  Patient Goals: rest  Family Goals: Education

## 2023-11-03 NOTE — THERAPY
Physical Therapy   Daily Treatment     Patient Name: Jose Castanon  Age:  68 y.o., Sex:  male  Medical Record #: [id number]  Today's Date: 11/3/2023     Precautions  Precautions: Fall Risk, Other (See Comments)  Comments: monitor HR and O2; hx of BKE with LLE prosthetic, wound near sacrum, wound on medial RLE heal, lateral foot wound RLE    Subjective    Patient is agreeable to participate, wants to work on walking with his crutches for longer distances and without crutches for shorter household distances.      Objective       11/03/23 1415   PT Charge Group   PT Gait Training (Units) 4   PT Total Time Spent   PT Individual Total Time Spent (Mins) 60   Gait Functional Level of Assist    Gait Level Of Assist Standby Assist   Assistive Device None;[place]) Crutches   Distance (Feet)   (20, 20, 20 (3 bouts in parallel bars without AD), 86 +112 with crutches)   Deviation Increased Base Of Support;Bradykinetic   Wheelchair Functional Level of Assist   Wheelchair Assist Supervised   Distance Wheelchair (Feet or Distance) 175   Wheelchair Description Extra time;Limited by fatigue;Supervision for safety     Balance training: whole body turn without UE support in parallel bars to both directions x 2 with seated rest break between    Assessment    Patient demonstrates improving activity tolerance, balance as evidenced by ambulating without AD for short household distances and with crutches for extended household/ limited community distances.     Strengths: Effective communication skills, Good insight into deficits/needs, Independent prior level of function, Motivated for self care and independence, Pleasant and cooperative, Willingly participates in therapeutic activities  Barriers: Decreased endurance, Fatigue, Home accessibility, Impaired activity tolerance, Impaired balance, Limited mobility, Pain    Plan    Practice fall recovery with tech assist for safety, gait with crutches, strengthening/  "balance    DME  PT DME Recommendations  Wheelchair:  (Pt has 2 wheelchairs reported to be in good condition)  Assistive Device:  (Pt has FWW and forearm crutches)    Passport items to be completed:  Get in/out of bed safely, in/out of a vehicle, safely use mobility device, walk or wheel around home/community, navigate up and down stairs, show how to get up/down from the ground, ensure home is accessible, demonstrate HEP, complete caregiver training    Physical Therapy Problems (Active)       Problem: Mobility       Dates: Start:  10/25/23         Goal: STG-Within one week, patient will ambulate up/down a 2\" step in // bars min A       Dates: Start:  10/25/23    Expected End:  11/08/23               Problem: PT-Long Term Goals       Dates: Start:  10/25/23         Goal: LTG-By discharge, patient will ambulate with FWW >50 ft mod I        Dates: Start:  10/25/23    Expected End:  11/08/23            Goal: LTG-By discharge, patient will transfer one surface to another mod I with FWW + prosthetic        Dates: Start:  10/25/23    Expected End:  11/08/23            Goal: LTG-By discharge, patient will ambulate up/down 2 stairs with (newly installed railings- recommended) mod I       Dates: Start:  10/25/23    Expected End:  11/08/23              "

## 2023-11-03 NOTE — THERAPY
Occupational Therapy  Daily Treatment     Patient Name: Jose Castanon  Age:  68 y.o., Sex:  male  Medical Record #: [id number]  Today's Date: 11/3/2023     Precautions  Precautions: Fall Risk, Other (See Comments)  Comments: monitor HR and O2; hx of BKE with LLE prosthetic, wound near sacrum, wound on medial RLE heal, lateral foot wound RLE    Subjective    Pt pleasant and motivated to participate in OT     Objective     11/03/23 0931 11/03/23 1031   OT Charge Group   Charges Yes Yes   OT Self Care / ADL (Units)  --  1   OT Therapy Activity (Units) 1 1   OT Therapeutic Exercise (Units) 1 2   OT Total Time Spent   OT Individual Total Time Spent (Mins) 30 60   Precautions   Precautions Fall Risk;Other (See Comments) Fall Risk;Other (See Comments)   Comments monitor HR and O2; hx of BKE with LLE prosthetic, wound near sacrum, wound on medial RLE heal, lateral foot wound RLE monitor HR and O2; hx of BKE with LLE prosthetic, wound near sacrum, wound on medial RLE heal, lateral foot wound RLE   Vitals   O2 Delivery Device None - Room Air None - Room Air   Pain   Intervention Declines Declines   Cognition    Level of Consciousness Alert Alert   ABS (Agitated Behavior Scale)   Agitated Behavior Scale Performed No No   Sleep/Wake Cycle   Sleep & Rest Awake;Out of bed Awake;Out of bed   Functional Level of Assist   Grooming  --  Independent;Seated   Grooming Description  --  Increased time;Seated in wheelchair at sink   Bed, Chair, Wheelchair Transfer  --  Contact Guard Assist   Bed Chair Wheelchair Transfer Description  --  Increased time;Initial preparation for task;Set-up of equipment;Supervision for safety   Sitting Upper Body Exercises   Chest Press  --  3 sets of 10;Bilateral;Weight (See Comments for lbs)  (35#)   Lat Pull 3 sets of 15;Bilateral;Weight (See Comments for lbs)  (40#)  --    Bilateral Row 3 sets of 15;Bilateral;Weight (See Comments for lbs)  (40#)  --    Tricep Press  --  3 sets of 15;Bilateral;Weight  (See Comments for lbs)  (45#)   Upper Extremity Bike  --  Level 1 Resistance  (Pt tolerated 5 minutes of the hydrocycle)   IADL Treatments   Home Management Provided SBA for laundry activity. Pt stood w/ support from washer to take clothing out of washer. Pt seated in w/c to put clothes into dryer and set controls, as well as folding clothes already in the dryer. Provided SBA for seated laundry activity. Pt used reacher to retrieve items from dryer, as well as folding.   Sitting Lower Body Exercises   Sitting Lower Body Exercises  --  Yes   Nustep  --  Resistance Level 3  (Pt tolerated 5 minutes)   Balance   Comments Pt self-propelled w/c from main gym to pt rm.  --    Bed Mobility    Sit to Supine  --  Modified Independent   Scooting  --  Modified Independent   Rolling  --  Modified Independent   Interdisciplinary Plan of Care Collaboration   IDT Collaboration with   --  Respiratory Therapist   Patient Position at End of Therapy Seated;Chair Alarm On;Call Light within Reach;Tray Table within Reach In Bed;Call Light within Reach;Tray Table within Reach;Phone within Reach   Collaboration Comments  --  RT present at beginning of session   Strengths & Barriers   Strengths Able to follow instructions;Alert and oriented;Effective communication skills;Good carryover of learning;Good insight into deficits/needs;Motivated for self care and independence;Pleasant and cooperative Able to follow instructions;Alert and oriented;Effective communication skills;Good carryover of learning;Good insight into deficits/needs;Motivated for self care and independence;Pleasant and cooperative   Barriers Decreased endurance;Bowel incontinence;Fatigue;Generalized weakness;Impaired activity tolerance Decreased endurance;Bowel incontinence;Fatigue;Generalized weakness;Impaired activity tolerance     Assessment    Pt seen for two tx this AM, addressing grooming, UE/LE exercise, and home management. Pt tolerated activity well w/ some fatigue  post-exercise. Pt progressing towards goals. Continue OT POC.    Strengths: Able to follow instructions, Alert and oriented, Effective communication skills, Good carryover of learning, Good insight into deficits/needs, Motivated for self care and independence, Pleasant and cooperative    Barriers: Decreased endurance, Bowel incontinence, Fatigue, Generalized weakness, Impaired activity tolerance    Plan    Strengthening  Endurance  Balance  Functional mobility  I/ADLs    DME       Passport items to be completed:  Perform bathroom transfers, complete dressing, complete feeding, get ready for the day, prepare a simple meal, participate in household tasks, adapt home for safety needs, demonstrate home exercise program, complete caregiver training     Occupational Therapy Goals (Active)       Problem: Bathing       Dates: Start:  10/25/23         Goal: STG-Within one week, patient will bathe with SBA and LRD       Dates: Start:  11/02/23    Expected End:  11/08/23       Description:             Problem: Dressing       Dates: Start:  10/25/23         Goal: STG-Within one week, patient will dress LB SBA and LRD       Dates: Start:  11/02/23    Expected End:  11/08/23       Description:             Problem: Functional Transfers       Dates: Start:  10/25/23         Goal: STG-Within one week, patient will transfer to toilet SUP and LRD       Dates: Start:  11/02/23    Expected End:  11/08/23       Description:          Goal: STG-Within one week, patient will transfer to step in shower with SUP and LRD       Dates: Start:  11/02/23    Expected End:  11/08/23       Description:             Problem: OT Long Term Goals       Dates: Start:  10/25/23         Goal: LTG-By discharge, patient will complete basic self care tasks SBA to SUP       Dates: Start:  10/25/23    Expected End:  11/08/23            Goal: LTG-By discharge, patient will perform bathroom transfers SBA to SUP       Dates: Start:  10/25/23    Expected End:   11/08/23            Goal: LTG-By discharge, patient will complete basic home management SBA to SUP       Dates: Start:  10/25/23    Expected End:  11/08/23               Problem: Toileting       Dates: Start:  10/25/23         Goal: STG-Within one week, patient will complete toileting tasks with Min A and LRD       Dates: Start:  10/25/23    Expected End:  11/08/23

## 2023-11-03 NOTE — PROGRESS NOTES
Hospital Medicine Daily Progress Note      Chief Complaint  Hypertension  Diabetes  Chronic Kidney Disease    Interval Problem Update  No significant events overnight.    Review of Systems  Review of Systems   Constitutional:  Negative for fever.   Eyes:  Negative for blurred vision.   Respiratory:  Negative for shortness of breath.    Cardiovascular:  Negative for palpitations.   Gastrointestinal:  Negative for nausea and vomiting.   Neurological:  Negative for dizziness and headaches.   Psychiatric/Behavioral:  Negative for hallucinations.         Physical Exam  Temp:  [36.7 °C (98 °F)-37.1 °C (98.7 °F)] 36.7 °C (98 °F)  Pulse:  [73-93] 93  Resp:  [16-18] 16  BP: (109-144)/(71-81) 120/75  SpO2:  [93 %-95 %] 93 %    Physical Exam  Vitals and nursing note reviewed.   Constitutional:       General: He is not in acute distress.  HENT:      Mouth/Throat:      Mouth: Mucous membranes are moist.      Pharynx: Oropharynx is clear.   Eyes:      General: No scleral icterus.  Cardiovascular:      Rate and Rhythm: Normal rate and regular rhythm.      Heart sounds: No murmur heard.  Pulmonary:      Effort: Pulmonary effort is normal.      Breath sounds: Normal breath sounds. No stridor.   Abdominal:      General: There is no distension.      Palpations: Abdomen is soft.      Tenderness: There is no abdominal tenderness.   Musculoskeletal:      Cervical back: No rigidity.      Right lower leg: No edema.      Left lower leg: No edema.      Comments: Has left BKA   Skin:     General: Skin is warm and dry.      Findings: No rash.   Neurological:      Mental Status: He is alert and oriented to person, place, and time.   Psychiatric:         Mood and Affect: Mood normal.         Behavior: Behavior normal.         Fluids    Intake/Output Summary (Last 24 hours) at 11/3/2023 1432  Last data filed at 11/3/2023 1300  Gross per 24 hour   Intake 400 ml   Output 2850 ml   Net -2450 ml         Laboratory                           Assessment/Plan  A-fib (HCC)  Assessment & Plan  HR ok  Cont Amio  Cont Cardizem  Cont Eliquis  Cont Lasix  Cont KCL: 20 meq daily --> 30 meq daily (3/6)  K+: 3.6 (10/31)  Cont to monitor    Gout  Assessment & Plan  Cont Allopurinol    Borderline abnormal TFTs  Assessment & Plan  TSH: 10.1  FT4: 1.15  ? 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)  Assessment & Plan  Has had prolonged hospitalization since 8/26/23  [facility]ially presented to [facility], [facility], [facility]  S/P I+D, IV Abx, and Wound Vac     Stage 3a chronic kidney disease (HCC)- (present on admission)  Assessment & Plan  Bun: 33 --> 23   Cr: wnl  Cont Lasix  Cont to monitor    PAD (peripheral artery disease) ([facility])- (present on admission)  Assessment & Plan  Hx of left BKA     Diabetes ([facility])- (present on admission)  Assessment & Plan  Hba1c: 6.8 (10/25)  BS: good but hit 86 x 1  Off Glargine (last dose 10/30)  Cont Metformin: 850 mg bid --> will decrease to 750 mg bid  Will consider stopping accuchecks soon  Note: home meds include Metformin 1000 mg bid and Actos 30 mg qd  Cont to monitor    Benign essential HTN- (present on admission)  Assessment & Plan  BP ok  Cont Cardizem  Note: on Lasix  Cont to monitor

## 2023-11-03 NOTE — PROGRESS NOTES
Physical Medicine & Rehabilitation Progress Note    Encounter Date: 11/3/2023    Chief Complaint: Decreased mobility, pain    Interval Events (Subjective):  Patient sitting up in room. He reports it will be easier for him to leave on 11/9 as he will have a ride. Discussed agree with extension. Denies pain at rest.     _____________________________________  Interdisciplinary Team Conference   Most recent IDT on 11/2/2023    Discharge Date/Disposition:  11/14/23 -> 11/9/23  _____________________________________    Objective:  VITAL SIGNS: /81   Pulse 73   Temp 37.1 °C (98.7 °F) (Oral)   Resp 16   Ht 1.829 m (6')   Wt (!) 162 kg (357 lb 12.8 oz)   SpO2 94%   BMI 48.53 kg/m²   Gen: NAD  Psych: Mood and affect appropriate  CV: RRR, 0 edema  Resp: CTAB, no upper airway sounds  Abd: NTND  Neuro: AOx4, following commands    Laboratory Values:  Recent Results (from the past 72 hour(s))   POCT glucose device results    Collection Time: 10/31/23  1:05 PM   Result Value Ref Range    POC Glucose, Blood 106 (H) 65 - 99 mg/dL   POCT glucose device results    Collection Time: 10/31/23  4:55 PM   Result Value Ref Range    POC Glucose, Blood 134 (H) 65 - 99 mg/dL   POCT glucose device results    Collection Time: 10/31/23  9:14 PM   Result Value Ref Range    POC Glucose, Blood 126 (H) 65 - 99 mg/dL   POCT glucose device results    Collection Time: 11/01/23  7:50 AM   Result Value Ref Range    POC Glucose, Blood 105 (H) 65 - 99 mg/dL   POCT glucose device results    Collection Time: 11/01/23 11:21 AM   Result Value Ref Range    POC Glucose, Blood 106 (H) 65 - 99 mg/dL   POCT glucose device results    Collection Time: 11/01/23  5:35 PM   Result Value Ref Range    POC Glucose, Blood 86 65 - 99 mg/dL   POCT glucose device results    Collection Time: 11/01/23  9:01 PM   Result Value Ref Range    POC Glucose, Blood 110 (H) 65 - 99 mg/dL   POCT glucose device results    Collection Time: 11/02/23  8:06 AM   Result Value Ref  Range    POC Glucose, Blood 136 (H) 65 - 99 mg/dL   POCT glucose device results    Collection Time: 11/02/23 11:29 AM   Result Value Ref Range    POC Glucose, Blood 102 (H) 65 - 99 mg/dL   POCT glucose device results    Collection Time: 11/02/23  4:58 PM   Result Value Ref Range    POC Glucose, Blood 113 (H) 65 - 99 mg/dL   POCT glucose device results    Collection Time: 11/02/23  9:10 PM   Result Value Ref Range    POC Glucose, Blood 94 65 - 99 mg/dL   POCT glucose device results    Collection Time: 11/03/23  7:23 AM   Result Value Ref Range    POC Glucose, Blood 103 (H) 65 - 99 mg/dL   POCT glucose device results    Collection Time: 11/03/23 11:37 AM   Result Value Ref Range    POC Glucose, Blood 103 (H) 65 - 99 mg/dL       Medications:  Scheduled Medications   Medication Dose Frequency    insulin lispro  2-12 Units 4X/DAY ACHS    potassium chloride SA  30 mEq DAILY    metFORMIN  850 mg BID WITH MEALS    simethicone  125 mg 4X/DAY WITH MEALS + NIGHTLY    furosemide  40 mg Q DAY    allopurinol  100 mg DAILY    atorvastatin  20 mg Q EVENING    gabapentin  400 mg TID    apixaban  5 mg BID    amiodarone  200 mg DAILY    dilTIAZem  30 mg Q8HRS    melatonin  3 mg QHS    omeprazole  20 mg DAILY     PRN medications: senna-docusate **AND** polyethylene glycol/lytes **AND** magnesium hydroxide **AND** bisacodyl, guaiFENesin dextromethorphan, [DISCONTINUED] insulin regular **AND** POC blood glucose manual result **AND** NOTIFY MD and PharmD **AND** Administer 20 grams of glucose (approximately 8 ounces of fruit juice) every 15 minutes PRN FSBG less than 70 mg/dL **AND** dextrose bolus, Respiratory Therapy Consult, hydrALAZINE, acetaminophen, mag hydrox-al hydrox-simeth, ondansetron **OR** ondansetron, traZODone, sodium chloride, oxyCODONE immediate-release **OR** oxyCODONE immediate-release, traMADol    Diet:  Current Diet Order   Procedures    Diet Order Diet: Consistent CHO (Diabetic)       Medical Decision Making and  Plan:   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 Per guidelines, 15 hours per week between PT, OT and/or SLP.  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      HTN/A fib - Patient now on Amiodarone 200 mg daily, Diltiazem 30 mg TID, Lasix 40 mg BID, and Eliquis 5 mg BID. Continue Amiodarone 200 mg and Lasix 40 mg -> reduced to daily     DM2 with hyperglycemia - Patient on Glargine 10 U BID and SSI. Consult hospitalist. Started on Metformin -> Metformin 850 mg BID, Continue metformin 850 mg BID, blood sugars < 130     PAD - Patient on Eliquis. Wound care recommending ABIs/US on 10/26/23 - no significant stenosis     CKD3a - Avoid nephrotoxic agents. Check AM CMP - Cr stable     Leukocytosis - 12 on 10/25/23    Anemia - 11.2 on admission. Monitor    Hypokalemia - 3.5 on admission, consult hospitalist, on Lasix. Continue K supplement 30 mEq per hospitalist    Gout - Patient on Allopurinol 100 mg  daily     Neuropathy - Patient on Gabapentin 400 mg TID. Continue Gabapentin 400 TID     L BKA - Patient with L BKA with prosthetic     Morbid Obesity due to excess calories - Patient with BMI of 48.5 on admission, meets medical criteria. Dietitian to consult     Pain - Patient on PRN Tylenol, PRN Oxycodone, and PRN Tramadol. Also on Gabapentin 400 mg TID     Skin - Patient at risk for skin breakdown due to debility in areas including sacrum, achilles, elbows and head in addition to other sites. Nursing to assess skin daily.      GI Ppx - Patient on Prilosec for GERD prophylaxis. Patient on Senna-docusate for constipation prophylaxis.  -Having multiple BMs, change bowel medications to PRN      DVT Ppx - Patient Eliquis on transfer. Continue Eliquis as limited  ambulation  ____________________________________    AUGUSTO Contreras MD/PhD  [facility] - Physical Medicine & Rehabilitation   [facility] - Brain Injury Medicine   ____________________________________

## 2023-11-03 NOTE — CARE PLAN
"The patient is Stable - Low risk of patient condition declining or worsening    Shift Goals  Clinical Goals: Safety  Patient Goals: rest  Family Goals: Education    Problem: Skin Integrity  Goal: Skin integrity is maintained or improved  Outcome: Progressing  Note:   Des Score: 13    Patient's skin remains intact and free from new or accidental injury this shift; no s/s of infection. RN wound protocol checked. Encouraged hydration and educated about the importance of nutrition to keep skin integrity. Will continue to monitor.       Problem: Fall Risk - Rehab  Goal: Patient will remain free from falls  Outcome: Progressing  Note: Lulu Aguilera Fall risk Assessment Score: 14    Moderate fall risk Interventions  - Bed and strip alarm   - Yellow sign by the door   - Yellow wrist band \"Fall risk\"  - Room near to the nurse station  - Do not leave patient unattended in the bathroom  - Fall risk education provided       "

## 2023-11-03 NOTE — PROGRESS NOTES
NURSING DAILY NOTE    Name: Jose Castanon   Date of Admission: 10/24/2023   Admitting Diagnosis: Critical illness polyneuropathy (HCC)  Attending Physician: Sylvester Contreras M.d.  Allergies: Penicillins, Bee venom, Cefazolin, and Linezolid    Safety  Patient Assist  min-max  Patient Precautions  Fall Risk, Other (See Comments)  Precaution Comments  monitor HR and O2; hx of BKE with LLE prosthetic, wound near sacrum, wound on medial RLE heal, lateral foot wound RLE  Bed Transfer Status  Standby Assist  Toilet Transfer Status   Minimal Assist  Assistive Devices  Rails, Wheelchair  Oxygen  None - Room Air  Diet/Therapeutic Dining  Current Diet Order   Procedures    Diet Order Diet: Consistent CHO (Diabetic)     Pill Administration  whole  Agitated Behavioral Scale  14  ABS Level of Severity  No Agitation    Fall Risk  Has the patient had a fall this admission?   No  Lulu Aguilera Fall Risk Scoring  14, MODERATE RISK  Fall Risk Safety Measures  bed alarm and chair alarm    Vitals  Temperature: 36.7 °C (98 °F)  Temp src: Oral  Pulse: 83  Respiration: 18  Blood Pressure : 109/71  Blood Pressure MAP (Calculated): 84 MM HG  BP Location: Right, Upper Arm  Patient BP Position: Supine     Oxygen  Pulse Oximetry: 95 %  O2 (LPM): 0  O2 Delivery Device: None - Room Air    Bowel and Bladder  Last Bowel Movement  11/02/23  Stool Type  Type 5: Soft blob with clear cut edges (passed easily)  Bowel Device  Diaper  Continent  Bladder: Did not void   Bowel: No movement  Bladder Function  Urine Void (mL): 1000 ml  Urine Color: Yellow  Urine Clarity: Clear  Genitourinary Assessment   Bladder Assessment (WDL):  WDL Except  Ambrosio Catheter: Present with Active Order  Ambrosio Care: Given with Soap and Water  Urinary Elimination: Catheter (Document on LDA)  Urine Color: Yellow  Urine Clarity: Clear  Bladder Device: Indwelling Catheter    Skin  Des Score   13  Sensory  Interventions   Bed Types: Bariatric Low Airloss  Skin Preventative Measures: Pillows in Use for Support / Positioning  Moisture Interventions  Protocols: Pressure Ulcer Prevention / Intervention Protocol in Place  Moisturizers/Barriers: Barrier Paste  Containment Devices: Indwelling Catheter      Pain  Pain Rating Scale  0 - No Pain  Pain Location  Incision, Scrotum  Pain Location Orientation  Proximal  Pain Interventions   Declines    ADLs    Bathing   Full Bed Bath  Linen Change   Complete  Personal Hygiene  Change Ankita Pads  Chlorhexidine Bath      Oral Care     Teeth/Dentures     Shave     Nutrition Percentage Eaten  Breakfast, Between % Consumed  Environmental Precautions  Treaded Slipper Socks on Patient  Patient Turns/Positioning  Patient Turns Self from Side to Side  Patient Turns Assistance/Tolerance  Assistance of One  Bed Positions  Bed Controls On, Bed Locked  Head of Bed Elevated  Self regulated      Psychosocial/Neurologic Assessment  Psychosocial Assessment  Psychosocial (WDL):  Within Defined Limits  Neurologic Assessment  Neuro (WDL): Exceptions to WDL  Level of Consciousness: Alert  Orientation Level: Oriented X4  Cognition: Follows commands, Appropriate attention/concentration, Appropriate judgement, Appropriate safety awareness  Speech: Clear  Motor Function/Sensation Assessment: Motor strength  RUE Sensation: No numbness  Muscle Strength Right Arm: Good Strength Against Gravity and Moderate Resistance  LUE Sensation: No numbness  Muscle Strength Left Arm: Good Strength Against Gravity and Moderate Resistance  RLE Sensation: Numbness, Tingling (BKA)  Muscle Strength Right Leg: Good Strength Against Gravity and Moderate Resistance  LLE Sensation: Numbness, Tingling  Muscle Strength Left Leg: Good Strength Against Gravity and Moderate Resistance  EENT (WDL):  WDL Except    Cardio/Pulmonary Assessment  Edema   RLE Edema: Generalized  Respiratory Breath Sounds  RUL Breath Sounds: Clear  RML  Breath Sounds: Clear  RLL Breath Sounds: Clear, Diminished  MIGUELINA Breath Sounds: Clear  LLL Breath Sounds: Clear, Diminished  Cardiac Assessment   Cardiac (WDL):  WDL Except (H/O HTN, CAD.)

## 2023-11-03 NOTE — DISCHARGE PLANNING
Dc date changed to 11/9 Tuesday as requested by pt; discussed w/ MD; he is in agreement; went to pt's room - not there; updated board. Referral sent to [facility]; follow up with pcp / surgeon.  Sister will provide transportation home on 11/9. Pt in agreement.

## 2023-11-04 LAB
GLUCOSE BLD STRIP.AUTO-MCNC: 107 MG/DL (ref 65–99)
GLUCOSE BLD STRIP.AUTO-MCNC: 109 MG/DL (ref 65–99)
GLUCOSE BLD STRIP.AUTO-MCNC: 109 MG/DL (ref 65–99)
GLUCOSE BLD STRIP.AUTO-MCNC: 117 MG/DL (ref 65–99)

## 2023-11-04 PROCEDURE — 700102 HCHG RX REV CODE 250 W/ 637 OVERRIDE(OP): Performed by: PHYSICAL MEDICINE & REHABILITATION

## 2023-11-04 PROCEDURE — A9270 NON-COVERED ITEM OR SERVICE: HCPCS | Performed by: PHYSICAL MEDICINE & REHABILITATION

## 2023-11-04 PROCEDURE — 700102 HCHG RX REV CODE 250 W/ 637 OVERRIDE(OP): Mod: JZ | Performed by: HOSPITALIST

## 2023-11-04 PROCEDURE — 700102 HCHG RX REV CODE 250 W/ 637 OVERRIDE(OP): Performed by: HOSPITALIST

## 2023-11-04 PROCEDURE — A9270 NON-COVERED ITEM OR SERVICE: HCPCS | Mod: JZ | Performed by: HOSPITALIST

## 2023-11-04 PROCEDURE — 770010 HCHG ROOM/CARE - REHAB SEMI PRIVAT*

## 2023-11-04 PROCEDURE — 82962 GLUCOSE BLOOD TEST: CPT | Mod: 91

## 2023-11-04 PROCEDURE — A9270 NON-COVERED ITEM OR SERVICE: HCPCS | Performed by: HOSPITALIST

## 2023-11-04 PROCEDURE — 94760 N-INVAS EAR/PLS OXIMETRY 1: CPT

## 2023-11-04 PROCEDURE — 99231 SBSQ HOSP IP/OBS SF/LOW 25: CPT | Performed by: HOSPITALIST

## 2023-11-04 RX ADMIN — ATORVASTATIN CALCIUM 20 MG: 10 TABLET, FILM COATED ORAL at 20:53

## 2023-11-04 RX ADMIN — GABAPENTIN 400 MG: 400 CAPSULE ORAL at 20:53

## 2023-11-04 RX ADMIN — SIMETHICONE 125 MG: 125 TABLET, CHEWABLE ORAL at 20:52

## 2023-11-04 RX ADMIN — METFORMIN HYDROCHLORIDE 750 MG: 500 TABLET ORAL at 09:14

## 2023-11-04 RX ADMIN — GUAIFENESIN SYRUP AND DEXTROMETHORPHAN 5 ML: 100; 10 SYRUP ORAL at 23:28

## 2023-11-04 RX ADMIN — FUROSEMIDE 40 MG: 40 TABLET ORAL at 05:58

## 2023-11-04 RX ADMIN — SIMETHICONE 125 MG: 125 TABLET, CHEWABLE ORAL at 09:14

## 2023-11-04 RX ADMIN — ALLOPURINOL 100 MG: 100 TABLET ORAL at 09:14

## 2023-11-04 RX ADMIN — DILTIAZEM HYDROCHLORIDE 30 MG: 30 TABLET, FILM COATED ORAL at 21:00

## 2023-11-04 RX ADMIN — DILTIAZEM HYDROCHLORIDE 30 MG: 30 TABLET, FILM COATED ORAL at 14:07

## 2023-11-04 RX ADMIN — APIXABAN 5 MG: 5 TABLET, FILM COATED ORAL at 20:53

## 2023-11-04 RX ADMIN — APIXABAN 5 MG: 5 TABLET, FILM COATED ORAL at 09:14

## 2023-11-04 RX ADMIN — OMEPRAZOLE 20 MG: 20 CAPSULE, DELAYED RELEASE ORAL at 09:14

## 2023-11-04 RX ADMIN — METFORMIN HYDROCHLORIDE 750 MG: 500 TABLET ORAL at 17:39

## 2023-11-04 RX ADMIN — GABAPENTIN 400 MG: 400 CAPSULE ORAL at 09:14

## 2023-11-04 RX ADMIN — GABAPENTIN 400 MG: 400 CAPSULE ORAL at 14:07

## 2023-11-04 RX ADMIN — SIMETHICONE 125 MG: 125 TABLET, CHEWABLE ORAL at 17:39

## 2023-11-04 RX ADMIN — AMIODARONE HYDROCHLORIDE 200 MG: 200 TABLET ORAL at 09:14

## 2023-11-04 RX ADMIN — DILTIAZEM HYDROCHLORIDE 30 MG: 30 TABLET, FILM COATED ORAL at 05:58

## 2023-11-04 RX ADMIN — POTASSIUM CHLORIDE 30 MEQ: 1500 TABLET, EXTENDED RELEASE ORAL at 09:14

## 2023-11-04 ASSESSMENT — ENCOUNTER SYMPTOMS
SHORTNESS OF BREATH: 0
HEADACHES: 0
PALPITATIONS: 0
HALLUCINATIONS: 0
NAUSEA: 0
BLURRED VISION: 0
VOMITING: 0
DIZZINESS: 0
FEVER: 0

## 2023-11-04 ASSESSMENT — PAIN DESCRIPTION - PAIN TYPE
TYPE: ACUTE PAIN
TYPE: ACUTE PAIN

## 2023-11-04 NOTE — CARE PLAN
"  Problem: Fall Risk - Rehab  Goal: Patient will remain free from falls  Note: Lulu Aguilera Fall risk Assessment Score: 14      Moderate fall risk Interventions  - Bed and strip alarm   - Yellow sign by the door   - Yellow wrist band \"Fall risk\"  - Room near to the nurse station  - Do not leave patient unattended in the bathroom  - Fall risk education provided      Problem: Respiratory  Goal: Patient will understand use and administration of respiratory medications to improve respiratory function  Note: CPAP at bedtime.     Problem: Bladder / Voiding  Goal: Patient will establish and maintain regular urinary output  Note: F/C.     Problem: Diabetes Management  Goal: Patient's ability to maintain appropriate glucose levels will be maintained or improve  Note: FS ac and hs.          The patient is Stable - Low risk of patient condition declining or worsening    Shift Goals  Clinical Goals: Safety  Patient Goals: rest  Family Goals: Education        "

## 2023-11-04 NOTE — FLOWSHEET NOTE
11/04/23 0725   Events/Summary/Plan   Events/Summary/Plan RA pulse ox check   Vital Signs   Pulse 78   Respiration 18   Pulse Oximetry 92 %   $ Pulse Oximetry (Spot Check) Yes   Respiratory Assessment   Level of Consciousness Alert   Chest Exam   Work Of Breathing / Effort Within Normal Limits   Oxygen   O2 Delivery Device Room air w/o2 available

## 2023-11-04 NOTE — CARE PLAN
The patient is Stable - Low risk of patient condition declining or worsening    Shift Goals  Clinical Goals: Safety, pain control  Patient Goals: Safety  Family Goals: Education    Progress made toward(s) clinical / shift goals: Pt's VSS, denies pain, able to participate in therapy, peritoneal dressing changed, last BM 11/4, grace in place.

## 2023-11-04 NOTE — PROGRESS NOTES
Hospital Medicine Daily Progress Note      Chief Complaint  Hypertension  Diabetes  Chronic Kidney Disease    Interval Problem Update  Note (11/04):  Patient has been doing well for a while.                          No medical issues to address a this time.                          Continue current care.                          Will sign off.                          Re-consult if needed.    Review of Systems  Review of Systems   Constitutional:  Negative for fever.   Eyes:  Negative for blurred vision.   Respiratory:  Negative for shortness of breath.    Cardiovascular:  Negative for palpitations.   Gastrointestinal:  Negative for nausea and vomiting.   Neurological:  Negative for dizziness and headaches.   Psychiatric/Behavioral:  Negative for hallucinations.         Physical Exam  Temp:  [36.5 °C (97.7 °F)-36.7 °C (98 °F)] 36.5 °C (97.7 °F)  Pulse:  [78-93] 78  Resp:  [16-18] 18  BP: (109-141)/(67-76) 109/67  SpO2:  [92 %-94 %] 92 %    Physical Exam  Vitals and nursing note reviewed.   Constitutional:       General: He is not in acute distress.  HENT:      Mouth/Throat:      Mouth: Mucous membranes are moist.      Pharynx: Oropharynx is clear.   Eyes:      General: No scleral icterus.  Cardiovascular:      Rate and Rhythm: Normal rate and regular rhythm.      Heart sounds: No murmur heard.  Pulmonary:      Effort: Pulmonary effort is normal.      Breath sounds: Normal breath sounds. No stridor.   Abdominal:      General: There is no distension.      Palpations: Abdomen is soft.      Tenderness: There is no abdominal tenderness.   Musculoskeletal:      Cervical back: No rigidity.      Right lower leg: No edema.      Left lower leg: No edema.      Comments: Has left BKA   Skin:     General: Skin is warm and dry.      Findings: No rash.   Neurological:      Mental Status: He is alert and oriented to person, place, and time.   Psychiatric:         Mood and Affect: Mood normal.         Behavior: Behavior normal.          Fluids    Intake/Output Summary (Last 24 hours) at 11/4/2023 0916  Last data filed at 11/4/2023 0629  Gross per 24 hour   Intake 360 ml   Output 1725 ml   Net -1365 ml         Laboratory                          Assessment/Plan  A-fib ([facility])  Assessment & Plan  HR ok  Cont Amio  Cont Cardizem  Cont Eliquis  Cont Lasix  Cont KCL: 20 meq daily --> 30 meq daily (3/6)  K+: 3.6 (10/31)  Cont to monitor    Gout  Assessment & Plan  Cont Allopurinol    Borderline abnormal TFTs  Assessment & Plan  TSH: 10.1  FT4: 1.15  ? 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)  Assessment & Plan  Has had prolonged hospitalization since 8/26/23  [facility]ially presented to [facility], then [facility], now Rehab  S/P I+D, IV Abx, and Wound Vac     Stage 3a chronic kidney disease (HCC)- (present on admission)  Assessment & Plan  Bun: 33 --> 23   Cr: wnl  Cont Lasix  Cont to monitor    PAD (peripheral artery disease) ([facility])- (present on admission)  Assessment & Plan  Hx of left BKA     Diabetes ([facility])- (present on admission)  Assessment & Plan  Hba1c: 6.8 (10/25)  BS: good but hit 86 x 1  Off Glargine (last dose 10/30)  Cont Metformin: 850 mg bid --> 750 mg bid (11/3 nite)  Will stop accuchecks  Note: home meds include Metformin 1000 mg bid and Actos 30 mg qd  Cont to monitor    Benign essential HTN- (present on admission)  Assessment & Plan  BP ok  Cont Cardizem  Note: on Lasix  Cont to monitor

## 2023-11-04 NOTE — PROGRESS NOTES
NURSING DAILY NOTE    Name: Jose Castanon   Date of Admission: 10/24/2023   Admitting Diagnosis: Critical illness polyneuropathy (HCC)  Attending Physician: Sylvester Contreras M.d.  Allergies: Penicillins, Bee venom, Cefazolin, and Linezolid    Safety  Patient Assist  min-max  Patient Precautions  Fall Risk, Other (See Comments)  Precaution Comments  monitor HR and O2; hx of BKE with LLE prosthetic, wound near sacrum, wound on medial RLE heal, lateral foot wound RLE  Bed Transfer Status  Contact Guard Assist  Toilet Transfer Status   Minimal Assist  Assistive Devices  Rails  Oxygen  CPAP  Diet/Therapeutic Dining  Current Diet Order   Procedures    Diet Order Diet: Consistent CHO (Diabetic)     Pill Administration  whole  Agitated Behavioral Scale  14  ABS Level of Severity  No Agitation    Fall Risk  Has the patient had a fall this admission?   No  Lulu Aguilera Fall Risk Scoring  14, MODERATE RISK  Fall Risk Safety Measures  bed alarm and chair alarm    Vitals  Temperature: 36.7 °C (98 °F)  Temp src: Oral  Pulse: 81  Respiration: 16  Blood Pressure : (!) 141/76  Blood Pressure MAP (Calculated): 98 MM HG  BP Location: Right, Upper Arm  Patient BP Position: Robledo's Position     Oxygen  Pulse Oximetry: 93 %  O2 (LPM): 0  O2 Delivery Device: CPAP    Bowel and Bladder  Last Bowel Movement  11/03/23  Stool Type  Type 5: Soft blob with clear cut edges (passed easily)  Bowel Device  Diaper  Continent  Bladder: Did not void   Bowel: No movement  Bladder Function  Urine Void (mL): 1000 ml  Urine Color: Yellow  Urine Clarity: Clear  Genitourinary Assessment   Bladder Assessment (WDL):  WDL Except  Ambrosio Catheter: Present with Active Order  Ambrosio Care: Given with Soap and Water  Urinary Elimination: Catheter (Document on LDA)  Urine Color: Yellow  Urine Clarity: Clear  Bladder Device: Indwelling Catheter    Skin  Des Score   13  Sensory Interventions   Bed  Types: Bariatric Low Airloss  Skin Preventative Measures: Pillows in Use for Support / Positioning  Moisture Interventions  Protocols: Pressure Ulcer Prevention / Intervention Protocol in Place  Moisturizers/Barriers: Barrier Wipes, Barrier Paste  Containment Devices: Indwelling Catheter      Pain  Pain Rating Scale  0 - No Pain  Pain Location  Scrotum, Incision  Pain Location Orientation  Proximal  Pain Interventions   Declines    ADLs    Bathing   Full Bed Bath  Linen Change   Complete  Personal Hygiene  Change Ankita Pads, Perineal Care  Chlorhexidine Bath      Oral Care  Brushed Teeth  Teeth/Dentures     Shave     Nutrition Percentage Eaten  *  * Meal *  *, Dinner, Between % Consumed  Environmental Precautions  Treaded Slipper Socks on Patient, Personal Belongings, Wastebasket, [place] etc. in Easy Reach, Bed in Low Position  Patient Turns/Positioning  Patient Turns Self from Side to Side  Patient Turns Assistance/Tolerance  Assistance of One  Bed Positions  Bed Controls On, Bed Locked  Head of Bed Elevated  Self regulated      Psychosocial/Neurologic Assessment  Psychosocial Assessment  Psychosocial (WDL):  Within Defined Limits  Neurologic Assessment  Neuro (WDL): Exceptions to WDL  Level of Consciousness: Alert  Orientation Level: Oriented X4  Cognition: Follows commands, Appropriate attention/concentration, Appropriate judgement, Appropriate safety awareness  Speech: Clear  Motor Function/Sensation Assessment: Motor strength  RUE Sensation: No numbness  Muscle Strength Right Arm: Good Strength Against Gravity and Moderate Resistance  LUE Sensation: No numbness  Muscle Strength Left Arm: Good Strength Against Gravity and Moderate Resistance  RLE Sensation: Numbness, Tingling (BKA)  Muscle Strength Right Leg: Good Strength Against Gravity and Moderate Resistance  LLE Sensation: Numbness, Tingling  Muscle Strength Left Leg: Good Strength Against Gravity and Moderate Resistance  EENT (WDL):  WDL  Except    Cardio/Pulmonary Assessment  Edema   RLE Edema: Generalized  Respiratory Breath Sounds  RUL Breath Sounds: Clear  RML Breath Sounds: Clear  RLL Breath Sounds: Clear, Diminished  MIGUELINA Breath Sounds: Clear  LLL Breath Sounds: Clear, Diminished  Cardiac Assessment   Cardiac (WDL):  WDL Except (H/O HTN, CAD.)

## 2023-11-05 LAB — GLUCOSE BLD STRIP.AUTO-MCNC: 107 MG/DL (ref 65–99)

## 2023-11-05 PROCEDURE — 700102 HCHG RX REV CODE 250 W/ 637 OVERRIDE(OP): Performed by: PHYSICAL MEDICINE & REHABILITATION

## 2023-11-05 PROCEDURE — 700102 HCHG RX REV CODE 250 W/ 637 OVERRIDE(OP): Mod: JZ | Performed by: HOSPITALIST

## 2023-11-05 PROCEDURE — 94760 N-INVAS EAR/PLS OXIMETRY 1: CPT

## 2023-11-05 PROCEDURE — A9270 NON-COVERED ITEM OR SERVICE: HCPCS | Performed by: PHYSICAL MEDICINE & REHABILITATION

## 2023-11-05 PROCEDURE — 700102 HCHG RX REV CODE 250 W/ 637 OVERRIDE(OP): Performed by: HOSPITALIST

## 2023-11-05 PROCEDURE — A9270 NON-COVERED ITEM OR SERVICE: HCPCS | Mod: JZ | Performed by: HOSPITALIST

## 2023-11-05 PROCEDURE — A9270 NON-COVERED ITEM OR SERVICE: HCPCS | Performed by: HOSPITALIST

## 2023-11-05 PROCEDURE — 82962 GLUCOSE BLOOD TEST: CPT

## 2023-11-05 PROCEDURE — 770010 HCHG ROOM/CARE - REHAB SEMI PRIVAT*

## 2023-11-05 RX ADMIN — DILTIAZEM HYDROCHLORIDE 30 MG: 30 TABLET, FILM COATED ORAL at 13:30

## 2023-11-05 RX ADMIN — GUAIFENESIN SYRUP AND DEXTROMETHORPHAN 5 ML: 100; 10 SYRUP ORAL at 13:30

## 2023-11-05 RX ADMIN — ATORVASTATIN CALCIUM 20 MG: 10 TABLET, FILM COATED ORAL at 22:14

## 2023-11-05 RX ADMIN — SIMETHICONE 125 MG: 125 TABLET, CHEWABLE ORAL at 17:23

## 2023-11-05 RX ADMIN — GABAPENTIN 400 MG: 400 CAPSULE ORAL at 22:13

## 2023-11-05 RX ADMIN — SIMETHICONE 125 MG: 125 TABLET, CHEWABLE ORAL at 08:17

## 2023-11-05 RX ADMIN — AMIODARONE HYDROCHLORIDE 200 MG: 200 TABLET ORAL at 08:17

## 2023-11-05 RX ADMIN — OMEPRAZOLE 20 MG: 20 CAPSULE, DELAYED RELEASE ORAL at 08:17

## 2023-11-05 RX ADMIN — APIXABAN 5 MG: 5 TABLET, FILM COATED ORAL at 22:13

## 2023-11-05 RX ADMIN — FUROSEMIDE 40 MG: 40 TABLET ORAL at 06:19

## 2023-11-05 RX ADMIN — METFORMIN HYDROCHLORIDE 750 MG: 500 TABLET ORAL at 17:23

## 2023-11-05 RX ADMIN — DILTIAZEM HYDROCHLORIDE 30 MG: 30 TABLET, FILM COATED ORAL at 22:13

## 2023-11-05 RX ADMIN — GABAPENTIN 400 MG: 400 CAPSULE ORAL at 08:17

## 2023-11-05 RX ADMIN — ALLOPURINOL 100 MG: 100 TABLET ORAL at 08:17

## 2023-11-05 RX ADMIN — POTASSIUM CHLORIDE 30 MEQ: 1500 TABLET, EXTENDED RELEASE ORAL at 08:17

## 2023-11-05 RX ADMIN — METFORMIN HYDROCHLORIDE 750 MG: 500 TABLET ORAL at 08:17

## 2023-11-05 RX ADMIN — GABAPENTIN 400 MG: 400 CAPSULE ORAL at 13:30

## 2023-11-05 RX ADMIN — APIXABAN 5 MG: 5 TABLET, FILM COATED ORAL at 08:17

## 2023-11-05 RX ADMIN — DILTIAZEM HYDROCHLORIDE 30 MG: 30 TABLET, FILM COATED ORAL at 06:18

## 2023-11-05 RX ADMIN — SIMETHICONE 125 MG: 125 TABLET, CHEWABLE ORAL at 11:25

## 2023-11-05 RX ADMIN — ACETAMINOPHEN 650 MG: 325 TABLET ORAL at 11:35

## 2023-11-05 RX ADMIN — SIMETHICONE 125 MG: 125 TABLET, CHEWABLE ORAL at 22:12

## 2023-11-05 ASSESSMENT — PAIN DESCRIPTION - PAIN TYPE
TYPE: ACUTE PAIN
TYPE: ACUTE PAIN

## 2023-11-05 NOTE — FLOWSHEET NOTE
11/05/23 1213   Events/Summary/Plan   Events/Summary/Plan spot check done on room air using bipap at noc as tolerated   Vital Signs   Pulse 88   Respiration 16   Pulse Oximetry 91 %   $ Pulse Oximetry (Spot Check) Yes   Respiratory Assessment   Respiratory Pattern Within Normal Limits   Level of Consciousness Alert   Chest Exam   Work Of Breathing / Effort Within Normal Limits   Oxygen   O2 (LPM) 0   FiO2% 21 %   O2 Delivery Device Room air w/o2 available   Non-Invasive Ventilation [employer]   Nocturnal CPAP or [facility]  (wore for 3.36 hours)   FiO2 or LPM 3

## 2023-11-05 NOTE — CARE PLAN
"  Problem: Fall Risk - Rehab  Goal: Patient will remain free from falls  Note: Lulu Aguilera Fall risk Assessment Score: 14      Moderate fall risk Interventions  - Bed and strip alarm   - Yellow sign by the door   - Yellow wrist band \"Fall risk\"  - Room near to the nurse station  - Do not leave patient unattended in the bathroom  - Fall risk education provided      Problem: Respiratory  Goal: Patient will understand use and administration of respiratory medications to improve respiratory function  Note: CPAP. Coughing noted. Will monitor.     Problem: Bladder / Voiding  Goal: Patient will establish and maintain regular urinary output  Note: F/C.     Problem: Diabetes Management  Goal: Patient's ability to maintain appropriate glucose levels will be maintained or improve  Note: FS ac and hs.          The patient is Stable - Low risk of patient condition declining or worsening    Shift Goals  Clinical Goals: Safety, pain control  Patient Goals: Safety  Family Goals: Education      "

## 2023-11-05 NOTE — CARE PLAN
The patient is Stable - Low risk of patient condition declining or worsening    Shift Goals  Clinical Goals: Wound care  Patient Goals: Rest  Family Goals: Education    Progress made toward(s) clinical / shift goals:  Blood glucose finger sticks discontinued per hospitalist orders.    Patient is not progressing towards the following goals:      Problem: Skin Integrity  Goal: Skin integrity is maintained or improved  Outcome: Progressing  Note: Wound care completed to perineum incision site. Wound was cleansed, repacked, and covered as ordered.

## 2023-11-05 NOTE — PROGRESS NOTES
NURSING DAILY NOTE    Name: Jose Castanon   Date of Admission: 10/24/2023   Admitting Diagnosis: Critical illness polyneuropathy (HCC)  Attending Physician: Sylvester Contreras M.d.  Allergies: Penicillins, Bee venom, Cefazolin, and Linezolid    Safety  Patient Assist  cga  Patient Precautions  Fall Risk, Other (See Comments)  Precaution Comments  monitor HR and O2; hx of BKE with LLE prosthetic, wound near sacrum, wound on medial RLE heal, lateral foot wound RLE  Bed Transfer Status  Contact Guard Assist  Toilet Transfer Status   Minimal Assist  Assistive Devices  Rails  Oxygen  CPAP  Diet/Therapeutic Dining  Current Diet Order   Procedures    Diet Order Diet: Consistent CHO (Diabetic)     Pill Administration  whole  Agitated Behavioral Scale  14  ABS Level of Severity  No Agitation    Fall Risk  Has the patient had a fall this admission?   No  Lulu Aguilera Fall Risk Scoring  14, MODERATE RISK  Fall Risk Safety Measures  bed alarm and chair alarm    Vitals  Temperature: 36.8 °C (98.3 °F)  Temp src: Oral  Pulse: 82  Respiration: 18  Blood Pressure : (!) 149/71  Blood Pressure MAP (Calculated): 97 MM HG  BP Location: Right, Upper Arm  Patient BP Position: Supine     Oxygen  Pulse Oximetry: 93 %  O2 (LPM): 0  O2 Delivery Device: CPAP    Bowel and Bladder  Last Bowel Movement  11/05/23  Stool Type  Type 6: Fluffy pieces with ragged edges, a mushy stool  Bowel Device  Diaper  Continent  Bladder: Did not void   Bowel: No movement  Bladder Function  Urine Void (mL): 1000 ml  Urine Color: Yellow  Urine Clarity: Clear  Genitourinary Assessment   Bladder Assessment (WDL):  WDL Except  Ambrosio Catheter: Present with Active Order  Ambrosio Care: Given with Soap and Water  Urinary Elimination: Catheter (Document on LDA)  Urine Color: Yellow  Urine Clarity: Clear  Bladder Device: Indwelling Catheter    Skin  Des Score   13  Sensory Interventions   Bed Types:  Bariatric Low Airloss  Skin Preventative Measures: Pillows in Use for Support / Positioning  Moisture Interventions  Protocols: Pressure Ulcer Prevention / Intervention Protocol in Place  Moisturizers/Barriers: Barrier Wipes, Barrier Paste  Containment Devices: Indwelling Catheter      Pain  Pain Rating Scale  0 - No Pain  Pain Location  Scrotum, Incision  Pain Location Orientation  Proximal  Pain Interventions   Declines    ADLs    Bathing   Full Bed Bath  Linen Change   Complete  Personal Hygiene  Moist Ankita Wipes, Perineal Care  Chlorhexidine Bath      Oral Care  Brushed Teeth (self)  Teeth/Dentures     Shave     Nutrition Percentage Eaten  *  * Meal *  *, Dinner, Between % Consumed (100%)  Environmental Precautions  Bed in Low Position, Treaded Slipper Socks on Patient  Patient Turns/Positioning  Patient Turns Self from Side to Side  Patient Turns Assistance/Tolerance  Assistance of One  Bed Positions  Bed Controls On  Head of Bed Elevated  Self regulated      Psychosocial/Neurologic Assessment  Psychosocial Assessment  Psychosocial (WDL):  Within Defined Limits  Neurologic Assessment  Neuro (WDL): Exceptions to WDL  Level of Consciousness: Alert  Orientation Level: Oriented X4  Cognition: Follows commands, Appropriate attention/concentration, Appropriate judgement, Appropriate safety awareness  Speech: Clear  Motor Function/Sensation Assessment: Motor strength  RUE Sensation: No numbness  Muscle Strength Right Arm: Good Strength Against Gravity and Moderate Resistance  LUE Sensation: No numbness  Muscle Strength Left Arm: Good Strength Against Gravity and Moderate Resistance  RLE Sensation: Numbness, Tingling (BKA)  Muscle Strength Right Leg: Good Strength Against Gravity and Moderate Resistance  LLE Sensation: Numbness, Tingling  Muscle Strength Left Leg: Good Strength Against Gravity and Moderate Resistance  EENT (WDL):  WDL Except    Cardio/Pulmonary Assessment  Edema   RLE Edema:  Generalized  Respiratory Breath Sounds  RUL Breath Sounds: Clear  RML Breath Sounds: Clear  RLL Breath Sounds: Clear, Diminished  MIGUELINA Breath Sounds: Clear  LLL Breath Sounds: Clear, Diminished  Cardiac Assessment   Cardiac (WDL):  WDL Except (H/O HTN, CAD.)

## 2023-11-06 ENCOUNTER — APPOINTMENT (OUTPATIENT)
Dept: RADIOLOGY | Facility: REHABILITATION | Age: 68
DRG: 074 | End: 2023-11-06
Attending: HOSPITALIST
Payer: MEDICARE

## 2023-11-06 ENCOUNTER — APPOINTMENT (OUTPATIENT)
Dept: OCCUPATIONAL THERAPY | Facility: REHABILITATION | Age: 68
DRG: 074 | End: 2023-11-06
Attending: PHYSICAL MEDICINE & REHABILITATION
Payer: MEDICARE

## 2023-11-06 ENCOUNTER — APPOINTMENT (OUTPATIENT)
Dept: PHYSICAL THERAPY | Facility: REHABILITATION | Age: 68
DRG: 074 | End: 2023-11-06
Attending: PHYSICAL MEDICINE & REHABILITATION
Payer: MEDICARE

## 2023-11-06 PROBLEM — E83.42 HYPOMAGNESEMIA: Status: ACTIVE | Noted: 2023-11-06

## 2023-11-06 LAB
ANION GAP SERPL CALC-SCNC: 14 MMOL/L (ref 7–16)
APPEARANCE UR: CLEAR
BACTERIA #/AREA URNS HPF: NEGATIVE /HPF
BASOPHILS # BLD AUTO: 0.5 % (ref 0–1.8)
BASOPHILS # BLD: 0.07 K/UL (ref 0–0.12)
BILIRUB UR QL STRIP.AUTO: NEGATIVE
BUN SERPL-MCNC: 24 MG/DL (ref 8–22)
CALCIUM SERPL-MCNC: 8.8 MG/DL (ref 8.5–10.5)
CHLORIDE SERPL-SCNC: 104 MMOL/L (ref 96–112)
CO2 SERPL-SCNC: 24 MMOL/L (ref 20–33)
COLOR UR: YELLOW
CREAT SERPL-MCNC: 1.39 MG/DL (ref 0.5–1.4)
EOSINOPHIL # BLD AUTO: 0.35 K/UL (ref 0–0.51)
EOSINOPHIL NFR BLD: 2.6 % (ref 0–6.9)
EPI CELLS #/AREA URNS HPF: NEGATIVE /HPF
ERYTHROCYTE [DISTWIDTH] IN BLOOD BY AUTOMATED COUNT: 52.8 FL (ref 35.9–50)
FLUAV RNA SPEC QL NAA+PROBE: NEGATIVE
FLUBV RNA SPEC QL NAA+PROBE: NEGATIVE
GFR SERPLBLD CREATININE-BSD FMLA CKD-EPI: 55 ML/MIN/1.73 M 2
GLUCOSE SERPL-MCNC: 111 MG/DL (ref 65–99)
GLUCOSE UR STRIP.AUTO-MCNC: NEGATIVE MG/DL
HCT VFR BLD AUTO: 38 % (ref 42–52)
HGB BLD-MCNC: 11.7 G/DL (ref 14–18)
HYALINE CASTS #/AREA URNS LPF: ABNORMAL /LPF
IMM GRANULOCYTES # BLD AUTO: 0.16 K/UL (ref 0–0.11)
IMM GRANULOCYTES NFR BLD AUTO: 1.2 % (ref 0–0.9)
KETONES UR STRIP.AUTO-MCNC: NEGATIVE MG/DL
LEUKOCYTE ESTERASE UR QL STRIP.AUTO: ABNORMAL
LYMPHOCYTES # BLD AUTO: 1.42 K/UL (ref 1–4.8)
LYMPHOCYTES NFR BLD: 10.7 % (ref 22–41)
MAGNESIUM SERPL-MCNC: 1.3 MG/DL (ref 1.5–2.5)
MCH RBC QN AUTO: 27.9 PG (ref 27–33)
MCHC RBC AUTO-ENTMCNC: 30.8 G/DL (ref 32.3–36.5)
MCV RBC AUTO: 90.5 FL (ref 81.4–97.8)
MICRO URNS: ABNORMAL
MONOCYTES # BLD AUTO: 0.79 K/UL (ref 0–0.85)
MONOCYTES NFR BLD AUTO: 6 % (ref 0–13.4)
NEUTROPHILS # BLD AUTO: 10.44 K/UL (ref 1.82–7.42)
NEUTROPHILS NFR BLD: 79 % (ref 44–72)
NITRITE UR QL STRIP.AUTO: NEGATIVE
NRBC # BLD AUTO: 0 K/UL
NRBC BLD-RTO: 0 /100 WBC (ref 0–0.2)
PH UR STRIP.AUTO: 5.5 [PH] (ref 5–8)
PLATELET # BLD AUTO: 268 K/UL (ref 164–446)
PMV BLD AUTO: 11.3 FL (ref 9–12.9)
POTASSIUM SERPL-SCNC: 4.1 MMOL/L (ref 3.6–5.5)
PROT UR QL STRIP: NEGATIVE MG/DL
RBC # BLD AUTO: 4.2 M/UL (ref 4.7–6.1)
RBC # URNS HPF: ABNORMAL /HPF
RBC UR QL AUTO: ABNORMAL
RSV RNA SPEC QL NAA+PROBE: NEGATIVE
SARS-COV-2 RNA RESP QL NAA+PROBE: NOTDETECTED
SCCMEC + MECA PNL NOSE NAA+PROBE: NEGATIVE
SCCMEC + MECA PNL NOSE NAA+PROBE: NEGATIVE
SODIUM SERPL-SCNC: 142 MMOL/L (ref 135–145)
SP GR UR STRIP.AUTO: 1.01
SPECIMEN SOURCE: 1
UROBILINOGEN UR STRIP.AUTO-MCNC: 0.2 MG/DL
WBC # BLD AUTO: 13.2 K/UL (ref 4.8–10.8)
WBC #/AREA URNS HPF: ABNORMAL /HPF

## 2023-11-06 PROCEDURE — 94760 N-INVAS EAR/PLS OXIMETRY 1: CPT

## 2023-11-06 PROCEDURE — 700102 HCHG RX REV CODE 250 W/ 637 OVERRIDE(OP): Performed by: HOSPITALIST

## 2023-11-06 PROCEDURE — 0241U HCHG SARS-COV-2 COVID-19 NFCT DS RESP RNA 4 TRGT MIC: CPT

## 2023-11-06 PROCEDURE — 700102 HCHG RX REV CODE 250 W/ 637 OVERRIDE(OP): Performed by: PHYSICAL MEDICINE & REHABILITATION

## 2023-11-06 PROCEDURE — 36415 COLL VENOUS BLD VENIPUNCTURE: CPT

## 2023-11-06 PROCEDURE — 99233 SBSQ HOSP IP/OBS HIGH 50: CPT | Performed by: PHYSICAL MEDICINE & REHABILITATION

## 2023-11-06 PROCEDURE — A9270 NON-COVERED ITEM OR SERVICE: HCPCS | Performed by: HOSPITALIST

## 2023-11-06 PROCEDURE — 97110 THERAPEUTIC EXERCISES: CPT

## 2023-11-06 PROCEDURE — 81001 URINALYSIS AUTO W/SCOPE: CPT

## 2023-11-06 PROCEDURE — 700102 HCHG RX REV CODE 250 W/ 637 OVERRIDE(OP): Mod: JZ | Performed by: HOSPITALIST

## 2023-11-06 PROCEDURE — 80048 BASIC METABOLIC PNL TOTAL CA: CPT

## 2023-11-06 PROCEDURE — 770010 HCHG ROOM/CARE - REHAB SEMI PRIVAT*

## 2023-11-06 PROCEDURE — 71045 X-RAY EXAM CHEST 1 VIEW: CPT

## 2023-11-06 PROCEDURE — 85025 COMPLETE CBC W/AUTO DIFF WBC: CPT

## 2023-11-06 PROCEDURE — 99232 SBSQ HOSP IP/OBS MODERATE 35: CPT | Performed by: HOSPITALIST

## 2023-11-06 PROCEDURE — A9270 NON-COVERED ITEM OR SERVICE: HCPCS | Mod: JZ | Performed by: HOSPITALIST

## 2023-11-06 PROCEDURE — A9270 NON-COVERED ITEM OR SERVICE: HCPCS | Performed by: PHYSICAL MEDICINE & REHABILITATION

## 2023-11-06 PROCEDURE — 97530 THERAPEUTIC ACTIVITIES: CPT

## 2023-11-06 PROCEDURE — 97535 SELF CARE MNGMENT TRAINING: CPT

## 2023-11-06 PROCEDURE — 97116 GAIT TRAINING THERAPY: CPT

## 2023-11-06 PROCEDURE — 83735 ASSAY OF MAGNESIUM: CPT

## 2023-11-06 PROCEDURE — 87641 MR-STAPH DNA AMP PROBE: CPT

## 2023-11-06 PROCEDURE — 87640 STAPH A DNA AMP PROBE: CPT

## 2023-11-06 RX ORDER — LANOLIN ALCOHOL/MO/W.PET/CERES
400 CREAM (GRAM) TOPICAL 2 TIMES DAILY
Status: DISCONTINUED | OUTPATIENT
Start: 2023-11-06 | End: 2023-11-08

## 2023-11-06 RX ORDER — HYDROCHLOROTHIAZIDE 25 MG/1
25 TABLET ORAL
Status: DISCONTINUED | OUTPATIENT
Start: 2023-11-07 | End: 2023-11-09 | Stop reason: HOSPADM

## 2023-11-06 RX ORDER — GUAIFENESIN/DEXTROMETHORPHAN 100-10MG/5
5 SYRUP ORAL EVERY 6 HOURS PRN
Status: DISCONTINUED | OUTPATIENT
Start: 2023-11-06 | End: 2023-11-06

## 2023-11-06 RX ADMIN — SIMETHICONE 125 MG: 125 TABLET, CHEWABLE ORAL at 07:59

## 2023-11-06 RX ADMIN — GUAIFENESIN SYRUP AND DEXTROMETHORPHAN 5 ML: 100; 10 SYRUP ORAL at 11:53

## 2023-11-06 RX ADMIN — POTASSIUM CHLORIDE 30 MEQ: 1500 TABLET, EXTENDED RELEASE ORAL at 07:58

## 2023-11-06 RX ADMIN — ATORVASTATIN CALCIUM 20 MG: 10 TABLET, FILM COATED ORAL at 21:23

## 2023-11-06 RX ADMIN — GABAPENTIN 400 MG: 400 CAPSULE ORAL at 21:23

## 2023-11-06 RX ADMIN — OMEPRAZOLE 20 MG: 20 CAPSULE, DELAYED RELEASE ORAL at 07:58

## 2023-11-06 RX ADMIN — SIMETHICONE 125 MG: 125 TABLET, CHEWABLE ORAL at 17:32

## 2023-11-06 RX ADMIN — FUROSEMIDE 40 MG: 40 TABLET ORAL at 06:00

## 2023-11-06 RX ADMIN — METFORMIN HYDROCHLORIDE 750 MG: 500 TABLET ORAL at 17:32

## 2023-11-06 RX ADMIN — ALLOPURINOL 100 MG: 100 TABLET ORAL at 07:58

## 2023-11-06 RX ADMIN — APIXABAN 5 MG: 5 TABLET, FILM COATED ORAL at 21:23

## 2023-11-06 RX ADMIN — Medication 400 MG: at 21:23

## 2023-11-06 RX ADMIN — SIMETHICONE 125 MG: 125 TABLET, CHEWABLE ORAL at 11:50

## 2023-11-06 RX ADMIN — ACETAMINOPHEN 650 MG: 325 TABLET ORAL at 08:03

## 2023-11-06 RX ADMIN — AMIODARONE HYDROCHLORIDE 200 MG: 200 TABLET ORAL at 07:57

## 2023-11-06 RX ADMIN — DILTIAZEM HYDROCHLORIDE 30 MG: 30 TABLET, FILM COATED ORAL at 21:23

## 2023-11-06 RX ADMIN — DILTIAZEM HYDROCHLORIDE 30 MG: 30 TABLET, FILM COATED ORAL at 14:48

## 2023-11-06 RX ADMIN — METFORMIN HYDROCHLORIDE 750 MG: 500 TABLET ORAL at 07:58

## 2023-11-06 RX ADMIN — GABAPENTIN 400 MG: 400 CAPSULE ORAL at 14:48

## 2023-11-06 RX ADMIN — APIXABAN 5 MG: 5 TABLET, FILM COATED ORAL at 07:58

## 2023-11-06 RX ADMIN — DILTIAZEM HYDROCHLORIDE 30 MG: 30 TABLET, FILM COATED ORAL at 05:59

## 2023-11-06 RX ADMIN — SIMETHICONE 125 MG: 125 TABLET, CHEWABLE ORAL at 21:23

## 2023-11-06 RX ADMIN — GABAPENTIN 400 MG: 400 CAPSULE ORAL at 07:58

## 2023-11-06 RX ADMIN — Medication 400 MG: at 11:50

## 2023-11-06 ASSESSMENT — GAIT ASSESSMENTS
GAIT LEVEL OF ASSIST: SUPERVISED
ASSISTIVE DEVICE: CANADIAN (LOFSTRAND) CRUTCHES
GAIT LEVEL OF ASSIST: STANDBY ASSIST
DISTANCE (FEET): 120
DEVIATION: INCREASED BASE OF SUPPORT;BRADYKINETIC
ASSISTIVE DEVICE: CANADIAN (LOFSTRAND) CRUTCHES

## 2023-11-06 ASSESSMENT — ENCOUNTER SYMPTOMS
FEVER: 0
DIARRHEA: 0
NERVOUS/ANXIOUS: 0
SHORTNESS OF BREATH: 0
NAUSEA: 0
VOMITING: 0
CHILLS: 0
ABDOMINAL PAIN: 0
COUGH: 1

## 2023-11-06 ASSESSMENT — PAIN DESCRIPTION - PAIN TYPE
TYPE: ACUTE PAIN
TYPE: ACUTE PAIN

## 2023-11-06 ASSESSMENT — ACTIVITIES OF DAILY LIVING (ADL)
BED_CHAIR_WHEELCHAIR_TRANSFER_DESCRIPTION: ADAPTIVE EQUIPMENT;INCREASED TIME;SET-UP OF EQUIPMENT;SUPERVISION FOR SAFETY
TUB_SHOWER_TRANSFER_DESCRIPTION: ADAPTIVE EQUIPMENT;GRAB BAR;SHOWER BENCH;INCREASED TIME
BED_CHAIR_WHEELCHAIR_TRANSFER_DESCRIPTION: ASSIST WITH TWO LIMBS
BED_CHAIR_WHEELCHAIR_TRANSFER_DESCRIPTION: ADAPTIVE EQUIPMENT;INCREASED TIME;SET-UP OF EQUIPMENT;SUPERVISION FOR SAFETY

## 2023-11-06 NOTE — PROGRESS NOTES
Patient care assumed. Report received from NOC RN. Patient is alert and calm, resting in bed. Call light and bedside table within reach. Will continue to monitor.

## 2023-11-06 NOTE — PROGRESS NOTES
NURSING DAILY NOTE    Name: Jose Castanon   Date of Admission: 10/24/2023   Admitting Diagnosis: Critical illness polyneuropathy (HCC)  Attending Physician: Sylvester Contreras M.d.  Allergies: Penicillins, Bee venom, Cefazolin, and Linezolid    Safety  Patient Assist  cga  Patient Precautions  Fall Risk, Other (See Comments)  Precaution Comments  monitor HR and O2; hx of BKE with LLE prosthetic, wound near sacrum, wound on medial RLE heal, lateral foot wound RLE  Bed Transfer Status  Contact Guard Assist  Toilet Transfer Status   Minimal Assist  Assistive Devices  Rails  Oxygen  CPAP  Diet/Therapeutic Dining  Current Diet Order   Procedures    Diet Order Diet: Consistent CHO (Diabetic)     Pill Administration  whole  Agitated Behavioral Scale  14  ABS Level of Severity  No Agitation    Fall Risk  Has the patient had a fall this admission?   No  Lulu Aguilera Fall Risk Scoring  14, MODERATE RISK  Fall Risk Safety Measures  bed alarm, chair alarm, and poor balance    Vitals  Temperature: 36.7 °C (98.1 °F)  Temp src: Oral  Pulse: 78  Respiration: 18  Blood Pressure : 127/70  Blood Pressure MAP (Calculated): 89 MM HG  BP Location: Right, Upper Arm  Patient BP Position: Supine     Oxygen  Pulse Oximetry: 92 %  O2 (LPM): 0  FiO2%: 21 %  O2 Delivery Device: CPAP    Bowel and Bladder  Last Bowel Movement  11/06/23  Stool Type  Type 6: Fluffy pieces with ragged edges, a mushy stool  Bowel Device  Diaper  Continent  Bladder: Did not void   Bowel: No movement  Bladder Function  Urine Void (mL): 1000 ml  Urine Color: Yellow  Urine Clarity: Clear  Genitourinary Assessment   Bladder Assessment (WDL):  WDL Except  Ambrosio Catheter: Present with Active Order  Ambrosio Reasons per MD Order: Neurogenic bladder  Ambrosio Care: Given with Soap and Water  Urinary Elimination: Catheter (Document on LDA)  Urine Color: Yellow  Urine Clarity: Clear  Bladder Device: Indwelling  Catheter    Skin  Dse Score   13  Sensory Interventions   Bed Types: Bariatric Low Airloss  Skin Preventative Measures: Pillows in Use for Support / Positioning  Moisture Interventions  Protocols: Pressure Ulcer Prevention / Intervention Protocol in Place  Moisturizers/Barriers: Barrier Wipes, Moisturizer , Containment Devices  Containment Devices: Indwelling Catheter      Pain  Pain Rating Scale  0 - No Pain  Pain Location  Hand  Pain Location Orientation  Left, Right  Pain Interventions   Declines    ADLs    Bathing   Full Bed Bath  Linen Change   Complete  Personal Hygiene  Change Ankita Pads, Moist Ankita Wipes, Perineal Care  Chlorhexidine Bath      Oral Care   (self)  Teeth/Dentures     Shave     Nutrition Percentage Eaten  *  * Meal *  *, Dinner, Between % Consumed  Environmental Precautions  Bed in Low Position, Treaded Slipper Socks on Patient  Patient Turns/Positioning  Patient Turns Self from Side to Side  Patient Turns Assistance/Tolerance  Assistance of One  Bed Positions  Bed Controls On  Head of Bed Elevated  Self regulated      Psychosocial/Neurologic Assessment  Psychosocial Assessment  Psychosocial (WDL):  Within Defined Limits  Neurologic Assessment  Neuro (WDL): Exceptions to WDL  Level of Consciousness: Alert  Orientation Level: Oriented X4  Cognition: Follows commands, Appropriate attention/concentration, Appropriate judgement, Appropriate safety awareness  Speech: Clear  Motor Function/Sensation Assessment: Motor strength  RUE Sensation: No numbness  Muscle Strength Right Arm: Good Strength Against Gravity and Moderate Resistance  LUE Sensation: No numbness  Muscle Strength Left Arm: Good Strength Against Gravity and Moderate Resistance  RLE Sensation: Numbness, Tingling  Muscle Strength Right Leg: Good Strength Against Gravity and Moderate Resistance  LLE Sensation: Numbness, Tingling  Muscle Strength Left Leg: Good Strength Against Gravity and Moderate Resistance  EENT (WDL):  WDL  Except    Cardio/Pulmonary Assessment  Edema   RLE Edema: Generalized  Respiratory Breath Sounds  RUL Breath Sounds: Clear  RML Breath Sounds: Clear  RLL Breath Sounds: Clear, Diminished  MIGUELINA Breath Sounds: Clear  LLL Breath Sounds: Clear, Diminished  Cardiac Assessment   Cardiac (WDL):  WDL Except (HX: HTN/CAD)

## 2023-11-06 NOTE — PROGRESS NOTES
NURSING DAILY NOTE    Name: Jose Castanon   Date of Admission: 10/24/2023   Admitting Diagnosis: Critical illness polyneuropathy (HCC)  Attending Physician: Sylvester Contreras M.d.  Allergies: Penicillins, Bee venom, Cefazolin, and Linezolid    Safety  Patient Assist  CGA  Patient Precautions  Fall Risk, Other (See Comments)  Precaution Comments  monitor HR and O2; hx of BKE with LLE prosthetic, wound near sacrum, wound on medial RLE heal, lateral foot wound RLE  Bed Transfer Status  Contact Guard Assist  Toilet Transfer Status   Minimal Assist  Assistive Devices  Rails  Oxygen  Room air w/o2 available  Diet/Therapeutic Dining  Current Diet Order   Procedures    Diet Order Diet: Consistent CHO (Diabetic)     Pill Administration  whole  Agitated Behavioral Scale  14  ABS Level of Severity  No Agitation    Fall Risk  Has the patient had a fall this admission?   No  Lulu Aguilera Fall Risk Scoring  14, MODERATE RISK  Fall Risk Safety Measures  bed alarm, chair alarm, and poor balance    Vitals  Temperature: 37 °C (98.6 °F)  Temp src: Oral  Pulse: 77  Respiration: 18  Blood Pressure : (!) 142/77  Blood Pressure MAP (Calculated): 99 MM HG  BP Location: Right, Upper Arm  Patient BP Position: Supine     Oxygen  Pulse Oximetry: 98 %  O2 (LPM): 0  FiO2%: 21 %  O2 Delivery Device: Room air w/o2 available    Bowel and Bladder  Last Bowel Movement  11/05/23  Stool Type  Type 6: Fluffy pieces with ragged edges, a mushy stool  Bowel Device  Diaper  Continent  Bladder: Did not void   Bowel: No movement  Bladder Function  Urine Void (mL): 1000 ml  Urine Color: Yellow  Urine Clarity: Clear  Genitourinary Assessment   Bladder Assessment (WDL):  WDL Except  Ambrosio Catheter: Present with Active Order  Ambrosio Reasons per MD Order: Neurogenic bladder  Ambrosio Care: Given with Soap and Water  Urinary Elimination: Catheter (Document on LDA)  Urine Color: Yellow  Urine Clarity:  Clear  Bladder Device: Indwelling Catheter    Skin  Des Score   13  Sensory Interventions   Bed Types: Bariatric Low Airloss  Skin Preventative Measures: Pillows in Use to Float Heels  Moisture Interventions  Protocols: Pressure Ulcer Prevention / Intervention Protocol in Place  Moisturizers/Barriers: Barrier Wipes, Moisturizer , Containment Devices  Containment Devices: Indwelling Catheter      Pain  Pain Rating Scale  7 - Focus of attention, prevents doing daily activities  Pain Location  Hand  Pain Location Orientation  Left, Right  Pain Interventions   Declines    ADLs    Bathing   Full Bed Bath  Linen Change   Complete  Personal Hygiene  Moist Ankita Wipes, Perineal Care  Chlorhexidine Bath      Oral Care   (self)  Teeth/Dentures     Shave     Nutrition Percentage Eaten  *  * Meal *  *, Breakfast, Between % Consumed (100%)  Environmental Precautions  Bed in Low Position, Treaded Slipper Socks on Patient  Patient Turns/Positioning  Patient Turns Self from Side to Side  Patient Turns Assistance/Tolerance  Assistance of One  Bed Positions  Bed Controls On  Head of Bed Elevated  Self regulated      Psychosocial/Neurologic Assessment  Psychosocial Assessment  Psychosocial (WDL):  Within Defined Limits  Neurologic Assessment  Neuro (WDL): Exceptions to WDL  Level of Consciousness: Alert  Orientation Level: Oriented X4  Cognition: Follows commands, Appropriate attention/concentration, Appropriate judgement, Appropriate safety awareness  Speech: Clear  Motor Function/Sensation Assessment: Motor strength  RUE Sensation: No numbness  Muscle Strength Right Arm: Good Strength Against Gravity and Moderate Resistance  LUE Sensation: No numbness  Muscle Strength Left Arm: Good Strength Against Gravity and Moderate Resistance  RLE Sensation: Numbness, Tingling  Muscle Strength Right Leg: Good Strength Against Gravity and Moderate Resistance  LLE Sensation: Numbness, Tingling  Muscle Strength Left Leg: Good Strength  Against Gravity and Moderate Resistance  EENT (WDL):  WDL Except    Cardio/Pulmonary Assessment  Edema   RLE Edema: Generalized  Respiratory Breath Sounds  RUL Breath Sounds: Clear  RML Breath Sounds: Clear  RLL Breath Sounds: Clear, Diminished  MIGUELINA Breath Sounds: Clear  LLL Breath Sounds: Clear, Diminished  Cardiac Assessment   Cardiac (WDL):  WDL Except (HX: HTN/CAD)

## 2023-11-06 NOTE — PROGRESS NOTES
Hospital Medicine Daily Progress Note      Chief Complaint  Hypertension  Diabetes  Chronic Kidney Disease  Reconsulted for cough and leukocytosis    Interval Problem Update  Developed a dry cough recently along with the new leukocytosis -- will get workup.    Review of Systems  Review of Systems   Constitutional:  Negative for chills and fever.   Respiratory:  Positive for cough. Negative for shortness of breath.    Cardiovascular:  Negative for chest pain.   Gastrointestinal:  Negative for abdominal pain, diarrhea, nausea and vomiting.   Psychiatric/Behavioral:  The patient is not nervous/anxious.         Physical Exam  Temp:  [36.7 °C (98.1 °F)-37 °C (98.6 °F)] 36.9 °C (98.5 °F)  Pulse:  [77-89] 80  Resp:  [16-18] 18  BP: (118-142)/(62-77) 118/71  SpO2:  [91 %-98 %] 92 %    Physical Exam  Vitals and nursing note reviewed.   Constitutional:       Appearance: Normal appearance.   HENT:      Head: Atraumatic.   Eyes:      Conjunctiva/sclera: Conjunctivae normal.      Pupils: Pupils are equal, round, and reactive to light.   Cardiovascular:      Rate and Rhythm: Normal rate and regular rhythm.      Heart sounds: No murmur heard.  Pulmonary:      Effort: Pulmonary effort is normal.      Breath sounds: No stridor. No wheezing or rales.   Abdominal:      General: There is no distension.      Palpations: Abdomen is soft.      Tenderness: There is no abdominal tenderness.   Musculoskeletal:      Cervical back: Normal range of motion and neck supple.      Right lower leg: No edema.      Left lower leg: No edema.      Comments: Has left BKA   Skin:     General: Skin is warm and dry.      Findings: No rash.   Neurological:      Mental Status: He is alert and oriented to person, place, and time.   Psychiatric:         Mood and Affect: Mood normal.         Behavior: Behavior normal.         Fluids    Intake/Output Summary (Last 24 hours) at 11/6/2023 1058  Last data filed at 11/6/2023 0532  Gross per 24 hour   Intake 480 ml    Output 1700 ml   Net -1220 ml         Laboratory  Recent Labs     23  0519   WBC 13.2*   RBC 4.20*   HEMOGLOBIN 11.7*   HEMATOCRIT 38.0*   MCV 90.5   MCH 27.9   MCHC 30.8*   RDW 52.8*   PLATELETCT 268   MPV 11.3       Recent Labs     23  0519   SODIUM 142   POTASSIUM 4.1   CHLORIDE 104   CO2 24   GLUCOSE 111*   BUN 24*   CREATININE 1.39   CALCIUM 8.8                   Assessment/Plan  Hypomagnesemia  Assessment & Plan  M.3  Will start supplements  Monitor    A-fib (HCC)  Assessment & Plan  HR ok  BNP (1026): 156  Cont Amio  Cont Cardizem  Cont Eliquis  Cont Lasix --> will change to home med of HCTZ  Cont KCL: 20 meq daily --> 30 meq daily (3/6)  Note: home med includes HCTZ 25 mg daily  K+: 4.1 ()  Cont to monitor    Leukocytosis  Assessment & Plan  WBC's: 9.2 (10/31) --> 13.2 ()  Has been afebrile  Has had a dry cough for about 1-2 days  Will check CXR  Will check U/A  Will check Covid test  Will check nasal MRSA  Will get CBC and PCT in am  Cont to monitor    Gout  Assessment & Plan  Cont Allopurinol    Borderline abnormal TFTs  Assessment & Plan  TSH: 10.1  FT4: 1.15  ? 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)  Assessment & Plan  Has had prolonged hospitalization since 23  [facility]ially presented to [facility], [facility], [facility]  S/P I+D, IV Abx, and Wound Vac     Stage 3a chronic kidney disease (HCC)- (present on admission)  Assessment & Plan  Bun/Cr: stable  Cont Lasix --> will change to his home med of HCTZ  Cont to monitor    PAD (peripheral artery disease) (HCC)- (present on admission)  Assessment & Plan  Hx of left BKA     Diabetes (HCC)- (present on admission)  Assessment & Plan  Hba1c: 6.8 (10/25)  BS: good but hit 86 x 1  Off Glargine (last dose 10/30)  Cont Metformin: 850 mg bid --> 750 mg bid (11/3 nite)  Off accuchecks  Note: home meds include Metformin 1000 mg bid and Actos 30 mg qd  Cont to  monitor    Benign essential HTN- (present on admission)  Assessment & Plan  BP ok  Cont Cardizem  Note: on Lasix --> will change to home med of HCTZ  Cont to monitor

## 2023-11-06 NOTE — PROGRESS NOTES
Physical Medicine & Rehabilitation Progress Note    Encounter Date: 11/6/2023    Chief Complaint: Decreased mobility, pain    Interval Events (Subjective):  Patient sitting up in room. Discussed about Lasix. Has new elevated WBC. Discussed reconsulting hospitalist. If Lasix can be stopped will remove grace.     _____________________________________  Interdisciplinary Team Conference   Most recent IDT on 11/2/2023    Discharge Date/Disposition:  11/14/23 -> 11/9/23  _____________________________________    Objective:  VITAL SIGNS: /71   Pulse 80   Temp 36.9 °C (98.5 °F)   Resp 18   Ht 1.829 m (6')   Wt (!) 162 kg (357 lb 12.8 oz)   SpO2 92%   BMI 48.53 kg/m²   Gen: NAD  Psych: Mood and affect appropriate  CV: RRR, 1+ edema  Resp: CTAB, no upper airway sounds  Abd: NTND  Neuro: AOx4, following commands    Laboratory Values:  Recent Results (from the past 72 hour(s))   POCT glucose device results    Collection Time: 11/03/23  5:15 PM   Result Value Ref Range    POC Glucose, Blood 106 (H) 65 - 99 mg/dL   POCT glucose device results    Collection Time: 11/03/23  9:53 PM   Result Value Ref Range    POC Glucose, Blood 107 (H) 65 - 99 mg/dL   POCT glucose device results    Collection Time: 11/04/23  7:13 AM   Result Value Ref Range    POC Glucose, Blood 109 (H) 65 - 99 mg/dL   POCT glucose device results    Collection Time: 11/04/23 11:11 AM   Result Value Ref Range    POC Glucose, Blood 109 (H) 65 - 99 mg/dL   POCT glucose device results    Collection Time: 11/04/23  5:27 PM   Result Value Ref Range    POC Glucose, Blood 107 (H) 65 - 99 mg/dL   POCT glucose device results    Collection Time: 11/04/23  8:50 PM   Result Value Ref Range    POC Glucose, Blood 117 (H) 65 - 99 mg/dL   POCT glucose device results    Collection Time: 11/05/23  8:02 AM   Result Value Ref Range    POC Glucose, Blood 107 (H) 65 - 99 mg/dL   CBC WITH DIFFERENTIAL    Collection Time: 11/06/23  5:19 AM   Result Value Ref Range    WBC 13.2  (H) 4.8 - 10.8 K/uL    RBC 4.20 (L) 4.70 - 6.10 M/uL    Hemoglobin 11.7 (L) 14.0 - 18.0 g/dL    Hematocrit 38.0 (L) 42.0 - 52.0 %    MCV 90.5 81.4 - 97.8 fL    MCH 27.9 27.0 - 33.0 pg    MCHC 30.8 (L) 32.3 - 36.5 g/dL    RDW 52.8 (H) 35.9 - 50.0 fL    Platelet Count 268 164 - 446 K/uL    MPV 11.3 9.0 - 12.9 fL    Neutrophils-Polys 79.00 (H) 44.00 - 72.00 %    Lymphocytes 10.70 (L) 22.00 - 41.00 %    Monocytes 6.00 0.00 - 13.40 %    Eosinophils 2.60 0.00 - 6.90 %    Basophils 0.50 0.00 - 1.80 %    Immature Granulocytes 1.20 (H) 0.00 - 0.90 %    Nucleated RBC 0.00 0.00 - 0.20 /100 WBC    Neutrophils (Absolute) 10.44 (H) 1.82 - 7.42 K/uL    Lymphs (Absolute) 1.42 1.00 - 4.80 K/uL    Monos (Absolute) 0.79 0.00 - 0.85 K/uL    Eos (Absolute) 0.35 0.00 - 0.51 K/uL    Baso (Absolute) 0.07 0.00 - 0.12 K/uL    Immature Granulocytes (abs) 0.16 (H) 0.00 - 0.11 K/uL    NRBC (Absolute) 0.00 K/uL   Basic Metabolic Panel    Collection Time: 11/06/23  5:19 AM   Result Value Ref Range    Sodium 142 135 - 145 mmol/L    Potassium 4.1 3.6 - 5.5 mmol/L    Chloride 104 96 - 112 mmol/L    Co2 24 20 - 33 mmol/L    Glucose 111 (H) 65 - 99 mg/dL    Bun 24 (H) 8 - 22 mg/dL    Creatinine 1.39 0.50 - 1.40 mg/dL    Calcium 8.8 8.5 - 10.5 mg/dL    Anion Gap 14.0 7.0 - 16.0   MAGNESIUM    Collection Time: 11/06/23  5:19 AM   Result Value Ref Range    Magnesium 1.3 (L) 1.5 - 2.5 mg/dL   ESTIMATED GFR    Collection Time: 11/06/23  5:19 AM   Result Value Ref Range    GFR (CKD-EPI) 55 (A) >60 mL/min/1.73 m 2       Medications:  Scheduled Medications   Medication Dose Frequency    magnesium oxide  400 mg BID    metFORMIN  750 mg BID WITH MEALS    potassium chloride SA  30 mEq DAILY    simethicone  125 mg 4X/DAY WITH MEALS + NIGHTLY    furosemide  40 mg Q DAY    allopurinol  100 mg DAILY    atorvastatin  20 mg Q EVENING    gabapentin  400 mg TID    apixaban  5 mg BID    amiodarone  200 mg DAILY    dilTIAZem  30 mg Q8HRS    melatonin  3 mg QHS     omeprazole  20 mg DAILY     PRN medications: senna-docusate **AND** polyethylene glycol/lytes **AND** magnesium hydroxide **AND** bisacodyl, guaiFENesin dextromethorphan, [DISCONTINUED] insulin regular **AND** [CANCELED] POC blood glucose manual result **AND** NOTIFY MD and PharmD **AND** Administer 20 grams of glucose (approximately 8 ounces of fruit juice) every 15 minutes PRN FSBG less than 70 mg/dL **AND** dextrose bolus, Respiratory Therapy Consult, hydrALAZINE, acetaminophen, mag hydrox-al hydrox-simeth, ondansetron **OR** ondansetron, traZODone, sodium chloride, oxyCODONE immediate-release **OR** oxyCODONE immediate-release, traMADol    Diet:  Current Diet Order   Procedures    Diet Order Diet: Consistent CHO (Diabetic)       Medical Decision Making and Plan:   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 Per guidelines, 15 hours per week between PT, OT and/or SLP.  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      HTN/A fib - Patient now on Amiodarone 200 mg daily, Diltiazem 30 mg TID, Lasix 40 mg BID, and Eliquis 5 mg BID. Continue Amiodarone 200 mg and Lasix 40 mg -> reduced to daily     DM2 with hyperglycemia - Patient on Glargine 10 U BID and SSI. Consult hospitalist. Started on Metformin -> Metformin 850 mg BID, Continue Metformin 850 mg BID     PAD - Patient on Eliquis. Wound care recommending ABIs/US on 10/26/23 - no significant stenosis     CKD3a - Avoid nephrotoxic agents. Check AM CMP - Cr stable     Leukocytosis - 13.2 on 11/6/23, reconsult hospitalist.     Anemia - 11.2 on admission. Monitor    Hypokalemia - 3.5 on admission, consult hospitalist, on Lasix. Continue K supplement 30 mEq per hospitalist    Gout - Patient on Allopurinol 100 mg  daily     Neuropathy - Patient on  Gabapentin 400 mg TID. Continue Gabapentin 400 TID     L BKA - Patient with L BKA with prosthetic     Morbid Obesity due to excess calories - Patient with BMI of 48.5 on admission, meets medical criteria. Dietitian to consult     Urinary retention - With grace, if lasix discontinued consider removal trial    Pain - Patient on PRN Tylenol, PRN Oxycodone, and PRN Tramadol. Also on Gabapentin 400 mg TID     Skin - Patient at risk for skin breakdown due to debility in areas including sacrum, achilles, elbows and head in addition to other sites. Nursing to assess skin daily.      GI Ppx - Patient on Prilosec for GERD prophylaxis. Patient on Senna-docusate for constipation prophylaxis.  -Having multiple BMs, change bowel medications to PRN      DVT Ppx - Patient Eliquis on transfer. Continue Eliquis as limited ambulation  ____________________________________    T. Clay Contreras MD/PhD  [facility] - Physical Medicine & Rehabilitation   [facility] - Brain Injury Medicine   ____________________________________    Total time:  50 minutes. Time spent included pre-rounding review of vitals and tests, unit/floor time, face-to-face time with the patient including physical examination, care coordination, counseling of patient and/or family, ordering medications/procedures/tests, discussion with CM, PT, OT, SLP and/or other healthcare providers, and documentation in the electronic medical record. Topics discussed included grace, urinary retention, leukocytosis, and reconsult hospitalist.

## 2023-11-06 NOTE — THERAPY
"Occupational Therapy  Daily Treatment     Patient Name: Jose Castanon  Age:  68 y.o., Sex:  male  Medical Record #: [id number]  Today's Date: 11/6/2023     Precautions  Precautions: Fall Risk, Other (See Comments)  Comments: hx of BKE with LLE prosthetic, wound near sacrum, wound on medial RLE heel, lateral foot wound RLE         Subjective    \"I am feeling very stiff this morning\"     Objective       11/06/23 0701   OT Charge Group   Charges Yes   OT Self Care / ADL (Units) 3   OT Therapy Activity (Units) 1   OT Total Time Spent   OT Individual Total Time Spent (Mins) 60   Precautions   Precautions Fall Risk;Other (See Comments)   Comments hx of BKE with LLE prosthetic, wound near sacrum, wound on medial RLE heel, lateral foot wound RLE   Functional Level of Assist   Grooming Independent;Seated   Grooming Description Increased time;Seated in wheelchair at sink   Bathing Standby Assist   Bathing Description Adaptive equipment;Grab bar;Tub bench;Increased time   Upper Body Dressing Modified Independent   Upper Body Dressing Description   (with item retrieval. Donned in sitting)   Lower Body Dressing Modified Independent   Lower Body Dressing Description Assistive devices;Reacher;Application of orthotic or brace;Increased time;Supervision for safety  (donned/doffed LB clothing with Mod Ind. Assistance for don/doff prosthetic in shower due to set up of environement limiting capabilitites only. Pt demos ability to don prosthetic at EOB)   Bed, Chair, Wheelchair Transfer Modified Independent   Bed Chair Wheelchair Transfer Description Adaptive equipment;Increased time;Set-up of equipment;Supervision for safety   Tub / Shower Transfers Standby Assist   Tub Shower Transfer Description Adaptive equipment;Grab bar;Shower bench;Increased time   Interdisciplinary Plan of Care Collaboration   IDT Collaboration with  Nursing   Patient Position at End of Therapy Seated;Call Light within Reach;Tray Table within Reach "   Collaboration Comments Shower complete     Theract: Pt completed bed making IADL task from seated in w/c, using RLE to propel w/c and manage small space around bed to assist therapist in changing bed linens    Assessment    Pt with good progress towards OT POC. He remains pleasant and motivated to treatment and increasing levels of independence. Pt needing intermittent assistance with LLE prosthetic due to environmental limitations, however, demonstrates ability to don prosthetic with correct environment set up.     Strengths: Able to follow instructions, Alert and oriented, Effective communication skills, Good carryover of learning, Good insight into deficits/needs, Motivated for self care and independence, Pleasant and cooperative  Barriers: Decreased endurance, Bowel incontinence, Fatigue, Generalized weakness, Impaired activity tolerance    Plan    Continue POC. DC 11/9    DME       Passport items to be completed:  Perform bathroom transfers, complete dressing, complete feeding, get ready for the day, prepare a simple meal, participate in household tasks, adapt home for safety needs, demonstrate home exercise program, complete caregiver training     Occupational Therapy Goals (Active)       Problem: Bathing       Dates: Start:  10/25/23         Goal: STG-Within one week, patient will bathe with SBA and LRD       Dates: Start:  11/02/23    Expected End:  11/08/23       Description:             Problem: Dressing       Dates: Start:  10/25/23         Goal: STG-Within one week, patient will dress LB SBA and LRD       Dates: Start:  11/02/23    Expected End:  11/08/23       Description:             Problem: Functional Transfers       Dates: Start:  10/25/23         Goal: STG-Within one week, patient will transfer to toilet SUP and LRD       Dates: Start:  11/02/23    Expected End:  11/08/23       Description:          Goal: STG-Within one week, patient will transfer to step in shower with SUP and LRD       Dates:  Start:  11/02/23    Expected End:  11/08/23       Description:             Problem: OT Long Term Goals       Dates: Start:  10/25/23         Goal: LTG-By discharge, patient will complete basic self care tasks SBA to SUP       Dates: Start:  10/25/23    Expected End:  11/08/23            Goal: LTG-By discharge, patient will perform bathroom transfers SBA to SUP       Dates: Start:  10/25/23    Expected End:  11/08/23            Goal: LTG-By discharge, patient will complete basic home management SBA to SUP       Dates: Start:  10/25/23    Expected End:  11/08/23               Problem: Toileting       Dates: Start:  10/25/23         Goal: STG-Within one week, patient will complete toileting tasks with Min A and LRD       Dates: Start:  10/25/23    Expected End:  11/08/23

## 2023-11-06 NOTE — THERAPY
Physical Therapy   Daily Treatment     Patient Name: Jose Castanon  Age:  68 y.o., Sex:  male  Medical Record #: [id number]  Today's Date: 11/6/2023     Precautions  Precautions: Fall Risk, Other (See Comments)  Comments: Ambrosio, tachycardia, L BKA prosthesis; wounds near sacrum, on medial R heel, & on lateral R foot    Subjective    Patient received supine with elevated HOB; agreeable to PT.     Objective     11/06/23 1410 11/06/23 1411 11/06/23 1412   Vitals   Pulse (!) 125  (1 minute post-ambulation) (!) 115  (2 minutes post-ambulation) (!) 109  (3 minutes post-ambulation)   Pulse Oximetry 95 % 95 % 96 %   O2 (LPM) 0 0 0   O2 Delivery Device None - Room Air None - Room Air None - Room Air      11/06/23 1413   Vitals   Pulse (!) 111  (4 minutes post-ambulation)   Pulse Oximetry 96 %   O2 (LPM) 0   O2 Delivery Device None - Room Air          11/06/23 1401   PT Charge Group   PT Gait Training (Units) 2   PT Total Time Spent   PT Individual Total Time Spent (Mins) 30   Precautions   Precautions Fall Risk;Other (See Comments)   Comments Ambrosio, tachycardia, L BKA prosthesis; wounds near sacrum, on medial R heel, & on lateral R foot   Vitals   O2 (LPM) 0   O2 Delivery Device None - Room Air   Gait Functional Level of Assist    Gait Level Of Assist Standby Assist   Assistive Device [place]) Crutches  (and L BKA prosthesis as usual)   Distance (Feet)   (140 ft and 70 ft)   # of Times Distance was Traveled 1   Deviation Increased Base Of Support;Bradykinetic;Decreased Toe Off;Other (Comment)  (slowly increasing thoracic kyphosis as fatigue increases; Ambrosio management)   Transfer Functional Level of Assist   Bed, Chair, Wheelchair Transfer   (Setup A)   Bed Chair Wheelchair Transfer Description Adaptive equipment;Increased time;Set-up of equipment;Supervision for safety  (FWW, Ambrosio, L BKA prosthesis)   Bed Mobility    Supine to Sit Modified Independent  (bed rail, increased time)   Sit to Stand  "Supervised  (Setup A with FWW, SPV with crutches)   Scooting Modified Independent   Rolling Modified Independent   Interdisciplinary Plan of Care Collaboration   IDT Collaboration with  Certified Nursing Assistant   Collaboration Comments handoff to CNA at end of session         Assessment    Patient verbalizes a high level of understanding with POC, treatment plan, goals, CLOF, and safety.  Improving activity tolerance and endurance noted.  Increased thoracic kyphosis as fatigue increases; Tachycardia throughout session; mild to no SOB at end of reps; good oxygen saturations as per above.  Good motivation; very pleasant; no LOB; requires Ambrosio bag management.     Strengths: Effective communication skills, Good insight into deficits/needs, Independent prior level of function, Motivated for self care and independence, Pleasant and cooperative, Willingly participates in therapeutic activities  Barriers: Decreased endurance, Fatigue, Home accessibility, Impaired activity tolerance, Impaired balance, Limited mobility, Pain    Plan    Practice fall recovery with tech assist for safety, gait with crutches, strengthening/ balance    DME  PT DME Recommendations  Wheelchair:  (Pt has 2 wheelchairs reported to be in good condition)  Assistive Device:  (Pt has FWW and forearm crutches)    Passport items to be completed:  Get in/out of bed safely, in/out of a vehicle, safely use mobility device, walk or wheel around home/community, navigate up and down stairs, show how to get up/down from the ground, ensure home is accessible, demonstrate HEP, complete caregiver training    Physical Therapy Problems (Active)       Problem: Mobility       Dates: Start:  10/25/23         Goal: STG-Within one week, patient will ambulate up/down a 2\" step in // bars min A       Dates: Start:  10/25/23    Expected End:  11/08/23               Problem: PT-Long Term Goals       Dates: Start:  10/25/23         Goal: LTG-By discharge, patient will " ambulate with FWW >50 ft mod I        Dates: Start:  10/25/23    Expected End:  11/08/23            Goal: LTG-By discharge, patient will transfer one surface to another mod I with FWW + prosthetic        Dates: Start:  10/25/23    Expected End:  11/08/23            Goal: LTG-By discharge, patient will ambulate up/down 2 stairs with (newly installed railings- recommended) mod I       Dates: Start:  10/25/23    Expected End:  11/08/23

## 2023-11-06 NOTE — CARE PLAN
"  Problem: Fall Risk - Rehab  Goal: Patient will remain free from falls  Note: Lulu Aguilera Fall risk Assessment Score:14       Moderate fall risk Interventions  - Bed and strip alarm   - Yellow sign by the door   - Yellow wrist band \"Fall risk\"  - Room near to the nurse station  - Do not leave patient unattended in the bathroom  - Fall risk education provided         The patient is Stable - Low risk of patient condition declining or worsening    Shift Goals  Clinical Goals: Wound care  Patient Goals: Rest  Family Goals: Education        "

## 2023-11-06 NOTE — ASSESSMENT & PLAN NOTE
PCT 0.16  MRSA PCR negative  COVID/FLU/RSV negative  UA 2-5 WBC w/ negative bacteria  CXR negative acute  Pt afebrile, non-toxic appearing, and had negative infxn w/u previously  Elevated WBC is likely reactive demargination

## 2023-11-06 NOTE — THERAPY
Occupational Therapy  Daily Treatment     Patient Name: Jose Castanon  Age:  68 y.o., Sex:  male  Medical Record #: [id number]  Today's Date: 11/6/2023     Precautions  Precautions: (P) Fall Risk, Other (See Comments)  Comments: (P) hx of BKE with LLE prosthetic, wound near sacrum, wound on medial RLE heel, lateral foot wound RLE         Subjective    Pt was agreeable for an OT tx session.     Objective       11/06/23 0901   OT Charge Group   OT Therapeutic Exercise (Units) 2   OT Total Time Spent   OT Individual Total Time Spent (Mins) 30   Precautions   Precautions Fall Risk;Other (See Comments)   Comments hx of BKE with LLE prosthetic, wound near sacrum, wound on medial RLE heel, lateral foot wound RLE   Pain   Intervention Declines   Pain 0 - 10 Group   Pain Rating Scale (NPRS) 0   Sitting Upper Body Exercises   Chest Press 2 sets of 15;Medium Resistance Theraband   Bicep Curls 2 sets of 15;Medium Resistance Theraband   Upper Extremity Bike Level 5 Resistance  (12 min.)   Interdisciplinary Plan of Care Collaboration   IDT Collaboration with  Certified Nursing Assistant   Patient Position at End of Therapy Seated;Chair Alarm On;Other (Comments)  (Pt was handed off to the Physical therapist.)     Therapeutic ex's performed to increase strength for improved ADL's and functional t/f's/tasks.    Assessment      Strengths: Able to follow instructions, Alert and oriented, Effective communication skills, Good carryover of learning, Good insight into deficits/needs, Motivated for self care and independence, Pleasant and cooperative  Barriers: Decreased endurance, Bowel incontinence, Fatigue, Generalized weakness, Impaired activity tolerance    Plan     Strengthening  Endurance  Balance  Functional mobility  I/ADLs      DME       Passport items to be completed:      Occupational Therapy Goals (Active)       Problem: Bathing       Dates: Start:  10/25/23         Goal: STG-Within one week, patient will bathe with SBA and  LRD       Dates: Start:  11/02/23    Expected End:  11/08/23       Description:             Problem: Dressing       Dates: Start:  10/25/23         Goal: STG-Within one week, patient will dress LB SBA and LRD       Dates: Start:  11/02/23    Expected End:  11/08/23       Description:             Problem: Functional Transfers       Dates: Start:  10/25/23         Goal: STG-Within one week, patient will transfer to toilet SUP and LRD       Dates: Start:  11/02/23    Expected End:  11/08/23       Description:          Goal: STG-Within one week, patient will transfer to step in shower with SUP and LRD       Dates: Start:  11/02/23    Expected End:  11/08/23       Description:             Problem: OT Long Term Goals       Dates: Start:  10/25/23         Goal: LTG-By discharge, patient will complete basic self care tasks SBA to SUP       Dates: Start:  10/25/23    Expected End:  11/08/23            Goal: LTG-By discharge, patient will perform bathroom transfers SBA to SUP       Dates: Start:  10/25/23    Expected End:  11/08/23            Goal: LTG-By discharge, patient will complete basic home management SBA to SUP       Dates: Start:  10/25/23    Expected End:  11/08/23               Problem: Toileting       Dates: Start:  10/25/23         Goal: STG-Within one week, patient will complete toileting tasks with Min A and LRD       Dates: Start:  10/25/23    Expected End:  11/08/23
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
## 2023-11-07 ENCOUNTER — HOME HEALTH ADMISSION (OUTPATIENT)
Dept: HOME HEALTH SERVICES | Facility: HOME HEALTHCARE | Age: 68
End: 2023-11-07
Payer: MEDICARE

## 2023-11-07 ENCOUNTER — APPOINTMENT (OUTPATIENT)
Dept: PHYSICAL THERAPY | Facility: REHABILITATION | Age: 68
DRG: 074 | End: 2023-11-07
Attending: PHYSICAL MEDICINE & REHABILITATION
Payer: MEDICARE

## 2023-11-07 ENCOUNTER — APPOINTMENT (OUTPATIENT)
Dept: OCCUPATIONAL THERAPY | Facility: REHABILITATION | Age: 68
DRG: 074 | End: 2023-11-07
Attending: PHYSICAL MEDICINE & REHABILITATION
Payer: MEDICARE

## 2023-11-07 LAB
BASOPHILS # BLD AUTO: 0.3 % (ref 0–1.8)
BASOPHILS # BLD: 0.04 K/UL (ref 0–0.12)
EOSINOPHIL # BLD AUTO: 0.42 K/UL (ref 0–0.51)
EOSINOPHIL NFR BLD: 3.7 % (ref 0–6.9)
ERYTHROCYTE [DISTWIDTH] IN BLOOD BY AUTOMATED COUNT: 52.9 FL (ref 35.9–50)
HCT VFR BLD AUTO: 36.6 % (ref 42–52)
HGB BLD-MCNC: 11.3 G/DL (ref 14–18)
IMM GRANULOCYTES # BLD AUTO: 0.11 K/UL (ref 0–0.11)
IMM GRANULOCYTES NFR BLD AUTO: 1 % (ref 0–0.9)
LYMPHOCYTES # BLD AUTO: 1.47 K/UL (ref 1–4.8)
LYMPHOCYTES NFR BLD: 12.8 % (ref 22–41)
MCH RBC QN AUTO: 28 PG (ref 27–33)
MCHC RBC AUTO-ENTMCNC: 30.9 G/DL (ref 32.3–36.5)
MCV RBC AUTO: 90.6 FL (ref 81.4–97.8)
MONOCYTES # BLD AUTO: 0.7 K/UL (ref 0–0.85)
MONOCYTES NFR BLD AUTO: 6.1 % (ref 0–13.4)
NEUTROPHILS # BLD AUTO: 8.74 K/UL (ref 1.82–7.42)
NEUTROPHILS NFR BLD: 76.1 % (ref 44–72)
NRBC # BLD AUTO: 0 K/UL
NRBC BLD-RTO: 0 /100 WBC (ref 0–0.2)
PLATELET # BLD AUTO: 264 K/UL (ref 164–446)
PMV BLD AUTO: 11.5 FL (ref 9–12.9)
PROCALCITONIN SERPL-MCNC: 0.16 NG/ML
RBC # BLD AUTO: 4.04 M/UL (ref 4.7–6.1)
WBC # BLD AUTO: 11.5 K/UL (ref 4.8–10.8)

## 2023-11-07 PROCEDURE — 97602 WOUND(S) CARE NON-SELECTIVE: CPT

## 2023-11-07 PROCEDURE — 770010 HCHG ROOM/CARE - REHAB SEMI PRIVAT*

## 2023-11-07 PROCEDURE — 97530 THERAPEUTIC ACTIVITIES: CPT

## 2023-11-07 PROCEDURE — 700102 HCHG RX REV CODE 250 W/ 637 OVERRIDE(OP): Mod: JZ | Performed by: HOSPITALIST

## 2023-11-07 PROCEDURE — 97116 GAIT TRAINING THERAPY: CPT

## 2023-11-07 PROCEDURE — 97112 NEUROMUSCULAR REEDUCATION: CPT

## 2023-11-07 PROCEDURE — A9270 NON-COVERED ITEM OR SERVICE: HCPCS | Performed by: PHYSICAL MEDICINE & REHABILITATION

## 2023-11-07 PROCEDURE — A9270 NON-COVERED ITEM OR SERVICE: HCPCS | Mod: JZ | Performed by: HOSPITALIST

## 2023-11-07 PROCEDURE — 36415 COLL VENOUS BLD VENIPUNCTURE: CPT

## 2023-11-07 PROCEDURE — 94760 N-INVAS EAR/PLS OXIMETRY 1: CPT

## 2023-11-07 PROCEDURE — 97110 THERAPEUTIC EXERCISES: CPT

## 2023-11-07 PROCEDURE — 84145 PROCALCITONIN (PCT): CPT

## 2023-11-07 PROCEDURE — 700102 HCHG RX REV CODE 250 W/ 637 OVERRIDE(OP): Performed by: PHYSICAL MEDICINE & REHABILITATION

## 2023-11-07 PROCEDURE — 85025 COMPLETE CBC W/AUTO DIFF WBC: CPT

## 2023-11-07 PROCEDURE — 99232 SBSQ HOSP IP/OBS MODERATE 35: CPT | Performed by: HOSPITALIST

## 2023-11-07 PROCEDURE — 99232 SBSQ HOSP IP/OBS MODERATE 35: CPT | Performed by: PHYSICAL MEDICINE & REHABILITATION

## 2023-11-07 PROCEDURE — 700102 HCHG RX REV CODE 250 W/ 637 OVERRIDE(OP): Performed by: HOSPITALIST

## 2023-11-07 PROCEDURE — A9270 NON-COVERED ITEM OR SERVICE: HCPCS | Performed by: HOSPITALIST

## 2023-11-07 RX ORDER — POTASSIUM CHLORIDE 20 MEQ/1
20 TABLET, EXTENDED RELEASE ORAL DAILY
Status: DISCONTINUED | OUTPATIENT
Start: 2023-11-08 | End: 2023-11-09 | Stop reason: HOSPADM

## 2023-11-07 RX ADMIN — SIMETHICONE 125 MG: 125 TABLET, CHEWABLE ORAL at 20:57

## 2023-11-07 RX ADMIN — Medication 400 MG: at 20:57

## 2023-11-07 RX ADMIN — GABAPENTIN 400 MG: 400 CAPSULE ORAL at 20:57

## 2023-11-07 RX ADMIN — METFORMIN HYDROCHLORIDE 750 MG: 500 TABLET ORAL at 08:13

## 2023-11-07 RX ADMIN — GABAPENTIN 400 MG: 400 CAPSULE ORAL at 08:12

## 2023-11-07 RX ADMIN — ATORVASTATIN CALCIUM 20 MG: 10 TABLET, FILM COATED ORAL at 20:57

## 2023-11-07 RX ADMIN — DILTIAZEM HYDROCHLORIDE 30 MG: 30 TABLET, FILM COATED ORAL at 20:57

## 2023-11-07 RX ADMIN — APIXABAN 5 MG: 5 TABLET, FILM COATED ORAL at 20:57

## 2023-11-07 RX ADMIN — DILTIAZEM HYDROCHLORIDE 30 MG: 30 TABLET, FILM COATED ORAL at 05:48

## 2023-11-07 RX ADMIN — APIXABAN 5 MG: 5 TABLET, FILM COATED ORAL at 08:12

## 2023-11-07 RX ADMIN — Medication 400 MG: at 08:12

## 2023-11-07 RX ADMIN — METFORMIN HYDROCHLORIDE 750 MG: 500 TABLET ORAL at 17:28

## 2023-11-07 RX ADMIN — AMIODARONE HYDROCHLORIDE 200 MG: 200 TABLET ORAL at 08:13

## 2023-11-07 RX ADMIN — GUAIFENESIN SYRUP AND DEXTROMETHORPHAN 5 ML: 100; 10 SYRUP ORAL at 11:51

## 2023-11-07 RX ADMIN — HYDROCHLOROTHIAZIDE 25 MG: 25 TABLET ORAL at 05:48

## 2023-11-07 RX ADMIN — GABAPENTIN 400 MG: 400 CAPSULE ORAL at 15:12

## 2023-11-07 RX ADMIN — DILTIAZEM HYDROCHLORIDE 30 MG: 30 TABLET, FILM COATED ORAL at 15:11

## 2023-11-07 RX ADMIN — ALLOPURINOL 100 MG: 100 TABLET ORAL at 08:12

## 2023-11-07 RX ADMIN — SIMETHICONE 125 MG: 125 TABLET, CHEWABLE ORAL at 11:38

## 2023-11-07 RX ADMIN — POTASSIUM CHLORIDE 30 MEQ: 1500 TABLET, EXTENDED RELEASE ORAL at 08:11

## 2023-11-07 RX ADMIN — SIMETHICONE 125 MG: 125 TABLET, CHEWABLE ORAL at 17:28

## 2023-11-07 RX ADMIN — SIMETHICONE 125 MG: 125 TABLET, CHEWABLE ORAL at 08:11

## 2023-11-07 RX ADMIN — OMEPRAZOLE 20 MG: 20 CAPSULE, DELAYED RELEASE ORAL at 08:12

## 2023-11-07 ASSESSMENT — ENCOUNTER SYMPTOMS
CHILLS: 0
NAUSEA: 0
EYES NEGATIVE: 1
VOMITING: 0
FEVER: 0
COUGH: 0
SHORTNESS OF BREATH: 0
POLYDIPSIA: 0
PALPITATIONS: 0
BRUISES/BLEEDS EASILY: 0
ABDOMINAL PAIN: 0

## 2023-11-07 ASSESSMENT — GAIT ASSESSMENTS
ASSISTIVE DEVICE: CANADIAN (LOFSTRAND) CRUTCHES
GAIT LEVEL OF ASSIST: STANDBY ASSIST
DISTANCE (FEET): 120
DEVIATION: INCREASED BASE OF SUPPORT

## 2023-11-07 NOTE — FLOWSHEET NOTE
11/06/23 1747   Events/Summary/Plan   Events/Summary/Plan SpO2 check   Vital Signs   Pulse 88   Respiration 18   Pulse Oximetry 91 %   $ Pulse Oximetry (Spot Check) Yes   Oxygen   O2 Delivery Device None - Room Air   Non-Invasive Ventilation [employer]   Nocturnal CPAP or BIPAP BIPAP - [facility]  (9:41 hrs CPAP use last night)   Settings (If Known) VAUTO Max IPAP-13, PS-4   FiO2 or LPM 3

## 2023-11-07 NOTE — DISCHARGE PLANNING
Per notes patient currently has a Ambrosio as well is on IV ABX. If patient is to DC with both we will need the current orderd updated to reflect this. AS well we will need option care orders for the iv abx.     Thank you

## 2023-11-07 NOTE — THERAPY
"Occupational Therapy  Daily Treatment     Patient Name: Jose Castanon  Age:  68 y.o., Sex:  male  Medical Record #: [id number]  Today's Date: 11/7/2023     Precautions  Precautions: (P) Fall Risk, Other (See Comments)  Comments: (P) grace, tachycardia, L BKA prosthesis, wounds near sacrum, wounds on R heel and lateral R foot         Subjective    \"I want to just do arms since I have so much PT today\"     Objective       11/07/23 1400   OT Charge Group   Charges Yes   OT Therapeutic Exercise (Units) 4   OT Total Time Spent   OT Individual Total Time Spent (Mins) 60   Precautions   Precautions Fall Risk;Other (See Comments)   Comments grace, tachycardia, L BKA prosthesis, wounds near sacrum, wounds on R heel and lateral R foot   Sitting Upper Body Exercises   Sitting Upper Body Exercises Yes   Lat Pull 3 sets of 10  (40#)   Bilateral Row 3 sets of 15  (40# wide  B row; 40# close  to chest row B; one arm upright row 15# B)   Bicep Curls 3 sets of 15  (15#)   Tricep Press 3 sets of 15  (45# @ rickshaw)   Upper Extremity Bike Level 5 Resistance  (7 minutes)   Interdisciplinary Plan of Care Collaboration   IDT Collaboration with  Physical Therapist   Patient Position at End of Therapy Seated;Chair Alarm On;Call Light within Reach   Collaboration Comments discussion of progression and DC plans         Assessment    Pt with good progression of OT POC on this date. He demos good competency and understanding of HEP and BUE therex. Pt is reporting mild pain and inflammation in R hand and elbow limiting mobility on this date, but able to tolerate therex with RB.     Strengths: Able to follow instructions, Alert and oriented, Effective communication skills, Good carryover of learning, Good insight into deficits/needs, Motivated for self care and independence, Pleasant and cooperative  Barriers: Decreased endurance, Bowel incontinence, Fatigue, Generalized weakness, Impaired activity tolerance    Plan    DC [facility] [place] " tomorrow    DME           Occupational Therapy Goals (Active)       Problem: Bathing       Dates: Start:  10/25/23         Goal: STG-Within one week, patient will bathe with SBA and LRD       Dates: Start:  11/02/23    Expected End:  11/08/23       Description:             Problem: Dressing       Dates: Start:  10/25/23         Goal: STG-Within one week, patient will dress LB SBA and LRD       Dates: Start:  11/02/23    Expected End:  11/08/23       Description:             Problem: Functional Transfers       Dates: Start:  10/25/23         Goal: STG-Within one week, patient will transfer to toilet SUP and LRD       Dates: Start:  11/02/23    Expected End:  11/08/23       Description:          Goal: STG-Within one week, patient will transfer to step in shower with SUP and LRD       Dates: Start:  11/02/23    Expected End:  11/08/23       Description:             Problem: OT Long Term Goals       Dates: Start:  10/25/23         Goal: LTG-By discharge, patient will complete basic self care tasks SBA to SUP       Dates: Start:  10/25/23    Expected End:  11/08/23            Goal: LTG-By discharge, patient will perform bathroom transfers SBA to SUP       Dates: Start:  10/25/23    Expected End:  11/08/23            Goal: LTG-By discharge, patient will complete basic home management SBA to SUP       Dates: Start:  10/25/23    Expected End:  11/08/23               Problem: Toileting       Dates: Start:  10/25/23         Goal: STG-Within one week, patient will complete toileting tasks with Min A and LRD       Dates: Start:  10/25/23    Expected End:  11/08/23

## 2023-11-07 NOTE — THERAPY
Physical Therapy   Daily Treatment  The following note reflects 2 nonconsecutive treatment sessions, the first occurring from [id number] and the second from [id number] for a total of 90 minutes     Patient Name: Jose Castanon  Age:  68 y.o., Sex:  male  Medical Record #: [id number]  Today's Date: 11/7/2023     Precautions  Precautions: Fall Risk, Other (See Comments)  Comments: Ambrosio, tachycardia, L BKA prosthesis; wounds near sacrum, on medial R heel, & on lateral R foot    Subjective    Pt is pleasant and cooperative.      Objective       11/07/23 0700   PT Charge Group   PT Gait Training (Units) 2   PT Therapeutic Exercise (Units) 2   PT Neuromuscular Re-Education / Balance (Units) 1   PT Therapeutic Activities (Units) 1   PT Total Time Spent   PT Individual Total Time Spent (Mins) 90   Pain   Intervention Declines   Gait Functional Level of Assist    Gait Level Of Assist Standby Assist   Assistive Device Closplint (Lofstrand) Crutches   Distance (Feet) 120   # of Times Distance was Traveled 4   Deviation Increased Base Of Support   Stairs Functional Level of Assist   Level of Assist with Stairs Contact Guard Assist   # of Stairs Climbed 4   Stairs Description Extra time;Hand rails;Limited by fatigue   Bed Mobility    Supine to Sit Modified Independent   Sit to Stand Supervised   Interdisciplinary Plan of Care Collaboration   Patient Position at End of Therapy Seated;Chair Alarm On;Phone within Reach;Tray Table within Reach;Call Light within Reach       First session- Pt supine in bed requesting assistance with dressing.   Pt performed upper and lower body dressing with set up assistance seated edge of bed. This took over 20 minutes to complete.     Pt requested to be weighed. He ambulated 5' onto scale with B crutches. Pt weighed 359 lbs with his prosthetic limb on.     Pt self propelled w/c to therapy gym with supervision and required rest break post due to fatigue.     Pt ambulated 2 bouts of 120' with B  loftstrand crutches and SBA. Pt demonstrates wide base of support however no losses of balance. Pt requires extended seated rest between bouts due to fatigue.     Sit<>stand 2x5 with 1 min seated rest between sets.     Standing hip flexion x 10 B with crutches. Pt reported significant fatigue.     Pt remained seated in w/c with chair alarm on and all needs in reach.     Second session-     Pt self propelled w/c to therapy gym.    Pt ambulated additional bout of 120' with B lofstrand and SBA with aforementioned gait deviations.     Pt performed 4 stairs with B handrails and CGA. Step to step, no losses of balance but pt did endorse fatigue.     Pt requested urgent bowel movement. He was brought back to room via w/c.     Stand pivot to toilet with SBA. Pt remained seated on toilet and stated intent to call nursing staff for assistance.         Assessment    Pt tolerated session well, he continues to be limited by poor cardiovascular endurance.   Strengths: Effective communication skills, Good insight into deficits/needs, Independent prior level of function, Motivated for self care and independence, Pleasant and cooperative, Willingly participates in therapeutic activities  Barriers: Decreased endurance, Fatigue, Home accessibility, Impaired activity tolerance, Impaired balance, Limited mobility, Pain    Plan    Continue to progress pt's functional independence and endurance.     DME  PT DME Recommendations  Wheelchair:  (Pt has 2 wheelchairs reported to be in good condition)  Assistive Device:  (Pt has FWW and forearm crutches)    Passport items to be completed:  Get in/out of bed safely, in/out of a vehicle, safely use mobility device, walk or wheel around home/community, navigate up and down stairs, show how to get up/down from the ground, ensure home is accessible, demonstrate HEP, complete caregiver training    Physical Therapy Problems (Active)       Problem: Mobility       Dates: Start:  10/25/23         Goal:  "STG-Within one week, patient will ambulate up/down a 2\" step in // bars min A       Dates: Start:  10/25/23    Expected End:  11/08/23               Problem: PT-Long Term Goals       Dates: Start:  10/25/23         Goal: LTG-By discharge, patient will ambulate with FWW >50 ft mod I        Dates: Start:  10/25/23    Expected End:  11/08/23            Goal: LTG-By discharge, patient will transfer one surface to another mod I with FWW + prosthetic        Dates: Start:  10/25/23    Expected End:  11/08/23            Goal: LTG-By discharge, patient will ambulate up/down 2 stairs with (newly installed railings- recommended) mod I       Dates: Start:  10/25/23    Expected End:  11/08/23              "

## 2023-11-07 NOTE — CARE PLAN
The patient is Watcher - Medium risk of patient condition declining or worsening    Shift Goals  Clinical Goals: Safety, pain management, wound care  Patient Goals: Comfort  Family Goals: Education      Problem: Fall Risk - Rehab  Goal: Patient will remain free from falls  Outcome: Progressing Pt uses call light consistently and appropriately. Waits for assistance does not attempt self transfer this shift. Able to verbalize needs.      Problem: Infection  Goal: Patient will remain free from infection  Outcome: Not Met UA collected from clean grace bag. Urine is clear and yellow draining adequate amounts of urine.

## 2023-11-07 NOTE — PROGRESS NOTES
NURSING DAILY NOTE    Name: Jose Castanon   Date of Admission: 10/24/2023   Admitting Diagnosis: Critical illness polyneuropathy (HCC)  Attending Physician: Sylvester Contreras M.d.  Allergies: Penicillins, Bee venom, Cefazolin, and Linezolid    Safety  Patient Assist  cga  Patient Precautions  Fall Risk, Other (See Comments)  Precaution Comments  Ambrosio, tachycardia, L BKA prosthesis; wounds near sacrum, on medial R heel, & on lateral R foot  Bed Transfer Status   (Setup A)  Toilet Transfer Status   Minimal Assist  Assistive Devices  Rails, Wheelchair  Oxygen  None - Room Air  Diet/Therapeutic Dining  Current Diet Order   Procedures    Diet Order Diet: Consistent CHO (Diabetic)     Pill Administration  whole  Agitated Behavioral Scale  14  ABS Level of Severity  No Agitation    Fall Risk  Has the patient had a fall this admission?   No  Lulu Aguilera Fall Risk Scoring  16, HIGH RISK  Fall Risk Safety Measures  bed alarm and chair alarm    Vitals  Temperature: (!) -17.5 °C (0.5 °F)  Temp src: Oral  Pulse: 80  Respiration: 18  Blood Pressure : 117/60  Blood Pressure MAP (Calculated): 79 MM HG  BP Location: Right, Upper Arm  Patient BP Position: Supine     Oxygen  Pulse Oximetry: 93 %  O2 (LPM): 0  FiO2%: 21 %  O2 Delivery Device: None - Room Air    Bowel and Bladder  Last Bowel Movement  11/06/23  Stool Type  Type 7: Watery, no solid pieces-entirely liquid  Bowel Device  Diaper  Continent  Bladder: Did not void   Bowel: No movement  Bladder Function  Urine Void (mL): 500 ml  Urine Color: Yellow  Urine Clarity: Clear  Genitourinary Assessment   Bladder Assessment (WDL):  WDL Except  Ambrosio Catheter: Present with Active Order  Ambrosio Reasons per MD Order: Neurogenic bladder  Ambrosio Care: Given with Soap and Water  Urinary Elimination: Catheter (Document on LDA)  Urine Color: Yellow  Urine Clarity: Clear  Bladder Device: Indwelling Catheter    Skin  Des  Score   12  Sensory Interventions   Bed Types: Bariatric Low Airloss  Skin Preventative Measures: Pillows in Use for Support / Positioning  Moisture Interventions  Protocols: Pressure Ulcer Prevention / Intervention Protocol in Place  Moisturizers/Barriers: Barrier Wipes, Containment Devices  Containment Devices: Indwelling Catheter      Pain  Pain Rating Scale  0 - No Pain  Pain Location  Generalized  Pain Location Orientation  Left, Right  Pain Interventions   Declines    ADLs    Bathing   Full Bed Bath  Linen Change   Complete  Personal Hygiene  Change Ankita Pads, Moist Ankita Wipes, Perineal Care  Chlorhexidine Bath      Oral Care   (self)  Teeth/Dentures     Shave     Nutrition Percentage Eaten  *  * Meal *  *, Dinner, Between % Consumed  Environmental Precautions  Bed in Low Position, Treaded Slipper Socks on Patient  Patient Turns/Positioning  Patient Turns Self from Side to Side  Patient Turns Assistance/Tolerance  Assistance of One  Bed Positions  Bed Controls On  Head of Bed Elevated  Self regulated      Psychosocial/Neurologic Assessment  Psychosocial Assessment  Psychosocial (WDL):  Within Defined Limits  Neurologic Assessment  Neuro (WDL): Exceptions to WDL  Level of Consciousness: Alert  Orientation Level: Oriented X4  Cognition: Follows commands, Appropriate attention/concentration, Appropriate judgement, Appropriate safety awareness  Speech: Clear  Motor Function/Sensation Assessment: Motor strength  RUE Sensation: No numbness  Muscle Strength Right Arm: Good Strength Against Gravity and Moderate Resistance  LUE Sensation: No numbness  Muscle Strength Left Arm: Good Strength Against Gravity and Moderate Resistance  RLE Sensation: Numbness, Tingling  Muscle Strength Right Leg: Good Strength Against Gravity and Moderate Resistance  LLE Sensation: Numbness, Tingling  Muscle Strength Left Leg: Good Strength Against Gravity and Moderate Resistance  EENT (WDL):  WDL Except    Cardio/Pulmonary  Assessment  Edema   RLE Edema: Generalized  Respiratory Breath Sounds  RUL Breath Sounds: Clear  RML Breath Sounds: Clear  RLL Breath Sounds: Clear, Diminished  MIGUELINA Breath Sounds: Clear  LLL Breath Sounds: Clear, Diminished  Cardiac Assessment   Cardiac (WDL):  WDL Except (HTN/ CAD)

## 2023-11-07 NOTE — CARE PLAN
"The patient is Stable - Low risk of patient condition declining or worsening    Shift Goals  Clinical Goals: safety  Patient Goals: safety, sleep/rest  Family Goals: Education      Problem: Fall Risk - Rehab  Goal: Patient will remain free from falls  Outcome: Progressing  Note: Lulu Aguilera Fall risk Assessment Score: 16    High fall risk Interventions   - Alarming seatbelt  - Bed and strip alarm   - Yellow sign by the door   - Yellow wrist band \"Fall risk\"  - Room near to the nurse station  - Do not leave patient unattended in the bathroom  - Fall risk education provided  Patient uses call light consistently and appropriately this shift.  Waits for assistance when needed and does not attempt self transfer.  Able to verbalize needs.  Will continue to monitor.       Problem: Skin Integrity  Goal: Skin integrity is maintained or improved  Outcome: Progressing  Note: Dressing change to right perineal/groin area with moderate amount of discharge noted. Denies any pain or any discomfort. Patient's skin remains intact and free from new or accidental injury this shift.  Will continue to monitor.        "

## 2023-11-07 NOTE — PROGRESS NOTES
Physical Medicine & Rehabilitation Progress Note    Encounter Date: 11/7/2023    Chief Complaint: Decreased mobility, pain    Interval Events (Subjective):  Patient sitting up in room. He reports he is doing well. Repeat WBC improved. Reviewed labs including negative UA, CXR and negative viral panel. Denies NVD. Denie fever or chills.      _____________________________________  Interdisciplinary Team Conference   Most recent IDT on 11/2/2023    Discharge Date/Disposition:  11/14/23 -> 11/9/23  _____________________________________    Objective:  VITAL SIGNS: /64   Pulse 82   Temp 37 °C (98.6 °F) (Oral)   Resp 18   Ht 1.829 m (6')   Wt (!) 162 kg (357 lb 12.8 oz)   SpO2 94%   BMI 48.53 kg/m²   Gen: NAD  Psych: Mood and affect appropriate  CV: RRR, 1+ edema  Resp: CTAB, no upper airway sounds  Abd: NTND  Neuro: AOx4, following commands  Unchanged from 11/6/23    Laboratory Values:  Recent Results (from the past 72 hour(s))   POCT glucose device results    Collection Time: 11/04/23  5:27 PM   Result Value Ref Range    POC Glucose, Blood 107 (H) 65 - 99 mg/dL   POCT glucose device results    Collection Time: 11/04/23  8:50 PM   Result Value Ref Range    POC Glucose, Blood 117 (H) 65 - 99 mg/dL   POCT glucose device results    Collection Time: 11/05/23  8:02 AM   Result Value Ref Range    POC Glucose, Blood 107 (H) 65 - 99 mg/dL   CBC WITH DIFFERENTIAL    Collection Time: 11/06/23  5:19 AM   Result Value Ref Range    WBC 13.2 (H) 4.8 - 10.8 K/uL    RBC 4.20 (L) 4.70 - 6.10 M/uL    Hemoglobin 11.7 (L) 14.0 - 18.0 g/dL    Hematocrit 38.0 (L) 42.0 - 52.0 %    MCV 90.5 81.4 - 97.8 fL    MCH 27.9 27.0 - 33.0 pg    MCHC 30.8 (L) 32.3 - 36.5 g/dL    RDW 52.8 (H) 35.9 - 50.0 fL    Platelet Count 268 164 - 446 K/uL    MPV 11.3 9.0 - 12.9 fL    Neutrophils-Polys 79.00 (H) 44.00 - 72.00 %    Lymphocytes 10.70 (L) 22.00 - 41.00 %    Monocytes 6.00 0.00 - 13.40 %    Eosinophils 2.60 0.00 - 6.90 %    Basophils 0.50 0.00  - 1.80 %    Immature Granulocytes 1.20 (H) 0.00 - 0.90 %    Nucleated RBC 0.00 0.00 - 0.20 /100 WBC    Neutrophils (Absolute) 10.44 (H) 1.82 - 7.42 K/uL    Lymphs (Absolute) 1.42 1.00 - 4.80 K/uL    Monos (Absolute) 0.79 0.00 - 0.85 K/uL    Eos (Absolute) 0.35 0.00 - 0.51 K/uL    Baso (Absolute) 0.07 0.00 - 0.12 K/uL    Immature Granulocytes (abs) 0.16 (H) 0.00 - 0.11 K/uL    NRBC (Absolute) 0.00 K/uL   Basic Metabolic Panel    Collection Time: 11/06/23  5:19 AM   Result Value Ref Range    Sodium 142 135 - 145 mmol/L    Potassium 4.1 3.6 - 5.5 mmol/L    Chloride 104 96 - 112 mmol/L    Co2 24 20 - 33 mmol/L    Glucose 111 (H) 65 - 99 mg/dL    Bun 24 (H) 8 - 22 mg/dL    Creatinine 1.39 0.50 - 1.40 mg/dL    Calcium 8.8 8.5 - 10.5 mg/dL    Anion Gap 14.0 7.0 - 16.0   MAGNESIUM    Collection Time: 11/06/23  5:19 AM   Result Value Ref Range    Magnesium 1.3 (L) 1.5 - 2.5 mg/dL   ESTIMATED GFR    Collection Time: 11/06/23  5:19 AM   Result Value Ref Range    GFR (CKD-EPI) 55 (A) >60 mL/min/1.73 m 2   S. Aureus By PCR, Nasal Complete    Collection Time: 11/06/23 11:40 AM    Specimen: Respiratory; Respirate   Result Value Ref Range    Staph aureus by PCR Negative Negative    MRSA by PCR Negative Negative   CoV-2, Flu A/B, And RSV by PCR ([id number])    Collection Time: 11/06/23 12:47 PM    Specimen: Nasal; Respirate   Result Value Ref Range    Influenza virus A RNA Negative Negative    Influenza virus B, PCR Negative Negative    RSV, PCR Negative Negative    SARS-CoV-2 by PCR NotDetected     SARS-CoV-2 Source 1    URINALYSIS    Collection Time: 11/06/23 12:53 PM    Specimen: Urine, Clean Catch   Result Value Ref Range    Color Yellow     Character Clear     Specific Gravity 1.014 <1.035    Ph 5.5 5.0 - 8.0    Glucose Negative Negative mg/dL    Ketones Negative Negative mg/dL    Protein Negative Negative mg/dL    Bilirubin Negative Negative    Urobilinogen, Urine 0.2 Negative    Nitrite Negative Negative    Leukocyte Esterase  Trace (A) Negative    Occult Blood Trace (A) Negative    Micro Urine Req Microscopic    URINE MICROSCOPIC (W/UA)    Collection Time: 11/06/23 12:53 PM   Result Value Ref Range    WBC 2-5 (A) /hpf    RBC 0-2 (A) /hpf    Bacteria Negative None /hpf    Epithelial Cells Negative /hpf    Hyaline Cast 0-2 /lpf   PROCALCITONIN    Collection Time: 11/07/23  5:30 AM   Result Value Ref Range    Procalcitonin 0.16 <0.25 ng/mL   CBC WITH DIFFERENTIAL    Collection Time: 11/07/23  5:30 AM   Result Value Ref Range    WBC 11.5 (H) 4.8 - 10.8 K/uL    RBC 4.04 (L) 4.70 - 6.10 M/uL    Hemoglobin 11.3 (L) 14.0 - 18.0 g/dL    Hematocrit 36.6 (L) 42.0 - 52.0 %    MCV 90.6 81.4 - 97.8 fL    MCH 28.0 27.0 - 33.0 pg    MCHC 30.9 (L) 32.3 - 36.5 g/dL    RDW 52.9 (H) 35.9 - 50.0 fL    Platelet Count 264 164 - 446 K/uL    MPV 11.5 9.0 - 12.9 fL    Neutrophils-Polys 76.10 (H) 44.00 - 72.00 %    Lymphocytes 12.80 (L) 22.00 - 41.00 %    Monocytes 6.10 0.00 - 13.40 %    Eosinophils 3.70 0.00 - 6.90 %    Basophils 0.30 0.00 - 1.80 %    Immature Granulocytes 1.00 (H) 0.00 - 0.90 %    Nucleated RBC 0.00 0.00 - 0.20 /100 WBC    Neutrophils (Absolute) 8.74 (H) 1.82 - 7.42 K/uL    Lymphs (Absolute) 1.47 1.00 - 4.80 K/uL    Monos (Absolute) 0.70 0.00 - 0.85 K/uL    Eos (Absolute) 0.42 0.00 - 0.51 K/uL    Baso (Absolute) 0.04 0.00 - 0.12 K/uL    Immature Granulocytes (abs) 0.11 0.00 - 0.11 K/uL    NRBC (Absolute) 0.00 K/uL       Medications:  Scheduled Medications   Medication Dose Frequency    magnesium oxide  400 mg BID    hydroCHLOROthiazide  25 mg Q DAY    metFORMIN  750 mg BID WITH MEALS    potassium chloride SA  30 mEq DAILY    simethicone  125 mg 4X/DAY WITH MEALS + NIGHTLY    allopurinol  100 mg DAILY    atorvastatin  20 mg Q EVENING    gabapentin  400 mg TID    apixaban  5 mg BID    amiodarone  200 mg DAILY    dilTIAZem  30 mg Q8HRS    melatonin  3 mg QHS    omeprazole  20 mg DAILY     PRN medications: senna-docusate **AND** polyethylene  glycol/lytes **AND** magnesium hydroxide **AND** bisacodyl, guaiFENesin dextromethorphan, [DISCONTINUED] insulin regular **AND** [CANCELED] POC blood glucose manual result **AND** NOTIFY MD and PharmD **AND** Administer 20 grams of glucose (approximately 8 ounces of fruit juice) every 15 minutes PRN FSBG less than 70 mg/dL **AND** dextrose bolus, Respiratory Therapy Consult, hydrALAZINE, acetaminophen, mag hydrox-al hydrox-simeth, ondansetron **OR** ondansetron, traZODone, sodium chloride, oxyCODONE immediate-release **OR** oxyCODONE immediate-release, traMADol    Diet:  Current Diet Order   Procedures    Diet Order Diet: Consistent CHO (Diabetic)       Medical Decision Making and Plan:   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 Per guidelines, 15 hours per week between PT, OT and/or SLP.  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      HTN/A fib - Patient now on Amiodarone 200 mg daily, Diltiazem 30 mg TID, Lasix 40 mg BID, and Eliquis 5 mg BID. Continue Amiodarone 200 mg and Lasix 40 mg -> reduced to daily -> discontinued. Will remove Ambrosio     DM2 with hyperglycemia - Patient on Glargine 10 U BID and SSI. Consult hospitalist. Started on Metformin -> Metformin 850 mg BID, Continue metformin, SSI disocntinued      PAD - Patient on Eliquis. Wound care recommending ABIs/US on 10/26/23 - no significant stenosis     CKD3a - Avoid nephrotoxic agents. Check AM CMP - Cr stable     Leukocytosis - 13.2 on 11/6/23, reconsult hospitalist. UA, viral panel negative. CXR negative. Repeat 11.5, improved from 13.2    Anemia - 11.2 on admission. Monitor    Hypokalemia - 3.5 on admission, consult hospitalist, on Lasix. Continue K supplement 30 mEq per hospitalist    Gout - Patient on Allopurinol 100 mg   daily     Neuropathy - Patient on Gabapentin 400 mg TID. Continue Gabapentin 400 mg TID, Cr stable     L BKA - Patient with L BKA with prosthetic     Morbid Obesity due to excess calories - Patient with BMI of 48.5 on admission, meets medical criteria. Dietitian to consult     Urinary retention - With grace, if lasix discontinued consider removal trial    Pain - Patient on PRN Tylenol, PRN Oxycodone, and PRN Tramadol. Also on Gabapentin 400 mg TID     Skin - Patient at risk for skin breakdown due to debility in areas including sacrum, achilles, elbows and head in addition to other sites. Nursing to assess skin daily.      GI Ppx - Patient on Prilosec for GERD prophylaxis. Patient on Senna-docusate for constipation prophylaxis.  -Having multiple BMs, change bowel medications to PRN      DVT Ppx - Patient Eliquis on transfer. Continue Eliquis as limited ambulation  ____________________________________    T. Clay Contreras MD/PhD  [facility] - Physical Medicine & Rehabilitation   [facility] - Brain Injury Medicine   ____________________________________

## 2023-11-07 NOTE — PROGRESS NOTES
Hospital Medicine Daily Progress Note      Chief Complaint  Hypertension  Diabetes  Chronic Kidney Disease    Interval Problem Update  Doing well, denies complaints.  Labs reviewed.    Review of Systems  Review of Systems   Constitutional:  Negative for chills and fever.   HENT: Negative.     Eyes: Negative.    Respiratory:  Negative for cough and shortness of breath.    Cardiovascular:  Negative for chest pain and palpitations.   Gastrointestinal:  Negative for abdominal pain, nausea and vomiting.   Musculoskeletal:         Wound pain   Skin:  Negative for itching and rash.   Endo/Heme/Allergies:  Negative for polydipsia. Does not bruise/bleed easily.        Physical Exam  Temp:  [36.5 °C (97.7 °F)-36.8 °C (98.2 °F)] 36.5 °C (97.7 °F)  Pulse:  [] 120  Resp:  [16-18] 18  BP: (114-150)/(62-80) 150/80  SpO2:  [91 %-96 %] 96 %    Physical Exam  Vitals reviewed.   Constitutional:       General: He is not in acute distress.     Appearance: Normal appearance. He is not ill-appearing.   HENT:      Head: Normocephalic and atraumatic.      Right Ear: External ear normal.      Left Ear: External ear normal.      Nose: Nose normal.      Mouth/Throat:      Pharynx: Oropharynx is clear.   Eyes:      General:         Right eye: No discharge.         Left eye: No discharge.      Extraocular Movements: Extraocular movements intact.      Conjunctiva/sclera: Conjunctivae normal.   Cardiovascular:      Rate and Rhythm: Normal rate and regular rhythm.   Pulmonary:      Effort: Pulmonary effort is normal. No respiratory distress.      Breath sounds: Normal breath sounds. No wheezing.   Abdominal:      General: Bowel sounds are normal. There is no distension.      Palpations: Abdomen is soft.      Tenderness: There is no abdominal tenderness.   Musculoskeletal:      Cervical back: Normal range of motion and neck supple.      Right lower leg: No edema.      Comments: L BKA   Skin:     General: Skin is warm and dry.   Neurological:       Mental Status: He is alert and oriented to person, place, and time.         Fluids    Intake/Output Summary (Last 24 hours) at 11/8/2023 1342  Last data filed at 11/8/2023 1000  Gross per 24 hour   Intake 1320 ml   Output 1400 ml   Net -80 ml       Laboratory  Recent Labs     11/06/23  0519 11/07/23  0530   WBC 13.2* 11.5*   RBC 4.20* 4.04*   HEMOGLOBIN 11.7* 11.3*   HEMATOCRIT 38.0* 36.6*   MCV 90.5 90.6   MCH 27.9 28.0   MCHC 30.8* 30.9*   RDW 52.8* 52.9*   PLATELETCT 268 264   MPV 11.3 11.5     Recent Labs     11/06/23  0519 11/08/23  0526   SODIUM 142 142   POTASSIUM 4.1 3.9   CHLORIDE 104 104   CO2 24 24   GLUCOSE 111* 124*   BUN 24* 27*   CREATININE 1.39 1.31   CALCIUM 8.8 8.9                 Assessment/Plan  Electrolyte abnormality  Assessment & Plan  Decrease KCl  Continue MgOx  Check F/U labs in AM    Anemia  Assessment & Plan  Has normocytic indices  Fe 31, may start supplement if no contraindications  Follow H/H    A-fib ([facility])  Assessment & Plan  On Amiodarone  Anticoagulated on Eliquis    Leukocytosis  Assessment & Plan  PCT 0.16  MRSA PCR negative  COVID/FLU/RSV negative  UA 2-5 WBC w/ negative bacteria  CXR negative acute  Pt afebrile, non-toxic appearing, and had negative infxn w/u previously  Elevated WBC is likely reactive demargination    Gout  Assessment & Plan  On Allopurinol    Borderline abnormal TFTs  Assessment & Plan  TSH 10.1 and FT4 1.15  May have subclinical hypothyroidism  Needs outpt F/U    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)  Assessment & Plan  Has had prolonged hospitalization since 8/26/23  [facility]ially presented to [facility], then [facility], [facility]hab  S/P I+D, IV Abx, and Wound Vac  Wound care and pain control per Physiatry    Stage 3a chronic kidney disease (HCC)- (present on admission)  Assessment & Plan  Avoid nephrotoxins  Renal dose all meds  Monitor electrolytes  Outpt Nephrology F/U    PAD (peripheral artery disease) ([facility])- (present on admission)  Assessment & Plan  H/O L  BKA  On Eliquis and Lipitor    Diabetes (HCC)- (present on admission)  Assessment & Plan  HbA1c 6.8  Continue Metformin  Now off Lantus and SSI  Outpt meds include Metformin 1000 mg bid and Actos 30 mg qd    Benign essential HTN- (present on admission)  Assessment & Plan  On Diltiazem and HCTZ  Now off Lasix  Observe blood pressure trends    Full Code

## 2023-11-08 ENCOUNTER — PHARMACY VISIT (OUTPATIENT)
Dept: PHARMACY | Facility: MEDICAL CENTER | Age: 68
End: 2023-11-08
Payer: COMMERCIAL

## 2023-11-08 ENCOUNTER — APPOINTMENT (OUTPATIENT)
Dept: OCCUPATIONAL THERAPY | Facility: REHABILITATION | Age: 68
DRG: 074 | End: 2023-11-08
Attending: PHYSICAL MEDICINE & REHABILITATION
Payer: MEDICARE

## 2023-11-08 ENCOUNTER — APPOINTMENT (OUTPATIENT)
Dept: PHYSICAL THERAPY | Facility: REHABILITATION | Age: 68
DRG: 074 | End: 2023-11-08
Attending: PHYSICAL MEDICINE & REHABILITATION
Payer: MEDICARE

## 2023-11-08 PROBLEM — E87.8 ELECTROLYTE ABNORMALITY: Status: ACTIVE | Noted: 2023-11-06

## 2023-11-08 LAB
ANION GAP SERPL CALC-SCNC: 14 MMOL/L (ref 7–16)
BUN SERPL-MCNC: 27 MG/DL (ref 8–22)
CALCIUM SERPL-MCNC: 8.9 MG/DL (ref 8.5–10.5)
CHLORIDE SERPL-SCNC: 104 MMOL/L (ref 96–112)
CO2 SERPL-SCNC: 24 MMOL/L (ref 20–33)
CREAT SERPL-MCNC: 1.31 MG/DL (ref 0.5–1.4)
GFR SERPLBLD CREATININE-BSD FMLA CKD-EPI: 59 ML/MIN/1.73 M 2
GLUCOSE SERPL-MCNC: 124 MG/DL (ref 65–99)
MAGNESIUM SERPL-MCNC: 1.4 MG/DL (ref 1.5–2.5)
POTASSIUM SERPL-SCNC: 3.9 MMOL/L (ref 3.6–5.5)
SODIUM SERPL-SCNC: 142 MMOL/L (ref 135–145)

## 2023-11-08 PROCEDURE — 99232 SBSQ HOSP IP/OBS MODERATE 35: CPT | Performed by: HOSPITALIST

## 2023-11-08 PROCEDURE — 700102 HCHG RX REV CODE 250 W/ 637 OVERRIDE(OP): Performed by: HOSPITALIST

## 2023-11-08 PROCEDURE — 97530 THERAPEUTIC ACTIVITIES: CPT

## 2023-11-08 PROCEDURE — 700102 HCHG RX REV CODE 250 W/ 637 OVERRIDE(OP): Performed by: PHYSICAL MEDICINE & REHABILITATION

## 2023-11-08 PROCEDURE — 97110 THERAPEUTIC EXERCISES: CPT

## 2023-11-08 PROCEDURE — A9270 NON-COVERED ITEM OR SERVICE: HCPCS | Performed by: PHYSICAL MEDICINE & REHABILITATION

## 2023-11-08 PROCEDURE — A9270 NON-COVERED ITEM OR SERVICE: HCPCS | Performed by: HOSPITALIST

## 2023-11-08 PROCEDURE — 83735 ASSAY OF MAGNESIUM: CPT

## 2023-11-08 PROCEDURE — 97110 THERAPEUTIC EXERCISES: CPT | Mod: CQ

## 2023-11-08 PROCEDURE — 770010 HCHG ROOM/CARE - REHAB SEMI PRIVAT*

## 2023-11-08 PROCEDURE — 99233 SBSQ HOSP IP/OBS HIGH 50: CPT | Performed by: PHYSICAL MEDICINE & REHABILITATION

## 2023-11-08 PROCEDURE — 80048 BASIC METABOLIC PNL TOTAL CA: CPT

## 2023-11-08 PROCEDURE — 97116 GAIT TRAINING THERAPY: CPT

## 2023-11-08 PROCEDURE — RXMED WILLOW AMBULATORY MEDICATION CHARGE: Performed by: PHYSICAL MEDICINE & REHABILITATION

## 2023-11-08 PROCEDURE — 97535 SELF CARE MNGMENT TRAINING: CPT

## 2023-11-08 PROCEDURE — 36415 COLL VENOUS BLD VENIPUNCTURE: CPT

## 2023-11-08 RX ORDER — HYDROCHLOROTHIAZIDE 25 MG/1
25 TABLET ORAL DAILY
Qty: 100 TABLET | Refills: 2 | Status: ON HOLD | OUTPATIENT
Start: 2023-11-08 | End: 2024-03-07

## 2023-11-08 RX ORDER — POTASSIUM CHLORIDE 20 MEQ/1
20 TABLET, EXTENDED RELEASE ORAL DAILY
Qty: 30 TABLET | Refills: 2 | Status: SHIPPED | OUTPATIENT
Start: 2023-11-09 | End: 2024-01-04 | Stop reason: SDUPTHER

## 2023-11-08 RX ORDER — ATORVASTATIN CALCIUM 20 MG/1
20 TABLET, FILM COATED ORAL EVERY EVENING
Qty: 100 TABLET | Refills: 2 | Status: SHIPPED | OUTPATIENT
Start: 2023-11-08 | End: 2024-01-26 | Stop reason: SDUPTHER

## 2023-11-08 RX ORDER — ALLOPURINOL 100 MG/1
100 TABLET ORAL DAILY
Qty: 90 TABLET | Refills: 2 | Status: ON HOLD | OUTPATIENT
Start: 2023-11-08 | End: 2024-03-07

## 2023-11-08 RX ORDER — GABAPENTIN 400 MG/1
400 CAPSULE ORAL 3 TIMES DAILY
Qty: 90 CAPSULE | Refills: 2 | Status: ON HOLD | OUTPATIENT
Start: 2023-11-08 | End: 2024-03-07

## 2023-11-08 RX ORDER — AMIODARONE HYDROCHLORIDE 200 MG/1
200 TABLET ORAL DAILY
Qty: 30 TABLET | Refills: 2 | Status: SHIPPED | OUTPATIENT
Start: 2023-11-09 | End: 2024-01-04 | Stop reason: SDUPTHER

## 2023-11-08 RX ADMIN — APIXABAN 5 MG: 5 TABLET, FILM COATED ORAL at 08:06

## 2023-11-08 RX ADMIN — Medication 400 MG: at 08:06

## 2023-11-08 RX ADMIN — DILTIAZEM HYDROCHLORIDE 30 MG: 30 TABLET, FILM COATED ORAL at 14:21

## 2023-11-08 RX ADMIN — GABAPENTIN 400 MG: 400 CAPSULE ORAL at 14:21

## 2023-11-08 RX ADMIN — Medication 3 MG: at 21:10

## 2023-11-08 RX ADMIN — AMIODARONE HYDROCHLORIDE 200 MG: 200 TABLET ORAL at 08:06

## 2023-11-08 RX ADMIN — POTASSIUM CHLORIDE 20 MEQ: 1500 TABLET, EXTENDED RELEASE ORAL at 08:06

## 2023-11-08 RX ADMIN — ALLOPURINOL 100 MG: 100 TABLET ORAL at 08:06

## 2023-11-08 RX ADMIN — METFORMIN HYDROCHLORIDE 750 MG: 500 TABLET ORAL at 08:06

## 2023-11-08 RX ADMIN — MAGNESIUM 64 MG (MAGNESIUM CHLORIDE) TABLET,DELAYED RELEASE 64 MG: at 21:09

## 2023-11-08 RX ADMIN — OMEPRAZOLE 20 MG: 20 CAPSULE, DELAYED RELEASE ORAL at 08:06

## 2023-11-08 RX ADMIN — GABAPENTIN 400 MG: 400 CAPSULE ORAL at 08:06

## 2023-11-08 RX ADMIN — APIXABAN 5 MG: 5 TABLET, FILM COATED ORAL at 21:09

## 2023-11-08 RX ADMIN — DILTIAZEM HYDROCHLORIDE 30 MG: 30 TABLET, FILM COATED ORAL at 21:10

## 2023-11-08 RX ADMIN — ATORVASTATIN CALCIUM 20 MG: 10 TABLET, FILM COATED ORAL at 21:10

## 2023-11-08 RX ADMIN — SIMETHICONE 125 MG: 125 TABLET, CHEWABLE ORAL at 11:15

## 2023-11-08 RX ADMIN — HYDROCHLOROTHIAZIDE 25 MG: 25 TABLET ORAL at 05:21

## 2023-11-08 RX ADMIN — GABAPENTIN 400 MG: 400 CAPSULE ORAL at 21:09

## 2023-11-08 RX ADMIN — SIMETHICONE 125 MG: 125 TABLET, CHEWABLE ORAL at 08:06

## 2023-11-08 RX ADMIN — DILTIAZEM HYDROCHLORIDE 30 MG: 30 TABLET, FILM COATED ORAL at 05:21

## 2023-11-08 RX ADMIN — SIMETHICONE 125 MG: 125 TABLET, CHEWABLE ORAL at 21:09

## 2023-11-08 RX ADMIN — SIMETHICONE 125 MG: 125 TABLET, CHEWABLE ORAL at 17:31

## 2023-11-08 RX ADMIN — METFORMIN HYDROCHLORIDE 750 MG: 500 TABLET ORAL at 17:31

## 2023-11-08 ASSESSMENT — ENCOUNTER SYMPTOMS
EYES NEGATIVE: 1
VOMITING: 0
COUGH: 0
ABDOMINAL PAIN: 0
POLYDIPSIA: 0
PALPITATIONS: 0
FEVER: 0
NAUSEA: 0
CHILLS: 0
SHORTNESS OF BREATH: 0
BRUISES/BLEEDS EASILY: 0

## 2023-11-08 ASSESSMENT — GAIT ASSESSMENTS
GAIT LEVEL OF ASSIST: CONTACT GUARD ASSIST
DISTANCE (FEET): 120
DEVIATION: INCREASED BASE OF SUPPORT
DISTANCE (FEET): 50
ASSISTIVE DEVICE: CANADIAN (LOFSTRAND) CRUTCHES
ASSISTIVE DEVICE: CANADIAN (LOFSTRAND) CRUTCHES
GAIT LEVEL OF ASSIST: STANDBY ASSIST
DEVIATION: INCREASED BASE OF SUPPORT

## 2023-11-08 ASSESSMENT — BRIEF INTERVIEW FOR MENTAL STATUS (BIMS)
WHAT DAY OF THE WEEK IS IT: CORRECT
ASKED TO RECALL SOCK: YES, NO CUE REQUIRED
ASKED TO RECALL BED: YES, NO CUE REQUIRED
INITIAL REPETITION OF BED BLUE SOCK - FIRST ATTEMPT: 3
BIMS SUMMARY SCORE: 15
WHAT MONTH IS IT: ACCURATE WITHIN 5 DAYS
ASKED TO RECALL BLUE: YES, NO CUE REQUIRED
WHAT YEAR IS IT: CORRECT

## 2023-11-08 ASSESSMENT — ACTIVITIES OF DAILY LIVING (ADL)
TOILET_TRANSFER_LEVEL_OF_ASSIST: ABLE TO COMPLETE TOILET TRANSFER WITHOUT ASSIST
BED_CHAIR_WHEELCHAIR_TRANSFER_DESCRIPTION: ADAPTIVE EQUIPMENT;SET-UP OF EQUIPMENT;SUPERVISION FOR SAFETY;VERBAL CUEING
BED_CHAIR_WHEELCHAIR_TRANSFER_DESCRIPTION: ADAPTIVE EQUIPMENT;INCREASED TIME
TOILET_TRANSFER_DESCRIPTION: GRAB BAR;INCREASED TIME
TOILETING_LEVEL_OF_ASSIST: REQUIRES PHYSICAL ASSIST WITH TOILETING
BED_CHAIR_WHEELCHAIR_TRANSFER_DESCRIPTION: ADAPTIVE EQUIPMENT
SHOWER_TRANSFER_LEVEL_OF_ASSIST: ABLE TO COMPLETE SHOWER TRANSFER WITHOUT ASSIST
TOILETING_LEVEL_OF_ASSIST_DESCRIPTION: ASSIST FOR HYGIENE;GRAB BAR;INCREASED TIME
TUB_SHOWER_TRANSFER_DESCRIPTION: GRAB BAR;SHOWER BENCH;INCREASED TIME

## 2023-11-08 ASSESSMENT — PAIN DESCRIPTION - PAIN TYPE: TYPE: ACUTE PAIN

## 2023-11-08 NOTE — THERAPY
Physical Therapy   Daily Treatment     Patient Name: Jose Castanon  Age:  68 y.o., Sex:  male  Medical Record #: [id number]  Today's Date: 11/8/2023     Precautions  Precautions: Fall Risk, Other (See Comments)  Comments: grace, tachycardia, L BKA prosthesis, wounds near sacrum, wounds on R heel and lateral R foot    Subjective    Pt was in w/c upon arrival, agreeable to session.     Objective       11/08/23 0931   PT Charge Group   PT Gait Training (Units) 1   PT Therapeutic Exercise (Units) 1   PT Therapeutic Activities (Units) 2   Supervising Physical Therapist Samira Byrd   PT Total Time Spent   PT Individual Total Time Spent (Mins) 60   Vitals   Pulse (!) 120   Patient BP Position Sitting  (post ambulation ~ 50 ft)   Pulse Oximetry 96 %   Cognition    Level of Consciousness Alert   Gait Functional Level of Assist    Gait Level Of Assist Contact Guard Assist   Assistive Device Ivorian ([place]) Crutches  (R crutch only)   Distance (Feet) 50   # of Times Distance was Traveled 1   Deviation Increased Base Of Support   Wheelchair Functional Level of Assist   Wheelchair Assist Supervised   Distance Wheelchair (Feet or Distance) 30x2+150x1   Wheelchair Description Extra time;Limited by fatigue;Supervision for safety   Transfer Functional Level of Assist   Bed, Chair, Wheelchair Transfer Supervised   Bed Chair Wheelchair Transfer Description Adaptive equipment;Set-up of equipment;Supervision for safety;Verbal cueing  (SPT w/ FWW)   Sitting Lower Body Exercises   Other Exercises BLE motomed at L5 for 10 mins for general strengthening and CV endurance. active throughout w/o RB and pt was able to keep the conversation going   Bed Mobility    Supine to Sit Modified Independent   Sit to Stand Supervised   Interdisciplinary Plan of Care Collaboration   IDT Collaboration with  Physical Therapist   Patient Position at End of Therapy In Bed;Call Light within Reach;Tray Table within Reach;Phone within Reach  "  Collaboration Comments received from PT     DEEPA ramya on equalizer #30 for 3 sets of 10/12/15 w/ RB in between.  Pt retrieved laundry and put clothes from washer > dryer w/ SPV.     Assessment    Pt tolerated session well, he was able to explain his way of moving around his house and showing good safety awareness throughout. He will follow up w/ HHPT after being d/c tomorrow. PTA has answered all questions as for now.       Strengths: Effective communication skills, Good insight into deficits/needs, Independent prior level of function, Motivated for self care and independence, Pleasant and cooperative, Willingly participates in therapeutic activities  Barriers: Decreased endurance, Fatigue, Home accessibility, Impaired activity tolerance, Impaired balance, Limited mobility, Pain    Plan    D/c tomorrow to home.    DME  PT DME Recommendations  Wheelchair:  (Pt has 2 wheelchairs reported to be in good condition)  Assistive Device:  (Pt has FWW and forearm crutches)    Passport items to be completed:  Completed    Physical Therapy Problems (Active)       Problem: Mobility       Dates: Start:  10/25/23         Goal: STG-Within one week, patient will ambulate up/down a 2\" step in // bars min A       Dates: Start:  10/25/23    Expected End:  11/08/23               Problem: PT-Long Term Goals       Dates: Start:  10/25/23         Goal: LTG-By discharge, patient will ambulate with FWW >50 ft mod I        Dates: Start:  10/25/23    Expected End:  11/08/23            Goal: LTG-By discharge, patient will transfer one surface to another mod I with FWW + prosthetic        Dates: Start:  10/25/23    Expected End:  11/08/23            Goal: LTG-By discharge, patient will ambulate up/down 2 stairs with (newly installed railings- recommended) mod I       Dates: Start:  10/25/23    Expected End:  11/08/23              "

## 2023-11-08 NOTE — THERAPY
Occupational Therapy  Daily Treatment     Patient Name: Jose Castanon  Age:  68 y.o., Sex:  male  Medical Record #: [id number]  Today's Date: 11/8/2023     Precautions  Precautions: Fall Risk  Comments: grace, tachycardia, L BKA prosthesis, wounds near sacrum, wounds on R heel and lateral R foot         Subjective    Pt was agreeable for an OT tx session.     Objective       11/08/23 1431   OT Charge Group   OT Therapy Activity (Units) 1   OT Therapeutic Exercise (Units) 1   OT Total Time Spent   OT Individual Total Time Spent (Mins) 30   Pain   Intervention Declines   Pain 0 - 10 Group   Location Hand   Location Orientation Right;Left   Pain Rating Scale (NPRS) 3   Description Sore   Therapist Pain Assessment Post Activity Pain Same as Prior to Activity   Functional Level of Assist   Bed, Chair, Wheelchair Transfer Supervised   Bed Chair Wheelchair Transfer Description Adaptive equipment  (Stand/pivot from w/c to bed.)   Sitting Upper Body Exercises   Tricep Press 3 sets of 15;Bilateral;Weight (See Comments for lbs)  (Rickshaw with 40 lbs.)   IADL Treatments   Comments Pt practiced with reacher retrieving items from floor and different levels in room.   Interdisciplinary Plan of Care Collaboration   IDT Collaboration with  Certified Nursing Assistant   Patient Position at End of Therapy Edge of Bed;Bed Alarm On;Call Light within Reach;Tray Table within Reach;Phone within Reach     Pt performed therapeutic ex's to increase strength for improved ADL's and functional t/f's/tasks.      Assessment    Pt tolerated OT tx well. Pt participated fully in OT session.  Strengths: Able to follow instructions, Alert and oriented, Effective communication skills, Good carryover of learning, Good insight into deficits/needs, Motivated for self care and independence, Pleasant and cooperative  Barriers: Decreased endurance, Bowel incontinence, Fatigue, Generalized weakness, Impaired activity tolerance    Plan    Pt is scheduled  for d/c home tomorrow.    DME       Passport items to be completed:      Occupational Therapy Goals (Active)       Problem: Bathing       Dates: Start:  10/25/23         Goal: STG-Within one week, patient will bathe with SBA and LRD       Dates: Start:  11/02/23    Expected End:  11/08/23       Description:             Problem: Dressing       Dates: Start:  10/25/23         Goal: STG-Within one week, patient will dress LB SBA and LRD       Dates: Start:  11/02/23    Expected End:  11/08/23       Description:             Problem: Functional Transfers       Dates: Start:  10/25/23         Goal: STG-Within one week, patient will transfer to toilet SUP and LRD       Dates: Start:  11/02/23    Expected End:  11/08/23       Description:          Goal: STG-Within one week, patient will transfer to step in shower with SUP and LRD       Dates: Start:  11/02/23    Expected End:  11/08/23       Description:             Problem: OT Long Term Goals       Dates: Start:  10/25/23         Goal: LTG-By discharge, patient will complete basic self care tasks SBA to SUP       Dates: Start:  10/25/23    Expected End:  11/08/23            Goal: LTG-By discharge, patient will perform bathroom transfers SBA to SUP       Dates: Start:  10/25/23    Expected End:  11/08/23            Goal: LTG-By discharge, patient will complete basic home management SBA to SUP       Dates: Start:  10/25/23    Expected End:  11/08/23               Problem: Toileting       Dates: Start:  10/25/23         Goal: STG-Within one week, patient will complete toileting tasks with Min A and LRD       Dates: Start:  10/25/23    Expected End:  11/08/23

## 2023-11-08 NOTE — THERAPY
Occupational Therapy   Discharge Summary     Patient Name: Jose Castanon  Age:  68 y.o., Sex:  male  Medical Record #: [id number]  Today's Date: 11/8/2023     Precautions  Precautions: Fall Risk  Comments: grace, tachycardia, L BKA prosthesis, wounds near sacrum, wounds on R heel and lateral R foot         Subjective    Pt supine in bed and agreeable to OT tx.     Objective       11/08/23 0701   OT Charge Group   OT Self Care / ADL (Units) 3   OT Therapy Activity (Units) 1   OT Total Time Spent   OT Individual Total Time Spent (Mins) 60   Pain   Intervention Declines   Pain 0 - 10 Group   Pain Rating Scale (NPRS) 0   Cognition    Level of Consciousness Alert   Functional Level of Assist   Eating Independent   Grooming Independent;Seated   Grooming Description Increased time  (For oral care, EOB)   Bathing Modified Independent   Bathing Description Adaptive equipment;Grab bar;Hand held shower;Long handled bath tool;Tub bench;Increased time   Upper Body Dressing Modified Independent   Upper Body Dressing Description   (Mod I for clothing retreival seated EOB for closet. Ind to don/doff pullover shirt)   Lower Body Dressing Modified Independent   Lower Body Dressing Description Reacher;Sock aid  (donned/doffed LB clothing with Mod Ind. Assistance for don/doff prosthetic in shower due to set up of environement limiting capabilitites only. Pt demos ability to don prosthetic at EOB)   Toileting Minimal Assist   Toileting Description Assist for hygiene;Grab bar;Increased time  (Min A in session. Pt explained that he completes hygiene in bed at home following toileting on toilet, using w/c for transport with a tanmay. Has been doing this for years.)   Bed, Chair, Wheelchair Transfer Modified Independent   Bed Chair Wheelchair Transfer Description Adaptive equipment;Increased time  (stand step with FWW and prosthetic)   Toilet Transfers Modified Independent   Toilet Transfer Description Grab bar;Increased time  (stand step  "with GB w/c<>toilet with prosthetic)   Tub / Shower Transfers Modified Independent   Tub Shower Transfer Description Grab bar;Shower bench;Increased time  (stand step with GB and prosthetic)   Interdisciplinary Plan of Care Collaboration   IDT Collaboration with  Nursing;Occupational Therapist   Patient Position at End of Therapy Seated;Call Light within Reach;Tray Table within Reach   Collaboration Comments RN completed wound care during session. Primary OT re: CLOF, POC   Eating   Assistance Needed Independent   CARE Score - Eating 6   Oral Hygiene   Assistance Needed Independent   CARE Score - Oral Hygiene 6   Toileting Hygiene   Assistance Needed Physical assistance   Physical Assistance Level 25% or less   CARE Score - Toileting Hygiene 3   Shower/Bathe Self   Assistance Needed Independent;Adaptive equipment   CARE Score - Shower/Bathe Self 6   Upper Body Dressing   Assistance Needed Independent   CARE Score - Upper Body Dressing 6   Lower Body Dressing   Assistance Needed Independent;Adaptive equipment   CARE Score - Lower Body Dressing 6   Putting On/Taking Off Footwear   Assistance Needed Independent;Adaptive equipment   CARE Score - Putting On/Taking Off Footwear 6   Toilet Transfer   Assistance Needed Independent;Adaptive equipment   CARE Score - Toilet Transfer 6   Cognitive Pattern Assessment   Cognitive Pattern Assessment Used BIMS   Brief Interview for Mental Status (BIMS)   Repetition of Three Words (First Attempt) 3   Temporal Orientation: Year Correct   Temporal Orientation: Month Accurate within 5 days   Temporal Orientation: Day Correct   Recall: \"Sock\" Yes, no cue required   Recall: \"Blue\" Yes, no cue required   Recall: \"Bed\" Yes, no cue required   BIMS Summary Score 15   Confusion Assessment Method (CAM)   Is there evidence of an acute change in mental status from the patient's baseline? No   Inattention Behavior not present   Disorganized thinking Behavior not present   Altered level of " consciousness Behavior not present   Discharge Summary    Discharge Location  Home   Patient Discharging with Assist of Family   (Sister will be assisting intermittently.)   Level of Supervision Required Intermittent Supervision   Recommended Equipment for Discharge   (Pt owns all DME.)   Recommended Services Upon Discharge Home Health Occupational Therapy   Long Term Goals Met 2   Long Term Goals Not Met 1   Reason(s) for Goals Not Met Pt required assist during session with toilet hygiene   Criteria for Termination of Services Maximum Function Achieved for Inpatient Rehabilitation   Discharge Instructions to Patient   Level of Assist Required for Eating Able to Complete Eating without Assist   Level of Assist Required for Grooming Able to Complete Grooming without Assist   Level of Assist Required for Dressing Able to Complete Dressing without Assist   Equipment for Dressing Reacher;Sock Aid   Level of Assist Required for Toileting Requires Physical Assist with Toileting   Level of Assist Required for Toilet Transfer Able to Complete Toilet Transfer without Assist   Equipment for Toilet Transfer Grab Bars by Toilet   Level of Assist Required for Bathing Able to Complete Bathing without Assist   Equipment for Bathing Tub Transfer Bench;Long Handled Sponge;Grab Bars in Tub / Shower;Hand Held Shower Head   Level of Assist Required for Shower Transfer Able to Complete Shower Transfer without Assist   Equipment for Shower Transfer Tub Transfer Bench;Grab Bars in Tub / Shower   Level of Assist Required for Home Mgmt Requires Supervision with Home Management   Level of Assist Required for Meal Prep Requires Supervision with Meal Preparation   Driving May not Drive, Please Contact Physician for Further Information   Home Exercise Program Refer to Home Exercise Program Handout for Details       Assessment    Pt tolerated OT session well, demonstrated significant improvement functional transfers and self-care skills. Pt  reported no concerns about DC with plan for sister to assist intermittently.  Strengths: Able to follow instructions, Alert and oriented, Effective communication skills, Good carryover of learning, Good insight into deficits/needs, Motivated for self care and independence, Pleasant and cooperative  Barriers: Decreased endurance, Bowel incontinence, Fatigue, Generalized weakness, Impaired activity tolerance    Plan    DC home with intermittent assist from sister.    Occupational Therapy Goals (Active)       There are no active problems.

## 2023-11-08 NOTE — PROGRESS NOTES
NURSING DAILY NOTE    Name: Jose Castanon   Date of Admission: 10/24/2023   Admitting Diagnosis: Critical illness polyneuropathy (HCC)  Attending Physician: Sylvester Contreras M.d.  Allergies: Penicillins, Bee venom, Cefazolin, and Linezolid    Safety  Patient Assist  cga  Patient Precautions  Fall Risk, Other (See Comments)  Precaution Comments  grace, tachycardia, L BKA prosthesis, wounds near sacrum, wounds on R heel and lateral R foot  Bed Transfer Status   (Setup A)  Toilet Transfer Status   Minimal Assist  Assistive Devices  Rails, Wheelchair  Oxygen  None - Room Air  Diet/Therapeutic Dining  Current Diet Order   Procedures    Diet Order Diet: Consistent CHO (Diabetic)     Pill Administration  whole  Agitated Behavioral Scale  14  ABS Level of Severity  No Agitation    Fall Risk  Has the patient had a fall this admission?   No  Lulu Aguilera Fall Risk Scoring  16, HIGH RISK  Fall Risk Safety Measures  poor balance    Vitals  Temperature: 36.8 °C (98.2 °F)  Temp src: Oral  Pulse: 90  Respiration: 16  Blood Pressure : 114/62  Blood Pressure MAP (Calculated): 79 MM HG  BP Location: Right, Upper Arm  Patient BP Position: Supine     Oxygen  Pulse Oximetry: 93 %  O2 (LPM): 0  FiO2%: 21 %  O2 Delivery Device: None - Room Air    Bowel and Bladder  Last Bowel Movement  11/07/23  Stool Type  Type 6: Fluffy pieces with ragged edges, a mushy stool  Bowel Device  Diaper  Continent  Bladder: Did not void   Bowel: No movement  Bladder Function  Urine Void (mL): 500 ml  Urine Color: Yellow  Urine Clarity: Clear  Genitourinary Assessment   Bladder Assessment (WDL):  WDL Except  Grace Catheter: Present with Active Order  Grace Reasons per MD Order: Neurogenic bladder  Garce Care: Given with Soap and Water  Urinary Elimination: Catheter (Document on LDA)  Urine Color: Yellow  Urine Clarity: Clear  Bladder Device: Indwelling Catheter    Skin  Des Score    14  Sensory Interventions   Bed Types: Bariatric Low Airloss  Skin Preventative Measures: Pillows in Use for Support / Positioning  Moisture Interventions  Protocols: Pressure Ulcer Prevention / Intervention Protocol in Place, Appropriate Wound Protocols in Place  Moisturizers/Barriers: Containment Devices, Barrier Wipes  Containment Devices: Indwelling Catheter      Pain  Pain Rating Scale  0 - No Pain  Pain Location  Generalized  Pain Location Orientation  Left, Right  Pain Interventions   Declines    ADLs    Bathing   Full Bed Bath  Linen Change   Complete  Personal Hygiene  Change Ankita Pads, Moist Ankita Wipes, Perineal Care  Chlorhexidine Bath      Oral Care   (self)  Teeth/Dentures     Shave     Nutrition Percentage Eaten  Lunch, Between % Consumed  Environmental Precautions  Bed in Low Position, Treaded Slipper Socks on Patient  Patient Turns/Positioning  Patient Turns Self from Side to Side  Patient Turns Assistance/Tolerance  Assistance of One  Bed Positions  Bed Controls On  Head of Bed Elevated  Self regulated      Psychosocial/Neurologic Assessment  Psychosocial Assessment  Psychosocial (WDL):  Within Defined Limits  Neurologic Assessment  Neuro (WDL): Exceptions to WDL  Level of Consciousness: Alert  Orientation Level: Oriented X4  Cognition: Follows commands, Appropriate attention/concentration, Appropriate judgement, Appropriate safety awareness  Speech: Clear  Motor Function/Sensation Assessment: Motor strength  RUE Sensation: No numbness  Muscle Strength Right Arm: Good Strength Against Gravity and Moderate Resistance  LUE Sensation: No numbness  Muscle Strength Left Arm: Good Strength Against Gravity and Moderate Resistance  RLE Sensation: Numbness, Tingling  Muscle Strength Right Leg: Good Strength Against Gravity and Moderate Resistance  LLE Sensation: Numbness, Tingling  Muscle Strength Left Leg: Good Strength Against Gravity and Moderate Resistance  EENT (WDL):  WDL  Except    Cardio/Pulmonary Assessment  Edema   RLE Edema: Generalized  Respiratory Breath Sounds  RUL Breath Sounds: Clear  RML Breath Sounds: Clear  RLL Breath Sounds: Clear, Diminished  MIGUELINA Breath Sounds: Clear  LLL Breath Sounds: Clear, Diminished  Cardiac Assessment   Cardiac (WDL):  WDL Except (HTN/ CAD)

## 2023-11-08 NOTE — CARE PLAN
Problem: Fall Risk - Rehab  Goal: Patient will remain free from falls  Note: Patient remains free from falls this shift. Patient was educated on using the call light to prevent falls. Patients bed is in the lowest position. The patients belongings are placed in near proximity to the patient.       Problem: Bladder / Voiding  Goal: Patient will establish and maintain bladder regimen  Note: Ambrosio removed per order.      Problem: Skin Integrity  Goal: Patient's skin integrity will be maintained or improve  Note: Skin checked for new skin breakdown. Patient educated on turning and floating heel. Patients wound dressings changed and cleansed.    The patient is Stable - Low risk of patient condition declining or worsening

## 2023-11-08 NOTE — DISCHARGE SUMMARY
Physical Medicine & Rehabilitation Discharge Summary    Admission Date: 10/24/2023    Discharge Date: 11/9/2023     Attending Provider: Sylvester Contreras MD/PhD    Admission Diagnosis:   Active Hospital Problems    Diagnosis     *Critical illness polyneuropathy (HCC)     Hypomagnesemia     Borderline abnormal TFTs     Gout     Leukocytosis     A-fib (HCC)     Anemia     Darion gangrene     Diabetes (HCC)     Diabetic ulcer of right calf (HCC)     Hyperlipidemia associated with type 2 diabetes mellitus (HCC)     PAD (peripheral artery disease) (HCC)     Stage 3a chronic kidney disease (HCC)     Morbid obesity (HCC)     Hypokalemia     Hx of BKA, left (HCC)     Benign essential HTN     Mixed hyperlipidemia        Discharge Diagnosis:  Active Hospital Problems    Diagnosis     *Critical illness polyneuropathy (HCC)     Hypomagnesemia     Borderline abnormal TFTs     Gout     Leukocytosis     A-fib (HCC)     Anemia     Darion gangrene     Diabetes (HCC)     Diabetic ulcer of right calf (HCC)     Hyperlipidemia associated with type 2 diabetes mellitus (HCC)     PAD (peripheral artery disease) (HCC)     Stage 3a chronic kidney disease (HCC)     Morbid obesity (HCC)     Hypokalemia     Hx of BKA, left (HCC)     Benign essential HTN     Mixed hyperlipidemia        HPI per Admission History & Physical:  Patient is a 68 y.o. 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 Patient reportedly initially presented to [facility] and had debridement and antibiotics for the infection. ID was consulted and he was placed on IV antibiotics which he has since completed. He had a wound vac placed and he was eventually transferred to [facility] for ongoing care.  His hospitalization was complicated by A fib which he was placed on amiodarone, digoxin and Eliquis.  Most recent intervention he underwent I&D with Dr. Higuera on  9/29/23 and has since had wound vac removed with packing.     Patient was admitted to [facility] on 10/24/2023.     Hospital Course by Problem List:  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 Per guidelines, 15 hours per week between PT, OT and/or SLP.  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 Follow-up Urology     HTN/A fib - Patient now on Amiodarone 200 mg daily, Diltiazem 30 mg TID, Lasix 40 mg BID, and Eliquis 5 mg BID. Continue Amiodarone 200 mg and Lasix 40 mg -> reduced to daily -> discontinued. Will remove Grace     DM2 with hyperglycemia - Patient on Glargine 10 U BID and SSI. Consult hospitalist. Started on Metformin -> Metformin 850 mg BID, Continue metformin, SSI disocntinued      PAD - Patient on Eliquis. Wound care recommending ABIs/US on 10/26/23 - no significant stenosis. Continue Eliquis     CKD3a - Avoid nephrotoxic agents. Check AM CMP - Cr stable     Leukocytosis - 13.2 on 11/6/23, reconsult hospitalist. UA, viral panel negative. CXR negative. Repeat 11.5, improved from 13.2     Anemia - 11.2 on admission. Monitor     Hypokalemia - 3.5 on admission, consult hospitalist, on Lasix. Continue K supplement 30 mEq per hospitalist     Gout - Patient on Allopurinol 100 mg  daily     Neuropathy - Patient on Gabapentin 400 mg TID. Continue Gabapentin 400 mg TID, Cr stable     L BKA - Patient with L BKA with prosthetic     Morbid Obesity due to excess calories - Patient with BMI of 48.5 on admission, meets medical criteria. Dietitian to consult     Urinary retention - With grace, if lasix discontinued consider removal trial     Pain - Patient on PRN Tylenol, PRN Oxycodone, and PRN Tramadol. Also on Gabapentin 400 mg TID     Skin - Patient at risk for skin  breakdown due to debility in areas including sacrum, achilles, elbows and head in addition to other sites. Nursing to assess skin daily.      GI Ppx - Patient on Prilosec for GERD prophylaxis. Patient on Senna-docusate for constipation prophylaxis.  -Having multiple BMs, change bowel medications to PRN      DVT Ppx - Patient Eliquis on transfer. Continue Eliquis as limited ambulation    Functional Status at Discharge  Eating:  Independent  Eating Description:  Set-up of equipment or meal/tube feeding  Grooming:  Independent, Seated  Grooming Description:  Increased time (For oral care, EOB)  Bathing:  Modified Independent  Bathing Description:  Adaptive equipment, Grab bar, Hand held shower, Long handled bath tool, Tub bench, Increased time  Upper Body Dressing:  Modified Independent  Upper Body Dressing Description:   (Mod I for clothing retreival seated EOB for closet. Ind to don/doff pullover shirt)  Lower Body Dressing:  Modified Independent  Lower Body Dressing Description:  Reacher, Sock aid (donned/doffed LB clothing with Mod Ind. Assistance for don/doff prosthetic in shower due to set up of environement limiting capabilitites only. Pt demos ability to don prosthetic at EOB)  Discharge Location : Home  Patient Discharging with Assist of: Family  (Sister will be assisting intermittently.)  Walk:  Contact Guard Assist  Distance Walked:  50  Number of Times Distance Was Traveled:  1  Assistive Device:  [place] ([place]) Crutches (R crutch only)  Gait Deviation:  Increased Base Of Support  Wheelchair:  Supervised  Distance Propelled:  30x2+150x1   Wheelchair Description:  Extra time, Limited by fatigue, Supervision for safety  Stairs Contact Guard Assist  Stairs Description Extra time, Hand rails, Limited by fatigue     Comprehension:     Comprehension Description:     Expression:     Expression Description:     Social Interaction:     Social Interaction Description:     Problem Solving:     Problem Solving  Description:     Memory:     Memory Description:          Sylvester DAMON M.D., personally performed a complete drug regimen review and no potential clinically significant medication issues were identified.   Discharge Medication:     Medication List        START taking these medications        Instructions   amiodarone 200 MG Tabs  Start taking on: November 9, 2023  Commonly known as: Cordarone   Take 1 Tablet by mouth every day.  Dose: 200 mg     apixaban 5mg Tabs  Commonly known as: Eliquis   Take 1 Tablet by mouth 2 times a day.  Dose: 5 mg     dilTIAZem 30 MG Tabs  Commonly known as: Cardizem   Take 1 Tablet by mouth every 8 hours.  Dose: 30 mg     potassium chloride SA 20 MEQ Tbcr  Start taking on: November 9, 2023  Commonly known as: Kdur   Take 1 Tablet by mouth every day.  Dose: 20 mEq            CHANGE how you take these medications        Instructions   gabapentin 400 MG Caps  What changed:   medication strength  how much to take  Commonly known as: Neurontin   Take 1 Capsule by mouth 3 times a day.  Dose: 400 mg     metFORMIN 850 MG Tabs  What changed: medication strength  Commonly known as: Glucophage   TAKE 1 TABLET BY MOUTH TWICE DAILY WITH FOOD            CONTINUE taking these medications        Instructions   allopurinol 100 MG Tabs  Commonly known as: Zyloprim   Take 1 Tablet by mouth every day.  Dose: 100 mg     atorvastatin 20 MG Tabs  Commonly known as: Lipitor   Take 1 Tablet by mouth every evening.  Dose: 20 mg     hydroCHLOROthiazide 25 MG Tabs   Take 1 Tablet by mouth every day.  Dose: 25 mg     pioglitazone 30 MG Tabs  Commonly known as: Actos   TAKE 1 TABLET BY MOUTH EVERY DAY  Dose: 30 mg     vitamin D 1000 UNIT Tabs  Commonly known as: Cholecalciferol   Take 1 Tab by mouth every day.  Dose: 1,000 Units            STOP taking these medications      atenolol 100 MG Tabs  Commonly known as: Tenormin     doxycycline 100 MG Tabs  Commonly known as: Vibramycin     lisinopril 40 MG  tablet  Commonly known as: Prinivil     Loratadine 10 MG Caps     Xarelto 20 MG Tabs tablet  Generic drug: rivaroxaban              Discharge Diet:  Current Diet Order   Procedures    Diet Order Diet: Consistent CHO (Diabetic)       Discharge Activity:  As tolerated     Disposition:  Patient to discharge home with family support and community resources.    Equipment:  FWW, prosthetic    Follow-up & Discharge Instructions:  Follow up with your primary care provider (PCP) within 7-10 days of discharge to review your medications and take over your care.     If you develop chest pain, fever, chills, change in neurologic function (weakness, sensation changes, vision changes), or other concerning sxs, seek immediate medical attention or call 911.      Future Appointments   Date Time Provider Department Center   11/8/2023  2:30 PM Marilyn Barboza OT OT None       Condition on Discharge:  Good    More than 31 minutes was spent on discharging this patient, including face-to-face time, prescription management, and the dictation of this note.    Sylvester Contreras M.D.    Date of Service: 11/9/2023

## 2023-11-08 NOTE — THERAPY
Physical Therapy   Daily Treatment     Patient Name: Jose Castanon  Age:  68 y.o., Sex:  male  Medical Record #: [id number]  Today's Date: 11/8/2023     Precautions  Precautions: (P) Fall Risk  Comments: (P) grace, tachycardia, L BKA prosthesis, wounds near sacrum, wounds on R heel and lateral R foot    Subjective    Patient feels prepared for discharge home tomorrow     Objective       11/08/23 0901   PT Charge Group   PT Gait Training (Units) 1   PT Therapeutic Activities (Units) 1   PT Total Time Spent   PT Individual Total Time Spent (Mins) 30   Precautions   Precautions Fall Risk   Comments grace, tachycardia, L BKA prosthesis, wounds near sacrum, wounds on R heel and lateral R foot   Cognition    Level of Consciousness Alert   ABS (Agitated Behavior Scale)   Agitated Behavior Scale Performed No   Sleep/Wake Cycle   Sleep & Rest Awake   Gait Functional Level of Assist    Gait Level Of Assist Standby Assist   Assistive Device [place] ([place]strand) Crutches   Distance (Feet) 120   # of Times Distance was Traveled 1   Deviation Increased Base Of Support   Bed Mobility    Sit to Stand Supervised   Interdisciplinary Plan of Care Collaboration   IDT Collaboration with  Occupational Therapist   Collaboration Comments able to stand for laundry; discharge planning , prepared to go home tomorrow     PT passport complete    Assessment    Patient demonstrates 120ft [place] crutches SBA, self-limits distance with no safety issues    Strengths: Effective communication skills, Good insight into deficits/needs, Independent prior level of function, Motivated for self care and independence, Pleasant and cooperative, Willingly participates in therapeutic activities  Barriers: Decreased endurance, Fatigue, Home accessibility, Impaired activity tolerance, Impaired balance, Limited mobility, Pain    Plan    Discharge patient    DME  PT DME Recommendations  Wheelchair:  (Pt has 2 wheelchairs reported to be in good  "condition)  Assistive Device:  (Pt has FWW and forearm crutches)    Passport items to be completed:  All complete    Physical Therapy Problems (Active)       Problem: Mobility       Dates: Start:  10/25/23         Goal: STG-Within one week, patient will ambulate up/down a 2\" step in // bars min A       Dates: Start:  10/25/23    Expected End:  11/08/23               Problem: PT-Long Term Goals       Dates: Start:  10/25/23         Goal: LTG-By discharge, patient will ambulate with FWW >50 ft mod I        Dates: Start:  10/25/23    Expected End:  11/08/23            Goal: LTG-By discharge, patient will transfer one surface to another mod I with FWW + prosthetic        Dates: Start:  10/25/23    Expected End:  11/08/23            Goal: LTG-By discharge, patient will ambulate up/down 2 stairs with (newly installed railings- recommended) mod I       Dates: Start:  10/25/23    Expected End:  11/08/23              "

## 2023-11-08 NOTE — DISCHARGE INSTRUCTIONS
[facility] NURSING DISCHARGE INSTRUCTIONS    Blood Pressure : 110/60  Weight: (!) 162 kg (357 lb 12.8 oz)  Nursing recommendations for Jose WARREN Chongdimas at time of discharge are as follows:  Client verbalized understanding of all discharge instructions and prescriptions.     Review all your home medications and newly ordered medications with your doctor and/or pharmacist. Follow medication instructions as directed by your doctor and/or pharmacist.    Pain Management:   Discharge Pain Medication Instructions:  Comfort Goal: Comfort with Movement, Sleep Comfortably  Notify your primary care provider if pain is unrelieved with these measures, if the pain is new, or increased in intensity.    Discharge Skin Characteristics: Warm, Dry  Discharge Skin Exam:  (Wound to right perineum area, open area to right lateral foot and black eschar to right heel intact.)    Skin / Wound Care Instructions: Please contact your primary care physician for any change in skin integrity. Increased, redness, drainage and fever.    If You Have Surgical Incisions / Wounds:  Monitor surgical site(s) for signs of increased swelling, redness or symptoms of drainage from the site or fever as this could indicate signs and symptoms of infection. If these symptoms are noted, notifiy your primary care provider.      Discharge Safety Instructions: Should Not Be Left Alone In The House     Discharge Safety Concerns: Balance Problems (Dizziness, Light Headedness)  The interdisciplinary team has made recommendation that you should not be left alone  in the house due to balance problem  Anti-embolic stockings are not required to increase circulation to the lower extremities.    Discharge Diet: CHO (Diabetic)     Discharge Liquids: thin  Discharge Bowel Function: Continent  Please contact your primary care physician for any changes in bowel habits.  Discharge Bowel Program:    Discharge Bladder Function: Continent  Discharge  Urinary Devices: None      Nursing Discharge Plan:   Influenza Vaccine Indication: Patient Refuses    Case Management Discharge Instructions:   Discharge Location: Home with Home Health  Agency Name/Address/Phone: [facility]  Home Health:    Outpatient Services:    DME Provider/Phone:    Medical Equipment Ordered:    Prescription Faxed to:        Discharge Medication Instructions:  Below are the medications your physician expects you to take upon discharge:      Diabetic neuropathy refers to nerve damage that is caused by diabetes. Over time, people with diabetes can develop nerve damage throughout the body. There are several types of diabetic neuropathy:  Peripheral neuropathy. This is the most common type of diabetic neuropathy. It damages the nerves that carry signals between the spinal cord and other parts of the body (peripheral nerves). This usually affects nerves in the feet, legs, hands, and arms.  Autonomic neuropathy. This type causes damage to nerves that control involuntary functions (autonomic nerves). Involuntary functions are functions of the body that you do not control. They include heartbeat, body temperature, blood pressure, urination, digestion, sweating, sexual function, or response to changes in blood glucose.  Focal neuropathy. This type of nerve damage affects one area of the body, such as an arm, a leg, or the face. The injury may involve one nerve or a small group of nerves. Focal neuropathy can be painful and unpredictable. It occurs most often in older adults with diabetes. This often develops suddenly, but usually improves over time and does not cause long-term problems.  Proximal neuropathy. This type of nerve damage affects the nerves of the thighs, hips, buttocks, or legs. It causes severe pain, weakness, and muscle death (atrophy), usually in the thigh muscles. It is more common among older men and people who have type 2 diabetes. The length of recovery time may vary.  What  are the causes?  Peripheral, autonomic, and focal neuropathies are caused by diabetes that is not well controlled with treatment. The cause of proximal neuropathy is not known, but it may be caused by inflammation related to uncontrolled blood glucose levels.  What are the signs or symptoms?  Peripheral neuropathy  Peripheral neuropathy develops slowly over time. When the nerves of the feet and legs no longer work, you may experience:  Burning, stabbing, or aching pain in the legs or feet.  Pain or cramping in the legs or feet.  Loss of feeling (numbness) and inability to feel pressure or pain in the feet. This can lead to:  Thick calluses or sores on areas of constant pressure.  Ulcers.  Reduced ability to feel temperature changes.  Foot deformities.  Muscle weakness.  Loss of balance or coordination.  Autonomic neuropathy  The symptoms of autonomic neuropathy vary depending on which nerves are affected. Symptoms may include:  Problems with digestion, such as:  Nausea or vomiting.  Poor appetite.  Bloating.  Diarrhea or constipation.  Trouble swallowing.  Losing weight without trying to.  Problems with the heart, blood, and lungs, such as:  Dizziness, especially when standing up.  Fainting.  Shortness of breath.  Irregular heartbeat.  Bladder problems, such as:  Trouble starting or stopping urination.  Leaking urine.  Trouble emptying the bladder.  Urinary tract infections (UTIs).  Problems with other body functions, such as:  Sweat. You may sweat too much or too little.  Temperature. You might get hot easily. Or, you might feel cold more than usual.  Sexual function. Men may not be able to get or maintain an erection. Women may have vaginal dryness and difficulty with arousal.  Focal neuropathy  Symptoms affect only one area of the body. Common symptoms include:  Numbness.  Tingling.  Burning pain.  Prickling feeling.  Very sensitive skin.  Weakness.  Inability to move (paralysis).  Muscle twitching.  Muscles  getting smaller (wasting).  Poor coordination.  Double or blurred vision.  Proximal neuropathy  Sudden, severe pain in the hip, thigh, or buttocks. Pain may spread from the back into the legs (sciatica).  Pain and numbness in the arms and legs.  Tingling.  Loss of bladder control or bowel control.  Weakness and wasting of thigh muscles.  Difficulty getting up from a seated position.  Abdominal swelling.  Unexplained weight loss.  How is this diagnosed?  Diagnosis varies depending on the type of neuropathy your health care provider suspects.  Peripheral neuropathy  Your health care provider will do a neurologic exam. This exam checks your reflexes, how you move, and what you can feel. You may have other tests, such as:  Blood tests.  Tests of the fluid that surrounds the spinal cord (lumbar puncture).  CT scan.  MRI.  Checking the nerves that control muscles (electromyogram, or EMG).  Checking how quickly signals pass through your nerves (nerve conduction study).  Checking a small piece of a nerve using a microscope (biopsy).  Autonomic neuropathy  You may have tests, such as:  Tests to measure your blood pressure and heart rate. You may be secured to an exam table that moves you from a lying position to an upright position (table tilt test).  Breathing tests to check your lungs.  Tests to check how food moves through the digestive system (gastric emptying tests).  Blood, sweat, or urine tests.  Ultrasound of your bladder.  Spinal fluid tests.  Focal neuropathy  This condition may be diagnosed with:  A neurologic exam.  CT scan.  MRI.  EMG.  Nerve conduction study.  Proximal neuropathy  There is no test to diagnose this type of neuropathy. You may have tests to rule out other possible causes of this type of neuropathy. Tests may include:  X-rays of your spine and lumbar region.  Lumbar puncture.  MRI.  How is this treated?  The goal of treatment is to keep nerve damage from getting worse. Treatment may  include:  Following your diabetes management plan. This will help keep your blood glucose level and your A1C level within your target range. This is the most important treatment.  Using prescription pain medicine.  Follow these instructions at home:  Diabetes management  Follow your diabetes management plan as told by your health care provider.  Check your blood glucose levels.  Keep your blood glucose in your target range.  Have your A1C level checked at least two times a year, or as often as told.  Take over the counter and prescription medicines only as told by your health care provider. This includes insulin and diabetes medicine.    Lifestyle    Do not use any products that contain nicotine or tobacco, such as cigarettes, e-cigarettes, and chewing tobacco. If you need help quitting, ask your health care provider.  Be physically active every day. Include strength training and balance exercises.  Follow a healthy meal plan.  Work with your health care provider to manage your blood pressure.  General instructions  Ask your health care provider if the medicine prescribed to you requires you to avoid driving or using machinery.  Check your skin and feet every day for cuts, bruises, redness, blisters, or sores.  Keep all follow-up visits. This is important.  Contact a health care provider if:  You have burning, stabbing, or aching pain in your legs or feet.  You are unable to feel pressure or pain in your feet.  You develop problems with digestion, such as:  Nausea.  Vomiting.  Bloating.  Constipation.  Diarrhea.  Abdominal pain.  You have difficulty with urination, such as:  Inability to control when you urinate (incontinence).  Inability to completely empty the bladder (retention).  You feel as if your heart is racing (palpitations).  You feel dizzy, weak, or faint when you stand up.  Get help right away if:  You cannot urinate.  You have sudden weakness or loss of coordination.  You have trouble speaking.  You  have pain or pressure in your chest.  You have an irregular heartbeat.  You have sudden inability to move a part of your body.  These symptoms may represent a serious problem that is an emergency. Do not wait to see if the symptoms will go away. Get medical help right away. Call your local emergency services (911 in the [place] Do not drive yourself to the hospital.  Summary  Diabetic neuropathy is nerve damage that is caused by diabetes. It can cause numbness and pain in the arms, legs, digestive tract, heart, and other body systems.  This condition is treated by keeping your blood glucose level and your A1C level within your target range. This can help prevent neuropathy from getting worse.  Check your skin and feet every day for cuts, bruises, redness, blisters, or sores.  Do not use any products that contain nicotine or tobacco, such as cigarettes, e-cigarettes, and chewing tobacco.  This information is not intended to replace advice given to you by your health care provider. Make sure you discuss any questions you have with your health care provider.  Document Revised: 04/29/2021 Document Reviewed: 04/29/2021  Elsevier Patient Education © 2023 [employer]      Neuropathic Pain  We often think that pain has a physical cause. If we get rid of the cause, the pain should go away. However, nerves themselves can also cause pain. This pain often does not go away easily. It is called neuropathic pain, which means nerve abnormality. It may be difficult to understand for the patients who have it and for the treating caregivers.  Neuropathic pain may be caused by an injury or failure of the nervous system. The pain often results from an injury, but this injury may or may not be the cause of actual damage to the nervous system. Nerves can be penetrated or squashed by tumors, strangled by scar tissue, or become inflamed due to infection. Neuropathic pain is often caused by nerve injury due to diabetes. Alcohol abuse may  also lead to neuropathic pain. Neuropathic pain often seems to have no cause. The diagnosis is made when no other cause is found.   Pain may persist for months or years following the healing of damaged tissues. When this happens, pain signals no longer sound an alarm about current injuries or injuries about to happen. Instead, the alarm system itself is not working correctly.   CHARACTERISTICS OF NEUROPATHIC PAIN  Severe, sharp, electric shock-like, shooting, lightening-like, knife-like.   Pins and needles sensation.   Deep burning, deep cold, or deep ache.   Persistent numbness, tingling, or weakness.   Pain resulting from a light touch or other stimulus that would not usually cause pain.   Increased sensitivity to something that would normally cause pain, such as a pinprick.   Neuropathic pain may get worse instead of better over time. For some people, it can lead to serious disability. It is important to be aware that severe injury in a limb can occur without a proper, protective pain response. Burns, cuts, and other injuries may go unnoticed. Without proper treatment, these injuries can become infected or lead to further disability. Take any injury seriously, and consult your caregiver for treatment.  TREATMENT   Neuropathic pain is often long-lasting and tends not to get better when treated with opioids (narcotic types of pain medicine). It may respond well to other drugs such as antiseizure and antidepressant medicines. Usually, neuropathic pain does not completely go away, but partial improvement is often possible with proper treatment. Your caregivers, along with you, will select the medicines which best allow you to live a normal life. Do not be discouraged if you do not get immediate relief. Sometimes different medicines or a combination of medicines will be tried before you receive the benefits you are hoping for.  SEEK IMMEDIATE MEDICAL CARE IF:  There is a sudden change in the quality of your pain,  especially if the change is on only one side of the body.   You notice changes of the skin, such as redness, black or purple discoloration, swelling, or an ulcer.   You cannot move the affected limbs.   Document Released: 08/10/2005 Document Revised: 03/11/2013 Document Reviewed: 01/26/2012  [employer]® Patient Information ©2013 [employer].    Wound Care, Adult  Taking care of your wound properly can help to prevent pain, infection, and scarring. It can also help your wound heal more quickly. Follow instructions from your health care provider about how to care for your wound.  Supplies needed:  Soap and water.  Wound cleanser, saline, or germ-free (sterile) water.  Gauze.  If needed, a clean bandage (dressing) or other type of wound dressing material to cover or place in the wound. Follow your health care provider's instructions about what dressing supplies to use.  Cream or topical ointment to apply to the wound, if told by your health care provider.  How to care for your wound  Cleaning the wound  Ask your health care provider how to clean the wound. This may include:  Using mild soap and water, a wound cleanser, saline, or sterile water.  Using a clean gauze to pat the wound dry after cleaning it. Do not rub or scrub the wound.  Dressing care  Wash your hands with soap and water for at least 20 seconds before and after you change the dressing. If soap and water are not available, use hand .  Change your dressing as told by your health care provider. This may include:  Cleaning or rinsing out (irrigating) the wound.  Application of cream or topical ointment, if told by your health care provider.  Placing a dressing over the wound or in the wound (packing).  Covering the wound with an outer dressing.  Leave stitches (sutures), staples, skin glue, or adhesive strips in place. These skin closures may need to stay in place for 2 weeks or longer. If adhesive strip edges start to loosen and curl up, you may  trim the loose edges. Do not remove adhesive strips completely unless your health care provider tells you to do that.  Ask your health care provider when you can leave the wound uncovered.  Checking for infection  Check your wound area every day for signs of infection. Check for:  More redness, swelling, or pain.  Fluid or blood.  Warmth.  Pus or a bad smell.    Follow these instructions at home  Medicines  If you were prescribed an antibiotic medicine, cream, or ointment, take or apply it as told by your health care provider. Do not stop using the antibiotic even if your condition improves.  If you were prescribed pain medicine, take it 30 minutes before you do any wound care or as told by your health care provider.  Take over-the-counter and prescription medicines only as told by your health care provider.  Eating and drinking  Eat a diet that includes protein, vitamin A, vitamin C, and other nutrient-rich foods to help the wound heal.  Foods rich in protein include meat, fish, eggs, dairy, beans, and nuts.  Foods rich in vitamin A include carrots and dark green, leafy vegetables.  Foods rich in vitamin C include citrus fruits, tomatoes, broccoli, and peppers.  Drink enough fluid to keep your urine pale yellow.  General instructions  Do not take baths, swim, or use a hot tub until your health care provider approves. Ask your health care provider if you may take showers. You may only be allowed to take sponge baths.  Do not scratch or pick at the wound. Keep it covered as told by your health care provider.  Return to your normal activities as told by your health care provider. Ask your health care provider what activities are safe for you.  Protect your wound from the sun when you are outside for the first 6 months, or for as long as told by your health care provider. Cover up the scar area or apply sunscreen that has an SPF of at least 30.  Do not use any products that contain nicotine or tobacco. These products  include cigarettes, chewing tobacco, and vaping devices, such as e-cigarettes. If you need help quitting, ask your health care provider.  Keep all follow-up visits. This is important.  Contact a health care provider if:  You received a tetanus shot and you have swelling, severe pain, redness, or bleeding at the injection site.  Your pain is not controlled with medicine.  You have any of these signs of infection:  More redness, swelling, or pain around the wound.  Fluid or blood coming from the wound.  Warmth coming from the wound.  A fever or chills.  You are nauseous or you vomit.  You are dizzy.  You have a new rash or hardness around the wound.  Get help right away if:  You have a red streak of skin near the area around your wound.  Pus or a bad smell coming from the wound.  Your wound has been closed with staples, sutures, skin glue, or adhesive strips and it begins to open up and separate.  Your wound is bleeding, and the bleeding does not stop with gentle pressure.  These symptoms may represent a serious problem that is an emergency. Do not wait to see if the symptoms will go away. Get medical help right away. Call your local emergency services (911 in the [place] Do not drive yourself to the hospital.  Summary  Always wash your hands with soap and water for at least 20 seconds before and after changing your dressing.  Change your dressing as told by your health care provider.  To help with healing, eat foods that are rich in protein, vitamin A, vitamin C, and other nutrients.  Check your wound every day for signs of infection. Contact your health care provider if you think that your wound is infected.  This information is not intended to replace advice given to you by your health care provider. Make sure you discuss any questions you have with your health care provider.  Document Revised: 04/26/2022 Document Reviewed: 04/26/2022  Elsevier Patient Education © 2023 [employer] Inc.       Occupational Therapy  Discharge Instructions for Jose Castanon    11/8/2023    Level of Assist Required for Eating: Able to Complete Eating without Assist  Level of Assist Required for Grooming: Able to Complete Grooming without Assist  Level of Assist Required for Dressing: Able to Complete Dressing without Assist  Equipment for Dressing: Reacher, Sock Aid  Level of Assist Required for Toileting: Requires Physical Assist with Toileting  Level of Assist Required for Toilet Transfer: Able to Complete Toilet Transfer without Assist  Equipment for Toilet Transfer: Grab Bars by Toilet  Level of Assist Required for Bathing: Able to Complete Bathing without Assist  Equipment for Bathing: Tub Transfer Bench, Long Handled Sponge, Grab Bars in Tub / Shower, Hand Held Shower Head  Level of Assist Required for Shower Transfer: Able to Complete Shower Transfer without Assist  Equipment for Shower Transfer: Tub Transfer Bench, Grab Bars in Tub / Shower  Level of Assist Required for Home Mgmt: Requires Supervision with Home Management  Level of Assist Required for Meal Prep: Requires Supervision with Meal Preparation  Driving: May not Drive, Please Contact Physician for Further Information  Home Exercise Program: Refer to Home Exercise Program Handout for Details

## 2023-11-08 NOTE — THERAPY
Physical Therapy   Daily Treatment     Patient Name: Jose Castanon  Age:  68 y.o., Sex:  male  Medical Record #: [id number]  Today's Date: 11/7/2023     Precautions  Precautions: Fall Risk, Other (See Comments)  Comments: grace, tachycardia, L BKA prosthesis, wounds near sacrum, wounds on R heel and lateral R foot    Subjective    Patient is agreeable to participate, willing to work on fall recovery     Objective       11/07/23 1301   PT Charge Group   PT Neuromuscular Re-Education / Balance (Units) 1   PT Therapeutic Activities (Units) 1   PT Total Time Spent   PT Individual Total Time Spent (Mins) 30   Neuro-Muscular Treatments   Neuro-Muscular Treatments Anterior weight shift;Tactile Cuing;Verbal Cuing;Weight Shift Right;Weight Shift Left;Compensatory Strategies   Comments ball toss x 30 seconds x2 with seated rest break between. Standing unsupported in parallel bars. Patient is unable to perform standing weight shift for trunk turn to catch ball to his side and maintains a heavy forward flexion. Any weight shifting posteriorly and he requires using his hands on the parallel bars to recover his balance. Performed fall recovery x 1 with mod assist to block foot for sufficient push through RLE.   Interdisciplinary Plan of Care Collaboration   IDT Collaboration with  Occupational Therapist   Patient Position at End of Therapy Seated;Chair Alarm On;Self Releasing Lap Belt Applied;Call Light within Reach;Tray Table within Reach;Phone within Reach   Collaboration Comments POC         Assessment    Patient required removal of LLE prosthetic to get to the ground and to get up from the floor. He was unable to come to standing on RLE to swing his hips to the mat table, required instead to lay on the table until enough of his trunk was on the table so he could roll to sitting. Standing balance with ball toss was limited to pure forward underhanded toss/ catch, he was unable to perform sufficient lateral weight shift for  "trunk rotation and did not demonstrate a stepping response for balance recovery with posterior weight shift with attempts to reduce his forward flexion.   Strengths: Effective communication skills, Good insight into deficits/needs, Independent prior level of function, Motivated for self care and independence, Pleasant and cooperative, Willingly participates in therapeutic activities  Barriers: Decreased endurance, Fatigue, Home accessibility, Impaired activity tolerance, Impaired balance, Limited mobility, Pain    Plan    Strengthening, gait with crutches, balance    DME  PT DME Recommendations  Wheelchair:  (Pt has 2 wheelchairs reported to be in good condition)  Assistive Device:  (Pt has FWW and forearm crutches)    Passport items to be completed:  Get in/out of bed safely, in/out of a vehicle, safely use mobility device, walk or wheel around home/community, navigate up and down stairs, show how to get up/down from the ground, ensure home is accessible, demonstrate HEP, complete caregiver training    Physical Therapy Problems (Active)       Problem: Mobility       Dates: Start:  10/25/23         Goal: STG-Within one week, patient will ambulate up/down a 2\" step in // bars min A       Dates: Start:  10/25/23    Expected End:  11/08/23               Problem: PT-Long Term Goals       Dates: Start:  10/25/23         Goal: LTG-By discharge, patient will ambulate with FWW >50 ft mod I        Dates: Start:  10/25/23    Expected End:  11/08/23            Goal: LTG-By discharge, patient will transfer one surface to another mod I with FWW + prosthetic        Dates: Start:  10/25/23    Expected End:  11/08/23            Goal: LTG-By discharge, patient will ambulate up/down 2 stairs with (newly installed railings- recommended) mod I       Dates: Start:  10/25/23    Expected End:  11/08/23              "

## 2023-11-08 NOTE — CARE PLAN
"  Problem: Fall Risk - Rehab  Goal: Patient will remain free from falls  Note: Lulu Aguilera Fall risk Assessment Score: 16  High fall risk Interventions   - Alarming seatbelt  - Bed and strip alarm   - Yellow sign by the door   - Yellow wrist band \"Fall risk\"  - Room near to the nurse station  - Do not leave patient unattended in the bathroom  - Fall risk education provided        Problem: Bladder / Voiding  Goal: Patient will establish and maintain regular urinary output  Note: Pt is continent of bladder using urinal.PVR 48.Will continue to monitor.     Problem: Bowel Elimination  Goal: Patient will participate in bowel management program  Note: Pt is continent of bowel per report.LBM 11/7.Will continue to monitor.         "

## 2023-11-08 NOTE — DISCHARGE PLANNING
Case management  Pt to [place] home on 11/9; sister to transport home; pt is covered thru [employer]- referral originally sent to [facility] as pt has been on service with them; [facility] indicates [facility] must decline referral before being able to accept; therefore, referral sent to [facility] and they have accepted.     PT has all dme at home; follow up appts included in AVS.   Reviewed signed copy of IMM and answered all questions.  Discussed above w/ pt; he is in agreement w/ plan.   Dc date /disposition: 11/9/2023 with [facility].

## 2023-11-08 NOTE — PROGRESS NOTES
Hospital Medicine Daily Progress Note      Chief Complaint  Hypertension  Diabetes  Chronic Kidney Disease    Interval Problem Update  Pt c/o diarrhea from MgOx.  Labs reviewed and discussed.    Review of Systems  Review of Systems   Constitutional:  Negative for chills and fever.   HENT: Negative.     Eyes: Negative.    Respiratory:  Negative for cough and shortness of breath.    Cardiovascular:  Negative for chest pain and palpitations.   Gastrointestinal:  Negative for abdominal pain, nausea and vomiting.   Musculoskeletal:         Wound pain   Skin:  Negative for itching and rash.   Endo/Heme/Allergies:  Negative for polydipsia. Does not bruise/bleed easily.        Physical Exam  Temp:  [36.5 °C (97.7 °F)-36.8 °C (98.2 °F)] 36.5 °C (97.7 °F)  Pulse:  [] 120  Resp:  [16-18] 18  BP: (114-150)/(62-80) 150/80  SpO2:  [91 %-96 %] 96 %    Physical Exam  Vitals reviewed.   Constitutional:       General: He is not in acute distress.     Appearance: Normal appearance. He is not ill-appearing.   HENT:      Head: Normocephalic and atraumatic.      Right Ear: External ear normal.      Left Ear: External ear normal.      Nose: Nose normal.      Mouth/Throat:      Pharynx: Oropharynx is clear.   Eyes:      General:         Right eye: No discharge.         Left eye: No discharge.      Extraocular Movements: Extraocular movements intact.      Conjunctiva/sclera: Conjunctivae normal.   Cardiovascular:      Rate and Rhythm: Normal rate and regular rhythm.   Pulmonary:      Effort: Pulmonary effort is normal. No respiratory distress.      Breath sounds: Normal breath sounds. No wheezing.   Abdominal:      General: Bowel sounds are normal. There is no distension.      Palpations: Abdomen is soft.      Tenderness: There is no abdominal tenderness.   Musculoskeletal:      Cervical back: Normal range of motion and neck supple.      Right lower leg: No edema.      Comments: L BKA   Skin:     General: Skin is warm and dry.    Neurological:      Mental Status: He is alert and oriented to person, place, and time.         Fluids    Intake/Output Summary (Last 24 hours) at 11/8/2023 1347  Last data filed at 11/8/2023 1000  Gross per 24 hour   Intake 1320 ml   Output 1400 ml   Net -80 ml       Laboratory  Recent Labs     11/06/23  0519 11/07/23  0530   WBC 13.2* 11.5*   RBC 4.20* 4.04*   HEMOGLOBIN 11.7* 11.3*   HEMATOCRIT 38.0* 36.6*   MCV 90.5 90.6   MCH 27.9 28.0   MCHC 30.8* 30.9*   RDW 52.8* 52.9*   PLATELETCT 268 264   MPV 11.3 11.5     Recent Labs     11/06/23  0519 11/08/23  0526   SODIUM 142 142   POTASSIUM 4.1 3.9   CHLORIDE 104 104   CO2 24 24   GLUCOSE 111* 124*   BUN 24* 27*   CREATININE 1.39 1.31   CALCIUM 8.8 8.9                 Assessment/Plan  Electrolyte abnormality  Assessment & Plan  Continue KCl  Change MgOx to MgCl2 due to diarrhea    Anemia  Assessment & Plan  Has normocytic indices  Fe 31, may start supplement if no contraindications  Follow H/H    A-fib (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)- (present on admission)  Assessment &  Plan  H/O L BKA  On Eliquis and Lipitor    Diabetes (HCC)- (present on admission)  Assessment & Plan  HbA1c 6.8  Continue Metformin  Now off Lantus and SSI  Outpt meds include Metformin 1000 mg bid and Actos 30 mg qd    Benign essential HTN- (present on admission)  Assessment & Plan  Continue Diltiazem and HCTZ  Observe blood pressure trends    Full Code

## 2023-11-08 NOTE — CARE PLAN
Problem: Bathing  Goal: STG-Within one week, patient will bathe with SBA and LRD  Outcome: Met     Problem: Dressing  Goal: STG-Within one week, patient will dress LB SBA and LRD  Outcome: Met     Problem: Toileting  Goal: STG-Within one week, patient will complete toileting tasks with Min A and LRD  Outcome: Met     Problem: Functional Transfers  Goal: STG-Within one week, patient will transfer to toilet SUP and LRD  Outcome: Met  Goal: STG-Within one week, patient will transfer to step in shower with SUP and LRD  Outcome: Met     Problem: OT Long Term Goals  Goal: LTG-By discharge, patient will perform bathroom transfers SBA to SUP  Outcome: Met  Goal: LTG-By discharge, patient will complete basic home management SBA to SUP  Outcome: Met     Problem: OT Long Term Goals  Goal: LTG-By discharge, patient will complete basic self care tasks SBA to SUP  Outcome: Discharged - Not Met

## 2023-11-08 NOTE — PROGRESS NOTES
Physical Medicine & Rehabilitation Progress Note    Encounter Date: 11/8/2023    Chief Complaint: Decreased mobility, pain    Interval Events (Subjective):  Patient sitting up in room. He reports he is doing well. Discussed about dsicharge planning. Discussed about medications. Denies NVD.      _____________________________________  Interdisciplinary Team Conference   Most recent IDT on 11/2/2023    Discharge Date/Disposition:  11/14/23 -> 11/9/23  _____________________________________    Objective:  VITAL SIGNS: BP (!) 150/80   Pulse (!) 120   Temp 36.5 °C (97.7 °F) (Oral)   Resp 18   Ht 1.829 m (6')   Wt (!) 162 kg (357 lb 12.8 oz)   SpO2 96%   BMI 48.53 kg/m²   Gen: NAD  Psych: Mood and affect appropriate  CV: RRR, 0 edema  Resp: CTAB, no upper airway sounds  Abd: NTND  Neuro: AOx4, following commands    Laboratory Values:  Recent Results (from the past 72 hour(s))   CBC WITH DIFFERENTIAL    Collection Time: 11/06/23  5:19 AM   Result Value Ref Range    WBC 13.2 (H) 4.8 - 10.8 K/uL    RBC 4.20 (L) 4.70 - 6.10 M/uL    Hemoglobin 11.7 (L) 14.0 - 18.0 g/dL    Hematocrit 38.0 (L) 42.0 - 52.0 %    MCV 90.5 81.4 - 97.8 fL    MCH 27.9 27.0 - 33.0 pg    MCHC 30.8 (L) 32.3 - 36.5 g/dL    RDW 52.8 (H) 35.9 - 50.0 fL    Platelet Count 268 164 - 446 K/uL    MPV 11.3 9.0 - 12.9 fL    Neutrophils-Polys 79.00 (H) 44.00 - 72.00 %    Lymphocytes 10.70 (L) 22.00 - 41.00 %    Monocytes 6.00 0.00 - 13.40 %    Eosinophils 2.60 0.00 - 6.90 %    Basophils 0.50 0.00 - 1.80 %    Immature Granulocytes 1.20 (H) 0.00 - 0.90 %    Nucleated RBC 0.00 0.00 - 0.20 /100 WBC    Neutrophils (Absolute) 10.44 (H) 1.82 - 7.42 K/uL    Lymphs (Absolute) 1.42 1.00 - 4.80 K/uL    Monos (Absolute) 0.79 0.00 - 0.85 K/uL    Eos (Absolute) 0.35 0.00 - 0.51 K/uL    Baso (Absolute) 0.07 0.00 - 0.12 K/uL    Immature Granulocytes (abs) 0.16 (H) 0.00 - 0.11 K/uL    NRBC (Absolute) 0.00 K/uL   Basic Metabolic Panel    Collection Time: 11/06/23  5:19 AM    Result Value Ref Range    Sodium 142 135 - 145 mmol/L    Potassium 4.1 3.6 - 5.5 mmol/L    Chloride 104 96 - 112 mmol/L    Co2 24 20 - 33 mmol/L    Glucose 111 (H) 65 - 99 mg/dL    Bun 24 (H) 8 - 22 mg/dL    Creatinine 1.39 0.50 - 1.40 mg/dL    Calcium 8.8 8.5 - 10.5 mg/dL    Anion Gap 14.0 7.0 - 16.0   MAGNESIUM    Collection Time: 11/06/23  5:19 AM   Result Value Ref Range    Magnesium 1.3 (L) 1.5 - 2.5 mg/dL   ESTIMATED GFR    Collection Time: 11/06/23  5:19 AM   Result Value Ref Range    GFR (CKD-EPI) 55 (A) >60 mL/min/1.73 m 2   S. Aureus By PCR, Nasal Complete    Collection Time: 11/06/23 11:40 AM    Specimen: Respiratory; Respirate   Result Value Ref Range    Staph aureus by PCR Negative Negative    MRSA by PCR Negative Negative   CoV-2, Flu A/B, And RSV by PCR ([facility])    Collection Time: 11/06/23 12:47 PM    Specimen: Nasal; Respirate   Result Value Ref Range    Influenza virus A RNA Negative Negative    Influenza virus B, PCR Negative Negative    RSV, PCR Negative Negative    SARS-CoV-2 by PCR NotDetected     SARS-CoV-2 Source 1    URINALYSIS    Collection Time: 11/06/23 12:53 PM    Specimen: Urine, Clean Catch   Result Value Ref Range    Color Yellow     Character Clear     Specific Gravity 1.014 <1.035    Ph 5.5 5.0 - 8.0    Glucose Negative Negative mg/dL    Ketones Negative Negative mg/dL    Protein Negative Negative mg/dL    Bilirubin Negative Negative    Urobilinogen, Urine 0.2 Negative    Nitrite Negative Negative    Leukocyte Esterase Trace (A) Negative    Occult Blood Trace (A) Negative    Micro Urine Req Microscopic    URINE MICROSCOPIC (W/UA)    Collection Time: 11/06/23 12:53 PM   Result Value Ref Range    WBC 2-5 (A) /hpf    RBC 0-2 (A) /hpf    Bacteria Negative None /hpf    Epithelial Cells Negative /hpf    Hyaline Cast 0-2 /lpf   PROCALCITONIN    Collection Time: 11/07/23  5:30 AM   Result Value Ref Range    Procalcitonin 0.16 <0.25 ng/mL   CBC WITH DIFFERENTIAL    Collection Time:  11/07/23  5:30 AM   Result Value Ref Range    WBC 11.5 (H) 4.8 - 10.8 K/uL    RBC 4.04 (L) 4.70 - 6.10 M/uL    Hemoglobin 11.3 (L) 14.0 - 18.0 g/dL    Hematocrit 36.6 (L) 42.0 - 52.0 %    MCV 90.6 81.4 - 97.8 fL    MCH 28.0 27.0 - 33.0 pg    MCHC 30.9 (L) 32.3 - 36.5 g/dL    RDW 52.9 (H) 35.9 - 50.0 fL    Platelet Count 264 164 - 446 K/uL    MPV 11.5 9.0 - 12.9 fL    Neutrophils-Polys 76.10 (H) 44.00 - 72.00 %    Lymphocytes 12.80 (L) 22.00 - 41.00 %    Monocytes 6.10 0.00 - 13.40 %    Eosinophils 3.70 0.00 - 6.90 %    Basophils 0.30 0.00 - 1.80 %    Immature Granulocytes 1.00 (H) 0.00 - 0.90 %    Nucleated RBC 0.00 0.00 - 0.20 /100 WBC    Neutrophils (Absolute) 8.74 (H) 1.82 - 7.42 K/uL    Lymphs (Absolute) 1.47 1.00 - 4.80 K/uL    Monos (Absolute) 0.70 0.00 - 0.85 K/uL    Eos (Absolute) 0.42 0.00 - 0.51 K/uL    Baso (Absolute) 0.04 0.00 - 0.12 K/uL    Immature Granulocytes (abs) 0.11 0.00 - 0.11 K/uL    NRBC (Absolute) 0.00 K/uL   Basic Metabolic Panel    Collection Time: 11/08/23  5:26 AM   Result Value Ref Range    Sodium 142 135 - 145 mmol/L    Potassium 3.9 3.6 - 5.5 mmol/L    Chloride 104 96 - 112 mmol/L    Co2 24 20 - 33 mmol/L    Glucose 124 (H) 65 - 99 mg/dL    Bun 27 (H) 8 - 22 mg/dL    Creatinine 1.31 0.50 - 1.40 mg/dL    Calcium 8.9 8.5 - 10.5 mg/dL    Anion Gap 14.0 7.0 - 16.0   MAGNESIUM    Collection Time: 11/08/23  5:26 AM   Result Value Ref Range    Magnesium 1.4 (L) 1.5 - 2.5 mg/dL   ESTIMATED GFR    Collection Time: 11/08/23  5:26 AM   Result Value Ref Range    GFR (CKD-EPI) 59 (A) >60 mL/min/1.73 m 2       Medications:  Scheduled Medications   Medication Dose Frequency    magnesium chloride  64 mg BID    potassium chloride SA  20 mEq DAILY    hydroCHLOROthiazide  25 mg Q DAY    metFORMIN  750 mg BID WITH MEALS    simethicone  125 mg 4X/DAY WITH MEALS + NIGHTLY    allopurinol  100 mg DAILY    atorvastatin  20 mg Q EVENING    gabapentin  400 mg TID    apixaban  5 mg BID    amiodarone  200 mg DAILY     dilTIAZem  30 mg Q8HRS    melatonin  3 mg QHS    omeprazole  20 mg DAILY     PRN medications: senna-docusate **AND** polyethylene glycol/lytes **AND** magnesium hydroxide **AND** bisacodyl, guaiFENesin dextromethorphan, [DISCONTINUED] insulin regular **AND** [CANCELED] POC blood glucose manual result **AND** NOTIFY MD and PharmD **AND** Administer 20 grams of glucose (approximately 8 ounces of fruit juice) every 15 minutes PRN FSBG less than 70 mg/dL **AND** dextrose bolus, Respiratory Therapy Consult, hydrALAZINE, acetaminophen, mag hydrox-al hydrox-simeth, ondansetron **OR** ondansetron, traZODone, sodium chloride, oxyCODONE immediate-release **OR** oxyCODONE immediate-release, traMADol    Diet:  Current Diet Order   Procedures    Diet Order Diet: Consistent CHO (Diabetic)       Medical Decision Making and Plan:   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 Per guidelines, 15 hours per week between PT, OT and/or SLP.  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 Follow-up Urology     HTN/A fib - Patient now on Amiodarone 200 mg daily, Diltiazem 30 mg TID, Lasix 40 mg BID, and Eliquis 5 mg BID. Continue Amiodarone 200 mg and Lasix 40 mg -> reduced to daily -> discontinued. Will remove Ambrosio     DM2 with hyperglycemia - Patient on Glargine 10 U BID and SSI. Consult hospitalist. Started on Metformin -> Metformin 850 mg BID, Continue metformin, SSI disocntinued      PAD - Patient on Eliquis. Wound care recommending ABIs/US on 10/26/23 - no significant stenosis. Continue Eliquis     CKD3a - Avoid nephrotoxic agents. Check AM CMP - Cr stable     Leukocytosis - 13.2 on 11/6/23, reconsult hospitalist. UA, viral panel negative. CXR negative. Repeat 11.5, improved from 13.2    Anemia - 11.2 on  admission. Monitor    Hypokalemia - 3.5 on admission, consult hospitalist, on Lasix. Continue K supplement 30 mEq per hospitalist    Gout - Patient on Allopurinol 100 mg  daily     Neuropathy - Patient on Gabapentin 400 mg TID. Continue Gabapentin 400 mg TID, Cr stable     L BKA - Patient with L BKA with prosthetic     Morbid Obesity due to excess calories - Patient with BMI of 48.5 on admission, meets medical criteria. Dietitian to consult     Urinary retention - With grace, if lasix discontinued consider removal trial    Pain - Patient on PRN Tylenol, PRN Oxycodone, and PRN Tramadol. Also on Gabapentin 400 mg TID     Skin - Patient at risk for skin breakdown due to debility in areas including sacrum, achilles, elbows and head in addition to other sites. Nursing to assess skin daily.      GI Ppx - Patient on Prilosec for GERD prophylaxis. Patient on Senna-docusate for constipation prophylaxis.  -Having multiple BMs, change bowel medications to PRN      DVT Ppx - Patient Eliquis on transfer. Continue Eliquis as limited ambulation  ____________________________________    T. Clay Contreras MD/PhD  [facility] - Physical Medicine & Rehabilitation   [facility] - Brain Injury Medicine   ____________________________________    Total time:  50 minutes. Time spent included pre-rounding review of vitals and tests, unit/floor time, face-to-face time with the patient including physical examination, care coordination, counseling of patient and/or family, ordering medications/procedures/tests, discussion with CM, PT, OT, SLP and/or other healthcare providers, and documentation in the electronic medical record. Topics discussed included discharge planning, grace removal, discharge medications, and continue follow-up with Urology.

## 2023-11-08 NOTE — CARE PLAN
Problem: Skin Integrity  Goal: Skin integrity is maintained or improved  Note: Wounds cleansed and dressings changed this shift. No new skin breakdown noted.     Problem: Fall Risk - Rehab  Goal: Patient will remain free from falls  Note: Patient remains free from falls this shift. Patient was educated on using the call light to prevent falls. Patients bed is in the lowest position. The patients belongings are placed in near proximity to the patient.    The patient is Stable - Low risk of patient condition declining or worsening

## 2023-11-08 NOTE — WOUND TEAM
[facility]  Inpatient Services  Wound and Skin Care Follow-up    Admission Date: 10/24/2023     Last order of IP CONSULT TO WOUND CARE was found on 2023 from Hospital Encounter on 10/23/2023     HPI, PMH, SH: Reviewed    Past Surgical History:   Procedure Laterality Date    PB AMPUTATION TOE,MT-P JT Right 9/15/2021    Procedure: RIGHT SECOND TOE AMPUTATION (30 MIN);  Surgeon: Jackson Castro M.D.;  Location: [facility];  Service: Orthopedics    TOE AMPUTATION Left      Social History     Tobacco Use    Smoking status: Former     Current packs/day: 0.00     Types: Cigarettes     Quit date: 1980     Years since quittin.8    Smokeless tobacco: Never   Substance Use Topics    Alcohol use: Not Currently     Comment: OCC     No chief complaint on file.    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 Contact wound team if area fails to progress, deteriorates or with any questions/concerns if something comes up before next scheduled follow up (See below as to whether wound is following and frequency of wound follow up)  Dressing changes by wound team:                   Weekly - perineum and right heel  Not following, consult as needed  - right lateral foot    WOUND HISTORY:             WOUND ASSESSMENT/LDA  Wound 10/24/23 Full Thickness Wound Perineum Midline (Active)   Date First Assessed/Time First Assessed: 10/24/23 1200   Primary Wound Type: Full Thickness Wound  Location: Perineum  Wound Orientation: Midline      Assessments 2023  5:00 PM   Wound Image     Site Assessment Red;Fully granulated   Periwound Assessment Clean;Dry;Intact   Margins Defined edges   Closure Secondary intention   Drainage Amount Small   Drainage  Description Serosanguineous;Sanguineous   Treatments Cleansed;Irrigation;Nonselective debridement   Wound Cleansing Approved Wound Cleanser   Periwound Protectant No-sting Skin Prep   Dressing Status Clean;Dry;Intact   Dressing Changed Reapplied   Dressing Cleansing/Solutions Not Applicable   Dressing Options Hydrofiber Silver   Dressing Change/Treatment Frequency Daily, and As Needed   NEXT Dressing Change/Treatment Date 11/08/23   NEXT Weekly Photo (Inpatient Only) 11/14/23   Wound Team Following Weekly   Wound Length (cm) 10 cm   Wound Width (cm) 1.5 cm   Wound Depth (cm) 2 cm   Wound Surface Area (cm^2) 15 cm^2   Wound Volume (cm^3) 30 cm^3   Wound Healing % 50   Tunneling (cm) 0 cm   Shape irregular linear   Wound Odor None   WOUND NURSE ONLY - Time Spent with Patient (mins) 15       Wound 10/24/23 Diabetic Ulcer Foot Lateral Right Lateral L- Foot Ulcer (Active)   Date First Assessed/Time First Assessed: 10/24/23 1200   Present on Original Admission: Yes  Hand Hygiene Completed: Yes  Primary Wound Type: Diabetic Ulcer  Location: Foot  Wound Orientation: Lateral  Laterality: Right  Wound Description (Comments): ...      Assessments 11/7/2023  5:00 PM   Wound Image     Site Assessment Pink;Red;White   Periwound Assessment Clean;Dry;Intact   Margins Defined edges   Closure Secondary intention   Drainage Amount Scant   Drainage Description Serosanguineous   Treatments Cleansed;Irrigation;Nonselective debridement   Wound Cleansing Approved Wound Cleanser   Periwound Protectant No-sting Skin Prep   Dressing Status Clean;Dry   Dressing Changed Reapplied   Dressing Cleansing/Solutions Not Applicable   Dressing Options Hydrofiber Silver;Silicone Adhesive Foam   Dressing Change/Treatment Frequency Every 48 hrs, and As Needed   NEXT Dressing Change/Treatment Date 11/09/23   NEXT Weekly Photo (Inpatient Only) 11/14/23   Wound Team Following Weekly   Non-staged Wound Description Full thickness   Wound Length (cm) 2 cm    Wound Width (cm) 0.3 cm   Wound Depth (cm) 0.2 cm   Wound Surface Area (cm^2) 0.6 cm^2   Wound Volume (cm^3) 0.12 cm^3   Wound Healing % 60   Shape linear   Wound Odor None   Exposed Structures None   WOUND NURSE ONLY - Time Spent with Patient (mins) 15       Wound 10/24/23 Pressure Injury Heel Right POA Unstageable (Active)   Date First Assessed/Time First Assessed: 10/24/23 1200   Present on Original Admission: Yes  Hand Hygiene Completed: Yes  Primary Wound Type: Pressure Injury  Location: Heel  Laterality: Right  Wound Description (Comments): POA Unstageable      Assessments 11/7/2023  5:00 PM   Wound Image     Site Assessment Black;Eschar   Periwound Assessment Clean;Dry;Intact   Margins Defined edges   Closure Open to air   Drainage Amount None   Treatments Cleansed;Irrigation;Nonselective debridement   Wound Cleansing Povidone-Iodine   Periwound Protectant Not Applicable   Dressing Status Open to Air   NEXT Weekly Photo (Inpatient Only) 11/14/23   Wound Team Following Weekly   Pressure Injury Stage Unstageable   Wound Length (cm) 3.5 cm   Wound Width (cm) 2.5 cm   Wound Surface Area (cm^2) 8.75 cm^2   Shape oval   Wound Odor None   WOUND NURSE ONLY - Time Spent with Patient (mins) 15        Vascular:    COOPER:   COOPER Results, Last 30 Days US-EXTREMITY ARTERY LOWER UNILAT RIGHT    Result Date: 10/26/2023  Narrative Lower Extremity  Arterial Duplex Report  Vascular Laboratory  CONCLUSIONS  1.  There is calcific atherosclerosis of the right lower extremity arteries  with no significant stenosis and 3 vessel runoff tothe level of the ankle.  BRENDA VINCENT  Exam Date:     10/26/2023 10:50  [facility]  Priority:     Routine  Ht (in):             Wt (lb):  Ordering Physician:        BECCA ARMAS  Referring Physician:       BECCA ARMAS  Sonographer:               Becky Vance RVT  Study Type:                Complete  Unilateral  Technical Quality:         Adequate  Age:    68    Gender:     M  MRN:    [id number]  :    1955      BSA:  Indications:     Ulcer of heel and midfoot  CPT Codes:       34875  ICD Codes:       707.14  History:         Non-healing wound of right heel. Diabetes mellitus. Left BKA.  Limitations:     Patient at Rehab.                RIGHT  Waveform        Peak Systolic Velocity (cm/s)                  Prox    Prox-Mid  Mid    Mid-Dist  Distal  Triphasic       125                                        CFA  Triphasic       108                                        PFA  Triphasic       102               93               119     SFA  Triphasic       21                                         POP  Triphasic       94                                 117     AT  Triphasic       77                                 65      PT  Triphasic       48                                 39      CHICO                LEFT  Waveform        Peak Systolic Velocity (cm/s)                  Prox    Prox-Mid  Mid    Mid-Dist  Distal                                                             CFA                                                             PFA                                                             SFA                                                             POP                                                             AT                                                             PT                                                             CHICO  FINDINGS  Right.  Mild medial calcification noted throuhout the arteries of the right leg.  Flow velocities and waveforms are normal to the ankle.  Color flow is not well demonstrated in the tibial arteries due to medial  calcification.  Three vessel runoff to the ankle with multiphasic flow.  Ankle brachial index not performed due to patient being at the Rehab  hospital.  Ofe Cleary  (Electronically Signed)  Final Date:      2023                    13:32      Lab Values:    Lab Results   Component Value Date/Time    WBC 11.5 (H) 11/07/2023 05:30 AM    RBC 4.04 (L) 11/07/2023 05:30 AM    HEMOGLOBIN 11.3 (L) 11/07/2023 05:30 AM    HEMATOCRIT 36.6 (L) 11/07/2023 05:30 AM    CREACTPROT 3.28 (H) 08/05/2020 10:15 AM    SEDRATEWES 2 08/05/2020 10:15 AM    HBA1C 6.8 (H) 10/25/2023 05:36 AM         Culture Results show:  No results found for this or any previous visit (from the past 720 hour(s)).    Pain Level/Medicated:  None, Tolerated without pain medication       INTERVENTIONS BY WOUND TEAM:  Chart and images reviewed. Discussed with bedside RN. All areas of concern (based on picture review, LDA review and discussion with bedside RN) have been thoroughly assessed. Documentation of areas based on significant findings. This RN in to assess patient. Performed standard wound care which includes appropriate positioning, dressing removal and non-selective debridement. Pictures and measurements obtained weekly if/when required.    Wound:  Right lateral foot  Preparation for Dressing removal: Removed without difficulty  Cleansed/Non-selectively Debrided with:  Wound cleanser and Gauze  Ankita wound: Cleansed with Wound cleanser and Gauze, Prepped with No Sting  Primary Dressing:  Aquacel Ag hydrofiber  Secondary (Outer) Dressing: silicone adhesive      Wound:  Right heel  Preparation for Dressing removal: Open to air  Cleansed/Non-selectively Debrided with:  Wound cleanser and Gauze  Ankita wound: Cleansed with Wound cleanser and Gauze, Prepped with No Sting  Primary Dressing:  betadine   Secondary (Outer) Dressing: BENJAMIN     Wound:  Perineum   Preparation for Dressing removal: Removed without difficulty  Cleansed/Non-selectively Debrided with:  Wound cleanser and Gauze  Ankita wound: Cleansed with Wound cleanser and Gauze, Prepped with No Sting  Primary Dressing:  Aquacel Ag hydrofiber  Secondary (Outer) Dressing: silicone adhesive foam     Advanced Wound Care Discharge  Planning  Number of Clinicians necessary to complete wound care: 1  Is patient requiring IV pain medications for dressing changes:  No   Length of time for dressing change 15 min. (This does not include chart review, pre-medication time, set up, clean up or time spent charting.)    Interdisciplinary consultation: Patient, Bedside RN (Kaden), Katerina FINNEY (Wound RN).    EVALUATION / RATIONALE FOR TREATMENT:     Date:  11/07/23  Wound Status:  Wound improving    Wound beds are progression well and has decreased in size with silver hydrofiber dressing.  Patient has good mobility and has therapies daily with PT/OT.  Discharge plans are for 11/11, referral for outpatient wound care clinic followup placed for [facility] wound care clinic.          Goals: Steady decrease in wound area and depth weekly.    NURSING PLAN OF CARE ORDERS:  No new orders this visit    NUTRITION RECOMMENDATIONS   Wound Team Recommendations:  Protein supplements  Arginine powder     DIET ORDERS (From admission to next 24h)       Start     Ordered    10/25/23 1156  Supplements  ALL MEALS        Comments: Aginaid/Jony with breakfast/lunch; Boost GC/Glucerna with dinner   Question:  Which Supplement  Answer:  OTHER (see comments)  Comment:  Arginaid BID, Boost Glucose Control QD    10/25/23 1156    10/24/23 1159  Diet Order Diet: Consistent CHO (Diabetic)  ALL MEALS        Question:  Diet:  Answer:  Consistent CHO (Diabetic)    10/24/23 1158                    PREVENTATIVE INTERVENTIONS:   Q shift Des - performed per nursing policy  Q shift pressure point assessments - performed per nursing policy    Surface/Positioning  Bariatric SANCHO - Currently in Place    Offloading/Redistribution  Sacral offloading dressing (Silicone dressing) - Currently in Place  Float Heels off Bed with Pillows - Currently in Place           Respiratory  N/A    Containment/Moisture Prevention    Dri-mariel pad - Currently in Place    Mobilization      Up to chair, Ambulate  , and PT/OT with walker      Anticipated discharge plans:  Self/Family Care, Home Health Care, and Outpatient Wound Center        Vac Discharge Needs:  Vac Discharge plan is purely a recommendation from wound team and not a requirement for discharge unless otherwise stated by physician.  Not Applicable Pt not on a wound vac

## 2023-11-08 NOTE — PROGRESS NOTES
NURSING DAILY NOTE    Name: Jose Castanon   Date of Admission: 10/24/2023   Admitting Diagnosis: Critical illness polyneuropathy (HCC)  Attending Physician: Sylvester Contreras M.d.  Allergies: Penicillins, Bee venom, Cefazolin, and Linezolid    Safety  Patient Assist  cga  Patient Precautions  Fall Risk, Other (See Comments)  Precaution Comments  grace, tachycardia, L BKA prosthesis, wounds near sacrum, wounds on R heel and lateral R foot  Bed Transfer Status   (Setup A)  Toilet Transfer Status   Minimal Assist  Assistive Devices  Rails, Wheelchair  Oxygen  CPAP  Diet/Therapeutic Dining  Current Diet Order   Procedures    Diet Order Diet: Consistent CHO (Diabetic)     Pill Administration  whole  Agitated Behavioral Scale  14  ABS Level of Severity  No Agitation    Fall Risk  Has the patient had a fall this admission?   No  Lulu Aguilera Fall Risk Scoring  16, HIGH RISK  Fall Risk Safety Measures  bed alarm and chair alarm    Vitals  Temperature: 36.7 °C (98.1 °F)  Temp src: Temporal  Pulse: 88  Respiration: 18  Blood Pressure : 137/75  Blood Pressure MAP (Calculated): 96 MM HG  BP Location: Right, Upper Arm  Patient BP Position: Supine     Oxygen  Pulse Oximetry: 91 %  O2 (LPM): 0  FiO2%: 21 %  O2 Delivery Device: CPAP    Bowel and Bladder  Last Bowel Movement  11/07/23  Stool Type  Type 6: Fluffy pieces with ragged edges, a mushy stool  Bowel Device  Diaper  Continent  Bladder: Did not void   Bowel: No movement  Bladder Function  Urine Void (mL): 200 ml (urinals)  Urine Color: Renee  Urine Clarity: Clear  Genitourinary Assessment   Bladder Assessment (WDL):  WDL Except  Grace Catheter: Present with Active Order  Grace Reasons per MD Order: Neurogenic bladder  Grace Care: Given with Soap and Water  Urinary Elimination: Catheter (Document on LDA)  Urine Color: Renee  Urine Clarity: Clear  Bladder Device: Urinal  $ Bladder Scan Results (mL):  48    Skin  Des Score   14  Sensory Interventions   Bed Types: Bariatric Low Airloss  Skin Preventative Measures: Pillows in Use for Support / Positioning  Moisture Interventions  Protocols: Pressure Ulcer Prevention / Intervention Protocol in Place  Moisturizers/Barriers: Containment Devices, Barrier Wipes  Containment Devices: Indwelling Catheter      Pain  Pain Rating Scale  0 - No Pain  Pain Location  Generalized  Pain Location Orientation  Left, Right  Pain Interventions   Declines    ADLs    Bathing   Full Bed Bath  Linen Change   Complete  Personal Hygiene  Change Ankita Pads, Moist Ankita Wipes, Perineal Care  Chlorhexidine Bath      Oral Care   (self)  Teeth/Dentures     Shave     Nutrition Percentage Eaten  Lunch, Between % Consumed  Environmental Precautions  Bed in Low Position, Treaded Slipper Socks on Patient  Patient Turns/Positioning  Patient Turns Self from Side to Side  Patient Turns Assistance/Tolerance  Assistance of One  Bed Positions  Bed Controls On  Head of Bed Elevated  Self regulated      Psychosocial/Neurologic Assessment  Psychosocial Assessment  Psychosocial (WDL):  Within Defined Limits  Neurologic Assessment  Neuro (WDL): Exceptions to WDL  Level of Consciousness: Alert  Orientation Level: Oriented X4  Cognition: Follows commands, Appropriate attention/concentration, Appropriate judgement, Appropriate safety awareness  Speech: Clear  Motor Function/Sensation Assessment: Motor strength  RUE Sensation: No numbness  Muscle Strength Right Arm: Good Strength Against Gravity and Moderate Resistance  LUE Sensation: No numbness  Muscle Strength Left Arm: Good Strength Against Gravity and Moderate Resistance  RLE Sensation: Numbness, Tingling  Muscle Strength Right Leg: Good Strength Against Gravity and Moderate Resistance  LLE Sensation: Numbness, Tingling  Muscle Strength Left Leg: Good Strength Against Gravity and Moderate Resistance  EENT (WDL):  WDL Except    Cardio/Pulmonary  Assessment  Edema   RLE Edema: Generalized  Respiratory Breath Sounds  RUL Breath Sounds: Clear  RML Breath Sounds: Clear  RLL Breath Sounds: Clear, Diminished  MIGUELINA Breath Sounds: Clear  LLL Breath Sounds: Clear, Diminished  Cardiac Assessment   Cardiac (WDL):  WDL Except (HTN/ CAD)

## 2023-11-08 NOTE — DISCHARGE PLANNING
ATTN: Case Management  RE: Referral for Home Health    As of 11.7.23, we have accepted the Home Health referral for the patient listed above.    A [facility] clinician will be out to see the patient within 48 hours. If you have any questions or concerns regarding the patient’s transition to Home Health, please do not hesitate to contact us at [phone].      We look forward to collaborating with you,  [facility] Team

## 2023-11-08 NOTE — FLOWSHEET NOTE
11/07/23 1704   Events/Summary/Plan   Events/Summary/Plan SpO2 check   Vital Signs   Pulse 90   Respiration 16   Pulse Oximetry 93 %   $ Pulse Oximetry (Spot Check) Yes   Respiratory Assessment   Respiratory Pattern Within Normal Limits   Level of Consciousness Alert   Chest Exam   Work Of Breathing / Effort Within Normal Limits   Non-Invasive Ventilation [employer]   Nocturnal CPAP or BIPAP [facility]AP - [facility]  (9:10 hrs BiPAP use last night)   FiO2 or LPM 3

## 2023-11-09 ENCOUNTER — HOME CARE VISIT (OUTPATIENT)
Dept: HOME HEALTH SERVICES | Facility: HOME HEALTHCARE | Age: 68
End: 2023-11-09

## 2023-11-09 VITALS
RESPIRATION RATE: 18 BRPM | BODY MASS INDEX: 42.66 KG/M2 | WEIGHT: 315 LBS | TEMPERATURE: 98.3 F | HEIGHT: 72 IN | SYSTOLIC BLOOD PRESSURE: 130 MMHG | DIASTOLIC BLOOD PRESSURE: 72 MMHG | OXYGEN SATURATION: 93 % | HEART RATE: 92 BPM

## 2023-11-09 PROCEDURE — A9270 NON-COVERED ITEM OR SERVICE: HCPCS | Performed by: HOSPITALIST

## 2023-11-09 PROCEDURE — 94760 N-INVAS EAR/PLS OXIMETRY 1: CPT

## 2023-11-09 PROCEDURE — 700102 HCHG RX REV CODE 250 W/ 637 OVERRIDE(OP): Performed by: PHYSICAL MEDICINE & REHABILITATION

## 2023-11-09 PROCEDURE — 700102 HCHG RX REV CODE 250 W/ 637 OVERRIDE(OP): Performed by: HOSPITALIST

## 2023-11-09 PROCEDURE — A9270 NON-COVERED ITEM OR SERVICE: HCPCS | Performed by: PHYSICAL MEDICINE & REHABILITATION

## 2023-11-09 PROCEDURE — 99231 SBSQ HOSP IP/OBS SF/LOW 25: CPT | Performed by: HOSPITALIST

## 2023-11-09 PROCEDURE — 99239 HOSP IP/OBS DSCHRG MGMT >30: CPT | Performed by: PHYSICAL MEDICINE & REHABILITATION

## 2023-11-09 RX ADMIN — AMIODARONE HYDROCHLORIDE 200 MG: 200 TABLET ORAL at 08:59

## 2023-11-09 RX ADMIN — GABAPENTIN 400 MG: 400 CAPSULE ORAL at 08:58

## 2023-11-09 RX ADMIN — POTASSIUM CHLORIDE 20 MEQ: 1500 TABLET, EXTENDED RELEASE ORAL at 08:59

## 2023-11-09 RX ADMIN — METFORMIN HYDROCHLORIDE 750 MG: 500 TABLET ORAL at 08:58

## 2023-11-09 RX ADMIN — HYDROCHLOROTHIAZIDE 25 MG: 25 TABLET ORAL at 05:32

## 2023-11-09 RX ADMIN — SIMETHICONE 125 MG: 125 TABLET, CHEWABLE ORAL at 08:59

## 2023-11-09 RX ADMIN — APIXABAN 5 MG: 5 TABLET, FILM COATED ORAL at 08:58

## 2023-11-09 RX ADMIN — SIMETHICONE 125 MG: 125 TABLET, CHEWABLE ORAL at 10:54

## 2023-11-09 RX ADMIN — MAGNESIUM 64 MG (MAGNESIUM CHLORIDE) TABLET,DELAYED RELEASE 64 MG: at 08:58

## 2023-11-09 RX ADMIN — OMEPRAZOLE 20 MG: 20 CAPSULE, DELAYED RELEASE ORAL at 08:58

## 2023-11-09 RX ADMIN — DILTIAZEM HYDROCHLORIDE 30 MG: 30 TABLET, FILM COATED ORAL at 05:32

## 2023-11-09 RX ADMIN — ALLOPURINOL 100 MG: 100 TABLET ORAL at 08:58

## 2023-11-09 ASSESSMENT — ENCOUNTER SYMPTOMS
EYES NEGATIVE: 1
COUGH: 0
PALPITATIONS: 0
CHILLS: 0
NAUSEA: 0
BRUISES/BLEEDS EASILY: 0
SHORTNESS OF BREATH: 0
VOMITING: 0
POLYDIPSIA: 0
ABDOMINAL PAIN: 0
FEVER: 0

## 2023-11-09 NOTE — CARE PLAN
Problem: Skin Integrity  Goal: Skin integrity is maintained or improved  Outcome: Progressing     Problem: Bladder / Voiding  Goal: Patient will establish and maintain bladder regimen  Outcome: Progressing     Problem: Bowel Elimination  Goal: Patient will participate in bowel management program  Outcome: Progressing     Problem: Mobility  Goal: Patient's capacity to carry out activities will improve  Outcome: Progressing     Problem: Knowledge Deficit - Standard  Goal: Patient and family/care givers will demonstrate understanding of plan of care, disease process/condition, diagnostic tests and medications  Outcome: Met     Problem: Fall Risk - Rehab  Goal: Patient will remain free from falls  Outcome: Met     Problem: Discharge Barriers/Planning  Goal: Patient's continuum of care needs are met  Outcome: Met     Problem: Psychosocial  Goal: Patient's level of anxiety will decrease  Outcome: Met     Problem: Hemodynamics  Goal: Patient's hemodynamics, fluid balance and neurologic status will be stable or improve  Outcome: Met     Problem: Respiratory  Goal: Patient will understand use and administration of respiratory medications to improve respiratory function  Outcome: Met     Problem: Risk for Aspiration  Goal: Patient's risk for aspiration will be absent or decrease  Outcome: Met     Problem: Bladder / Voiding  Goal: Patient will establish and maintain regular urinary output  Outcome: Met     Problem: Nutrition  Goal: Patient's nutritional and fluid intake will be adequate or improve  Outcome: Met     Problem: Self Care  Goal: Patient will have the ability to perform ADLs independently or with assistance  Outcome: Met     Problem: Infection  Goal: Patient will remain free from infection  Outcome: Met     Problem: VTE Prevention  Goal: Patient will remain free from venous thromboembolism (VTE)  Outcome: Met     Problem: Diabetes Management  Goal: Patient's ability to maintain appropriate glucose levels will be  maintained or improve  Outcome: Met     Problem: Pain - Standard  Goal: Alleviation of pain or a reduction in pain to the patient’s comfort goal  Outcome: Met   The patient is Stable - Low risk of patient condition declining or worsening

## 2023-11-09 NOTE — CARE PLAN
"  Problem: Mobility  Goal: STG-Within one week, patient will ambulate up/down a 2\" step in // bars min A  Outcome: Met     Problem: PT-Long Term Goals  Goal: LTG-By discharge, patient will ambulate with FWW >50 ft mod I   Outcome: Met  Goal: LTG-By discharge, patient will transfer one surface to another mod I with FWW + prosthetic   Outcome: Met  Goal: LTG-By discharge, patient will ambulate up/down 2 stairs with (newly installed railings- recommended) mod I  Outcome: Met     "

## 2023-11-09 NOTE — FLOWSHEET NOTE
11/09/23 0715   Events/Summary/Plan   Events/Summary/Plan RA pulse ox check   Vital Signs   Pulse 92   Respiration 18   Pulse Oximetry 93 %   $ Pulse Oximetry (Spot Check) Yes   Respiratory Assessment   Level of Consciousness Alert   Chest Exam   Work Of Breathing / Effort Within Normal Limits   Oxygen   O2 Delivery Device Room air w/o2 available

## 2023-11-09 NOTE — CARE PLAN
"The patient is Stable - Low risk of patient condition declining or worsening    Shift Goals  Clinical Goals: safety  Patient Goals: safety, sleep/rest  Family Goals: Education      Problem: Skin Integrity  Goal: Skin integrity is maintained or improved  Outcome: Progressing  Note: Dressing change done to open wound on  perineum with scant amount of yellow discharge. No foul odor or bleeding noted. Patient's skin remains intact and free from new or accidental injury this shift.  Will continue to monitor.     Problem: Fall Risk - Rehab  Goal: Patient will remain free from falls  Outcome: Progressing  Note: Lulu Aguilera Fall risk Assessment Score: 19    High fall risk Interventions   - Alarming seatbelt  - Bed and strip alarm   - Yellow sign by the door   - Yellow wrist band \"Fall risk\"  - Room near to the nurse station  - Do not leave patient unattended in the bathroom  - Fall risk education provided  Patient uses call light consistently and appropriately this shift.  Waits for assistance when needed and does not attempt self transfer.  Able to verbalize needs.  Will continue to monitor.     "

## 2023-11-09 NOTE — PROGRESS NOTES
Hospital Medicine Daily Progress Note      Chief Complaint  Hypertension  Diabetes  Chronic Kidney Disease    Interval Problem Update  Pt denies new complaints, excited to go home today.    Review of Systems  Review of Systems   Constitutional:  Negative for chills and fever.   HENT: Negative.     Eyes: Negative.    Respiratory:  Negative for cough and shortness of breath.    Cardiovascular:  Negative for chest pain and palpitations.   Gastrointestinal:  Negative for abdominal pain, nausea and vomiting.   Musculoskeletal:         Wound pain   Skin:  Negative for itching and rash.   Endo/Heme/Allergies:  Negative for polydipsia. Does not bruise/bleed easily.        Physical Exam  Temp:  [36.5 °C (97.7 °F)-36.9 °C (98.4 °F)] 36.8 °C (98.3 °F)  Pulse:  [] 92  Resp:  [18] 18  BP: (110-142)/(60-84) 130/72  SpO2:  [93 %-95 %] 93 %    Physical Exam  Vitals reviewed.   Constitutional:       General: He is not in acute distress.     Appearance: Normal appearance. He is not ill-appearing.   HENT:      Head: Normocephalic and atraumatic.      Right Ear: External ear normal.      Left Ear: External ear normal.      Nose: Nose normal.      Mouth/Throat:      Pharynx: Oropharynx is clear.   Eyes:      General:         Right eye: No discharge.         Left eye: No discharge.      Extraocular Movements: Extraocular movements intact.      Conjunctiva/sclera: Conjunctivae normal.   Cardiovascular:      Rate and Rhythm: Normal rate and regular rhythm.   Pulmonary:      Effort: Pulmonary effort is normal. No respiratory distress.      Breath sounds: Normal breath sounds. No wheezing.   Abdominal:      General: Bowel sounds are normal. There is no distension.      Palpations: Abdomen is soft.      Tenderness: There is no abdominal tenderness.   Musculoskeletal:      Cervical back: Normal range of motion and neck supple.      Right lower leg: No edema.      Comments: L BKA w/ prosthesis   Skin:     General: Skin is warm and dry.    Neurological:      Mental Status: He is alert and oriented to person, place, and time.         Fluids    Intake/Output Summary (Last 24 hours) at 11/9/2023 1030  Last data filed at 11/9/2023 0900  Gross per 24 hour   Intake 1320 ml   Output 450 ml   Net 870 ml       Laboratory  Recent Labs     11/07/23  0530   WBC 11.5*   RBC 4.04*   HEMOGLOBIN 11.3*   HEMATOCRIT 36.6*   MCV 90.6   MCH 28.0   MCHC 30.9*   RDW 52.9*   PLATELETCT 264   MPV 11.5     Recent Labs     11/08/23  0526   SODIUM 142   POTASSIUM 3.9   CHLORIDE 104   CO2 24   GLUCOSE 124*   BUN 27*   CREATININE 1.31   CALCIUM 8.9                 Assessment/Plan  Electrolyte abnormality  Assessment & Plan  Continue KCl while on HCTZ  Mg++ adequately repleted    Anemia  Assessment & Plan  Has normocytic indices  Fe 31, may start supplement if no contraindications  Follow H/H    A-fib ([facility])  Assessment & Plan  On Amiodarone  Anticoagulated on Eliquis    Leukocytosis  Assessment & Plan  PCT 0.16  MRSA PCR negative  COVID/FLU/RSV negative  UA 2-5 WBC w/ negative bacteria  CXR negative acute  Pt afebrile, non-toxic appearing, and had negative infxn w/u previously  Elevated WBC is likely reactive demargination    Gout  Assessment & Plan  On Allopurinol    Borderline abnormal TFTs  Assessment & Plan  TSH 10.1 and FT4 1.15  May have subclinical hypothyroidism  Needs outpt F/U    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)  Assessment & Plan  Has had prolonged hospitalization since 8/26/23  [facility]ially presented to [facility], [facility], now Rehab  S/P I+D, IV Abx, and Wound Vac  Wound care and pain control per Physiatry    Stage 3a chronic kidney disease ([facility])- (present on admission)  Assessment & Plan  Avoid nephrotoxins  Renal dose all meds  Monitor electrolytes  Outpt Nephrology F/U    PAD (peripheral artery disease) ([facility])- (present on admission)  Assessment & Plan  H/O L BKA  On Eliquis and Lipitor    Diabetes ([facility])- (present on admission)  Assessment & Plan  HbA1c  6.8  Continue Metformin  Now off Lantus and SSI  Outpt meds include Metformin 1000 mg bid and Actos 30 mg qd  Resume Actos per PCP discretion    Benign essential HTN- (present on admission)  Assessment & Plan  Continue Diltiazem and HCTZ  Observe blood pressure trends    Full Code

## 2023-11-09 NOTE — PROGRESS NOTES
NURSING DAILY NOTE    Name: Jose Castanon   Date of Admission: 10/24/2023   Admitting Diagnosis: Critical illness polyneuropathy (HCC)  Attending Physician: Sylvester Contreras M.d.  Allergies: Penicillins, Bee venom, Cefazolin, and Linezolid    Safety  Patient Assist  cga  Patient Precautions  Fall Risk  Precaution Comments  grace, tachycardia, L BKA prosthesis, wounds near sacrum, wounds on R heel and lateral R foot  Bed Transfer Status  Supervised  Toilet Transfer Status   Modified Independent  Assistive Devices  Rails, Wheelchair  Oxygen  CPAP  Diet/Therapeutic Dining  Current Diet Order   Procedures    Diet Order Diet: Consistent CHO (Diabetic)     Pill Administration  whole  Agitated Behavioral Scale  14  ABS Level of Severity  No Agitation    Fall Risk  Has the patient had a fall this admission?   No  Lulu Aguilera Fall Risk Scoring  16, HIGH RISK  Fall Risk Safety Measures  poor balance    Vitals  Temperature: 36.5 °C (97.7 °F)  Temp src: Oral  Pulse: 100  Respiration: 18  Blood Pressure : (!) 142/84  Blood Pressure MAP (Calculated): 103 MM HG  BP Location: Right, Upper Arm  Patient BP Position: Sitting     Oxygen  Pulse Oximetry: 94 %  O2 (LPM): 0  FiO2%: 21 %  O2 Delivery Device: CPAP    Bowel and Bladder  Last Bowel Movement  11/08/23  Stool Type  Type 7: Watery, no solid pieces-entirely liquid  Bowel Device  Diaper  Continent  Bladder: Did not void   Bowel: No movement  Bladder Function  Urine Void (mL): 300 ml (urinals)  Number of Times Voided: 1  Urine Color: Unable To Evaluate  Urine Clarity: Clear  Genitourinary Assessment   Bladder Assessment (WDL):  WDL Except  Grace Catheter: Not Applicable  Grace Reasons per MD Order: Neurogenic bladder  Grace Care: Given with Soap and Water  Urinary Elimination: Catheter (Document on LDA)  Urine Color: Unable To Evaluate  Urine Clarity: Clear  Bladder Device: Urinal  $ Bladder Scan Results (mL):  48    Skin  Des Score   15  Sensory Interventions   Bed Types: Bariatric Low Airloss  Skin Preventative Measures: Pillows in Use for Support / Positioning, Pillows in Use to Float Heels  Moisture Interventions  Protocols: Pressure Ulcer Prevention / Intervention Protocol in Place, Appropriate Wound Protocols in Place  Moisturizers/Barriers: Barrier Wipes  Containment Devices: Indwelling Catheter      Pain  Pain Rating Scale  3 - Sometimes distracts me  Pain Location  Hand  Pain Location Orientation  Right, Left  Pain Interventions   Declines    ADLs    Bathing   Staff, Shower  Linen Change   Complete  Personal Hygiene  Change Ankita Pads, Moist Ankita Wipes, Perineal Care  Chlorhexidine Bath      Oral Care   (self)  Teeth/Dentures     Shave     Nutrition Percentage Eaten  Lunch, Between % Consumed  Environmental Precautions  Bed in Low Position, Treaded Slipper Socks on Patient  Patient Turns/Positioning  Patient Turns Self from Side to Side  Patient Turns Assistance/Tolerance  Assistance of One  Bed Positions  Bed Controls On  Head of Bed Elevated  Self regulated      Psychosocial/Neurologic Assessment  Psychosocial Assessment  Psychosocial (WDL):  Within Defined Limits  Neurologic Assessment  Neuro (WDL): Exceptions to WDL  Level of Consciousness: Alert  Orientation Level: Oriented X4  Cognition: Follows commands, Appropriate attention/concentration, Appropriate judgement, Appropriate safety awareness  Speech: Clear  Motor Function/Sensation Assessment: Motor strength  RUE Sensation: No numbness  Muscle Strength Right Arm: Good Strength Against Gravity and Moderate Resistance  LUE Sensation: No numbness  Muscle Strength Left Arm: Good Strength Against Gravity and Moderate Resistance  RLE Sensation: Numbness, Tingling  Muscle Strength Right Leg: Good Strength Against Gravity and Moderate Resistance  LLE Sensation: Numbness, Tingling  Muscle Strength Left Leg: Good Strength Against Gravity and Moderate  Resistance  EENT (WDL):  WDL Except    Cardio/Pulmonary Assessment  Edema   RLE Edema: Generalized  Respiratory Breath Sounds  RUL Breath Sounds: Clear  RML Breath Sounds: Clear  RLL Breath Sounds: Clear, Diminished  MIGUELINA Breath Sounds: Clear  LLL Breath Sounds: Clear, Diminished  Cardiac Assessment   Cardiac (WDL):  WDL Except (HTN/ CAD)

## 2023-11-09 NOTE — PROGRESS NOTES
Patient discharged to home per order.  Discharge instructions reviewed with patient and sister; they verbalize understanding and signed copies placed in chart.  Patient has all belongings. Patient left facility at 1120 via wheelchair accompanied by rehab staff and sister.  Have enjoyed working with this pleasant patient.

## 2023-11-09 NOTE — PROGRESS NOTES
NURSING DAILY NOTE    Name: Jose Castanon   Date of Admission: 10/24/2023   Admitting Diagnosis: Critical illness polyneuropathy (HCC)  Attending Physician: Sylvester Contreras M.d.  Allergies: Penicillins, Bee venom, Cefazolin, and Linezolid    Safety  Patient Assist  cga  Patient Precautions  Fall Risk  Precaution Comments  grace, tachycardia, L BKA prosthesis, wounds near sacrum, wounds on R heel and lateral R foot  Bed Transfer Status  Supervised  Toilet Transfer Status   Modified Independent  Assistive Devices  Rails, Wheelchair  Oxygen  None - Room Air  Diet/Therapeutic Dining  Current Diet Order   Procedures    Diet Order Diet: Consistent CHO (Diabetic)     Pill Administration  whole  Agitated Behavioral Scale  14  ABS Level of Severity  No Agitation    Fall Risk  Has the patient had a fall this admission?   No  Lulu Aguilera Fall Risk Scoring  19, HIGH RISK  Fall Risk Safety Measures  bed alarm, chair alarm, poor balance, and low vision/ hearing    Vitals  Temperature: 36.9 °C (98.4 °F)  Temp src: Oral  Pulse: 76  Respiration: 18  Blood Pressure : 132/75  Blood Pressure MAP (Calculated): 94 MM HG  BP Location: Right, Upper Arm  Patient BP Position: Supine     Oxygen  Pulse Oximetry: 93 %  O2 (LPM): 0  FiO2%: 21 %  O2 Delivery Device: None - Room Air    Bowel and Bladder  Last Bowel Movement  11/09/23  Stool Type  Type 7: Watery, no solid pieces-entirely liquid  Bowel Device  Diaper  Continent  Bladder: Continent void   Bowel: Continent movement  Bladder Function  Urine Void (mL): 250 ml  Number of Times Voided: 1  Urine Color: Yellow  Urine Clarity: Clear  Genitourinary Assessment   Bladder Assessment (WDL):  Within Defined Limits  Grace Catheter: Not Applicable  Grace Reasons per MD Order: Neurogenic bladder  Grace Care: Given with Soap and Water  Urinary Elimination: Catheter (Document on LDA)  Urine Color: Yellow  Urine Clarity:  Clear  Bladder Device: Urinal  $ Bladder Scan Results (mL): 48    Skin  Des Score   13  Sensory Interventions   Bed Types: Bariatric Low Airloss  Skin Preventative Measures: Pillows in Use for Support / Positioning  Moisture Interventions  Protocols: Pressure Ulcer Prevention / Intervention Protocol in Place, Appropriate Wound Protocols in Place  Moisturizers/Barriers: Barrier Wipes  Containment Devices: Indwelling Catheter      Pain  Pain Rating Scale  2 - Notice Pain, does not interfere with activities  Pain Location  Hand  Pain Location Orientation  Right, Left  Pain Interventions   Declines    ADLs    Bathing   Staff, Shower  Linen Change   Complete  Personal Hygiene  Change Ankita Pads, Moist Ankita Wipes, Perineal Care  Chlorhexidine Bath      Oral Care   (self)  Teeth/Dentures     Shave     Nutrition Percentage Eaten  Lunch, Between % Consumed  Environmental Precautions  Bed in Low Position, Treaded Slipper Socks on Patient  Patient Turns/Positioning  Patient Turns Self from Side to Side  Patient Turns Assistance/Tolerance  Assistance of One  Bed Positions  Bed Controls On  Head of Bed Elevated  Self regulated      Psychosocial/Neurologic Assessment  Psychosocial Assessment  Psychosocial (WDL):  Within Defined Limits  Neurologic Assessment  Neuro (WDL): Exceptions to WDL  Level of Consciousness: Alert  Orientation Level: Oriented X4  Cognition: Follows commands, Appropriate attention/concentration, Appropriate judgement, Appropriate safety awareness  Speech: Clear  Motor Function/Sensation Assessment: Motor strength  RUE Sensation: No numbness  Muscle Strength Right Arm: Good Strength Against Gravity and Moderate Resistance  LUE Sensation: No numbness  Muscle Strength Left Arm: Good Strength Against Gravity and Moderate Resistance  RLE Sensation: Numbness, Tingling  Muscle Strength Right Leg: Good Strength Against Gravity and Moderate Resistance  LLE Sensation: Numbness, Tingling  Muscle Strength Left  Leg: Good Strength Against Gravity and Moderate Resistance  EENT (WDL):  WDL Except    Cardio/Pulmonary Assessment  Edema   RLE Edema: Generalized  Respiratory Breath Sounds  RUL Breath Sounds: Clear  RML Breath Sounds: Clear  RLL Breath Sounds: Clear  MIGUELINA Breath Sounds: Clear  LLL Breath Sounds: Clear  Cardiac Assessment   Cardiac (WDL):  WDL Except (HTN/ CAD)

## 2023-11-10 ENCOUNTER — PATIENT OUTREACH (OUTPATIENT)
Dept: MEDICAL GROUP | Facility: PHYSICIAN GROUP | Age: 68
End: 2023-11-10
Payer: MEDICARE

## 2023-11-10 NOTE — PROGRESS NOTES
Transitional Care Management  TCM Outreach Date and Time: Filed (11/10/2023  8:35 AM)    Discharge Questions  Actual Discharge Date: 11/09/23  Now that you are home, how are you feeling?: Good  Did you receive any new prescriptions?: Yes  Were you able to get them filled?: Yes  Meds to Bed or Pharmacy filled?: Meds to Bed  Do you have any questions about your current medications or new medications (Review Med Rec)?: No  Do you have a follow up appointment scheduled with your PCP?: Yes  Appointment Date: 11/14/23  Appointment Time: 1300  Any issues or paperwork you wish to discuss with your PCP?: No  Does this patient qualify for the CCM program?: No (has home health)    Transitional Care  Number of attempts made to contact patient: 1  Current or previous attempts competed within two business days of discharge? : Yes  Provided education regarding treatment plan, medications, self-management, ADLs?: Yes  Has patient completed an Advanced Directive?: No  Has the Care Manager's phone number provided?: No  Is there anything else I can help you with?: No    Discharge Summary  Chief Complaint: decreased mobility (perirectal abscess found to have Darion's gangrene. initially  went to [facility] had debridement, iv abx for the infection. wound vac placed, transferred to [facility].His admission was complicated by Afib, given amio,dig,eliquis. underwent I&D by Dr. Higuera 9/29)  Admitting Diagnosis: critical illness polyneuropathy  Discharge Diagnosis: critical illness polyneuropathy

## 2023-11-11 ENCOUNTER — HOME CARE VISIT (OUTPATIENT)
Dept: HOME HEALTH SERVICES | Facility: HOME HEALTHCARE | Age: 68
End: 2023-11-11
Payer: MEDICARE

## 2023-11-11 VITALS
BODY MASS INDEX: 48.53 KG/M2 | DIASTOLIC BLOOD PRESSURE: 68 MMHG | HEIGHT: 72 IN | SYSTOLIC BLOOD PRESSURE: 128 MMHG | TEMPERATURE: 97.6 F | OXYGEN SATURATION: 98 % | RESPIRATION RATE: 16 BRPM | HEART RATE: 92 BPM

## 2023-11-11 PROCEDURE — A6250 SKIN SEAL PROTECT MOISTURIZR: HCPCS

## 2023-11-11 PROCEDURE — 665001 SOC-HOME HEALTH

## 2023-11-11 PROCEDURE — G0299 HHS/HOSPICE OF RN EA 15 MIN: HCPCS

## 2023-11-11 PROCEDURE — A5120 SKIN BARRIER, WIPE OR SWAB: HCPCS

## 2023-11-11 PROCEDURE — A6197 ALGINATE DRSG >16 <=48 SQ IN: HCPCS

## 2023-11-11 PROCEDURE — A6212 FOAM DRG <=16 SQ IN W/BORDER: HCPCS

## 2023-11-11 SDOH — ECONOMIC STABILITY: HOUSING INSECURITY: HOME SAFETY: SN EDUCATED PT IN REGARDS TO FALL PREVENTION USING HANDOUT IN WHITE BINDER

## 2023-11-11 ASSESSMENT — ENCOUNTER SYMPTOMS
LOSS OF SENSATION: 1
NAUSEA: PT DENIES
NAIL CHANGES: 1
LOWEST PAIN SEVERITY IN PAST 24 HOURS: 4/10
DYSPNEA ON EXERTION: 1
PAIN SEVERITY GOAL: 2/10
FATIGUES EASILY: 1
LAST BOWEL MOVEMENT: 66789
PAIN: 1
SHORTNESS OF BREATH: 1
SKIN CHANGES: 1
EDEMA: 1
SKIN LESIONS: 1
STOOL FREQUENCY: DAILY
DYSPNEA ACTIVITY LEVEL: AFTER AMBULATING 10 - 20 FT
DRY SKIN: 1
HIGHEST PAIN SEVERITY IN PAST 24 HOURS: 5/10
PERSON REPORTING PAIN: PATIENT
POOR WOUND HEALING: 1
VOMITING: PT DENIES
SUBJECTIVE PAIN PROGRESSION: GRADUALLY IMPROVING
BOWEL PATTERN NORMAL: 1

## 2023-11-11 ASSESSMENT — ACTIVITIES OF DAILY LIVING (ADL)
USING THE TELPHONE: INDEPENDENT
DRESSING_UB_CURRENT_FUNCTION: STAND BY ASSIST
DRESSING_LB_CURRENT_FUNCTION: MODERATE ASSIST
PHYSICAL TRANSFERS ASSESSED: 1
AMBULATION ASSISTANCE: 1
ORAL_CARE_CURRENT_FUNCTION: INDEPENDENT
TOILETING: ONE PERSON
CURRENT_FUNCTION: MINIMUM ASSIST
BATHING_CURRENT_FUNCTION: MINIMUM ASSIST
BATHING_CURRENT_FUNCTION: ONE PERSON
GROOMING_CURRENT_FUNCTION: MODERATE ASSIST
FEEDING: INDEPENDENT
GROOMING ASSESSED: 1
OASIS_M1830: 05
FEEDING ASSESSED: 1
TOILETING: 1
ORAL_CARE_ASSESSED: 1
AMBULATION ASSISTANCE: CONTACT GUARD ASSIST
PREPARING MEALS: NEEDS ASSISTANCE
TRANSPORTATION COMMENTS: PT NEEDS ASSISTANCE TO LEAVE HOME
TELEPHONE USE ASSESSED: 1
BATHING ASSESSED: 1
TOILETING: MINIMUM ASSIST

## 2023-11-13 ENCOUNTER — HOME CARE VISIT (OUTPATIENT)
Dept: HOME HEALTH SERVICES | Facility: HOME HEALTHCARE | Age: 68
End: 2023-11-13
Payer: MEDICARE

## 2023-11-13 ENCOUNTER — DOCUMENTATION (OUTPATIENT)
Dept: MEDICAL GROUP | Facility: PHYSICIAN GROUP | Age: 68
End: 2023-11-13
Payer: MEDICARE

## 2023-11-13 VITALS
TEMPERATURE: 97.5 F | RESPIRATION RATE: 16 BRPM | DIASTOLIC BLOOD PRESSURE: 74 MMHG | HEART RATE: 93 BPM | SYSTOLIC BLOOD PRESSURE: 128 MMHG | OXYGEN SATURATION: 95 %

## 2023-11-13 PROCEDURE — G0180 MD CERTIFICATION HHA PATIENT: HCPCS | Performed by: FAMILY MEDICINE

## 2023-11-13 PROCEDURE — G0151 HHCP-SERV OF PT,EA 15 MIN: HCPCS

## 2023-11-13 PROCEDURE — G0299 HHS/HOSPICE OF RN EA 15 MIN: HCPCS

## 2023-11-13 ASSESSMENT — GAIT ASSESSMENTS
WALKING STANCE: 0 - HEELS APART
INITIATION OF GAIT IMMEDIATELY AFTER GO: 1 - NO HESITANCY
TRUNK SCORE: 0
TRUNK: 0 - MARKED SWAY OR USES WALKING AID
STEP CONTINUITY: 1 - STEPS APPEAR CONTINUOUS
GAIT SCORE: 8
PATH: 1 - MILD/MODERATE DEVIATION OR USES WALKING AID
PATH SCORE: 1
STEP SYMMETRY: 1 - RIGHT AND LEFT STEP LENGTH APPEAR EQUAL
BALANCE AND GAIT SCORE: 14

## 2023-11-13 ASSESSMENT — ACTIVITIES OF DAILY LIVING (ADL)
PHYSICAL_TRANSFERS_DEVICES: USE OF B UES TO ASSIST
CURRENT_FUNCTION: STAND BY ASSIST
PHYSICAL TRANSFERS ASSESSED: 1
GROOMING_COMMENTS: SEE OT NOTES
AMBULATION ASSISTANCE: 1
AMBULATION ASSISTANCE ON FLAT SURFACES: 1
AMBULATION ASSISTANCE: STAND BY ASSIST
FEEDING_COMMENTS: SEE OT NOTES
BATHING_COMMENTS: SEE OT NOTES

## 2023-11-13 ASSESSMENT — ENCOUNTER SYMPTOMS
PAIN LOCATION - PAIN DURATION: DAILY
PAIN LOCATION - PAIN QUALITY: ACHING, BURNING
PAIN LOCATION - PAIN FREQUENCY: CONSTANT
PERSON REPORTING PAIN: PATIENT
MUSCLE WEAKNESS: 1
PAIN: 1
PAIN LOCATION: PERINEUM
PAIN LOCATION - PAIN SEVERITY: 6/10

## 2023-11-13 ASSESSMENT — BALANCE ASSESSMENTS
NUDGED: 0 - BEGINS TO FALL
ATTEMPTS TO ARISE: 2 - ABLE TO RISE, ONE ATTEMPT
TURNING 360 DEGREES STEPS: 0 - DISCONTINUOUS STEPS
IMMEDIATE STANDING BALANCE FIRST 5 SECONDS: 0 - UNSTEADY (STAGGERS, MOVES FEET, TRUNK SWAY)
BALANCE SCORE: 6
STANDING BALANCE: 1 - STEADY BUT WIDE STANCE AND USES CANE OR OTHER SUPPORT
NUDGED SCORE: 0
SITTING BALANCE: 1 - STEADY, SAFE
ARISING SCORE: 1
EYES CLOSED AT MAXIMUM POSITION NUDGED: 0 - UNSTEADY
ARISES: 1 - ABLE, USES ARMS TO HELP
SITTING DOWN: 1 - USES ARMS OR NOT SMOOTH MOTION

## 2023-11-13 NOTE — PROGRESS NOTES
Medication chart review for [facility] services    Received referral from [facility].   Medications reviewed  compared with discharge summary if available.  Discharge summary date, if applicable:   11/9/23    Current medication list per [facility]     Medication list one, patient is currently taking    Current Outpatient Medications:     Loratadine-Pseudoephedrine (KLS ALLERCLEAR D-24HR PO), 1 Tablet, Oral, DAILY    acetaminophen, 500-1,000 mg, Oral, Q6HRS PRN    allopurinol, 100 mg, Oral, DAILY    amiodarone, 200 mg, Oral, DAILY    apixaban, 5 mg, Oral, BID    atorvastatin, 20 mg, Oral, Q EVENING    dilTIAZem, 30 mg, Oral, Q8HRS    gabapentin, 400 mg, Oral, TID    hydroCHLOROthiazide, 25 mg, Oral, DAILY    metformin, TAKE 1 TABLET BY MOUTH TWICE DAILY WITH FOOD    potassium chloride SA, 20 mEq, Oral, DAILY    pioglitazone, 30 mg, Oral, DAILY    vitamin D3, 1 Tablet, Oral, DAILY      Medication list two, drugs that the patient has been prescribed or recommended to take by their healthcare provider on discharge summary  START taking these medications         Instructions   amiodarone 200 MG Tabs  Start taking on: November 9, 2023  Commonly known as: Cordarone    Take 1 Tablet by mouth every day.  Dose: 200 mg      apixaban 5mg Tabs  Commonly known as: Eliquis    Take 1 Tablet by mouth 2 times a day.  Dose: 5 mg      dilTIAZem 30 MG Tabs  Commonly known as: Cardizem    Take 1 Tablet by mouth every 8 hours.  Dose: 30 mg      potassium chloride SA 20 MEQ Tbcr  Start taking on: November 9, 2023  Commonly known as: Kdur    Take 1 Tablet by mouth every day.  Dose: 20 mEq                CHANGE how you take these medications         Instructions   gabapentin 400 MG Caps  What changed:   medication strength  how much to take  Commonly known as: Neurontin    Take 1 Capsule by mouth 3 times a day.  Dose: 400 mg      metFORMIN 850 MG Tabs  What changed: medication strength  Commonly known as: Glucophage    TAKE 1  TABLET BY MOUTH TWICE DAILY WITH FOOD                CONTINUE taking these medications         Instructions   allopurinol 100 MG Tabs  Commonly known as: Zyloprim    Take 1 Tablet by mouth every day.  Dose: 100 mg      atorvastatin 20 MG Tabs  Commonly known as: Lipitor    Take 1 Tablet by mouth every evening.  Dose: 20 mg      hydroCHLOROthiazide 25 MG Tabs    Take 1 Tablet by mouth every day.  Dose: 25 mg      pioglitazone 30 MG Tabs  Commonly known as: Actos    TAKE 1 TABLET BY MOUTH EVERY DAY  Dose: 30 mg      vitamin D 1000 UNIT Tabs  Commonly known as: Cholecalciferol    Take 1 Tab by mouth every day.  Dose: 1,000 Units                STOP taking these medications       atenolol 100 MG Tabs  Commonly known as: Tenormin      doxycycline 100 MG Tabs  Commonly known as: Vibramycin      lisinopril 40 MG tablet  Commonly known as: Prinivil      Loratadine 10 MG Caps      Xarelto 20 MG Tabs tablet  Generic drug: rivaroxaban                 Allergies   Allergen Reactions    Penicillins Unspecified and Swelling     Given in eye drops as a child, eyes swell  2003-06-09;ITCH   Create Date: [id number] Create User Name: Juan J Castellanos Update Date: [id number] Update User Name: Juan J Castellanos    Bee Venom Swelling    Cefazolin Rash, Itching and Swelling    Linezolid Unspecified     Caused unk lab value to increase.       Labs     Lab Results   Component Value Date/Time    SODIUM 142 11/08/2023 05:26 AM    POTASSIUM 3.9 11/08/2023 05:26 AM    CHLORIDE 104 11/08/2023 05:26 AM    CO2 24 11/08/2023 05:26 AM    GLUCOSE 124 (H) 11/08/2023 05:26 AM    BUN 27 (H) 11/08/2023 05:26 AM    CREATININE 1.31 11/08/2023 05:26 AM    GLOMRATE 52 (L) 09/08/2023 08:50 AM     Lab Results   Component Value Date/Time    ALKPHOSPHAT 103 (H) 10/25/2023 05:36 AM    ASTSGOT 17 10/25/2023 05:36 AM    ALTSGPT 15 10/25/2023 05:36 AM    TBILIRUBIN 0.3 10/25/2023 05:36 AM    ALBUMIN 3.2 10/25/2023 05:36 AM        Assessment for clinically significant drug  interactions, drug omissions/additions, duplicative therapies.            CC   Theo James M.D.  [address] [facility] [address] [facility] [address]  [place] [address]  Fax: [phone]    [facility]  Phone [phone] fax [phone]    This note was created using voice recognition software (Dragon). The accuracy of the dictation is limited by the abilities of the software. I have reviewed the note prior to signing, however some errors in grammar and context are still possible. If you have any questions related to this note please do not hesitate to contact our office.

## 2023-11-14 ENCOUNTER — HOME CARE VISIT (OUTPATIENT)
Dept: HOME HEALTH SERVICES | Facility: HOME HEALTHCARE | Age: 68
End: 2023-11-14
Payer: MEDICARE

## 2023-11-14 ENCOUNTER — PATIENT OUTREACH (OUTPATIENT)
Dept: HEALTH INFORMATION MANAGEMENT | Facility: OTHER | Age: 68
End: 2023-11-14

## 2023-11-14 ENCOUNTER — PATIENT MESSAGE (OUTPATIENT)
Dept: HEALTH INFORMATION MANAGEMENT | Facility: OTHER | Age: 68
End: 2023-11-14

## 2023-11-14 ENCOUNTER — OFFICE VISIT (OUTPATIENT)
Dept: MEDICAL GROUP | Facility: PHYSICIAN GROUP | Age: 68
End: 2023-11-14
Payer: MEDICARE

## 2023-11-14 VITALS
BODY MASS INDEX: 42.66 KG/M2 | HEART RATE: 90 BPM | HEIGHT: 72 IN | OXYGEN SATURATION: 96 % | RESPIRATION RATE: 12 BRPM | DIASTOLIC BLOOD PRESSURE: 76 MMHG | WEIGHT: 315 LBS | SYSTOLIC BLOOD PRESSURE: 122 MMHG | TEMPERATURE: 97.8 F

## 2023-11-14 VITALS
DIASTOLIC BLOOD PRESSURE: 74 MMHG | TEMPERATURE: 97.6 F | OXYGEN SATURATION: 95 % | HEART RATE: 84 BPM | RESPIRATION RATE: 16 BRPM | SYSTOLIC BLOOD PRESSURE: 120 MMHG

## 2023-11-14 DIAGNOSIS — E11.622: ICD-10-CM

## 2023-11-14 DIAGNOSIS — E11.51 DM (DIABETES MELLITUS) TYPE II, CONTROLLED, WITH PERIPHERAL VASCULAR DISORDER (HCC): ICD-10-CM

## 2023-11-14 DIAGNOSIS — Z09 HOSPITAL DISCHARGE FOLLOW-UP: ICD-10-CM

## 2023-11-14 DIAGNOSIS — I10 BENIGN ESSENTIAL HTN: ICD-10-CM

## 2023-11-14 DIAGNOSIS — L97.219: ICD-10-CM

## 2023-11-14 DIAGNOSIS — N49.3 FOURNIER GANGRENE: ICD-10-CM

## 2023-11-14 DIAGNOSIS — I48.0 PAROXYSMAL ATRIAL FIBRILLATION (HCC): ICD-10-CM

## 2023-11-14 DIAGNOSIS — N18.31 STAGE 3A CHRONIC KIDNEY DISEASE: ICD-10-CM

## 2023-11-14 DIAGNOSIS — Z23 NEED FOR VACCINATION: ICD-10-CM

## 2023-11-14 PROBLEM — R94.6 BORDERLINE ABNORMAL TFTS: Status: RESOLVED | Noted: 2023-10-25 | Resolved: 2023-11-14

## 2023-11-14 PROBLEM — Z86.718 HISTORY OF DVT (DEEP VEIN THROMBOSIS): Status: RESOLVED | Noted: 2020-01-21 | Resolved: 2023-11-14

## 2023-11-14 PROBLEM — M10.9 GOUT: Status: RESOLVED | Noted: 2023-10-25 | Resolved: 2023-11-14

## 2023-11-14 PROBLEM — E87.6 HYPOKALEMIA: Status: RESOLVED | Noted: 2020-03-18 | Resolved: 2023-11-14

## 2023-11-14 PROBLEM — E87.8 ELECTROLYTE ABNORMALITY: Status: RESOLVED | Noted: 2023-11-06 | Resolved: 2023-11-14

## 2023-11-14 PROBLEM — G62.81 CRITICAL ILLNESS POLYNEUROPATHY (HCC): Status: RESOLVED | Noted: 2023-10-24 | Resolved: 2023-11-14

## 2023-11-14 PROBLEM — D72.829 LEUKOCYTOSIS: Status: RESOLVED | Noted: 2023-10-25 | Resolved: 2023-11-14

## 2023-11-14 PROBLEM — Z79.01 LONG TERM (CURRENT) USE OF ANTICOAGULANTS: Status: RESOLVED | Noted: 2020-01-21 | Resolved: 2023-11-14

## 2023-11-14 PROBLEM — E55.9 VITAMIN D DEFICIENCY: Status: RESOLVED | Noted: 2020-03-11 | Resolved: 2023-11-14

## 2023-11-14 PROBLEM — D64.9 ANEMIA: Status: RESOLVED | Noted: 2023-10-25 | Resolved: 2023-11-14

## 2023-11-14 PROBLEM — E11.69 HYPERLIPIDEMIA ASSOCIATED WITH TYPE 2 DIABETES MELLITUS (HCC): Status: RESOLVED | Noted: 2022-09-09 | Resolved: 2023-11-14

## 2023-11-14 PROBLEM — E78.5 HYPERLIPIDEMIA ASSOCIATED WITH TYPE 2 DIABETES MELLITUS (HCC): Status: RESOLVED | Noted: 2022-09-09 | Resolved: 2023-11-14

## 2023-11-14 PROCEDURE — G0008 ADMIN INFLUENZA VIRUS VAC: HCPCS | Performed by: FAMILY MEDICINE

## 2023-11-14 PROCEDURE — 99214 OFFICE O/P EST MOD 30 MIN: CPT | Mod: 25 | Performed by: FAMILY MEDICINE

## 2023-11-14 PROCEDURE — 3078F DIAST BP <80 MM HG: CPT | Performed by: FAMILY MEDICINE

## 2023-11-14 PROCEDURE — 3074F SYST BP LT 130 MM HG: CPT | Performed by: FAMILY MEDICINE

## 2023-11-14 PROCEDURE — 90662 IIV NO PRSV INCREASED AG IM: CPT | Performed by: FAMILY MEDICINE

## 2023-11-14 RX ORDER — PROCHLORPERAZINE MALEATE 5 MG/1
5 TABLET ORAL
COMMUNITY
Start: 2023-09-08 | End: 2023-11-15

## 2023-11-14 RX ORDER — LANOLIN ALCOHOL/MO/W.PET/CERES
400 CREAM (GRAM) TOPICAL DAILY
COMMUNITY
Start: 2023-09-08 | End: 2023-11-15

## 2023-11-14 RX ORDER — LOPERAMIDE HYDROCHLORIDE 2 MG/1
2 CAPSULE ORAL
COMMUNITY
Start: 2023-09-08 | End: 2023-11-15

## 2023-11-14 RX ORDER — HYDROCORTISONE 25 MG/G
CREAM TOPICAL
COMMUNITY
Start: 2023-08-20 | End: 2023-11-15

## 2023-11-14 RX ORDER — NYSTATIN 100000 [USP'U]/G
1 POWDER TOPICAL 2 TIMES DAILY
COMMUNITY
Start: 2023-09-08 | End: 2023-11-15

## 2023-11-14 RX ORDER — SIMETHICONE 80 MG
80 TABLET,CHEWABLE ORAL
COMMUNITY
Start: 2023-09-08 | End: 2023-11-15

## 2023-11-14 RX ORDER — DIPHENHYDRAMINE HCL 25 MG
25 CAPSULE ORAL
COMMUNITY
Start: 2023-09-08 | End: 2023-11-15

## 2023-11-14 RX ORDER — TAMSULOSIN HYDROCHLORIDE 0.4 MG/1
0.4 CAPSULE ORAL DAILY
COMMUNITY
Start: 2023-09-09 | End: 2023-11-15

## 2023-11-14 RX ORDER — POLYETHYLENE GLYCOL 3350 17 G/17G
17 POWDER, FOR SOLUTION ORAL
COMMUNITY
Start: 2023-09-08 | End: 2023-11-15

## 2023-11-14 RX ORDER — SENNOSIDES A AND B 8.6 MG/1
2 TABLET, FILM COATED ORAL
COMMUNITY
Start: 2023-09-08 | End: 2023-11-15

## 2023-11-14 RX ORDER — OXYCODONE HYDROCHLORIDE 5 MG/1
5-10 TABLET ORAL
COMMUNITY
Start: 2023-09-08 | End: 2023-11-15

## 2023-11-14 RX ORDER — INSULIN ASPART 100 [IU]/ML
7 INJECTION, SOLUTION INTRAVENOUS; SUBCUTANEOUS
COMMUNITY
Start: 2023-09-08 | End: 2023-11-15

## 2023-11-14 RX ORDER — ATENOLOL 100 MG/1
1 TABLET ORAL
COMMUNITY
End: 2023-11-15

## 2023-11-14 RX ORDER — PANTOPRAZOLE SODIUM 40 MG/1
40 TABLET, DELAYED RELEASE ORAL
COMMUNITY
Start: 2023-09-08 | End: 2023-11-15

## 2023-11-14 RX ORDER — NALOXONE HYDROCHLORIDE 4 MG/.1ML
SPRAY NASAL
COMMUNITY
Start: 2023-09-08 | End: 2023-11-15

## 2023-11-14 RX ORDER — LISINOPRIL 40 MG/1
1 TABLET ORAL
COMMUNITY
End: 2023-11-15

## 2023-11-14 RX ORDER — ONDANSETRON 4 MG/1
4 TABLET, ORALLY DISINTEGRATING ORAL
COMMUNITY
Start: 2023-09-08 | End: 2023-11-15

## 2023-11-14 RX ORDER — METOPROLOL TARTRATE 50 MG/1
50 TABLET, FILM COATED ORAL
COMMUNITY
Start: 2023-09-08 | End: 2023-11-15

## 2023-11-14 ASSESSMENT — ENCOUNTER SYMPTOMS
TINGLING: 0
BRUISES/BLEEDS EASILY: 0
FEVER: 0
HEMOPTYSIS: 0
LAST BOWEL MOVEMENT: 66791
PAIN: 1
RESPIRATORY NEGATIVE: 1
MYALGIAS: 0
HEARTBURN: 0
BOWEL PATTERN NORMAL: 1
SUBJECTIVE PAIN PROGRESSION: UNCHANGED
PAIN LOCATION - PAIN SEVERITY: 4/10
PAIN LOCATION - PAIN FREQUENCY: FREQUENT
PERSON REPORTING PAIN: PATIENT
CHILLS: 0
MUSCULOSKELETAL NEGATIVE: 1
PSYCHIATRIC NEGATIVE: 1
PAIN LOCATION - PAIN QUALITY: DULL, ACHY
ASSOCIATED SYMPTOMS: DENIES
VOMITING: DENIES
DOUBLE VISION: 0
PAIN LOCATION: BACK
COUGH: 0
HEADACHES: 0
FATIGUE: 1
DEPRESSION: 0
NAUSEA: 0
EYES NEGATIVE: 1
NAUSEA: DENIES
FATIGUES EASILY: 1
GASTROINTESTINAL NEGATIVE: 1
NEUROLOGICAL NEGATIVE: 1
PAIN LOCATION - RELIEVING FACTORS: REST
DIZZINESS: 0
LOWEST PAIN SEVERITY IN PAST 24 HOURS: 4/10
HIGHEST PAIN SEVERITY IN PAST 24 HOURS: 6/10
BLURRED VISION: 0
STOOL FREQUENCY: DAILY
PAIN SEVERITY GOAL: 1/10
PALPITATIONS: 0
MUSCLE WEAKNESS: 1

## 2023-11-14 ASSESSMENT — FIBROSIS 4 INDEX: FIB4 SCORE: 1.13

## 2023-11-14 NOTE — PROGRESS NOTES
Subjective:     Jose Castanon is a 68 y.o. male who presents for Hospital Follow-up.    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 On abx now and getting  wound care  Also with afib new onset and started on eliquis and good rate today  Dm now stable at home  Also new right heel ulcer and right lateral foot ulcer - both being seen by  wound care  Critical illness neuropathy improving with  PT and OT and using crutches to ambulate.    Current medicines (including reconciliation performed today)  Current Outpatient Medications   Medication Sig Dispense Refill    metoprolol tartrate (LOPRESSOR) 50 MG Tab Take 50 mg by mouth.      Loratadine-Pseudoephedrine (KLS ALLERCLEAR D-24HR PO) Take 1 Tablet by mouth every day. Indications: Seasonal allergies      acetaminophen (TYLENOL) 500 MG Tab Take 500-1,000 mg by mouth every 6 hours as needed for Mild Pain or Moderate Pain. Indications: Pain      allopurinol (ZYLOPRIM) 100 MG Tab Take 1 Tablet by mouth every day. 90 Tablet 2    amiodarone (CORDARONE) 200 MG Tab Take 1 Tablet by mouth every day. 30 Tablet 2    apixaban (ELIQUIS) 5mg Tab Take 1 tablet by mouth 2 times a day. 60 Tablet 2    atorvastatin (LIPITOR) 20 MG Tab Take 1 Tablet by mouth every evening. 100 Tablet 2    dilTIAZem (CARDIZEM) 30 MG Tab Take 1 Tablet by mouth every 8 hours. 90 Tablet 2    gabapentin (NEURONTIN) 400 MG Cap Take 1 capsule by mouth 3 times a day. 90 Capsule 2    hydroCHLOROthiazide 25 MG Tab Take 1 tablet by mouth every day. 100 Tablet 2    metFORMIN (GLUCOPHAGE) 850 MG Tab TAKE 1 TABLET BY MOUTH TWICE DAILY WITH FOOD 60 Tablet 2    potassium chloride SA (KDUR) 20 MEQ Tab CR Take 1 Tablet by mouth every day. 30 Tablet 2    pioglitazone (ACTOS) 30 MG Tab TAKE 1 TABLET BY MOUTH EVERY  Tablet 3    vitamin D (VITAMIND D3) 1000 UNIT Tab Take 1 Tablet by mouth every day. Indications:  Nutritional supplement 60 Tab     albuterol (PROVENTIL) 2.5mg/0.5ml Nebu Soln Inhale 2.5 mg. (Patient not taking: Reported on 11/14/2023)      atenolol (TENORMIN) 100 MG Tab Take 1 Tablet by mouth every day. (Patient not taking: Reported on 11/14/2023)      collagenase (SANTYL) ointment Apply  topically every day. (Patient not taking: Reported on 11/14/2023)      diphenhydrAMINE (BENADRYL) 25 MG capsule Take 25 mg by mouth. (Patient not taking: Reported on 11/14/2023)      hydrocortisone rectal (PERIANAL) 2.5% Cream APPLY RECTALLY TO THE AFFECTED AREA TWICE DAILY FOR 14 DAYS (Patient not taking: Reported on 11/14/2023)      insulin aspart (NOVOLOG) 100 UNIT/ML Solution Inject 7 Units under the skin. (Patient not taking: Reported on 11/14/2023)      lisinopril (PRINIVIL) 40 MG tablet Take 1 Tablet by mouth every day. (Patient not taking: Reported on 11/14/2023)      loperamide (IMODIUM) 2 MG Cap Take 2 mg by mouth. (Patient not taking: Reported on 11/14/2023)      magnesium oxide 400 (240 Mg) MG Tab Take 400 mg by mouth every day. (Patient not taking: Reported on 11/14/2023)      Naloxone (NARCAN) 4 MG/0.1ML Liquid Spray entire contents of 1 inhaler into 1 nostril for suspected opioid overdose.  Call 911.  Use second inhaler in other nostril in 2 to 3 minutes if needed. (Patient not taking: Reported on 11/14/2023)      nystatin (MYCOSTATIN) powder Apply 1 Application topically 2 times a day. (Patient not taking: Reported on 11/14/2023)      ondansetron (ZOFRAN ODT) 4 MG TABLET DISPERSIBLE Take 4 mg by mouth. (Patient not taking: Reported on 11/14/2023)      oxyCODONE immediate-release (ROXICODONE) 5 MG Tab Take 5-10 mg by mouth. (Patient not taking: Reported on 11/14/2023)      pantoprazole (PROTONIX) 40 MG Tablet Delayed Response Take 40 mg by mouth. (Patient not taking: Reported on 11/14/2023)      polyethylene glycol/lytes (MIRALAX) 17 g Pack Take 17 g by mouth. (Patient not taking: Reported on 11/14/2023)       prochlorperazine (COMPAZINE) 5 MG Tab Take 5 mg by mouth. (Patient not taking: Reported on 2023)      rivaroxaban (XARELTO) 20 MG Tab tablet Take 1 Tablet by mouth with dinner. (Patient not taking: Reported on 2023)      sennosides (SENOKOT) 8.6 MG Tab Take 2 Tablets by mouth. (Patient not taking: Reported on 2023)      simethicone (MYLICON) 80 MG Chew Tab 80 mg. (Patient not taking: Reported on 2023)      tamsulosin (FLOMAX) 0.4 MG capsule Take 0.4 mg by mouth every day. (Patient not taking: Reported on 2023)       No current facility-administered medications for this visit.       Allergies:   Penicillins, Bee venom, Cefazolin, and Linezolid    Social History     Tobacco Use    Smoking status: Former     Current packs/day: 0.00     Types: Cigarettes     Quit date: 1980     Years since quittin.8    Smokeless tobacco: Never   Vaping Use    Vaping Use: Never used   Substance Use Topics    Alcohol use: Not Currently     Comment: OCC    Drug use: Never       ROS:  Review of Systems   Constitutional:  Positive for malaise/fatigue. Negative for chills and fever.   HENT: Negative.  Negative for hearing loss.    Eyes: Negative.  Negative for blurred vision and double vision.   Respiratory: Negative.  Negative for cough and hemoptysis.    Cardiovascular:  Positive for leg swelling. Negative for chest pain and palpitations.   Gastrointestinal: Negative.  Negative for heartburn and nausea.   Genitourinary: Negative.  Negative for dysuria.   Musculoskeletal: Negative.  Negative for myalgias.   Skin: Negative.  Negative for rash.   Neurological: Negative.  Negative for dizziness, tingling and headaches.   Endo/Heme/Allergies: Negative.  Does not bruise/bleed easily.   Psychiatric/Behavioral: Negative.  Negative for depression and suicidal ideas.    All other systems reviewed and are negative.        Objective:     Body mass index is 48.69 kg/m².  Physical Exam  Constitutional:        General: He is not in acute distress.     Appearance: He is not diaphoretic.   HENT:      Head: Normocephalic and atraumatic.      Right Ear: External ear normal.      Left Ear: External ear normal.      Nose: Nose normal.      Mouth/Throat:      Pharynx: No oropharyngeal exudate.   Eyes:      General: No scleral icterus.        Right eye: No discharge.         Left eye: No discharge.      Conjunctiva/sclera: Conjunctivae normal.      Pupils: Pupils are equal, round, and reactive to light.   Neck:      Thyroid: No thyromegaly.      Vascular: No JVD.      Trachea: No tracheal deviation.   Cardiovascular:      Rate and Rhythm: Normal rate. Rhythm regularly irregular.      Heart sounds: Normal heart sounds. No murmur heard.     No friction rub. No gallop.   Pulmonary:      Effort: Pulmonary effort is normal. No respiratory distress.      Breath sounds: Normal breath sounds. No stridor. No wheezing or rales.   Chest:      Chest wall: No tenderness.   Abdominal:      General: Bowel sounds are normal. There is no distension.      Palpations: Abdomen is soft. There is no mass.      Tenderness: There is no abdominal tenderness. There is no guarding or rebound.   Musculoskeletal:         General: No tenderness. Normal range of motion.      Cervical back: Normal range of motion and neck supple.   Lymphadenopathy:      Cervical: No cervical adenopathy.   Skin:     General: Skin is warm.      Comments: Left bka present.  Right heel hard eschar 3 by 2 cm ulcer  Lateral foot abrased area   Neurological:      Mental Status: He is alert and oriented to person, place, and time.      Cranial Nerves: No cranial nerve deficit.      Coordination: Coordination normal.   Psychiatric:         Mood and Affect: Mood and affect normal.         Cognition and Memory: Memory normal.         Judgment: Judgment normal.       Vitals:    11/14/23 1258   BP: 122/76   Pulse: 90   Resp: 12   Temp: 36.6 °C (97.8 °F)   SpO2: 96%           Physical  Exam:      Assessment and Plan:   1. Hospital discharge follow-up    2. Darion gangrene    3. Need for vaccination  - Influenza Vaccine, High Dose (65+ Only)    4. Diabetic ulcer of right calf (HCC)    5. DM (diabetes mellitus) type II, controlled, with peripheral vascular disorder (HCC)    6. Paroxysmal atrial fibrillation (HCC)    Other orders  - albuterol (PROVENTIL) 2.5mg/0.5ml Nebu Soln; Inhale 2.5 mg. (Patient not taking: Reported on 11/14/2023)  - atenolol (TENORMIN) 100 MG Tab; Take 1 Tablet by mouth every day. (Patient not taking: Reported on 11/14/2023)  - collagenase (SANTYL) ointment; Apply  topically every day. (Patient not taking: Reported on 11/14/2023)  - diphenhydrAMINE (BENADRYL) 25 MG capsule; Take 25 mg by mouth. (Patient not taking: Reported on 11/14/2023)  - hydrocortisone rectal (PERIANAL) 2.5% Cream; APPLY RECTALLY TO THE AFFECTED AREA TWICE DAILY FOR 14 DAYS (Patient not taking: Reported on 11/14/2023)  - insulin aspart (NOVOLOG) 100 UNIT/ML Solution; Inject 7 Units under the skin. (Patient not taking: Reported on 11/14/2023)  - lisinopril (PRINIVIL) 40 MG tablet; Take 1 Tablet by mouth every day. (Patient not taking: Reported on 11/14/2023)  - loperamide (IMODIUM) 2 MG Cap; Take 2 mg by mouth. (Patient not taking: Reported on 11/14/2023)  - magnesium oxide 400 (240 Mg) MG Tab; Take 400 mg by mouth every day. (Patient not taking: Reported on 11/14/2023)  - metoprolol tartrate (LOPRESSOR) 50 MG Tab; Take 50 mg by mouth.  - Naloxone (NARCAN) 4 MG/0.1ML Liquid; Spray entire contents of 1 inhaler into 1 nostril for suspected opioid overdose.  Call 911.  Use second inhaler in other nostril in 2 to 3 minutes if needed. (Patient not taking: Reported on 11/14/2023)  - nystatin (MYCOSTATIN) powder; Apply 1 Application topically 2 times a day. (Patient not taking: Reported on 11/14/2023)  - ondansetron (ZOFRAN ODT) 4 MG TABLET DISPERSIBLE; Take 4 mg by mouth. (Patient not taking: Reported on  11/14/2023)  - oxyCODONE immediate-release (ROXICODONE) 5 MG Tab; Take 5-10 mg by mouth. (Patient not taking: Reported on 11/14/2023)  - pantoprazole (PROTONIX) 40 MG Tablet Delayed Response; Take 40 mg by mouth. (Patient not taking: Reported on 11/14/2023)  - polyethylene glycol/lytes (MIRALAX) 17 g Pack; Take 17 g by mouth. (Patient not taking: Reported on 11/14/2023)  - prochlorperazine (COMPAZINE) 5 MG Tab; Take 5 mg by mouth. (Patient not taking: Reported on 11/14/2023)  - rivaroxaban (XARELTO) 20 MG Tab tablet; Take 1 Tablet by mouth with dinner. (Patient not taking: Reported on 11/14/2023)  - sennosides (SENOKOT) 8.6 MG Tab; Take 2 Tablets by mouth. (Patient not taking: Reported on 11/14/2023)  - simethicone (MYLICON) 80 MG Chew Tab; 80 mg. (Patient not taking: Reported on 11/14/2023)  - tamsulosin (FLOMAX) 0.4 MG capsule; Take 0.4 mg by mouth every day. (Patient not taking: Reported on 11/14/2023)      - Chart and discharge summary were reviewed.   - Hospitalization and results reviewed with patient.   - Medications reviewed including instructions regarding high risk medications, dosing and side effects.  - Recommended Services: No services needed at this time  - Advance directive/POLST on file?  No     Follow-up:No follow-ups on file.    Face-to-face transitional care management services with [facility]ERATE (today's visit is within 14 days post discharge & LACE+ score of 28-58) medical decision complexity were provided.     LACE+ Historical Score Over Time (0-28: Low, 29-58: Medium, 59+: High): 58      Critical Care

## 2023-11-15 ENCOUNTER — HOME CARE VISIT (OUTPATIENT)
Dept: HOME HEALTH SERVICES | Facility: HOME HEALTHCARE | Age: 68
End: 2023-11-15
Payer: MEDICARE

## 2023-11-15 VITALS
SYSTOLIC BLOOD PRESSURE: 118 MMHG | RESPIRATION RATE: 16 BRPM | HEART RATE: 93 BPM | OXYGEN SATURATION: 95 % | TEMPERATURE: 97.6 F | DIASTOLIC BLOOD PRESSURE: 60 MMHG

## 2023-11-15 VITALS
OXYGEN SATURATION: 95 % | DIASTOLIC BLOOD PRESSURE: 60 MMHG | SYSTOLIC BLOOD PRESSURE: 118 MMHG | RESPIRATION RATE: 18 BRPM | TEMPERATURE: 98 F | HEART RATE: 74 BPM

## 2023-11-15 PROCEDURE — G0299 HHS/HOSPICE OF RN EA 15 MIN: HCPCS

## 2023-11-15 PROCEDURE — G0151 HHCP-SERV OF PT,EA 15 MIN: HCPCS

## 2023-11-15 SDOH — HEALTH STABILITY: PHYSICAL HEALTH: EXERCISE TYPE: HOME EXERCISE PROGRAM

## 2023-11-15 ASSESSMENT — ENCOUNTER SYMPTOMS
BOWEL PATTERN NORMAL: 1
SUBJECTIVE PAIN PROGRESSION: WAXING AND WANING
PAIN LOCATION - PAIN DURATION: DAILY
NAUSEA: DENIES
PAIN: 1
VOMITING: DENIES
MUSCLE WEAKNESS: 1
PAIN LOCATION: PERINEUM
PAIN LOCATION - PAIN QUALITY: ACHING, BURNING
PAIN SEVERITY GOAL: 0/10
STOOL FREQUENCY: DAILY
PERSON REPORTING PAIN: PATIENT
FATIGUE: 1
LAST BOWEL MOVEMENT: 66793
HIGHEST PAIN SEVERITY IN PAST 24 HOURS: 7/10
FATIGUES EASILY: 1
PAIN LOCATION - PAIN SEVERITY: 6/10
LOWEST PAIN SEVERITY IN PAST 24 HOURS: 4/10
PAIN LOCATION - PAIN FREQUENCY: CONSTANT

## 2023-11-16 ENCOUNTER — HOME CARE VISIT (OUTPATIENT)
Dept: HOME HEALTH SERVICES | Facility: HOME HEALTHCARE | Age: 68
End: 2023-11-16
Payer: MEDICARE

## 2023-11-16 PROCEDURE — G0152 HHCP-SERV OF OT,EA 15 MIN: HCPCS

## 2023-11-17 ENCOUNTER — HOME CARE VISIT (OUTPATIENT)
Dept: HOME HEALTH SERVICES | Facility: HOME HEALTHCARE | Age: 68
End: 2023-11-17
Payer: MEDICARE

## 2023-11-17 VITALS
RESPIRATION RATE: 16 BRPM | SYSTOLIC BLOOD PRESSURE: 128 MMHG | TEMPERATURE: 97.9 F | HEART RATE: 94 BPM | OXYGEN SATURATION: 97 % | DIASTOLIC BLOOD PRESSURE: 60 MMHG

## 2023-11-17 PROCEDURE — G0155 HHCP-SVS OF CSW,EA 15 MIN: HCPCS

## 2023-11-17 PROCEDURE — G0299 HHS/HOSPICE OF RN EA 15 MIN: HCPCS

## 2023-11-17 ASSESSMENT — ACTIVITIES OF DAILY LIVING (ADL)
SHOPPING_REQUIRES_ASSISTANCE: 1
LAUNDRY_REQUIRES_ASSISTANCE: 1
BATHING_REQUIRES_ASSISTANCE: 1

## 2023-11-18 ENCOUNTER — HOME CARE VISIT (OUTPATIENT)
Dept: HOME HEALTH SERVICES | Facility: HOME HEALTHCARE | Age: 68
End: 2023-11-18
Payer: MEDICARE

## 2023-11-18 ASSESSMENT — ENCOUNTER SYMPTOMS
LOWEST PAIN SEVERITY IN PAST 24 HOURS: 0/10
LAST BOWEL MOVEMENT: 66795
MUSCLE WEAKNESS: 1
FATIGUES EASILY: 1
HIGHEST PAIN SEVERITY IN PAST 24 HOURS: 0/10
NAUSEA: DENIES
STOOL FREQUENCY: DAILY
PERSON REPORTING PAIN: PATIENT
PAIN SEVERITY GOAL: 0/10
PAIN: 1
BOWEL PATTERN NORMAL: 1
VOMITING: DENIES

## 2023-11-20 ENCOUNTER — HOME CARE VISIT (OUTPATIENT)
Dept: HOME HEALTH SERVICES | Facility: HOME HEALTHCARE | Age: 68
End: 2023-11-20
Payer: MEDICARE

## 2023-11-20 ENCOUNTER — TELEPHONE (OUTPATIENT)
Dept: HEALTH INFORMATION MANAGEMENT | Facility: OTHER | Age: 68
End: 2023-11-20
Payer: MEDICARE

## 2023-11-20 VITALS
SYSTOLIC BLOOD PRESSURE: 110 MMHG | HEART RATE: 89 BPM | TEMPERATURE: 97.8 F | RESPIRATION RATE: 16 BRPM | OXYGEN SATURATION: 95 % | DIASTOLIC BLOOD PRESSURE: 80 MMHG

## 2023-11-20 VITALS
OXYGEN SATURATION: 96 % | DIASTOLIC BLOOD PRESSURE: 80 MMHG | SYSTOLIC BLOOD PRESSURE: 132 MMHG | HEART RATE: 92 BPM | TEMPERATURE: 96 F | RESPIRATION RATE: 16 BRPM

## 2023-11-20 PROCEDURE — G0151 HHCP-SERV OF PT,EA 15 MIN: HCPCS

## 2023-11-20 PROCEDURE — G0493 RN CARE EA 15 MIN HH/HOSPICE: HCPCS

## 2023-11-20 ASSESSMENT — ENCOUNTER SYMPTOMS
PERSON REPORTING PAIN: PATIENT
SUBJECTIVE PAIN PROGRESSION: WAXING AND WANING
HIGHEST PAIN SEVERITY IN PAST 24 HOURS: 7/10
PAIN LOCATION - PAIN DURATION: DAILY
PAIN LOCATION: BACK
PAIN LOCATION - PAIN SEVERITY: 6/10
PAIN LOCATION - PAIN FREQUENCY: FREQUENT
PERSON REPORTING PAIN: PATIENT
PAIN: 1
PAIN LOCATION - PAIN QUALITY: BURNING, ACHING
PAIN: 1
LOWEST PAIN SEVERITY IN PAST 24 HOURS: 4/10
PAIN LOCATION - PAIN FREQUENCY: CONSTANT
PAIN LOCATION - PAIN QUALITY: ACHING
PAIN LOCATION - PAIN SEVERITY: 6/10
PAIN SEVERITY GOAL: 0/10
PAIN LOCATION - EXACERBATING FACTORS: NOTHING
PAIN LOCATION: PERINEUM

## 2023-11-20 ASSESSMENT — ACTIVITIES OF DAILY LIVING (ADL)
DRESSING_UB_CURRENT_FUNCTION: INDEPENDENT
CURRENT_FUNCTION: STAND BY ASSIST
LAUNDRY: NEEDS ASSISTANCE
ORAL_CARE_ASSESSED: 1
FEEDING_WITHIN_DEFINED_LIMITS: 1
DRESSING_LB_CURRENT_FUNCTION: STAND BY ASSIST
PHYSICAL TRANSFERS ASSESSED: 1
AMBULATION ASSISTANCE: STAND BY ASSIST
GROOMING_CURRENT_FUNCTION: INDEPENDENT
HOUSEKEEPING ASSESSED: 1
GROOMING ASSESSED: 1
LAUNDRY ASSESSED: 1
LIGHT HOUSEKEEPING: NEEDS ASSISTANCE
TOILETING: 1
AMBULATION ASSISTANCE: CONTACT GUARD ASSIST
GROOMING_WITHIN_DEFINED_LIMITS: 1
FEEDING: INDEPENDENT
ORAL_CARE_CURRENT_FUNCTION: INDEPENDENT
TOILETING: MODERATE ASSIST
AMBULATION ASSISTANCE: 1
FEEDING ASSESSED: 1

## 2023-11-20 NOTE — Clinical Note
FOR INFO ONLY    Pt's HR during visit at 92 and is outside sepsis screening parameters. No other symptoms at this time, all other VS within parameters.

## 2023-11-20 NOTE — Clinical Note
FOR INFO     Wound evaluation:    Pt's perirectal wound - difficult to keep all dressings intact d/t moisture and location of wound  Recommendation: Hydrofiber silver to absorb drainage and prevent any infection. Wound bed is clean and red, excess drainage to be absorbed by hydrofiber silver and creates a well-balanced moisture to wound bed for wound healing, hydrocolloid thin to protect primary dressing and to adhere better to skin, and pink ostomy tape to secure all dressings.   If dressings still not holding, may just apply triad 3x a week.     Edelmira Albrecht BSN-RN, CWOCN

## 2023-11-20 NOTE — Clinical Note
I agree with these changes  ----- Message -----  From: Amelie Avila R.N.  Sent: 11/20/2023   3:01 PM PST  To: Chelsea Giles R.N.      Quality Review for [facility] by NELSY Avila, RN on  November 20, 2023     Edits for Chelsea to complete:     Edits completed by NELSY Avila RN:  1.  and  diagnosis coding updated per chart review.  2. Changed flu vaccine to yes, received from another health care provider, per EP[employer] received on 11/14/23, chnaged  to 11/14/23 per the collaboration convention  3.  A1 changed to 0; E1 changed to 1.  Wound team calls right lateral foot a diabetic ulcer, right heel is unstageable to to eschar.. Changed  to NA  4. Per narrative that patient needs min assistance  and a walker for safety, the following changes were made:  is 2;   5. Changed  to 3 per narrative stating patient is short of breath with minimal activity  6.  is 3 per ambulation status  7. Safety measures checked ambulate only with assistance, adequate emergency plan. And proper medication use  8. Completed F2F information

## 2023-11-20 NOTE — CASE COMMUNICATION
Quality Review for [facility] by NELSY Avila, RN on  November 20, 2023     Edits for Chelsea to complete:     Edits completed by NELSY Avila RN:  1.  and  diagnosis coding updated per chart review.  2. Changed flu vaccine to yes, received from another health care provider, per EP[employer] received on 11/14/23, chnaged  to 11/14/23 per the collaboration convention  3.  A1 changed to 0; E1 changed to 1.  Wound team calls  right lateral foot a diabetic ulcer, right heel is unstageable to to eschar.. Changed  to NA  4. Per narrative that patient needs min assistance  and a walker for safety, the following changes were made:  is 2;   5. Changed  to 3 per narrative stating patient is short of breath with minimal activity  6.  is 3 per ambulation status  7. Safety measures checked ambulate only with assistance, adequate emergency plan. An d proper medication use  8. Completed F2F information

## 2023-11-21 ENCOUNTER — HOME CARE VISIT (OUTPATIENT)
Dept: HOME HEALTH SERVICES | Facility: HOME HEALTHCARE | Age: 68
End: 2023-11-21
Payer: MEDICARE

## 2023-11-21 PROCEDURE — G0152 HHCP-SERV OF OT,EA 15 MIN: HCPCS

## 2023-11-21 PROCEDURE — G0155 HHCP-SVS OF CSW,EA 15 MIN: HCPCS

## 2023-11-21 ASSESSMENT — ACTIVITIES OF DAILY LIVING (ADL)
LAUNDRY_REQUIRES_ASSISTANCE: 1
SHOPPING_REQUIRES_ASSISTANCE: 1

## 2023-11-22 ENCOUNTER — HOME CARE VISIT (OUTPATIENT)
Dept: HOME HEALTH SERVICES | Facility: HOME HEALTHCARE | Age: 68
End: 2023-11-22
Payer: MEDICARE

## 2023-11-22 VITALS
DIASTOLIC BLOOD PRESSURE: 80 MMHG | SYSTOLIC BLOOD PRESSURE: 130 MMHG | OXYGEN SATURATION: 95 % | RESPIRATION RATE: 16 BRPM | HEART RATE: 88 BPM | TEMPERATURE: 97.6 F

## 2023-11-22 VITALS
DIASTOLIC BLOOD PRESSURE: 80 MMHG | OXYGEN SATURATION: 95 % | SYSTOLIC BLOOD PRESSURE: 130 MMHG | RESPIRATION RATE: 18 BRPM | TEMPERATURE: 97.8 F | HEART RATE: 88 BPM

## 2023-11-22 VITALS
SYSTOLIC BLOOD PRESSURE: 132 MMHG | DIASTOLIC BLOOD PRESSURE: 80 MMHG | OXYGEN SATURATION: 96 % | HEART RATE: 92 BPM | TEMPERATURE: 98.2 F | RESPIRATION RATE: 16 BRPM

## 2023-11-22 PROCEDURE — G0299 HHS/HOSPICE OF RN EA 15 MIN: HCPCS

## 2023-11-22 PROCEDURE — G0151 HHCP-SERV OF PT,EA 15 MIN: HCPCS

## 2023-11-22 ASSESSMENT — ENCOUNTER SYMPTOMS
BOWEL PATTERN NORMAL: 1
NAUSEA: DENIES
PAIN LOCATION - PAIN FREQUENCY: INTERMITTENT
BOWEL PATTERN NORMAL: 1
FATIGUES EASILY: 1
PAIN LOCATION - PAIN FREQUENCY: FREQUENT
PERSON REPORTING PAIN: PATIENT
PAIN LOCATION - RELIEVING FACTORS: PAIN MEDS, REST, REPOSITIONING
MUSCLE WEAKNESS: 1
PAIN LOCATION: BUTTOCKS
HIGHEST PAIN SEVERITY IN PAST 24 HOURS: 6/10
HIGHEST PAIN SEVERITY IN PAST 24 HOURS: 6/10
SUBJECTIVE PAIN PROGRESSION: UNCHANGED
FATIGUE: 1
PAIN LOCATION: LOWER BACK
LOWEST PAIN SEVERITY IN PAST 24 HOURS: 4/10
SUBJECTIVE PAIN PROGRESSION: WAXING AND WANING
VOMITING: DENIES
NAUSEA: DENIES
LAST BOWEL MOVEMENT: 66800
PAIN LOCATION - PAIN QUALITY: DULL, ACHY
PERSON REPORTING PAIN: PATIENT
PAIN LOCATION - PAIN SEVERITY: 6/10
PAIN: 1
STOOL FREQUENCY: DAILY
PAIN LOCATION: PERINEUM
PAIN LOCATION - RELIEVING FACTORS: PAIN MEDICATION
PERSON REPORTING PAIN: PATIENT
ASSOCIATED SYMPTOMS: DENIES
PAIN LOCATION - RELIEVING FACTORS: PAIN MEDICATION, REPOSITION
PAIN LOCATION - PAIN DURATION: DAILY
PAIN LOCATION - PAIN QUALITY: ACHING, BURNING
SUBJECTIVE PAIN PROGRESSION: UNCHANGED
PAIN SEVERITY GOAL: 0/10
VOMITING: DENIES
PAIN: 1
HIGHEST PAIN SEVERITY IN PAST 24 HOURS: 7/10
MENTAL STATUS CHANGE: 0
FATIGUES EASILY: 1
LOWEST PAIN SEVERITY IN PAST 24 HOURS: 4/10
PAIN SEVERITY GOAL: 1/10
PAIN LOCATION - PAIN SEVERITY: 4/10
PAIN LOCATION - EXACERBATING FACTORS: INCREASE ACTIVITY
PAIN: 1
DEBILITATING PAIN: 1
PAIN LOCATION - PAIN QUALITY: ACHY
PAIN SEVERITY GOAL: 2/10
LAST BOWEL MOVEMENT: 66798
PAIN LOCATION - PAIN FREQUENCY: CONSTANT
PAIN LOCATION - PAIN DURATION: 2-4 HRS
PAIN LOCATION - PAIN SEVERITY: 6/10
LOWEST PAIN SEVERITY IN PAST 24 HOURS: 4/10

## 2023-11-22 NOTE — CASE COMMUNICATION
noted  ----- Message -----  From: Graham Albrecht R.N.  Sent: 11/22/2023   7:10 AM PST  To: Brisa Robledo R.N.; Lucy Moraes R.N.; *      FOR INFO     Wound evaluation:    Pt's perirectal wound - difficult to keep all dressings intact d/t moisture and location of wound  Recommendation: Hydrofiber silver to absorb drainage and prevent any infection. Wound bed is clean and red, excess drainage to be absorbed by hydrofiber silve r and creates a well-balanced moisture to wound bed for wound healing, hydrocolloid thin to protect primary dressing and to adhere better to skin, and pink ostomy tape to secure all dressings.   If dressings still not holding, may just apply triad 3x a week.     Edelmira Albrecht BSN-RN, CWOCN

## 2023-11-24 ENCOUNTER — HOME CARE VISIT (OUTPATIENT)
Dept: HOME HEALTH SERVICES | Facility: HOME HEALTHCARE | Age: 68
End: 2023-11-24
Payer: MEDICARE

## 2023-11-24 PROCEDURE — G0299 HHS/HOSPICE OF RN EA 15 MIN: HCPCS

## 2023-11-24 NOTE — CARE PLAN
The patient is Stable - Low risk of patient condition declining or worsening    Problem: Knowledge Deficit - Standard  Goal: Patient and family/care givers will demonstrate understanding of plan of care, disease process/condition, diagnostic tests and medications  Outcome: Progressing. Reviewed POC, all questions answered.        Problem: Diabetes Management  Goal: Patient's ability to maintain appropriate glucose levels will be maintained or improve  Outcome: Progressing     Shift Goals  Clinical Goals: Safety  Patient Goals: Education       Pt is currently disabled

## 2023-11-25 VITALS
DIASTOLIC BLOOD PRESSURE: 78 MMHG | SYSTOLIC BLOOD PRESSURE: 138 MMHG | RESPIRATION RATE: 16 BRPM | OXYGEN SATURATION: 95 % | TEMPERATURE: 97.9 F | HEART RATE: 87 BPM

## 2023-11-25 VITALS
DIASTOLIC BLOOD PRESSURE: 64 MMHG | RESPIRATION RATE: 18 BRPM | SYSTOLIC BLOOD PRESSURE: 138 MMHG | OXYGEN SATURATION: 95 % | HEART RATE: 76 BPM | TEMPERATURE: 97.6 F

## 2023-11-25 SDOH — HEALTH STABILITY: PHYSICAL HEALTH: EXERCISE TYPE: HOME EXERCISE PROGRAM

## 2023-11-25 ASSESSMENT — ENCOUNTER SYMPTOMS
PERSON REPORTING PAIN: PATIENT
PAIN LOCATION - PAIN QUALITY: ACHING
BOWEL PATTERN NORMAL: 1
PERSON REPORTING PAIN: PATIENT
PAIN LOCATION - PAIN SEVERITY: 7/10
VOMITING: DENIES
LAST BOWEL MOVEMENT: 66802
PAIN LOCATION: BACK
MUSCLE WEAKNESS: 1
STOOL FREQUENCY: DAILY
HIGHEST PAIN SEVERITY IN PAST 24 HOURS: 0/10
FATIGUES EASILY: 1
PAIN LOCATION - EXACERBATING FACTORS: AMBULATION, STANDING
PAIN LOCATION - PAIN FREQUENCY: FREQUENT
PAIN SEVERITY GOAL: 0/10
PAIN: 1
NAUSEA: DENIES
PAIN LOCATION - RELIEVING FACTORS: MEDICATIONS
PAIN LOCATION - PAIN DURATION: WEEKS
PAIN: 1
LOWEST PAIN SEVERITY IN PAST 24 HOURS: 0/10

## 2023-11-27 ENCOUNTER — HOME CARE VISIT (OUTPATIENT)
Dept: HOME HEALTH SERVICES | Facility: HOME HEALTHCARE | Age: 68
End: 2023-11-27
Payer: MEDICARE

## 2023-11-27 VITALS
DIASTOLIC BLOOD PRESSURE: 82 MMHG | OXYGEN SATURATION: 99 % | TEMPERATURE: 97.4 F | RESPIRATION RATE: 16 BRPM | HEART RATE: 87 BPM | SYSTOLIC BLOOD PRESSURE: 128 MMHG

## 2023-11-27 PROCEDURE — G0151 HHCP-SERV OF PT,EA 15 MIN: HCPCS

## 2023-11-27 PROCEDURE — G0493 RN CARE EA 15 MIN HH/HOSPICE: HCPCS

## 2023-11-27 ASSESSMENT — ENCOUNTER SYMPTOMS
PAIN LOCATION: BACK
HIGHEST PAIN SEVERITY IN PAST 24 HOURS: 8/10
PAIN LOCATION - PAIN SEVERITY: 6/10
PAIN LOCATION - PAIN QUALITY: ACHING, BURNING
PAIN SEVERITY GOAL: 0/10
PAIN LOCATION - PAIN DURATION: 3 DAYS
LOWEST PAIN SEVERITY IN PAST 24 HOURS: 5/10
PERSON REPORTING PAIN: PATIENT
PAIN LOCATION - PAIN FREQUENCY: CONSTANT
PAIN LOCATION - RELIEVING FACTORS: PAIN MEDS, REST, REPOSITIONING
PAIN: 1

## 2023-11-27 NOTE — Clinical Note
FOR INFO ONLY    Pt with pain on right flank area, pt stated that it's manageable. Pt stated that he had kidney stones before, it happended before and it will pass, but pt has information for [facility] and aware if needed to go to the ER to be evaluated. No other issues or concerns noted at this time. Pt with no trouble on urinating, pt has been having good urine output. No other complaints noted at this time.

## 2023-11-27 NOTE — Clinical Note
"Pt reporting R flank pain today, pointing to area and asking \"is this where my kidneys are?\"  Advised patient that yes, general area that he was pointing to was where his kidney would be located.  Pt reports he feels he may have kidney stones, \"I've had them in the past.\"  Pt reporting pain to R flank ranging 6-8/10, worsened with transfers and some movements.  Pt denies any issues with urination, no burning, urgency, difficulty urinating.  Pt took pain medication at start of PT session and reported pain decreased to 2/10 by end of session.  Education to pt that if symptoms worsen, develops fever, is unable to urinate to call 911.  Also provided patient with information on [facility] if needed.  SN to see patient later this afternoon, Voalte sent to SN and CM to advise of above."

## 2023-11-27 NOTE — Clinical Note
FOR INFO     Difficult to dress wound d/t location. Pt with perirectal abscess, no NPWT at this time, clean red wound. Wound situated between the crevice of the gluteal fold and testicular area. Tried silver, hydrocolloid and with pinc ostomy tape, but won't stay on. Measured at 7cm x 1.2cm however this SN lightly probed at the proximal area of the wound, and pt with a small pocket of 1cm depth (tip of the qtip). Due to difficult area, this SN recommended triad that was started on last SN visit. Pt unable to reach area to clean and reapply triad. This SN applied ABD pad with no tapes and just to keep surrounding area dry. Pt was advised on using sanitary pad that might help as well.     Read colopast triad application, and triad can also be used on tunneled wounds. Can impregante packing strip and lightly pack to the wound bed.     Initial measurement was at 9cm and now down to 7cm. Need to reeval treatment plan if still no change.       Edelmira BAUMN-RN, CWOCN.

## 2023-11-28 NOTE — CASE COMMUNICATION
"noted  ----- Message -----  From: Ivis Suazo, PT  Sent: 11/27/2023   4:19 PM PST  To: Graham Albrecht R.N.; Lucy Moraes R.N.      Pt reporting R flank pain today, pointing to area and asking \"is this where my kidneys are?\"  Advised patient that yes, general area that he was pointing to was where his kidney would be located.  Pt reports he feels he may have kidney stones, \"I've had them in the past.\"  Pt reporting pain to R fl ank ranging 6-8/10, worsened with transfers and some movements.  Pt denies any issues with urination, no burning, urgency, difficulty urinating.  Pt took pain medication at start of PT session and reported pain decreased to 2/10 by end of session.  Education to pt that if symptoms worsen, develops fever, is unable to urinate to call 911.  Also provided patient with information on [facility] if needed.  SN to see patient later this afterno on, Voalte sent to SN and CM to advise of above."

## 2023-11-29 ENCOUNTER — HOME CARE VISIT (OUTPATIENT)
Dept: HOME HEALTH SERVICES | Facility: HOME HEALTHCARE | Age: 68
End: 2023-11-29
Payer: MEDICARE

## 2023-11-29 VITALS
OXYGEN SATURATION: 97 % | SYSTOLIC BLOOD PRESSURE: 120 MMHG | RESPIRATION RATE: 18 BRPM | HEART RATE: 87 BPM | TEMPERATURE: 98.2 F | DIASTOLIC BLOOD PRESSURE: 70 MMHG

## 2023-11-29 VITALS
OXYGEN SATURATION: 98 % | HEART RATE: 87 BPM | SYSTOLIC BLOOD PRESSURE: 110 MMHG | RESPIRATION RATE: 18 BRPM | DIASTOLIC BLOOD PRESSURE: 64 MMHG | TEMPERATURE: 97.4 F

## 2023-11-29 PROCEDURE — G0299 HHS/HOSPICE OF RN EA 15 MIN: HCPCS

## 2023-11-29 PROCEDURE — G0151 HHCP-SERV OF PT,EA 15 MIN: HCPCS

## 2023-11-29 PROCEDURE — G0152 HHCP-SERV OF OT,EA 15 MIN: HCPCS

## 2023-11-29 ASSESSMENT — ENCOUNTER SYMPTOMS
PAIN SEVERITY GOAL: 0/10
PAIN LOCATION - PAIN QUALITY: ACHY
VOMITING: DENIES
PAIN: 1
PAIN LOCATION - PAIN SEVERITY: 7/10
DYSPNEA ACTIVITY LEVEL: AFTER AMBULATING LESS THAN 10 FT
PAIN LOCATION - PAIN DURATION: 2-4 HRS
PAIN SEVERITY GOAL: 0/10
LOWEST PAIN SEVERITY IN PAST 24 HOURS: 4/10
PAIN LOCATION - PAIN SEVERITY: 6/10
PERSON REPORTING PAIN: PATIENT
PAIN LOCATION - PAIN FREQUENCY: INTERMITTENT
PAIN LOCATION - EXACERBATING FACTORS: MOVING TOO MUCH
SUBJECTIVE PAIN PROGRESSION: UNCHANGED
PAIN LOCATION - PAIN DURATION: 2 DAYS
PAIN LOCATION: BACK
HIGHEST PAIN SEVERITY IN PAST 24 HOURS: 7/10
PAIN LOCATION - PAIN FREQUENCY: CONSTANT
SHORTNESS OF BREATH: 1
LOWEST PAIN SEVERITY IN PAST 24 HOURS: 6/10
PAIN LOCATION - EXACERBATING FACTORS: INCREASED ACTIVITY
PAIN LOCATION: RIGHT FLANK
HIGHEST PAIN SEVERITY IN PAST 24 HOURS: 6/10
PAIN LOCATION - RELIEVING FACTORS: PAIN MEDICATION, REPOSITION
PERSON REPORTING PAIN: PATIENT
PAIN: 1
STOOL FREQUENCY: DAILY
BOWEL PATTERN NORMAL: 1
LAST BOWEL MOVEMENT: 66805
PAIN LOCATION - RELIEVING FACTORS: PAIN MEDS, REST, REPOSITIONING
DEBILITATING PAIN: 1
NAUSEA: DENIES
PAIN LOCATION - PAIN QUALITY: ACHING

## 2023-11-29 NOTE — CASE COMMUNICATION
NOTED  ----- Message -----  From: Garham Albrecht R.N.  Sent: 11/29/2023   7:16 AM PST  To: Brisa Robledo R.N.; Domenica Bernardo R.N.; *      FOR INFO     Difficult to dress wound d/t location. Pt with perirectal abscess, no NPWT at this time, clean red wound. Wound situated between the crevice of the gluteal fold and testicular area. Tried silver, hydrocolloid and with pinc ostomy tape, but won't stay on. Measured at 7cm x 1.2cm  however this SN lightly probed at the proximal area of the wound, and pt with a small pocket of 1cm depth (tip of the qtip). Due to difficult area, this SN recommended triad that was started on last SN visit. Pt unable to reach area to clean and reapply triad. This SN applied ABD pad with no tapes and just to keep surrounding area dry. Pt was advised on using sanitary pad that might help as well.     Read colopast triad application, an d triad can also be used on tunneled wounds. Can impregante packing strip and lightly pack to the wound bed.     Initial measurement was at 9cm and now down to 7cm. Need to reeval treatment plan if still no change.       Edelmira Albrecht BSN-RN, CWOCN.

## 2023-11-29 NOTE — CASE COMMUNICATION
NOTED  ----- Message -----  From: Graham Albrecht R.N.  Sent: 11/29/2023   7:53 AM PST  To: Brisa Robledo R.N.; Lucy Moraes R.N.; *      FOR INFO ONLY    Pt with pain on right flank area, pt stated that it's manageable. Pt stated that he had kidney stones before, it happended before and it will pass, but pt has information for [facility] and aware if needed to go to the ER to be evaluated. No other issues or concerns noted at  this time. Pt with no trouble on urinating, pt has been having good urine output. No other complaints noted at this time.

## 2023-11-29 NOTE — CASE COMMUNICATION
IDT  Patient discussed today in interdisciplinary team meeting. Concerns noted: right heel ulcer, right lateral foot ulcer, perirectal abscess, DM, afib, Left BKA, HTN.   Deficits in balnce, gait, transfers, strength and activity tolerance, decreased activity tolerance, decline in adl's .   Plan to address issue-SN for wound care, disease process education, infection prevention education, medications education. Patient would benefit fro m skilled PT services for above deficits and assist with return to maximal functional level.   OT  to address deficits and improve function.

## 2023-11-30 ENCOUNTER — HOME CARE VISIT (OUTPATIENT)
Dept: HOME HEALTH SERVICES | Facility: HOME HEALTHCARE | Age: 68
End: 2023-11-30
Payer: MEDICARE

## 2023-11-30 VITALS
RESPIRATION RATE: 18 BRPM | SYSTOLIC BLOOD PRESSURE: 130 MMHG | TEMPERATURE: 97.6 F | HEART RATE: 76 BPM | OXYGEN SATURATION: 95 % | DIASTOLIC BLOOD PRESSURE: 60 MMHG

## 2023-11-30 VITALS
SYSTOLIC BLOOD PRESSURE: 110 MMHG | TEMPERATURE: 97.4 F | DIASTOLIC BLOOD PRESSURE: 64 MMHG | OXYGEN SATURATION: 98 % | HEART RATE: 87 BPM | RESPIRATION RATE: 18 BRPM

## 2023-11-30 PROCEDURE — G0152 HHCP-SERV OF OT,EA 15 MIN: HCPCS

## 2023-11-30 ASSESSMENT — ENCOUNTER SYMPTOMS
PAIN: 1
FATIGUE: 1
PAIN LOCATION - PAIN FREQUENCY: FREQUENT
BOWEL PATTERN NORMAL: 1
MUSCLE WEAKNESS: 1
FATIGUES EASILY: 1
PAIN LOCATION - PAIN SEVERITY: 3/10
NAUSEA: DENIES
PAIN LOCATION - RELIEVING FACTORS: PAIN MEDICATION
LAST BOWEL MOVEMENT: 66807
PAIN LOCATION - PAIN FREQUENCY: FREQUENT
PAIN LOCATION: RIGHT FLANK
PERSON REPORTING PAIN: PATIENT
STOOL FREQUENCY: DAILY
PAIN LOCATION - PAIN QUALITY: DULL, ACHY
PAIN LOCATION - PAIN SEVERITY: 6/10
PAIN LOCATION - PAIN DURATION: 2 DAYS
VOMITING: DENIES
PERSON REPORTING PAIN: PATIENT
PAIN LOCATION - PAIN QUALITY: ACHING
PAIN: 1

## 2023-12-01 ENCOUNTER — HOME CARE VISIT (OUTPATIENT)
Dept: HOME HEALTH SERVICES | Facility: HOME HEALTHCARE | Age: 68
End: 2023-12-01
Payer: MEDICARE

## 2023-12-01 VITALS
RESPIRATION RATE: 18 BRPM | SYSTOLIC BLOOD PRESSURE: 124 MMHG | HEART RATE: 76 BPM | OXYGEN SATURATION: 96 % | TEMPERATURE: 97.4 F | DIASTOLIC BLOOD PRESSURE: 68 MMHG

## 2023-12-01 PROCEDURE — G0299 HHS/HOSPICE OF RN EA 15 MIN: HCPCS

## 2023-12-01 ASSESSMENT — ENCOUNTER SYMPTOMS
PAIN LOCATION - PAIN FREQUENCY: FREQUENT
PAIN LOCATION - PAIN DURATION: 3 DAYS
PAIN: 1
PERSON REPORTING PAIN: PATIENT
PAIN LOCATION - PAIN QUALITY: ACHING
PAIN LOCATION - PAIN SEVERITY: 4/10

## 2023-12-02 VITALS
RESPIRATION RATE: 18 BRPM | HEART RATE: 76 BPM | TEMPERATURE: 97.6 F | OXYGEN SATURATION: 96 % | SYSTOLIC BLOOD PRESSURE: 130 MMHG | DIASTOLIC BLOOD PRESSURE: 60 MMHG

## 2023-12-02 ASSESSMENT — ENCOUNTER SYMPTOMS
MUSCLE WEAKNESS: 1
PAIN SEVERITY GOAL: 0/10
FATIGUE: 1
LOWEST PAIN SEVERITY IN PAST 24 HOURS: 0/10
HIGHEST PAIN SEVERITY IN PAST 24 HOURS: 0/10
FATIGUES EASILY: 1
PAIN: 1
PERSON REPORTING PAIN: PATIENT
NAUSEA: DENIES
VOMITING: DENIES
STOOL FREQUENCY: DAILY
LAST BOWEL MOVEMENT: 66809
BOWEL PATTERN NORMAL: 1

## 2023-12-04 ENCOUNTER — HOME CARE VISIT (OUTPATIENT)
Dept: HOME HEALTH SERVICES | Facility: HOME HEALTHCARE | Age: 68
End: 2023-12-04
Payer: MEDICARE

## 2023-12-04 VITALS
TEMPERATURE: 97.6 F | SYSTOLIC BLOOD PRESSURE: 130 MMHG | DIASTOLIC BLOOD PRESSURE: 70 MMHG | HEART RATE: 76 BPM | RESPIRATION RATE: 18 BRPM | OXYGEN SATURATION: 95 %

## 2023-12-04 PROCEDURE — G0299 HHS/HOSPICE OF RN EA 15 MIN: HCPCS

## 2023-12-04 ASSESSMENT — ENCOUNTER SYMPTOMS
FATIGUES EASILY: 1
HIGHEST PAIN SEVERITY IN PAST 24 HOURS: 3/10
NAUSEA: DENIES
PAIN LOCATION - PAIN QUALITY: DULL, ACHY
BOWEL PATTERN NORMAL: 1
SUBJECTIVE PAIN PROGRESSION: UNCHANGED
PAIN LOCATION - RELIEVING FACTORS: NON PHARMACOLOGICAL INTERVENTIONS
LOWEST PAIN SEVERITY IN PAST 24 HOURS: 1/10
VOMITING: DENIES
MUSCLE WEAKNESS: 1
PAIN LOCATION - PAIN SEVERITY: 1/10
PAIN: 1
PAIN LOCATION - PAIN FREQUENCY: FREQUENT
PERSON REPORTING PAIN: PATIENT
ASSOCIATED SYMPTOMS: DENIES
PAIN SEVERITY GOAL: 0/10
STOOL FREQUENCY: DAILY
LAST BOWEL MOVEMENT: 66812
PAIN LOCATION: RIGHT FLANK

## 2023-12-05 ENCOUNTER — HOME CARE VISIT (OUTPATIENT)
Dept: HOME HEALTH SERVICES | Facility: HOME HEALTHCARE | Age: 68
End: 2023-12-05
Payer: MEDICARE

## 2023-12-05 VITALS
OXYGEN SATURATION: 96 % | HEART RATE: 87 BPM | DIASTOLIC BLOOD PRESSURE: 70 MMHG | TEMPERATURE: 97.7 F | RESPIRATION RATE: 18 BRPM | SYSTOLIC BLOOD PRESSURE: 120 MMHG

## 2023-12-05 PROCEDURE — G0151 HHCP-SERV OF PT,EA 15 MIN: HCPCS

## 2023-12-05 PROCEDURE — G0152 HHCP-SERV OF OT,EA 15 MIN: HCPCS

## 2023-12-05 ASSESSMENT — ENCOUNTER SYMPTOMS
PAIN LOCATION - RELIEVING FACTORS: PAIN MEDS, REST, REPOSITIONING
PAIN LOCATION - PAIN SEVERITY: 4/10
PERSON REPORTING PAIN: PATIENT
PAIN SEVERITY GOAL: 0/10
PAIN LOCATION: PERINEUM
LOWEST PAIN SEVERITY IN PAST 24 HOURS: 3/10
HIGHEST PAIN SEVERITY IN PAST 24 HOURS: 6/10
PAIN LOCATION - PAIN QUALITY: ACHING
PAIN LOCATION - PAIN DURATION: DAILY
PAIN: 1
PAIN LOCATION - EXACERBATING FACTORS: SITTING TOO LONG
PAIN LOCATION - PAIN FREQUENCY: INTERMITTENT

## 2023-12-06 ENCOUNTER — HOME CARE VISIT (OUTPATIENT)
Dept: HOME HEALTH SERVICES | Facility: HOME HEALTHCARE | Age: 68
End: 2023-12-06
Payer: MEDICARE

## 2023-12-06 VITALS
DIASTOLIC BLOOD PRESSURE: 78 MMHG | SYSTOLIC BLOOD PRESSURE: 142 MMHG | TEMPERATURE: 97.8 F | RESPIRATION RATE: 18 BRPM | OXYGEN SATURATION: 96 % | HEART RATE: 102 BPM

## 2023-12-06 VITALS
DIASTOLIC BLOOD PRESSURE: 64 MMHG | TEMPERATURE: 97.6 F | HEART RATE: 74 BPM | RESPIRATION RATE: 18 BRPM | OXYGEN SATURATION: 95 % | SYSTOLIC BLOOD PRESSURE: 130 MMHG

## 2023-12-06 PROCEDURE — G0299 HHS/HOSPICE OF RN EA 15 MIN: HCPCS

## 2023-12-06 PROCEDURE — G0151 HHCP-SERV OF PT,EA 15 MIN: HCPCS

## 2023-12-06 ASSESSMENT — ENCOUNTER SYMPTOMS
PAIN LOCATION - PAIN FREQUENCY: CONSTANT
HIGHEST PAIN SEVERITY IN PAST 24 HOURS: 7/10
PAIN LOCATION - PAIN SEVERITY: 7/10
PAIN LOCATION - EXACERBATING FACTORS: NOTHING
PAIN LOCATION: GENERALIZED
LOWEST PAIN SEVERITY IN PAST 24 HOURS: 3/10
PAIN LOCATION - PAIN QUALITY: ACHING, CRAMPING
PAIN: 1
PERSON REPORTING PAIN: PATIENT
PAIN SEVERITY GOAL: 0/10
SUBJECTIVE PAIN PROGRESSION: WAXING AND WANING
PAIN LOCATION - PAIN DURATION: 1 DAY

## 2023-12-06 ASSESSMENT — BALANCE ASSESSMENTS
SITTING BALANCE: 1 - STEADY, SAFE
EYES CLOSED AT MAXIMUM POSITION NUDGED: 0 - UNSTEADY
IMMEDIATE STANDING BALANCE FIRST 5 SECONDS: 1 - STEADY BUT USES WALKER OR OTHER SUPPORT
STANDING BALANCE: 1 - STEADY BUT WIDE STANCE AND USES CANE OR OTHER SUPPORT
BALANCE SCORE: 8
ARISING SCORE: 1
ARISES: 1 - ABLE, USES ARMS TO HELP
SITTING DOWN: 1 - USES ARMS OR NOT SMOOTH MOTION
NUDGED: 0 - BEGINS TO FALL
ATTEMPTS TO ARISE: 2 - ABLE TO RISE, ONE ATTEMPT
NUDGED SCORE: 0
TURNING 360 DEGREES STEPS: 1 - CONTINUOUS STEPS

## 2023-12-06 ASSESSMENT — GAIT ASSESSMENTS
GAIT SCORE: 8
PATH SCORE: 1
INITIATION OF GAIT IMMEDIATELY AFTER GO: 1 - NO HESITANCY
WALKING STANCE: 0 - HEELS APART
BALANCE AND GAIT SCORE: 16
STEP CONTINUITY: 1 - STEPS APPEAR CONTINUOUS
TRUNK SCORE: 0
STEP SYMMETRY: 1 - RIGHT AND LEFT STEP LENGTH APPEAR EQUAL
TRUNK: 0 - MARKED SWAY OR USES WALKING AID
PATH: 1 - MILD/MODERATE DEVIATION OR USES WALKING AID

## 2023-12-06 ASSESSMENT — ACTIVITIES OF DAILY LIVING (ADL)
PHYSICAL TRANSFERS ASSESSED: 1
GROOMING_COMMENTS: SEE OT NOTES
AMBULATION ASSISTANCE: 1
AMBULATION ASSISTANCE ON FLAT SURFACES: 1
FEEDING_COMMENTS: SEE OT NOTES
CURRENT_FUNCTION: SUPERVISION
AMBULATION ASSISTANCE: STAND BY ASSIST
BATHING_COMMENTS: SEE OT NOTES

## 2023-12-06 NOTE — Clinical Note
FOR INFO ONLY    Reviewed treatment plan with another wound care nurse, Domenica.   Pt's perirectal abscess wound appears to be getting smaller with triad however tunnel is getting deeper per measurements from last SN visit. Needs to be filled in and absorb drainage from the tunneled wound. Difficult spot to seal any types of wound dressing.   Recommended: Algidex (silver packing strip) to absorb as much as it can through the tunnel and lightly packed and may stay longer than silver hydrofiber as it would just turn into a gel and falls off. Would continue triad paste throughout wound bed. Will continue to follow through progress and reeval as needed.   Discussed with central supply for availability of 2x-3x mesh underwear to keep some type of dressing in place. Ordered and awaiting for approval.     Wound care plan revised.     Edelmira BAUMN-RN, CWOCN

## 2023-12-06 NOTE — Clinical Note
"Arrived to pt's home for PT visit, pt met PT at door reporting he \"wasn't doing well today.\"  Reports loose BM x5 since last night, with at least one episode of incontinence.  Pt HR at 102 with elevated BP of 142/78 compared to pt's normal.  Pt also reporting pain of 7/10 due to abdominal cramping related to diarrhea.  Pt states \"I'm a little worried it's c diff, I've had that before.\"  Call placed to CM, advised her of above.  CM report concern for dehydration, decreased blood sugar levels which could affect pt's wound healing.  While on phone with CM, pt reporting need to use restroom again.  No change in smell during BM noted while PT in home.  CM advised that pt go to Urgent Care or contact [facility].  Education to pt on these recommendations, pt stated his understanding but declined to go to Urgent Care or have [facility] contacted, then stating \"I think it's just something I ate, if it's not better by tomorrow maybe I'll go.\"  Advised patient that [facility] performs house calls and they could be contacted to possibly come today to see him.  Pt again declined.  CM scheduled to see patient later this afternoon as well.  CM updated that pt declined to go to Urgent Care or have [facility] contacted.  Education to pt if symptoms worsen, notices increased weakness, dizziness, etc, to contact 911 immediately.  Pt stated his understanding."

## 2023-12-06 NOTE — CASE COMMUNICATION
noted  ----- Message -----  From: Graham Albrecht R.N.  Sent: 12/6/2023   6:19 AM PST  To: Brisa Rboledo R.N.; Domenica Bernardo R.N.; *      FOR INFO ONLY    Reviewed treatment plan with another wound care nurse, Domenica.   Pt's perirectal abscess wound appears to be getting smaller with triad however tunnel is getting deeper per measurements from last SN visit. Needs to be filled in and absorb drainage from the tunneled wound. Diff icult spot to seal any types of wound dressing.   Recommended: Algidex (silver packing strip) to absorb as much as it can through the tunnel and lightly packed and may stay longer than silver hydrofiber as it would just turn into a gel and falls off. Would continue triad paste throughout wound bed. Will continue to follow through progress and reeval as needed.   Discussed with central supply for availability of 2x-3x mesh underwear to k eep some type of dressing in place. Ordered and awaiting for approval.     Wound care plan revised.     Edelmira Albrecht BSN-RN, CWOCN

## 2023-12-07 ENCOUNTER — TELEPHONE (OUTPATIENT)
Dept: MEDICAL GROUP | Facility: PHYSICIAN GROUP | Age: 68
End: 2023-12-07
Payer: MEDICARE

## 2023-12-07 VITALS
RESPIRATION RATE: 18 BRPM | DIASTOLIC BLOOD PRESSURE: 70 MMHG | TEMPERATURE: 97.7 F | HEART RATE: 87 BPM | SYSTOLIC BLOOD PRESSURE: 120 MMHG | OXYGEN SATURATION: 96 %

## 2023-12-07 SDOH — HEALTH STABILITY: PHYSICAL HEALTH: EXERCISE TYPE: HOME EXERCISE PROGRAM

## 2023-12-07 ASSESSMENT — ENCOUNTER SYMPTOMS
PAIN LOCATION - PAIN DURATION: 1 DAY
DIARRHEA: 1
FATIGUES EASILY: 1
LOWEST PAIN SEVERITY IN PAST 24 HOURS: 0/10
PAIN: 1
ABDOMINAL PAIN: 1
MUSCLE WEAKNESS: 1
VOMITING: DENIES
NAUSEA: DENIES
PAIN LOCATION - PAIN SEVERITY: 4/10
HIGHEST PAIN SEVERITY IN PAST 24 HOURS: 0/10
PAIN LOCATION - EXACERBATING FACTORS: WOUND CARE
PAIN LOCATION - PAIN FREQUENCY: FREQUENT
PAIN: 1
PAIN LOCATION - PAIN QUALITY: ACHING
LAST BOWEL MOVEMENT: 66814
PAIN SEVERITY GOAL: 0/10
PERSON REPORTING PAIN: PATIENT
PERSON REPORTING PAIN: PATIENT

## 2023-12-07 NOTE — CASE COMMUNICATION
noted  ----- Message -----  From: Ivis Suazo, PT  Sent: 12/6/2023   3:58 PM PST  To: Lucy Moraes R.N.; Paula Hooper; *      PT reassessment completed, requesting auth for 2w4 effective 12/10/2023.

## 2023-12-07 NOTE — TELEPHONE ENCOUNTER
Left voicemail for patient to see which medication are needing to be refilled. Left call back number [phone]

## 2023-12-08 ENCOUNTER — HOME CARE VISIT (OUTPATIENT)
Dept: HOME HEALTH SERVICES | Facility: HOME HEALTHCARE | Age: 68
End: 2023-12-08
Payer: MEDICARE

## 2023-12-08 PROCEDURE — G0299 HHS/HOSPICE OF RN EA 15 MIN: HCPCS

## 2023-12-08 PROCEDURE — G0152 HHCP-SERV OF OT,EA 15 MIN: HCPCS

## 2023-12-09 VITALS
SYSTOLIC BLOOD PRESSURE: 128 MMHG | DIASTOLIC BLOOD PRESSURE: 68 MMHG | HEART RATE: 82 BPM | TEMPERATURE: 97.6 F | RESPIRATION RATE: 18 BRPM | OXYGEN SATURATION: 96 %

## 2023-12-09 VITALS
TEMPERATURE: 97.6 F | RESPIRATION RATE: 18 BRPM | SYSTOLIC BLOOD PRESSURE: 128 MMHG | OXYGEN SATURATION: 96 % | HEART RATE: 82 BPM | DIASTOLIC BLOOD PRESSURE: 68 MMHG

## 2023-12-09 SDOH — HEALTH STABILITY: PHYSICAL HEALTH: EXERCISE TYPE: HOME EXERCISE PROGRAM

## 2023-12-09 ASSESSMENT — ENCOUNTER SYMPTOMS
PERSON REPORTING PAIN: PATIENT
PAIN LOCATION - PAIN QUALITY: DULL, ACHY
MUSCLE WEAKNESS: 1
PAIN LOCATION - PAIN SEVERITY: 3/10
PAIN LOCATION - PAIN FREQUENCY: FREQUENT
VOMITING: DENIES
PAIN: 1
LAST BOWEL MOVEMENT: 66816
PAIN: 1
PAIN LOCATION - PAIN FREQUENCY: FREQUENT
STOOL FREQUENCY: DAILY
PAIN LOCATION: LEFT BUTTOCK
PAIN LOCATION - EXACERBATING FACTORS: WOUND CARE, PRESSURE
SUBJECTIVE PAIN PROGRESSION: UNCHANGED
PAIN SEVERITY GOAL: 0/10
FATIGUES EASILY: 1
HIGHEST PAIN SEVERITY IN PAST 24 HOURS: 3/10
ASSOCIATED SYMPTOMS: DENIES
PAIN LOCATION - PAIN QUALITY: ACHING
PAIN LOCATION - PAIN SEVERITY: 1/10
NAUSEA: DENIES
PAIN LOCATION - RELIEVING FACTORS: GABAPENTIN
PAIN LOCATION: RLE
LOWEST PAIN SEVERITY IN PAST 24 HOURS: 1/10
BOWEL PATTERN NORMAL: 1
PAIN LOCATION - PAIN DURATION: WEEKS
PERSON REPORTING PAIN: PATIENT

## 2023-12-11 ENCOUNTER — HOME CARE VISIT (OUTPATIENT)
Dept: HOME HEALTH SERVICES | Facility: HOME HEALTHCARE | Age: 68
End: 2023-12-11
Payer: MEDICARE

## 2023-12-11 VITALS
HEART RATE: 63 BPM | DIASTOLIC BLOOD PRESSURE: 82 MMHG | SYSTOLIC BLOOD PRESSURE: 134 MMHG | TEMPERATURE: 97.9 F | OXYGEN SATURATION: 97 % | RESPIRATION RATE: 18 BRPM

## 2023-12-11 PROCEDURE — A6234 HYDROCOLLD DRG <=16 W/O BDR: HCPCS

## 2023-12-11 PROCEDURE — 665001 SOC-HOME HEALTH

## 2023-12-11 PROCEDURE — G0299 HHS/HOSPICE OF RN EA 15 MIN: HCPCS

## 2023-12-11 PROCEDURE — 6650300 HCR  CLEANSER 4-IN-1

## 2023-12-11 PROCEDURE — A6407 PACKING STRIPS, NON-IMPREG: HCPCS

## 2023-12-11 PROCEDURE — G0151 HHCP-SERV OF PT,EA 15 MIN: HCPCS

## 2023-12-11 ASSESSMENT — ENCOUNTER SYMPTOMS
HIGHEST PAIN SEVERITY IN PAST 24 HOURS: 3/10
PERSON REPORTING PAIN: PATIENT
PAIN: 1

## 2023-12-11 NOTE — Clinical Note
Arginaid or Jony is fine. I have some Arginaid if he would like it. :)  I think if he could get some Abintra that would be a better option.  It's only once a day. I'm unsure of pricing at this time, however.  Michaela   ----- Message -----  From: Domenica Bernardo R.N.  Sent: 12/12/2023   6:30 PM PST  To: Brisa Robledo R.N.; Michaela Armstrong R.D.; *      ACTION REQUIRED  Updated wound care orders submitted to Dr James for review and signature.  Dr James - please refer pt to [facility] for consultation/debridement of non-healing right medial heel pressure injury. Photo in chart. Arterial US 10/26/23 but no COOPER. Please also order antifungal cream. Sig: apply twice daily to all affected area.  Very pleasant gentleman with multiple concerns. WOUNDS: RLE lymphedema with punctate scabs in area of redness - probable hemosiderin staining. No oozing. Should oozing or wounds develop, would need to consider compression therapy. RLE washed with no-rinse cleanser, moisturized, and left BENJAMIN. Two small crusted wounds distal lateral right foot should heal quickly. Recommend Triad wound paste. Right medial heel of most concern. States wound developed after wearing a shoe for 7 days. Has neuropathy and was unaware blood blister had formed. Developed eschar, which he states was treated with Betadine applications and left BENJAMIN. Pt needs this leg for stability, so he is wearing his diabetic shoe. Could not leave eschar BENJAMIN with sock and shoe. Blister started in August and has made little progress in 4 months. Wound margins are now open with moderate amount of serosanguinous exudate on dressing that was removed. Pt had arterial US, but not COOPER, when in Rehab 10/26. Results are in the chart. Recommendation now is to continue Betadine but cover with dry folded 4x4 gauze to keep dry. Use heel silicone adhesive dressing for protection. Would like to avoid contact with silicone until he can be evaluated at a wound center.  Strongly recommend pt be referred to one of the [facility] for [facility] and debridement. Presently UTS, but possibly stage 4. I know he is reluctant to leave home if not necessary, but perhaps they could see him every two weeks instead of weekly and home health could use a dressing to continue to support autolytic debridement. Perineal/deborah-rectal wound healing where exposed but tunnel 3.3 cm deep today. Home Health has been unable to get a dressing to stay in place more than one day. Dressing applied again today. I think it is worth it even if for one day. Wound is very friable and beefy red. Concern over contamination, as close to rectum and pt states it is difficult to cleanse himself well after BM. Recommend continuing an antimicrobial dressing. NUTRITION: Pt with type 2 DM with last A1c 6.7%. Checks BG in am and was 103 this morning. States when his wife was alive, she added sugar to everything she made. He is trying to be more responsible with his eating. Is purchasing [employer] foods - comes as a package and he prepares. Each meal lasts him for 2. Is drinking 2 Jony a day, costing him $95/30 packets. Is asking if Arginaid would be as good, as it is $30 less expensive. Will request input from RD. Pt is motivated to lose weight. CANDIDIASIS under pannus. Saturated cloth taken from under pannus. Will ask next SN to bring him some InterDry sheets to use and see if that works better. Would benefit from antifungal cream. I think it would be difficult for him to lift pannus with one hand and apply powder. Cream should be easier to manage.  Thank you, Domenica Bernardo, RN, MSN, WOCN

## 2023-12-11 NOTE — Clinical Note
He has been buying Jony and Arginaid. Michaela likes this product and only once a day. Maybe after what he has is gone.  ----- Message -----  From: Michaela Armstrong R.D.  Sent: 12/14/2023  12:13 PM PST  To: Domenica Bernardo R.N.  Subject: RE:                                                Arginaid or Jony is fine. I have some Arginaid if he would like it. :)  I think if he could get some Abintra that would be a better option.  It's only once a day. I'm unsure of pricing at this time, however.  Michaela   ----- Message -----  From: Doemnica Bernardo R.N.  Sent: 12/12/2023   6:30 PM PST  To: Brisa Robledo R.N.; Michaela Armstrong R.D.; *      ACTION REQUIRED  Updated wound care orders submitted to Dr James for review and signature.  Dr James - please refer pt to [facility] for consultation/debridement of non-healing right medial heel pressure injury. Photo in chart. Arterial US 10/26/23 but no COOPER. Please also order antifungal cream. Sig: apply twice daily to all affected area.  Very pleasant gentleman with multiple concerns. WOUNDS: RLE lymphedema with punctate scabs in area of redness - probable hemosiderin staining. No oozing. Should oozing or wounds develop, would need to consider compression therapy. RLE washed with no-rinse cleanser, moisturized, and left BENJAMIN. Two small crusted wounds distal lateral right foot should heal quickly. Recommend Triad wound paste. Right medial heel of most concern. States wound developed after wearing a shoe for 7 days. Has neuropathy and was unaware blood blister had formed. Developed eschar, which he states was treated with Betadine applications and left BENJAMIN. Pt needs this leg for stability, so he is wearing his diabetic shoe. Could not leave eschar BENJAMIN with sock and shoe. Blister started in August and has made little progress in 4 months. Wound margins are now open with moderate amount of serosanguinous exudate on dressing that was removed. Pt had arterial US, but not  COOPER, when in Rehab 10/26. Results are in the chart. Recommendation now is to continue Betadine but cover with dry folded 4x4 gauze to keep dry. Use heel silicone adhesive dressing for protection. Would like to avoid contact with silicone until he can be evaluated at a wound center. Strongly recommend pt be referred to one of the [facility] for consultation and debridement. Presently UTS, but possibly stage 4. I know he is reluctant to leave home if not necessary, but perhaps they could see him every two weeks instead of weekly and home health could use a dressing to continue to support autolytic debridement. Perineal/deborah-rectal wound healing where exposed but tunnel 3.3 cm deep today. Home Health has been unable to get a dressing to stay in place more than one day. Dressing applied again today. I think it is worth it even if for one day. Wound is very friable and beefy red. Concern over contamination, as close to rectum and pt states it is difficult to cleanse himself well after BM. Recommend continuing an antimicrobial dressing. NUTRITION: Pt with type 2 DM with last A1c 6.7%. Checks BG in am and was 103 this morning. States when his wife was alive, she added sugar to everything she made. He is trying to be more responsible with his eating. Is purchasing [employer] foods - comes as a package and he prepares. Each meal lasts him for 2. Is drinking 2 Jony a day, costing him $95/30 packets. Is asking if Arginaid would be as good, as it is $30 less expensive. Will request input from RD. Pt is motivated to lose weight. CANDIDIASIS under pannus. Saturated cloth taken from under pannus. Will ask next SN to bring him some InterDry sheets to use and see if that works better. Would benefit from antifungal cream. I think it would be difficult for him to lift pannus with one hand and apply powder. Cream should be easier to manage.  Thank you, Domenica Bernardo, RN, MSN, WOCN

## 2023-12-11 NOTE — Clinical Note
Michaela also has coupons for the Jony. The Abintra apparently is really good and only once a day.  ----- Message -----  From: Michaela Armstrong R.D.  Sent: 12/14/2023  12:13 PM PST  To: Domenica Bernardo R.N.  Subject: RE:                                                Arginaid or Jony is fine. I have some Arginaid if he would like it. :)  I think if he could get some Abintra that would be a better option.  It's only once a day. I'm unsure of pricing at this time, however.  Michaela   ----- Message -----  From: Domenica Bernardo R.N.  Sent: 12/12/2023   6:30 PM PST  To: Brisa Robledo R.N.; Michaela Armstrong R.D.; *      ACTION REQUIRED  Updated wound care orders submitted to Dr James for review and signature.  Dr James - please refer pt to [facility] for consultation/debridement of non-healing right medial heel pressure injury. Photo in chart. Arterial US 10/26/23 but no COOPER. Please also order antifungal cream. Sig: apply twice daily to all affected area.  Very pleasant gentleman with multiple concerns. WOUNDS: RLE lymphedema with punctate scabs in area of redness - probable hemosiderin staining. No oozing. Should oozing or wounds develop, would need to consider compression therapy. RLE washed with no-rinse cleanser, moisturized, and left BENJAMIN. Two small crusted wounds distal lateral right foot should heal quickly. Recommend Triad wound paste. Right medial heel of most concern. States wound developed after wearing a shoe for 7 days. Has neuropathy and was unaware blood blister had formed. Developed eschar, which he states was treated with Betadine applications and left BENJAMIN. Pt needs this leg for stability, so he is wearing his diabetic shoe. Could not leave eschar BENJAMIN with sock and shoe. Blister started in August and has made little progress in 4 months. Wound margins are now open with moderate amount of serosanguinous exudate on dressing that was removed. Pt had arterial US, but not COOPER, when in Rehab  10/26. Results are in the chart. Recommendation now is to continue Betadine but cover with dry folded 4x4 gauze to keep dry. Use heel silicone adhesive dressing for protection. Would like to avoid contact with silicone until he can be evaluated at a wound center. Strongly recommend pt be referred to one of the [facility] for consultation and debridement. Presently UTS, but possibly stage 4. I know he is reluctant to leave home if not necessary, but perhaps they could see him every two weeks instead of weekly and home health could use a dressing to continue to support autolytic debridement. Perineal/deborah-rectal wound healing where exposed but tunnel 3.3 cm deep today. Home Health has been unable to get a dressing to stay in place more than one day. Dressing applied again today. I think it is worth it even if for one day. Wound is very friable and beefy red. Concern over contamination, as close to rectum and pt states it is difficult to cleanse himself well after BM. Recommend continuing an antimicrobial dressing. NUTRITION: Pt with type 2 DM with last A1c 6.7%. Checks BG in am and was 103 this morning. States when his wife was alive, she added sugar to everything she made. He is trying to be more responsible with his eating. Is purchasing [employer] foods - comes as a package and he prepares. Each meal lasts him for 2. Is drinking 2 Jony a day, costing him $95/30 packets. Is asking if Arginaid would be as good, as it is $30 less expensive. Will request input from RD. Pt is motivated to lose weight. CANDIDIASIS under pannus. Saturated cloth taken from under pannus. Will ask next SN to bring him some InterDry sheets to use and see if that works better. Would benefit from antifungal cream. I think it would be difficult for him to lift pannus with one hand and apply powder. Cream should be easier to manage.  Thank you, Domenica Bernardo, RN, MSN, WOCN

## 2023-12-11 NOTE — Clinical Note
thank you  ----- Message -----  From: Domenica Bernardo R.N.  Sent: 12/14/2023   4:30 PM PST  To: Lucy Moraes R.N.  Subject: FW:                                                Michaela also has coupons for the Jony. The Abintra apparently is really good and only once a day.  ----- Message -----  From: Michaela Armstrong R.D.  Sent: 12/14/2023  12:13 PM PST  To: Domenica Bernardo R.N.  Subject: RE:                                                Arginaid or Jony is fine. I have some Arginaid if he would like it. :)  I think if he could get some Abintra that would be a better option.  It's only once a day. I'm unsure of pricing at this time, however.  Michaela   ----- Message -----  From: Domenica Bernardo R.N.  Sent: 12/12/2023   6:30 PM PST  To: Brisa Robledo R.N.; Michaela Armstrong R.D.; *      ACTION REQUIRED  Updated wound care orders submitted to Dr James for review and signature.  Dr James - please refer pt to [facility] for consultation/debridement of non-healing right medial heel pressure injury. Photo in chart. Arterial US 10/26/23 but no COOPER. Please also order antifungal cream. Sig: apply twice daily to all affected area.  Very pleasant gentleman with multiple concerns. WOUNDS: RLE lymphedema with punctate scabs in area of redness - probable hemosiderin staining. No oozing. Should oozing or wounds develop, would need to consider compression therapy. RLE washed with no-rinse cleanser, moisturized, and left BENJAMIN. Two small crusted wounds distal lateral right foot should heal quickly. Recommend Triad wound paste. Right medial heel of most concern. States wound developed after wearing a shoe for 7 days. Has neuropathy and was unaware blood blister had formed. Developed eschar, which he states was treated with Betadine applications and left BENJAMIN. Pt needs this leg for stability, so he is wearing his diabetic shoe. Could not leave eschar BENJAMIN with sock and shoe. Blister started in August and has made little  progress in 4 months. Wound margins are now open with moderate amount of serosanguinous exudate on dressing that was removed. Pt had arterial US, but not COOPER, when in Rehab 10/26. Results are in the chart. Recommendation now is to continue Betadine but cover with dry folded 4x4 gauze to keep dry. Use heel silicone adhesive dressing for protection. Would like to avoid contact with silicone until he can be evaluated at a wound center. Strongly recommend pt be referred to one of the [facility] for consultation and debridement. Presently UTS, but possibly stage 4. I know he is reluctant to leave home if not necessary, but perhaps they could see him every two weeks instead of weekly and home health could use a dressing to continue to support autolytic debridement. Perineal/deborah-rectal wound healing where exposed but tunnel 3.3 cm deep today. Home Health has been unable to get a dressing to stay in place more than one day. Dressing applied again today. I think it is worth it even if for one day. Wound is very friable and beefy red. Concern over contamination, as close to rectum and pt states it is difficult to cleanse himself well after BM. Recommend continuing an antimicrobial dressing. NUTRITION: Pt with type 2 DM with last A1c 6.7%. Checks BG in am and was 103 this morning. States when his wife was alive, she added sugar to everything she made. He is trying to be more responsible with his eating. Is purchasing [employer] foods - comes as a package and he prepares. Each meal lasts him for 2. Is drinking 2 Jony a day, costing him $95/30 packets. Is asking if Arginaid would be as good, as it is $30 less expensive. Will request input from RD. Pt is motivated to lose weight. CANDIDIASIS under pannus. Saturated cloth taken from under pannus. Will ask next SN to bring him some InterDry sheets to use and see if that works better. Would benefit from antifungal cream. I think it would be difficult for him to  lift pannus with one hand and apply powder. Cream should be easier to manage.  Thank you, Domenica Bernardo, RN, MSN, WOCN

## 2023-12-11 NOTE — Clinical Note
ACTION REQUIRED  Updated wound care orders submitted to Dr James for review and signature.  Dr James - please refer pt to [facility] for consultation/debridement of non-healing right medial heel pressure injury. Photo in chart. Arterial US 10/26/23 but no COOPER. Please also order antifungal cream. Sig: apply twice daily to all affected area.  Very pleasant gentleman with multiple concerns. WOUNDS: RLE lymphedema with punctate scabs in area of redness - probable hemosiderin staining. No oozing. Should oozing or wounds develop, would need to consider compression therapy. RLE washed with no-rinse cleanser, moisturized, and left BENJAMIN. Two small crusted wounds distal lateral right foot should heal quickly. Recommend Triad wound paste. Right medial heel of most concern. States wound developed after wearing a shoe for 7 days. Has neuropathy and was unaware blood blister had formed. Developed eschar, which he states was treated with Betadine applications and left BENJAMIN. Pt needs this leg for stability, so he is wearing his diabetic shoe. Could not leave eschar BENJAMIN with sock and shoe. Blister started in August and has made little progress in 4 months. Wound margins are now open with moderate amount of serosanguinous exudate on dressing that was removed. Pt had arterial US, but not COOPER, when in Rehab 10/26. Results are in the chart. Recommendation now is to continue Betadine but cover with dry folded 4x4 gauze to keep dry. Use heel silicone adhesive dressing for protection. Would like to avoid contact with silicone until he can be evaluated at a wound center. Strongly recommend pt be referred to one of the [facility] for consultation and debridement. Presently UTS, but possibly stage 4. I know he is reluctant to leave home if not necessary, but perhaps they could see him every two weeks instead of weekly and home health could use a dressing to continue to support autolytic debridement.  Perineal/deborah-rectal wound healing where exposed but tunnel 3.3 cm deep today. Home Health has been unable to get a dressing to stay in place more than one day. Dressing applied again today. I think it is worth it even if for one day. Wound is very friable and beefy red. Concern over contamination, as close to rectum and pt states it is difficult to cleanse himself well after BM. Recommend continuing an antimicrobial dressing. NUTRITION: Pt with type 2 DM with last A1c 6.7%. Checks BG in am and was 103 this morning. States when his wife was alive, she added sugar to everything she made. He is trying to be more responsible with his eating. Is purchasing [employer] foods - comes as a package and he prepares. Each meal lasts him for 2. Is drinking 2 Jony a day, costing him $95/30 packets. Is asking if Arginaid would be as good, as it is $30 less expensive. Will request input from RD. Pt is motivated to lose weight. CANDIDIASIS under pannus. Saturated cloth taken from under pannus. Will ask next SN to bring him some InterDry sheets to use and see if that works better. Would benefit from antifungal cream. I think it would be difficult for him to lift pannus with one hand and apply powder. Cream should be easier to manage.  Thank you, Domenica Bernardo, RN, MSN, WOCN

## 2023-12-12 ENCOUNTER — HOME CARE VISIT (OUTPATIENT)
Dept: HOME HEALTH SERVICES | Facility: HOME HEALTHCARE | Age: 68
End: 2023-12-12
Payer: MEDICARE

## 2023-12-12 VITALS
DIASTOLIC BLOOD PRESSURE: 82 MMHG | HEART RATE: 90 BPM | SYSTOLIC BLOOD PRESSURE: 134 MMHG | TEMPERATURE: 97.9 F | OXYGEN SATURATION: 97 % | RESPIRATION RATE: 18 BRPM

## 2023-12-12 PROCEDURE — G0152 HHCP-SERV OF OT,EA 15 MIN: HCPCS

## 2023-12-12 ASSESSMENT — ENCOUNTER SYMPTOMS
SHORTNESS OF BREATH: 1
SUBJECTIVE PAIN PROGRESSION: UNCHANGED
PAIN LOCATION - PAIN DURATION: DAILY
PAIN LOCATION - PAIN QUALITY: HURTS
PAIN LOCATION - PAIN SEVERITY: 3/10
PAIN SEVERITY GOAL: 0/10
LOWEST PAIN SEVERITY IN PAST 24 HOURS: 6/10
PERSON REPORTING PAIN: PATIENT
LOWEST PAIN SEVERITY IN PAST 24 HOURS: 2/10
HYPERTENSION: 1
VOMITING: NO
PAIN: 1
SUBJECTIVE PAIN PROGRESSION: WAXING AND WANING
NAUSEA: NO
PAIN LOCATION - PAIN SEVERITY: 3/10
PAIN SEVERITY GOAL: 0/10
PAIN LOCATION - EXACERBATING FACTORS: DRESSING CHANGE
PAIN LOCATION - PAIN FREQUENCY: INTERMITTENT
PAIN LOCATION - PAIN QUALITY: ACHING
DYSPNEA ACTIVITY LEVEL: AFTER AMBULATING MORE THAN 20 FT
PAIN LOCATION - RELIEVING FACTORS: PAIN MEDS, REST, REPOSITIONING
PAIN LOCATION - PAIN FREQUENCY: INTERMITTENT
HIGHEST PAIN SEVERITY IN PAST 24 HOURS: 5/10
LOWER EXTREMITY EDEMA: 1
PAIN LOCATION: PERINEUM

## 2023-12-13 ENCOUNTER — HOME CARE VISIT (OUTPATIENT)
Dept: HOME HEALTH SERVICES | Facility: HOME HEALTHCARE | Age: 68
End: 2023-12-13
Payer: MEDICARE

## 2023-12-13 VITALS
RESPIRATION RATE: 18 BRPM | OXYGEN SATURATION: 98 % | DIASTOLIC BLOOD PRESSURE: 60 MMHG | SYSTOLIC BLOOD PRESSURE: 130 MMHG | HEART RATE: 76 BPM | TEMPERATURE: 97.6 F

## 2023-12-13 VITALS
DIASTOLIC BLOOD PRESSURE: 64 MMHG | HEART RATE: 76 BPM | RESPIRATION RATE: 18 BRPM | OXYGEN SATURATION: 97 % | TEMPERATURE: 98 F | SYSTOLIC BLOOD PRESSURE: 128 MMHG

## 2023-12-13 VITALS
TEMPERATURE: 97.5 F | RESPIRATION RATE: 18 BRPM | DIASTOLIC BLOOD PRESSURE: 70 MMHG | OXYGEN SATURATION: 96 % | SYSTOLIC BLOOD PRESSURE: 138 MMHG | HEART RATE: 88 BPM

## 2023-12-13 PROCEDURE — G0151 HHCP-SERV OF PT,EA 15 MIN: HCPCS

## 2023-12-13 PROCEDURE — G0299 HHS/HOSPICE OF RN EA 15 MIN: HCPCS

## 2023-12-13 ASSESSMENT — ENCOUNTER SYMPTOMS
PAIN LOCATION - EXACERBATING FACTORS: SITTING TOO LONG
HIGHEST PAIN SEVERITY IN PAST 24 HOURS: 5/10
PAIN LOCATION - PAIN DURATION: MONTHS
SUBJECTIVE PAIN PROGRESSION: WAXING AND WANING
PERSON REPORTING PAIN: PATIENT
PAIN SEVERITY GOAL: 0/10
PAIN: 1
PAIN LOCATION - PAIN DURATION: DAILY
PAIN LOCATION - RELIEVING FACTORS: PAIN MEDS, REST, REPOSITIONING
PAIN LOCATION - PAIN QUALITY: ACHING, BURNING
PAIN LOCATION - PAIN FREQUENCY: INTERMITTENT
PAIN LOCATION - PAIN SEVERITY: 3/10
LOWEST PAIN SEVERITY IN PAST 24 HOURS: 0/10
PAIN LOCATION - PAIN SEVERITY: 3/10
PAIN LOCATION - PAIN QUALITY: ACHING
PAIN LOCATION: PERINEUM
PAIN LOCATION - PAIN FREQUENCY: CONSTANT
PERSON REPORTING PAIN: PATIENT
PAIN: 1

## 2023-12-13 NOTE — CASE COMMUNICATION
noted  ----- Message -----  From: Domenica Bernardo R.N.  Sent: 12/12/2023   6:30 PM PST  To: Brisa Robledo R.N.; Michaela Armstrong R.D.; *      ACTION REQUIRED  Updated wound care orders submitted to Dr James for review and signature.  Dr James - please refer pt to [facility] for consultation/debridement of non-healing right medial heel pressure injury. Photo in chart. Arterial US 10/26/23 but no COOPER. Please also ord er antifungal cream. Sig: apply twice daily to all affected area.  Very pleasant gentleman with multiple concerns. WOUNDS: RLE lymphedema with punctate scabs in area of redness - probable hemosiderin staining. No oozing. Should oozing or wounds develop, would need to consider compression therapy. RLE washed with no-rinse cleanser, moisturized, and left BENJAMIN. Two small crusted wounds distal lateral right foot should heal quickly. Recommen d Triad wound paste. Right medial heel of most concern. States wound developed after wearing a shoe for 7 days. Has neuropathy and was unaware blood blister had formed. Developed eschar, which he states was treated with Betadine applications and left BENJAMIN. Pt needs this leg for stability, so he is wearing his diabetic shoe. Could not leave eschar BENJAMIN with sock and shoe. Blister started in August and has made little progress in 4 months.  Wound margins are now open with moderate amount of serosanguinous exudate on dressing that was removed. Pt had arterial US, but not COOPER, when in Rehab 10/26. Results are in the chart. Recommendation now is to continue Betadine but cover with dry folded 4x4 gauze to keep dry. Use heel silicone adhesive dressing for protection. Would like to avoid contact with silicone until he can be evaluated at a wound center. Strongly recommend pt be  referred to one of the [facility] for consultation and debridement. Presently UTS, but possibly stage 4. I know he is reluctant to leave home if not necessary, but  perhaps they could see him every two weeks instead of weekly and home health could use a dressing to continue to support autolytic debridement. Perineal/deborah-rectal wound healing where exposed but tunnel 3.3 cm deep today. Home Health has been unable to get a  dressing to stay in place more than one day. Dressing applied again today. I think it is worth it even if for one day. Wound is very friable and beefy red. Concern over contamination, as close to rectum and pt states it is difficult to cleanse himself well after BM. Recommend continuing an antimicrobial dressing. NUTRITION: Pt with type 2 DM with last A1c 6.7%. Checks BG in am and was 103 this morning. States when his wife was alive, s he added sugar to everything she made. He is trying to be more responsible with his eating. Is purchasing [employer] foods - comes as a package and he prepares. Each meal lasts him for 2. Is drinking 2 Jony a day, costing him $95/30 packets. Is asking if Arginaid would be as good, as it is $30 less expensive. Will request input from RD. Pt is motivated to lose weight. CANDIDIASIS under pannus. Saturated cloth taken from under pannus. W ill ask next SN to bring him some InterDry sheets to use and see if that works better. Would benefit from antifungal cream. I think it would be difficult for him to lift pannus with one hand and apply powder. Cream should be easier to manage.  Thank you, Domenica Bernardo, RN, MSN, WOCN

## 2023-12-14 ASSESSMENT — ENCOUNTER SYMPTOMS
BOWEL PATTERN NORMAL: 1
PAIN: 1
PERSON REPORTING PAIN: PATIENT
VOMITING: DENIES
MUSCLE WEAKNESS: 1
HIGHEST PAIN SEVERITY IN PAST 24 HOURS: 0/10
NAUSEA: DENIES
PAIN SEVERITY GOAL: 0/10
STOOL FREQUENCY: DAILY
LAST BOWEL MOVEMENT: 66821
FATIGUES EASILY: 1
LOWEST PAIN SEVERITY IN PAST 24 HOURS: 0/10

## 2023-12-15 ENCOUNTER — HOME CARE VISIT (OUTPATIENT)
Dept: HOME HEALTH SERVICES | Facility: HOME HEALTHCARE | Age: 68
End: 2023-12-15
Payer: MEDICARE

## 2023-12-15 VITALS
SYSTOLIC BLOOD PRESSURE: 138 MMHG | HEART RATE: 74 BPM | TEMPERATURE: 97.4 F | OXYGEN SATURATION: 96 % | RESPIRATION RATE: 18 BRPM | DIASTOLIC BLOOD PRESSURE: 70 MMHG

## 2023-12-15 PROCEDURE — G0152 HHCP-SERV OF OT,EA 15 MIN: HCPCS

## 2023-12-15 PROCEDURE — G0299 HHS/HOSPICE OF RN EA 15 MIN: HCPCS

## 2023-12-15 ASSESSMENT — ENCOUNTER SYMPTOMS
PAIN LOCATION - PAIN QUALITY: DULL , ACHY
PAIN: 1
PERSON REPORTING PAIN: PATIENT
PAIN LOCATION - PAIN SEVERITY: 2/10
PAIN LOCATION - PAIN FREQUENCY: FREQUENT
PAIN LOCATION: BUTTOCK
PAIN LOCATION - RELIEVING FACTORS: PAIN MEDICATION

## 2023-12-16 SDOH — HEALTH STABILITY: PHYSICAL HEALTH: EXERCISE TYPE: HOME EXERCISE PROGRAM

## 2023-12-16 ASSESSMENT — ENCOUNTER SYMPTOMS
MUSCLE WEAKNESS: 1
VOMITING: DENIES
PAIN SEVERITY GOAL: 0/10
LOWEST PAIN SEVERITY IN PAST 24 HOURS: 1/10
BOWEL PATTERN NORMAL: 1
ASSOCIATED SYMPTOMS: DENIES
LAST BOWEL MOVEMENT: 66823
STOOL FREQUENCY: DAILY
SUBJECTIVE PAIN PROGRESSION: UNCHANGED
NAUSEA: DENIES
FATIGUES EASILY: 1
HIGHEST PAIN SEVERITY IN PAST 24 HOURS: 3/10

## 2023-12-18 ENCOUNTER — HOME CARE VISIT (OUTPATIENT)
Dept: HOME HEALTH SERVICES | Facility: HOME HEALTHCARE | Age: 68
End: 2023-12-18
Payer: MEDICARE

## 2023-12-18 VITALS
TEMPERATURE: 97.5 F | DIASTOLIC BLOOD PRESSURE: 64 MMHG | HEART RATE: 88 BPM | RESPIRATION RATE: 18 BRPM | OXYGEN SATURATION: 98 % | SYSTOLIC BLOOD PRESSURE: 128 MMHG

## 2023-12-18 VITALS
RESPIRATION RATE: 18 BRPM | HEART RATE: 74 BPM | OXYGEN SATURATION: 96 % | TEMPERATURE: 97.4 F | SYSTOLIC BLOOD PRESSURE: 142 MMHG | DIASTOLIC BLOOD PRESSURE: 80 MMHG

## 2023-12-18 VITALS
OXYGEN SATURATION: 98 % | SYSTOLIC BLOOD PRESSURE: 128 MMHG | HEART RATE: 88 BPM | RESPIRATION RATE: 18 BRPM | DIASTOLIC BLOOD PRESSURE: 64 MMHG | TEMPERATURE: 97.5 F

## 2023-12-18 PROCEDURE — A6212 FOAM DRG <=16 SQ IN W/BORDER: HCPCS

## 2023-12-18 PROCEDURE — G0299 HHS/HOSPICE OF RN EA 15 MIN: HCPCS

## 2023-12-18 PROCEDURE — A6214 FOAM DRG > 48 SQ IN W/BORDER: HCPCS

## 2023-12-18 PROCEDURE — A6196 ALGINATE DRESSING <=16 SQ IN: HCPCS

## 2023-12-18 PROCEDURE — G0151 HHCP-SERV OF PT,EA 15 MIN: HCPCS

## 2023-12-18 ASSESSMENT — ENCOUNTER SYMPTOMS
PAIN: 1
PAIN SEVERITY GOAL: 0/10
LOWEST PAIN SEVERITY IN PAST 24 HOURS: 0/10
PAIN LOCATION - PAIN FREQUENCY: INTERMITTENT
PAIN LOCATION - PAIN SEVERITY: 2/10
SUBJECTIVE PAIN PROGRESSION: WAXING AND WANING
PAIN LOCATION - PAIN DURATION: MONTHS
PAIN LOCATION - PAIN DURATION: DAILY
PAIN LOCATION: PERINEUM
PERSON REPORTING PAIN: PATIENT
PERSON REPORTING PAIN: PATIENT
FATIGUES EASILY: 1
LIMITED RANGE OF MOTION: 1
PAIN LOCATION - PAIN SEVERITY: 2/10
PAIN LOCATION - PAIN QUALITY: ACHING, BURNING
PAIN SEVERITY GOAL: 0/10
PAIN LOCATION - EXACERBATING FACTORS: PRESSURE
PAIN LOCATION - PAIN QUALITY: PRESSURE
PAIN LOCATION - PAIN SEVERITY: 2/10
PAIN: 1
NAUSEA: NO
PAIN: 1
PERSON REPORTING PAIN: PATIENT
LAST BOWEL MOVEMENT: 66826
LOWEST PAIN SEVERITY IN PAST 24 HOURS: 0/10
PAIN LOCATION - PAIN QUALITY: ACHING
PAIN LOCATION - PAIN FREQUENCY: FREQUENT
VOMITING: NO
HIGHEST PAIN SEVERITY IN PAST 24 HOURS: 4/10
SUBJECTIVE PAIN PROGRESSION: GRADUALLY IMPROVING
HIGHEST PAIN SEVERITY IN PAST 24 HOURS: 2/10
PAIN LOCATION - PAIN FREQUENCY: INTERMITTENT

## 2023-12-18 NOTE — Clinical Note
ACTION REQUIRED  Rigth heel wound updated and submitted to Dr James for review and signature.   Right lateral distal foot with only one small area open 0.3 x 0.3 x 0.1. Right medial heel with eschar softening and all margins open. Can see thick slough underneath. Pt limiting his walking to off-load. Perineal/rafael-rectal wound not improving. Remains beefy red as it can be visualized but friable. Tunnel depth has increased to 6.4 cm. Wound photos and measurements taken and posted in chart under Media. Pt has lorrie't at [facility] 1/3/23. FBG 95. Preparing healthy meals  Thank you, Domenica Bernardo, RN, MSN, WOCN.

## 2023-12-19 ENCOUNTER — HOME CARE VISIT (OUTPATIENT)
Dept: HOME HEALTH SERVICES | Facility: HOME HEALTHCARE | Age: 68
End: 2023-12-19
Payer: MEDICARE

## 2023-12-19 VITALS
OXYGEN SATURATION: 98 % | HEART RATE: 89 BPM | DIASTOLIC BLOOD PRESSURE: 70 MMHG | SYSTOLIC BLOOD PRESSURE: 124 MMHG | TEMPERATURE: 97.4 F | RESPIRATION RATE: 18 BRPM

## 2023-12-19 PROCEDURE — G0152 HHCP-SERV OF OT,EA 15 MIN: HCPCS

## 2023-12-19 ASSESSMENT — ENCOUNTER SYMPTOMS
PAIN: 1
PAIN LOCATION - PAIN SEVERITY: 2/10
PAIN LOCATION - EXACERBATING FACTORS: WOUND CARE
PERSON REPORTING PAIN: PATIENT
PAIN LOCATION - PAIN DURATION: MONTHS
PAIN LOCATION - PAIN FREQUENCY: FREQUENT
PAIN LOCATION - PAIN QUALITY: ACHING

## 2023-12-19 NOTE — CASE COMMUNICATION
noted  ----- Message -----  From: Domenica Bernardo R.N.  Sent: 12/18/2023   8:42 PM PST  To: Brisa Robledo R.N.; Lucy Moraes R.N.; *      ACTION REQUIRED  Rigth heel wound updated and submitted to Dr James for review and signature.   Right lateral distal foot with only one small area open 0.3 x 0.3 x 0.1. Right medial heel with eschar softening and all margins open. Can see thick slough underneath. Pt limiting his walking to off-l oad. Perineal/rafael-rectal wound not improving. Remains beefy red as it can be visualized but friable. Tunnel depth has increased to 6.4 cm. Wound photos and measurements taken and posted in chart under Media. Pt has lorrie't at [facility] 1/3/23. FBG 95. Preparing healthy meals  Thank you, Domenica Bernardo, RN, MSN, WOCN.

## 2023-12-20 ENCOUNTER — HOME CARE VISIT (OUTPATIENT)
Dept: HOME HEALTH SERVICES | Facility: HOME HEALTHCARE | Age: 68
End: 2023-12-20
Payer: MEDICARE

## 2023-12-20 VITALS
OXYGEN SATURATION: 98 % | RESPIRATION RATE: 18 BRPM | SYSTOLIC BLOOD PRESSURE: 136 MMHG | TEMPERATURE: 97.4 F | DIASTOLIC BLOOD PRESSURE: 80 MMHG | HEART RATE: 80 BPM

## 2023-12-20 VITALS
RESPIRATION RATE: 18 BRPM | SYSTOLIC BLOOD PRESSURE: 132 MMHG | OXYGEN SATURATION: 98 % | DIASTOLIC BLOOD PRESSURE: 78 MMHG | TEMPERATURE: 97.4 F | HEART RATE: 78 BPM

## 2023-12-20 PROCEDURE — G0299 HHS/HOSPICE OF RN EA 15 MIN: HCPCS

## 2023-12-20 PROCEDURE — G0151 HHCP-SERV OF PT,EA 15 MIN: HCPCS

## 2023-12-20 SDOH — HEALTH STABILITY: PHYSICAL HEALTH: EXERCISE TYPE: HOME EXERCISE PROGRAM

## 2023-12-20 ASSESSMENT — ENCOUNTER SYMPTOMS
PAIN SEVERITY GOAL: 0/10
PERSON REPORTING PAIN: PATIENT
STOOL FREQUENCY: DAILY
LOWEST PAIN SEVERITY IN PAST 24 HOURS: 0/10
PAIN: 1
NAUSEA: DENIES
PERSON REPORTING PAIN: PATIENT
PAIN SEVERITY GOAL: 0/10
LAST BOWEL MOVEMENT: 66828
HIGHEST PAIN SEVERITY IN PAST 24 HOURS: 4/10
LOWEST PAIN SEVERITY IN PAST 24 HOURS: 0/10
PAIN LOCATION - EXACERBATING FACTORS: SITTING TOO LONG
BOWEL PATTERN NORMAL: 1
PAIN LOCATION - PAIN DURATION: DAILY
HIGHEST PAIN SEVERITY IN PAST 24 HOURS: 0/10
PAIN LOCATION - PAIN QUALITY: ACHING
PAIN LOCATION - RELIEVING FACTORS: REPOSITIONING
PAIN: 1
VOMITING: DENIES
FATIGUES EASILY: 1
MUSCLE WEAKNESS: 1
PAIN LOCATION - PAIN FREQUENCY: INTERMITTENT
PAIN LOCATION: PERINEUM
PAIN LOCATION - PAIN SEVERITY: 2/10

## 2023-12-21 ENCOUNTER — HOME CARE VISIT (OUTPATIENT)
Dept: HOME HEALTH SERVICES | Facility: HOME HEALTHCARE | Age: 68
End: 2023-12-21
Payer: MEDICARE

## 2023-12-21 PROCEDURE — G0152 HHCP-SERV OF OT,EA 15 MIN: HCPCS

## 2023-12-22 ENCOUNTER — HOME CARE VISIT (OUTPATIENT)
Dept: HOME HEALTH SERVICES | Facility: HOME HEALTHCARE | Age: 68
End: 2023-12-22
Payer: MEDICARE

## 2023-12-22 VITALS
HEART RATE: 79 BPM | TEMPERATURE: 97.4 F | RESPIRATION RATE: 18 BRPM | OXYGEN SATURATION: 97 % | SYSTOLIC BLOOD PRESSURE: 134 MMHG | DIASTOLIC BLOOD PRESSURE: 82 MMHG

## 2023-12-22 PROCEDURE — G0299 HHS/HOSPICE OF RN EA 15 MIN: HCPCS

## 2023-12-22 ASSESSMENT — ENCOUNTER SYMPTOMS
PAIN: 1
PAIN LOCATION - PAIN FREQUENCY: FREQUENT
PAIN LOCATION - PAIN SEVERITY: 2/10
PAIN LOCATION - EXACERBATING FACTORS: WOUND CARE
PAIN LOCATION - PAIN DURATION: MONTHS
PAIN LOCATION - PAIN QUALITY: ACHING

## 2023-12-23 VITALS
OXYGEN SATURATION: 94 % | HEART RATE: 84 BPM | SYSTOLIC BLOOD PRESSURE: 130 MMHG | TEMPERATURE: 97.6 F | DIASTOLIC BLOOD PRESSURE: 80 MMHG | RESPIRATION RATE: 18 BRPM

## 2023-12-23 ASSESSMENT — ENCOUNTER SYMPTOMS
VOMITING: DENIES
PERSON REPORTING PAIN: PATIENT
ASSOCIATED SYMPTOMS: DENIES
FATIGUES EASILY: 1
MUSCLE WEAKNESS: 1
PAIN LOCATION - PAIN QUALITY: DULL, ACHY
PAIN LOCATION - PAIN SEVERITY: 2/10
LOWEST PAIN SEVERITY IN PAST 24 HOURS: 2/10
BOWEL PATTERN NORMAL: 1
PAIN: 1
PAIN LOCATION - RELIEVING FACTORS: REPOSITIONING
HIGHEST PAIN SEVERITY IN PAST 24 HOURS: 2/10
PAIN LOCATION - PAIN FREQUENCY: INTERMITTENT
SUBJECTIVE PAIN PROGRESSION: UNCHANGED
LAST BOWEL MOVEMENT: 66830
STOOL FREQUENCY: DAILY
NAUSEA: DENIES
PAIN SEVERITY GOAL: 0/10

## 2023-12-25 ENCOUNTER — HOME CARE VISIT (OUTPATIENT)
Dept: HOME HEALTH SERVICES | Facility: HOME HEALTHCARE | Age: 68
End: 2023-12-25
Payer: MEDICARE

## 2023-12-25 VITALS
HEART RATE: 72 BPM | SYSTOLIC BLOOD PRESSURE: 130 MMHG | DIASTOLIC BLOOD PRESSURE: 78 MMHG | TEMPERATURE: 97.9 F | OXYGEN SATURATION: 98 % | RESPIRATION RATE: 18 BRPM

## 2023-12-25 PROCEDURE — G0299 HHS/HOSPICE OF RN EA 15 MIN: HCPCS

## 2023-12-25 PROCEDURE — A6214 FOAM DRG > 48 SQ IN W/BORDER: HCPCS

## 2023-12-25 ASSESSMENT — ENCOUNTER SYMPTOMS
PAIN: 1
PAIN LOCATION - PAIN QUALITY: ACHY
HIGHEST PAIN SEVERITY IN PAST 24 HOURS: 2/10
PAIN LOCATION - EXACERBATING FACTORS: PRESSURE OVER THE AREA
PERSON REPORTING PAIN: PATIENT
LOWEST PAIN SEVERITY IN PAST 24 HOURS: 2/10
PAIN LOCATION - PAIN FREQUENCY: INTERMITTENT
PAIN SEVERITY GOAL: 0/10
VOMITING: NO
LAST BOWEL MOVEMENT: 66832
PAIN LOCATION - PAIN SEVERITY: 2/10
NAUSEA: NO
LIMITED RANGE OF MOTION: 1
PAIN LOCATION - RELIEVING FACTORS: REPOSITIONING
LOWER EXTREMITY EDEMA: 1

## 2023-12-25 NOTE — Clinical Note
FYI  Pt states he is trying to offload his perineal/deborah-rectal wound more. Surface wound appears small but now with small area of yellow slough. Unable to gently probe tunnel past 3.2 cm with cotton tip. Packing has not worked. Puracyn hypochlorous hydrogel instilled into tunnel. Right lateral foot wound healed; small amount of crusting. Noted to have area of red irritation over vein on dorsal foot where margin of silicone foam dressing was. Covered with large oval DuoDerm thin hydrocolloid dressing. Pt wears a substantial shoe that appears to fit snug around his foot. He needs this shoe to stabilize him when he walks. Right medial heel wound with mushy autolytically debriding eschar. Margins open. Thick slough under eschar. Pt had new tube of Santyl Collagenase with his supplies. Should work well to enzymatically debride wound. However, Santyl should be applied daily and he cannot do that. Other option is to use Hydrofera Blue antimicrobial foam over the Santyl and change MWF. Would mean an extra thickness of foam with the silicone adhesive foam heel dressing to secure. I believe that will be too thick in his shoe. He [facility]rie't [facility]. Wound should be debrided and if they choose to prescribe the Santyl, hopefully they can give him a different shoe or boot to better manage the dressing and pressure over the area. This wound is likely to be stage 4. Heel: 2 x 4 x 0.8 cm. Periwound erythema up to 4.5 cm all around. Barely blanchable. Slight increase in warmth. Dressing was partially open. New dressing secured with Pinc tape to help keep it in place. No candidiasis under right pannus. Pt sleeps on his left side. Extensive candidiasis under left pannus. Photo taken. New wicking cloth placed. Pt would benefit from antifungal cream twice daily. Photos of all wounds taken.  to day. Continues to focus on eating well and hopefully losing weight.   Thank you, Domenica Bernardo, RN, MSN, WOCN

## 2023-12-26 ENCOUNTER — HOME CARE VISIT (OUTPATIENT)
Dept: HOME HEALTH SERVICES | Facility: HOME HEALTHCARE | Age: 68
End: 2023-12-26
Payer: MEDICARE

## 2023-12-26 NOTE — CASE COMMUNICATION
noted  ----- Message -----  From: Domenica Bernardo R.N.  Sent: 12/25/2023   5:51 PM PST  To: Brisa Robledo R.N.; Lucy Moraes R.N.; *      FYI  Pt states he is trying to offload his perineal/deborah-rectal wound more. Surface wound appears small but now with small area of yellow slough. Unable to gently probe tunnel past 3.2 cm with cotton tip. Packing has not worked. Puracyn hypochlorous hydrogel instilled into tunnel. Right lateral fo ot wound healed; small amount of crusting. Noted to have area of red irritation over vein on dorsal foot where margin of silicone foam dressing was. Covered with large oval DuoDerm thin hydrocolloid dressing. Pt wears a substantial shoe that appears to fit snug around his foot. He needs this shoe to stabilize him when he walks. Right medial heel wound with mushy autolytically debriding eschar. Margins open. Thick slough under eschar. Pt  had new tube of Santyl Collagenase with his supplies. Should work well to enzymatically debride wound. However, Santyl should be applied daily and he cannot do that. Other option is to use Hydrofera Blue antimicrobial foam over the Santyl and change MWF. Would mean an extra thickness of foam with the silicone adhesive foam heel dressing to secure. I believe that will be too thick in his shoe. He has lorrie't at [facility] next week.  Wound should be debrided and if they choose to prescribe the Santyl, hopefully they can give him a different shoe or boot to better manage the dressing and pressure over the area. This wound is likely to be stage 4. Heel: 2 x 4 x 0.8 cm. Periwound erythema up to 4.5 cm all around. Barely blanchable. Slight increase in warmth. Dressing was partially open. New dressing secured with Pinc tape to help keep it in place. No candidiasis under  right pannus. Pt sleeps on his left side. Extensive candidiasis under left pannus. Photo taken. New wicking cloth placed. Pt would benefit from antifungal cream twice daily. Photos of  all wounds taken.  to day. Continues to focus on eating well and hopefully losing weight.   Thank you, Domenica Bernardo RN, MSN, WOCN

## 2023-12-27 ENCOUNTER — HOME CARE VISIT (OUTPATIENT)
Dept: HOME HEALTH SERVICES | Facility: HOME HEALTHCARE | Age: 68
End: 2023-12-27
Payer: MEDICARE

## 2023-12-27 VITALS
RESPIRATION RATE: 18 BRPM | HEART RATE: 76 BPM | TEMPERATURE: 97.6 F | DIASTOLIC BLOOD PRESSURE: 80 MMHG | SYSTOLIC BLOOD PRESSURE: 128 MMHG | OXYGEN SATURATION: 96 %

## 2023-12-27 PROCEDURE — G0299 HHS/HOSPICE OF RN EA 15 MIN: HCPCS

## 2023-12-27 ASSESSMENT — ENCOUNTER SYMPTOMS
PAIN SEVERITY GOAL: 0/10
MUSCLE WEAKNESS: 1
STOOL FREQUENCY: DAILY
BOWEL PATTERN NORMAL: 1
PAIN: 1
LAST BOWEL MOVEMENT: 66835
FATIGUES EASILY: 1
PERSON REPORTING PAIN: PATIENT
HIGHEST PAIN SEVERITY IN PAST 24 HOURS: 0/10
NAUSEA: DENIES
LOWEST PAIN SEVERITY IN PAST 24 HOURS: 0/10
VOMITING: DENIES

## 2023-12-28 ENCOUNTER — HOME CARE VISIT (OUTPATIENT)
Dept: HOME HEALTH SERVICES | Facility: HOME HEALTHCARE | Age: 68
End: 2023-12-28
Payer: MEDICARE

## 2023-12-28 VITALS
RESPIRATION RATE: 18 BRPM | OXYGEN SATURATION: 95 % | SYSTOLIC BLOOD PRESSURE: 132 MMHG | TEMPERATURE: 97.4 F | DIASTOLIC BLOOD PRESSURE: 74 MMHG | HEART RATE: 78 BPM

## 2023-12-28 PROCEDURE — G0151 HHCP-SERV OF PT,EA 15 MIN: HCPCS

## 2023-12-28 ASSESSMENT — ENCOUNTER SYMPTOMS
PAIN LOCATION: PERINEUM
PAIN LOCATION - PAIN DURATION: DAILY
PAIN LOCATION - PAIN FREQUENCY: INTERMITTENT
PAIN LOCATION - PAIN QUALITY: ACHING, BURNING
SUBJECTIVE PAIN PROGRESSION: WAXING AND WANING
PAIN LOCATION - PAIN SEVERITY: 4/10
LOWEST PAIN SEVERITY IN PAST 24 HOURS: 0/10
HIGHEST PAIN SEVERITY IN PAST 24 HOURS: 4/10
PERSON REPORTING PAIN: PATIENT
PAIN: 1
PAIN LOCATION - RELIEVING FACTORS: PAIN MEDS, REST, REPOSITIONING
PAIN SEVERITY GOAL: 0/10

## 2023-12-29 ENCOUNTER — HOME CARE VISIT (OUTPATIENT)
Dept: HOME HEALTH SERVICES | Facility: HOME HEALTHCARE | Age: 68
End: 2023-12-29
Payer: MEDICARE

## 2023-12-29 PROCEDURE — G0299 HHS/HOSPICE OF RN EA 15 MIN: HCPCS

## 2023-12-30 VITALS
RESPIRATION RATE: 18 BRPM | SYSTOLIC BLOOD PRESSURE: 138 MMHG | HEART RATE: 80 BPM | DIASTOLIC BLOOD PRESSURE: 80 MMHG | TEMPERATURE: 97.9 F | OXYGEN SATURATION: 96 %

## 2023-12-30 ASSESSMENT — ENCOUNTER SYMPTOMS
BOWEL PATTERN NORMAL: 1
PAIN: 1
PERSON REPORTING PAIN: PATIENT
VOMITING: DENIES
PAIN SEVERITY GOAL: 0/10
HIGHEST PAIN SEVERITY IN PAST 24 HOURS: 0/10
LOWEST PAIN SEVERITY IN PAST 24 HOURS: 0/10
MUSCLE WEAKNESS: 1
LAST BOWEL MOVEMENT: 66837
NAUSEA: DENIES
STOOL FREQUENCY: DAILY
FATIGUES EASILY: 1

## 2024-01-01 ENCOUNTER — HOME CARE VISIT (OUTPATIENT)
Dept: HOME HEALTH SERVICES | Facility: HOME HEALTHCARE | Age: 69
End: 2024-01-01
Payer: MEDICARE

## 2024-01-01 VITALS — TEMPERATURE: 97.4 F | DIASTOLIC BLOOD PRESSURE: 66 MMHG | HEART RATE: 78 BPM | SYSTOLIC BLOOD PRESSURE: 124 MMHG

## 2024-01-01 VITALS
SYSTOLIC BLOOD PRESSURE: 124 MMHG | OXYGEN SATURATION: 96 % | HEART RATE: 78 BPM | TEMPERATURE: 97.4 F | DIASTOLIC BLOOD PRESSURE: 66 MMHG | RESPIRATION RATE: 18 BRPM

## 2024-01-01 PROCEDURE — A6214 FOAM DRG > 48 SQ IN W/BORDER: HCPCS

## 2024-01-01 PROCEDURE — G0299 HHS/HOSPICE OF RN EA 15 MIN: HCPCS

## 2024-01-01 PROCEDURE — G0151 HHCP-SERV OF PT,EA 15 MIN: HCPCS

## 2024-01-01 PROCEDURE — A6196 ALGINATE DRESSING <=16 SQ IN: HCPCS

## 2024-01-01 ASSESSMENT — ENCOUNTER SYMPTOMS
PAIN: 1
VOMITING: NO
PAIN SEVERITY GOAL: 0/10
PAIN LOCATION - PAIN SEVERITY: 3/10
LOWER EXTREMITY EDEMA: 1
HIGHEST PAIN SEVERITY IN PAST 24 HOURS: 5/10
LAST BOWEL MOVEMENT: 66840
PERSON REPORTING PAIN: PATIENT
PERSON REPORTING PAIN: PATIENT
NAUSEA: NO
LIMITED RANGE OF MOTION: 1
PAIN LOCATION - PAIN FREQUENCY: INTERMITTENT
PAIN: 1
PAIN LOCATION: PERINEUM
PAIN LOCATION - PAIN SEVERITY: 2/10
LOWEST PAIN SEVERITY IN PAST 24 HOURS: 0/10
SUBJECTIVE PAIN PROGRESSION: WAXING AND WANING
PAIN LOCATION - PAIN QUALITY: ACHING
PAIN LOCATION - EXACERBATING FACTORS: SITTING
PAIN LOCATION - EXACERBATING FACTORS: DRESSING CHANGE
PAIN LOCATION - PAIN DURATION: DAILY
PAIN LOCATION - RELIEVING FACTORS: PAIN MEDS, REST, REPOSITIONING
PAIN LOCATION - PAIN FREQUENCY: INTERMITTENT

## 2024-01-02 ENCOUNTER — HOME CARE VISIT (OUTPATIENT)
Dept: HOME HEALTH SERVICES | Facility: HOME HEALTHCARE | Age: 69
End: 2024-01-02
Payer: MEDICARE

## 2024-01-02 VITALS
HEART RATE: 93 BPM | RESPIRATION RATE: 18 BRPM | SYSTOLIC BLOOD PRESSURE: 128 MMHG | OXYGEN SATURATION: 95 % | TEMPERATURE: 97.4 F | DIASTOLIC BLOOD PRESSURE: 80 MMHG

## 2024-01-02 PROCEDURE — G0152 HHCP-SERV OF OT,EA 15 MIN: HCPCS

## 2024-01-02 ASSESSMENT — ENCOUNTER SYMPTOMS
PAIN LOCATION - RELIEVING FACTORS: POSITIONING
PAIN: 1
PAIN LOCATION - PAIN FREQUENCY: FREQUENT
PAIN LOCATION - PAIN SEVERITY: 3/10
PAIN LOCATION - PAIN QUALITY: ACHING
PAIN LOCATION - PAIN DURATION: MONTHS
PERSON REPORTING PAIN: PATIENT
PAIN LOCATION - EXACERBATING FACTORS: POSITIONING

## 2024-01-03 ENCOUNTER — HOME CARE VISIT (OUTPATIENT)
Dept: HOME HEALTH SERVICES | Facility: HOME HEALTHCARE | Age: 69
End: 2024-01-03
Payer: MEDICARE

## 2024-01-03 VITALS
SYSTOLIC BLOOD PRESSURE: 128 MMHG | HEART RATE: 95 BPM | OXYGEN SATURATION: 96 % | DIASTOLIC BLOOD PRESSURE: 78 MMHG | RESPIRATION RATE: 18 BRPM | TEMPERATURE: 97.5 F

## 2024-01-03 PROCEDURE — G0151 HHCP-SERV OF PT,EA 15 MIN: HCPCS

## 2024-01-03 ASSESSMENT — ACTIVITIES OF DAILY LIVING (ADL)
PHYSICAL TRANSFERS ASSESSED: 1
CURRENT_FUNCTION: INDEPENDENT
AMBULATION ASSISTANCE: 1
AMBULATION ASSISTANCE: SUPERVISION
GROOMING_COMMENTS: SEE OT NOTES
PHYSICAL_TRANSFERS_DEVICES: USE OF B UES TO ASSIST.
BATHING_COMMENTS: SEE OT NOTES
FEEDING_COMMENTS: SEE OT NOTES

## 2024-01-03 ASSESSMENT — ENCOUNTER SYMPTOMS
HIGHEST PAIN SEVERITY IN PAST 24 HOURS: 5/10
PAIN LOCATION: PERINEUM
PAIN LOCATION - PAIN SEVERITY: 3/10
PAIN LOCATION - PAIN QUALITY: ACHING, BURNING
SUBJECTIVE PAIN PROGRESSION: WAXING AND WANING
LOWEST PAIN SEVERITY IN PAST 24 HOURS: 0/10
PERSON REPORTING PAIN: PATIENT
PAIN LOCATION - RELIEVING FACTORS: PAIN MEDS, REST, REPOSITIONING
PAIN LOCATION - PAIN FREQUENCY: INTERMITTENT
PAIN LOCATION - EXACERBATING FACTORS: SITTING TOO MUCH
PAIN: 1
PAIN LOCATION - PAIN DURATION: DAILY
PAIN SEVERITY GOAL: 0/10

## 2024-01-03 NOTE — Clinical Note
Dr. James, pt reports he is low on prescriptions for eliquis, amniodarone, potassium, diltiazem, and metformin.  Last prescription filled upon hospital discharge.  If you feel appropriate, please send refills for above medictions.  Pt reports his pharmacy [facility] on [address] [facility].  Thank you.

## 2024-01-03 NOTE — Clinical Note
noted  ----- Message -----  From: Ivis Suazo, PT  Sent: 1/3/2024   5:17 PM PST  To: Lucy Moraes R.N.; Paula Hooper; *      PT reassessment completed, requesting auth for 1w1 effective 1/8/2024.

## 2024-01-04 DIAGNOSIS — I48.91 ATRIAL FIBRILLATION, UNSPECIFIED TYPE (HCC): ICD-10-CM

## 2024-01-04 DIAGNOSIS — E11.51 DM (DIABETES MELLITUS) TYPE II, CONTROLLED, WITH PERIPHERAL VASCULAR DISORDER (HCC): ICD-10-CM

## 2024-01-04 RX ORDER — POTASSIUM CHLORIDE 20 MEQ/1
20 TABLET, EXTENDED RELEASE ORAL DAILY
Qty: 100 TABLET | Refills: 2 | Status: ON HOLD | OUTPATIENT
Start: 2024-01-04 | End: 2024-03-07

## 2024-01-04 RX ORDER — AMIODARONE HYDROCHLORIDE 200 MG/1
200 TABLET ORAL DAILY
Qty: 30 TABLET | Refills: 2 | Status: SHIPPED | OUTPATIENT
Start: 2024-01-04 | End: 2024-02-01

## 2024-01-04 NOTE — CASE COMMUNICATION
noted  ----- Message -----  From: Ivis Suazo, PT  Sent: 1/3/2024   5:17 PM PST  To: Lucy Moraes R.N.; Theo James M.D.      Dr. James, pt reports he is low on prescriptions for eliquis, amniodarone, potassium, diltiazem, and metformin.  Last prescription filled upon hospital discharge.  If you feel appropriate, please send refills for above medictions.  Pt reports his pharmacy [facility] on [address] [facility].  Thank you.

## 2024-01-04 NOTE — CASE COMMUNICATION
FYI-Missed SNV, pt have appt for wound care at [facility] wound clinic/md wound clinic.

## 2024-01-05 ENCOUNTER — HOME CARE VISIT (OUTPATIENT)
Dept: HOME HEALTH SERVICES | Facility: HOME HEALTHCARE | Age: 69
End: 2024-01-05
Payer: MEDICARE

## 2024-01-05 PROCEDURE — G0152 HHCP-SERV OF OT,EA 15 MIN: HCPCS

## 2024-01-05 PROCEDURE — G0299 HHS/HOSPICE OF RN EA 15 MIN: HCPCS

## 2024-01-06 VITALS
OXYGEN SATURATION: 96 % | TEMPERATURE: 97.6 F | HEART RATE: 79 BPM | SYSTOLIC BLOOD PRESSURE: 118 MMHG | RESPIRATION RATE: 18 BRPM | DIASTOLIC BLOOD PRESSURE: 62 MMHG

## 2024-01-06 SDOH — HEALTH STABILITY: PHYSICAL HEALTH: EXERCISE TYPE: HOME EXERCISE PROGRAM

## 2024-01-06 ASSESSMENT — ENCOUNTER SYMPTOMS
LAST BOWEL MOVEMENT: 66844
PAIN SEVERITY GOAL: 1/10
SUBJECTIVE PAIN PROGRESSION: UNCHANGED
PAIN LOCATION - RELIEVING FACTORS: OFFLOADING
ASSOCIATED SYMPTOMS: DENIES
STOOL FREQUENCY: DAILY
VOMITING: DENIES
PAIN: 1
FATIGUES EASILY: 1
PAIN LOCATION - PAIN DURATION: FREQUENT
MUSCLE WEAKNESS: 1
PERSON REPORTING PAIN: PATIENT
PAIN LOCATION - PAIN QUALITY: DULL, ACHY
PAIN LOCATION - PAIN SEVERITY: 5/10
BOWEL PATTERN NORMAL: 1
PAIN LOCATION: PERINEUM
NAUSEA: DENIES
PAIN LOCATION - EXACERBATING FACTORS: PROLONGED SITTING
HIGHEST PAIN SEVERITY IN PAST 24 HOURS: 5/10
PAIN LOCATION - PAIN FREQUENCY: FREQUENT
LOWEST PAIN SEVERITY IN PAST 24 HOURS: 3/10

## 2024-01-07 VITALS
TEMPERATURE: 97.6 F | RESPIRATION RATE: 18 BRPM | DIASTOLIC BLOOD PRESSURE: 62 MMHG | SYSTOLIC BLOOD PRESSURE: 118 MMHG | HEART RATE: 79 BPM | OXYGEN SATURATION: 96 %

## 2024-01-07 ASSESSMENT — ENCOUNTER SYMPTOMS
PAIN LOCATION: PERINEUM
PAIN LOCATION - EXACERBATING FACTORS: WOUND CARE
PAIN LOCATION - PAIN QUALITY: ACHING
PAIN: 1
PAIN LOCATION - PAIN FREQUENCY: FREQUENT
PAIN LOCATION - RELIEVING FACTORS: POSITIONING
PAIN LOCATION - PAIN SEVERITY: 5/10
PAIN LOCATION - PAIN DURATION: MONTHS
PERSON REPORTING PAIN: PATIENT

## 2024-01-08 ENCOUNTER — HOME CARE VISIT (OUTPATIENT)
Dept: HOME HEALTH SERVICES | Facility: HOME HEALTHCARE | Age: 69
End: 2024-01-08
Payer: MEDICARE

## 2024-01-08 VITALS
TEMPERATURE: 98 F | HEIGHT: 72 IN | WEIGHT: 315 LBS | SYSTOLIC BLOOD PRESSURE: 142 MMHG | OXYGEN SATURATION: 95 % | BODY MASS INDEX: 42.66 KG/M2 | HEART RATE: 87 BPM | DIASTOLIC BLOOD PRESSURE: 78 MMHG | RESPIRATION RATE: 18 BRPM

## 2024-01-08 VITALS
HEART RATE: 87 BPM | TEMPERATURE: 98 F | SYSTOLIC BLOOD PRESSURE: 142 MMHG | DIASTOLIC BLOOD PRESSURE: 78 MMHG | OXYGEN SATURATION: 95 % | RESPIRATION RATE: 18 BRPM

## 2024-01-08 PROCEDURE — G0151 HHCP-SERV OF PT,EA 15 MIN: HCPCS

## 2024-01-08 PROCEDURE — G0299 HHS/HOSPICE OF RN EA 15 MIN: HCPCS

## 2024-01-08 ASSESSMENT — ENCOUNTER SYMPTOMS
PAIN LOCATION - RELIEVING FACTORS: TYLENOL
LOWEST PAIN SEVERITY IN PAST 24 HOURS: 0/10
PAIN SEVERITY GOAL: 0/10
PAIN LOCATION: PERINEUM
SUBJECTIVE PAIN PROGRESSION: WAXING AND WANING
PAIN LOCATION: PERINEUM
PAIN LOCATION - PAIN FREQUENCY: INTERMITTENT
PAIN LOCATION - PAIN SEVERITY: 4/10
HIGHEST PAIN SEVERITY IN PAST 24 HOURS: 6/10
PAIN LOCATION - PAIN SEVERITY: 2/10
PAIN SEVERITY GOAL: 0/10
PAIN LOCATION - PAIN SEVERITY: 5/10
PERSON REPORTING PAIN: PATIENT
SUBJECTIVE PAIN PROGRESSION: UNCHANGED
PAIN: 1
PAIN LOCATION - PAIN DURATION: DAILY
PAIN LOCATION - PAIN FREQUENCY: CONSTANT
HIGHEST PAIN SEVERITY IN PAST 24 HOURS: 5/10
PERSON REPORTING PAIN: PATIENT
PAIN LOCATION - RELIEVING FACTORS: PAIN MEDICATION
PAIN LOCATION - PAIN QUALITY: DULL, ACHY
PAIN LOCATION - PAIN QUALITY: DULL, ACHY
LOWEST PAIN SEVERITY IN PAST 24 HOURS: 1/10
PAIN LOCATION - PAIN QUALITY: ACHING
PAIN: 1
PAIN LOCATION - RELIEVING FACTORS: PAIN MEDS, REST, REPOSITIONING
ASSOCIATED SYMPTOMS: DENIES
PAIN LOCATION - PAIN FREQUENCY: CONSTANT

## 2024-01-08 ASSESSMENT — FIBROSIS 4 INDEX: FIB4 SCORE: 1.13

## 2024-01-09 ENCOUNTER — DOCUMENTATION (OUTPATIENT)
Dept: CARDIOLOGY | Facility: MEDICAL CENTER | Age: 69
End: 2024-01-09
Payer: MEDICARE

## 2024-01-09 SDOH — HEALTH STABILITY: PHYSICAL HEALTH: EXERCISE TYPE: HOME EXERCISE PROGRAM

## 2024-01-09 ASSESSMENT — ACTIVITIES OF DAILY LIVING (ADL)
USING THE TELPHONE: INDEPENDENT
DRESSING_UB_CURRENT_FUNCTION: INDEPENDENT
OASIS_M1830: 01
TELEPHONE USE ASSESSED: 1
DRESSING_LB_CURRENT_FUNCTION: INDEPENDENT
ORAL_CARE_CURRENT_FUNCTION: INDEPENDENT
ORAL_CARE_ASSESSED: 1

## 2024-01-09 ASSESSMENT — ENCOUNTER SYMPTOMS
NAUSEA: DENIES
FATIGUES EASILY: 1
VOMITING: DENIES

## 2024-01-09 ASSESSMENT — PATIENT HEALTH QUESTIONNAIRE - PHQ9: CLINICAL INTERPRETATION OF PHQ2 SCORE: 0

## 2024-01-10 ENCOUNTER — HOME CARE VISIT (OUTPATIENT)
Dept: HOME HEALTH SERVICES | Facility: HOME HEALTHCARE | Age: 69
End: 2024-01-10
Payer: MEDICARE

## 2024-01-10 VITALS
TEMPERATURE: 97.8 F | DIASTOLIC BLOOD PRESSURE: 70 MMHG | OXYGEN SATURATION: 98 % | HEART RATE: 86 BPM | RESPIRATION RATE: 18 BRPM | SYSTOLIC BLOOD PRESSURE: 136 MMHG

## 2024-01-10 PROCEDURE — 665005 NO-PAY RAP - HOME HEALTH

## 2024-01-10 PROCEDURE — 665999 HH PPS REVENUE DEBIT

## 2024-01-10 PROCEDURE — G0299 HHS/HOSPICE OF RN EA 15 MIN: HCPCS

## 2024-01-10 PROCEDURE — 665998 HH PPS REVENUE CREDIT

## 2024-01-10 PROCEDURE — 665003 FOLLOW UP-HOME HEALTH

## 2024-01-10 NOTE — PROGRESS NOTES
Medication chart review for [facility] services    Received referral from [facility].   Medications reviewed  compared with discharge summary if available.  Discharge summary date, if applicable:   11/9/23    Current medication list per [facility]     Medication list one, patient is currently taking    Current Outpatient Medications:     loratadine, 10 mg, Oral, DAILY    Multiple Vitamins-Minerals (MULTIVITAMIN GUMMIES ADULTS PO), 1 Dose, Oral, DAILY AT 1800    Saccharomyces boulardii (DAILY PROBIOTIC SUPPLEMENT PO), 1 Dose, Oral, DAILY    Arginaid, 1 Dose, Oral, DAILY AT 1800    Jony, 1 Dose, Oral, DAILY AT 1800    amiodarone, 200 mg, Oral, DAILY    dilTIAZem, 30 mg, Oral, Q8HRS    metFORMIN, TAKE 1 TABLET BY MOUTH TWICE DAILY WITH FOOD  Indications: Type 2 Diabetes    potassium chloride SA, 20 mEq, Oral, DAILY    apixaban, 5 mg, Oral, BID    Loratadine-Pseudoephedrine (KLS ALLERCLEAR D-24HR PO), 1 Tablet, Oral, DAILY    acetaminophen, 500-1,000 mg, Oral, Q6HRS PRN    allopurinol, 100 mg, Oral, DAILY    atorvastatin, 20 mg, Oral, Q EVENING    gabapentin, 400 mg, Oral, TID (Patient taking differently: 400 mg, Oral, 3 TIMES DAILY, take 300 mg gabapentin one capsule by mouth four times a day, Indications: Diabetic Neuropathy)    hydroCHLOROthiazide, 25 mg, Oral, DAILY    pioglitazone, 30 mg, Oral, DAILY    vitamin D3, 1 Tablet, Oral, DAILY      Medication list two, drugs that the patient has been prescribed or recommended to take by their healthcare provider on discharge summary  START taking these medications         Instructions   amiodarone 200 MG Tabs  Start taking on: November 9, 2023  Commonly known as: Cordarone    Take 1 Tablet by mouth every day.  Dose: 200 mg      apixaban 5mg Tabs  Commonly known as: Eliquis    Take 1 Tablet by mouth 2 times a day.  Dose: 5 mg      dilTIAZem 30 MG Tabs  Commonly known as: Cardizem    Take 1 Tablet by mouth every 8 hours.  Dose: 30 mg      potassium chloride SA 20  MEQ Tbcr  Start taking on: November 9, 2023  Commonly known as: Kdur    Take 1 Tablet by mouth every day.  Dose: 20 mEq                CHANGE how you take these medications         Instructions   gabapentin 400 MG Caps  What changed:   medication strength  how much to take  Commonly known as: Neurontin    Take 1 Capsule by mouth 3 times a day.  Dose: 400 mg      metFORMIN 850 MG Tabs  What changed: medication strength  Commonly known as: Glucophage    TAKE 1 TABLET BY MOUTH TWICE DAILY WITH FOOD                CONTINUE taking these medications         Instructions   allopurinol 100 MG Tabs  Commonly known as: Zyloprim    Take 1 Tablet by mouth every day.  Dose: 100 mg      atorvastatin 20 MG Tabs  Commonly known as: Lipitor    Take 1 Tablet by mouth every evening.  Dose: 20 mg      hydroCHLOROthiazide 25 MG Tabs    Take 1 Tablet by mouth every day.  Dose: 25 mg      pioglitazone 30 MG Tabs  Commonly known as: Actos    TAKE 1 TABLET BY MOUTH EVERY DAY  Dose: 30 mg      vitamin D 1000 UNIT Tabs  Commonly known as: Cholecalciferol    Take 1 Tab by mouth every day.  Dose: 1,000 Units                STOP taking these medications       atenolol 100 MG Tabs  Commonly known as: Tenormin      doxycycline 100 MG Tabs  Commonly known as: Vibramycin      lisinopril 40 MG tablet  Commonly known as: Prinivil      Loratadine 10 MG Caps      Xarelto 20 MG Tabs tablet  Generic drug: rivaroxaban                 Allergies   Allergen Reactions    Penicillins Unspecified and Swelling     Given in eye drops as a child, eyes swell  2003[birth year]09;ITCH   Create Date: [id number] Create User Name: Juan J Castellanos Update Date: [id number] Update User Name: Juan J Castellanos    Bee Venom Swelling    Cefazolin Rash, Itching and Swelling    Linezolid Unspecified     Caused unk lab value to increase.       Labs     Lab Results   Component Value Date/Time    SODIUM 142 11/08/2023 05:26 AM    POTASSIUM 3.9 11/08/2023 05:26 AM    CHLORIDE 104 11/08/2023 05:26 AM     CO2 24 11/08/2023 05:26 AM    GLUCOSE 124 (H) 11/08/2023 05:26 AM    BUN 27 (H) 11/08/2023 05:26 AM    CREATININE 1.31 11/08/2023 05:26 AM    GLOMRATE 52 (L) 09/08/2023 08:50 AM     Lab Results   Component Value Date/Time    ALKPHOSPHAT 103 (H) 10/25/2023 05:36 AM    ASTSGOT 17 10/25/2023 05:36 AM    ALTSGPT 15 10/25/2023 05:36 AM    TBILIRUBIN 0.3 10/25/2023 05:36 AM    ALBUMIN 3.2 10/25/2023 05:36 AM        Assessment for clinically significant drug interactions, drug omissions/additions, duplicative therapies.            CC   Theo James M.D.  [address] [address]  Fax: [phone]    [facility]  Phone [phone] fax [phone]    This note was created using voice recognition software (Dragon). The accuracy of the dictation is limited by the abilities of the software. I have reviewed the note prior to signing, however some errors in grammar and context are still possible. If you have any questions related to this note please do not hesitate to contact our office.

## 2024-01-11 PROCEDURE — 665999 HH PPS REVENUE DEBIT

## 2024-01-11 PROCEDURE — 665998 HH PPS REVENUE CREDIT

## 2024-01-11 ASSESSMENT — ENCOUNTER SYMPTOMS
NAUSEA: DENIES
PAIN LOCATION: PERINEUM
BOWEL PATTERN NORMAL: 1
HIGHEST PAIN SEVERITY IN PAST 24 HOURS: 5/10
PAIN SEVERITY GOAL: 0/10
SUBJECTIVE PAIN PROGRESSION: UNCHANGED
FATIGUES EASILY: 1
VOMITING: DENIES
LAST BOWEL MOVEMENT: 66849
PAIN LOCATION - PAIN SEVERITY: 2/10
ASSOCIATED SYMPTOMS: DENIES
PAIN LOCATION - EXACERBATING FACTORS: AMBULATION
PAIN LOCATION - RELIEVING FACTORS: REST, PAIN MEDICATION
PAIN LOCATION - PAIN QUALITY: DULL, ACHY
STOOL FREQUENCY: DAILY
LOWEST PAIN SEVERITY IN PAST 24 HOURS: 2/10
PAIN LOCATION - PAIN FREQUENCY: FREQUENT
PERSON REPORTING PAIN: PATIENT

## 2024-01-12 ENCOUNTER — HOME CARE VISIT (OUTPATIENT)
Dept: HOME HEALTH SERVICES | Facility: HOME HEALTHCARE | Age: 69
End: 2024-01-12
Payer: MEDICARE

## 2024-01-12 PROCEDURE — 665998 HH PPS REVENUE CREDIT

## 2024-01-12 PROCEDURE — 665999 HH PPS REVENUE DEBIT

## 2024-01-12 PROCEDURE — G0152 HHCP-SERV OF OT,EA 15 MIN: HCPCS

## 2024-01-12 PROCEDURE — G0299 HHS/HOSPICE OF RN EA 15 MIN: HCPCS

## 2024-01-13 VITALS
HEART RATE: 88 BPM | SYSTOLIC BLOOD PRESSURE: 118 MMHG | OXYGEN SATURATION: 98 % | DIASTOLIC BLOOD PRESSURE: 70 MMHG | RESPIRATION RATE: 18 BRPM | TEMPERATURE: 97.4 F

## 2024-01-13 PROCEDURE — 665998 HH PPS REVENUE CREDIT

## 2024-01-13 PROCEDURE — 665999 HH PPS REVENUE DEBIT

## 2024-01-13 SDOH — HEALTH STABILITY: PHYSICAL HEALTH: EXERCISE TYPE: HOME EXERCISE PROGRAM

## 2024-01-13 ASSESSMENT — ENCOUNTER SYMPTOMS
LAST BOWEL MOVEMENT: 66851
PAIN LOCATION - PAIN FREQUENCY: FREQUENT
PERSON REPORTING PAIN: PATIENT
ASSOCIATED SYMPTOMS: DENIES
VOMITING: DENIES
PAIN SEVERITY GOAL: 1/10
NAUSEA: DENIES
PAIN LOCATION - PAIN QUALITY: DULL, ACHY
PAIN: 1
PAIN LOCATION: PERINEUM
BOWEL PATTERN NORMAL: 1
PAIN LOCATION - EXACERBATING FACTORS: PROLONGED SITTING
LOWEST PAIN SEVERITY IN PAST 24 HOURS: 2/10
SUBJECTIVE PAIN PROGRESSION: UNCHANGED
FATIGUES EASILY: 1
PAIN LOCATION - RELIEVING FACTORS: REPOSITIONING
PAIN LOCATION - PAIN SEVERITY: 2/10
STOOL FREQUENCY: DAILY
MUSCLE WEAKNESS: 1
HIGHEST PAIN SEVERITY IN PAST 24 HOURS: 5/10

## 2024-01-14 PROCEDURE — 665998 HH PPS REVENUE CREDIT

## 2024-01-14 PROCEDURE — 665999 HH PPS REVENUE DEBIT

## 2024-01-15 ENCOUNTER — HOME CARE VISIT (OUTPATIENT)
Dept: HOME HEALTH SERVICES | Facility: HOME HEALTHCARE | Age: 69
End: 2024-01-15
Payer: MEDICARE

## 2024-01-15 VITALS
HEART RATE: 88 BPM | TEMPERATURE: 97.4 F | DIASTOLIC BLOOD PRESSURE: 80 MMHG | SYSTOLIC BLOOD PRESSURE: 118 MMHG | OXYGEN SATURATION: 98 % | RESPIRATION RATE: 18 BRPM

## 2024-01-15 VITALS
TEMPERATURE: 97.4 F | DIASTOLIC BLOOD PRESSURE: 72 MMHG | HEART RATE: 78 BPM | SYSTOLIC BLOOD PRESSURE: 120 MMHG | RESPIRATION RATE: 18 BRPM | OXYGEN SATURATION: 92 %

## 2024-01-15 VITALS
OXYGEN SATURATION: 92 % | SYSTOLIC BLOOD PRESSURE: 120 MMHG | TEMPERATURE: 97.4 F | DIASTOLIC BLOOD PRESSURE: 72 MMHG | RESPIRATION RATE: 18 BRPM | HEART RATE: 78 BPM

## 2024-01-15 PROCEDURE — G0299 HHS/HOSPICE OF RN EA 15 MIN: HCPCS

## 2024-01-15 PROCEDURE — G0179 MD RECERTIFICATION HHA PT: HCPCS | Performed by: FAMILY MEDICINE

## 2024-01-15 PROCEDURE — G0151 HHCP-SERV OF PT,EA 15 MIN: HCPCS

## 2024-01-15 PROCEDURE — 665998 HH PPS REVENUE CREDIT

## 2024-01-15 PROCEDURE — 665999 HH PPS REVENUE DEBIT

## 2024-01-15 ASSESSMENT — ENCOUNTER SYMPTOMS
PAIN LOCATION - PAIN FREQUENCY: FREQUENT
PAIN LOCATION - RELIEVING FACTORS: REST, REPOSITIONING, PAIN MEDS
PAIN LOCATION - PAIN SEVERITY: 4/10
LOWEST PAIN SEVERITY IN PAST 24 HOURS: 0/10
PAIN LOCATION: PERINEUM
PAIN LOCATION - EXACERBATING FACTORS: SITTING TOO MUCH
HIGHEST PAIN SEVERITY IN PAST 24 HOURS: 5/10
PAIN LOCATION - RELIEVING FACTORS: POSITIONING
PAIN LOCATION - PAIN FREQUENCY: INTERMITTENT
PAIN LOCATION - PAIN QUALITY: ACHING
PAIN LOCATION - PAIN DURATION: DAILY
PERSON REPORTING PAIN: PATIENT
PAIN: 1
PAIN: 1
PAIN LOCATION - EXACERBATING FACTORS: POSITIONING
PAIN SEVERITY GOAL: 0/10
PERSON REPORTING PAIN: PATIENT
PAIN LOCATION - PAIN QUALITY: ACHING, BURNING
PAIN LOCATION - PAIN DURATION: MONTHS
PAIN LOCATION - PAIN SEVERITY: 5/10

## 2024-01-15 ASSESSMENT — ACTIVITIES OF DAILY LIVING (ADL)
AMBULATION ASSISTANCE: 1
CURRENT_FUNCTION: INDEPENDENT
FEEDING_COMMENTS: SEE OT NOTES
PHYSICAL TRANSFERS ASSESSED: 1
AMBULATION ASSISTANCE: STAND BY ASSIST
GROOMING_COMMENTS: SEE OT NOTES

## 2024-01-15 NOTE — CASE COMMUNICATION
Quality Review for Recertification OASIS by NELSY Avila RN on  January 15, 2024       Edits completed by NELSY Avila RN:  1.  and  diagnosis coding updated per chart review.  2. Functional limitations checked amputation  3. Nutritional requirements checked diabetic diet

## 2024-01-16 PROCEDURE — 665998 HH PPS REVENUE CREDIT

## 2024-01-16 PROCEDURE — 665999 HH PPS REVENUE DEBIT

## 2024-01-16 ASSESSMENT — ENCOUNTER SYMPTOMS
VOMITING: DENIES
LOWEST PAIN SEVERITY IN PAST 24 HOURS: 0/10
STOOL FREQUENCY: DAILY
LAST BOWEL MOVEMENT: 66855
HIGHEST PAIN SEVERITY IN PAST 24 HOURS: 0/10
PAIN: 1
PAIN SEVERITY GOAL: 0/10
FATIGUES EASILY: 1
PERSON REPORTING PAIN: PATIENT
MUSCLE WEAKNESS: 1
BOWEL PATTERN NORMAL: 1
NAUSEA: DENIES

## 2024-01-16 NOTE — CASE COMMUNICATION
noted  ----- Message -----  From: Amelie Avila R.N.  Sent: 1/15/2024   3:11 PM PST  To: Lucy Moraes R.N.      Quality Review for Recertification OASIS by NELSY Avila RN on  January 15, 2024       Edits completed by NELSY Avila RN:  1.  and  diagnosis coding updated per chart review.  2. Functional limitations checked amputation  3. Nutritional requirements checked diabetic diet

## 2024-01-17 ENCOUNTER — HOME CARE VISIT (OUTPATIENT)
Dept: HOME HEALTH SERVICES | Facility: HOME HEALTHCARE | Age: 69
End: 2024-01-17
Payer: MEDICARE

## 2024-01-17 VITALS
TEMPERATURE: 97.5 F | RESPIRATION RATE: 18 BRPM | HEART RATE: 88 BPM | SYSTOLIC BLOOD PRESSURE: 138 MMHG | OXYGEN SATURATION: 96 % | DIASTOLIC BLOOD PRESSURE: 60 MMHG

## 2024-01-17 PROCEDURE — 665999 HH PPS REVENUE DEBIT

## 2024-01-17 PROCEDURE — 665998 HH PPS REVENUE CREDIT

## 2024-01-17 PROCEDURE — G0299 HHS/HOSPICE OF RN EA 15 MIN: HCPCS

## 2024-01-17 SDOH — HEALTH STABILITY: PHYSICAL HEALTH: EXERCISE TYPE: HOME EXERCISE PROGRAM

## 2024-01-17 ASSESSMENT — ENCOUNTER SYMPTOMS
PAIN SEVERITY GOAL: 0/10
ASSOCIATED SYMPTOMS: DENIES
PAIN LOCATION - EXACERBATING FACTORS: PROLONGED SITTING
PAIN LOCATION - PAIN SEVERITY: 4/10
MUSCLE WEAKNESS: 1
PERSON REPORTING PAIN: PATIENT
HIGHEST PAIN SEVERITY IN PAST 24 HOURS: 6/10
PAIN LOCATION - PAIN FREQUENCY: FREQUENT
PAIN LOCATION - PAIN QUALITY: DULL, ACHY
PAIN: 1
PAIN LOCATION - RELIEVING FACTORS: REPOSITIONING
FATIGUES EASILY: 1
STOOL FREQUENCY: DAILY
BOWEL PATTERN NORMAL: 1
PAIN LOCATION: PERINEUM
SUBJECTIVE PAIN PROGRESSION: UNCHANGED
LOWEST PAIN SEVERITY IN PAST 24 HOURS: 4/10
LAST BOWEL MOVEMENT: 66856

## 2024-01-18 PROCEDURE — 665998 HH PPS REVENUE CREDIT

## 2024-01-18 PROCEDURE — 665999 HH PPS REVENUE DEBIT

## 2024-01-19 ENCOUNTER — HOME CARE VISIT (OUTPATIENT)
Dept: HOME HEALTH SERVICES | Facility: HOME HEALTHCARE | Age: 69
End: 2024-01-19
Payer: MEDICARE

## 2024-01-19 VITALS
RESPIRATION RATE: 18 BRPM | TEMPERATURE: 97.5 F | SYSTOLIC BLOOD PRESSURE: 130 MMHG | DIASTOLIC BLOOD PRESSURE: 80 MMHG | OXYGEN SATURATION: 93 % | HEART RATE: 86 BPM

## 2024-01-19 PROCEDURE — 665999 HH PPS REVENUE DEBIT

## 2024-01-19 PROCEDURE — G0299 HHS/HOSPICE OF RN EA 15 MIN: HCPCS

## 2024-01-19 PROCEDURE — 665998 HH PPS REVENUE CREDIT

## 2024-01-19 PROCEDURE — G0152 HHCP-SERV OF OT,EA 15 MIN: HCPCS

## 2024-01-20 DIAGNOSIS — M10.9 GOUT, UNSPECIFIED CAUSE, UNSPECIFIED CHRONICITY, UNSPECIFIED SITE: ICD-10-CM

## 2024-01-20 PROCEDURE — 665998 HH PPS REVENUE CREDIT

## 2024-01-20 PROCEDURE — 665999 HH PPS REVENUE DEBIT

## 2024-01-20 ASSESSMENT — ENCOUNTER SYMPTOMS
PAIN SEVERITY GOAL: 0/10
PERSON REPORTING PAIN: PATIENT
MUSCLE WEAKNESS: 1
FATIGUES EASILY: 1
LOWEST PAIN SEVERITY IN PAST 24 HOURS: 0/10
HIGHEST PAIN SEVERITY IN PAST 24 HOURS: 0/10
STOOL FREQUENCY: DAILY
NAUSEA: DENIES
VOMITING: DENIES
LAST BOWEL MOVEMENT: 66858
PAIN: 1
BOWEL PATTERN NORMAL: 1

## 2024-01-21 VITALS
RESPIRATION RATE: 18 BRPM | TEMPERATURE: 97.5 F | SYSTOLIC BLOOD PRESSURE: 130 MMHG | DIASTOLIC BLOOD PRESSURE: 80 MMHG | OXYGEN SATURATION: 90 % | HEART RATE: 86 BPM

## 2024-01-21 PROCEDURE — 665998 HH PPS REVENUE CREDIT

## 2024-01-21 PROCEDURE — 665999 HH PPS REVENUE DEBIT

## 2024-01-21 ASSESSMENT — ENCOUNTER SYMPTOMS
PAIN: 1
PAIN LOCATION - PAIN QUALITY: ACHING
PAIN LOCATION - PAIN SEVERITY: 4/10
PAIN LOCATION - PAIN FREQUENCY: FREQUENT
PAIN LOCATION - RELIEVING FACTORS: POSITIONING
PERSON REPORTING PAIN: PATIENT
PAIN LOCATION - EXACERBATING FACTORS: POSITIONING
PAIN LOCATION - PAIN DURATION: MONTHS

## 2024-01-22 ENCOUNTER — HOME CARE VISIT (OUTPATIENT)
Dept: HOME HEALTH SERVICES | Facility: HOME HEALTHCARE | Age: 69
End: 2024-01-22
Payer: MEDICARE

## 2024-01-22 VITALS
OXYGEN SATURATION: 96 % | SYSTOLIC BLOOD PRESSURE: 132 MMHG | TEMPERATURE: 97.5 F | DIASTOLIC BLOOD PRESSURE: 78 MMHG | HEART RATE: 83 BPM | RESPIRATION RATE: 18 BRPM

## 2024-01-22 VITALS
TEMPERATURE: 97.6 F | SYSTOLIC BLOOD PRESSURE: 132 MMHG | OXYGEN SATURATION: 95 % | HEART RATE: 74 BPM | RESPIRATION RATE: 18 BRPM | DIASTOLIC BLOOD PRESSURE: 60 MMHG

## 2024-01-22 DIAGNOSIS — I10 BENIGN ESSENTIAL HTN: ICD-10-CM

## 2024-01-22 PROCEDURE — G0151 HHCP-SERV OF PT,EA 15 MIN: HCPCS

## 2024-01-22 PROCEDURE — G0299 HHS/HOSPICE OF RN EA 15 MIN: HCPCS

## 2024-01-22 PROCEDURE — 665998 HH PPS REVENUE CREDIT

## 2024-01-22 PROCEDURE — 665999 HH PPS REVENUE DEBIT

## 2024-01-22 ASSESSMENT — ENCOUNTER SYMPTOMS
PAIN LOCATION - PAIN FREQUENCY: INTERMITTENT
PAIN LOCATION - PAIN QUALITY: ACHY
LOWEST PAIN SEVERITY IN PAST 24 HOURS: 2/10
PAIN LOCATION - PAIN DURATION: DAILY
PAIN LOCATION - EXACERBATING FACTORS: SITTING TOO MUCH
PAIN: 1
HIGHEST PAIN SEVERITY IN PAST 24 HOURS: 6/10
PAIN LOCATION: PERINEUM
PAIN LOCATION - PAIN QUALITY: ACHING
PAIN SEVERITY GOAL: 0/10
PERSON REPORTING PAIN: PATIENT
PAIN LOCATION - PAIN SEVERITY: 4/10
PAIN LOCATION - RELIEVING FACTORS: PAIN MEDS, REST, REPOSITIONING
HIGHEST PAIN SEVERITY IN PAST 24 HOURS: 4/10
SUBJECTIVE PAIN PROGRESSION: UNCHANGED
PERSON REPORTING PAIN: PATIENT
PAIN: 1
PAIN LOCATION - PAIN SEVERITY: 3/10
PAIN SEVERITY GOAL: 0/10
PAIN LOCATION - PAIN FREQUENCY: FREQUENT
ASSOCIATED SYMPTOMS: DENIES
PAIN LOCATION - RELIEVING FACTORS: REPOSITIONING
LOWEST PAIN SEVERITY IN PAST 24 HOURS: 3/10
PAIN LOCATION: PERINEUM

## 2024-01-23 PROCEDURE — 665999 HH PPS REVENUE DEBIT

## 2024-01-23 PROCEDURE — 665998 HH PPS REVENUE CREDIT

## 2024-01-23 ASSESSMENT — ENCOUNTER SYMPTOMS
VOMITING: DENIES
LAST BOWEL MOVEMENT: 66861
MUSCLE WEAKNESS: 1
BOWEL PATTERN NORMAL: 1
NAUSEA: DENIES
STOOL FREQUENCY: DAILY

## 2024-01-24 ENCOUNTER — HOME CARE VISIT (OUTPATIENT)
Dept: HOME HEALTH SERVICES | Facility: HOME HEALTHCARE | Age: 69
End: 2024-01-24
Payer: MEDICARE

## 2024-01-24 VITALS
RESPIRATION RATE: 18 BRPM | OXYGEN SATURATION: 95 % | TEMPERATURE: 97.5 F | DIASTOLIC BLOOD PRESSURE: 76 MMHG | HEART RATE: 81 BPM | SYSTOLIC BLOOD PRESSURE: 140 MMHG

## 2024-01-24 PROCEDURE — 665998 HH PPS REVENUE CREDIT

## 2024-01-24 PROCEDURE — 665999 HH PPS REVENUE DEBIT

## 2024-01-24 PROCEDURE — G0299 HHS/HOSPICE OF RN EA 15 MIN: HCPCS

## 2024-01-24 ASSESSMENT — ENCOUNTER SYMPTOMS
PAIN LOCATION - RELIEVING FACTORS: REPOSITIONING
PERSON REPORTING PAIN: PATIENT
PAIN LOCATION - PAIN QUALITY: DULL, ACHY
LOWEST PAIN SEVERITY IN PAST 24 HOURS: 3/10
PAIN: 1
PAIN LOCATION - PAIN SEVERITY: 3/10
NAUSEA: DENIES
SUBJECTIVE PAIN PROGRESSION: UNCHANGED
VOMITING: DENIES
BOWEL PATTERN NORMAL: 1
MUSCLE WEAKNESS: 1
FATIGUES EASILY: 1
STOOL FREQUENCY: DAILY
PAIN LOCATION - PAIN FREQUENCY: FREQUENT
LAST BOWEL MOVEMENT: 66863
PAIN SEVERITY GOAL: 0/10
PAIN LOCATION: PERINEUM
HIGHEST PAIN SEVERITY IN PAST 24 HOURS: 5/10
ASSOCIATED SYMPTOMS: DENIES

## 2024-01-25 ENCOUNTER — HOME CARE VISIT (OUTPATIENT)
Dept: HOME HEALTH SERVICES | Facility: HOME HEALTHCARE | Age: 69
End: 2024-01-25
Payer: MEDICARE

## 2024-01-25 PROCEDURE — 665998 HH PPS REVENUE CREDIT

## 2024-01-25 PROCEDURE — 665999 HH PPS REVENUE DEBIT

## 2024-01-25 PROCEDURE — G0152 HHCP-SERV OF OT,EA 15 MIN: HCPCS

## 2024-01-26 ENCOUNTER — HOME CARE VISIT (OUTPATIENT)
Dept: HOME HEALTH SERVICES | Facility: HOME HEALTHCARE | Age: 69
End: 2024-01-26
Payer: MEDICARE

## 2024-01-26 VITALS
SYSTOLIC BLOOD PRESSURE: 134 MMHG | HEART RATE: 84 BPM | DIASTOLIC BLOOD PRESSURE: 80 MMHG | RESPIRATION RATE: 18 BRPM | TEMPERATURE: 97.9 F | OXYGEN SATURATION: 97 %

## 2024-01-26 DIAGNOSIS — I10 BENIGN ESSENTIAL HTN: ICD-10-CM

## 2024-01-26 PROCEDURE — 665998 HH PPS REVENUE CREDIT

## 2024-01-26 PROCEDURE — 665999 HH PPS REVENUE DEBIT

## 2024-01-26 PROCEDURE — G0299 HHS/HOSPICE OF RN EA 15 MIN: HCPCS

## 2024-01-26 ASSESSMENT — ENCOUNTER SYMPTOMS
STOOL FREQUENCY: DAILY
PAIN LOCATION - PAIN DURATION: CONSTANT
PAIN: 1
PAIN LOCATION - PAIN SEVERITY: 2/10
LAST BOWEL MOVEMENT: 66864
PERSON REPORTING PAIN: PATIENT
PAIN LOCATION - EXACERBATING FACTORS: PROLONGED SITTING
BOWEL PATTERN NORMAL: 1
MUSCLE WEAKNESS: 1
NAUSEA: DENIES
HIGHEST PAIN SEVERITY IN PAST 24 HOURS: 5/10
PAIN LOCATION - RELIEVING FACTORS: PAIN MEDICATION
PAIN LOCATION - PAIN QUALITY: DULL, ACHY
PAIN LOCATION: PERINEUM
PAIN LOCATION - PAIN FREQUENCY: CONSTANT
SUBJECTIVE PAIN PROGRESSION: UNCHANGED
VOMITING: DENIES
PAIN SEVERITY GOAL: 0/10
ASSOCIATED SYMPTOMS: DENIES
LOWEST PAIN SEVERITY IN PAST 24 HOURS: 2/10
FATIGUES EASILY: 1

## 2024-01-26 NOTE — TELEPHONE ENCOUNTER
Received request via: Pharmacy    Was the patient seen in the last year in this department? Yes    Does the patient have an active prescription (recently filled or refills available) for medication(s) requested? No      Does the [facility] and need 100 day supply (blood pressure, diabetes and cholesterol meds only)? Yes, quantity updated to 100 days

## 2024-01-27 PROCEDURE — 665998 HH PPS REVENUE CREDIT

## 2024-01-27 PROCEDURE — 665999 HH PPS REVENUE DEBIT

## 2024-01-28 VITALS
OXYGEN SATURATION: 96 % | DIASTOLIC BLOOD PRESSURE: 76 MMHG | SYSTOLIC BLOOD PRESSURE: 140 MMHG | RESPIRATION RATE: 18 BRPM | HEART RATE: 85 BPM | TEMPERATURE: 97.5 F

## 2024-01-28 PROCEDURE — 665999 HH PPS REVENUE DEBIT

## 2024-01-28 PROCEDURE — 665998 HH PPS REVENUE CREDIT

## 2024-01-28 ASSESSMENT — ENCOUNTER SYMPTOMS
PERSON REPORTING PAIN: PATIENT
PAIN LOCATION - PAIN SEVERITY: 2/10
PAIN LOCATION - PAIN FREQUENCY: FREQUENT
PAIN LOCATION - PAIN QUALITY: ACHING
PAIN LOCATION - EXACERBATING FACTORS: POSITIONING
PAIN: 1
PAIN LOCATION: GROIN
PAIN LOCATION - PAIN DURATION: MONTHS
PAIN LOCATION - RELIEVING FACTORS: POSITIONING

## 2024-01-29 ENCOUNTER — HOME CARE VISIT (OUTPATIENT)
Dept: HOME HEALTH SERVICES | Facility: HOME HEALTHCARE | Age: 69
End: 2024-01-29
Payer: MEDICARE

## 2024-01-29 DIAGNOSIS — I10 BENIGN ESSENTIAL HTN: ICD-10-CM

## 2024-01-29 PROCEDURE — 665999 HH PPS REVENUE DEBIT

## 2024-01-29 PROCEDURE — G0299 HHS/HOSPICE OF RN EA 15 MIN: HCPCS

## 2024-01-29 PROCEDURE — 665998 HH PPS REVENUE CREDIT

## 2024-01-29 RX ORDER — ATORVASTATIN CALCIUM 20 MG/1
20 TABLET, FILM COATED ORAL EVERY EVENING
Qty: 100 TABLET | Refills: 2 | Status: ON HOLD | OUTPATIENT
Start: 2024-01-29 | End: 2024-03-07

## 2024-01-30 PROCEDURE — 665998 HH PPS REVENUE CREDIT

## 2024-01-30 PROCEDURE — 665999 HH PPS REVENUE DEBIT

## 2024-01-31 ENCOUNTER — HOME CARE VISIT (OUTPATIENT)
Dept: HOME HEALTH SERVICES | Facility: HOME HEALTHCARE | Age: 69
End: 2024-01-31
Payer: MEDICARE

## 2024-01-31 VITALS
DIASTOLIC BLOOD PRESSURE: 80 MMHG | TEMPERATURE: 97.6 F | RESPIRATION RATE: 18 BRPM | HEART RATE: 72 BPM | OXYGEN SATURATION: 97 % | SYSTOLIC BLOOD PRESSURE: 142 MMHG

## 2024-01-31 VITALS
OXYGEN SATURATION: 94 % | TEMPERATURE: 97.8 F | RESPIRATION RATE: 18 BRPM | SYSTOLIC BLOOD PRESSURE: 126 MMHG | HEART RATE: 94 BPM | DIASTOLIC BLOOD PRESSURE: 80 MMHG

## 2024-01-31 VITALS
DIASTOLIC BLOOD PRESSURE: 80 MMHG | SYSTOLIC BLOOD PRESSURE: 126 MMHG | HEART RATE: 94 BPM | OXYGEN SATURATION: 94 % | RESPIRATION RATE: 18 BRPM | TEMPERATURE: 97.8 F

## 2024-01-31 PROCEDURE — G0299 HHS/HOSPICE OF RN EA 15 MIN: HCPCS

## 2024-01-31 PROCEDURE — 665999 HH PPS REVENUE DEBIT

## 2024-01-31 PROCEDURE — 665998 HH PPS REVENUE CREDIT

## 2024-01-31 PROCEDURE — G0151 HHCP-SERV OF PT,EA 15 MIN: HCPCS

## 2024-01-31 ASSESSMENT — ENCOUNTER SYMPTOMS
LIMITED RANGE OF MOTION: 1
PAIN LOCATION - PAIN DURATION: CONSTANT
PAIN: 1
BOWEL PATTERN NORMAL: 1
PAIN LOCATION - EXACERBATING FACTORS: MOVEMENT
PAIN LOCATION - PAIN SEVERITY: 2/10
PAIN LOCATION - RELIEVING FACTORS: PAIN MED, REPOSITONING
PAIN LOCATION - PAIN SEVERITY: 1/10
NAUSEA: DENIES
PAIN SEVERITY GOAL: 2/10
PAIN LOCATION - PAIN SEVERITY: 2/10
PAIN SEVERITY GOAL: 0/10
SUBJECTIVE PAIN PROGRESSION: WAXING AND WANING
PERSON REPORTING PAIN: PATIENT
VOMITING: DENIES
PAIN: 1
LOWEST PAIN SEVERITY IN PAST 24 HOURS: 3/10
LAST BOWEL MOVEMENT: 66869
PAIN LOCATION - PAIN FREQUENCY: INTERMITTENT
PERSON REPORTING PAIN: PATIENT
LOWER EXTREMITY EDEMA: 1
EDEMA: 1
PAIN: 1
PAIN LOCATION - RELIEVING FACTORS: REPOSITIONING
PAIN LOCATION - PAIN QUALITY: ACHY
PAIN LOCATION - PAIN FREQUENCY: INTERMITTENT
ASSOCIATED SYMPTOMS: DENIES
FATIGUES EASILY: 1
STOOL FREQUENCY: DAILY
PAIN LOCATION - PAIN FREQUENCY: CONSTANT
HIGHEST PAIN SEVERITY IN PAST 24 HOURS: 5/10
SKIN CHANGES: 1
PAIN LOCATION - PAIN QUALITY: ACHING
LAST BOWEL MOVEMENT: 66870
PAIN LOCATION - EXACERBATING FACTORS: SITTING TOO LONG
LOWEST PAIN SEVERITY IN PAST 24 HOURS: 1/10
FATIGUES EASILY: 1
MUSCLE WEAKNESS: 1
PAIN LOCATION: PERINEUM
PERSON REPORTING PAIN: PATIENT
PAIN LOCATION - PAIN QUALITY: DULL, ACHY
PAIN LOCATION: PERINEUM
FATIGUE: 1
PAIN LOCATION - RELIEVING FACTORS: PAIN MEDS, REST, REPOSITIONING
SUBJECTIVE PAIN PROGRESSION: GRADUALLY IMPROVING
BOWEL PATTERN NORMAL: 1
PAIN LOCATION - PAIN DURATION: DAILY
HIGHEST PAIN SEVERITY IN PAST 24 HOURS: 4/10
MUSCLE WEAKNESS: 1

## 2024-01-31 ASSESSMENT — ACTIVITIES OF DAILY LIVING (ADL)
AMBULATION ASSISTANCE: 1
PHYSICAL TRANSFERS ASSESSED: 1
PHYSICAL_TRANSFERS_DEVICES: USE OF B UES TO ASSIST.
AMBULATION ASSISTANCE: INDEPENDENT
CURRENT_FUNCTION: INDEPENDENT

## 2024-02-01 DIAGNOSIS — I48.91 ATRIAL FIBRILLATION, UNSPECIFIED TYPE (HCC): ICD-10-CM

## 2024-02-01 PROCEDURE — 665999 HH PPS REVENUE DEBIT

## 2024-02-01 PROCEDURE — 665998 HH PPS REVENUE CREDIT

## 2024-02-01 RX ORDER — AMIODARONE HYDROCHLORIDE 200 MG/1
TABLET ORAL
Qty: 90 TABLET | Refills: 0 | Status: ON HOLD | OUTPATIENT
Start: 2024-02-01 | End: 2024-03-07

## 2024-02-01 NOTE — TELEPHONE ENCOUNTER
Received request via: Pharmacy    Was the patient seen in the last year in this department? Yes    Does the patient have an active prescription (recently filled or refills available) for medication(s) requested? No    Pharmacy Name: [facility]     Does the patient have [facility] Plus and need 100 day supply (blood pressure, diabetes and cholesterol meds only)? Medication is not for cholesterol, blood pressure or diabetes

## 2024-02-02 ENCOUNTER — HOME CARE VISIT (OUTPATIENT)
Dept: HOME HEALTH SERVICES | Facility: HOME HEALTHCARE | Age: 69
End: 2024-02-02
Payer: MEDICARE

## 2024-02-02 PROCEDURE — G0152 HHCP-SERV OF OT,EA 15 MIN: HCPCS

## 2024-02-02 PROCEDURE — 665999 HH PPS REVENUE DEBIT

## 2024-02-02 PROCEDURE — 665998 HH PPS REVENUE CREDIT

## 2024-02-02 PROCEDURE — G0299 HHS/HOSPICE OF RN EA 15 MIN: HCPCS

## 2024-02-03 VITALS
TEMPERATURE: 99.4 F | OXYGEN SATURATION: 91 % | SYSTOLIC BLOOD PRESSURE: 138 MMHG | HEART RATE: 90 BPM | DIASTOLIC BLOOD PRESSURE: 76 MMHG

## 2024-02-03 PROCEDURE — 665999 HH PPS REVENUE DEBIT

## 2024-02-03 PROCEDURE — 665998 HH PPS REVENUE CREDIT

## 2024-02-03 ASSESSMENT — ENCOUNTER SYMPTOMS
PAIN SEVERITY GOAL: 0/10
PAIN LOCATION - PAIN DURATION: ACUTE
PAIN LOCATION - PAIN QUALITY: ACHING
PAIN: 1
NAUSEA: DENIES
HIGHEST PAIN SEVERITY IN PAST 24 HOURS: 2/10
SUBJECTIVE PAIN PROGRESSION: UNCHANGED
PAIN LOCATION - EXACERBATING FACTORS: POSITIONING
PERSON REPORTING PAIN: PATIENT
PAIN LOCATION - PAIN FREQUENCY: INTERMITTENT
PAIN: 1
PAIN LOCATION - PAIN DURATION: MONTHS
PAIN LOCATION - PAIN FREQUENCY: FREQUENT
LOWEST PAIN SEVERITY IN PAST 24 HOURS: 0/10
LOSS OF SENSATION: 1
BOWEL PATTERN NORMAL: 1
PERSON REPORTING PAIN: PATIENT
PAIN LOCATION - PAIN QUALITY: ACHE
PAIN LOCATION - PAIN SEVERITY: 2/10
PAIN LOCATION - PAIN SEVERITY: 2/10
DIFFICULTY THINKING: 1
VOMITING: DENIES

## 2024-02-04 PROCEDURE — 665998 HH PPS REVENUE CREDIT

## 2024-02-04 PROCEDURE — 665999 HH PPS REVENUE DEBIT

## 2024-02-05 ENCOUNTER — HOME CARE VISIT (OUTPATIENT)
Dept: HOME HEALTH SERVICES | Facility: HOME HEALTHCARE | Age: 69
End: 2024-02-05
Payer: MEDICARE

## 2024-02-05 VITALS
TEMPERATURE: 99.2 F | DIASTOLIC BLOOD PRESSURE: 76 MMHG | OXYGEN SATURATION: 90 % | SYSTOLIC BLOOD PRESSURE: 118 MMHG | HEART RATE: 81 BPM | RESPIRATION RATE: 18 BRPM

## 2024-02-05 PROCEDURE — 665999 HH PPS REVENUE DEBIT

## 2024-02-05 PROCEDURE — 665998 HH PPS REVENUE CREDIT

## 2024-02-05 PROCEDURE — G0299 HHS/HOSPICE OF RN EA 15 MIN: HCPCS

## 2024-02-05 ASSESSMENT — ENCOUNTER SYMPTOMS
PAIN LOCATION - PAIN SEVERITY: 4/10
PAIN LOCATION - PAIN FREQUENCY: INTERMITTENT
MUSCLE WEAKNESS: 1
COUGH: 1
LIMITED RANGE OF MOTION: 1
PAIN SEVERITY GOAL: 0/10
LAST BOWEL MOVEMENT: 66874
HIGHEST PAIN SEVERITY IN PAST 24 HOURS: 4/10
LOWER EXTREMITY EDEMA: 1
COUGH CHARACTERISTICS: PRODUCTIVE
COUGH CHARACTERISTICS: NON-PRODUCTIVE
FATIGUE: 1
COUGH: 1
BOWEL PATTERN NORMAL: 1
PERSON REPORTING PAIN: PATIENT
FATIGUES EASILY: 1
PAIN LOCATION - EXACERBATING FACTORS: COUGHING
PAIN: 1
EDEMA: 1
DRY SKIN: 1
LOWEST PAIN SEVERITY IN PAST 24 HOURS: 4/10

## 2024-02-06 ENCOUNTER — HOME CARE VISIT (OUTPATIENT)
Dept: HOME HEALTH SERVICES | Facility: HOME HEALTHCARE | Age: 69
End: 2024-02-06
Payer: MEDICARE

## 2024-02-06 PROCEDURE — 665999 HH PPS REVENUE DEBIT

## 2024-02-06 PROCEDURE — 665998 HH PPS REVENUE CREDIT

## 2024-02-07 ENCOUNTER — HOME CARE VISIT (OUTPATIENT)
Dept: HOME HEALTH SERVICES | Facility: HOME HEALTHCARE | Age: 69
End: 2024-02-07
Payer: MEDICARE

## 2024-02-07 PROCEDURE — 665998 HH PPS REVENUE CREDIT

## 2024-02-07 PROCEDURE — 665999 HH PPS REVENUE DEBIT

## 2024-02-08 PROCEDURE — 665999 HH PPS REVENUE DEBIT

## 2024-02-08 PROCEDURE — 665998 HH PPS REVENUE CREDIT

## 2024-02-08 PROCEDURE — 665997 HH PPS REVENUE ADJ

## 2024-02-09 ENCOUNTER — HOME CARE VISIT (OUTPATIENT)
Dept: HOME HEALTH SERVICES | Facility: HOME HEALTHCARE | Age: 69
End: 2024-02-09
Payer: MEDICARE

## 2024-02-09 PROCEDURE — 665998 HH PPS REVENUE CREDIT

## 2024-02-09 PROCEDURE — 665999 HH PPS REVENUE DEBIT

## 2024-02-10 PROCEDURE — 665999 HH PPS REVENUE DEBIT

## 2024-02-10 PROCEDURE — 665998 HH PPS REVENUE CREDIT

## 2024-02-11 PROCEDURE — 665999 HH PPS REVENUE DEBIT

## 2024-02-11 PROCEDURE — 665998 HH PPS REVENUE CREDIT

## 2024-02-12 ENCOUNTER — HOME CARE VISIT (OUTPATIENT)
Dept: HOME HEALTH SERVICES | Facility: HOME HEALTHCARE | Age: 69
End: 2024-02-12
Payer: MEDICARE

## 2024-02-12 PROCEDURE — 665999 HH PPS REVENUE DEBIT

## 2024-02-12 PROCEDURE — 665998 HH PPS REVENUE CREDIT

## 2024-02-12 NOTE — CASE COMMUNICATION
Quality Review Completed for Transfer  OASIS by SHAYLA Kirk RN on 2/12/2024:     Edits completed by SHAYLA Kirk RN:  1.  is yes to pain and pressure ulcer prevention per care plan interventions

## 2024-02-13 PROCEDURE — 665999 HH PPS REVENUE DEBIT

## 2024-02-13 PROCEDURE — 665998 HH PPS REVENUE CREDIT

## 2024-02-13 NOTE — CASE COMMUNICATION
I agree with the change.     ----- Message -----  From: Ava Kirk R.N.  Sent: 2/12/2024   1:40 PM PST  To: Brisa Robledo R.N.      Quality Review Completed for Transfer  OASIS by SHAYLA Kirk, RN on 2/12/2024:     Edits completed by SHAYLA Kirk RN:  1.  is yes to pain and pressure ulcer prevention per care plan interventions

## 2024-02-14 PROCEDURE — 665998 HH PPS REVENUE CREDIT

## 2024-02-14 PROCEDURE — 665999 HH PPS REVENUE DEBIT

## 2024-02-15 PROCEDURE — 665998 HH PPS REVENUE CREDIT

## 2024-02-15 PROCEDURE — 665999 HH PPS REVENUE DEBIT

## 2024-02-16 PROBLEM — S81.801S WOUND OF RIGHT LOWER EXTREMITY, SEQUELA: Status: ACTIVE | Noted: 2024-02-16

## 2024-02-16 PROCEDURE — 665999 HH PPS REVENUE DEBIT

## 2024-02-16 PROCEDURE — 665998 HH PPS REVENUE CREDIT

## 2024-02-17 PROCEDURE — 665999 HH PPS REVENUE DEBIT

## 2024-02-17 PROCEDURE — 665998 HH PPS REVENUE CREDIT

## 2024-02-18 PROCEDURE — 665999 HH PPS REVENUE DEBIT

## 2024-02-18 PROCEDURE — 665998 HH PPS REVENUE CREDIT

## 2024-02-19 DIAGNOSIS — E11.51 DM (DIABETES MELLITUS) TYPE II, CONTROLLED, WITH PERIPHERAL VASCULAR DISORDER (HCC): ICD-10-CM

## 2024-02-19 PROCEDURE — 665998 HH PPS REVENUE CREDIT

## 2024-02-19 PROCEDURE — 665999 HH PPS REVENUE DEBIT

## 2024-02-20 PROCEDURE — 665999 HH PPS REVENUE DEBIT

## 2024-02-20 PROCEDURE — 665998 HH PPS REVENUE CREDIT

## 2024-02-20 NOTE — TELEPHONE ENCOUNTER
Received request via: Pharmacy    Was the patient seen in the last year in this department? Yes    Does the patient have an active prescription (recently filled or refills available) for medication(s) requested? No      Does the patient have sn[facility] Plus and need 100 day supply (blood pressure, diabetes and cholesterol meds only)? Yes, quantity updated to 100 days

## 2024-02-21 PROCEDURE — 665999 HH PPS REVENUE DEBIT

## 2024-02-21 PROCEDURE — 665998 HH PPS REVENUE CREDIT

## 2024-02-22 PROCEDURE — 665998 HH PPS REVENUE CREDIT

## 2024-02-22 PROCEDURE — 665999 HH PPS REVENUE DEBIT

## 2024-02-23 PROCEDURE — 665999 HH PPS REVENUE DEBIT

## 2024-02-23 PROCEDURE — 665998 HH PPS REVENUE CREDIT

## 2024-02-24 PROCEDURE — 665998 HH PPS REVENUE CREDIT

## 2024-02-24 PROCEDURE — 665999 HH PPS REVENUE DEBIT

## 2024-02-25 PROCEDURE — 665998 HH PPS REVENUE CREDIT

## 2024-02-25 PROCEDURE — 665999 HH PPS REVENUE DEBIT

## 2024-02-26 PROCEDURE — 665999 HH PPS REVENUE DEBIT

## 2024-02-26 PROCEDURE — 665998 HH PPS REVENUE CREDIT

## 2024-02-27 PROCEDURE — 665999 HH PPS REVENUE DEBIT

## 2024-02-27 PROCEDURE — 665998 HH PPS REVENUE CREDIT

## 2024-02-28 PROCEDURE — 665998 HH PPS REVENUE CREDIT

## 2024-02-28 PROCEDURE — 665999 HH PPS REVENUE DEBIT

## 2024-02-29 PROCEDURE — 665998 HH PPS REVENUE CREDIT

## 2024-02-29 PROCEDURE — 665999 HH PPS REVENUE DEBIT

## 2024-03-01 PROCEDURE — 665999 HH PPS REVENUE DEBIT

## 2024-03-01 PROCEDURE — 665998 HH PPS REVENUE CREDIT

## 2024-03-02 PROCEDURE — 665998 HH PPS REVENUE CREDIT

## 2024-03-02 PROCEDURE — 665999 HH PPS REVENUE DEBIT

## 2024-03-03 PROCEDURE — 665999 HH PPS REVENUE DEBIT

## 2024-03-03 PROCEDURE — 665998 HH PPS REVENUE CREDIT

## 2024-03-04 PROCEDURE — 665999 HH PPS REVENUE DEBIT

## 2024-03-04 PROCEDURE — 665998 HH PPS REVENUE CREDIT

## 2024-03-05 ENCOUNTER — DOCUMENTATION (OUTPATIENT)
Dept: HEALTH INFORMATION MANAGEMENT | Facility: OTHER | Age: 69
End: 2024-03-05
Payer: MEDICARE

## 2024-03-05 PROCEDURE — 665999 HH PPS REVENUE DEBIT

## 2024-03-05 PROCEDURE — 665998 HH PPS REVENUE CREDIT

## 2024-03-06 PROCEDURE — 665999 HH PPS REVENUE DEBIT

## 2024-03-06 PROCEDURE — 665998 HH PPS REVENUE CREDIT

## 2024-03-07 PROCEDURE — 665999 HH PPS REVENUE DEBIT

## 2024-03-07 PROCEDURE — 665998 HH PPS REVENUE CREDIT

## 2024-03-07 RX ORDER — HYDROCHLOROTHIAZIDE 25 MG/1
25 TABLET ORAL DAILY
Qty: 100 TABLET | Refills: 2 | OUTPATIENT
Start: 2024-03-07

## 2024-03-07 RX ORDER — ALLOPURINOL 100 MG/1
100 TABLET ORAL DAILY
Qty: 90 TABLET | Refills: 2 | OUTPATIENT
Start: 2024-03-07

## 2024-03-08 ENCOUNTER — HOME CARE VISIT (OUTPATIENT)
Dept: HOME HEALTH SERVICES | Facility: HOME HEALTHCARE | Age: 69
End: 2024-03-08
Payer: MEDICARE

## 2024-03-08 VITALS
BODY MASS INDEX: 51.4 KG/M2 | SYSTOLIC BLOOD PRESSURE: 130 MMHG | RESPIRATION RATE: 18 BRPM | DIASTOLIC BLOOD PRESSURE: 70 MMHG | WEIGHT: 315 LBS | OXYGEN SATURATION: 95 % | HEART RATE: 78 BPM | TEMPERATURE: 97.6 F

## 2024-03-08 PROCEDURE — 665003 FOLLOW UP-HOME HEALTH

## 2024-03-08 PROCEDURE — 665998 HH PPS REVENUE CREDIT

## 2024-03-08 PROCEDURE — 665999 HH PPS REVENUE DEBIT

## 2024-03-08 PROCEDURE — G0495 RN CARE TRAIN/EDU IN HH: HCPCS

## 2024-03-08 ASSESSMENT — ENCOUNTER SYMPTOMS
ASSOCIATED SYMPTOMS: DENIES
PAIN SEVERITY GOAL: 0/10
PAIN LOCATION - PAIN FREQUENCY: FREQUENT
PAIN LOCATION - PAIN SEVERITY: 2/10
PAIN: 1
PERSON REPORTING PAIN: PATIENT
HIGHEST PAIN SEVERITY IN PAST 24 HOURS: 4/10
PAIN LOCATION - RELIEVING FACTORS: PAIN MEDICATION
SUBJECTIVE PAIN PROGRESSION: UNCHANGED
PAIN LOCATION - PAIN QUALITY: DULL, ACHY
PAIN LOCATION: PERINEUM
LOWEST PAIN SEVERITY IN PAST 24 HOURS: 2/10

## 2024-03-08 ASSESSMENT — FIBROSIS 4 INDEX: FIB4 SCORE: 0.86

## 2024-03-09 PROCEDURE — 665997 HH PPS REVENUE ADJ

## 2024-03-09 PROCEDURE — 665998 HH PPS REVENUE CREDIT

## 2024-03-09 PROCEDURE — 665999 HH PPS REVENUE DEBIT

## 2024-03-09 ASSESSMENT — ACTIVITIES OF DAILY LIVING (ADL)
PREPARING MEALS: INDEPENDENT
TELEPHONE USE ASSESSED: 1
ORAL_CARE_ASSESSED: 1
USING THE TELPHONE: INDEPENDENT
ORAL_CARE_CURRENT_FUNCTION: INDEPENDENT

## 2024-03-09 ASSESSMENT — ENCOUNTER SYMPTOMS
NAUSEA: DENIES
LAST BOWEL MOVEMENT: 66907
HOARSE VOICE: 1
LOWER EXTREMITY EDEMA: 1
COUGH: 1
BOWEL PATTERN NORMAL: 1
VOMITING: DENIES
FATIGUES EASILY: 1
STOOL FREQUENCY: DAILY
COUGH CHARACTERISTICS: DRY

## 2024-03-10 PROCEDURE — 665998 HH PPS REVENUE CREDIT

## 2024-03-10 PROCEDURE — 665999 HH PPS REVENUE DEBIT

## 2024-03-11 ENCOUNTER — OFFICE VISIT (OUTPATIENT)
Dept: MEDICAL GROUP | Facility: PHYSICIAN GROUP | Age: 69
End: 2024-03-11
Payer: MEDICARE

## 2024-03-11 ENCOUNTER — DOCUMENTATION (OUTPATIENT)
Dept: MEDICAL GROUP | Facility: PHYSICIAN GROUP | Age: 69
End: 2024-03-11

## 2024-03-11 ENCOUNTER — HOME CARE VISIT (OUTPATIENT)
Dept: HOME HEALTH SERVICES | Facility: HOME HEALTHCARE | Age: 69
End: 2024-03-11
Payer: MEDICARE

## 2024-03-11 VITALS
WEIGHT: 315 LBS | RESPIRATION RATE: 14 BRPM | HEIGHT: 72 IN | BODY MASS INDEX: 42.66 KG/M2 | SYSTOLIC BLOOD PRESSURE: 120 MMHG | HEART RATE: 84 BPM | OXYGEN SATURATION: 92 % | DIASTOLIC BLOOD PRESSURE: 72 MMHG | TEMPERATURE: 96.1 F

## 2024-03-11 DIAGNOSIS — I48.91 ATRIAL FIBRILLATION, UNSPECIFIED TYPE (HCC): ICD-10-CM

## 2024-03-11 DIAGNOSIS — E11.622: ICD-10-CM

## 2024-03-11 DIAGNOSIS — I10 BENIGN ESSENTIAL HTN: ICD-10-CM

## 2024-03-11 DIAGNOSIS — Z09 HOSPITAL DISCHARGE FOLLOW-UP: ICD-10-CM

## 2024-03-11 DIAGNOSIS — E11.51 DM (DIABETES MELLITUS) TYPE II, CONTROLLED, WITH PERIPHERAL VASCULAR DISORDER (HCC): ICD-10-CM

## 2024-03-11 DIAGNOSIS — L97.219: ICD-10-CM

## 2024-03-11 PROCEDURE — G0179 MD RECERTIFICATION HHA PT: HCPCS | Performed by: FAMILY MEDICINE

## 2024-03-11 PROCEDURE — 665999 HH PPS REVENUE DEBIT

## 2024-03-11 PROCEDURE — 665998 HH PPS REVENUE CREDIT

## 2024-03-11 RX ORDER — CARVEDILOL 6.25 MG/1
6.25 TABLET ORAL 2 TIMES DAILY WITH MEALS
Qty: 60 TABLET | Refills: 0 | Status: SHIPPED | OUTPATIENT
Start: 2024-03-11 | End: 2024-04-10

## 2024-03-11 RX ORDER — AMIODARONE HYDROCHLORIDE 200 MG/1
200 TABLET ORAL DAILY
Qty: 90 TABLET | Refills: 0 | Status: SHIPPED | OUTPATIENT
Start: 2024-03-11

## 2024-03-11 RX ORDER — POTASSIUM CHLORIDE 1500 MG/1
TABLET, EXTENDED RELEASE ORAL
COMMUNITY
Start: 2023-12-31

## 2024-03-11 RX ORDER — SULFAMETHOXAZOLE AND TRIMETHOPRIM 800; 160 MG/1; MG/1
1 TABLET ORAL 2 TIMES DAILY
COMMUNITY
Start: 2024-02-01 | End: 2024-02-08

## 2024-03-11 ASSESSMENT — ENCOUNTER SYMPTOMS
MUSCULOSKELETAL NEGATIVE: 1
NEUROLOGICAL NEGATIVE: 1
COUGH: 0
CHILLS: 0
DIZZINESS: 0
PALPITATIONS: 0
CARDIOVASCULAR NEGATIVE: 1
NAUSEA: 0
GASTROINTESTINAL NEGATIVE: 1
DEPRESSION: 0
MYALGIAS: 0
BRUISES/BLEEDS EASILY: 0
DOUBLE VISION: 0
FEVER: 0
HEMOPTYSIS: 0
EYES NEGATIVE: 1
TINGLING: 0
HEADACHES: 0
PSYCHIATRIC NEGATIVE: 1
RESPIRATORY NEGATIVE: 1
BLURRED VISION: 0
CONSTITUTIONAL NEGATIVE: 1
HEARTBURN: 0

## 2024-03-11 ASSESSMENT — FIBROSIS 4 INDEX: FIB4 SCORE: 0.86

## 2024-03-11 NOTE — PROGRESS NOTES
Subjective:     Jose Mc is a 68 y.o. male who presents for Hospital Follow-up.    HPI:   Recently hospitalized for right foot ulcer, seen in er and treated with wound vac. Started on abx, neg for osteo, seeing wound clinic qweek and HH    Current medicines (including reconciliation performed today)  Current Outpatient Medications   Medication Sig Dispense Refill    potassium Chloride ER (K-TAB) 20 MEQ Tab CR tablet TAKE 1 TABLET BY MOUTH EVERY DAY FOR HYPOKALEMIA      amiodarone (CORDARONE) 200 MG Tab Take 1 Tablet by mouth every day. Indications: Atrial Fibrillation 90 Tablet 0    carvedilol (COREG) 6.25 MG Tab Take 1 Tablet by mouth 2 times a day with meals for 30 days. Indications: High Blood Pressure Disorder 60 Tablet 0    metformin (GLUCOPHAGE) 1000 MG tablet TAKE 1 TABLET BY MOUTH TWICE DAILY WITH FOOD  Indications: Type 2 Diabetes 200 Tablet 3    hydroCHLOROthiazide 25 MG Tab Take 1 tablet by mouth every day. 30 Tablet 0    allopurinol (ZYLOPRIM) 100 MG Tab Take 1 Tablet by mouth every day. Indications: Gout 90 Tablet 2    apixaban (ELIQUIS) 5mg Tab Take 1 tablet by mouth 2 times a day. 60 Tablet 0    atorvastatin (LIPITOR) 20 MG Tab Take 1 Tablet by mouth every evening for 30 days. Indications: High Amount of Fats in the Blood 30 Tablet 0    dilTIAZem (CARDIZEM) 30 MG Tab Take 1 Tablet by mouth every 8 hours for 30 days. Indications: Atrial Fibrillation 90 Tablet 0    gabapentin (NEURONTIN) 400 MG Cap Take 1 capsule by mouth 3 times a day. 90 Capsule 2    pioglitazone (ACTOS) 30 MG Tab Take 1 Tablet by mouth every day. Indications: Type 2 Diabetes 100 Tablet 3    vitamin D (CHOLECALCIFEROL) 1000 UNIT Tab Take 1 Tablet by mouth every day. Indications: Nutritional supplement 60 Tablet 0    loratadine (CLARITIN) 10 MG Tab Take 10 mg by mouth every day. Indications: Eye Itching, Hayfever, Runny Nose, Watery Eyes      Multiple Vitamins-Minerals (MULTIVITAMIN GUMMIES ADULTS PO) Take 1 Dose by mouth  every day at 6 PM. Indications: supplement      Saccharomyces boulardii (DAILY PROBIOTIC SUPPLEMENT PO) Take 1 Dose by mouth every day. Indications: supplement      Nutritional Supplements (ARGINAID) Pack Take 1 Dose by mouth every day at 6 PM. Indications: supplement      Nutritional Supplements (JOSÉ) Powder Take 1 Dose by mouth every day at 6 PM. Indications: supplement      acetaminophen (TYLENOL) 500 MG Tab Take 500-1,000 mg by mouth every 6 hours as needed for Mild Pain or Moderate Pain. Indications: Pain       No current facility-administered medications for this visit.       Allergies:   Penicillins, Bee venom, Cefazolin, and Linezolid    Social History     Tobacco Use    Smoking status: Former     Current packs/day: 0.00     Types: Cigarettes     Quit date: 1980     Years since quittin.1    Smokeless tobacco: Never   Vaping Use    Vaping Use: Never used   Substance Use Topics    Alcohol use: Not Currently     Comment: OCC    Drug use: Never       ROS:  Review of Systems   Constitutional: Negative.  Negative for chills and fever.   HENT: Negative.  Negative for hearing loss.    Eyes: Negative.  Negative for blurred vision and double vision.   Respiratory: Negative.  Negative for cough and hemoptysis.    Cardiovascular: Negative.  Negative for chest pain and palpitations.   Gastrointestinal: Negative.  Negative for heartburn and nausea.   Genitourinary: Negative.  Negative for dysuria.   Musculoskeletal: Negative.  Negative for myalgias.   Skin:  Positive for rash.   Neurological: Negative.  Negative for dizziness, tingling and headaches.   Endo/Heme/Allergies: Negative.  Does not bruise/bleed easily.   Psychiatric/Behavioral: Negative.  Negative for depression and suicidal ideas.    All other systems reviewed and are negative.        Objective:     Vitals:    24 1254   BP: 120/72   Pulse: 84   Resp: 14   Temp: (!) 35.6 °C (96.1 °F)   TempSrc: Temporal   SpO2: 92%   Weight: (!) 172 kg (379  lb)   Height: 1.829 m (6')     Body mass index is 51.4 kg/m².    Physical Exam:  Physical Exam  Constitutional:       General: He is not in acute distress.     Appearance: He is not diaphoretic.   HENT:      Head: Normocephalic and atraumatic.      Right Ear: External ear normal.      Left Ear: External ear normal.      Nose: Nose normal.      Mouth/Throat:      Pharynx: No oropharyngeal exudate.   Eyes:      General: No scleral icterus.        Right eye: No discharge.         Left eye: No discharge.      Conjunctiva/sclera: Conjunctivae normal.      Pupils: Pupils are equal, round, and reactive to light.   Neck:      Thyroid: No thyromegaly.      Vascular: No JVD.      Trachea: No tracheal deviation.   Cardiovascular:      Rate and Rhythm: Normal rate and regular rhythm.      Heart sounds: Normal heart sounds. No murmur heard.     No friction rub. No gallop.   Pulmonary:      Effort: Pulmonary effort is normal. No respiratory distress.      Breath sounds: Normal breath sounds. No stridor. No wheezing or rales.   Chest:      Chest wall: No tenderness.   Abdominal:      General: Bowel sounds are normal. There is no distension.      Palpations: Abdomen is soft. There is no mass.      Tenderness: There is no abdominal tenderness. There is no guarding or rebound.   Musculoskeletal:         General: No tenderness. Normal range of motion.      Cervical back: Normal range of motion and neck supple.        Feet:       Comments: Wc bound, left bka   Feet:      Comments: 4 by 4 cm foot ulcer  Lymphadenopathy:      Cervical: No cervical adenopathy.   Skin:     General: Skin is warm.   Neurological:      Mental Status: He is alert and oriented to person, place, and time.      Cranial Nerves: No cranial nerve deficit.      Coordination: Coordination normal.   Psychiatric:         Mood and Affect: Mood and affect normal.         Cognition and Memory: Memory normal.         Judgment: Judgment normal.           Assessment and Plan:    1. Hospital discharge follow-up    2. Diabetic ulcer of right calf (HCC)    3. Benign essential HTN  - carvedilol (COREG) 6.25 MG Tab; Take 1 Tablet by mouth 2 times a day with meals for 30 days. Indications: High Blood Pressure Disorder  Dispense: 60 Tablet; Refill: 0    4. Atrial fibrillation, unspecified type (HCC)  - amiodarone (CORDARONE) 200 MG Tab; Take 1 Tablet by mouth every day. Indications: Atrial Fibrillation  Dispense: 90 Tablet; Refill: 0    5. DM (diabetes mellitus) type II, controlled, with peripheral vascular disorder (HCC)  - metformin (GLUCOPHAGE) 1000 MG tablet; TAKE 1 TABLET BY MOUTH TWICE DAILY WITH FOOD  Indications: Type 2 Diabetes  Dispense: 200 Tablet; Refill: 3    Other orders  - sulfamethoxazole-trimethoprim (BACTRIM DS) 800-160 MG tablet; Take 1 Tablet by mouth 2 times a day.  - potassium Chloride ER (K-TAB) 20 MEQ Tab CR tablet; TAKE 1 TABLET BY MOUTH EVERY DAY FOR HYPOKALEMIA      - Chart and discharge summary were reviewed.   - Hospitalization and results reviewed with patient.   - Medications reviewed including instructions regarding high risk medications, dosing and side effects.  - Recommended Services: No services needed at this time  - Advance directive/POLST on file?  Yes    Follow-up:No follow-ups on file.    Face-to-face transitional care management services with MODERATE (today's visit is within 14 days post discharge & LACE+ score of 28-58) medical decision complexity were provided.     LACE+ Historical Score Over Time (0-28: Low, 29-58: Medium, 59+: High): 58      Critical Care

## 2024-03-11 NOTE — PROGRESS NOTES
Medication chart review for [facility] services    Received referral from [facility].   Medications reviewed  compared with discharge summary if available.  Discharge summary date, if applicable:   3/7/24    Current medication list per [facility]     Medication list one, patient is currently taking    Current Outpatient Medications:     hydroCHLOROthiazide, 25 mg, Oral, DAILY    amiodarone, 200 mg, Oral, DAILY    allopurinol, 100 mg, Oral, DAILY    apixaban, 5 mg, Oral, BID    atorvastatin, 20 mg, Oral, Q EVENING    dilTIAZem, 30 mg, Oral, Q8HRS    gabapentin, 400 mg, Oral, TID    metformin, TAKE 1 TABLET BY MOUTH TWICE DAILY WITH FOOD    pioglitazone, 30 mg, Oral, DAILY    vitamin D, 1,000 Units, Oral, DAILY    carvedilol, 6.25 mg, Oral, BID WITH MEALS    loratadine, 10 mg, Oral, DAILY    Multiple Vitamins-Minerals (MULTIVITAMIN GUMMIES ADULTS PO), 1 Dose, Oral, DAILY AT 1800    Saccharomyces boulardii (DAILY PROBIOTIC SUPPLEMENT PO), 1 Dose, Oral, DAILY    Arginaid, 1 Dose, Oral, DAILY AT 1800    Jony, 1 Dose, Oral, DAILY AT 1800    acetaminophen, 500-1,000 mg, Oral, Q6HRS PRN      Medication list two, drugs that the patient has been prescribed or recommended to take by their healthcare provider on discharge summary      Medication List          START taking these medications         Instructions   carvedilol 6.25 MG Tabs  Commonly known as: Coreg    Take 1 Tablet by mouth 2 times a day with meals for 30 days.  Dose: 6.25 mg                CHANGE how you take these medications         Instructions   amiodarone 200 MG Tabs  What changed: See the new instructions.  Commonly known as: Cordarone    Doctor's comments: **Patient requests 90 days supply**  Take 1 Tablet by mouth every day.  Dose: 200 mg      metformin 1000 MG tablet  What changed: medication strength  Commonly known as: Glucophage    Doctor's comments: ZERO refills remain on this prescription. Your patient is requesting advance approval of  refills for this medication to PREVENT ANY MISSED DOSES  TAKE 1 TABLET BY MOUTH TWICE DAILY WITH FOOD                CONTINUE taking these medications         Instructions   acetaminophen 500 MG Tabs  Commonly known as: Tylenol    Take 500-1,000 mg by mouth every 6 hours as needed for Mild Pain or Moderate Pain. Indications: Pain  Dose: 500-1,000 mg      allopurinol 100 MG Tabs  Commonly known as: Zyloprim    Take 1 Tablet by mouth every day. Indications: Gout  Dose: 100 mg      apixaban 5mg Tabs  Commonly known as: Eliquis    Take 1 tablet by mouth 2 times a day.  Dose: 5 mg      * Arginaid Pack    Take 1 Dose by mouth every day at 6 PM. Indications: supplement  Dose: 1 Dose      * Jony Powd    Take 1 Dose by mouth every day at 6 PM. Indications: supplement  Dose: 1 Dose      atorvastatin 20 MG Tabs  Commonly known as: Lipitor    Take 1 Tablet by mouth every evening for 30 days. Indications: High Amount of Fats in the Blood  Dose: 20 mg      DAILY PROBIOTIC SUPPLEMENT PO    Take 1 Dose by mouth every day. Indications: supplement  Dose: 1 Dose      dilTIAZem 30 MG Tabs  Commonly known as: Cardizem    Take 1 Tablet by mouth every 8 hours for 30 days. Indications: Atrial Fibrillation  Dose: 30 mg      gabapentin 400 MG Caps  Commonly known as: Neurontin    Take 1 capsule by mouth 3 times a day.  Dose: 400 mg      hydroCHLOROthiazide 25 MG Tabs    Take 1 tablet by mouth every day.  Dose: 25 mg      loratadine 10 MG Tabs  Commonly known as: Claritin    Take 10 mg by mouth every day. Indications: Eye Itching, Hayfever, Runny Nose, Watery Eyes  Dose: 10 mg      MULTIVITAMIN GUMMIES ADULTS PO    Take 1 Dose by mouth every day at 6 PM. Indications: supplement  Dose: 1 Dose      pioglitazone 30 MG Tabs  Commonly known as: Actos    Take 1 Tablet by mouth every day. Indications: Type 2 Diabetes  Dose: 30 mg      vitamin D 1000 UNIT Tabs  Commonly known as: Cholecalciferol    Take 1 Tablet by mouth every day. Indications:  Nutritional supplement  Dose: 1,000 Units              * This list has 2 medication(s) that are the same as other medications prescribed for you. Read the directions carefully, and ask your doctor or other care provider to review them with you.                    STOP taking these medications       KLS ALLERCLEAR D-24HR PO      potassium chloride SA 20 MEQ Tbcr  Commonly known as: Kdur           Allergies   Allergen Reactions    Penicillins Unspecified and Swelling     Given in eye drops as a child, eyes swell  2003[birth year];ITCH   Create Date: [id number] Create User Name: Juan J Castellanos Update Date: [id number] Update User Name: Juan J Castellanos    Bee Venom Swelling    Cefazolin Rash, Itching and Swelling    Linezolid Unspecified     Caused unk lab value to increase.       Labs     Lab Results   Component Value Date/Time    SODIUM 142 02/27/2024 05:55 AM    POTASSIUM 3.8 02/27/2024 05:55 AM    CHLORIDE 106 02/27/2024 05:55 AM    CO2 29 02/27/2024 05:55 AM    GLUCOSE 100 (H) 02/27/2024 05:55 AM    BUN 28 (H) 02/27/2024 05:55 AM    CREATININE 1.4 (H) 02/27/2024 05:55 AM    GLOMRATE 52 (L) 09/08/2023 08:50 AM     Lab Results   Component Value Date/Time    ALKPHOSPHAT 105 02/27/2024 05:55 AM    ASTSGOT 17 02/27/2024 05:55 AM    ALTSGPT 14 02/27/2024 05:55 AM    TBILIRUBIN 0.4 02/27/2024 05:55 AM    ALBUMIN 2.7 (L) 02/27/2024 05:55 AM        Assessment for clinically significant drug interactions, drug omissions/additions, duplicative therapies.            CC   Theo James M.D.  [address] [address]  Fax: [phone]    [facility]  Phone [phone] fax [phone]    This note was created using voice recognition software (Dragon). The accuracy of the dictation is limited by the abilities of the software. I have reviewed the note prior to signing, however some errors in grammar and context are still possible. If you have any questions related to this note  please do not hesitate to contact our office.

## 2024-03-12 ENCOUNTER — HOME CARE VISIT (OUTPATIENT)
Dept: HOME HEALTH SERVICES | Facility: HOME HEALTHCARE | Age: 69
End: 2024-03-12
Payer: MEDICARE

## 2024-03-12 VITALS
SYSTOLIC BLOOD PRESSURE: 138 MMHG | HEART RATE: 75 BPM | TEMPERATURE: 97.9 F | RESPIRATION RATE: 18 BRPM | OXYGEN SATURATION: 95 % | DIASTOLIC BLOOD PRESSURE: 80 MMHG

## 2024-03-12 PROCEDURE — 665002 ROC-HOME HEALTH

## 2024-03-12 PROCEDURE — 665998 HH PPS REVENUE CREDIT

## 2024-03-12 PROCEDURE — 665999 HH PPS REVENUE DEBIT

## 2024-03-12 PROCEDURE — G0151 HHCP-SERV OF PT,EA 15 MIN: HCPCS

## 2024-03-12 ASSESSMENT — ACTIVITIES OF DAILY LIVING (ADL)
CURRENT_FUNCTION: STAND BY ASSIST
PHYSICAL TRANSFERS ASSESSED: 1
PHYSICAL_TRANSFERS_DEVICES: USE OF B UES TO ASSIST
AMBULATION ASSISTANCE: CONTACT GUARD ASSIST
BATHING_COMMENTS: SEE OT NOTES
AMBULATION ASSISTANCE: STAND BY ASSIST
GROOMING_COMMENTS: SEE OT NOTES
FEEDING_COMMENTS: SEE OT NOTES
AMBULATION ASSISTANCE ON FLAT SURFACES: 1
AMBULATION ASSISTANCE: 1

## 2024-03-12 ASSESSMENT — ENCOUNTER SYMPTOMS
LOWEST PAIN SEVERITY IN PAST 24 HOURS: 3/10
PAIN SEVERITY GOAL: 0/10
PAIN LOCATION - PAIN SEVERITY: 4/10
PAIN LOCATION - PAIN DURATION: DAILY
PAIN LOCATION - PAIN FREQUENCY: INTERMITTENT
PAIN LOCATION - RELIEVING FACTORS: PAIN MEDS, REST, REPOSITIONING
PAIN: 1
PERSON REPORTING PAIN: PATIENT
HIGHEST PAIN SEVERITY IN PAST 24 HOURS: 6/10
PAIN LOCATION - PAIN QUALITY: ACHING, BURNING
PAIN LOCATION: PERINEUM
SUBJECTIVE PAIN PROGRESSION: WAXING AND WANING

## 2024-03-13 ENCOUNTER — HOME CARE VISIT (OUTPATIENT)
Dept: HOME HEALTH SERVICES | Facility: HOME HEALTHCARE | Age: 69
End: 2024-03-13
Payer: MEDICARE

## 2024-03-13 VITALS
SYSTOLIC BLOOD PRESSURE: 132 MMHG | RESPIRATION RATE: 18 BRPM | TEMPERATURE: 97.6 F | OXYGEN SATURATION: 93 % | HEART RATE: 76 BPM | DIASTOLIC BLOOD PRESSURE: 76 MMHG

## 2024-03-13 PROCEDURE — G0299 HHS/HOSPICE OF RN EA 15 MIN: HCPCS

## 2024-03-13 PROCEDURE — 665998 HH PPS REVENUE CREDIT

## 2024-03-13 PROCEDURE — 665999 HH PPS REVENUE DEBIT

## 2024-03-13 ASSESSMENT — ENCOUNTER SYMPTOMS
HIGHEST PAIN SEVERITY IN PAST 24 HOURS: 5/10
FATIGUES EASILY: 1
LOWEST PAIN SEVERITY IN PAST 24 HOURS: 2/10
PAIN SEVERITY GOAL: 0/10
PAIN: 1
NAUSEA: DENIES
SUBJECTIVE PAIN PROGRESSION: UNCHANGED
BOWEL PATTERN NORMAL: 1
LAST BOWEL MOVEMENT: 66912
VOMITING: DENIES
PAIN LOCATION - PAIN FREQUENCY: FREQUENT
COUGH CHARACTERISTICS: DRY
PAIN LOCATION - PAIN SEVERITY: 3/10
MUSCLE WEAKNESS: 1
PAIN LOCATION: PERINEUM
STOOL FREQUENCY: DAILY
PERSON REPORTING PAIN: PATIENT
ASSOCIATED SYMPTOMS: DENIES
PAIN LOCATION - RELIEVING FACTORS: REPOSITIONING
COUGH: 1

## 2024-03-14 ENCOUNTER — HOME CARE VISIT (OUTPATIENT)
Dept: HOME HEALTH SERVICES | Facility: HOME HEALTHCARE | Age: 69
End: 2024-03-14
Payer: MEDICARE

## 2024-03-14 PROCEDURE — 665999 HH PPS REVENUE DEBIT

## 2024-03-14 PROCEDURE — 665998 HH PPS REVENUE CREDIT

## 2024-03-14 ASSESSMENT — ACTIVITIES OF DAILY LIVING (ADL): OASIS_M1830: 03

## 2024-03-14 NOTE — CASE COMMUNICATION
Quality Review for ARIEL/Recertification OASIS by NELSY Avila RN on  March 14, 2024       Edits completed by NELSY Avila RN:  1.  and  diagnosis coding updated per chart review.  2. Changed  to 3/7/24, date of valid referral  3. Added short stay acute hospital to   4. Per narrative that patient needs supervision and a walker for safety, the following changes were made: Changed , , , ,   to 2; Changed  to 1; changed  and  to 3  5. Checked amputation in functional limitations  6. Changed nutritional requirements to diabetic diet

## 2024-03-14 NOTE — CASE COMMUNICATION
I agree with the changes made.  ----- Message -----  From: Amelie Avila R.N.  Sent: 3/14/2024   5:48 AM PDT  To: Lucy Moraes R.N.      Quality Review for ARIEL/Recertification OASIS by NELSY Avila, RN on  March 14, 2024       Edits completed by NELSY Avila RN:  1.  and  diagnosis coding updated per chart review.  2. Changed  to 3/7/24, date of valid referral  3. Added short stay acute hospital to   4. P er narrative that patient needs supervision and a walker for safety, the following changes were made: Changed , , , ,  to 2; Changed  to 1; changed  and  to 3  5. Checked amputation in functional limitations  6. Changed nutritional requirements to diabetic diet

## 2024-03-15 ENCOUNTER — HOME CARE VISIT (OUTPATIENT)
Dept: HOME HEALTH SERVICES | Facility: HOME HEALTHCARE | Age: 69
End: 2024-03-15
Payer: MEDICARE

## 2024-03-15 PROCEDURE — 665999 HH PPS REVENUE DEBIT

## 2024-03-15 PROCEDURE — G0152 HHCP-SERV OF OT,EA 15 MIN: HCPCS

## 2024-03-15 PROCEDURE — G0299 HHS/HOSPICE OF RN EA 15 MIN: HCPCS

## 2024-03-15 PROCEDURE — 665998 HH PPS REVENUE CREDIT

## 2024-03-16 VITALS
OXYGEN SATURATION: 95 % | SYSTOLIC BLOOD PRESSURE: 138 MMHG | RESPIRATION RATE: 18 BRPM | HEART RATE: 69 BPM | DIASTOLIC BLOOD PRESSURE: 74 MMHG | TEMPERATURE: 98.1 F

## 2024-03-16 PROCEDURE — 665999 HH PPS REVENUE DEBIT

## 2024-03-16 PROCEDURE — 665998 HH PPS REVENUE CREDIT

## 2024-03-16 ASSESSMENT — ENCOUNTER SYMPTOMS
HIGHEST PAIN SEVERITY IN PAST 24 HOURS: 5/10
LAST BOWEL MOVEMENT: 66914
PAIN: 1
PAIN SEVERITY GOAL: 0/10
STOOL FREQUENCY: DAILY
MUSCLE WEAKNESS: 1
BOWEL PATTERN NORMAL: 1
PAIN LOCATION - PAIN QUALITY: DULL, ACHY
FATIGUES EASILY: 1
SUBJECTIVE PAIN PROGRESSION: UNCHANGED
PAIN LOCATION - RELIEVING FACTORS: REPOSITIONING
NAUSEA: DENIES
VOMITING: DENIES
PAIN LOCATION - PAIN SEVERITY: 5/10
LOWEST PAIN SEVERITY IN PAST 24 HOURS: 3/10
PAIN LOCATION - PAIN FREQUENCY: FREQUENT
PAIN LOCATION: PERINEUM
ASSOCIATED SYMPTOMS: DENIES
PERSON REPORTING PAIN: PATIENT

## 2024-03-17 PROCEDURE — 665999 HH PPS REVENUE DEBIT

## 2024-03-17 PROCEDURE — 665998 HH PPS REVENUE CREDIT

## 2024-03-17 ASSESSMENT — ACTIVITIES OF DAILY LIVING (ADL)
DRESSING_LB_CURRENT_FUNCTION: STAND BY ASSIST
PREPARING MEALS: INDEPENDENT
ORAL_CARE_ASSESSED: 1
GROOMING ASSESSED: 1
LAUNDRY: NEEDS ASSISTANCE
PHYSICAL TRANSFERS ASSESSED: 1
DRESSING_UB_CURRENT_FUNCTION: INDEPENDENT
TOILETING: STAND BY ASSIST
FEEDING ASSESSED: 1
ORAL_CARE_CURRENT_FUNCTION: INDEPENDENT
FEEDING: INDEPENDENT
AMBULATION ASSISTANCE: STAND BY ASSIST
FEEDING_WITHIN_DEFINED_LIMITS: 1
GROOMING_WITHIN_DEFINED_LIMITS: 1
TOILETING: 1
GROOMING_CURRENT_FUNCTION: INDEPENDENT
CURRENT_FUNCTION: STAND BY ASSIST
AMBULATION ASSISTANCE: 1
LAUNDRY ASSESSED: 1

## 2024-03-17 ASSESSMENT — ENCOUNTER SYMPTOMS
PAIN LOCATION - PAIN DURATION: MONTHS
PAIN LOCATION - PAIN SEVERITY: 5/10
PAIN LOCATION - PAIN FREQUENCY: FREQUENT
PERSON REPORTING PAIN: PATIENT
PAIN: 1
PAIN LOCATION - PAIN QUALITY: ACHING
PAIN LOCATION - RELIEVING FACTORS: POSITIONING
PAIN LOCATION - EXACERBATING FACTORS: PRESSURE

## 2024-03-18 ENCOUNTER — HOME CARE VISIT (OUTPATIENT)
Dept: HOME HEALTH SERVICES | Facility: HOME HEALTHCARE | Age: 69
End: 2024-03-18
Payer: MEDICARE

## 2024-03-18 VITALS
HEART RATE: 73 BPM | SYSTOLIC BLOOD PRESSURE: 136 MMHG | OXYGEN SATURATION: 97 % | DIASTOLIC BLOOD PRESSURE: 78 MMHG | RESPIRATION RATE: 18 BRPM | TEMPERATURE: 97.7 F

## 2024-03-18 VITALS
OXYGEN SATURATION: 96 % | HEART RATE: 96 BPM | TEMPERATURE: 97.6 F | DIASTOLIC BLOOD PRESSURE: 60 MMHG | RESPIRATION RATE: 18 BRPM | SYSTOLIC BLOOD PRESSURE: 130 MMHG

## 2024-03-18 PROCEDURE — 665998 HH PPS REVENUE CREDIT

## 2024-03-18 PROCEDURE — G0151 HHCP-SERV OF PT,EA 15 MIN: HCPCS

## 2024-03-18 PROCEDURE — 665999 HH PPS REVENUE DEBIT

## 2024-03-18 PROCEDURE — G0299 HHS/HOSPICE OF RN EA 15 MIN: HCPCS

## 2024-03-18 ASSESSMENT — ENCOUNTER SYMPTOMS
PAIN LOCATION - PAIN FREQUENCY: FREQUENT
PAIN LOCATION - PAIN SEVERITY: 2/10
NAUSEA: DENIES
PAIN LOCATION - PAIN SEVERITY: 3/10
HIGHEST PAIN SEVERITY IN PAST 24 HOURS: 5/10
PAIN SEVERITY GOAL: 0/10
PAIN LOCATION - PAIN FREQUENCY: INTERMITTENT
STOOL FREQUENCY: DAILY
MUSCLE WEAKNESS: 1
PAIN LOCATION - RELIEVING FACTORS: REPOSITIONING
LOWEST PAIN SEVERITY IN PAST 24 HOURS: 0/10
HIGHEST PAIN SEVERITY IN PAST 24 HOURS: 5/10
LAST BOWEL MOVEMENT: 66917
ASSOCIATED SYMPTOMS: DENIES
PERSON REPORTING PAIN: PATIENT
BOWEL PATTERN NORMAL: 1
SUBJECTIVE PAIN PROGRESSION: UNCHANGED
PAIN: 1
PAIN LOCATION - PAIN QUALITY: ACHING, BURNING
SUBJECTIVE PAIN PROGRESSION: WAXING AND WANING
LOWEST PAIN SEVERITY IN PAST 24 HOURS: 2/10
PAIN LOCATION - PAIN DURATION: DAILY
PAIN LOCATION - EXACERBATING FACTORS: SITTING TOO LONG
PAIN SEVERITY GOAL: 0/10
PAIN LOCATION: PERINEUM
PAIN LOCATION: PERINEUM
PAIN: 1
PAIN LOCATION - RELIEVING FACTORS: PAIN MEDS, REST, REPOSITIONING
PAIN LOCATION - EXACERBATING FACTORS: PROLONGED SITTING
PAIN LOCATION - PAIN QUALITY: DULL, ACHY
FATIGUES EASILY: 1
VOMITING: DENIES

## 2024-03-19 ENCOUNTER — HOME CARE VISIT (OUTPATIENT)
Dept: HOME HEALTH SERVICES | Facility: HOME HEALTHCARE | Age: 69
End: 2024-03-19
Payer: MEDICARE

## 2024-03-19 PROCEDURE — G0152 HHCP-SERV OF OT,EA 15 MIN: HCPCS

## 2024-03-19 PROCEDURE — 665999 HH PPS REVENUE DEBIT

## 2024-03-19 PROCEDURE — 665998 HH PPS REVENUE CREDIT

## 2024-03-20 ENCOUNTER — HOME CARE VISIT (OUTPATIENT)
Dept: HOME HEALTH SERVICES | Facility: HOME HEALTHCARE | Age: 69
End: 2024-03-20
Payer: MEDICARE

## 2024-03-20 VITALS
DIASTOLIC BLOOD PRESSURE: 78 MMHG | TEMPERATURE: 97.8 F | RESPIRATION RATE: 18 BRPM | OXYGEN SATURATION: 93 % | HEART RATE: 76 BPM | SYSTOLIC BLOOD PRESSURE: 132 MMHG

## 2024-03-20 VITALS
TEMPERATURE: 97.6 F | RESPIRATION RATE: 18 BRPM | SYSTOLIC BLOOD PRESSURE: 130 MMHG | DIASTOLIC BLOOD PRESSURE: 80 MMHG | HEART RATE: 76 BPM | OXYGEN SATURATION: 97 %

## 2024-03-20 PROCEDURE — 665999 HH PPS REVENUE DEBIT

## 2024-03-20 PROCEDURE — G0299 HHS/HOSPICE OF RN EA 15 MIN: HCPCS

## 2024-03-20 PROCEDURE — 665998 HH PPS REVENUE CREDIT

## 2024-03-20 ASSESSMENT — PATIENT HEALTH QUESTIONNAIRE - PHQ9: CLINICAL INTERPRETATION OF PHQ2 SCORE: 0

## 2024-03-20 ASSESSMENT — ENCOUNTER SYMPTOMS
PAIN LOCATION - PAIN SEVERITY: 3/10
PERSON REPORTING PAIN: PATIENT
STOOL FREQUENCY: DAILY
PAIN LOCATION - EXACERBATING FACTORS: WOUND CARE, PRESSURE
PAIN LOCATION - PAIN QUALITY: DULL, ACHY
VOMITING: DENIES
LOWEST PAIN SEVERITY IN PAST 24 HOURS: 1/10
PAIN: 1
PAIN LOCATION - PAIN DURATION: FEW MONTHS
PAIN LOCATION - PAIN FREQUENCY: FREQUENT
PAIN LOCATION - RELIEVING FACTORS: PRESSURE RELIEF
FATIGUES EASILY: 1
MUSCLE WEAKNESS: 1
PAIN LOCATION - EXACERBATING FACTORS: PROLONGED SITTING
BOWEL PATTERN NORMAL: 1
LAST BOWEL MOVEMENT: 66919
PAIN LOCATION - PAIN QUALITY: ACHING
PAIN LOCATION - RELIEVING FACTORS: REPOSITIONING
PAIN SEVERITY GOAL: 0/10
NAUSEA: DENIES
PAIN: 1
PAIN LOCATION: PERINEUM
PERSON REPORTING PAIN: PATIENT
ASSOCIATED SYMPTOMS: DENIES
SUBJECTIVE PAIN PROGRESSION: UNCHANGED
PAIN LOCATION - PAIN FREQUENCY: FREQUENT
PAIN LOCATION - PAIN SEVERITY: 3/10
HIGHEST PAIN SEVERITY IN PAST 24 HOURS: 5/10

## 2024-03-21 ENCOUNTER — HOME CARE VISIT (OUTPATIENT)
Dept: HOME HEALTH SERVICES | Facility: HOME HEALTHCARE | Age: 69
End: 2024-03-21
Payer: MEDICARE

## 2024-03-21 PROCEDURE — 665999 HH PPS REVENUE DEBIT

## 2024-03-21 PROCEDURE — G0152 HHCP-SERV OF OT,EA 15 MIN: HCPCS

## 2024-03-21 PROCEDURE — 665998 HH PPS REVENUE CREDIT

## 2024-03-22 ENCOUNTER — HOME CARE VISIT (OUTPATIENT)
Dept: HOME HEALTH SERVICES | Facility: HOME HEALTHCARE | Age: 69
End: 2024-03-22
Payer: MEDICARE

## 2024-03-22 PROCEDURE — 665998 HH PPS REVENUE CREDIT

## 2024-03-22 PROCEDURE — 665999 HH PPS REVENUE DEBIT

## 2024-03-22 NOTE — CASE COMMUNICATION
FYI-Missed SNV, Patient refused SNV, states I am going to the wound clinic and have an appt with my doctor, call me next week, I am fine.

## 2024-03-23 PROCEDURE — 665998 HH PPS REVENUE CREDIT

## 2024-03-23 PROCEDURE — 665999 HH PPS REVENUE DEBIT

## 2024-03-24 VITALS
DIASTOLIC BLOOD PRESSURE: 78 MMHG | RESPIRATION RATE: 18 BRPM | OXYGEN SATURATION: 97 % | SYSTOLIC BLOOD PRESSURE: 118 MMHG | TEMPERATURE: 97.8 F | HEART RATE: 74 BPM

## 2024-03-24 PROCEDURE — 665999 HH PPS REVENUE DEBIT

## 2024-03-24 PROCEDURE — 665998 HH PPS REVENUE CREDIT

## 2024-03-24 ASSESSMENT — ENCOUNTER SYMPTOMS
PAIN: 1
PERSON REPORTING PAIN: PATIENT
PAIN LOCATION - PAIN DURATION: MONTHS
PAIN LOCATION - PAIN FREQUENCY: FREQUENT
PAIN LOCATION - PAIN SEVERITY: 3/10
PAIN LOCATION - RELIEVING FACTORS: POSITIONING
PAIN LOCATION - PAIN QUALITY: ACHING

## 2024-03-25 ENCOUNTER — HOME CARE VISIT (OUTPATIENT)
Dept: HOME HEALTH SERVICES | Facility: HOME HEALTHCARE | Age: 69
End: 2024-03-25
Payer: MEDICARE

## 2024-03-25 PROCEDURE — 665999 HH PPS REVENUE DEBIT

## 2024-03-25 PROCEDURE — G0152 HHCP-SERV OF OT,EA 15 MIN: HCPCS

## 2024-03-25 PROCEDURE — G0299 HHS/HOSPICE OF RN EA 15 MIN: HCPCS

## 2024-03-25 PROCEDURE — 665998 HH PPS REVENUE CREDIT

## 2024-03-26 VITALS
DIASTOLIC BLOOD PRESSURE: 72 MMHG | RESPIRATION RATE: 18 BRPM | HEART RATE: 67 BPM | SYSTOLIC BLOOD PRESSURE: 122 MMHG | TEMPERATURE: 97.5 F | OXYGEN SATURATION: 94 %

## 2024-03-26 VITALS
RESPIRATION RATE: 18 BRPM | OXYGEN SATURATION: 94 % | DIASTOLIC BLOOD PRESSURE: 72 MMHG | SYSTOLIC BLOOD PRESSURE: 120 MMHG | HEART RATE: 67 BPM | TEMPERATURE: 97.6 F

## 2024-03-26 PROCEDURE — 665998 HH PPS REVENUE CREDIT

## 2024-03-26 PROCEDURE — 665999 HH PPS REVENUE DEBIT

## 2024-03-26 ASSESSMENT — ENCOUNTER SYMPTOMS
PAIN LOCATION - PAIN DURATION: MONTHS
PAIN: 1
PERSON REPORTING PAIN: PATIENT
VOMITING: DENIES
PAIN LOCATION - RELIEVING FACTORS: POSITIONING
PAIN LOCATION - PAIN QUALITY: ACHING
SUBJECTIVE PAIN PROGRESSION: UNCHANGED
PAIN LOCATION - RELIEVING FACTORS: REPOSITIONING
LAST BOWEL MOVEMENT: 66924
PAIN LOCATION - PAIN QUALITY: DULL,ACHY
PAIN LOCATION: PERINEUM
STOOL FREQUENCY: DAILY
PAIN LOCATION - PAIN FREQUENCY: FREQUENT
BOWEL PATTERN NORMAL: 1
MUSCLE WEAKNESS: 1
PAIN LOCATION - PAIN FREQUENCY: FREQUENT
FATIGUES EASILY: 1
PAIN LOCATION - PAIN SEVERITY: 4/10
PAIN: 1
NAUSEA: DENIES
PAIN LOCATION - PAIN SEVERITY: 2/10
LOWEST PAIN SEVERITY IN PAST 24 HOURS: 2/10
PAIN SEVERITY GOAL: 0/10
PAIN LOCATION - EXACERBATING FACTORS: POSITIONING
PERSON REPORTING PAIN: PATIENT

## 2024-03-26 ASSESSMENT — PATIENT HEALTH QUESTIONNAIRE - PHQ9: CLINICAL INTERPRETATION OF PHQ2 SCORE: 0

## 2024-03-27 ENCOUNTER — HOME CARE VISIT (OUTPATIENT)
Dept: HOME HEALTH SERVICES | Facility: HOME HEALTHCARE | Age: 69
End: 2024-03-27
Payer: MEDICARE

## 2024-03-27 VITALS
TEMPERATURE: 97.6 F | DIASTOLIC BLOOD PRESSURE: 80 MMHG | HEART RATE: 80 BPM | OXYGEN SATURATION: 98 % | SYSTOLIC BLOOD PRESSURE: 140 MMHG | RESPIRATION RATE: 18 BRPM

## 2024-03-27 VITALS
RESPIRATION RATE: 18 BRPM | TEMPERATURE: 97.6 F | DIASTOLIC BLOOD PRESSURE: 80 MMHG | HEART RATE: 80 BPM | OXYGEN SATURATION: 93 % | SYSTOLIC BLOOD PRESSURE: 148 MMHG

## 2024-03-27 PROCEDURE — G0151 HHCP-SERV OF PT,EA 15 MIN: HCPCS

## 2024-03-27 PROCEDURE — 665999 HH PPS REVENUE DEBIT

## 2024-03-27 PROCEDURE — G0299 HHS/HOSPICE OF RN EA 15 MIN: HCPCS

## 2024-03-27 PROCEDURE — 665998 HH PPS REVENUE CREDIT

## 2024-03-27 SDOH — HEALTH STABILITY: PHYSICAL HEALTH: EXERCISE TYPE: HOME EXERCISE PROGRAM

## 2024-03-27 ASSESSMENT — ENCOUNTER SYMPTOMS
PAIN LOCATION - PAIN SEVERITY: 2/10
LOWEST PAIN SEVERITY IN PAST 24 HOURS: 2/10
PAIN LOCATION - PAIN SEVERITY: 3/10
ASSOCIATED SYMPTOMS: DENIES
MUSCLE WEAKNESS: 1
PAIN LOCATION - RELIEVING FACTORS: REPOSITIONING
SUBJECTIVE PAIN PROGRESSION: UNCHANGED
PAIN LOCATION - PAIN FREQUENCY: INTERMITTENT
PERSON REPORTING PAIN: PATIENT
PAIN LOCATION - PAIN FREQUENCY: FREQUENT
BOWEL PATTERN NORMAL: 1
PAIN LOCATION - PAIN QUALITY: DULL,ACHY
VOMITING: DENIES
PAIN LOCATION - EXACERBATING FACTORS: PROLONGED SITTING
PAIN LOCATION - PAIN DURATION: DAILY
PAIN LOCATION - PAIN QUALITY: ACHING
PAIN SEVERITY GOAL: 0/10
PAIN: 1
HIGHEST PAIN SEVERITY IN PAST 24 HOURS: 4/10
STOOL FREQUENCY: DAILY
PAIN LOCATION - RELIEVING FACTORS: REST, REPOSITIONING, PAIN MEDS
PAIN LOCATION - EXACERBATING FACTORS: STANDING OR WALKING
NAUSEA: DENIES
LAST BOWEL MOVEMENT: 66925
PERSON REPORTING PAIN: PATIENT
PAIN: 1
FATIGUES EASILY: 1
PAIN LOCATION: RIGHT FOOT
PAIN LOCATION: PERINEUM

## 2024-03-27 ASSESSMENT — PATIENT HEALTH QUESTIONNAIRE - PHQ9: CLINICAL INTERPRETATION OF PHQ2 SCORE: 0

## 2024-03-28 PROCEDURE — 665998 HH PPS REVENUE CREDIT

## 2024-03-28 PROCEDURE — 665999 HH PPS REVENUE DEBIT

## 2024-03-29 ENCOUNTER — HOME CARE VISIT (OUTPATIENT)
Dept: HOME HEALTH SERVICES | Facility: HOME HEALTHCARE | Age: 69
End: 2024-03-29
Payer: MEDICARE

## 2024-03-29 PROCEDURE — G0152 HHCP-SERV OF OT,EA 15 MIN: HCPCS

## 2024-03-29 PROCEDURE — 665998 HH PPS REVENUE CREDIT

## 2024-03-29 PROCEDURE — 665999 HH PPS REVENUE DEBIT

## 2024-03-30 PROCEDURE — 665999 HH PPS REVENUE DEBIT

## 2024-03-30 PROCEDURE — 665998 HH PPS REVENUE CREDIT

## 2024-03-30 NOTE — CASE COMMUNICATION
fyi-Missed SNV d/t wound care at [facility], pt states I forgot to tell you that I have a doctors appt/wound caare appt today,

## 2024-04-01 ENCOUNTER — HOME CARE VISIT (OUTPATIENT)
Dept: HOME HEALTH SERVICES | Facility: HOME HEALTHCARE | Age: 69
End: 2024-04-01
Payer: MEDICARE

## 2024-04-01 VITALS
TEMPERATURE: 97.7 F | RESPIRATION RATE: 18 BRPM | OXYGEN SATURATION: 96 % | SYSTOLIC BLOOD PRESSURE: 122 MMHG | DIASTOLIC BLOOD PRESSURE: 62 MMHG | HEART RATE: 70 BPM

## 2024-04-01 VITALS
HEART RATE: 82 BPM | RESPIRATION RATE: 18 BRPM | TEMPERATURE: 97.4 F | DIASTOLIC BLOOD PRESSURE: 70 MMHG | OXYGEN SATURATION: 96 % | SYSTOLIC BLOOD PRESSURE: 108 MMHG

## 2024-04-01 VITALS
TEMPERATURE: 97.7 F | DIASTOLIC BLOOD PRESSURE: 61 MMHG | OXYGEN SATURATION: 96 % | RESPIRATION RATE: 18 BRPM | HEART RATE: 70 BPM | SYSTOLIC BLOOD PRESSURE: 120 MMHG

## 2024-04-01 DIAGNOSIS — I10 BENIGN ESSENTIAL HTN: ICD-10-CM

## 2024-04-01 PROCEDURE — G0151 HHCP-SERV OF PT,EA 15 MIN: HCPCS

## 2024-04-01 PROCEDURE — G0299 HHS/HOSPICE OF RN EA 15 MIN: HCPCS

## 2024-04-01 RX ORDER — HYDROCHLOROTHIAZIDE 25 MG/1
25 TABLET ORAL DAILY
Qty: 90 TABLET | Refills: 2 | Status: SHIPPED | OUTPATIENT
Start: 2024-04-01 | End: 2024-12-27

## 2024-04-01 ASSESSMENT — ACTIVITIES OF DAILY LIVING (ADL)
AMBULATION ASSISTANCE: STAND BY ASSIST
PHYSICAL TRANSFERS ASSESSED: 1
BATHING_COMMENTS: SEE OT NOTES
FEEDING_COMMENTS: SEE OT NOTES
GROOMING_COMMENTS: SEE OT NOTES
PHYSICAL_TRANSFERS_DEVICES: USE OF B UES TO ASSIST
AMBULATION ASSISTANCE: 1
CURRENT_FUNCTION: STAND BY ASSIST

## 2024-04-01 ASSESSMENT — ENCOUNTER SYMPTOMS
PAIN LOCATION - PAIN DURATION: DAILY
PERSON REPORTING PAIN: PATIENT
PAIN: 1
PAIN LOCATION - RELIEVING FACTORS: PAIN MEDS, REST, REPOSITIONING
PAIN LOCATION - EXACERBATING FACTORS: SITTING TOO MUCH
PAIN LOCATION: PERINEUM
PAIN SEVERITY GOAL: 0/10
PERSON REPORTING PAIN: PATIENT
SUBJECTIVE PAIN PROGRESSION: WAXING AND WANING
PAIN LOCATION - PAIN SEVERITY: 3/10
PAIN LOCATION - PAIN FREQUENCY: FREQUENT
PAIN LOCATION - PAIN SEVERITY: 3/10
PAIN LOCATION - EXACERBATING FACTORS: PRESSURE
HIGHEST PAIN SEVERITY IN PAST 24 HOURS: 5/10
LOWEST PAIN SEVERITY IN PAST 24 HOURS: 0/10
PAIN LOCATION - PAIN FREQUENCY: INTERMITTENT
PAIN LOCATION - PAIN DURATION: MONTHS
PAIN LOCATION - PAIN QUALITY: ACHING
PAIN: 1
PAIN LOCATION - PAIN QUALITY: ACHING

## 2024-04-01 ASSESSMENT — PATIENT HEALTH QUESTIONNAIRE - PHQ9: CLINICAL INTERPRETATION OF PHQ2 SCORE: 0

## 2024-04-01 NOTE — TELEPHONE ENCOUNTER
Received request via: Pharmacy    Was the patient seen in the last year in this department? Yes    Does the patient have an active prescription (recently filled or refills available) for medication(s) requested? No    Pharmacy Name: Pharmacy:   [id number] - [place], [place] - [address] [facility] [address]     Does the patient have correction Plus and need 100 day supply (blood pressure, diabetes and cholesterol meds only)? Yes, quantity updated to 100 days

## 2024-04-02 ASSESSMENT — ENCOUNTER SYMPTOMS
FATIGUES EASILY: 1
HIGHEST PAIN SEVERITY IN PAST 24 HOURS: 0/10
PAIN: 1
LOWEST PAIN SEVERITY IN PAST 24 HOURS: 0/10
PERSON REPORTING PAIN: PATIENT
FATIGUE: 1
NAUSEA: DENIES
VOMITING: DENIES
STOOL FREQUENCY: DAILY
PAIN SEVERITY GOAL: 0/10
LAST BOWEL MOVEMENT: 66932
BOWEL PATTERN NORMAL: 1
MUSCLE WEAKNESS: 1

## 2024-04-02 ASSESSMENT — PATIENT HEALTH QUESTIONNAIRE - PHQ9: CLINICAL INTERPRETATION OF PHQ2 SCORE: 0

## 2024-04-03 ENCOUNTER — HOME CARE VISIT (OUTPATIENT)
Dept: HOME HEALTH SERVICES | Facility: HOME HEALTHCARE | Age: 69
End: 2024-04-03
Payer: MEDICARE

## 2024-04-03 VITALS
RESPIRATION RATE: 18 BRPM | OXYGEN SATURATION: 96 % | TEMPERATURE: 97.6 F | HEART RATE: 74 BPM | DIASTOLIC BLOOD PRESSURE: 70 MMHG | SYSTOLIC BLOOD PRESSURE: 130 MMHG

## 2024-04-03 PROCEDURE — G0299 HHS/HOSPICE OF RN EA 15 MIN: HCPCS

## 2024-04-03 SDOH — HEALTH STABILITY: PHYSICAL HEALTH: EXERCISE TYPE: HOME EXERCISE PROGRAM

## 2024-04-03 ASSESSMENT — ENCOUNTER SYMPTOMS
VOMITING: DENIES
PAIN LOCATION - RELIEVING FACTORS: REPOSITIONING
MUSCLE WEAKNESS: 1
SUBJECTIVE PAIN PROGRESSION: UNCHANGED
FATIGUES EASILY: 1
NAUSEA: DENIES
PAIN SEVERITY GOAL: 0/10
PERSON REPORTING PAIN: PATIENT
LAST BOWEL MOVEMENT: 66933
PAIN LOCATION: PERINEUM
PAIN LOCATION - PAIN FREQUENCY: FREQUENT
LOWEST PAIN SEVERITY IN PAST 24 HOURS: 2/10
PAIN LOCATION - PAIN SEVERITY: 2/10
PAIN: 1
ASSOCIATED SYMPTOMS: DENIES
PAIN LOCATION - PAIN QUALITY: DULL, ACHY
HIGHEST PAIN SEVERITY IN PAST 24 HOURS: 3/10
BOWEL PATTERN NORMAL: 1
PAIN LOCATION - EXACERBATING FACTORS: PROLONGED SITTING

## 2024-04-03 ASSESSMENT — PATIENT HEALTH QUESTIONNAIRE - PHQ9: CLINICAL INTERPRETATION OF PHQ2 SCORE: 0

## 2024-04-04 ENCOUNTER — HOME CARE VISIT (OUTPATIENT)
Dept: HOME HEALTH SERVICES | Facility: HOME HEALTHCARE | Age: 69
End: 2024-04-04
Payer: MEDICARE

## 2024-04-04 PROCEDURE — G0152 HHCP-SERV OF OT,EA 15 MIN: HCPCS

## 2024-04-05 ENCOUNTER — HOME CARE VISIT (OUTPATIENT)
Dept: HOME HEALTH SERVICES | Facility: HOME HEALTHCARE | Age: 69
End: 2024-04-05
Payer: MEDICARE

## 2024-04-05 VITALS
HEART RATE: 71 BPM | RESPIRATION RATE: 18 BRPM | TEMPERATURE: 97.4 F | SYSTOLIC BLOOD PRESSURE: 122 MMHG | OXYGEN SATURATION: 96 % | DIASTOLIC BLOOD PRESSURE: 64 MMHG

## 2024-04-05 ASSESSMENT — ENCOUNTER SYMPTOMS
PAIN LOCATION - PAIN DURATION: MONTHS
PAIN LOCATION - EXACERBATING FACTORS: SEATING
PAIN LOCATION - PAIN FREQUENCY: FREQUENT
PERSON REPORTING PAIN: PATIENT
PAIN LOCATION - PAIN SEVERITY: 3/10
PAIN LOCATION - PAIN QUALITY: ACHING
PAIN: 1
PAIN LOCATION - RELIEVING FACTORS: PRESSURE RELIEF

## 2024-04-08 ENCOUNTER — HOME CARE VISIT (OUTPATIENT)
Dept: HOME HEALTH SERVICES | Facility: HOME HEALTHCARE | Age: 69
End: 2024-04-08
Payer: MEDICARE

## 2024-04-08 VITALS
SYSTOLIC BLOOD PRESSURE: 140 MMHG | OXYGEN SATURATION: 97 % | HEART RATE: 76 BPM | TEMPERATURE: 97.5 F | DIASTOLIC BLOOD PRESSURE: 76 MMHG | RESPIRATION RATE: 18 BRPM

## 2024-04-08 VITALS
OXYGEN SATURATION: 98 % | TEMPERATURE: 97.6 F | SYSTOLIC BLOOD PRESSURE: 138 MMHG | HEART RATE: 74 BPM | DIASTOLIC BLOOD PRESSURE: 70 MMHG | RESPIRATION RATE: 18 BRPM

## 2024-04-08 PROCEDURE — G0299 HHS/HOSPICE OF RN EA 15 MIN: HCPCS

## 2024-04-08 PROCEDURE — G0151 HHCP-SERV OF PT,EA 15 MIN: HCPCS

## 2024-04-08 ASSESSMENT — ENCOUNTER SYMPTOMS
PAIN LOCATION: PERINEUM
PAIN SEVERITY GOAL: 0/10
SUBJECTIVE PAIN PROGRESSION: WAXING AND WANING
HIGHEST PAIN SEVERITY IN PAST 24 HOURS: 4/10
PERSON REPORTING PAIN: PATIENT
PAIN LOCATION - PAIN FREQUENCY: INTERMITTENT
PAIN LOCATION - PAIN SEVERITY: 2/10
PAIN LOCATION - RELIEVING FACTORS: PAIN MEDS, REST, REPOSITIONING
PAIN LOCATION - PAIN DURATION: DAILY
PAIN LOCATION - PAIN QUALITY: ACHING
PAIN: 1
LOWEST PAIN SEVERITY IN PAST 24 HOURS: 0/10

## 2024-04-09 ASSESSMENT — ENCOUNTER SYMPTOMS
LOWEST PAIN SEVERITY IN PAST 24 HOURS: 0/10
NAUSEA: DENIES
BOWEL PATTERN NORMAL: 1
LAST BOWEL MOVEMENT: 66939
LOWER EXTREMITY EDEMA: 1
MUSCLE WEAKNESS: 1
HIGHEST PAIN SEVERITY IN PAST 24 HOURS: 0/10
PAIN: 1
FATIGUES EASILY: 1
PAIN SEVERITY GOAL: 0/10
VOMITING: DENIES
STOOL FREQUENCY: DAILY

## 2024-04-09 ASSESSMENT — PATIENT HEALTH QUESTIONNAIRE - PHQ9: CLINICAL INTERPRETATION OF PHQ2 SCORE: 0

## 2024-04-10 ENCOUNTER — HOME CARE VISIT (OUTPATIENT)
Dept: HOME HEALTH SERVICES | Facility: HOME HEALTHCARE | Age: 69
End: 2024-04-10
Payer: MEDICARE

## 2024-04-10 VITALS
DIASTOLIC BLOOD PRESSURE: 60 MMHG | HEART RATE: 76 BPM | OXYGEN SATURATION: 96 % | RESPIRATION RATE: 18 BRPM | SYSTOLIC BLOOD PRESSURE: 128 MMHG | TEMPERATURE: 97.5 F

## 2024-04-10 PROCEDURE — G0299 HHS/HOSPICE OF RN EA 15 MIN: HCPCS

## 2024-04-10 ASSESSMENT — ENCOUNTER SYMPTOMS
HIGHEST PAIN SEVERITY IN PAST 24 HOURS: 0/10
LOWEST PAIN SEVERITY IN PAST 24 HOURS: 0/10
VOMITING: DENIES
MUSCLE WEAKNESS: 1
STOOL FREQUENCY: DAILY
BOWEL PATTERN NORMAL: 1
PERSON REPORTING PAIN: PATIENT
PAIN SEVERITY GOAL: 0/10
NAUSEA: DENIES
LAST BOWEL MOVEMENT: 66940
PAIN: 1

## 2024-04-10 ASSESSMENT — PATIENT HEALTH QUESTIONNAIRE - PHQ9: CLINICAL INTERPRETATION OF PHQ2 SCORE: 0

## 2024-04-11 ENCOUNTER — HOME CARE VISIT (OUTPATIENT)
Dept: HOME HEALTH SERVICES | Facility: HOME HEALTHCARE | Age: 69
End: 2024-04-11
Payer: MEDICARE

## 2024-04-11 PROCEDURE — G0152 HHCP-SERV OF OT,EA 15 MIN: HCPCS

## 2024-04-12 ENCOUNTER — HOME CARE VISIT (OUTPATIENT)
Dept: HOME HEALTH SERVICES | Facility: HOME HEALTHCARE | Age: 69
End: 2024-04-12
Payer: MEDICARE

## 2024-04-12 VITALS
HEART RATE: 76 BPM | TEMPERATURE: 97.6 F | RESPIRATION RATE: 19 BRPM | DIASTOLIC BLOOD PRESSURE: 62 MMHG | SYSTOLIC BLOOD PRESSURE: 128 MMHG | OXYGEN SATURATION: 96 %

## 2024-04-12 VITALS
HEART RATE: 68 BPM | TEMPERATURE: 97.9 F | SYSTOLIC BLOOD PRESSURE: 122 MMHG | DIASTOLIC BLOOD PRESSURE: 72 MMHG | OXYGEN SATURATION: 94 % | RESPIRATION RATE: 18 BRPM

## 2024-04-12 PROCEDURE — G0299 HHS/HOSPICE OF RN EA 15 MIN: HCPCS

## 2024-04-12 SDOH — HEALTH STABILITY: PHYSICAL HEALTH: EXERCISE TYPE: WALKING

## 2024-04-12 ASSESSMENT — ENCOUNTER SYMPTOMS
PAIN: 1
PAIN LOCATION - RELIEVING FACTORS: REST
DEPRESSED MOOD: 1
NAUSEA: DENIES
LAST BOWEL MOVEMENT: 66941
BOWEL PATTERN COMMENTS: INTERMITTENT DIARRHEA
PERSON REPORTING PAIN: PATIENT
PAIN LOCATION - EXACERBATING FACTORS: USING HANDS
HIGHEST PAIN SEVERITY IN PAST 24 HOURS: 4/10
LOWEST PAIN SEVERITY IN PAST 24 HOURS: 2/10
ENDOCRINE COMMENTS: DENIES
PAIN LOCATION - PAIN DURATION: FEW DAYS
VOMITING: DENIES
ARTHRALGIAS: 1
PAIN LOCATION - PAIN SEVERITY: 4/10
DIARRHEA: 1
PAIN LOCATION - PAIN QUALITY: BURNING, ACHING
PAIN LOCATION - PAIN FREQUENCY: FREQUENT
LIMITED RANGE OF MOTION: 1
DYSPNEA ON EXERTION: 1
PAIN LOCATION - EXACERBATING FACTORS: MOVEMENT
ASSOCIATED SYMPTOMS: DENIES
PAIN: 1
FORGETFULNESS: 1
RHINORRHEA: 1
SUBJECTIVE PAIN PROGRESSION: WAXING AND WANING
STOOL FREQUENCY: DAILY
PAIN LOCATION - PAIN FREQUENCY: INTERMITTENT
PAIN LOCATION: LEFT HAND
PAIN LOCATION - PAIN SEVERITY: 4/10
PAIN LOCATION - RELIEVING FACTORS: REST
LOWER EXTREMITY EDEMA: 1
PAIN LOCATION - PAIN QUALITY: ACHING

## 2024-04-12 ASSESSMENT — PATIENT HEALTH QUESTIONNAIRE - PHQ9: CLINICAL INTERPRETATION OF PHQ2 SCORE: 0

## 2024-04-12 ASSESSMENT — ACTIVITIES OF DAILY LIVING (ADL): AMBULATION ASSISTANCE: STAND BY ASSIST

## 2024-04-13 ASSESSMENT — ENCOUNTER SYMPTOMS: PAIN SEVERITY GOAL: 0/10

## 2024-04-15 ENCOUNTER — HOME CARE VISIT (OUTPATIENT)
Dept: HOME HEALTH SERVICES | Facility: HOME HEALTHCARE | Age: 69
End: 2024-04-15
Payer: MEDICARE

## 2024-04-15 ENCOUNTER — PATIENT OUTREACH (OUTPATIENT)
Dept: HEALTH INFORMATION MANAGEMENT | Facility: OTHER | Age: 69
End: 2024-04-15
Payer: MEDICARE

## 2024-04-15 VITALS
OXYGEN SATURATION: 97 % | HEART RATE: 89 BPM | DIASTOLIC BLOOD PRESSURE: 64 MMHG | RESPIRATION RATE: 18 BRPM | SYSTOLIC BLOOD PRESSURE: 122 MMHG | TEMPERATURE: 97.6 F

## 2024-04-15 VITALS
TEMPERATURE: 97.6 F | DIASTOLIC BLOOD PRESSURE: 64 MMHG | OXYGEN SATURATION: 97 % | HEART RATE: 69 BPM | RESPIRATION RATE: 18 BRPM | SYSTOLIC BLOOD PRESSURE: 122 MMHG

## 2024-04-15 DIAGNOSIS — E78.2 MIXED HYPERLIPIDEMIA: ICD-10-CM

## 2024-04-15 DIAGNOSIS — E11.51 DM (DIABETES MELLITUS) TYPE II, CONTROLLED, WITH PERIPHERAL VASCULAR DISORDER (HCC): ICD-10-CM

## 2024-04-15 DIAGNOSIS — N18.31 STAGE 3A CHRONIC KIDNEY DISEASE: ICD-10-CM

## 2024-04-15 DIAGNOSIS — I10 BENIGN ESSENTIAL HTN: ICD-10-CM

## 2024-04-15 PROCEDURE — G0299 HHS/HOSPICE OF RN EA 15 MIN: HCPCS

## 2024-04-15 PROCEDURE — G0151 HHCP-SERV OF PT,EA 15 MIN: HCPCS

## 2024-04-15 RX ORDER — GABAPENTIN 400 MG/1
400 CAPSULE ORAL 4 TIMES DAILY
Qty: 90 CAPSULE | Refills: 2 | Status: SHIPPED | OUTPATIENT
Start: 2024-04-15

## 2024-04-15 ASSESSMENT — ENCOUNTER SYMPTOMS
PAIN LOCATION - PAIN QUALITY: DULL , ACHY
PAIN LOCATION: LEFT HAND
PAIN: 1
LOWEST PAIN SEVERITY IN PAST 24 HOURS: 2/10
NAUSEA: DENIES
PAIN LOCATION - PAIN FREQUENCY: FREQUENT
HIGHEST PAIN SEVERITY IN PAST 24 HOURS: 6/10
PAIN SEVERITY GOAL: 0/10
PAIN LOCATION - EXACERBATING FACTORS: USING L HAND
PAIN LOCATION - PAIN SEVERITY: 3/10
PAIN LOCATION - RELIEVING FACTORS: PAIN MEDICATION
ASSOCIATED SYMPTOMS: DENIES
PERSON REPORTING PAIN: PATIENT
PAIN LOCATION - PAIN QUALITY: ACHING
LOWEST PAIN SEVERITY IN PAST 24 HOURS: 3/10
BOWEL PATTERN NORMAL: 1
SUBJECTIVE PAIN PROGRESSION: UNCHANGED
PAIN SEVERITY GOAL: 1/10
HIGHEST PAIN SEVERITY IN PAST 24 HOURS: 5/10
PAIN LOCATION - RELIEVING FACTORS: REST, PAIN MEDS
PAIN LOCATION - PAIN FREQUENCY: INTERMITTENT
VOMITING: DENIES
PERSON REPORTING PAIN: PATIENT
SUBJECTIVE PAIN PROGRESSION: WAXING AND WANING
PAIN LOCATION - PAIN DURATION: DAILY
PAIN LOCATION - PAIN SEVERITY: 3/10
PAIN: 1
PAIN LOCATION: LEFT HAND
LAST BOWEL MOVEMENT: 66945
STOOL FREQUENCY: DAILY
FATIGUES EASILY: 1

## 2024-04-15 ASSESSMENT — PATIENT HEALTH QUESTIONNAIRE - PHQ9: CLINICAL INTERPRETATION OF PHQ2 SCORE: 0

## 2024-04-17 ENCOUNTER — HOME CARE VISIT (OUTPATIENT)
Dept: HOME HEALTH SERVICES | Facility: HOME HEALTHCARE | Age: 69
End: 2024-04-17
Payer: MEDICARE

## 2024-04-17 VITALS
TEMPERATURE: 97.8 F | OXYGEN SATURATION: 97 % | DIASTOLIC BLOOD PRESSURE: 60 MMHG | SYSTOLIC BLOOD PRESSURE: 130 MMHG | RESPIRATION RATE: 18 BRPM | HEART RATE: 68 BPM

## 2024-04-17 PROCEDURE — G0299 HHS/HOSPICE OF RN EA 15 MIN: HCPCS

## 2024-04-17 SDOH — HEALTH STABILITY: PHYSICAL HEALTH: EXERCISE TYPE: HOME EXERCISE PROGRAM

## 2024-04-17 ASSESSMENT — ENCOUNTER SYMPTOMS
NAUSEA: DENIES
HIGHEST PAIN SEVERITY IN PAST 24 HOURS: 0/10
LOWER EXTREMITY EDEMA: 1
DIARRHEA: 1
LOWEST PAIN SEVERITY IN PAST 24 HOURS: 0/10
PAIN: 1
PERSON REPORTING PAIN: PATIENT
ABDOMINAL PAIN: 1
VOMITING: DENIES
MUSCLE WEAKNESS: 1
STOOL FREQUENCY: TWICE DAILY
LAST BOWEL MOVEMENT: 66947
PAIN SEVERITY GOAL: 0/10

## 2024-04-17 ASSESSMENT — PATIENT HEALTH QUESTIONNAIRE - PHQ9: CLINICAL INTERPRETATION OF PHQ2 SCORE: 0

## 2024-04-19 ENCOUNTER — HOME CARE VISIT (OUTPATIENT)
Dept: HOME HEALTH SERVICES | Facility: HOME HEALTHCARE | Age: 69
End: 2024-04-19
Payer: MEDICARE

## 2024-04-19 VITALS
HEART RATE: 64 BPM | SYSTOLIC BLOOD PRESSURE: 128 MMHG | RESPIRATION RATE: 18 BRPM | TEMPERATURE: 97.4 F | DIASTOLIC BLOOD PRESSURE: 60 MMHG | OXYGEN SATURATION: 97 %

## 2024-04-19 PROCEDURE — G0152 HHCP-SERV OF OT,EA 15 MIN: HCPCS

## 2024-04-19 PROCEDURE — G0299 HHS/HOSPICE OF RN EA 15 MIN: HCPCS

## 2024-04-19 ASSESSMENT — ENCOUNTER SYMPTOMS
VOMITING: DENIES
LOWER EXTREMITY EDEMA: 1
STOOL FREQUENCY: DAILY
PAIN: 1
BOWEL PATTERN NORMAL: 1
NAUSEA: DENIES
LOWEST PAIN SEVERITY IN PAST 24 HOURS: 0/10
HIGHEST PAIN SEVERITY IN PAST 24 HOURS: 0/10
PAIN SEVERITY GOAL: 0/10
LAST BOWEL MOVEMENT: 66949
MUSCLE WEAKNESS: 1

## 2024-04-19 ASSESSMENT — PATIENT HEALTH QUESTIONNAIRE - PHQ9: CLINICAL INTERPRETATION OF PHQ2 SCORE: 0

## 2024-04-21 RX ORDER — ATORVASTATIN CALCIUM 20 MG/1
20 TABLET, FILM COATED ORAL EVERY EVENING
Qty: 100 TABLET | Refills: 2 | OUTPATIENT
Start: 2024-04-21

## 2024-04-21 ASSESSMENT — ENCOUNTER SYMPTOMS
PERSON REPORTING PAIN: PATIENT
DENIES PAIN: 1

## 2024-04-22 ENCOUNTER — HOME CARE VISIT (OUTPATIENT)
Dept: HOME HEALTH SERVICES | Facility: HOME HEALTHCARE | Age: 69
End: 2024-04-22
Payer: MEDICARE

## 2024-04-22 VITALS
OXYGEN SATURATION: 98 % | TEMPERATURE: 97.6 F | RESPIRATION RATE: 18 BRPM | SYSTOLIC BLOOD PRESSURE: 128 MMHG | DIASTOLIC BLOOD PRESSURE: 60 MMHG | HEART RATE: 76 BPM

## 2024-04-22 VITALS
TEMPERATURE: 97.5 F | OXYGEN SATURATION: 95 % | SYSTOLIC BLOOD PRESSURE: 132 MMHG | HEART RATE: 67 BPM | DIASTOLIC BLOOD PRESSURE: 64 MMHG | RESPIRATION RATE: 18 BRPM

## 2024-04-22 PROCEDURE — G0299 HHS/HOSPICE OF RN EA 15 MIN: HCPCS

## 2024-04-22 PROCEDURE — G0151 HHCP-SERV OF PT,EA 15 MIN: HCPCS

## 2024-04-22 ASSESSMENT — ENCOUNTER SYMPTOMS
ASSOCIATED SYMPTOMS: DENIES
HIGHEST PAIN SEVERITY IN PAST 24 HOURS: 0/10
VOMITING: DENIES
BOWEL PATTERN NORMAL: 1
DENIES PAIN: 1
PAIN: 1
PERSON REPORTING PAIN: PATIENT
PAIN LOCATION - EXACERBATING FACTORS: PROLONGED SITTING
PAIN LOCATION - PAIN QUALITY: DULL, ACHY
PAIN LOCATION - RELIEVING FACTORS: REPOSITIONING
LAST BOWEL MOVEMENT: 66952
PAIN SEVERITY GOAL: 0/10
STOOL FREQUENCY: DAILY
NAUSEA: DENIES
PERSON REPORTING PAIN: PATIENT
MUSCLE WEAKNESS: 1
HIGHEST PAIN SEVERITY IN PAST 24 HOURS: 5/10
SUBJECTIVE PAIN PROGRESSION: UNCHANGED
PAIN LOCATION: PERINEUM
LOWEST PAIN SEVERITY IN PAST 24 HOURS: 0/10
PAIN LOCATION - PAIN SEVERITY: 3/10
PAIN SEVERITY GOAL: 0/10
PAIN LOCATION - PAIN FREQUENCY: FREQUENT
FATIGUES EASILY: 1
LOWEST PAIN SEVERITY IN PAST 24 HOURS: 3/10

## 2024-04-22 ASSESSMENT — PATIENT HEALTH QUESTIONNAIRE - PHQ9: CLINICAL INTERPRETATION OF PHQ2 SCORE: 0

## 2024-04-24 ENCOUNTER — HOME CARE VISIT (OUTPATIENT)
Dept: HOME HEALTH SERVICES | Facility: HOME HEALTHCARE | Age: 69
End: 2024-04-24
Payer: MEDICARE

## 2024-04-24 VITALS
RESPIRATION RATE: 18 BRPM | HEART RATE: 69 BPM | TEMPERATURE: 97.6 F | SYSTOLIC BLOOD PRESSURE: 142 MMHG | OXYGEN SATURATION: 94 % | DIASTOLIC BLOOD PRESSURE: 84 MMHG

## 2024-04-24 DIAGNOSIS — I10 BENIGN ESSENTIAL HTN: ICD-10-CM

## 2024-04-24 PROCEDURE — G0299 HHS/HOSPICE OF RN EA 15 MIN: HCPCS

## 2024-04-24 ASSESSMENT — ENCOUNTER SYMPTOMS
PERSON REPORTING PAIN: PATIENT
HIGHEST PAIN SEVERITY IN PAST 24 HOURS: 0/10
PAIN SEVERITY GOAL: 0/10
STOOL FREQUENCY: DAILY
FATIGUES EASILY: 1
LOWEST PAIN SEVERITY IN PAST 24 HOURS: 0/10
PAIN: 1
LAST BOWEL MOVEMENT: 66954
BOWEL PATTERN NORMAL: 1

## 2024-04-24 NOTE — TELEPHONE ENCOUNTER
Received request via: Patient    Was the patient seen in the last year in this department? Yes    Does the patient have an active prescription (recently filled or refills available) for medication(s) requested? No    [facility]    Does the patient have [facility] Plus and need 100 day supply (blood pressure, diabetes and cholesterol meds only)? Yes, quantity updated to 100 days

## 2024-04-25 RX ORDER — CARVEDILOL 6.25 MG/1
6.25 TABLET ORAL 2 TIMES DAILY WITH MEALS
Qty: 200 TABLET | Refills: 2 | Status: SHIPPED | OUTPATIENT
Start: 2024-04-25

## 2024-04-25 SDOH — HEALTH STABILITY: PHYSICAL HEALTH: EXERCISE TYPE: HOME EXERCISE PROGRAM

## 2024-04-25 ASSESSMENT — PATIENT HEALTH QUESTIONNAIRE - PHQ9: CLINICAL INTERPRETATION OF PHQ2 SCORE: 0

## 2024-04-25 ASSESSMENT — ENCOUNTER SYMPTOMS
MUSCLE WEAKNESS: 1
NAUSEA: DENIES
VOMITING: DENIES

## 2024-04-26 ENCOUNTER — HOME CARE VISIT (OUTPATIENT)
Dept: HOME HEALTH SERVICES | Facility: HOME HEALTHCARE | Age: 69
End: 2024-04-26
Payer: MEDICARE

## 2024-04-26 VITALS
TEMPERATURE: 97.4 F | OXYGEN SATURATION: 93 % | SYSTOLIC BLOOD PRESSURE: 136 MMHG | HEART RATE: 66 BPM | DIASTOLIC BLOOD PRESSURE: 60 MMHG | RESPIRATION RATE: 18 BRPM

## 2024-04-26 DIAGNOSIS — E11.51 DM (DIABETES MELLITUS) TYPE II, CONTROLLED, WITH PERIPHERAL VASCULAR DISORDER (HCC): ICD-10-CM

## 2024-04-26 PROCEDURE — G0152 HHCP-SERV OF OT,EA 15 MIN: HCPCS

## 2024-04-26 PROCEDURE — G0299 HHS/HOSPICE OF RN EA 15 MIN: HCPCS

## 2024-04-26 ASSESSMENT — ENCOUNTER SYMPTOMS
PAIN LOCATION - PAIN SEVERITY: 2/10
PAIN SEVERITY GOAL: 0/10
HIGHEST PAIN SEVERITY IN PAST 24 HOURS: 4/10
PAIN LOCATION - PAIN FREQUENCY: CONSTANT
PERSON REPORTING PAIN: PATIENT
PAIN LOCATION - RELIEVING FACTORS: PAIN MEDICATION
LOWEST PAIN SEVERITY IN PAST 24 HOURS: 2/10
PAIN LOCATION: PERINEUM
PAIN: 1

## 2024-04-26 NOTE — TELEPHONE ENCOUNTER
Received request via: Pharmacy    Was the patient seen in the last year in this department? Yes    Does the patient have an active prescription (recently filled or refills available) for medication(s) requested? No    Pharmacy Name: Pharmacy:   [id number] - [place], [place] - [address] [facility] [address]     Does the patient have care home Plus and need 100 day supply (blood pressure, diabetes and cholesterol meds only)? Yes, quantity updated to 100 days

## 2024-04-27 ASSESSMENT — ENCOUNTER SYMPTOMS
VOMITING: DENIES
LAST BOWEL MOVEMENT: 66956
STOOL FREQUENCY: DAILY
NAUSEA: DENIES
BOWEL PATTERN NORMAL: 1

## 2024-04-27 ASSESSMENT — PATIENT HEALTH QUESTIONNAIRE - PHQ9: CLINICAL INTERPRETATION OF PHQ2 SCORE: 0

## 2024-04-28 ASSESSMENT — ENCOUNTER SYMPTOMS
PERSON REPORTING PAIN: PATIENT
DENIES PAIN: 1

## 2024-04-29 ENCOUNTER — HOME CARE VISIT (OUTPATIENT)
Dept: HOME HEALTH SERVICES | Facility: HOME HEALTHCARE | Age: 69
End: 2024-04-29
Payer: MEDICARE

## 2024-04-29 VITALS
SYSTOLIC BLOOD PRESSURE: 130 MMHG | HEART RATE: 64 BPM | DIASTOLIC BLOOD PRESSURE: 70 MMHG | OXYGEN SATURATION: 98 % | TEMPERATURE: 97.9 F | RESPIRATION RATE: 18 BRPM

## 2024-04-29 VITALS
OXYGEN SATURATION: 98 % | RESPIRATION RATE: 18 BRPM | HEART RATE: 64 BPM | DIASTOLIC BLOOD PRESSURE: 70 MMHG | SYSTOLIC BLOOD PRESSURE: 132 MMHG | TEMPERATURE: 97.9 F

## 2024-04-29 PROCEDURE — G0151 HHCP-SERV OF PT,EA 15 MIN: HCPCS

## 2024-04-29 PROCEDURE — G0299 HHS/HOSPICE OF RN EA 15 MIN: HCPCS

## 2024-04-29 ASSESSMENT — ENCOUNTER SYMPTOMS
PERSON REPORTING PAIN: PATIENT
NAUSEA: DENIES
PAIN SEVERITY GOAL: 0/10
VOMITING: DENIES
PAIN: 1
HIGHEST PAIN SEVERITY IN PAST 24 HOURS: 0/10
STOOL FREQUENCY: DAILY
FATIGUES EASILY: 1
PAIN SEVERITY GOAL: 0/10
MUSCLE WEAKNESS: 1
PERSON REPORTING PAIN: PATIENT
LOWEST PAIN SEVERITY IN PAST 24 HOURS: 0/10
LOWEST PAIN SEVERITY IN PAST 24 HOURS: 0/10
HIGHEST PAIN SEVERITY IN PAST 24 HOURS: 0/10
BOWEL PATTERN NORMAL: 1
DENIES PAIN: 1
LAST BOWEL MOVEMENT: 66959

## 2024-04-29 ASSESSMENT — ACTIVITIES OF DAILY LIVING (ADL)
AMBULATION ASSISTANCE: STAND BY ASSIST
PHYSICAL_TRANSFERS_DEVICES: USE OF B UES TO ASSIST
GROOMING_COMMENTS: SEE OT NOTES
CURRENT_FUNCTION: SUPERVISION
PHYSICAL TRANSFERS ASSESSED: 1
FEEDING_COMMENTS: SEE OT NOTES
AMBULATION ASSISTANCE: 1
BATHING_COMMENTS: SEE OT NOTES

## 2024-04-29 ASSESSMENT — PATIENT HEALTH QUESTIONNAIRE - PHQ9: CLINICAL INTERPRETATION OF PHQ2 SCORE: 0

## 2024-05-01 ENCOUNTER — HOME CARE VISIT (OUTPATIENT)
Dept: HOME HEALTH SERVICES | Facility: HOME HEALTHCARE | Age: 69
End: 2024-05-01
Payer: MEDICARE

## 2024-05-01 VITALS
SYSTOLIC BLOOD PRESSURE: 140 MMHG | TEMPERATURE: 97.8 F | RESPIRATION RATE: 18 BRPM | HEART RATE: 65 BPM | DIASTOLIC BLOOD PRESSURE: 64 MMHG | OXYGEN SATURATION: 95 %

## 2024-05-01 ASSESSMENT — ENCOUNTER SYMPTOMS
PAIN SEVERITY GOAL: 0/10
MUSCLE WEAKNESS: 1
VOMITING: DENIES
FATIGUES EASILY: 1
LOWEST PAIN SEVERITY IN PAST 24 HOURS: 0/10
PERSON REPORTING PAIN: PATIENT
STOOL FREQUENCY: DAILY
BOWEL PATTERN NORMAL: 1
LAST BOWEL MOVEMENT: 66961
PAIN: 1
NAUSEA: DENIES
HIGHEST PAIN SEVERITY IN PAST 24 HOURS: 0/10

## 2024-05-01 ASSESSMENT — PATIENT HEALTH QUESTIONNAIRE - PHQ9: CLINICAL INTERPRETATION OF PHQ2 SCORE: 0

## 2024-05-03 ENCOUNTER — HOME CARE VISIT (OUTPATIENT)
Dept: HOME HEALTH SERVICES | Facility: HOME HEALTHCARE | Age: 69
End: 2024-05-03
Payer: MEDICARE

## 2024-05-03 VITALS
TEMPERATURE: 97.4 F | RESPIRATION RATE: 18 BRPM | HEART RATE: 62 BPM | DIASTOLIC BLOOD PRESSURE: 72 MMHG | OXYGEN SATURATION: 94 % | SYSTOLIC BLOOD PRESSURE: 150 MMHG

## 2024-05-03 ASSESSMENT — ENCOUNTER SYMPTOMS
PERSON REPORTING PAIN: PATIENT
DENIES PAIN: 1

## 2024-05-04 VITALS
RESPIRATION RATE: 18 BRPM | DIASTOLIC BLOOD PRESSURE: 72 MMHG | TEMPERATURE: 97.4 F | SYSTOLIC BLOOD PRESSURE: 142 MMHG | HEART RATE: 62 BPM | OXYGEN SATURATION: 95 %

## 2024-05-04 DIAGNOSIS — I48.91 ATRIAL FIBRILLATION, UNSPECIFIED TYPE (HCC): ICD-10-CM

## 2024-05-04 ASSESSMENT — ENCOUNTER SYMPTOMS
FATIGUE: 1
LOWER EXTREMITY EDEMA: 1
STOOL FREQUENCY: LESS THAN DAILY
LIMITED RANGE OF MOTION: 1
DRY SKIN: 1
PERSON REPORTING PAIN: PATIENT
SKIN CHANGES: 1
LAST BOWEL MOVEMENT: 66963
BOWEL PATTERN NORMAL: 1
FATIGUES EASILY: 1
DENIES PAIN: 1
MUSCLE WEAKNESS: 1

## 2024-05-04 ASSESSMENT — PATIENT HEALTH QUESTIONNAIRE - PHQ9: CLINICAL INTERPRETATION OF PHQ2 SCORE: 0

## 2024-05-06 ENCOUNTER — DOCUMENTATION (OUTPATIENT)
Dept: MEDICAL GROUP | Facility: PHYSICIAN GROUP | Age: 69
End: 2024-05-06
Payer: MEDICARE

## 2024-05-06 ENCOUNTER — HOME CARE VISIT (OUTPATIENT)
Dept: HOME HEALTH SERVICES | Facility: HOME HEALTHCARE | Age: 69
End: 2024-05-06
Payer: MEDICARE

## 2024-05-06 VITALS
RESPIRATION RATE: 18 BRPM | OXYGEN SATURATION: 94 % | TEMPERATURE: 97.4 F | HEART RATE: 72 BPM | DIASTOLIC BLOOD PRESSURE: 88 MMHG | SYSTOLIC BLOOD PRESSURE: 148 MMHG

## 2024-05-06 VITALS
SYSTOLIC BLOOD PRESSURE: 144 MMHG | OXYGEN SATURATION: 94 % | RESPIRATION RATE: 18 BRPM | HEART RATE: 72 BPM | TEMPERATURE: 97.4 F | DIASTOLIC BLOOD PRESSURE: 88 MMHG

## 2024-05-06 PROCEDURE — G0179 MD RECERTIFICATION HHA PT: HCPCS | Performed by: FAMILY MEDICINE

## 2024-05-06 ASSESSMENT — ENCOUNTER SYMPTOMS
LOSS OF SENSATION: 1
POOR WOUND HEALING: 1
DENIES PAIN: 1
PERSON REPORTING PAIN: PATIENT
HIGHEST PAIN SEVERITY IN PAST 24 HOURS: 0/10
EDEMA: 1
WEIGHT GAIN: 1
PERSON REPORTING PAIN: PATIENT
LOWEST PAIN SEVERITY IN PAST 24 HOURS: 0/10
PAIN SEVERITY GOAL: 0/10
LOWER EXTREMITY EDEMA: 1
DENIES PAIN: 1
NAUSEA: DENIES
PAIN: DENIES ANY PAIN OR DISCOMFORT AT TIME OF NURSING VISIT.
VOMITING: DENIES

## 2024-05-06 ASSESSMENT — PATIENT HEALTH QUESTIONNAIRE - PHQ9: CLINICAL INTERPRETATION OF PHQ2 SCORE: 0

## 2024-05-06 ASSESSMENT — ACTIVITIES OF DAILY LIVING (ADL): OASIS_M1830: 03

## 2024-05-06 NOTE — TELEPHONE ENCOUNTER
Received request via: Patient    Was the patient seen in the last year in this department? Yes    Does the patient have an active prescription (recently filled or refills available) for medication(s) requested? No    Pharmacy Name: [facility]    Does the patient have [facility] Plus and need 100 day supply (blood pressure, diabetes and cholesterol meds only)? Medication is not for cholesterol, blood pressure or diabetes

## 2024-05-06 NOTE — Clinical Note
ARIEL, RECERTIFICATION for continued wound care.            DX-Right heel wound, perineum/scrotal wound,HX HTN, LEFT BKA, DM            NURSING FREQUENCY: 1w1, 3w8, 3 PRN    Disciplines-SN, PT, OT            Cert period 5/9/2024-7/7/2024            Insurance SCP    SPECIAL CONSIDERATION: none

## 2024-05-06 NOTE — PROGRESS NOTES
Medication chart review for [facility] services    Received referral from [facility].   Medications reviewed  compared with discharge summary if available.  Discharge summary date, if applicable:   3/7/24    Current medication list per [facility]     Medication list one, patient is currently taking    Current Outpatient Medications:     dilTIAZem, TAKE 1 TABLET BY MOUTH EVERY 8 HOURS FOR ATRIAL FIBRILLATION    dilTIAZem, 30 mg, Oral, TID    apixaban, 5 mg, Oral, BID    atorvastatin, 20 mg, Oral, DAILY    Simethicone, 1 Capsule, Oral, QDAY PRN    metformin, TAKE 1 TABLET BY MOUTH TWICE DAILY WITH FOOD  Indications: Type 2 Diabetes    carvedilol, 6.25 mg, Oral, BID WITH MEALS    hydrocortisone, 1 Application , Topical, QDAY PRN    gabapentin, 400 mg, Oral, 4XDAY    hydroCHLOROthiazide, 25 mg, Oral, DAILY    potassium Chloride ER, TAKE 1 TABLET BY MOUTH EVERY DAY FOR HYPOKALEMIA    amiodarone, 200 mg, Oral, DAILY    allopurinol, 100 mg, Oral, DAILY    pioglitazone, 30 mg, Oral, DAILY    vitamin D, 1,000 Units, Oral, DAILY (Patient taking differently: 2,000 Units, Oral, DAILY, Indications: Nutritional supplement)    loratadine, 10 mg, Oral, DAILY    Multiple Vitamins-Minerals (MULTIVITAMIN GUMMIES ADULTS PO), 1 Dose, Oral, DAILY AT 1800    Saccharomyces boulardii (DAILY PROBIOTIC SUPPLEMENT PO), 1 Dose, Oral, DAILY    Arginaid, 1 Dose, Oral, DAILY AT 1800    Jony, 1 Dose, Oral, DAILY AT 1800    acetaminophen, 500-1,000 mg, Oral, Q6HRS PRN      Medication list two, drugs that the patient has been prescribed or recommended to take by their healthcare provider on discharge summary      Medication List          START taking these medications         Instructions   carvedilol 6.25 MG Tabs  Commonly known as: Coreg    Take 1 Tablet by mouth 2 times a day with meals for 30 days.  Dose: 6.25 mg                CHANGE how you take these medications         Instructions   amiodarone 200 MG Tabs  What changed: See the  new instructions.  Commonly known as: Cordarone    Doctor's comments: **Patient requests 90 days supply**  Take 1 Tablet by mouth every day.  Dose: 200 mg      metformin 1000 MG tablet  What changed: medication strength  Commonly known as: Glucophage    Doctor's comments: ZERO refills remain on this prescription. Your patient is requesting advance approval of refills for this medication to PREVENT ANY MISSED DOSES  TAKE 1 TABLET BY MOUTH TWICE DAILY WITH FOOD                CONTINUE taking these medications         Instructions   acetaminophen 500 MG Tabs  Commonly known as: Tylenol    Take 500-1,000 mg by mouth every 6 hours as needed for Mild Pain or Moderate Pain. Indications: Pain  Dose: 500-1,000 mg      allopurinol 100 MG Tabs  Commonly known as: Zyloprim    Take 1 Tablet by mouth every day. Indications: Gout  Dose: 100 mg      apixaban 5mg Tabs  Commonly known as: Eliquis    Take 1 tablet by mouth 2 times a day.  Dose: 5 mg      * Arginaid Pack    Take 1 Dose by mouth every day at 6 PM. Indications: supplement  Dose: 1 Dose      * Jony Powd    Take 1 Dose by mouth every day at 6 PM. Indications: supplement  Dose: 1 Dose      atorvastatin 20 MG Tabs  Commonly known as: Lipitor    Take 1 Tablet by mouth every evening for 30 days. Indications: High Amount of Fats in the Blood  Dose: 20 mg      DAILY PROBIOTIC SUPPLEMENT PO    Take 1 Dose by mouth every day. Indications: supplement  Dose: 1 Dose      dilTIAZem 30 MG Tabs  Commonly known as: Cardizem    Take 1 Tablet by mouth every 8 hours for 30 days. Indications: Atrial Fibrillation  Dose: 30 mg      gabapentin 400 MG Caps  Commonly known as: Neurontin    Take 1 capsule by mouth 3 times a day.  Dose: 400 mg      hydroCHLOROthiazide 25 MG Tabs    Take 1 tablet by mouth every day.  Dose: 25 mg      loratadine 10 MG Tabs  Commonly known as: Claritin    Take 10 mg by mouth every day. Indications: Eye Itching, Hayfever, Runny Nose, Watery Eyes  Dose: 10 mg       MULTIVITAMIN GUMMIES ADULTS PO    Take 1 Dose by mouth every day at 6 PM. Indications: supplement  Dose: 1 Dose      pioglitazone 30 MG Tabs  Commonly known as: Actos    Take 1 Tablet by mouth every day. Indications: Type 2 Diabetes  Dose: 30 mg      vitamin D 1000 UNIT Tabs  Commonly known as: Cholecalciferol    Take 1 Tablet by mouth every day. Indications: Nutritional supplement  Dose: 1,000 Units              * This list has 2 medication(s) that are the same as other medications prescribed for you. Read the directions carefully, and ask your doctor or other care provider to review them with you.                    STOP taking these medications       KLS ALLERCLEAR D-24HR PO      potassium chloride SA 20 MEQ Tbcr  Commonly known as: Kdur           Allergies   Allergen Reactions    Penicillins Unspecified and Swelling     Given in eye drops as a child, eyes swell  2003-06[birth year]09;ITCH   Create Date: [id number] Create User Name: Juan J Castellanos Update Date: [id number] Update User Name: Juan J Emanuel    Bee Venom Swelling    Cefazolin Rash, Itching and Swelling    Linezolid Unspecified     Caused unk lab value to increase.       Labs     Lab Results   Component Value Date/Time    SODIUM 142 02/27/2024 05:55 AM    POTASSIUM 3.8 02/27/2024 05:55 AM    CHLORIDE 106 02/27/2024 05:55 AM    CO2 29 02/27/2024 05:55 AM    GLUCOSE 100 (H) 02/27/2024 05:55 AM    BUN 28 (H) 02/27/2024 05:55 AM    CREATININE 1.4 (H) 02/27/2024 05:55 AM    GLOMRATE 52 (L) 09/08/2023 08:50 AM     Lab Results   Component Value Date/Time    ALKPHOSPHAT 105 02/27/2024 05:55 AM    ASTSGOT 17 02/27/2024 05:55 AM    ALTSGPT 14 02/27/2024 05:55 AM    TBILIRUBIN 0.4 02/27/2024 05:55 AM    ALBUMIN 2.7 (L) 02/27/2024 05:55 AM        Assessment for clinically significant drug interactions, drug omissions/additions, duplicative therapies.            CC   Theo James M.D.  [address] [address]  Fax: [phone]    [facility]  Phone [phone] fax [phone]    This note was created using voice recognition software (Dragon). The accuracy of the dictation is limited by the abilities of the software. I have reviewed the note prior to signing, however some errors in grammar and context are still possible. If you have any questions related to this note please do not hesitate to contact our office.

## 2024-05-06 NOTE — Clinical Note
Recertification on patient to [facility] medication reconciliation process done. Medication list left in home care folder. See MAR for drug allergy interactions. There are major drug to drug interactions.   Drug-Drug: dilTIAZem and atorvastatin   Plasma concentrations and pharmacologic effects of atorvastatin may be increased by coadministration of diltiazem. The risk of myopathy and rhabdomyolysis may be increased.     Drug-Drug: amiodarone and atorvastatin   Plasma concentrations and pharmacologic effects of atorvastatin may be increased by amiodarone.                        The best contact phone number is [phone]  and Jose manages the medications.

## 2024-05-08 ENCOUNTER — HOME CARE VISIT (OUTPATIENT)
Dept: HOME HEALTH SERVICES | Facility: HOME HEALTHCARE | Age: 69
End: 2024-05-08
Payer: MEDICARE

## 2024-05-08 ENCOUNTER — PATIENT OUTREACH (OUTPATIENT)
Dept: HEALTH INFORMATION MANAGEMENT | Facility: OTHER | Age: 69
End: 2024-05-08
Payer: MEDICARE

## 2024-05-08 VITALS
DIASTOLIC BLOOD PRESSURE: 74 MMHG | RESPIRATION RATE: 18 BRPM | HEART RATE: 80 BPM | TEMPERATURE: 97.4 F | SYSTOLIC BLOOD PRESSURE: 138 MMHG | OXYGEN SATURATION: 97 %

## 2024-05-08 DIAGNOSIS — N18.31 STAGE 3A CHRONIC KIDNEY DISEASE: ICD-10-CM

## 2024-05-08 ASSESSMENT — ENCOUNTER SYMPTOMS
STOOL FREQUENCY: DAILY
HIGHEST PAIN SEVERITY IN PAST 24 HOURS: 0/10
PERSON REPORTING PAIN: PATIENT
NAUSEA: DENIES
PAIN SEVERITY GOAL: 0/10
VOMITING: DENIES
FATIGUES EASILY: 1
BOWEL PATTERN NORMAL: 1
PAIN: 1
LAST BOWEL MOVEMENT: 66968
MUSCLE WEAKNESS: 1

## 2024-05-08 ASSESSMENT — PATIENT HEALTH QUESTIONNAIRE - PHQ9: CLINICAL INTERPRETATION OF PHQ2 SCORE: 0

## 2024-05-08 NOTE — CASE COMMUNICATION
noted  ----- Message -----  From: Deann Gooden R.N.  Sent: 5/6/2024  12:15 PM PDT  To: Brisa Robledo R.N.; Nakia Guerrero; *      ARIEL, RECERTIFICATION for continued wound care.            DX-Right heel wound, perineum/scrotal wound,HX HTN, LEFT BKA, DM            NURSING FREQUENCY: 1w1, 3w8, 3 PRN    Disciplines-SN, PT, OT            Cert period 5/9/2024-7/7/2024            Insurance SCP    SPECIAL CONSIDERATION: none

## 2024-05-09 DIAGNOSIS — I48.91 ATRIAL FIBRILLATION, UNSPECIFIED TYPE (HCC): ICD-10-CM

## 2024-05-09 NOTE — TELEPHONE ENCOUNTER
Received request via: Patient    Was the patient seen in the last year in this department? Yes    Does the patient have an active prescription (recently filled or refills available) for medication(s) requested? No    Pharmacy Name: [facility]     Does the patient have [facility] Plus and need 100 day supply (blood pressure, diabetes and cholesterol meds only)? Yes, quantity updated to 100 days

## 2024-05-10 ENCOUNTER — HOME CARE VISIT (OUTPATIENT)
Dept: HOME HEALTH SERVICES | Facility: HOME HEALTHCARE | Age: 69
End: 2024-05-10
Payer: MEDICARE

## 2024-05-10 VITALS
TEMPERATURE: 97.4 F | DIASTOLIC BLOOD PRESSURE: 80 MMHG | RESPIRATION RATE: 18 BRPM | SYSTOLIC BLOOD PRESSURE: 140 MMHG | OXYGEN SATURATION: 96 % | HEART RATE: 63 BPM

## 2024-05-10 VITALS
SYSTOLIC BLOOD PRESSURE: 140 MMHG | HEART RATE: 63 BPM | TEMPERATURE: 97.4 F | OXYGEN SATURATION: 96 % | DIASTOLIC BLOOD PRESSURE: 80 MMHG | RESPIRATION RATE: 18 BRPM

## 2024-05-10 DIAGNOSIS — I48.91 ATRIAL FIBRILLATION, UNSPECIFIED TYPE (HCC): ICD-10-CM

## 2024-05-10 ASSESSMENT — ENCOUNTER SYMPTOMS
DENIES PAIN: 1
PERSON REPORTING PAIN: PATIENT

## 2024-05-11 ASSESSMENT — ENCOUNTER SYMPTOMS
HIGHEST PAIN SEVERITY IN PAST 24 HOURS: 0/10
FATIGUES EASILY: 1
PAIN SEVERITY GOAL: 0/10
PAIN: 1
STOOL FREQUENCY: DAILY
BOWEL PATTERN NORMAL: 1
NAUSEA: DENIES
LAST BOWEL MOVEMENT: 66970
MUSCLE WEAKNESS: 1
LOWEST PAIN SEVERITY IN PAST 24 HOURS: 0/10
PERSON REPORTING PAIN: PATIENT
VOMITING: DENIES

## 2024-05-11 ASSESSMENT — PATIENT HEALTH QUESTIONNAIRE - PHQ9: CLINICAL INTERPRETATION OF PHQ2 SCORE: 0

## 2024-05-13 ENCOUNTER — HOME CARE VISIT (OUTPATIENT)
Dept: HOME HEALTH SERVICES | Facility: HOME HEALTHCARE | Age: 69
End: 2024-05-13
Payer: MEDICARE

## 2024-05-13 VITALS
OXYGEN SATURATION: 94 % | RESPIRATION RATE: 18 BRPM | HEART RATE: 68 BPM | TEMPERATURE: 97.5 F | DIASTOLIC BLOOD PRESSURE: 70 MMHG | SYSTOLIC BLOOD PRESSURE: 128 MMHG

## 2024-05-13 VITALS
HEART RATE: 74 BPM | TEMPERATURE: 97.8 F | SYSTOLIC BLOOD PRESSURE: 128 MMHG | OXYGEN SATURATION: 95 % | RESPIRATION RATE: 18 BRPM | DIASTOLIC BLOOD PRESSURE: 74 MMHG

## 2024-05-13 ASSESSMENT — ENCOUNTER SYMPTOMS
PAIN LOCATION - PAIN FREQUENCY: CONSTANT
PERSON REPORTING PAIN: PATIENT
FATIGUES EASILY: 1
PAIN LOCATION - PAIN QUALITY: ACHING
PAIN LOCATION - PAIN SEVERITY: 4/10
PAIN LOCATION - PAIN SEVERITY: 4/10
PERSON REPORTING PAIN: PATIENT
PAIN LOCATION - PAIN FREQUENCY: INTERMITTENT
PAIN: 1
PAIN LOCATION - EXACERBATING FACTORS: MOVING WRONG""
PAIN LOCATION - RELIEVING FACTORS: DISTRACTION
PAIN: 1
NAUSEA: DENIES
VOMITING: DENIES
PAIN LOCATION - PAIN QUALITY: DULL, ACHY
PAIN LOCATION - RELIEVING FACTORS: REST, REPOSITIONING, PAIN MEDS
BOWEL PATTERN NORMAL: 1
MUSCLE WEAKNESS: 1
PAIN SEVERITY GOAL: 0/10
HIGHEST PAIN SEVERITY IN PAST 24 HOURS: 5/10
LAST BOWEL MOVEMENT: 66973
PAIN LOCATION: RIGHT SHOULDER
PAIN LOCATION - EXACERBATING FACTORS: MOVEMENT
STOOL FREQUENCY: DAILY
LOWEST PAIN SEVERITY IN PAST 24 HOURS: 0/10
SUBJECTIVE PAIN PROGRESSION: WAXING AND WANING
PAIN LOCATION - PAIN DURATION: DAILY

## 2024-05-13 ASSESSMENT — PATIENT HEALTH QUESTIONNAIRE - PHQ9: CLINICAL INTERPRETATION OF PHQ2 SCORE: 0

## 2024-05-13 ASSESSMENT — ACTIVITIES OF DAILY LIVING (ADL)
AMBULATION ASSISTANCE: 1
BATHING_COMMENTS: SEE OT NOTES
AMBULATION ASSISTANCE: STAND BY ASSIST
FEEDING_COMMENTS: SEE OT NOTES
GROOMING_COMMENTS: SEE OT NOTES
PHYSICAL TRANSFERS ASSESSED: 1
CURRENT_FUNCTION: SUPERVISION
PHYSICAL_TRANSFERS_DEVICES: USE OF B UES TO ASSIST

## 2024-05-15 ENCOUNTER — HOME CARE VISIT (OUTPATIENT)
Dept: HOME HEALTH SERVICES | Facility: HOME HEALTHCARE | Age: 69
End: 2024-05-15
Payer: MEDICARE

## 2024-05-15 NOTE — CASE COMMUNICATION
Quality Review for Recertification OASIS by NELSY Avila, RN on  May 15, 2024    Edits completed by NELSY Avila RN:  1.  and  diagnosis coding updated per chart review.  2. Changed Flu vaccine to yes (last ARIEL was 3/8), received from another health care provider  3. Changed  to yes, still has pressure ulcer to right heel

## 2024-05-15 NOTE — CASE COMMUNICATION
I agree with these changes.  ----- Message -----  From: Amelie Avila R.N.  Sent: 5/15/2024  11:16 AM PDT  To: Deann Gooden R.N.      Quality Review for Recertification OASIS by NELSY Avila, KAYCEE on  May 15, 2024    Edits completed by NELSY Avila RN:  1.  and  diagnosis coding updated per chart review.  2. Changed Flu vaccine to yes (last RAIEL was 3/8), received from another health care provider  3. Changed   to yes, still has pressure ulcer to right heel

## 2024-05-17 ENCOUNTER — HOME CARE VISIT (OUTPATIENT)
Dept: HOME HEALTH SERVICES | Facility: HOME HEALTHCARE | Age: 69
End: 2024-05-17
Payer: MEDICARE

## 2024-05-17 VITALS
RESPIRATION RATE: 18 BRPM | OXYGEN SATURATION: 96 % | DIASTOLIC BLOOD PRESSURE: 62 MMHG | SYSTOLIC BLOOD PRESSURE: 118 MMHG | HEART RATE: 64 BPM | TEMPERATURE: 97.4 F

## 2024-05-17 VITALS
OXYGEN SATURATION: 93 % | DIASTOLIC BLOOD PRESSURE: 60 MMHG | SYSTOLIC BLOOD PRESSURE: 102 MMHG | RESPIRATION RATE: 18 BRPM | HEART RATE: 72 BPM | TEMPERATURE: 97.6 F

## 2024-05-17 ASSESSMENT — ENCOUNTER SYMPTOMS
BOWEL PATTERN NORMAL: 1
HIGHEST PAIN SEVERITY IN PAST 24 HOURS: 0/10
NAUSEA: DENIES
LOWEST PAIN SEVERITY IN PAST 24 HOURS: 0/10
FATIGUES EASILY: 1
PAIN: 1
FATIGUES EASILY: 1
STOOL FREQUENCY: DAILY
PAIN SEVERITY GOAL: 0/10
LAST BOWEL MOVEMENT: 66975
LOWEST PAIN SEVERITY IN PAST 24 HOURS: 0/10
MUSCLE WEAKNESS: 1
BOWEL PATTERN NORMAL: 1
PAIN SEVERITY GOAL: 0/10
LAST BOWEL MOVEMENT: 66977
MUSCLE WEAKNESS: 1
VOMITING: DENIES
PERSON REPORTING PAIN: PATIENT
HIGHEST PAIN SEVERITY IN PAST 24 HOURS: 0/10
PAIN: 1
VOMITING: DENIES
STOOL FREQUENCY: DAILY
PERSON REPORTING PAIN: PATIENT

## 2024-05-17 ASSESSMENT — PATIENT HEALTH QUESTIONNAIRE - PHQ9
CLINICAL INTERPRETATION OF PHQ2 SCORE: 0
CLINICAL INTERPRETATION OF PHQ2 SCORE: 0

## 2024-05-20 ENCOUNTER — HOME CARE VISIT (OUTPATIENT)
Dept: HOME HEALTH SERVICES | Facility: HOME HEALTHCARE | Age: 69
End: 2024-05-20
Payer: MEDICARE

## 2024-05-20 VITALS
TEMPERATURE: 97.6 F | HEART RATE: 62 BPM | DIASTOLIC BLOOD PRESSURE: 62 MMHG | SYSTOLIC BLOOD PRESSURE: 128 MMHG | OXYGEN SATURATION: 95 % | RESPIRATION RATE: 18 BRPM

## 2024-05-20 VITALS
HEART RATE: 62 BPM | RESPIRATION RATE: 18 BRPM | OXYGEN SATURATION: 95 % | TEMPERATURE: 97.6 F | SYSTOLIC BLOOD PRESSURE: 128 MMHG | DIASTOLIC BLOOD PRESSURE: 62 MMHG

## 2024-05-20 ASSESSMENT — ENCOUNTER SYMPTOMS
LAST BOWEL MOVEMENT: 66980
LOWEST PAIN SEVERITY IN PAST 24 HOURS: 1/10
PAIN LOCATION - PAIN DURATION: DAILY
PAIN LOCATION - PAIN SEVERITY: 2/10
HIGHEST PAIN SEVERITY IN PAST 24 HOURS: 2/10
STOOL FREQUENCY: DAILY
PAIN LOCATION - PAIN SEVERITY: 1/10
NAUSEA: DENIES
PAIN LOCATION - EXACERBATING FACTORS: EXERTION
PAIN LOCATION - PAIN FREQUENCY: FREQUENT
SUBJECTIVE PAIN PROGRESSION: GRADUALLY IMPROVING
PAIN: 1
PAIN LOCATION - PAIN QUALITY: DULL, ACHY
PAIN LOCATION: RIGHT SHOULDER
PAIN LOCATION - PAIN QUALITY: ACHING
PERSON REPORTING PAIN: PATIENT
PAIN LOCATION: LEFT HIP
PAIN LOCATION - RELIEVING FACTORS: PAIN MEDS, REST, REPOSITIONING
PAIN LOCATION - PAIN FREQUENCY: FREQUENT
BOWEL PATTERN NORMAL: 1
LOWER EXTREMITY EDEMA: 1
HIGHEST PAIN SEVERITY IN PAST 24 HOURS: 3/10
PAIN LOCATION - PAIN FREQUENCY: INTERMITTENT
LOWEST PAIN SEVERITY IN PAST 24 HOURS: 0/10
PAIN SEVERITY GOAL: 0/10
PAIN LOCATION - PAIN QUALITY: ACHING
PERSON REPORTING PAIN: PATIENT
PAIN LOCATION - RELIEVING FACTORS: REST
PAIN LOCATION - PAIN DURATION: A FEW DAYS
PAIN LOCATION - RELIEVING FACTORS: PAIN MEDICATION
PAIN: 1
PAIN: 1
ASSOCIATED SYMPTOMS: DENIES
SUBJECTIVE PAIN PROGRESSION: WAXING AND WANING
PAIN LOCATION: RIGHT HIP
VOMITING: DENIES
PAIN LOCATION - PAIN SEVERITY: 1/10
PAIN LOCATION - EXACERBATING FACTORS: MOVEMENT
MUSCLE WEAKNESS: 1
PAIN SEVERITY GOAL: 0/10

## 2024-05-20 ASSESSMENT — PATIENT HEALTH QUESTIONNAIRE - PHQ9: CLINICAL INTERPRETATION OF PHQ2 SCORE: 0

## 2024-05-22 ENCOUNTER — HOME CARE VISIT (OUTPATIENT)
Dept: HOME HEALTH SERVICES | Facility: HOME HEALTHCARE | Age: 69
End: 2024-05-22
Payer: MEDICARE

## 2024-05-22 VITALS
RESPIRATION RATE: 18 BRPM | OXYGEN SATURATION: 97 % | DIASTOLIC BLOOD PRESSURE: 70 MMHG | TEMPERATURE: 97.5 F | HEART RATE: 62 BPM | SYSTOLIC BLOOD PRESSURE: 136 MMHG

## 2024-05-22 ASSESSMENT — ENCOUNTER SYMPTOMS
PAIN LOCATION - PAIN QUALITY: DULL, ACHY
LOWEST PAIN SEVERITY IN PAST 24 HOURS: 4/10
PAIN: 1
PERSON REPORTING PAIN: PATIENT
PAIN SEVERITY GOAL: 0/10
PAIN LOCATION: LOWER BACK
PAIN LOCATION - PAIN SEVERITY: 4/10
ASSOCIATED SYMPTOMS: DENIES
PAIN LOCATION - PAIN FREQUENCY: CONSTANT
HIGHEST PAIN SEVERITY IN PAST 24 HOURS: 4/10
PAIN LOCATION - RELIEVING FACTORS: TYLENOL
SUBJECTIVE PAIN PROGRESSION: UNCHANGED

## 2024-05-23 ASSESSMENT — ENCOUNTER SYMPTOMS
NAUSEA: DENIES
STOOL FREQUENCY: DAILY
LAST BOWEL MOVEMENT: 66982
VOMITING: DENIES
FATIGUES EASILY: 1
BOWEL PATTERN NORMAL: 1
MUSCLE WEAKNESS: 1

## 2024-05-23 ASSESSMENT — PATIENT HEALTH QUESTIONNAIRE - PHQ9: CLINICAL INTERPRETATION OF PHQ2 SCORE: 0

## 2024-05-24 ENCOUNTER — HOME CARE VISIT (OUTPATIENT)
Dept: HOME HEALTH SERVICES | Facility: HOME HEALTHCARE | Age: 69
End: 2024-05-24
Payer: MEDICARE

## 2024-05-24 VITALS
SYSTOLIC BLOOD PRESSURE: 144 MMHG | OXYGEN SATURATION: 98 % | HEART RATE: 64 BPM | DIASTOLIC BLOOD PRESSURE: 76 MMHG | TEMPERATURE: 97.6 F | RESPIRATION RATE: 18 BRPM

## 2024-05-24 ASSESSMENT — ENCOUNTER SYMPTOMS
NAUSEA: DENIES
FATIGUES EASILY: 1
LAST BOWEL MOVEMENT: 66984
PERSON REPORTING PAIN: PATIENT
PAIN: 1
PAIN SEVERITY GOAL: 0/10
HIGHEST PAIN SEVERITY IN PAST 24 HOURS: 0/10
STOOL FREQUENCY: DAILY
VOMITING: DENIES
BOWEL PATTERN NORMAL: 1
MUSCLE WEAKNESS: 1
LOWEST PAIN SEVERITY IN PAST 24 HOURS: 0/10

## 2024-05-24 ASSESSMENT — PATIENT HEALTH QUESTIONNAIRE - PHQ9: CLINICAL INTERPRETATION OF PHQ2 SCORE: 0

## 2024-05-27 ENCOUNTER — HOME CARE VISIT (OUTPATIENT)
Dept: HOME HEALTH SERVICES | Facility: HOME HEALTHCARE | Age: 69
End: 2024-05-27
Payer: MEDICARE

## 2024-05-27 VITALS
SYSTOLIC BLOOD PRESSURE: 140 MMHG | RESPIRATION RATE: 18 BRPM | TEMPERATURE: 97.5 F | DIASTOLIC BLOOD PRESSURE: 68 MMHG | HEART RATE: 65 BPM | OXYGEN SATURATION: 93 %

## 2024-05-27 VITALS
SYSTOLIC BLOOD PRESSURE: 140 MMHG | DIASTOLIC BLOOD PRESSURE: 68 MMHG | TEMPERATURE: 97.5 F | RESPIRATION RATE: 18 BRPM | HEART RATE: 65 BPM | OXYGEN SATURATION: 93 %

## 2024-05-27 ASSESSMENT — ENCOUNTER SYMPTOMS
PAIN LOCATION - PAIN QUALITY: ACHING
PAIN: 1
PERSON REPORTING PAIN: PATIENT
PAIN LOCATION: NECK
PAIN LOCATION - PAIN FREQUENCY: INTERMITTENT
SUBJECTIVE PAIN PROGRESSION: WAXING AND WANING
PAIN LOCATION - RELIEVING FACTORS: PAIN MEDS, REST, REPOSITIONING
HIGHEST PAIN SEVERITY IN PAST 24 HOURS: 4/10
LOWEST PAIN SEVERITY IN PAST 24 HOURS: 0/10
FATIGUES EASILY: 1
BOWEL PATTERN NORMAL: 1
NAUSEA: DENIES
VOMITING: DENIES
PAIN SEVERITY GOAL: 0/10
PAIN LOCATION - PAIN DURATION: DAILY
STOOL FREQUENCY: DAILY
PAIN LOCATION - PAIN SEVERITY: 2/10
PAIN SEVERITY GOAL: 0/10
LAST BOWEL MOVEMENT: 66987
LOWEST PAIN SEVERITY IN PAST 24 HOURS: 0/10
MUSCLE WEAKNESS: 1
PAIN: 1
PAIN LOCATION - EXACERBATING FACTORS: NOTHING
PERSON REPORTING PAIN: PATIENT
HIGHEST PAIN SEVERITY IN PAST 24 HOURS: 0/10

## 2024-05-27 ASSESSMENT — PATIENT HEALTH QUESTIONNAIRE - PHQ9: CLINICAL INTERPRETATION OF PHQ2 SCORE: 0

## 2024-05-29 ENCOUNTER — HOME CARE VISIT (OUTPATIENT)
Dept: HOME HEALTH SERVICES | Facility: HOME HEALTHCARE | Age: 69
End: 2024-05-29
Payer: MEDICARE

## 2024-05-29 VITALS
RESPIRATION RATE: 18 BRPM | HEART RATE: 66 BPM | SYSTOLIC BLOOD PRESSURE: 136 MMHG | TEMPERATURE: 97.5 F | OXYGEN SATURATION: 95 % | DIASTOLIC BLOOD PRESSURE: 70 MMHG

## 2024-05-29 ASSESSMENT — ENCOUNTER SYMPTOMS
PAIN: 1
NAUSEA: DENIES
VOMITING: DENIES
LAST BOWEL MOVEMENT: 66989
PERSON REPORTING PAIN: PATIENT
MUSCLE WEAKNESS: 1
STOOL FREQUENCY: DAILY
LOWEST PAIN SEVERITY IN PAST 24 HOURS: 0/10
FATIGUES EASILY: 1
HIGHEST PAIN SEVERITY IN PAST 24 HOURS: 0/10
BOWEL PATTERN NORMAL: 1
PAIN SEVERITY GOAL: 0/10

## 2024-05-29 ASSESSMENT — PATIENT HEALTH QUESTIONNAIRE - PHQ9: CLINICAL INTERPRETATION OF PHQ2 SCORE: 0

## 2024-05-30 DIAGNOSIS — E78.2 MIXED HYPERLIPIDEMIA: ICD-10-CM

## 2024-05-30 RX ORDER — GABAPENTIN 400 MG/1
400 CAPSULE ORAL 4 TIMES DAILY
Qty: 90 CAPSULE | Refills: 2 | Status: SHIPPED | OUTPATIENT
Start: 2024-05-30

## 2024-05-30 NOTE — TELEPHONE ENCOUNTER
Requested Prescriptions     Pending Prescriptions Disp Refills    gabapentin (NEURONTIN) 400 MG Cap 90 Capsule 2     Sig: Take 1 Capsule by mouth 4 times a day. 300mg 4x day  Indications: Diabetes with Nerve Disease     LOV 3/11/24  Labs 2/24/24

## 2024-05-31 ENCOUNTER — HOME CARE VISIT (OUTPATIENT)
Dept: HOME HEALTH SERVICES | Facility: HOME HEALTHCARE | Age: 69
End: 2024-05-31
Payer: MEDICARE

## 2024-05-31 VITALS
DIASTOLIC BLOOD PRESSURE: 80 MMHG | TEMPERATURE: 97.8 F | OXYGEN SATURATION: 96 % | SYSTOLIC BLOOD PRESSURE: 136 MMHG | RESPIRATION RATE: 18 BRPM | HEART RATE: 68 BPM

## 2024-05-31 PROCEDURE — G0152 HHCP-SERV OF OT,EA 15 MIN: HCPCS

## 2024-05-31 ASSESSMENT — ENCOUNTER SYMPTOMS
PAIN SEVERITY GOAL: 0/10
MUSCLE WEAKNESS: 1
BOWEL PATTERN NORMAL: 1
PAIN: 1
PERSON REPORTING PAIN: PATIENT
LAST BOWEL MOVEMENT: 66991
LOWEST PAIN SEVERITY IN PAST 24 HOURS: 0/10
STOOL FREQUENCY: DAILY
FATIGUES EASILY: 1
HIGHEST PAIN SEVERITY IN PAST 24 HOURS: 0/10

## 2024-05-31 ASSESSMENT — PATIENT HEALTH QUESTIONNAIRE - PHQ9: CLINICAL INTERPRETATION OF PHQ2 SCORE: 0

## 2024-06-01 DIAGNOSIS — I48.91 ATRIAL FIBRILLATION, UNSPECIFIED TYPE (HCC): ICD-10-CM

## 2024-06-03 ENCOUNTER — HOME CARE VISIT (OUTPATIENT)
Dept: HOME HEALTH SERVICES | Facility: HOME HEALTHCARE | Age: 69
End: 2024-06-03
Payer: MEDICARE

## 2024-06-03 VITALS
HEART RATE: 68 BPM | RESPIRATION RATE: 18 BRPM | TEMPERATURE: 97.5 F | OXYGEN SATURATION: 93 % | DIASTOLIC BLOOD PRESSURE: 70 MMHG | SYSTOLIC BLOOD PRESSURE: 140 MMHG

## 2024-06-03 VITALS
OXYGEN SATURATION: 93 % | TEMPERATURE: 97.5 F | DIASTOLIC BLOOD PRESSURE: 70 MMHG | HEART RATE: 68 BPM | RESPIRATION RATE: 18 BRPM | SYSTOLIC BLOOD PRESSURE: 140 MMHG

## 2024-06-03 PROCEDURE — RXMED WILLOW AMBULATORY MEDICATION CHARGE: Performed by: FAMILY MEDICINE

## 2024-06-03 PROCEDURE — G0151 HHCP-SERV OF PT,EA 15 MIN: HCPCS

## 2024-06-03 PROCEDURE — G0299 HHS/HOSPICE OF RN EA 15 MIN: HCPCS

## 2024-06-03 RX ORDER — AMIODARONE HYDROCHLORIDE 200 MG/1
200 TABLET ORAL DAILY
Qty: 90 TABLET | Refills: 0 | Status: SHIPPED | OUTPATIENT
Start: 2024-06-03

## 2024-06-03 ASSESSMENT — PATIENT HEALTH QUESTIONNAIRE - PHQ9: CLINICAL INTERPRETATION OF PHQ2 SCORE: 0

## 2024-06-03 ASSESSMENT — ENCOUNTER SYMPTOMS
PERSON REPORTING PAIN: PATIENT
STOOL FREQUENCY: DAILY
LOWEST PAIN SEVERITY IN PAST 24 HOURS: 0/10
BOWEL PATTERN NORMAL: 1
DENIES PAIN: 1
FATIGUES EASILY: 1
HIGHEST PAIN SEVERITY IN PAST 24 HOURS: 0/10
PERSON REPORTING PAIN: PATIENT
HIGHEST PAIN SEVERITY IN PAST 24 HOURS: 0/10
MUSCLE WEAKNESS: 1
NAUSEA: DENIES
LAST BOWEL MOVEMENT: 66994
PAIN: 1
VOMITING: DENIES
PAIN SEVERITY GOAL: 0/10
PERSON REPORTING PAIN: PATIENT
DENIES PAIN: 1
LOWEST PAIN SEVERITY IN PAST 24 HOURS: 0/10
PAIN SEVERITY GOAL: 0/10

## 2024-06-03 ASSESSMENT — ACTIVITIES OF DAILY LIVING (ADL)
PHYSICAL_TRANSFERS_DEVICES: USE OF B UES TO ASSIST.
AMBULATION ASSISTANCE: 1
AMBULATION ASSISTANCE: INDEPENDENT
CURRENT_FUNCTION: INDEPENDENT
PHYSICAL TRANSFERS ASSESSED: 1

## 2024-06-03 NOTE — TELEPHONE ENCOUNTER
Received request via: Patient    Was the patient seen in the last year in this department? No    Does the patient have an active prescription (recently filled or refills available) for medication(s) requested? No    [facility]    Does the patient have [facility] Plus and need 100 day supply (blood pressure, diabetes and cholesterol meds only)? Medication is not for cholesterol, blood pressure or diabetes

## 2024-06-04 DIAGNOSIS — E78.2 MIXED HYPERLIPIDEMIA: ICD-10-CM

## 2024-06-04 RX ORDER — POTASSIUM CHLORIDE 1500 MG/1
TABLET, EXTENDED RELEASE ORAL
Qty: 60 TABLET | Refills: 3 | Status: SHIPPED | OUTPATIENT
Start: 2024-06-04

## 2024-06-04 NOTE — TELEPHONE ENCOUNTER
Received request via: Patient    Was the patient seen in the last year in this department? Yes    Does the patient have an active prescription (recently filled or refills available) for medication(s) requested? No    Pharmacy Name: [facility]     Does the patient have [facility] Plus and need 100 day supply (blood pressure, diabetes and cholesterol meds only)? Yes, quantity updated to 100 days

## 2024-06-05 ENCOUNTER — HOME CARE VISIT (OUTPATIENT)
Dept: HOME HEALTH SERVICES | Facility: HOME HEALTHCARE | Age: 69
End: 2024-06-05
Payer: MEDICARE

## 2024-06-05 VITALS
OXYGEN SATURATION: 93 % | DIASTOLIC BLOOD PRESSURE: 64 MMHG | TEMPERATURE: 97.5 F | RESPIRATION RATE: 18 BRPM | HEART RATE: 72 BPM | SYSTOLIC BLOOD PRESSURE: 124 MMHG

## 2024-06-05 PROCEDURE — G0299 HHS/HOSPICE OF RN EA 15 MIN: HCPCS

## 2024-06-05 PROCEDURE — RXMED WILLOW AMBULATORY MEDICATION CHARGE: Performed by: FAMILY MEDICINE

## 2024-06-05 ASSESSMENT — ENCOUNTER SYMPTOMS
LAST BOWEL MOVEMENT: 66996
VOMITING: DENIES
LOWEST PAIN SEVERITY IN PAST 24 HOURS: 0/10
PAIN: 1
PERSON REPORTING PAIN: PATIENT
BOWEL PATTERN NORMAL: 1
HIGHEST PAIN SEVERITY IN PAST 24 HOURS: 0/10
STOOL FREQUENCY: DAILY
FATIGUES EASILY: 1
NAUSEA: DENIES
MUSCLE WEAKNESS: 1
PAIN SEVERITY GOAL: 0/10

## 2024-06-05 ASSESSMENT — PATIENT HEALTH QUESTIONNAIRE - PHQ9: CLINICAL INTERPRETATION OF PHQ2 SCORE: 0

## 2024-06-06 ENCOUNTER — PHARMACY VISIT (OUTPATIENT)
Dept: PHARMACY | Facility: MEDICAL CENTER | Age: 69
End: 2024-06-06
Payer: COMMERCIAL

## 2024-06-07 ENCOUNTER — HOME CARE VISIT (OUTPATIENT)
Dept: HOME HEALTH SERVICES | Facility: HOME HEALTHCARE | Age: 69
End: 2024-06-07
Payer: MEDICARE

## 2024-06-07 PROCEDURE — G0152 HHCP-SERV OF OT,EA 15 MIN: HCPCS

## 2024-06-07 PROCEDURE — G0299 HHS/HOSPICE OF RN EA 15 MIN: HCPCS

## 2024-06-08 VITALS
DIASTOLIC BLOOD PRESSURE: 70 MMHG | HEART RATE: 72 BPM | RESPIRATION RATE: 18 BRPM | TEMPERATURE: 97.5 F | SYSTOLIC BLOOD PRESSURE: 142 MMHG | OXYGEN SATURATION: 92 %

## 2024-06-08 ASSESSMENT — ENCOUNTER SYMPTOMS
NAUSEA: DENIES
BOWEL PATTERN NORMAL: 1
VOMITING: DENIES
PERSON REPORTING PAIN: PATIENT
FATIGUES EASILY: 1
LAST BOWEL MOVEMENT: 66998
HIGHEST PAIN SEVERITY IN PAST 24 HOURS: 0/10
PAIN: 1
LOWEST PAIN SEVERITY IN PAST 24 HOURS: 0/10
MUSCLE WEAKNESS: 1
PAIN SEVERITY GOAL: 0/10
STOOL FREQUENCY: DAILY

## 2024-06-08 ASSESSMENT — PATIENT HEALTH QUESTIONNAIRE - PHQ9: CLINICAL INTERPRETATION OF PHQ2 SCORE: 0

## 2024-06-09 ASSESSMENT — ENCOUNTER SYMPTOMS
DENIES PAIN: 1
PERSON REPORTING PAIN: PATIENT

## 2024-06-10 ENCOUNTER — HOME CARE VISIT (OUTPATIENT)
Dept: HOME HEALTH SERVICES | Facility: HOME HEALTHCARE | Age: 69
End: 2024-06-10
Payer: MEDICARE

## 2024-06-10 VITALS
TEMPERATURE: 97.8 F | DIASTOLIC BLOOD PRESSURE: 70 MMHG | RESPIRATION RATE: 18 BRPM | HEART RATE: 70 BPM | SYSTOLIC BLOOD PRESSURE: 128 MMHG | OXYGEN SATURATION: 93 %

## 2024-06-10 PROCEDURE — G0299 HHS/HOSPICE OF RN EA 15 MIN: HCPCS

## 2024-06-10 ASSESSMENT — ENCOUNTER SYMPTOMS
PERSON REPORTING PAIN: PATIENT
LOWEST PAIN SEVERITY IN PAST 24 HOURS: 0/10
MUSCLE WEAKNESS: 1
PAIN: 1
LAST BOWEL MOVEMENT: 67001
NAUSEA: DENIES
HIGHEST PAIN SEVERITY IN PAST 24 HOURS: 0/10
PAIN SEVERITY GOAL: 0/10
FATIGUES EASILY: 1
BOWEL PATTERN NORMAL: 1
VOMITING: DENIES

## 2024-06-10 ASSESSMENT — PATIENT HEALTH QUESTIONNAIRE - PHQ9: CLINICAL INTERPRETATION OF PHQ2 SCORE: 0

## 2024-06-12 ENCOUNTER — HOME CARE VISIT (OUTPATIENT)
Dept: HOME HEALTH SERVICES | Facility: HOME HEALTHCARE | Age: 69
End: 2024-06-12
Payer: MEDICARE

## 2024-06-12 VITALS
DIASTOLIC BLOOD PRESSURE: 70 MMHG | SYSTOLIC BLOOD PRESSURE: 130 MMHG | HEART RATE: 76 BPM | OXYGEN SATURATION: 91 % | RESPIRATION RATE: 18 BRPM | TEMPERATURE: 101 F

## 2024-06-12 PROCEDURE — G0299 HHS/HOSPICE OF RN EA 15 MIN: HCPCS

## 2024-06-12 ASSESSMENT — ENCOUNTER SYMPTOMS
NAUSEA: DENIES
HIGHEST PAIN SEVERITY IN PAST 24 HOURS: 0/10
MUSCLE WEAKNESS: 1
FATIGUES EASILY: 1
BOWEL PATTERN NORMAL: 1
LAST BOWEL MOVEMENT: 67002
PAIN SEVERITY GOAL: 0/10
PERSON REPORTING PAIN: PATIENT
VOMITING: DENIES
PAIN: 1
LOWEST PAIN SEVERITY IN PAST 24 HOURS: 0/10
STOOL FREQUENCY: DAILY

## 2024-06-12 ASSESSMENT — PATIENT HEALTH QUESTIONNAIRE - PHQ9: CLINICAL INTERPRETATION OF PHQ2 SCORE: 0

## 2024-06-13 ENCOUNTER — HOME CARE VISIT (OUTPATIENT)
Dept: HOME HEALTH SERVICES | Facility: HOME HEALTHCARE | Age: 69
End: 2024-06-13
Payer: MEDICARE

## 2024-06-13 DIAGNOSIS — I48.91 ATRIAL FIBRILLATION, UNSPECIFIED TYPE (HCC): ICD-10-CM

## 2024-06-13 PROCEDURE — RXMED WILLOW AMBULATORY MEDICATION CHARGE: Performed by: FAMILY MEDICINE

## 2024-06-13 PROCEDURE — G0152 HHCP-SERV OF OT,EA 15 MIN: HCPCS

## 2024-06-13 NOTE — TELEPHONE ENCOUNTER
Received request via: Patient    Was the patient seen in the last year in this department? Yes    Does the patient have an active prescription (recently filled or refills available) for medication(s) requested? No    Pharmacy Name: Pharmacy:   [facility]     Does the patient have detention Plus and need 100 day supply (blood pressure, diabetes and cholesterol meds only)? Medication is not for cholesterol, blood pressure or diabetes  
15-Aug-2018 16:46

## 2024-06-14 ENCOUNTER — HOME CARE VISIT (OUTPATIENT)
Dept: HOME HEALTH SERVICES | Facility: HOME HEALTHCARE | Age: 69
End: 2024-06-14
Payer: MEDICARE

## 2024-06-14 ENCOUNTER — PHARMACY VISIT (OUTPATIENT)
Dept: PHARMACY | Facility: MEDICAL CENTER | Age: 69
End: 2024-06-14
Payer: COMMERCIAL

## 2024-06-14 VITALS
DIASTOLIC BLOOD PRESSURE: 60 MMHG | TEMPERATURE: 97.4 F | OXYGEN SATURATION: 93 % | RESPIRATION RATE: 18 BRPM | SYSTOLIC BLOOD PRESSURE: 130 MMHG | HEART RATE: 64 BPM

## 2024-06-14 PROCEDURE — G0299 HHS/HOSPICE OF RN EA 15 MIN: HCPCS

## 2024-06-14 ASSESSMENT — ENCOUNTER SYMPTOMS
BOWEL PATTERN NORMAL: 1
MUSCLE WEAKNESS: 1
PAIN LOCATION - PAIN SEVERITY: 2/10
NAUSEA: DENIES
STOOL FREQUENCY: DAILY
LAST BOWEL MOVEMENT: 67005
PERSON REPORTING PAIN: PATIENT
LOWEST PAIN SEVERITY IN PAST 24 HOURS: 1/10
PAIN SEVERITY GOAL: 0/10
FATIGUES EASILY: 1
ASSOCIATED SYMPTOMS: DENIES
PAIN LOCATION: PERINEUM
PAIN LOCATION - PAIN FREQUENCY: FREQUENT
PAIN LOCATION - PAIN QUALITY: DULL, ACHY
SUBJECTIVE PAIN PROGRESSION: UNCHANGED
PAIN: 1
VOMITING: DENIES
PAIN LOCATION - RELIEVING FACTORS: DISTRACTION
HIGHEST PAIN SEVERITY IN PAST 24 HOURS: 2/10

## 2024-06-14 ASSESSMENT — PATIENT HEALTH QUESTIONNAIRE - PHQ9: CLINICAL INTERPRETATION OF PHQ2 SCORE: 0

## 2024-06-16 VITALS
SYSTOLIC BLOOD PRESSURE: 118 MMHG | TEMPERATURE: 97.6 F | HEART RATE: 68 BPM | RESPIRATION RATE: 18 BRPM | DIASTOLIC BLOOD PRESSURE: 72 MMHG | OXYGEN SATURATION: 93 %

## 2024-06-16 ASSESSMENT — ENCOUNTER SYMPTOMS
PERSON REPORTING PAIN: PATIENT
DENIES PAIN: 1

## 2024-06-17 ENCOUNTER — HOME CARE VISIT (OUTPATIENT)
Dept: HOME HEALTH SERVICES | Facility: HOME HEALTHCARE | Age: 69
End: 2024-06-17
Payer: MEDICARE

## 2024-06-17 PROCEDURE — G0299 HHS/HOSPICE OF RN EA 15 MIN: HCPCS

## 2024-06-18 VITALS
OXYGEN SATURATION: 95 % | SYSTOLIC BLOOD PRESSURE: 128 MMHG | RESPIRATION RATE: 18 BRPM | HEART RATE: 70 BPM | TEMPERATURE: 97.7 F | DIASTOLIC BLOOD PRESSURE: 70 MMHG

## 2024-06-18 RX ORDER — BLOOD SUGAR DIAGNOSTIC
1 STRIP MISCELLANEOUS PRN
COMMUNITY

## 2024-06-18 RX ORDER — BLOOD-GLUCOSE METER
EACH MISCELLANEOUS 3 TIMES DAILY PRN
COMMUNITY
End: 2024-06-18 | Stop reason: SDUPTHER

## 2024-06-18 RX ORDER — PEN NEEDLE, DIABETIC 31 GX5/16"
NEEDLE, DISPOSABLE MISCELLANEOUS
COMMUNITY
End: 2024-06-18 | Stop reason: SDUPTHER

## 2024-06-18 ASSESSMENT — ENCOUNTER SYMPTOMS
PAIN: 1
VOMITING: DENIES
MUSCLE WEAKNESS: 1
FATIGUES EASILY: 1
HIGHEST PAIN SEVERITY IN PAST 24 HOURS: 0/10
STOOL FREQUENCY: DAILY
BOWEL PATTERN NORMAL: 1
SUBJECTIVE PAIN PROGRESSION: UNCHANGED
PAIN SEVERITY GOAL: 0/10
ASSOCIATED SYMPTOMS: DENIES
LAST BOWEL MOVEMENT: 67008
LOWEST PAIN SEVERITY IN PAST 24 HOURS: 0/10
PERSON REPORTING PAIN: PATIENT
NAUSEA: DENIES

## 2024-06-18 ASSESSMENT — PATIENT HEALTH QUESTIONNAIRE - PHQ9: CLINICAL INTERPRETATION OF PHQ2 SCORE: 0

## 2024-06-19 ENCOUNTER — HOME CARE VISIT (OUTPATIENT)
Dept: HOME HEALTH SERVICES | Facility: HOME HEALTHCARE | Age: 69
End: 2024-06-19
Payer: MEDICARE

## 2024-06-19 VITALS
HEART RATE: 71 BPM | DIASTOLIC BLOOD PRESSURE: 60 MMHG | SYSTOLIC BLOOD PRESSURE: 118 MMHG | RESPIRATION RATE: 18 BRPM | TEMPERATURE: 97.5 F | OXYGEN SATURATION: 93 %

## 2024-06-19 PROCEDURE — G0299 HHS/HOSPICE OF RN EA 15 MIN: HCPCS

## 2024-06-19 RX ORDER — BLOOD-GLUCOSE METER
1 EACH MISCELLANEOUS 3 TIMES DAILY
Qty: 1 KIT | Refills: 3 | Status: SHIPPED | OUTPATIENT
Start: 2024-06-19

## 2024-06-19 RX ORDER — PEN NEEDLE, DIABETIC 31 GX5/16"
1 NEEDLE, DISPOSABLE MISCELLANEOUS 3 TIMES DAILY
Qty: 200 EACH | Refills: 2 | Status: SHIPPED | OUTPATIENT
Start: 2024-06-19

## 2024-06-19 ASSESSMENT — ENCOUNTER SYMPTOMS
LOWEST PAIN SEVERITY IN PAST 24 HOURS: 0/10
FATIGUES EASILY: 1
LAST BOWEL MOVEMENT: 67010
VOMITING: DENIES
PERSON REPORTING PAIN: PATIENT
PAIN: 1
PAIN SEVERITY GOAL: 0/10
HIGHEST PAIN SEVERITY IN PAST 24 HOURS: 0/10
NAUSEA: DENIES
BOWEL PATTERN NORMAL: 1
STOOL FREQUENCY: DAILY
MUSCLE WEAKNESS: 1

## 2024-06-19 ASSESSMENT — PATIENT HEALTH QUESTIONNAIRE - PHQ9: CLINICAL INTERPRETATION OF PHQ2 SCORE: 0

## 2024-06-19 NOTE — TELEPHONE ENCOUNTER
Received request via: Patient    Was the patient seen in the last year in this department? Yes    Does the patient have an active prescription (recently filled or refills available) for medication(s) requested? No    Pharmacy Name: [facility] [place] [facility]    Does the patient have care home Plus and need 100 day supply (blood pressure, diabetes and cholesterol meds only)? Yes, quantity updated to 100 days

## 2024-06-20 ENCOUNTER — PHARMACY VISIT (OUTPATIENT)
Dept: PHARMACY | Facility: MEDICAL CENTER | Age: 69
End: 2024-06-20
Payer: COMMERCIAL

## 2024-06-20 ENCOUNTER — HOME CARE VISIT (OUTPATIENT)
Dept: HOME HEALTH SERVICES | Facility: HOME HEALTHCARE | Age: 69
End: 2024-06-20
Payer: MEDICARE

## 2024-06-20 DIAGNOSIS — E11.51 DM (DIABETES MELLITUS) TYPE II, CONTROLLED, WITH PERIPHERAL VASCULAR DISORDER (HCC): ICD-10-CM

## 2024-06-20 PROCEDURE — RXMED WILLOW AMBULATORY MEDICATION CHARGE: Performed by: FAMILY MEDICINE

## 2024-06-20 PROCEDURE — G0152 HHCP-SERV OF OT,EA 15 MIN: HCPCS

## 2024-06-20 NOTE — TELEPHONE ENCOUNTER
Received request via: Patient    Was the patient seen in the last year in this department? Yes    Does the patient have an active prescription (recently filled or refills available) for medication(s) requested? No    [facility]     Does the patient have [facility] Plus and need 100 day supply (blood pressure, diabetes and cholesterol meds only)? Yes, quantity updated to 100 days

## 2024-06-21 ENCOUNTER — HOME CARE VISIT (OUTPATIENT)
Dept: HOME HEALTH SERVICES | Facility: HOME HEALTHCARE | Age: 69
End: 2024-06-21
Payer: MEDICARE

## 2024-06-21 VITALS
DIASTOLIC BLOOD PRESSURE: 60 MMHG | RESPIRATION RATE: 18 BRPM | TEMPERATURE: 97.6 F | HEART RATE: 76 BPM | SYSTOLIC BLOOD PRESSURE: 124 MMHG | OXYGEN SATURATION: 95 %

## 2024-06-21 PROCEDURE — G0299 HHS/HOSPICE OF RN EA 15 MIN: HCPCS

## 2024-06-21 ASSESSMENT — ENCOUNTER SYMPTOMS
PAIN: 1
HIGHEST PAIN SEVERITY IN PAST 24 HOURS: 0/10
STOOL FREQUENCY: DAILY
FATIGUES EASILY: 1
PAIN SEVERITY GOAL: 0/10
NAUSEA: DENIES
LAST BOWEL MOVEMENT: 67012
BOWEL PATTERN NORMAL: 1
LOWEST PAIN SEVERITY IN PAST 24 HOURS: 0/10
VOMITING: DENIES
PERSON REPORTING PAIN: PATIENT
MUSCLE WEAKNESS: 1

## 2024-06-21 ASSESSMENT — PATIENT HEALTH QUESTIONNAIRE - PHQ9: CLINICAL INTERPRETATION OF PHQ2 SCORE: 0

## 2024-06-23 VITALS
TEMPERATURE: 96.9 F | SYSTOLIC BLOOD PRESSURE: 124 MMHG | DIASTOLIC BLOOD PRESSURE: 70 MMHG | HEART RATE: 67 BPM | OXYGEN SATURATION: 94 % | RESPIRATION RATE: 18 BRPM

## 2024-06-23 ASSESSMENT — ENCOUNTER SYMPTOMS
PERSON REPORTING PAIN: PATIENT
DENIES PAIN: 1

## 2024-06-24 ENCOUNTER — HOME CARE VISIT (OUTPATIENT)
Dept: HOME HEALTH SERVICES | Facility: HOME HEALTHCARE | Age: 69
End: 2024-06-24
Payer: MEDICARE

## 2024-06-24 PROCEDURE — G0299 HHS/HOSPICE OF RN EA 15 MIN: HCPCS

## 2024-06-25 VITALS
OXYGEN SATURATION: 92 % | RESPIRATION RATE: 18 BRPM | HEART RATE: 77 BPM | DIASTOLIC BLOOD PRESSURE: 76 MMHG | TEMPERATURE: 98.7 F | SYSTOLIC BLOOD PRESSURE: 132 MMHG

## 2024-06-25 ASSESSMENT — ENCOUNTER SYMPTOMS
LAST BOWEL MOVEMENT: 67015
STOOL FREQUENCY: LESS THAN DAILY
PAIN SEVERITY GOAL: 0/10
POOR WOUND HEALING: 1
LOWEST PAIN SEVERITY IN PAST 24 HOURS: 1/10
PAIN LOCATION - PAIN QUALITY: ACHY
FATIGUE: 1
HIGHEST PAIN SEVERITY IN PAST 24 HOURS: 2/10
PAIN LOCATION - PAIN SEVERITY: 1/10
DRY SKIN: 1
LIMITED RANGE OF MOTION: 1
DIARRHEA: 1
PAIN: 1
PAIN LOCATION - PAIN DURATION: UNKOWN
LOWER EXTREMITY EDEMA: 1
MUSCLE WEAKNESS: 1
PAIN LOCATION - EXACERBATING FACTORS: MOVEMENT
PAIN LOCATION: PERINEAL AREA
PAIN LOCATION - PAIN FREQUENCY: FREQUENT
FATIGUES EASILY: 1
SUBJECTIVE PAIN PROGRESSION: WAXING AND WANING
SKIN CHANGES: 1
PERSON REPORTING PAIN: PATIENT

## 2024-06-25 ASSESSMENT — PATIENT HEALTH QUESTIONNAIRE - PHQ9: CLINICAL INTERPRETATION OF PHQ2 SCORE: 0

## 2024-06-26 ENCOUNTER — HOME CARE VISIT (OUTPATIENT)
Dept: HOME HEALTH SERVICES | Facility: HOME HEALTHCARE | Age: 69
End: 2024-06-26
Payer: MEDICARE

## 2024-06-26 VITALS
TEMPERATURE: 98.5 F | OXYGEN SATURATION: 94 % | HEART RATE: 70 BPM | SYSTOLIC BLOOD PRESSURE: 124 MMHG | DIASTOLIC BLOOD PRESSURE: 74 MMHG | RESPIRATION RATE: 18 BRPM

## 2024-06-26 PROCEDURE — G0299 HHS/HOSPICE OF RN EA 15 MIN: HCPCS

## 2024-06-26 SDOH — HEALTH STABILITY: PHYSICAL HEALTH: EXERCISE TYPE: PT DOEST EXERCISE

## 2024-06-26 ASSESSMENT — ENCOUNTER SYMPTOMS
PAIN LOCATION - PAIN SEVERITY: 2/10
EDEMA: 1
HIGHEST PAIN SEVERITY IN PAST 24 HOURS: 4/10
LIMITED RANGE OF MOTION: 1
DRY SKIN: 1
LOWER EXTREMITY EDEMA: 1
PAIN LOCATION: PERINEUM
FATIGUE: 1
PAIN SEVERITY GOAL: 0/10
MUSCLE WEAKNESS: 1
PAIN: 1
BOWEL PATTERN NORMAL: 1
PAIN LOCATION - PAIN FREQUENCY: FREQUENT
ARTHRALGIAS: 1
FATIGUES EASILY: 1
STOOL FREQUENCY: LESS THAN DAILY
PAIN LOCATION - PAIN QUALITY: ACHY
SUBJECTIVE PAIN PROGRESSION: UNCHANGED
PERSON REPORTING PAIN: PATIENT
LAST BOWEL MOVEMENT: 67016
SKIN CHANGES: 1

## 2024-06-26 ASSESSMENT — PATIENT HEALTH QUESTIONNAIRE - PHQ9: CLINICAL INTERPRETATION OF PHQ2 SCORE: 0

## 2024-06-26 ASSESSMENT — ACTIVITIES OF DAILY LIVING (ADL): AMBULATION ASSISTANCE: NON-AMBULATORY

## 2024-06-27 DIAGNOSIS — I10 BENIGN ESSENTIAL HTN: ICD-10-CM

## 2024-06-28 ENCOUNTER — HOME CARE VISIT (OUTPATIENT)
Dept: HOME HEALTH SERVICES | Facility: HOME HEALTHCARE | Age: 69
End: 2024-06-28
Payer: MEDICARE

## 2024-06-28 VITALS
DIASTOLIC BLOOD PRESSURE: 70 MMHG | HEART RATE: 74 BPM | TEMPERATURE: 98.3 F | SYSTOLIC BLOOD PRESSURE: 130 MMHG | OXYGEN SATURATION: 95 % | RESPIRATION RATE: 18 BRPM

## 2024-06-28 PROCEDURE — G0299 HHS/HOSPICE OF RN EA 15 MIN: HCPCS

## 2024-06-28 ASSESSMENT — ENCOUNTER SYMPTOMS
FATIGUE: 1
LIMITED RANGE OF MOTION: 1
LAST BOWEL MOVEMENT: 67019
BOWEL PATTERN NORMAL: 1
FATIGUES EASILY: 1
DRY SKIN: 1
PAIN LOCATION - PAIN SEVERITY: 2/10
STOOL FREQUENCY: DAILY
MUSCLE WEAKNESS: 1
PERSON REPORTING PAIN: PATIENT
LOWER EXTREMITY EDEMA: 1
PAIN: 1

## 2024-06-28 ASSESSMENT — PATIENT HEALTH QUESTIONNAIRE - PHQ9: CLINICAL INTERPRETATION OF PHQ2 SCORE: 0

## 2024-06-28 NOTE — TELEPHONE ENCOUNTER
Received request via: Patient    Was the patient seen in the last year in this department? Yes    Does the patient have an active prescription (recently filled or refills available) for medication(s) requested? No    Pharmacy Name: [facility]acy:   [facility]     Does the patient have half-way Plus and need 100 day supply (blood pressure, diabetes and cholesterol meds only)? Yes, quantity updated to 100 days

## 2024-06-30 RX ORDER — HYDROCHLOROTHIAZIDE 25 MG/1
25 TABLET ORAL DAILY
Qty: 90 TABLET | Refills: 2 | Status: SHIPPED | OUTPATIENT
Start: 2024-06-30 | End: 2025-03-27

## 2024-07-01 ENCOUNTER — HOME CARE VISIT (OUTPATIENT)
Dept: HOME HEALTH SERVICES | Facility: HOME HEALTHCARE | Age: 69
End: 2024-07-01
Payer: MEDICARE

## 2024-07-01 VITALS
HEART RATE: 74 BPM | TEMPERATURE: 97.6 F | SYSTOLIC BLOOD PRESSURE: 134 MMHG | OXYGEN SATURATION: 95 % | DIASTOLIC BLOOD PRESSURE: 70 MMHG | RESPIRATION RATE: 18 BRPM

## 2024-07-01 PROCEDURE — RXMED WILLOW AMBULATORY MEDICATION CHARGE: Performed by: FAMILY MEDICINE

## 2024-07-01 PROCEDURE — G0299 HHS/HOSPICE OF RN EA 15 MIN: HCPCS

## 2024-07-01 ASSESSMENT — ENCOUNTER SYMPTOMS
VOMITING: DENIES
MUSCLE WEAKNESS: 1
PAIN SEVERITY GOAL: 0/10
HIGHEST PAIN SEVERITY IN PAST 24 HOURS: 0/10
LAST BOWEL MOVEMENT: 67022
BOWEL PATTERN NORMAL: 1
LOWEST PAIN SEVERITY IN PAST 24 HOURS: 0/10
PERSON REPORTING PAIN: PATIENT
PAIN: 1
NAUSEA: DENIES
FATIGUES EASILY: 1
STOOL FREQUENCY: DAILY

## 2024-07-01 ASSESSMENT — PATIENT HEALTH QUESTIONNAIRE - PHQ9: CLINICAL INTERPRETATION OF PHQ2 SCORE: 0

## 2024-07-01 NOTE — PROGRESS NOTES
[phone] Patient is requesting a refill on [facility] Prom[facility] [address]   Rehab Progress Note     Date of Service: 3/23/2020  Chief Complaint: follow up BKA    Interval Events (Subjective)    Patient seen and examined today in his room.  He reports his diarrhea has resolved and his abdomen is feeling much better.  His stools are now soft.  He did have an ultrasound of his residual limb over the weekend that showed a fluid collection.  He continues on antibiotics.  He denies any pain in his residual limb or phantom pain.  He is set up for home evaluation tomorrow with plan discharge on Thursday.  He sees his surgeon again on Friday.  He has no new complaints.    Objective:  VITAL SIGNS: /78   Pulse 66   Temp 36 °C (96.8 °F) (Oral)   Resp 18   Ht 1.829 m (6')   Wt (!) 147 kg (324 lb 1.2 oz)   SpO2 95%   BMI 43.95 kg/m²   Gen: alert, no apparent distress, obese  CV: regular rate and rhythm, no murmurs, no peripheral edema  Resp: clear to ascultation bilaterally, normal respiratory effort  GI: soft, non-tender abdomen, bowel sounds present  Msk: left BKA with IPOP    Recent Results (from the past 72 hour(s))   CBC WITHOUT DIFFERENTIAL    Collection Time: 03/23/20  5:21 AM   Result Value Ref Range    WBC 10.7 4.8 - 10.8 K/uL    RBC 3.82 (L) 4.70 - 6.10 M/uL    Hemoglobin 10.8 (L) 14.0 - 18.0 g/dL    Hematocrit 33.7 (L) 42.0 - 52.0 %    MCV 88.2 81.4 - 97.8 fL    MCH 28.3 27.0 - 33.0 pg    MCHC 32.0 (L) 33.7 - 35.3 g/dL    RDW 46.2 35.9 - 50.0 fL    Platelet Count 284 164 - 446 K/uL    MPV 10.8 9.0 - 12.9 fL   Basic Metabolic Panel    Collection Time: 03/23/20  5:21 AM   Result Value Ref Range    Sodium 145 135 - 145 mmol/L    Potassium 3.8 3.6 - 5.5 mmol/L    Chloride 116 (H) 96 - 112 mmol/L    Co2 19 (L) 20 - 33 mmol/L    Glucose 112 (H) 65 - 99 mg/dL    Bun 12 8 - 22 mg/dL    Creatinine 1.09 0.50 - 1.40 mg/dL    Calcium 8.2 (L) 8.5 - 10.5 mg/dL    Anion Gap 10.0 7.0 - 16.0   ESTIMATED GFR    Collection Time: 03/23/20  5:21 AM   Result Value Ref Range    GFR If   >60 >60 mL/min/1.73 m 2    GFR If Non African American >60 >60 mL/min/1.73 m 2       Current Facility-Administered Medications   Medication Frequency   • cholestyramine (QUESTRAN,PREVALITE) 4 GM powder 4 g TID PRN   • simethicone (MYLICON) chewable tab 160 mg 4X/DAY WITH MEALS + NIGHTLY   • lactobacillus rhamnosus (CULTURELLE) capsule 1 Cap QDAY with Breakfast   • ciprofloxacin (CIPRO) tablet 500 mg Q12HRS   • senna-docusate (PERICOLACE or SENOKOT S) 8.6-50 MG per tablet 2 Tab BID PRN    And   • polyethylene glycol/lytes (MIRALAX) PACKET 1 Packet QDAY PRN    And   • magnesium hydroxide (MILK OF MAGNESIA) suspension 30 mL QDAY PRN    And   • bisacodyl (DULCOLAX) suppository 10 mg QDAY PRN   • metFORMIN (GLUCOPHAGE) tablet 250 mg BID WITH MEALS   • glucose 4 g chewable tablet 16 g Q15 MIN PRN    And   • dextrose 50% (D50W) injection 50 mL Q15 MIN PRN   • Respiratory Therapy Consult Continuous RT   • Pharmacy Consult Request ...Pain Management Review 1 Each PHARMACY TO DOSE   • acetaminophen (TYLENOL) tablet 650 mg Q4HRS PRN   • artificial tears ophthalmic solution 1 Drop PRN   • benzocaine-menthol (CEPACOL) lozenge 1 Lozenge Q2HRS PRN   • mag hydrox-al hydrox-simeth (MAALOX PLUS ES or MYLANTA DS) suspension 20 mL Q2HRS PRN   • ondansetron (ZOFRAN ODT) dispertab 4 mg 4X/DAY PRN    Or   • ondansetron (ZOFRAN) syringe/vial injection 4 mg 4X/DAY PRN   • traZODone (DESYREL) tablet 50 mg QHS PRN   • sodium chloride (OCEAN) 0.65 % nasal spray 2 Spray PRN   • hydrOXYzine HCl (ATARAX) tablet 50 mg Q6HRS PRN   • melatonin tablet 3 mg HS PRN   • atenolol (TENORMIN) tablet 100 mg Q DAY   • atorvastatin (LIPITOR) tablet 20 mg Q EVENING   • gabapentin (NEURONTIN) capsule 300 mg TID   • tramadol (ULTRAM) 50 MG tablet 50 mg Q4HRS PRN   • oxyCODONE immediate-release (ROXICODONE) tablet 5 mg Q4HRS PRN   • lactulose 20 GM/30ML solution 30 mL QDAY PRN   • docusate sodium (ENEMEEZ) enema 283 mg QDAY PRN   • rivaroxaban (XARELTO) tablet 20  mg PM MEAL   • morphine ER (MS CONTIN) tablet 15 mg Q12HRS   • oxyCODONE-acetaminophen (PERCOCET) 5-325 MG per tablet 1 Tab Q4HRS PRN       Orders Placed This Encounter   Procedures   • Diet Order Diabetic     Standing Status:   Standing     Number of Occurrences:   1     Order Specific Question:   Diet:     Answer:   Diabetic [3]       Radiology    3/22/2020 3:20 PM     HISTORY/REASON FOR EXAM:  R/O bka stump fluid collection  Swelling. Pain.     TECHNIQUE/EXAM DESCRIPTION AND NUMBER OF VIEWS:  Ultrasound of the soft tissues of the left lower extremity     COMPARISON: 3/15/2020     FINDINGS:  There is edema within the soft tissues of the left lower extremity. There are fluid collections within the soft tissues measuring up to approximately 3.4 x 1 x 4.9 cm. Debris is seen within the fluid collection.     IMPRESSION:     Fluid collections within the soft tissues measuring up to 3.4 x 1 x 4.9 cm fluid may represent postsurgical seromas, hematomas or abscesses.     Edema within the soft tissues is noted.      Assessment:  Active Hospital Problems    Diagnosis   • *Status post below-knee amputation of left lower extremity (HCC)   • Leukocytosis   • Vitamin D deficiency   • Impaired mobility and ADLs   • Diabetes mellitus due to underlying condition with hyperglycemia (HCC)   • Peripheral neuropathy   • Obstructive sleep apnea syndrome   • Benign essential HTN   • Mixed hyperlipidemia   • History of DVT (deep vein thrombosis)     This patient is a 64 y.o. male admitted for acute inpatient rehabilitation with Status post below-knee amputation of left lower extremity (HCC).    I led and attended the weekly conference, and agree with the IDT conference documentation and plan of care as noted below.    Date of conference: 3/23/2020    Goals and barriers: See IDT note.    Therapy update 3/23/2020  Independent eating  Independent grooming  Modified Independent bathing  Modified Independent upper body dressing  Modified  Independent lower body dressing  Supervision toileting  Min assistbladder  Min assist bowel  Supervision bed/chair transfer  Supervision toilet transfer  Supervision tub/shower transfer  Total assist ambulation  Supervision wheelchair propulsion  Not tested stairs  Modified Independent comprehension  Modified Independent expression  Independent social interaction  Supervision problem solving  Supervision memory    CM/social support: siblings supportive    Anticipated DC date: 3/26/2020    Home health: PT/OT/RN    Equip: WC, bariatric FWW    Follow up: PCP, surgery     Medical Decision Making and Plan:    Left BKA  3/6 Dr. Ceja  Daily wound care  PT/OT, 1.5 hr each discipline, 5 days per week  Outpatient follow up with surgery 3/17 - no concern for infection, has another apt on 3/27  Repeat US with fluid collection, continue antibiotics, surgery follow up     Chronic opiate use  Continue home medications  Morphine ER 15 mg BID  Percocets 5-325 PRN      Peripheral Neuropathy  Phantom sensation  Continue gabapentin  Currently well controlled     Opioid induced constipation, resolved  Loose stools, resolved  As needed bowel meds  Last BM 3/23    Bladder  Checked PVRs  Not retaining  ICP for over 400 cc  Scheduled toileting    DVT prophylaxis  Xarelto     Appreciate the assistance of hospitalist with his medical co-morbidities:     Hypertension, atenolol  Diabetes with hyperglycemia, metformin, SSI  Sleep apnea, CPAP  Hyperlipidemia, statin  History of DVT, Xarelto  Left arm erythema/edema/cellulitis, s/p Bactrim  Leukocytosis, resolved, US with fluid collection, continue antibiotics  Anemia  Vit D deficiency, restart supplementation  Loose stools, resolved, thought secondary to antibiotic side effect, c diff negative    Total time:  40 minutes.  I spent greater than 50% of the time for patient care, counseling, and coordination on this date, including patient face-to face time, unit/floor time with review of  records/pertinent lab data and studies, as well as discussing diagnostic evaluation/work up, planned therapeutic interventions, and future disposition of care, as per the interval events/subjective and the assessment and plan as noted above.      Sophia Beck M.D.   Physical Medicine and Rehabilitation

## 2024-07-02 ENCOUNTER — HOME CARE VISIT (OUTPATIENT)
Dept: HOME HEALTH SERVICES | Facility: HOME HEALTHCARE | Age: 69
End: 2024-07-02
Payer: MEDICARE

## 2024-07-02 VITALS
SYSTOLIC BLOOD PRESSURE: 122 MMHG | OXYGEN SATURATION: 95 % | HEART RATE: 70 BPM | TEMPERATURE: 97.4 F | DIASTOLIC BLOOD PRESSURE: 68 MMHG | RESPIRATION RATE: 18 BRPM

## 2024-07-02 PROCEDURE — G0152 HHCP-SERV OF OT,EA 15 MIN: HCPCS

## 2024-07-02 ASSESSMENT — ENCOUNTER SYMPTOMS
PAIN: 1
PAIN LOCATION - EXACERBATING FACTORS: PRESSURE
PAIN LOCATION - PAIN QUALITY: ACHING
PAIN LOCATION - PAIN FREQUENCY: FREQUENT
PAIN LOCATION: PERINEUM
PAIN LOCATION - PAIN DURATION: YEAR
PERSON REPORTING PAIN: PATIENT
PAIN LOCATION - RELIEVING FACTORS: PRESSURE RELIEF
PAIN LOCATION - PAIN SEVERITY: 3/10

## 2024-07-03 ENCOUNTER — HOME CARE VISIT (OUTPATIENT)
Dept: HOME HEALTH SERVICES | Facility: HOME HEALTHCARE | Age: 69
End: 2024-07-03
Payer: MEDICARE

## 2024-07-03 ENCOUNTER — PHARMACY VISIT (OUTPATIENT)
Dept: PHARMACY | Facility: MEDICAL CENTER | Age: 69
End: 2024-07-03
Payer: COMMERCIAL

## 2024-07-03 VITALS
TEMPERATURE: 97.5 F | DIASTOLIC BLOOD PRESSURE: 60 MMHG | RESPIRATION RATE: 18 BRPM | HEART RATE: 61 BPM | SYSTOLIC BLOOD PRESSURE: 118 MMHG | OXYGEN SATURATION: 95 %

## 2024-07-03 PROCEDURE — G0299 HHS/HOSPICE OF RN EA 15 MIN: HCPCS

## 2024-07-04 SDOH — HEALTH STABILITY: PHYSICAL HEALTH: EXERCISE TYPE: HOME EXERCISE PROGRAM

## 2024-07-04 ASSESSMENT — ENCOUNTER SYMPTOMS
LOWEST PAIN SEVERITY IN PAST 24 HOURS: 0/10
FATIGUES EASILY: 1
PAIN SEVERITY GOAL: 0/10
HIGHEST PAIN SEVERITY IN PAST 24 HOURS: 0/10
FORGETFULNESS: 1
PAIN: 1
NAUSEA: DENIES
PERSON REPORTING PAIN: PATIENT
SUBJECTIVE PAIN PROGRESSION: UNCHANGED
VOMITING: DENIES
ASSOCIATED SYMPTOMS: DENIES

## 2024-07-04 ASSESSMENT — ACTIVITIES OF DAILY LIVING (ADL)
DRESSING_UB_CURRENT_FUNCTION: INDEPENDENT
AMBULATION ASSISTANCE: 1
AMBULATION ASSISTANCE: INDEPENDENT
DRESSING_LB_CURRENT_FUNCTION: INDEPENDENT
TOILETING: 1
USING THE TELPHONE: INDEPENDENT
TELEPHONE USE ASSESSED: 1
ORAL_CARE_CURRENT_FUNCTION: INDEPENDENT
ORAL_CARE_ASSESSED: 1
TOILETING: INDEPENDENT
OASIS_M1830: 03

## 2024-07-04 ASSESSMENT — PATIENT HEALTH QUESTIONNAIRE - PHQ9: CLINICAL INTERPRETATION OF PHQ2 SCORE: 0

## 2024-07-05 ENCOUNTER — HOME CARE VISIT (OUTPATIENT)
Dept: HOME HEALTH SERVICES | Facility: HOME HEALTHCARE | Age: 69
End: 2024-07-05
Payer: MEDICARE

## 2024-07-05 VITALS
DIASTOLIC BLOOD PRESSURE: 70 MMHG | HEART RATE: 74 BPM | SYSTOLIC BLOOD PRESSURE: 128 MMHG | RESPIRATION RATE: 18 BRPM | OXYGEN SATURATION: 95 % | TEMPERATURE: 97.6 F

## 2024-07-05 PROCEDURE — G0299 HHS/HOSPICE OF RN EA 15 MIN: HCPCS

## 2024-07-05 ASSESSMENT — ENCOUNTER SYMPTOMS
PERSON REPORTING PAIN: PATIENT
STOOL FREQUENCY: DAILY
PAIN: 1
NAUSEA: DENIES
HIGHEST PAIN SEVERITY IN PAST 24 HOURS: 0/10
LAST BOWEL MOVEMENT: 67026
BOWEL PATTERN NORMAL: 1
MUSCLE WEAKNESS: 1
FATIGUES EASILY: 1
LOWEST PAIN SEVERITY IN PAST 24 HOURS: 0/10
PAIN SEVERITY GOAL: 0/10
VOMITING: DENIES

## 2024-07-05 ASSESSMENT — PATIENT HEALTH QUESTIONNAIRE - PHQ9: CLINICAL INTERPRETATION OF PHQ2 SCORE: 0

## 2024-07-08 ENCOUNTER — DOCUMENTATION (OUTPATIENT)
Dept: MEDICAL GROUP | Facility: PHYSICIAN GROUP | Age: 69
End: 2024-07-08

## 2024-07-08 ENCOUNTER — HOME CARE VISIT (OUTPATIENT)
Dept: HOME HEALTH SERVICES | Facility: HOME HEALTHCARE | Age: 69
End: 2024-07-08
Payer: MEDICARE

## 2024-07-08 VITALS
RESPIRATION RATE: 18 BRPM | OXYGEN SATURATION: 95 % | DIASTOLIC BLOOD PRESSURE: 60 MMHG | SYSTOLIC BLOOD PRESSURE: 124 MMHG | TEMPERATURE: 97.6 F | HEART RATE: 74 BPM

## 2024-07-08 DIAGNOSIS — I48.91 ATRIAL FIBRILLATION, UNSPECIFIED TYPE (HCC): ICD-10-CM

## 2024-07-08 PROCEDURE — G0152 HHCP-SERV OF OT,EA 15 MIN: HCPCS

## 2024-07-08 PROCEDURE — G0179 MD RECERTIFICATION HHA PT: HCPCS | Performed by: FAMILY MEDICINE

## 2024-07-08 PROCEDURE — G0299 HHS/HOSPICE OF RN EA 15 MIN: HCPCS

## 2024-07-08 PROCEDURE — RXMED WILLOW AMBULATORY MEDICATION CHARGE: Performed by: FAMILY MEDICINE

## 2024-07-08 PROCEDURE — 665998 HH PPS REVENUE CREDIT

## 2024-07-08 PROCEDURE — 665003 FOLLOW UP-HOME HEALTH

## 2024-07-08 PROCEDURE — 665999 HH PPS REVENUE DEBIT

## 2024-07-08 SDOH — HEALTH STABILITY: PHYSICAL HEALTH: EXERCISE TYPE: HOME EXERCISE PROGRAM

## 2024-07-08 ASSESSMENT — ENCOUNTER SYMPTOMS
PAIN: 1
STOOL FREQUENCY: DAILY
PAIN SEVERITY GOAL: 0/10
BOWEL PATTERN NORMAL: 1
LAST BOWEL MOVEMENT: 67029
MUSCLE WEAKNESS: 1
LOWEST PAIN SEVERITY IN PAST 24 HOURS: 0/10
VOMITING: DENIES
NAUSEA: DENIES
HIGHEST PAIN SEVERITY IN PAST 24 HOURS: 0/10
PERSON REPORTING PAIN: PATIENT
FATIGUES EASILY: 1

## 2024-07-08 ASSESSMENT — PATIENT HEALTH QUESTIONNAIRE - PHQ9: CLINICAL INTERPRETATION OF PHQ2 SCORE: 0

## 2024-07-09 ENCOUNTER — PHARMACY VISIT (OUTPATIENT)
Dept: PHARMACY | Facility: MEDICAL CENTER | Age: 69
End: 2024-07-09
Payer: COMMERCIAL

## 2024-07-09 PROCEDURE — 665998 HH PPS REVENUE CREDIT

## 2024-07-09 PROCEDURE — 665999 HH PPS REVENUE DEBIT

## 2024-07-10 ENCOUNTER — HOME CARE VISIT (OUTPATIENT)
Dept: HOME HEALTH SERVICES | Facility: HOME HEALTHCARE | Age: 69
End: 2024-07-10
Payer: MEDICARE

## 2024-07-10 VITALS
RESPIRATION RATE: 18 BRPM | SYSTOLIC BLOOD PRESSURE: 124 MMHG | DIASTOLIC BLOOD PRESSURE: 60 MMHG | TEMPERATURE: 97.6 F | HEART RATE: 74 BPM | OXYGEN SATURATION: 96 %

## 2024-07-10 PROCEDURE — 665998 HH PPS REVENUE CREDIT

## 2024-07-10 PROCEDURE — 665999 HH PPS REVENUE DEBIT

## 2024-07-10 PROCEDURE — G0299 HHS/HOSPICE OF RN EA 15 MIN: HCPCS

## 2024-07-10 ASSESSMENT — ENCOUNTER SYMPTOMS
LAST BOWEL MOVEMENT: 67031
VOMITING: DENIES
STOOL FREQUENCY: DAILY
PAIN SEVERITY GOAL: 0/10
DENIES PAIN: 1
PAIN: 1
NAUSEA: DENIES
LOWEST PAIN SEVERITY IN PAST 24 HOURS: 0/10
BOWEL PATTERN NORMAL: 1
PERSON REPORTING PAIN: PATIENT
FATIGUES EASILY: 1
HIGHEST PAIN SEVERITY IN PAST 24 HOURS: 0/10
MUSCLE WEAKNESS: 1

## 2024-07-10 ASSESSMENT — PATIENT HEALTH QUESTIONNAIRE - PHQ9: CLINICAL INTERPRETATION OF PHQ2 SCORE: 0

## 2024-07-11 PROCEDURE — 665998 HH PPS REVENUE CREDIT

## 2024-07-11 PROCEDURE — 665999 HH PPS REVENUE DEBIT

## 2024-07-12 ENCOUNTER — HOME CARE VISIT (OUTPATIENT)
Dept: HOME HEALTH SERVICES | Facility: HOME HEALTHCARE | Age: 69
End: 2024-07-12
Payer: MEDICARE

## 2024-07-12 VITALS
TEMPERATURE: 97.7 F | RESPIRATION RATE: 18 BRPM | DIASTOLIC BLOOD PRESSURE: 80 MMHG | HEART RATE: 70 BPM | OXYGEN SATURATION: 93 % | SYSTOLIC BLOOD PRESSURE: 138 MMHG

## 2024-07-12 PROCEDURE — G0299 HHS/HOSPICE OF RN EA 15 MIN: HCPCS

## 2024-07-12 PROCEDURE — 665999 HH PPS REVENUE DEBIT

## 2024-07-12 PROCEDURE — 665998 HH PPS REVENUE CREDIT

## 2024-07-12 ASSESSMENT — ENCOUNTER SYMPTOMS
MUSCLE WEAKNESS: 1
PAIN: 1
VOMITING: DENIES
PERSON REPORTING PAIN: PATIENT
NAUSEA: DENIES
LAST BOWEL MOVEMENT: 67033
LOWEST PAIN SEVERITY IN PAST 24 HOURS: 0/10
BOWEL PATTERN NORMAL: 1
STOOL FREQUENCY: DAILY
FATIGUES EASILY: 1
HIGHEST PAIN SEVERITY IN PAST 24 HOURS: 0/10
PAIN SEVERITY GOAL: 0/10

## 2024-07-12 ASSESSMENT — PATIENT HEALTH QUESTIONNAIRE - PHQ9: CLINICAL INTERPRETATION OF PHQ2 SCORE: 0

## 2024-07-13 PROCEDURE — 665999 HH PPS REVENUE DEBIT

## 2024-07-13 PROCEDURE — 665998 HH PPS REVENUE CREDIT

## 2024-07-14 PROCEDURE — 665998 HH PPS REVENUE CREDIT

## 2024-07-14 PROCEDURE — 665999 HH PPS REVENUE DEBIT

## 2024-07-15 ENCOUNTER — HOME CARE VISIT (OUTPATIENT)
Dept: HOME HEALTH SERVICES | Facility: HOME HEALTHCARE | Age: 69
End: 2024-07-15
Payer: MEDICARE

## 2024-07-15 VITALS
RESPIRATION RATE: 18 BRPM | TEMPERATURE: 97.6 F | DIASTOLIC BLOOD PRESSURE: 60 MMHG | SYSTOLIC BLOOD PRESSURE: 128 MMHG | OXYGEN SATURATION: 95 % | HEART RATE: 74 BPM

## 2024-07-15 PROCEDURE — 665998 HH PPS REVENUE CREDIT

## 2024-07-15 PROCEDURE — G0299 HHS/HOSPICE OF RN EA 15 MIN: HCPCS

## 2024-07-15 PROCEDURE — 665999 HH PPS REVENUE DEBIT

## 2024-07-15 ASSESSMENT — ENCOUNTER SYMPTOMS
PERSON REPORTING PAIN: PATIENT
STOOL FREQUENCY: DAILY
PAIN SEVERITY GOAL: 0/10
NAUSEA: DENIES
LAST BOWEL MOVEMENT: 67036
MUSCLE WEAKNESS: 1
VOMITING: DENIES
BOWEL PATTERN NORMAL: 1
HIGHEST PAIN SEVERITY IN PAST 24 HOURS: 0/10
FATIGUES EASILY: 1
PAIN: 1
LOWEST PAIN SEVERITY IN PAST 24 HOURS: 0/10

## 2024-07-15 ASSESSMENT — PATIENT HEALTH QUESTIONNAIRE - PHQ9: CLINICAL INTERPRETATION OF PHQ2 SCORE: 0

## 2024-07-16 PROCEDURE — 665999 HH PPS REVENUE DEBIT

## 2024-07-16 PROCEDURE — 665998 HH PPS REVENUE CREDIT

## 2024-07-17 ENCOUNTER — PATIENT OUTREACH (OUTPATIENT)
Dept: HEALTH INFORMATION MANAGEMENT | Facility: OTHER | Age: 69
End: 2024-07-17
Payer: MEDICARE

## 2024-07-17 ENCOUNTER — HOME CARE VISIT (OUTPATIENT)
Dept: HOME HEALTH SERVICES | Facility: HOME HEALTHCARE | Age: 69
End: 2024-07-17
Payer: MEDICARE

## 2024-07-17 VITALS
TEMPERATURE: 97.5 F | OXYGEN SATURATION: 96 % | SYSTOLIC BLOOD PRESSURE: 138 MMHG | DIASTOLIC BLOOD PRESSURE: 80 MMHG | RESPIRATION RATE: 18 BRPM | HEART RATE: 77 BPM

## 2024-07-17 DIAGNOSIS — E11.51 DM (DIABETES MELLITUS) TYPE II, CONTROLLED, WITH PERIPHERAL VASCULAR DISORDER (HCC): ICD-10-CM

## 2024-07-17 DIAGNOSIS — I10 BENIGN ESSENTIAL HTN: ICD-10-CM

## 2024-07-17 DIAGNOSIS — N18.31 STAGE 3A CHRONIC KIDNEY DISEASE: ICD-10-CM

## 2024-07-17 PROCEDURE — 665999 HH PPS REVENUE DEBIT

## 2024-07-17 PROCEDURE — G0152 HHCP-SERV OF OT,EA 15 MIN: HCPCS

## 2024-07-17 PROCEDURE — G0299 HHS/HOSPICE OF RN EA 15 MIN: HCPCS

## 2024-07-17 PROCEDURE — 665998 HH PPS REVENUE CREDIT

## 2024-07-17 ASSESSMENT — ENCOUNTER SYMPTOMS
PAIN SEVERITY GOAL: 0/10
VOMITING: DENIES
HIGHEST PAIN SEVERITY IN PAST 24 HOURS: 0/10
PERSON REPORTING PAIN: PATIENT
DENIES PAIN: 1
FATIGUES EASILY: 1
NAUSEA: DENIES
BOWEL PATTERN NORMAL: 1
PERSON REPORTING PAIN: PATIENT
LOWEST PAIN SEVERITY IN PAST 24 HOURS: 0/10
PAIN: 1
STOOL FREQUENCY: DAILY
LAST BOWEL MOVEMENT: 67038

## 2024-07-17 ASSESSMENT — PATIENT HEALTH QUESTIONNAIRE - PHQ9: CLINICAL INTERPRETATION OF PHQ2 SCORE: 0

## 2024-07-18 PROCEDURE — 665998 HH PPS REVENUE CREDIT

## 2024-07-18 PROCEDURE — 665999 HH PPS REVENUE DEBIT

## 2024-07-19 ENCOUNTER — HOME CARE VISIT (OUTPATIENT)
Dept: HOME HEALTH SERVICES | Facility: HOME HEALTHCARE | Age: 69
End: 2024-07-19
Payer: MEDICARE

## 2024-07-19 VITALS
TEMPERATURE: 97.8 F | DIASTOLIC BLOOD PRESSURE: 76 MMHG | SYSTOLIC BLOOD PRESSURE: 132 MMHG | RESPIRATION RATE: 18 BRPM | HEART RATE: 70 BPM | OXYGEN SATURATION: 94 %

## 2024-07-19 PROCEDURE — G0299 HHS/HOSPICE OF RN EA 15 MIN: HCPCS

## 2024-07-19 PROCEDURE — 665998 HH PPS REVENUE CREDIT

## 2024-07-19 PROCEDURE — 665999 HH PPS REVENUE DEBIT

## 2024-07-19 ASSESSMENT — ENCOUNTER SYMPTOMS
STOOL FREQUENCY: DAILY
VOMITING: DENIES
LOWEST PAIN SEVERITY IN PAST 24 HOURS: 0/10
LAST BOWEL MOVEMENT: 67040
PAIN SEVERITY GOAL: 0/10
BOWEL PATTERN NORMAL: 1
PAIN: 1
FATIGUES EASILY: 1
NAUSEA: DENIES
ASSOCIATED SYMPTOMS: DENIES
HIGHEST PAIN SEVERITY IN PAST 24 HOURS: 0/10
SUBJECTIVE PAIN PROGRESSION: UNCHANGED

## 2024-07-19 ASSESSMENT — PATIENT HEALTH QUESTIONNAIRE - PHQ9: CLINICAL INTERPRETATION OF PHQ2 SCORE: 0

## 2024-07-20 PROCEDURE — 665998 HH PPS REVENUE CREDIT

## 2024-07-20 PROCEDURE — 665999 HH PPS REVENUE DEBIT

## 2024-07-21 PROCEDURE — 665999 HH PPS REVENUE DEBIT

## 2024-07-21 PROCEDURE — 665998 HH PPS REVENUE CREDIT

## 2024-07-22 ENCOUNTER — HOME CARE VISIT (OUTPATIENT)
Dept: HOME HEALTH SERVICES | Facility: HOME HEALTHCARE | Age: 69
End: 2024-07-22
Payer: MEDICARE

## 2024-07-22 ENCOUNTER — PATIENT MESSAGE (OUTPATIENT)
Dept: BEHAVIORAL HEALTH | Facility: MEDICAL CENTER | Age: 69
End: 2024-07-22
Payer: MEDICARE

## 2024-07-22 VITALS
OXYGEN SATURATION: 95 % | SYSTOLIC BLOOD PRESSURE: 124 MMHG | RESPIRATION RATE: 18 BRPM | HEART RATE: 71 BPM | DIASTOLIC BLOOD PRESSURE: 70 MMHG | TEMPERATURE: 97.4 F

## 2024-07-22 DIAGNOSIS — M10.9 GOUT, UNSPECIFIED CAUSE, UNSPECIFIED CHRONICITY, UNSPECIFIED SITE: ICD-10-CM

## 2024-07-22 PROCEDURE — 665999 HH PPS REVENUE DEBIT

## 2024-07-22 PROCEDURE — G0299 HHS/HOSPICE OF RN EA 15 MIN: HCPCS

## 2024-07-22 PROCEDURE — 665998 HH PPS REVENUE CREDIT

## 2024-07-22 PROCEDURE — G0152 HHCP-SERV OF OT,EA 15 MIN: HCPCS

## 2024-07-22 RX ORDER — ALLOPURINOL 100 MG/1
100 TABLET ORAL DAILY
Qty: 90 TABLET | Refills: 2 | Status: SHIPPED | OUTPATIENT
Start: 2024-07-22

## 2024-07-22 SDOH — HEALTH STABILITY: PHYSICAL HEALTH: EXERCISE TYPE: HOME EXERCISE PROGRAM

## 2024-07-22 ASSESSMENT — ENCOUNTER SYMPTOMS
FATIGUES EASILY: 1
PAIN SEVERITY GOAL: 0/10
LOWEST PAIN SEVERITY IN PAST 24 HOURS: 0/10
PERSON REPORTING PAIN: PATIENT
LAST BOWEL MOVEMENT: 67043
BOWEL PATTERN NORMAL: 1
PAIN: 1
NAUSEA: DENIES
HIGHEST PAIN SEVERITY IN PAST 24 HOURS: 0/10
VOMITING: DENIES
STOOL FREQUENCY: DAILY
MUSCLE WEAKNESS: 1

## 2024-07-22 ASSESSMENT — PATIENT HEALTH QUESTIONNAIRE - PHQ9: CLINICAL INTERPRETATION OF PHQ2 SCORE: 0

## 2024-07-23 ENCOUNTER — PHARMACY VISIT (OUTPATIENT)
Dept: PHARMACY | Facility: MEDICAL CENTER | Age: 69
End: 2024-07-23
Payer: COMMERCIAL

## 2024-07-23 PROCEDURE — 665998 HH PPS REVENUE CREDIT

## 2024-07-23 PROCEDURE — 665999 HH PPS REVENUE DEBIT

## 2024-07-23 PROCEDURE — RXMED WILLOW AMBULATORY MEDICATION CHARGE: Performed by: FAMILY MEDICINE

## 2024-07-24 ENCOUNTER — HOME CARE VISIT (OUTPATIENT)
Dept: HOME HEALTH SERVICES | Facility: HOME HEALTHCARE | Age: 69
End: 2024-07-24
Payer: MEDICARE

## 2024-07-24 VITALS
DIASTOLIC BLOOD PRESSURE: 64 MMHG | SYSTOLIC BLOOD PRESSURE: 128 MMHG | HEART RATE: 71 BPM | RESPIRATION RATE: 18 BRPM | TEMPERATURE: 97.8 F | OXYGEN SATURATION: 93 %

## 2024-07-24 PROCEDURE — 665998 HH PPS REVENUE CREDIT

## 2024-07-24 PROCEDURE — G0299 HHS/HOSPICE OF RN EA 15 MIN: HCPCS

## 2024-07-24 PROCEDURE — 665999 HH PPS REVENUE DEBIT

## 2024-07-24 ASSESSMENT — ENCOUNTER SYMPTOMS
VOMITING: DENIES
LOWEST PAIN SEVERITY IN PAST 24 HOURS: 0/10
BOWEL PATTERN NORMAL: 1
STOOL FREQUENCY: DAILY
LAST BOWEL MOVEMENT: 67044
NAUSEA: DENIES
PAIN: 1
HIGHEST PAIN SEVERITY IN PAST 24 HOURS: 0/10
MUSCLE WEAKNESS: 1
PERSON REPORTING PAIN: PATIENT
PAIN SEVERITY GOAL: 0/10

## 2024-07-24 ASSESSMENT — PATIENT HEALTH QUESTIONNAIRE - PHQ9: CLINICAL INTERPRETATION OF PHQ2 SCORE: 0

## 2024-07-25 PROCEDURE — 665998 HH PPS REVENUE CREDIT

## 2024-07-25 PROCEDURE — 665999 HH PPS REVENUE DEBIT

## 2024-07-25 ASSESSMENT — ENCOUNTER SYMPTOMS
PERSON REPORTING PAIN: PATIENT
DENIES PAIN: 1

## 2024-07-26 ENCOUNTER — HOME CARE VISIT (OUTPATIENT)
Dept: HOME HEALTH SERVICES | Facility: HOME HEALTHCARE | Age: 69
End: 2024-07-26
Payer: MEDICARE

## 2024-07-26 PROCEDURE — 665998 HH PPS REVENUE CREDIT

## 2024-07-26 PROCEDURE — G0299 HHS/HOSPICE OF RN EA 15 MIN: HCPCS

## 2024-07-26 PROCEDURE — 665999 HH PPS REVENUE DEBIT

## 2024-07-27 VITALS
HEART RATE: 62 BPM | OXYGEN SATURATION: 92 % | DIASTOLIC BLOOD PRESSURE: 70 MMHG | TEMPERATURE: 97.5 F | RESPIRATION RATE: 18 BRPM | SYSTOLIC BLOOD PRESSURE: 132 MMHG

## 2024-07-27 PROCEDURE — 665998 HH PPS REVENUE CREDIT

## 2024-07-27 PROCEDURE — 665999 HH PPS REVENUE DEBIT

## 2024-07-27 ASSESSMENT — ENCOUNTER SYMPTOMS
NAUSEA: DENIES
PAIN SEVERITY GOAL: 0/10
BOWEL PATTERN NORMAL: 1
LAST BOWEL MOVEMENT: 67047
PAIN: 1
MUSCLE WEAKNESS: 1
HIGHEST PAIN SEVERITY IN PAST 24 HOURS: 0/10
FATIGUES EASILY: 1
STOOL FREQUENCY: DAILY
FORGETFULNESS: 1
PERSON REPORTING PAIN: PATIENT
VOMITING: DENIES

## 2024-07-27 ASSESSMENT — PATIENT HEALTH QUESTIONNAIRE - PHQ9: CLINICAL INTERPRETATION OF PHQ2 SCORE: 0

## 2024-07-28 PROCEDURE — 665999 HH PPS REVENUE DEBIT

## 2024-07-28 PROCEDURE — 665998 HH PPS REVENUE CREDIT

## 2024-07-29 ENCOUNTER — HOME CARE VISIT (OUTPATIENT)
Dept: HOME HEALTH SERVICES | Facility: HOME HEALTHCARE | Age: 69
End: 2024-07-29
Payer: MEDICARE

## 2024-07-29 VITALS
SYSTOLIC BLOOD PRESSURE: 130 MMHG | TEMPERATURE: 97.5 F | OXYGEN SATURATION: 96 % | DIASTOLIC BLOOD PRESSURE: 64 MMHG | RESPIRATION RATE: 18 BRPM | HEART RATE: 62 BPM

## 2024-07-29 PROCEDURE — 665998 HH PPS REVENUE CREDIT

## 2024-07-29 PROCEDURE — G0152 HHCP-SERV OF OT,EA 15 MIN: HCPCS

## 2024-07-29 PROCEDURE — 665999 HH PPS REVENUE DEBIT

## 2024-07-29 PROCEDURE — G0299 HHS/HOSPICE OF RN EA 15 MIN: HCPCS

## 2024-07-29 ASSESSMENT — ENCOUNTER SYMPTOMS
NAUSEA: DENIES
VOMITING: DENIES
PERSON REPORTING PAIN: PATIENT
PAIN: 1
BOWEL PATTERN NORMAL: 1
HIGHEST PAIN SEVERITY IN PAST 24 HOURS: 0/10
PAIN SEVERITY GOAL: 0/10
LOWEST PAIN SEVERITY IN PAST 24 HOURS: 0/10
LAST BOWEL MOVEMENT: 67050
STOOL FREQUENCY: DAILY

## 2024-07-29 ASSESSMENT — PATIENT HEALTH QUESTIONNAIRE - PHQ9: CLINICAL INTERPRETATION OF PHQ2 SCORE: 0

## 2024-07-30 PROCEDURE — 665999 HH PPS REVENUE DEBIT

## 2024-07-30 PROCEDURE — 665998 HH PPS REVENUE CREDIT

## 2024-07-30 ASSESSMENT — ENCOUNTER SYMPTOMS
PERSON REPORTING PAIN: PATIENT
DENIES PAIN: 1

## 2024-07-31 ENCOUNTER — HOME CARE VISIT (OUTPATIENT)
Dept: HOME HEALTH SERVICES | Facility: HOME HEALTHCARE | Age: 69
End: 2024-07-31
Payer: MEDICARE

## 2024-07-31 VITALS
TEMPERATURE: 97.9 F | OXYGEN SATURATION: 98 % | RESPIRATION RATE: 18 BRPM | SYSTOLIC BLOOD PRESSURE: 134 MMHG | DIASTOLIC BLOOD PRESSURE: 70 MMHG | HEART RATE: 68 BPM

## 2024-07-31 PROCEDURE — G0299 HHS/HOSPICE OF RN EA 15 MIN: HCPCS

## 2024-07-31 ASSESSMENT — ENCOUNTER SYMPTOMS
FATIGUES EASILY: 1
MUSCLE WEAKNESS: 1
LAST BOWEL MOVEMENT: 67052
HIGHEST PAIN SEVERITY IN PAST 24 HOURS: 0/10
NAUSEA: DENIES
LOWEST PAIN SEVERITY IN PAST 24 HOURS: 0/10
PERSON REPORTING PAIN: PATIENT
PAIN: 1
PAIN SEVERITY GOAL: 0/10
STOOL FREQUENCY: DAILY
VOMITING: DENIES
BOWEL PATTERN NORMAL: 1

## 2024-07-31 ASSESSMENT — PATIENT HEALTH QUESTIONNAIRE - PHQ9: CLINICAL INTERPRETATION OF PHQ2 SCORE: 0

## 2024-08-02 ENCOUNTER — HOME CARE VISIT (OUTPATIENT)
Dept: HOME HEALTH SERVICES | Facility: HOME HEALTHCARE | Age: 69
End: 2024-08-02
Payer: MEDICARE

## 2024-08-02 PROCEDURE — G0299 HHS/HOSPICE OF RN EA 15 MIN: HCPCS

## 2024-08-03 VITALS
RESPIRATION RATE: 18 BRPM | TEMPERATURE: 97.6 F | SYSTOLIC BLOOD PRESSURE: 110 MMHG | HEART RATE: 68 BPM | OXYGEN SATURATION: 95 % | DIASTOLIC BLOOD PRESSURE: 60 MMHG

## 2024-08-03 ASSESSMENT — ENCOUNTER SYMPTOMS
STOOL FREQUENCY: DAILY
LOWEST PAIN SEVERITY IN PAST 24 HOURS: 0/10
MUSCLE WEAKNESS: 1
FATIGUES EASILY: 1
PERSON REPORTING PAIN: PATIENT
PAIN SEVERITY GOAL: 0/10
LAST BOWEL MOVEMENT: 67054
PAIN: 1
VOMITING: DENIES
HIGHEST PAIN SEVERITY IN PAST 24 HOURS: 0/10
BOWEL PATTERN NORMAL: 1
NAUSEA: DENIES

## 2024-08-03 ASSESSMENT — PATIENT HEALTH QUESTIONNAIRE - PHQ9: CLINICAL INTERPRETATION OF PHQ2 SCORE: 0

## 2024-08-05 ENCOUNTER — HOME CARE VISIT (OUTPATIENT)
Dept: HOME HEALTH SERVICES | Facility: HOME HEALTHCARE | Age: 69
End: 2024-08-05
Payer: MEDICARE

## 2024-08-05 PROCEDURE — G0299 HHS/HOSPICE OF RN EA 15 MIN: HCPCS

## 2024-08-06 VITALS
HEART RATE: 68 BPM | DIASTOLIC BLOOD PRESSURE: 60 MMHG | SYSTOLIC BLOOD PRESSURE: 120 MMHG | RESPIRATION RATE: 18 BRPM | TEMPERATURE: 97.7 F | OXYGEN SATURATION: 93 %

## 2024-08-06 ASSESSMENT — PATIENT HEALTH QUESTIONNAIRE - PHQ9: CLINICAL INTERPRETATION OF PHQ2 SCORE: 0

## 2024-08-06 ASSESSMENT — ENCOUNTER SYMPTOMS
NAUSEA: DENIES
VOMITING: DENIES
LAST BOWEL MOVEMENT: 67057
PERSON REPORTING PAIN: PATIENT
LOWEST PAIN SEVERITY IN PAST 24 HOURS: 0/10
HIGHEST PAIN SEVERITY IN PAST 24 HOURS: 0/10
PAIN SEVERITY GOAL: 0/10
MUSCLE WEAKNESS: 1
PAIN: 1
STOOL FREQUENCY: DAILY
BOWEL PATTERN NORMAL: 1
FATIGUES EASILY: 1

## 2024-08-07 ENCOUNTER — TELEPHONE (OUTPATIENT)
Dept: MEDICAL GROUP | Facility: PHYSICIAN GROUP | Age: 69
End: 2024-08-07
Payer: MEDICARE

## 2024-08-07 ENCOUNTER — HOME CARE VISIT (OUTPATIENT)
Dept: HOME HEALTH SERVICES | Facility: HOME HEALTHCARE | Age: 69
End: 2024-08-07
Payer: MEDICARE

## 2024-08-07 VITALS
HEART RATE: 67 BPM | SYSTOLIC BLOOD PRESSURE: 138 MMHG | TEMPERATURE: 97.6 F | RESPIRATION RATE: 18 BRPM | DIASTOLIC BLOOD PRESSURE: 70 MMHG | OXYGEN SATURATION: 96 %

## 2024-08-07 PROCEDURE — G0299 HHS/HOSPICE OF RN EA 15 MIN: HCPCS

## 2024-08-07 NOTE — TELEPHONE ENCOUNTER
Pt's home health nurse called and asked for the referral for the wound clinic to be [facility]. Pt lives in [place].

## 2024-08-08 ASSESSMENT — ENCOUNTER SYMPTOMS
BOWEL PATTERN NORMAL: 1
LOWEST PAIN SEVERITY IN PAST 24 HOURS: 0/10
HIGHEST PAIN SEVERITY IN PAST 24 HOURS: 0/10
PERSON REPORTING PAIN: PATIENT
PAIN: 1
STOOL FREQUENCY: DAILY
FATIGUES EASILY: 1
VOMITING: DENIES
MUSCLE WEAKNESS: 1
PAIN SEVERITY GOAL: 0/10
ABDOMINAL PAIN: 1
NAUSEA: DENIES
LAST BOWEL MOVEMENT: 67059

## 2024-08-08 ASSESSMENT — PATIENT HEALTH QUESTIONNAIRE - PHQ9: CLINICAL INTERPRETATION OF PHQ2 SCORE: 0

## 2024-08-09 ENCOUNTER — HOME CARE VISIT (OUTPATIENT)
Dept: HOME HEALTH SERVICES | Facility: HOME HEALTHCARE | Age: 69
End: 2024-08-09
Payer: MEDICARE

## 2024-08-09 VITALS
SYSTOLIC BLOOD PRESSURE: 128 MMHG | OXYGEN SATURATION: 94 % | RESPIRATION RATE: 18 BRPM | DIASTOLIC BLOOD PRESSURE: 70 MMHG | TEMPERATURE: 97.9 F | HEART RATE: 61 BPM

## 2024-08-09 DIAGNOSIS — E11.51 DM (DIABETES MELLITUS) TYPE II, CONTROLLED, WITH PERIPHERAL VASCULAR DISORDER (HCC): ICD-10-CM

## 2024-08-09 DIAGNOSIS — I48.91 ATRIAL FIBRILLATION, UNSPECIFIED TYPE (HCC): ICD-10-CM

## 2024-08-09 PROCEDURE — G0299 HHS/HOSPICE OF RN EA 15 MIN: HCPCS

## 2024-08-09 PROCEDURE — G0152 HHCP-SERV OF OT,EA 15 MIN: HCPCS

## 2024-08-09 ASSESSMENT — ENCOUNTER SYMPTOMS
HIGHEST PAIN SEVERITY IN PAST 24 HOURS: 0/10
PERSON REPORTING PAIN: PATIENT
LOWEST PAIN SEVERITY IN PAST 24 HOURS: 0/10
STOOL FREQUENCY: DAILY
LAST BOWEL MOVEMENT: 67061
MUSCLE WEAKNESS: 1
PAIN: 1
BOWEL PATTERN NORMAL: 1
VOMITING: DENIES
FATIGUES EASILY: 1
PAIN SEVERITY GOAL: 0/10
NAUSEA: DENIES

## 2024-08-09 ASSESSMENT — PATIENT HEALTH QUESTIONNAIRE - PHQ9: CLINICAL INTERPRETATION OF PHQ2 SCORE: 0

## 2024-08-09 NOTE — TELEPHONE ENCOUNTER
Received request via: Pharmacy    Was the patient seen in the last year in this department? Yes    Does the patient have an active prescription (recently filled or refills available) for medication(s) requested? No    [facility]     Does the patient have [facility] Plus and need 100-day supply? (This applies to ALL medications) Yes, quantity updated to 100 days

## 2024-08-09 NOTE — TELEPHONE ENCOUNTER
Received request via: Patient    Was the patient seen in the last year in this department? Yes    Does the patient have an active prescription (recently filled or refills available) for medication(s) requested? No    Pharmacy Name: Pharmacy:   [facility]     Does the patient have longterm Plus and need 100-day supply? (This applies to ALL medications) Yes, quantity updated to 100 days

## 2024-08-12 ENCOUNTER — HOME CARE VISIT (OUTPATIENT)
Dept: HOME HEALTH SERVICES | Facility: HOME HEALTHCARE | Age: 69
End: 2024-08-12
Payer: MEDICARE

## 2024-08-12 VITALS
DIASTOLIC BLOOD PRESSURE: 70 MMHG | OXYGEN SATURATION: 96 % | TEMPERATURE: 97.6 F | SYSTOLIC BLOOD PRESSURE: 130 MMHG | HEART RATE: 62 BPM | RESPIRATION RATE: 18 BRPM

## 2024-08-12 PROCEDURE — G0299 HHS/HOSPICE OF RN EA 15 MIN: HCPCS

## 2024-08-12 RX ORDER — PIOGLITAZONEHYDROCHLORIDE 30 MG/1
30 TABLET ORAL DAILY
Qty: 100 TABLET | Refills: 3 | Status: SHIPPED | OUTPATIENT
Start: 2024-08-12

## 2024-08-12 RX ORDER — DILTIAZEM HYDROCHLORIDE 30 MG/1
30 TABLET, FILM COATED ORAL 3 TIMES DAILY
Qty: 120 TABLET | Refills: 4 | Status: SHIPPED | OUTPATIENT
Start: 2024-08-12

## 2024-08-12 ASSESSMENT — ENCOUNTER SYMPTOMS
STOOL FREQUENCY: DAILY
FATIGUES EASILY: 1
VOMITING: DENIES
HIGHEST PAIN SEVERITY IN PAST 24 HOURS: 0/10
NAUSEA: DENIES
LAST BOWEL MOVEMENT: 67064
PAIN SEVERITY GOAL: 0/10
MUSCLE WEAKNESS: 1
PAIN: 1
BOWEL PATTERN NORMAL: 1
LOWEST PAIN SEVERITY IN PAST 24 HOURS: 0/10
PERSON REPORTING PAIN: PATIENT

## 2024-08-12 ASSESSMENT — PATIENT HEALTH QUESTIONNAIRE - PHQ9: CLINICAL INTERPRETATION OF PHQ2 SCORE: 0

## 2024-08-13 PROCEDURE — RXMED WILLOW AMBULATORY MEDICATION CHARGE: Performed by: FAMILY MEDICINE

## 2024-08-13 ASSESSMENT — ENCOUNTER SYMPTOMS
PERSON REPORTING PAIN: PATIENT
PAIN: 1
PAIN LOCATION - EXACERBATING FACTORS: AMBULATION
PAIN LOCATION - PAIN DURATION: 2 DAYS
PAIN LOCATION: LEFT LEG
PAIN LOCATION - PAIN QUALITY: ACHING
PAIN LOCATION - PAIN FREQUENCY: FREQUENT
PAIN LOCATION - PAIN SEVERITY: 1/10
PAIN LOCATION - RELIEVING FACTORS: REST

## 2024-08-14 ENCOUNTER — HOME CARE VISIT (OUTPATIENT)
Dept: HOME HEALTH SERVICES | Facility: HOME HEALTHCARE | Age: 69
End: 2024-08-14
Payer: MEDICARE

## 2024-08-14 ENCOUNTER — PHARMACY VISIT (OUTPATIENT)
Dept: PHARMACY | Facility: MEDICAL CENTER | Age: 69
End: 2024-08-14
Payer: COMMERCIAL

## 2024-08-14 VITALS
RESPIRATION RATE: 17 BRPM | SYSTOLIC BLOOD PRESSURE: 120 MMHG | OXYGEN SATURATION: 99 % | BODY MASS INDEX: 51.54 KG/M2 | WEIGHT: 315 LBS | TEMPERATURE: 97.7 F | HEART RATE: 62 BPM | DIASTOLIC BLOOD PRESSURE: 80 MMHG

## 2024-08-14 PROCEDURE — A6214 FOAM DRG > 48 SQ IN W/BORDER: HCPCS

## 2024-08-14 PROCEDURE — 6650300 HCR  CLEANSER 4-IN-1

## 2024-08-14 PROCEDURE — A6212 FOAM DRG <=16 SQ IN W/BORDER: HCPCS

## 2024-08-14 PROCEDURE — G0299 HHS/HOSPICE OF RN EA 15 MIN: HCPCS

## 2024-08-14 SDOH — HEALTH STABILITY: PHYSICAL HEALTH: EXERCISE TYPE: HOME EXERCISE PROGRAM

## 2024-08-14 ASSESSMENT — ENCOUNTER SYMPTOMS
NAUSEA: NO
EDEMA: 1
LOWER EXTREMITY EDEMA: 1
POOR WOUND HEALING: 1
PAIN: 1
LAST BOWEL MOVEMENT: 67065
PERSON REPORTING PAIN: PATIENT
VOMITING: NO

## 2024-08-14 ASSESSMENT — ACTIVITIES OF DAILY LIVING (ADL): AMBULATION ASSISTANCE: NON-AMBULATORY

## 2024-08-14 ASSESSMENT — FIBROSIS 4 INDEX: FIB4 SCORE: 0.88

## 2024-08-14 NOTE — Clinical Note
FYI  Discouraged that wounds have not healed. Primary wound right medial posterior heel with red base but calloused margins. Needs debridement. Left BKA residual leg with very rough texture. Calloused area partially open and needs debridement. Wounds cleansed and new dressings applied per care plan. Skin very dry. Encouraged him to get ammonium lactate 12% lotion. Has l[facility]rie[facility] at [facility] . It is difficult for him to get to [facility]. Sister has to take off work to go with him. Pt cannot transfer into passenger seat of car, so must drive with sister next to him. Difficult. When he stands to transfer, end of residual leg is painful from pressure inside prosthesis. Cannot bear weight on right heel because of wound. Balances on toes. Difficult for him to prepare meals. Question if pt would benefit from multi-polus boot with walking sole or Darco shoe to float heel? Would need to us his walker or [place] crutches to be sure he is balanced. Pt states he is going to [facility] in two days to discuss Assisted Living. States it is very difficult to care for his needs himself. Would sell his house and have the money to pay. Weighed as best we could this mornin lb. Is trying to be careful with how he eats, trying to lose weight. Gets food delivered through [employer]. Activity very limited other than chair exercises. If getting to [facility] continues to be difficult, please consider in-home wound care for debridement and management through one of the contracted companies and continuing home health for visits between.   Thank you, Domenica Bernardo, RN, MSN, WOCN

## 2024-08-14 NOTE — Clinical Note
noted  ----- Message -----  From: Domenica Bernardo R.N.  Sent: 2024   8:51 PM PDT  To: Brisa Robledo R.N.; Lucy Moraes R.N.; *      FELICITY  Discouraged that wounds have not healed. Primary wound right medial posterior heel with red base but calloused margins. Needs debridement. Left BKA residual leg with very rough texture. Calloused area partially open and needs debridement. Wounds cleansed and new dressings applied per care plan. Skin very dry. Encouraged him to get ammonium lactate 12% lotion. Has lorrie't at [facility] . It is difficult for him to get to [facility]. Sister has to take off work to go with him. Pt cannot transfer into passenger seat of car, so must drive with sister next to him. Difficult. When he stands to transfer, end of residual leg is painful from pressure inside prosthesis. Cannot bear weight on right heel because of wound. Balances on toes. Difficult for him to prepare meals. Question if pt would benefit from multi-polus boot with walking sole or Darco shoe to float heel? Would need to us his walker or [place] crutches to be sure he is balanced. Pt states he is going to [facility] in two days to discuss Assisted Living. States it is very difficult to care for his needs himself. Would sell his house and have the money to pay. Weighed as best we could this mornin lb. Is trying to be careful with how he eats, trying to lose weight. Gets food delivered through [employer]. Activity very limited other than chair exercises. If getting to [facility] continues to be difficult, please consider in-home wound care for debridement and management through one of the contracted companies and continuing home health for visits between.   Thank you, Domenica Bernardo, RN, MSN, WOCN

## 2024-08-15 ENCOUNTER — TELEPHONE (OUTPATIENT)
Dept: HEALTH INFORMATION MANAGEMENT | Facility: OTHER | Age: 69
End: 2024-08-15

## 2024-08-15 ENCOUNTER — HOME CARE VISIT (OUTPATIENT)
Dept: HOME HEALTH SERVICES | Facility: HOME HEALTHCARE | Age: 69
End: 2024-08-15
Payer: MEDICARE

## 2024-08-15 PROCEDURE — G0152 HHCP-SERV OF OT,EA 15 MIN: HCPCS

## 2024-08-15 NOTE — TELEPHONE ENCOUNTER
Good morning,     Patient called needing a appointment for wound care. Could you possibly assist and contact patient regarding this?     Thank-you,   Deidra Cordero [facility]

## 2024-08-16 ENCOUNTER — HOME CARE VISIT (OUTPATIENT)
Dept: HOME HEALTH SERVICES | Facility: HOME HEALTHCARE | Age: 69
End: 2024-08-16
Payer: MEDICARE

## 2024-08-18 VITALS
TEMPERATURE: 97.4 F | HEART RATE: 63 BPM | SYSTOLIC BLOOD PRESSURE: 126 MMHG | OXYGEN SATURATION: 95 % | RESPIRATION RATE: 18 BRPM | DIASTOLIC BLOOD PRESSURE: 78 MMHG

## 2024-08-18 ASSESSMENT — ENCOUNTER SYMPTOMS
PAIN LOCATION: LEFT LEG
PAIN LOCATION - PAIN FREQUENCY: FREQUENT
PAIN LOCATION - PAIN QUALITY: ACHING
PAIN: 1
PAIN LOCATION - PAIN DURATION: FEW WEEKS
PAIN LOCATION - EXACERBATING FACTORS: WEIGHT BEARING
PERSON REPORTING PAIN: PATIENT
PAIN LOCATION - RELIEVING FACTORS: PRESSURE RELIEF
PAIN LOCATION - PAIN SEVERITY: 1/10

## 2024-08-19 ENCOUNTER — HOME CARE VISIT (OUTPATIENT)
Dept: HOME HEALTH SERVICES | Facility: HOME HEALTHCARE | Age: 69
End: 2024-08-19
Payer: MEDICARE

## 2024-08-19 ENCOUNTER — TELEPHONE (OUTPATIENT)
Dept: HEALTH INFORMATION MANAGEMENT | Facility: OTHER | Age: 69
End: 2024-08-19
Payer: MEDICARE

## 2024-08-19 VITALS
TEMPERATURE: 97.8 F | SYSTOLIC BLOOD PRESSURE: 132 MMHG | RESPIRATION RATE: 18 BRPM | HEART RATE: 67 BPM | DIASTOLIC BLOOD PRESSURE: 88 MMHG | OXYGEN SATURATION: 94 % | BODY MASS INDEX: 52.32 KG/M2 | WEIGHT: 315 LBS

## 2024-08-19 PROCEDURE — G0299 HHS/HOSPICE OF RN EA 15 MIN: HCPCS

## 2024-08-19 ASSESSMENT — ENCOUNTER SYMPTOMS
PAIN SEVERITY GOAL: 0/10
PAIN LOCATION: LEFT BKA STUMP
NAUSEA: PT DENIES ANY NAUSEA AT THIS TIME
SKIN CHANGES: 1
PAIN: 1
LAST BOWEL MOVEMENT: 67071
VOMITING: PT DENIES ANY EMESIS AT THIS TIME
HYPERTENSION: 1
HIGHEST PAIN SEVERITY IN PAST 24 HOURS: 1/10
BOWEL PATTERN NORMAL: 1
STOOL FREQUENCY: DAILY
PAIN LOCATION - PAIN QUALITY: ACHES
LOWEST PAIN SEVERITY IN PAST 24 HOURS: 0/10
PAIN LOCATION - RELIEVING FACTORS: REST
PERSON REPORTING PAIN: PATIENT
SUBJECTIVE PAIN PROGRESSION: UNCHANGED
PAIN LOCATION - PAIN SEVERITY: 1/10
PAIN LOCATION - EXACERBATING FACTORS: PROSTHESIS
LOSS OF SENSATION: 1
POOR WOUND HEALING: 1
PAIN LOCATION - PAIN FREQUENCY: INTERMITTENT

## 2024-08-19 ASSESSMENT — FIBROSIS 4 INDEX: FIB4 SCORE: 0.88

## 2024-08-19 NOTE — CASE COMMUNICATION
noted  ----- Message -----  From: Domenica Bernardo R.N.  Sent: 2024   8:51 PM PDT  To: Brisa Robledo R.N.; Lucy Moraes R.N.; *      FELICITY  Discouraged that wounds have not healed. Primary wound right medial posterior heel with red base but calloused margins. Needs debridement. Left BKA residual leg with very rough texture. Calloused area partially open and needs debridement. Wounds cleansed and new dressings applied per care plan . Skin very dry. Encouraged him to get ammonium lactate 12% lotion. Has lorrie't at [facility] . It is difficult for him to get to [facility]. Sister has to take off work to go with him. Pt cannot transfer into passenger seat of car, so must drive with sister next to him. Difficult. When he stands to transfer, end of residual leg is painful from pressure inside prosthesis. Cannot bear weight on right heel because of wound.  Balances on toes. Difficult for him to prepare meals. Question if pt would benefit from multi-polus boot with walking sole or Darco shoe to float heel? Would need to us his walker or [place] crutches to be sure he is balanced. Pt states he is going to [facility] in two days to discuss Assisted Living. States it is very difficult to care for his needs himself. Would sell his house and have the money to pay. Weighed as best we could this  mornin lb. Is trying to be careful with how he eats, trying to lose weight. Gets food delivered through [employer]. Activity very limited other than chair exercises. If getting to [facility] continues to be difficult, please consider in-home wound care for debridement and management through one of the contracted companies and continuing home health for visits between.   Thank you, Domenica Bernardo, RN, MSN, WOCN

## 2024-08-21 ENCOUNTER — HOME CARE VISIT (OUTPATIENT)
Dept: HOME HEALTH SERVICES | Facility: HOME HEALTHCARE | Age: 69
End: 2024-08-21
Payer: MEDICARE

## 2024-08-21 VITALS
SYSTOLIC BLOOD PRESSURE: 132 MMHG | TEMPERATURE: 97.6 F | HEART RATE: 76 BPM | RESPIRATION RATE: 18 BRPM | OXYGEN SATURATION: 95 % | DIASTOLIC BLOOD PRESSURE: 76 MMHG

## 2024-08-21 PROCEDURE — G0299 HHS/HOSPICE OF RN EA 15 MIN: HCPCS

## 2024-08-21 ASSESSMENT — ENCOUNTER SYMPTOMS
LAST BOWEL MOVEMENT: 67073
STOOL FREQUENCY: DAILY
LOWEST PAIN SEVERITY IN PAST 24 HOURS: 0/10
PERSON REPORTING PAIN: PATIENT
PAIN SEVERITY GOAL: 0/10
MUSCLE WEAKNESS: 1
BOWEL PATTERN NORMAL: 1
HIGHEST PAIN SEVERITY IN PAST 24 HOURS: 0/10
FATIGUES EASILY: 1
NAUSEA: DENIES
PAIN: 1
VOMITING: DENIES

## 2024-08-21 ASSESSMENT — PATIENT HEALTH QUESTIONNAIRE - PHQ9: CLINICAL INTERPRETATION OF PHQ2 SCORE: 0

## 2024-08-23 ENCOUNTER — HOME CARE VISIT (OUTPATIENT)
Dept: HOME HEALTH SERVICES | Facility: HOME HEALTHCARE | Age: 69
End: 2024-08-23
Payer: MEDICARE

## 2024-08-23 VITALS
DIASTOLIC BLOOD PRESSURE: 60 MMHG | TEMPERATURE: 97.6 F | HEART RATE: 68 BPM | SYSTOLIC BLOOD PRESSURE: 118 MMHG | RESPIRATION RATE: 18 BRPM | OXYGEN SATURATION: 98 %

## 2024-08-23 PROCEDURE — G0299 HHS/HOSPICE OF RN EA 15 MIN: HCPCS

## 2024-08-23 PROCEDURE — G0152 HHCP-SERV OF OT,EA 15 MIN: HCPCS

## 2024-08-23 ASSESSMENT — ENCOUNTER SYMPTOMS
PERSON REPORTING PAIN: PATIENT
LAST BOWEL MOVEMENT: 67075
LOWEST PAIN SEVERITY IN PAST 24 HOURS: 0/10
FATIGUES EASILY: 1
BOWEL PATTERN NORMAL: 1
PAIN: 1
MUSCLE WEAKNESS: 1
HIGHEST PAIN SEVERITY IN PAST 24 HOURS: 0/10
STOOL FREQUENCY: DAILY
VOMITING: DENIES
PAIN SEVERITY GOAL: 0/10
NAUSEA: DENIES

## 2024-08-23 ASSESSMENT — PATIENT HEALTH QUESTIONNAIRE - PHQ9: CLINICAL INTERPRETATION OF PHQ2 SCORE: 0

## 2024-08-24 DIAGNOSIS — I48.91 ATRIAL FIBRILLATION, UNSPECIFIED TYPE (HCC): ICD-10-CM

## 2024-08-24 DIAGNOSIS — L97.219: ICD-10-CM

## 2024-08-24 DIAGNOSIS — E11.622: ICD-10-CM

## 2024-08-25 ASSESSMENT — ENCOUNTER SYMPTOMS
PERSON REPORTING PAIN: PATIENT
DENIES PAIN: 1

## 2024-08-26 ENCOUNTER — HOME CARE VISIT (OUTPATIENT)
Dept: HOME HEALTH SERVICES | Facility: HOME HEALTHCARE | Age: 69
End: 2024-08-26
Payer: MEDICARE

## 2024-08-26 VITALS
DIASTOLIC BLOOD PRESSURE: 64 MMHG | HEART RATE: 89 BPM | SYSTOLIC BLOOD PRESSURE: 110 MMHG | OXYGEN SATURATION: 98 % | TEMPERATURE: 97.4 F | RESPIRATION RATE: 18 BRPM

## 2024-08-26 PROCEDURE — G0299 HHS/HOSPICE OF RN EA 15 MIN: HCPCS

## 2024-08-26 PROCEDURE — RXMED WILLOW AMBULATORY MEDICATION CHARGE: Performed by: FAMILY MEDICINE

## 2024-08-26 RX ORDER — AMIODARONE HYDROCHLORIDE 200 MG/1
200 TABLET ORAL DAILY
Qty: 100 TABLET | Refills: 0 | Status: SHIPPED | OUTPATIENT
Start: 2024-08-26

## 2024-08-26 ASSESSMENT — ENCOUNTER SYMPTOMS
NAUSEA: DENIES
PAIN: 1
FATIGUES EASILY: 1
BOWEL PATTERN NORMAL: 1
MUSCLE WEAKNESS: 1
LAST BOWEL MOVEMENT: 67078
HIGHEST PAIN SEVERITY IN PAST 24 HOURS: 0/10
VOMITING: DENIES
LOWEST PAIN SEVERITY IN PAST 24 HOURS: 0/10
PAIN SEVERITY GOAL: 0/10
PERSON REPORTING PAIN: PATIENT
STOOL FREQUENCY: DAILY

## 2024-08-26 ASSESSMENT — PATIENT HEALTH QUESTIONNAIRE - PHQ9: CLINICAL INTERPRETATION OF PHQ2 SCORE: 0

## 2024-08-26 NOTE — TELEPHONE ENCOUNTER
Received request via: Pharmacy    Was the patient seen in the last year in this department? Yes    Does the patient have an active prescription (recently filled or refills available) for medication(s) requested? No    [facility]     Does the patient have custodial Plus and need 100-day supply? (This applies to ALL medications) Yes, quantity updated to 100 days

## 2024-08-27 ENCOUNTER — PHARMACY VISIT (OUTPATIENT)
Dept: PHARMACY | Facility: MEDICAL CENTER | Age: 69
End: 2024-08-27
Payer: COMMERCIAL

## 2024-08-28 ENCOUNTER — HOME CARE VISIT (OUTPATIENT)
Dept: HOME HEALTH SERVICES | Facility: HOME HEALTHCARE | Age: 69
End: 2024-08-28
Payer: MEDICARE

## 2024-08-28 VITALS
DIASTOLIC BLOOD PRESSURE: 70 MMHG | HEART RATE: 70 BPM | TEMPERATURE: 97.6 F | SYSTOLIC BLOOD PRESSURE: 130 MMHG | OXYGEN SATURATION: 92 % | RESPIRATION RATE: 18 BRPM

## 2024-08-28 PROCEDURE — G0299 HHS/HOSPICE OF RN EA 15 MIN: HCPCS

## 2024-08-28 ASSESSMENT — ENCOUNTER SYMPTOMS
MUSCLE WEAKNESS: 1
STOOL FREQUENCY: DAILY
HIGHEST PAIN SEVERITY IN PAST 24 HOURS: 0/10
SUBJECTIVE PAIN PROGRESSION: UNCHANGED
PAIN SEVERITY GOAL: 0/10
BOWEL PATTERN NORMAL: 1
LAST BOWEL MOVEMENT: 67080
PAIN: 1
ASSOCIATED SYMPTOMS: DENIES
PERSON REPORTING PAIN: PATIENT
NAUSEA: DENIES
VOMITING: DENIES
LOWEST PAIN SEVERITY IN PAST 24 HOURS: 0/10
FATIGUES EASILY: 1

## 2024-08-28 ASSESSMENT — PATIENT HEALTH QUESTIONNAIRE - PHQ9: CLINICAL INTERPRETATION OF PHQ2 SCORE: 0

## 2024-08-29 ENCOUNTER — HOME CARE VISIT (OUTPATIENT)
Dept: HOME HEALTH SERVICES | Facility: HOME HEALTHCARE | Age: 69
End: 2024-08-29
Payer: MEDICARE

## 2024-08-29 PROCEDURE — G0155 HHCP-SVS OF CSW,EA 15 MIN: HCPCS

## 2024-08-29 ASSESSMENT — ACTIVITIES OF DAILY LIVING (ADL): SHOPPING_REQUIRES_ASSISTANCE: 1

## 2024-08-29 ASSESSMENT — PATIENT HEALTH QUESTIONNAIRE - PHQ9: CLINICAL INTERPRETATION OF PHQ2 SCORE: 0

## 2024-08-30 ENCOUNTER — HOME CARE VISIT (OUTPATIENT)
Dept: HOME HEALTH SERVICES | Facility: HOME HEALTHCARE | Age: 69
End: 2024-08-30
Payer: MEDICARE

## 2024-08-30 PROCEDURE — G0299 HHS/HOSPICE OF RN EA 15 MIN: HCPCS

## 2024-08-31 VITALS
OXYGEN SATURATION: 95 % | SYSTOLIC BLOOD PRESSURE: 128 MMHG | HEART RATE: 76 BPM | RESPIRATION RATE: 18 BRPM | DIASTOLIC BLOOD PRESSURE: 68 MMHG | TEMPERATURE: 97.6 F

## 2024-08-31 ASSESSMENT — ENCOUNTER SYMPTOMS
NAUSEA: DENIES
PAIN: 1
PERSON REPORTING PAIN: PATIENT
STOOL FREQUENCY: DAILY
FATIGUES EASILY: 1
MUSCLE WEAKNESS: 1
HIGHEST PAIN SEVERITY IN PAST 24 HOURS: 0/10
BOWEL PATTERN NORMAL: 1
PAIN SEVERITY GOAL: 0/10
LOWEST PAIN SEVERITY IN PAST 24 HOURS: 0/10
LAST BOWEL MOVEMENT: 67082
VOMITING: DENIES

## 2024-08-31 ASSESSMENT — PATIENT HEALTH QUESTIONNAIRE - PHQ9: CLINICAL INTERPRETATION OF PHQ2 SCORE: 0

## 2024-09-02 ENCOUNTER — HOME CARE VISIT (OUTPATIENT)
Dept: HOME HEALTH SERVICES | Facility: HOME HEALTHCARE | Age: 69
End: 2024-09-02
Payer: MEDICARE

## 2024-09-02 PROCEDURE — G0299 HHS/HOSPICE OF RN EA 15 MIN: HCPCS

## 2024-09-02 ASSESSMENT — FIBROSIS 4 INDEX: FIB4 SCORE: 0.88

## 2024-09-03 ENCOUNTER — DOCUMENTATION (OUTPATIENT)
Dept: CARDIOLOGY | Facility: MEDICAL CENTER | Age: 69
End: 2024-09-03
Payer: MEDICARE

## 2024-09-03 VITALS — BODY MASS INDEX: 50.99 KG/M2 | RESPIRATION RATE: 18 BRPM | WEIGHT: 315 LBS

## 2024-09-03 PROCEDURE — G0179 MD RECERTIFICATION HHA PT: HCPCS | Performed by: FAMILY MEDICINE

## 2024-09-03 ASSESSMENT — ENCOUNTER SYMPTOMS
PAIN LOCATION - EXACERBATING FACTORS: WALKING
PAIN LOCATION - PAIN SEVERITY: 1/10
PAIN LOCATION - RELIEVING FACTORS: DISTRACTION
PERSON REPORTING PAIN: PATIENT
VOMITING: DENIES
PAIN: 1
SUBJECTIVE PAIN PROGRESSION: UNCHANGED
PAIN LOCATION - PAIN FREQUENCY: CONSTANT
FORGETFULNESS: 1
PAIN LOCATION: PAIN
HIGHEST PAIN SEVERITY IN PAST 24 HOURS: 3/10
FATIGUES EASILY: 1
LOWEST PAIN SEVERITY IN PAST 24 HOURS: 1/10
PAIN SEVERITY GOAL: 0/10
NAUSEA: DENIES
PAIN LOCATION - PAIN QUALITY: DULL, ACHY
ASSOCIATED SYMPTOMS: DENIES

## 2024-09-03 ASSESSMENT — ACTIVITIES OF DAILY LIVING (ADL)
ORAL_CARE_ASSESSED: 1
OASIS_M1830: 03
FEEDING ASSESSED: 1
USING THE TELPHONE: INDEPENDENT
FEEDING: INDEPENDENT
ORAL_CARE_CURRENT_FUNCTION: INDEPENDENT
TELEPHONE USE ASSESSED: 1

## 2024-09-03 ASSESSMENT — PATIENT HEALTH QUESTIONNAIRE - PHQ9: CLINICAL INTERPRETATION OF PHQ2 SCORE: 0

## 2024-09-03 NOTE — PROGRESS NOTES
Medication chart review for [facility] services    Received referral from [facility].   Medications reviewed  compared with discharge summary if available.  Discharge summary date, if applicable:   N/a    Current medication list per [facility]     Medication list one, patient is currently taking    Current Outpatient Medications:     amiodarone, 200 mg, Oral, DAILY    apixaban, 5 mg, Oral, BID    pioglitazone, 30 mg, Oral, DAILY    dilTIAZem, 30 mg, Oral, TID    allopurinol, 100 mg, Oral, DAILY    hydroCHLOROthiazide, 25 mg, Oral, DAILY    metformin, TAKE 1 TABLET BY MOUTH TWICE DAILY WITH FOOD  Indications: Type 2 Diabetes    Alcohol Prep, 1 Each, Does not apply, TID    OneTouch Verio, 1 Each, Does not apply, TID    glucose blood, Use to test in the morning, at noon and at bedtime    fingerstick, Use to test in the morning, at noon and at bedtime    OneTouch Verio, 1 Each, Other, PRN    potassium Chloride ER, TAKE 1 TABLET BY MOUTH EVERY DAY FOR HYPOKALEMIA  Indications: Low Amount of Potassium in the Blood    gabapentin, 400 mg, Oral, 4XDAY    dilTIAZem, TAKE 1 TABLET BY MOUTH EVERY 8 HOURS FOR ATRIAL FIBRILLATION    atorvastatin, 20 mg, Oral, DAILY    Simethicone, 1 Capsule, Oral, QDAY PRN    carvedilol, 6.25 mg, Oral, BID WITH MEALS    hydrocortisone, 1 Application , Topical, QDAY PRN    atorvastatin, 20 mg, Oral, Q EVENING    vitamin D, 1,000 Units, Oral, DAILY (Patient taking differently: 2,000 Units, Oral, DAILY, Indications: Nutritional supplement)    loratadine, 10 mg, Oral, DAILY    Multiple Vitamins-Minerals (MULTIVITAMIN GUMMIES ADULTS PO), 1 Dose, Oral, DAILY AT 1800    Saccharomyces boulardii (DAILY PROBIOTIC SUPPLEMENT PO), 1 Dose, Oral, DAILY    Arginaid, 1 Dose, Oral, DAILY AT 1800    Jony, 1 Dose, Oral, DAILY AT 1800    acetaminophen, 500-1,000 mg, Oral, Q6HRS PRN      Medication list two, drugs that the patient has been prescribed or recommended to take by their healthcare provider on  discharge summary  N/a    Allergies   Allergen Reactions    Penicillins Unspecified and Swelling     Given in eye drops as a child, eyes swell  2003-06-09;ITCH   Create Date: [id number] Create User Name: Juan J Castellanos Update Date: [id number] Update User Name: Juan J Castellanos    Bee Venom Swelling    Cefazolin Rash, Itching and Swelling    Linezolid Unspecified     Caused unk lab value to increase.       Labs     Lab Results   Component Value Date/Time    SODIUM 142 02/27/2024 05:55 AM    POTASSIUM 3.8 02/27/2024 05:55 AM    CHLORIDE 106 02/27/2024 05:55 AM    CO2 29 02/27/2024 05:55 AM    GLUCOSE 100 (H) 02/27/2024 05:55 AM    BUN 28 (H) 02/27/2024 05:55 AM    CREATININE 1.4 (H) 02/27/2024 05:55 AM    GLOMRATE 52 (L) 09/08/2023 08:50 AM     Lab Results   Component Value Date/Time    ALKPHOSPHAT 105 02/27/2024 05:55 AM    ASTSGOT 17 02/27/2024 05:55 AM    ALTSGPT 14 02/27/2024 05:55 AM    TBILIRUBIN 0.4 02/27/2024 05:55 AM    ALBUMIN 2.7 (L) 02/27/2024 05:55 AM        Assessment for clinically significant drug interactions, drug omissions/additions, duplicative therapies.            CC   Theo James M.D.  [address] [address]  Fax: [phone]    [facility]  Phone [phone] fax [phone]    This note was created using voice recognition software (Dragon). The accuracy of the dictation is limited by the abilities of the software. I have reviewed the note prior to signing, however some errors in grammar and context are still possible. If you have any questions related to this note please do not hesitate to contact our office.

## 2024-09-04 ENCOUNTER — HOME CARE VISIT (OUTPATIENT)
Dept: HOME HEALTH SERVICES | Facility: HOME HEALTHCARE | Age: 69
End: 2024-09-04
Payer: MEDICARE

## 2024-09-04 VITALS
HEART RATE: 74 BPM | SYSTOLIC BLOOD PRESSURE: 128 MMHG | DIASTOLIC BLOOD PRESSURE: 72 MMHG | RESPIRATION RATE: 18 BRPM | OXYGEN SATURATION: 98 % | TEMPERATURE: 97.6 F

## 2024-09-04 PROCEDURE — G0299 HHS/HOSPICE OF RN EA 15 MIN: HCPCS

## 2024-09-04 ASSESSMENT — ENCOUNTER SYMPTOMS
STOOL FREQUENCY: DAILY
HIGHEST PAIN SEVERITY IN PAST 24 HOURS: 0/10
PERSON REPORTING PAIN: PATIENT
NAUSEA: DENIES
BOWEL PATTERN NORMAL: 1
PAIN SEVERITY GOAL: 0/10
ASSOCIATED SYMPTOMS: DENIES
SUBJECTIVE PAIN PROGRESSION: UNCHANGED
LAST BOWEL MOVEMENT: 67087
VOMITING: DENIES
FATIGUES EASILY: 1
MUSCLE WEAKNESS: 1
LOWEST PAIN SEVERITY IN PAST 24 HOURS: 0/10
PAIN: 1

## 2024-09-04 ASSESSMENT — PATIENT HEALTH QUESTIONNAIRE - PHQ9: CLINICAL INTERPRETATION OF PHQ2 SCORE: 0

## 2024-09-06 ENCOUNTER — HOME CARE VISIT (OUTPATIENT)
Dept: HOME HEALTH SERVICES | Facility: HOME HEALTHCARE | Age: 69
End: 2024-09-06
Payer: MEDICARE

## 2024-09-06 PROCEDURE — 665999 HH PPS REVENUE DEBIT

## 2024-09-06 PROCEDURE — 665998 HH PPS REVENUE CREDIT

## 2024-09-06 PROCEDURE — 665003 FOLLOW UP-HOME HEALTH

## 2024-09-06 PROCEDURE — G0299 HHS/HOSPICE OF RN EA 15 MIN: HCPCS

## 2024-09-07 VITALS
TEMPERATURE: 97.8 F | SYSTOLIC BLOOD PRESSURE: 132 MMHG | DIASTOLIC BLOOD PRESSURE: 60 MMHG | OXYGEN SATURATION: 94 % | RESPIRATION RATE: 18 BRPM | HEART RATE: 70 BPM

## 2024-09-07 PROCEDURE — 665998 HH PPS REVENUE CREDIT

## 2024-09-07 PROCEDURE — 665999 HH PPS REVENUE DEBIT

## 2024-09-07 ASSESSMENT — ENCOUNTER SYMPTOMS
LOWEST PAIN SEVERITY IN PAST 24 HOURS: 0/10
STOOL FREQUENCY: DAILY
PAIN SEVERITY GOAL: 0/10
VOMITING: DENIES
LAST BOWEL MOVEMENT: 67089
FATIGUES EASILY: 1
NAUSEA: DENIES
HIGHEST PAIN SEVERITY IN PAST 24 HOURS: 0/10
PERSON REPORTING PAIN: PATIENT
BOWEL PATTERN NORMAL: 1
PAIN: 1
MUSCLE WEAKNESS: 1

## 2024-09-07 ASSESSMENT — PATIENT HEALTH QUESTIONNAIRE - PHQ9: CLINICAL INTERPRETATION OF PHQ2 SCORE: 0

## 2024-09-08 PROCEDURE — 665999 HH PPS REVENUE DEBIT

## 2024-09-08 PROCEDURE — 665998 HH PPS REVENUE CREDIT

## 2024-09-09 ENCOUNTER — HOME CARE VISIT (OUTPATIENT)
Dept: HOME HEALTH SERVICES | Facility: HOME HEALTHCARE | Age: 69
End: 2024-09-09
Payer: MEDICARE

## 2024-09-09 VITALS
DIASTOLIC BLOOD PRESSURE: 70 MMHG | SYSTOLIC BLOOD PRESSURE: 128 MMHG | RESPIRATION RATE: 18 BRPM | OXYGEN SATURATION: 97 % | TEMPERATURE: 97.4 F | HEART RATE: 69 BPM

## 2024-09-09 PROCEDURE — G0299 HHS/HOSPICE OF RN EA 15 MIN: HCPCS

## 2024-09-09 PROCEDURE — 665999 HH PPS REVENUE DEBIT

## 2024-09-09 PROCEDURE — 665998 HH PPS REVENUE CREDIT

## 2024-09-09 ASSESSMENT — ENCOUNTER SYMPTOMS
NAUSEA: DENIES
VOMITING: DENIES
MUSCLE WEAKNESS: 1
LAST BOWEL MOVEMENT: 67092
LOWEST PAIN SEVERITY IN PAST 24 HOURS: 0/10
PAIN: 1
FATIGUES EASILY: 1
STOOL FREQUENCY: DAILY
PERSON REPORTING PAIN: PATIENT
PAIN SEVERITY GOAL: 0/10
HIGHEST PAIN SEVERITY IN PAST 24 HOURS: 0/10
BOWEL PATTERN NORMAL: 1

## 2024-09-09 ASSESSMENT — PATIENT HEALTH QUESTIONNAIRE - PHQ9: CLINICAL INTERPRETATION OF PHQ2 SCORE: 0

## 2024-09-10 PROCEDURE — 665998 HH PPS REVENUE CREDIT

## 2024-09-10 PROCEDURE — 665999 HH PPS REVENUE DEBIT

## 2024-09-11 ENCOUNTER — HOME CARE VISIT (OUTPATIENT)
Dept: HOME HEALTH SERVICES | Facility: HOME HEALTHCARE | Age: 69
End: 2024-09-11
Payer: MEDICARE

## 2024-09-11 VITALS
SYSTOLIC BLOOD PRESSURE: 140 MMHG | HEART RATE: 61 BPM | TEMPERATURE: 97.4 F | DIASTOLIC BLOOD PRESSURE: 72 MMHG | OXYGEN SATURATION: 91 % | RESPIRATION RATE: 18 BRPM

## 2024-09-11 PROCEDURE — 665998 HH PPS REVENUE CREDIT

## 2024-09-11 PROCEDURE — G0299 HHS/HOSPICE OF RN EA 15 MIN: HCPCS

## 2024-09-11 PROCEDURE — 665999 HH PPS REVENUE DEBIT

## 2024-09-11 ASSESSMENT — ENCOUNTER SYMPTOMS
PAIN LOCATION - PAIN SEVERITY: 5/10
PAIN SEVERITY GOAL: 1/10
BOWEL PATTERN NORMAL: 1
FATIGUES EASILY: 1
SUBJECTIVE PAIN PROGRESSION: GRADUALLY IMPROVING
PERSON REPORTING PAIN: PATIENT
LAST BOWEL MOVEMENT: 67094
PAIN LOCATION - PAIN FREQUENCY: CONSTANT
VOMITING: DENIES
MUSCLE WEAKNESS: 1
HIGHEST PAIN SEVERITY IN PAST 24 HOURS: 5/10
PAIN LOCATION - PAIN QUALITY: DULL, ACHY
STOOL FREQUENCY: DAILY
NAUSEA: DENIES
LOWEST PAIN SEVERITY IN PAST 24 HOURS: 4/10
PAIN: 1
PAIN LOCATION - EXACERBATING FACTORS: EXERTION

## 2024-09-11 ASSESSMENT — PATIENT HEALTH QUESTIONNAIRE - PHQ9: CLINICAL INTERPRETATION OF PHQ2 SCORE: 0

## 2024-09-12 PROCEDURE — 665998 HH PPS REVENUE CREDIT

## 2024-09-12 PROCEDURE — 665999 HH PPS REVENUE DEBIT

## 2024-09-13 ENCOUNTER — HOME CARE VISIT (OUTPATIENT)
Dept: HOME HEALTH SERVICES | Facility: HOME HEALTHCARE | Age: 69
End: 2024-09-13
Payer: MEDICARE

## 2024-09-13 VITALS
RESPIRATION RATE: 18 BRPM | HEART RATE: 65 BPM | OXYGEN SATURATION: 96 % | DIASTOLIC BLOOD PRESSURE: 70 MMHG | SYSTOLIC BLOOD PRESSURE: 130 MMHG | TEMPERATURE: 97.7 F

## 2024-09-13 PROCEDURE — 665999 HH PPS REVENUE DEBIT

## 2024-09-13 PROCEDURE — 665998 HH PPS REVENUE CREDIT

## 2024-09-13 PROCEDURE — G0299 HHS/HOSPICE OF RN EA 15 MIN: HCPCS

## 2024-09-13 ASSESSMENT — ENCOUNTER SYMPTOMS
MUSCLE WEAKNESS: 1
NAUSEA: DENIES
ASSOCIATED SYMPTOMS: DENIES
BOWEL PATTERN NORMAL: 1
SUBJECTIVE PAIN PROGRESSION: UNCHANGED
HIGHEST PAIN SEVERITY IN PAST 24 HOURS: 0/10
LOWEST PAIN SEVERITY IN PAST 24 HOURS: 0/10
PERSON REPORTING PAIN: PATIENT
PAIN SEVERITY GOAL: 0/10
PAIN: 1
FATIGUES EASILY: 1
STOOL FREQUENCY: DAILY
LAST BOWEL MOVEMENT: 67096
VOMITING: DENIES

## 2024-09-13 ASSESSMENT — PATIENT HEALTH QUESTIONNAIRE - PHQ9: CLINICAL INTERPRETATION OF PHQ2 SCORE: 0

## 2024-09-14 PROCEDURE — 665999 HH PPS REVENUE DEBIT

## 2024-09-14 PROCEDURE — 665998 HH PPS REVENUE CREDIT

## 2024-09-15 PROCEDURE — 665999 HH PPS REVENUE DEBIT

## 2024-09-15 PROCEDURE — 665998 HH PPS REVENUE CREDIT

## 2024-09-16 ENCOUNTER — HOME CARE VISIT (OUTPATIENT)
Dept: HOME HEALTH SERVICES | Facility: HOME HEALTHCARE | Age: 69
End: 2024-09-16
Payer: MEDICARE

## 2024-09-16 PROCEDURE — 665999 HH PPS REVENUE DEBIT

## 2024-09-16 PROCEDURE — 665998 HH PPS REVENUE CREDIT

## 2024-09-17 ENCOUNTER — HOME CARE VISIT (OUTPATIENT)
Dept: HOME HEALTH SERVICES | Facility: HOME HEALTHCARE | Age: 69
End: 2024-09-17
Payer: MEDICARE

## 2024-09-17 PROCEDURE — 665999 HH PPS REVENUE DEBIT

## 2024-09-17 PROCEDURE — 665998 HH PPS REVENUE CREDIT

## 2024-09-17 NOTE — CASE COMMUNICATION
Quality Review for Recertification OASIS by NELSY Avila RN on  September 17, 2024    Edits completed by NELSY Avila RN:  1.  and  diagnosis coding updated per chart review.  2. In nutritional requirements changed to diabetic diet

## 2024-09-17 NOTE — CASE COMMUNICATION
Fyi-Missed SNV, pt refused snv d/t appt at [facility] wound [facility] for wound care

## 2024-09-17 NOTE — CASE COMMUNICATION
I agree with the changes made.  ----- Message -----  From: Amelie Avila R.N.  Sent: 9/17/2024   1:48 PM PDT  To: Lucy Moraes R.N.      Quality Review for Recertification OASIS by NELSY Avila RN on  September 17, 2024    Edits completed by NELSY Avila RN:  1.  and  diagnosis coding updated per chart review.  2. In nutritional requirements changed to diabetic diet

## 2024-09-18 ENCOUNTER — HOME CARE VISIT (OUTPATIENT)
Dept: HOME HEALTH SERVICES | Facility: HOME HEALTHCARE | Age: 69
End: 2024-09-18
Payer: MEDICARE

## 2024-09-18 VITALS
TEMPERATURE: 97.6 F | SYSTOLIC BLOOD PRESSURE: 130 MMHG | HEART RATE: 76 BPM | DIASTOLIC BLOOD PRESSURE: 70 MMHG | RESPIRATION RATE: 18 BRPM | OXYGEN SATURATION: 96 %

## 2024-09-18 PROCEDURE — G0299 HHS/HOSPICE OF RN EA 15 MIN: HCPCS

## 2024-09-18 PROCEDURE — 665998 HH PPS REVENUE CREDIT

## 2024-09-18 PROCEDURE — 665999 HH PPS REVENUE DEBIT

## 2024-09-18 ASSESSMENT — ENCOUNTER SYMPTOMS
PERSON REPORTING PAIN: PATIENT
LAST BOWEL MOVEMENT: 67101
NAUSEA: DENIES
LOWEST PAIN SEVERITY IN PAST 24 HOURS: 0/10
BOWEL PATTERN NORMAL: 1
HIGHEST PAIN SEVERITY IN PAST 24 HOURS: 0/10
PAIN: 1
PAIN SEVERITY GOAL: 0/10
VOMITING: DENIES
MUSCLE WEAKNESS: 1

## 2024-09-18 ASSESSMENT — PATIENT HEALTH QUESTIONNAIRE - PHQ9: CLINICAL INTERPRETATION OF PHQ2 SCORE: 0

## 2024-09-19 PROCEDURE — 665999 HH PPS REVENUE DEBIT

## 2024-09-19 PROCEDURE — 665998 HH PPS REVENUE CREDIT

## 2024-09-20 ENCOUNTER — HOME CARE VISIT (OUTPATIENT)
Dept: HOME HEALTH SERVICES | Facility: HOME HEALTHCARE | Age: 69
End: 2024-09-20
Payer: MEDICARE

## 2024-09-20 VITALS
HEART RATE: 62 BPM | SYSTOLIC BLOOD PRESSURE: 132 MMHG | RESPIRATION RATE: 18 BRPM | DIASTOLIC BLOOD PRESSURE: 64 MMHG | TEMPERATURE: 97.5 F | OXYGEN SATURATION: 93 %

## 2024-09-20 PROCEDURE — 665998 HH PPS REVENUE CREDIT

## 2024-09-20 PROCEDURE — 665999 HH PPS REVENUE DEBIT

## 2024-09-20 PROCEDURE — G0299 HHS/HOSPICE OF RN EA 15 MIN: HCPCS

## 2024-09-20 ASSESSMENT — ENCOUNTER SYMPTOMS
PERSON REPORTING PAIN: PATIENT
LOWEST PAIN SEVERITY IN PAST 24 HOURS: 0/10
PAIN SEVERITY GOAL: 0/10
PAIN: 1
MUSCLE WEAKNESS: 1
FORGETFULNESS: 1
HIGHEST PAIN SEVERITY IN PAST 24 HOURS: 0/10
VOMITING: DENIES
BOWEL PATTERN NORMAL: 1
NAUSEA: DENIES
STOOL FREQUENCY: DAILY
FATIGUES EASILY: 1
LAST BOWEL MOVEMENT: 67103

## 2024-09-20 ASSESSMENT — PATIENT HEALTH QUESTIONNAIRE - PHQ9: CLINICAL INTERPRETATION OF PHQ2 SCORE: 0

## 2024-09-21 PROCEDURE — RXMED WILLOW AMBULATORY MEDICATION CHARGE: Performed by: FAMILY MEDICINE

## 2024-09-21 PROCEDURE — 665998 HH PPS REVENUE CREDIT

## 2024-09-21 PROCEDURE — 665999 HH PPS REVENUE DEBIT

## 2024-09-22 PROCEDURE — 665999 HH PPS REVENUE DEBIT

## 2024-09-22 PROCEDURE — 665998 HH PPS REVENUE CREDIT

## 2024-09-23 ENCOUNTER — PHARMACY VISIT (OUTPATIENT)
Dept: PHARMACY | Facility: MEDICAL CENTER | Age: 69
End: 2024-09-23
Payer: COMMERCIAL

## 2024-09-23 ENCOUNTER — HOME CARE VISIT (OUTPATIENT)
Dept: HOME HEALTH SERVICES | Facility: HOME HEALTHCARE | Age: 69
End: 2024-09-23
Payer: MEDICARE

## 2024-09-23 VITALS
DIASTOLIC BLOOD PRESSURE: 70 MMHG | TEMPERATURE: 97.4 F | HEART RATE: 62 BPM | SYSTOLIC BLOOD PRESSURE: 134 MMHG | RESPIRATION RATE: 18 BRPM | OXYGEN SATURATION: 93 %

## 2024-09-23 PROCEDURE — 665999 HH PPS REVENUE DEBIT

## 2024-09-23 PROCEDURE — G0299 HHS/HOSPICE OF RN EA 15 MIN: HCPCS

## 2024-09-23 PROCEDURE — 665998 HH PPS REVENUE CREDIT

## 2024-09-23 ASSESSMENT — ENCOUNTER SYMPTOMS
LAST BOWEL MOVEMENT: 67106
BOWEL PATTERN NORMAL: 1
MUSCLE WEAKNESS: 1
LOWEST PAIN SEVERITY IN PAST 24 HOURS: 0/10
NAUSEA: DENIES
HIGHEST PAIN SEVERITY IN PAST 24 HOURS: 0/10
STOOL FREQUENCY: DAILY
VOMITING: DENIES
PERSON REPORTING PAIN: PATIENT
PAIN SEVERITY GOAL: 0/10
PAIN: 1
FATIGUES EASILY: 1

## 2024-09-23 ASSESSMENT — PATIENT HEALTH QUESTIONNAIRE - PHQ9: CLINICAL INTERPRETATION OF PHQ2 SCORE: 0

## 2024-09-24 PROCEDURE — 665999 HH PPS REVENUE DEBIT

## 2024-09-24 PROCEDURE — 665998 HH PPS REVENUE CREDIT

## 2024-09-25 ENCOUNTER — HOME CARE VISIT (OUTPATIENT)
Dept: HOME HEALTH SERVICES | Facility: HOME HEALTHCARE | Age: 69
End: 2024-09-25
Payer: MEDICARE

## 2024-09-25 PROCEDURE — G0299 HHS/HOSPICE OF RN EA 15 MIN: HCPCS

## 2024-09-25 PROCEDURE — 665999 HH PPS REVENUE DEBIT

## 2024-09-25 PROCEDURE — 665998 HH PPS REVENUE CREDIT

## 2024-09-26 VITALS
RESPIRATION RATE: 18 BRPM | DIASTOLIC BLOOD PRESSURE: 70 MMHG | TEMPERATURE: 98 F | SYSTOLIC BLOOD PRESSURE: 120 MMHG | HEART RATE: 67 BPM | OXYGEN SATURATION: 95 %

## 2024-09-26 PROCEDURE — 665999 HH PPS REVENUE DEBIT

## 2024-09-26 PROCEDURE — 665998 HH PPS REVENUE CREDIT

## 2024-09-26 ASSESSMENT — ENCOUNTER SYMPTOMS
LAST BOWEL MOVEMENT: 67108
VOMITING: DENIES
MUSCLE WEAKNESS: 1
DENIES PAIN: 1
BOWEL PATTERN NORMAL: 1
NAUSEA: DENIES
STOOL FREQUENCY: DAILY

## 2024-09-26 ASSESSMENT — PATIENT HEALTH QUESTIONNAIRE - PHQ9: CLINICAL INTERPRETATION OF PHQ2 SCORE: 0

## 2024-09-27 ENCOUNTER — HOME CARE VISIT (OUTPATIENT)
Dept: HOME HEALTH SERVICES | Facility: HOME HEALTHCARE | Age: 69
End: 2024-09-27
Payer: MEDICARE

## 2024-09-27 PROCEDURE — 665998 HH PPS REVENUE CREDIT

## 2024-09-27 PROCEDURE — 665999 HH PPS REVENUE DEBIT

## 2024-09-28 DIAGNOSIS — I48.91 ATRIAL FIBRILLATION, UNSPECIFIED TYPE (HCC): ICD-10-CM

## 2024-09-28 PROCEDURE — 665999 HH PPS REVENUE DEBIT

## 2024-09-28 PROCEDURE — 665998 HH PPS REVENUE CREDIT

## 2024-09-28 NOTE — CASE COMMUNICATION
FYI-Missed SNV, Patient refused snv, states wound care was done yesterday at [facility] wound [facility]

## 2024-09-29 PROCEDURE — 665998 HH PPS REVENUE CREDIT

## 2024-09-29 PROCEDURE — 665999 HH PPS REVENUE DEBIT

## 2024-09-30 ENCOUNTER — HOME CARE VISIT (OUTPATIENT)
Dept: HOME HEALTH SERVICES | Facility: HOME HEALTHCARE | Age: 69
End: 2024-09-30
Payer: MEDICARE

## 2024-09-30 VITALS
TEMPERATURE: 97.4 F | SYSTOLIC BLOOD PRESSURE: 130 MMHG | DIASTOLIC BLOOD PRESSURE: 70 MMHG | HEART RATE: 62 BPM | RESPIRATION RATE: 18 BRPM | OXYGEN SATURATION: 95 %

## 2024-09-30 PROCEDURE — G0299 HHS/HOSPICE OF RN EA 15 MIN: HCPCS

## 2024-09-30 PROCEDURE — RXMED WILLOW AMBULATORY MEDICATION CHARGE: Performed by: FAMILY MEDICINE

## 2024-09-30 ASSESSMENT — ENCOUNTER SYMPTOMS
BOWEL PATTERN NORMAL: 1
DENIES PAIN: 1
MUSCLE WEAKNESS: 1
NAUSEA: DENIES
VOMITING: DENIES
FATIGUES EASILY: 1
STOOL FREQUENCY: DAILY
LAST BOWEL MOVEMENT: 67113

## 2024-09-30 ASSESSMENT — PATIENT HEALTH QUESTIONNAIRE - PHQ9: CLINICAL INTERPRETATION OF PHQ2 SCORE: 0

## 2024-09-30 NOTE — TELEPHONE ENCOUNTER
Received request via: Patient    Was the patient seen in the last year in this department? Yes    Does the patient have an active prescription (recently filled or refills available) for medication(s) requested? No    Ph[facility] Name: [facility]    Does the patient have nursing home Plus and need 100-day supply? (This applies to ALL medications) Yes, quantity updated to 100 days

## 2024-10-01 ENCOUNTER — PHARMACY VISIT (OUTPATIENT)
Dept: PHARMACY | Facility: MEDICAL CENTER | Age: 69
End: 2024-10-01
Payer: COMMERCIAL

## 2024-10-02 ENCOUNTER — HOME CARE VISIT (OUTPATIENT)
Dept: HOME HEALTH SERVICES | Facility: HOME HEALTHCARE | Age: 69
End: 2024-10-02
Payer: MEDICARE

## 2024-10-02 VITALS
HEART RATE: 68 BPM | TEMPERATURE: 97.4 F | DIASTOLIC BLOOD PRESSURE: 60 MMHG | OXYGEN SATURATION: 94 % | RESPIRATION RATE: 18 BRPM | SYSTOLIC BLOOD PRESSURE: 134 MMHG

## 2024-10-02 PROCEDURE — G0299 HHS/HOSPICE OF RN EA 15 MIN: HCPCS

## 2024-10-02 ASSESSMENT — ENCOUNTER SYMPTOMS
VOMITING: DENIES
STOOL FREQUENCY: DAILY
NAUSEA: DENIES
LAST BOWEL MOVEMENT: 67115
FATIGUES EASILY: 1
BOWEL PATTERN NORMAL: 1
DENIES PAIN: 1

## 2024-10-02 ASSESSMENT — PATIENT HEALTH QUESTIONNAIRE - PHQ9: CLINICAL INTERPRETATION OF PHQ2 SCORE: 0

## 2024-10-04 ENCOUNTER — HOME CARE VISIT (OUTPATIENT)
Dept: HOME HEALTH SERVICES | Facility: HOME HEALTHCARE | Age: 69
End: 2024-10-04
Payer: MEDICARE

## 2024-10-04 VITALS
RESPIRATION RATE: 18 BRPM | HEART RATE: 66 BPM | DIASTOLIC BLOOD PRESSURE: 70 MMHG | TEMPERATURE: 97.4 F | SYSTOLIC BLOOD PRESSURE: 132 MMHG | OXYGEN SATURATION: 95 %

## 2024-10-04 PROCEDURE — G0299 HHS/HOSPICE OF RN EA 15 MIN: HCPCS

## 2024-10-04 ASSESSMENT — ENCOUNTER SYMPTOMS
NAUSEA: DENIES
FORGETFULNESS: 1
VOMITING: DENIES
MUSCLE WEAKNESS: 1
FATIGUES EASILY: 1
DENIES PAIN: 1
LAST BOWEL MOVEMENT: 67117
STOOL FREQUENCY: DAILY
BOWEL PATTERN NORMAL: 1

## 2024-10-04 ASSESSMENT — PATIENT HEALTH QUESTIONNAIRE - PHQ9: CLINICAL INTERPRETATION OF PHQ2 SCORE: 0

## 2024-10-07 ENCOUNTER — HOME CARE VISIT (OUTPATIENT)
Dept: HOME HEALTH SERVICES | Facility: HOME HEALTHCARE | Age: 69
End: 2024-10-07
Payer: MEDICARE

## 2024-10-07 VITALS
OXYGEN SATURATION: 93 % | HEART RATE: 63 BPM | BODY MASS INDEX: 50.72 KG/M2 | WEIGHT: 315 LBS | RESPIRATION RATE: 18 BRPM | DIASTOLIC BLOOD PRESSURE: 78 MMHG | TEMPERATURE: 97.9 F | SYSTOLIC BLOOD PRESSURE: 138 MMHG

## 2024-10-07 PROCEDURE — G0299 HHS/HOSPICE OF RN EA 15 MIN: HCPCS

## 2024-10-07 ASSESSMENT — ENCOUNTER SYMPTOMS
STOOL FREQUENCY: DAILY
MUSCLE WEAKNESS: 1
LOWEST PAIN SEVERITY IN PAST 24 HOURS: 0/10
LAST BOWEL MOVEMENT: 67120
BOWEL PATTERN NORMAL: 1
DENIES PAIN: 1
HIGHEST PAIN SEVERITY IN PAST 24 HOURS: 0/10
NAUSEA: DENIES
PAIN SEVERITY GOAL: 0/10
VOMITING: DENIES
SUBJECTIVE PAIN PROGRESSION: UNCHANGED

## 2024-10-07 ASSESSMENT — PATIENT HEALTH QUESTIONNAIRE - PHQ9: CLINICAL INTERPRETATION OF PHQ2 SCORE: 0

## 2024-10-07 ASSESSMENT — FIBROSIS 4 INDEX: FIB4 SCORE: 0.88

## 2024-10-09 ENCOUNTER — HOME CARE VISIT (OUTPATIENT)
Dept: HOME HEALTH SERVICES | Facility: HOME HEALTHCARE | Age: 69
End: 2024-10-09
Payer: MEDICARE

## 2024-10-09 VITALS
OXYGEN SATURATION: 95 % | HEART RATE: 74 BPM | DIASTOLIC BLOOD PRESSURE: 70 MMHG | SYSTOLIC BLOOD PRESSURE: 130 MMHG | RESPIRATION RATE: 16 BRPM | TEMPERATURE: 97.6 F

## 2024-10-09 PROCEDURE — G0299 HHS/HOSPICE OF RN EA 15 MIN: HCPCS

## 2024-10-09 ASSESSMENT — ENCOUNTER SYMPTOMS
DENIES PAIN: 1
VOMITING: DENIES
BOWEL PATTERN NORMAL: 1
FATIGUES EASILY: 1
NAUSEA: DENIES
LAST BOWEL MOVEMENT: 67122
STOOL FREQUENCY: DAILY

## 2024-10-09 ASSESSMENT — PATIENT HEALTH QUESTIONNAIRE - PHQ9: CLINICAL INTERPRETATION OF PHQ2 SCORE: 0

## 2024-10-11 ENCOUNTER — HOME CARE VISIT (OUTPATIENT)
Dept: HOME HEALTH SERVICES | Facility: HOME HEALTHCARE | Age: 69
End: 2024-10-11
Payer: MEDICARE

## 2024-10-13 PROCEDURE — RXMED WILLOW AMBULATORY MEDICATION CHARGE: Performed by: FAMILY MEDICINE

## 2024-10-14 ENCOUNTER — HOME CARE VISIT (OUTPATIENT)
Dept: HOME HEALTH SERVICES | Facility: HOME HEALTHCARE | Age: 69
End: 2024-10-14
Payer: MEDICARE

## 2024-10-14 VITALS
SYSTOLIC BLOOD PRESSURE: 134 MMHG | RESPIRATION RATE: 18 BRPM | TEMPERATURE: 97.6 F | OXYGEN SATURATION: 98 % | DIASTOLIC BLOOD PRESSURE: 72 MMHG | HEART RATE: 68 BPM

## 2024-10-14 PROCEDURE — G0299 HHS/HOSPICE OF RN EA 15 MIN: HCPCS

## 2024-10-14 ASSESSMENT — PATIENT HEALTH QUESTIONNAIRE - PHQ9: CLINICAL INTERPRETATION OF PHQ2 SCORE: 0

## 2024-10-14 ASSESSMENT — ENCOUNTER SYMPTOMS
DENIES PAIN: 1
STOOL FREQUENCY: DAILY
NAUSEA: DENIES
MUSCLE WEAKNESS: 1
BOWEL PATTERN NORMAL: 1
LAST BOWEL MOVEMENT: 67127
VOMITING: DENIES
FATIGUES EASILY: 1
CHEST TIGHTNESS: 1

## 2024-10-16 ENCOUNTER — PHARMACY VISIT (OUTPATIENT)
Dept: PHARMACY | Facility: MEDICAL CENTER | Age: 69
End: 2024-10-16
Payer: COMMERCIAL

## 2024-10-16 ENCOUNTER — HOME CARE VISIT (OUTPATIENT)
Dept: HOME HEALTH SERVICES | Facility: HOME HEALTHCARE | Age: 69
End: 2024-10-16
Payer: MEDICARE

## 2024-10-16 VITALS
OXYGEN SATURATION: 95 % | DIASTOLIC BLOOD PRESSURE: 62 MMHG | TEMPERATURE: 97.9 F | SYSTOLIC BLOOD PRESSURE: 128 MMHG | HEART RATE: 62 BPM | RESPIRATION RATE: 18 BRPM

## 2024-10-16 PROCEDURE — G0299 HHS/HOSPICE OF RN EA 15 MIN: HCPCS

## 2024-10-16 ASSESSMENT — ENCOUNTER SYMPTOMS
FATIGUES EASILY: 1
BOWEL PATTERN NORMAL: 1
VOMITING: DENIES
DENIES PAIN: 1
NAUSEA: DENIES
LAST BOWEL MOVEMENT: 67129
STOOL FREQUENCY: DAILY

## 2024-10-16 ASSESSMENT — PATIENT HEALTH QUESTIONNAIRE - PHQ9: CLINICAL INTERPRETATION OF PHQ2 SCORE: 0

## 2024-10-18 ENCOUNTER — HOME CARE VISIT (OUTPATIENT)
Dept: HOME HEALTH SERVICES | Facility: HOME HEALTHCARE | Age: 69
End: 2024-10-18
Payer: MEDICARE

## 2024-10-18 PROCEDURE — G0299 HHS/HOSPICE OF RN EA 15 MIN: HCPCS

## 2024-10-19 VITALS
OXYGEN SATURATION: 93 % | HEART RATE: 64 BPM | DIASTOLIC BLOOD PRESSURE: 60 MMHG | SYSTOLIC BLOOD PRESSURE: 124 MMHG | TEMPERATURE: 97.5 F | RESPIRATION RATE: 18 BRPM

## 2024-10-19 DIAGNOSIS — I10 BENIGN ESSENTIAL HTN: ICD-10-CM

## 2024-10-19 ASSESSMENT — ENCOUNTER SYMPTOMS
STOOL FREQUENCY: DAILY
NAUSEA: DENIES
MUSCLE WEAKNESS: 1
VOMITING: DENIES
DENIES PAIN: 1
LAST BOWEL MOVEMENT: 67130
BOWEL PATTERN NORMAL: 1
FATIGUES EASILY: 1

## 2024-10-19 ASSESSMENT — PATIENT HEALTH QUESTIONNAIRE - PHQ9: CLINICAL INTERPRETATION OF PHQ2 SCORE: 0

## 2024-10-21 ENCOUNTER — HOME CARE VISIT (OUTPATIENT)
Dept: HOME HEALTH SERVICES | Facility: HOME HEALTHCARE | Age: 69
End: 2024-10-21
Payer: MEDICARE

## 2024-10-21 VITALS
TEMPERATURE: 97.6 F | HEART RATE: 67 BPM | RESPIRATION RATE: 18 BRPM | DIASTOLIC BLOOD PRESSURE: 64 MMHG | OXYGEN SATURATION: 97 % | SYSTOLIC BLOOD PRESSURE: 128 MMHG

## 2024-10-21 PROCEDURE — G0299 HHS/HOSPICE OF RN EA 15 MIN: HCPCS

## 2024-10-21 RX ORDER — HYDROCHLOROTHIAZIDE 25 MG/1
25 TABLET ORAL DAILY
Qty: 100 TABLET | Refills: 2 | Status: SHIPPED | OUTPATIENT
Start: 2024-10-21 | End: 2025-08-17

## 2024-10-21 ASSESSMENT — ENCOUNTER SYMPTOMS
LAST BOWEL MOVEMENT: 67134
NAUSEA: DENIES
DENIES PAIN: 1
MUSCLE WEAKNESS: 1
BOWEL PATTERN NORMAL: 1
STOOL FREQUENCY: DAILY
VOMITING: DENIES
FATIGUES EASILY: 1

## 2024-10-21 ASSESSMENT — PATIENT HEALTH QUESTIONNAIRE - PHQ9: CLINICAL INTERPRETATION OF PHQ2 SCORE: 0

## 2024-10-23 ENCOUNTER — HOME CARE VISIT (OUTPATIENT)
Dept: HOME HEALTH SERVICES | Facility: HOME HEALTHCARE | Age: 69
End: 2024-10-23
Payer: MEDICARE

## 2024-10-23 VITALS
DIASTOLIC BLOOD PRESSURE: 80 MMHG | OXYGEN SATURATION: 95 % | HEART RATE: 65 BPM | TEMPERATURE: 97.4 F | RESPIRATION RATE: 20 BRPM | SYSTOLIC BLOOD PRESSURE: 132 MMHG

## 2024-10-23 PROCEDURE — G0299 HHS/HOSPICE OF RN EA 15 MIN: HCPCS

## 2024-10-23 PROCEDURE — RXMED WILLOW AMBULATORY MEDICATION CHARGE: Performed by: FAMILY MEDICINE

## 2024-10-23 ASSESSMENT — ENCOUNTER SYMPTOMS
BOWEL PATTERN NORMAL: 1
NAUSEA: DENIES
VOMITING: DENIES
HYPERTENSION: 1
STOOL FREQUENCY: DAILY
LAST BOWEL MOVEMENT: 67136
DENIES PAIN: 1

## 2024-10-23 ASSESSMENT — ACTIVITIES OF DAILY LIVING (ADL)
AMBULATION ASSISTANCE: NON-AMBULATORY
CURRENT_FUNCTION: ONE PERSON

## 2024-10-25 ENCOUNTER — HOME CARE VISIT (OUTPATIENT)
Dept: HOME HEALTH SERVICES | Facility: HOME HEALTHCARE | Age: 69
End: 2024-10-25
Payer: MEDICARE

## 2024-10-28 ENCOUNTER — PHARMACY VISIT (OUTPATIENT)
Dept: PHARMACY | Facility: MEDICAL CENTER | Age: 69
End: 2024-10-28
Payer: COMMERCIAL

## 2024-10-28 ENCOUNTER — HOME CARE VISIT (OUTPATIENT)
Dept: HOME HEALTH SERVICES | Facility: HOME HEALTHCARE | Age: 69
End: 2024-10-28
Payer: MEDICARE

## 2024-10-28 VITALS
HEART RATE: 74 BPM | TEMPERATURE: 97.6 F | OXYGEN SATURATION: 96 % | RESPIRATION RATE: 18 BRPM | DIASTOLIC BLOOD PRESSURE: 60 MMHG | SYSTOLIC BLOOD PRESSURE: 124 MMHG

## 2024-10-28 DIAGNOSIS — M10.9 GOUT, UNSPECIFIED CAUSE, UNSPECIFIED CHRONICITY, UNSPECIFIED SITE: ICD-10-CM

## 2024-10-28 PROCEDURE — G0299 HHS/HOSPICE OF RN EA 15 MIN: HCPCS

## 2024-10-28 PROCEDURE — RXMED WILLOW AMBULATORY MEDICATION CHARGE: Performed by: FAMILY MEDICINE

## 2024-10-28 RX ORDER — ALLOPURINOL 100 MG/1
100 TABLET ORAL DAILY
Qty: 90 TABLET | Refills: 2 | Status: SHIPPED | OUTPATIENT
Start: 2024-10-28

## 2024-10-28 SDOH — HEALTH STABILITY: PHYSICAL HEALTH: EXERCISE TYPE: HOME EXERCISE PROGRAM

## 2024-10-28 ASSESSMENT — ENCOUNTER SYMPTOMS
BOWEL PATTERN NORMAL: 1
PAIN LOCATION - PAIN SEVERITY: 2/10
PAIN LOCATION - RELIEVING FACTORS: DISTRACTION
LAST BOWEL MOVEMENT: 67141
FATIGUES EASILY: 1
PAIN LOCATION - PAIN FREQUENCY: INFREQUENT
STOOL FREQUENCY: DAILY
PAIN: 1
PAIN LOCATION: RIGHT SIDE OF NECK
PAIN SEVERITY GOAL: 0/10
LOWEST PAIN SEVERITY IN PAST 24 HOURS: 2/10
NAUSEA: DENIES
PAIN LOCATION - PAIN QUALITY: ACHY
VOMITING: DENIES
SUBJECTIVE PAIN PROGRESSION: UNCHANGED
ASSOCIATED SYMPTOMS: DENIES
HIGHEST PAIN SEVERITY IN PAST 24 HOURS: 3/10

## 2024-10-28 ASSESSMENT — PATIENT HEALTH QUESTIONNAIRE - PHQ9: CLINICAL INTERPRETATION OF PHQ2 SCORE: 0

## 2024-10-29 ENCOUNTER — PHARMACY VISIT (OUTPATIENT)
Dept: PHARMACY | Facility: MEDICAL CENTER | Age: 69
End: 2024-10-29
Payer: COMMERCIAL

## 2024-10-30 ENCOUNTER — HOME CARE VISIT (OUTPATIENT)
Dept: HOME HEALTH SERVICES | Facility: HOME HEALTHCARE | Age: 69
End: 2024-10-30
Payer: MEDICARE

## 2024-10-30 VITALS
OXYGEN SATURATION: 97 % | HEART RATE: 66 BPM | TEMPERATURE: 97.4 F | SYSTOLIC BLOOD PRESSURE: 126 MMHG | DIASTOLIC BLOOD PRESSURE: 72 MMHG | RESPIRATION RATE: 18 BRPM

## 2024-10-30 PROCEDURE — G0299 HHS/HOSPICE OF RN EA 15 MIN: HCPCS

## 2024-10-30 ASSESSMENT — ENCOUNTER SYMPTOMS
PAIN LOCATION - PAIN FREQUENCY: INTERMITTENT
ASSOCIATED SYMPTOMS: DENIES
DRY SKIN: 1
PAIN: 1
STOOL FREQUENCY: LESS THAN DAILY
LAST BOWEL MOVEMENT: 67142
PAIN LOCATION - PAIN SEVERITY: 3/10
FATIGUES EASILY: 1
LIMITED RANGE OF MOTION: 1
PAIN LOCATION: PHANTOM PAIN
PAIN SEVERITY GOAL: 2/10
HIGHEST PAIN SEVERITY IN PAST 24 HOURS: 5/10
PAIN LOCATION - PAIN QUALITY: SHARP
LOWER EXTREMITY EDEMA: 1
LOWEST PAIN SEVERITY IN PAST 24 HOURS: 3/10
SUBJECTIVE PAIN PROGRESSION: WAXING AND WANING
MUSCLE WEAKNESS: 1
FATIGUE: 1
BOWEL PATTERN NORMAL: 1

## 2024-10-30 ASSESSMENT — PATIENT HEALTH QUESTIONNAIRE - PHQ9: CLINICAL INTERPRETATION OF PHQ2 SCORE: 0

## 2024-11-01 ENCOUNTER — HOME CARE VISIT (OUTPATIENT)
Dept: HOME HEALTH SERVICES | Facility: HOME HEALTHCARE | Age: 69
End: 2024-11-01
Payer: MEDICARE

## 2024-11-01 VITALS
OXYGEN SATURATION: 92 % | TEMPERATURE: 97.8 F | HEART RATE: 67 BPM | SYSTOLIC BLOOD PRESSURE: 114 MMHG | RESPIRATION RATE: 18 BRPM | DIASTOLIC BLOOD PRESSURE: 74 MMHG

## 2024-11-01 PROCEDURE — G0299 HHS/HOSPICE OF RN EA 15 MIN: HCPCS

## 2024-11-01 ASSESSMENT — ENCOUNTER SYMPTOMS
PAIN LOCATION - EXACERBATING FACTORS: ACTIVITY
LAST BOWEL MOVEMENT: 67144
VOMITING: DENIES
PAIN: 1
STOOL FREQUENCY: DAILY
LOWEST PAIN SEVERITY IN PAST 24 HOURS: 2/10
PAIN LOCATION - RELIEVING FACTORS: REST, MEDS
HYPERTENSION: 1
SUBJECTIVE PAIN PROGRESSION: UNCHANGED
BOWEL PATTERN NORMAL: 1
NAUSEA: DENIES
PAIN LOCATION - PAIN SEVERITY: 4/10
DRY SKIN: 1
PAIN LOCATION: NECK
HIGHEST PAIN SEVERITY IN PAST 24 HOURS: 6/10
MUSCLE WEAKNESS: 1
FATIGUE: 1
PAIN LOCATION - PAIN QUALITY: ACH
PAIN SEVERITY GOAL: 0/10
PAIN LOCATION - PAIN FREQUENCY: FREQUENT

## 2024-11-01 ASSESSMENT — ACTIVITIES OF DAILY LIVING (ADL)
CURRENT_FUNCTION: STAND BY ASSIST
AMBULATION ASSISTANCE: NON-AMBULATORY

## 2024-11-01 NOTE — Clinical Note
VS WNL. Patient tolerated wound care well. Concerned about how red the foot is looking but it is not warm to the touch. Educated patient on elevating it as much as possible and not to put pressure on the lateral side of the right foot - patient agreed to plan. No other concerns at this time.

## 2024-11-02 PROCEDURE — RXMED WILLOW AMBULATORY MEDICATION CHARGE: Performed by: FAMILY MEDICINE

## 2024-11-04 ENCOUNTER — HOME CARE VISIT (OUTPATIENT)
Dept: HOME HEALTH SERVICES | Facility: HOME HEALTHCARE | Age: 69
End: 2024-11-04
Payer: MEDICARE

## 2024-11-04 ENCOUNTER — DOCUMENTATION (OUTPATIENT)
Dept: MEDICAL GROUP | Facility: PHYSICIAN GROUP | Age: 69
End: 2024-11-04
Payer: MEDICARE

## 2024-11-04 VITALS
RESPIRATION RATE: 17 BRPM | HEART RATE: 57 BPM | DIASTOLIC BLOOD PRESSURE: 80 MMHG | SYSTOLIC BLOOD PRESSURE: 116 MMHG | OXYGEN SATURATION: 95 % | TEMPERATURE: 97.8 F | BODY MASS INDEX: 54.11 KG/M2 | WEIGHT: 315 LBS

## 2024-11-04 PROCEDURE — G0299 HHS/HOSPICE OF RN EA 15 MIN: HCPCS

## 2024-11-04 ASSESSMENT — ENCOUNTER SYMPTOMS
LOWER EXTREMITY EDEMA: 1
LOWEST PAIN SEVERITY IN PAST 24 HOURS: 1/10
NAUSEA: DENIES
HYPERTENSION: 1
PAIN LOCATION - EXACERBATING FACTORS: ACTIVITY
PAIN LOCATION - PAIN QUALITY: ACHES
VOMITING: DENIES
PAIN: 1
HIGHEST PAIN SEVERITY IN PAST 24 HOURS: 4/10
SUBJECTIVE PAIN PROGRESSION: UNCHANGED
LOSS OF SENSATION: 1
PAIN LOCATION: NECK
PAIN LOCATION - PAIN FREQUENCY: FREQUENT
PAIN LOCATION - PAIN SEVERITY: 3/10
DRY SKIN: 1
PAIN LOCATION - RELIEVING FACTORS: MEDS
PAIN SEVERITY GOAL: 0/10
FATIGUE: 1

## 2024-11-04 ASSESSMENT — ACTIVITIES OF DAILY LIVING (ADL): OASIS_M1830: 03

## 2024-11-04 ASSESSMENT — PATIENT HEALTH QUESTIONNAIRE - PHQ9: CLINICAL INTERPRETATION OF PHQ2 SCORE: 0

## 2024-11-04 ASSESSMENT — FIBROSIS 4 INDEX: FIB4 SCORE: 0.88

## 2024-11-05 PROCEDURE — 665999 HH PPS REVENUE DEBIT

## 2024-11-05 PROCEDURE — G0179 MD RECERTIFICATION HHA PT: HCPCS | Performed by: FAMILY MEDICINE

## 2024-11-05 PROCEDURE — 665998 HH PPS REVENUE CREDIT

## 2024-11-05 NOTE — PROGRESS NOTES
Medication chart review for [facility] services    Received referral from [facility].   Medications reviewed  compared with discharge summary if available.  Discharge summary date, if applicable:   N/a    Current medication list per [facility]     Medication list one, patient is currently taking    Current Outpatient Medications:     allopurinol, 100 mg, Oral, DAILY    hydroCHLOROthiazide, 25 mg, Oral, DAILY    apixaban, 5 mg, Oral, BID    amiodarone, 200 mg, Oral, DAILY    pioglitazone, 30 mg, Oral, DAILY    dilTIAZem, 30 mg, Oral, TID    metformin, TAKE 1 TABLET BY MOUTH TWICE DAILY WITH FOOD  Indications: Type 2 Diabetes    Alcohol Prep, 1 Each, Does not apply, TID    OneTouch Verio, 1 Each, Does not apply, TID    glucose blood, Use to test in the morning, at noon and at bedtime    fingerstick, Use to test in the morning, at noon and at bedtime    OneTouch Verio, 1 Each, Other, PRN    potassium Chloride ER, TAKE 1 TABLET BY MOUTH EVERY DAY FOR HYPOKALEMIA  Indications: Low Amount of Potassium in the Blood    gabapentin, 400 mg, Oral, 4XDAY    dilTIAZem, TAKE 1 TABLET BY MOUTH EVERY 8 HOURS FOR ATRIAL FIBRILLATION    atorvastatin, 20 mg, Oral, DAILY    Simethicone, 1 Capsule, Oral, QDAY PRN    carvedilol, 6.25 mg, Oral, BID WITH MEALS    hydrocortisone, 1 Application , Topical, QDAY PRN    atorvastatin, 20 mg, Oral, Q EVENING    vitamin D, 1,000 Units, Oral, DAILY (Patient taking differently: 2,000 Units, Oral, DAILY, Indications: Nutritional supplement)    loratadine, 10 mg, Oral, DAILY    Multiple Vitamins-Minerals (MULTIVITAMIN GUMMIES ADULTS PO), 1 Dose, Oral, DAILY AT 1800    Saccharomyces boulardii (DAILY PROBIOTIC SUPPLEMENT PO), 1 Dose, Oral, DAILY    Arginaid, 1 Dose, Oral, DAILY AT 1800    Jony, 1 Dose, Oral, DAILY AT 1800    acetaminophen, 500-1,000 mg, Oral, Q6HRS PRN      Medication list two, drugs that the patient has been prescribed or recommended to take by their healthcare provider on  discharge summary  N/a    Allergies   Allergen Reactions    Penicillins Unspecified and Swelling     Given in eye drops as a child, eyes swell  2003-06-09;ITCH   Create Date: [id number] Create User Name: Juan J Castellanos Update Date: [id number] Update User Name: Juan J Castellanos    Bee Venom Swelling    Cefazolin Rash, Itching and Swelling    Linezolid Unspecified     Caused unk lab value to increase.       Labs     Lab Results   Component Value Date/Time    SODIUM 142 02/27/2024 05:55 AM    POTASSIUM 3.8 02/27/2024 05:55 AM    CHLORIDE 106 02/27/2024 05:55 AM    CO2 29 02/27/2024 05:55 AM    GLUCOSE 100 (H) 02/27/2024 05:55 AM    BUN 28 (H) 02/27/2024 05:55 AM    CREATININE 1.4 (H) 02/27/2024 05:55 AM    GLOMRATE 52 (L) 09/08/2023 08:50 AM     Lab Results   Component Value Date/Time    ALKPHOSPHAT 105 02/27/2024 05:55 AM    ASTSGOT 17 02/27/2024 05:55 AM    ALTSGPT 14 02/27/2024 05:55 AM    TBILIRUBIN 0.4 02/27/2024 05:55 AM    ALBUMIN 2.7 (L) 02/27/2024 05:55 AM        Assessment for clinically significant drug interactions, drug omissions/additions, duplicative therapies.            CC   Theo James M.D.  [address] [address]  Fax: [phone]    [facility]  Phone [phone] fax [phone]    This note was created using voice recognition software (Dragon). The accuracy of the dictation is limited by the abilities of the software. I have reviewed the note prior to signing, however some errors in grammar and context are still possible. If you have any questions related to this note please do not hesitate to contact our office.

## 2024-11-06 ENCOUNTER — HOME CARE VISIT (OUTPATIENT)
Dept: HOME HEALTH SERVICES | Facility: HOME HEALTHCARE | Age: 69
End: 2024-11-06
Payer: MEDICARE

## 2024-11-06 VITALS
OXYGEN SATURATION: 96 % | RESPIRATION RATE: 18 BRPM | TEMPERATURE: 97.6 F | HEART RATE: 86 BPM | SYSTOLIC BLOOD PRESSURE: 122 MMHG | DIASTOLIC BLOOD PRESSURE: 76 MMHG

## 2024-11-06 PROCEDURE — 665999 HH PPS REVENUE DEBIT

## 2024-11-06 PROCEDURE — 665998 HH PPS REVENUE CREDIT

## 2024-11-06 PROCEDURE — G0299 HHS/HOSPICE OF RN EA 15 MIN: HCPCS

## 2024-11-06 PROCEDURE — 665003 FOLLOW UP-HOME HEALTH

## 2024-11-06 ASSESSMENT — ENCOUNTER SYMPTOMS
PAIN LOCATION - PAIN QUALITY: ACHY
LIMITED RANGE OF MOTION: 1
SUBJECTIVE PAIN PROGRESSION: UNCHANGED
FATIGUES EASILY: 1
PAIN LOCATION - PAIN FREQUENCY: INTERMITTENT
LAST BOWEL MOVEMENT: 67149
PAIN LOCATION - EXACERBATING FACTORS: MOVEMENT
LOWER EXTREMITY EDEMA: 1
PAIN: 1
FATIGUE: 1
PAIN LOCATION: RIGHT NECK
STOOL FREQUENCY: LESS THAN DAILY
DRY SKIN: 1
HIGHEST PAIN SEVERITY IN PAST 24 HOURS: 5/10
BOWEL PATTERN NORMAL: 1
LOWEST PAIN SEVERITY IN PAST 24 HOURS: 3/10
PAIN SEVERITY GOAL: 1/10
PAIN LOCATION - RELIEVING FACTORS: REPOSITIONING
MUSCLE WEAKNESS: 1
PAIN LOCATION - PAIN SEVERITY: 3/10

## 2024-11-06 ASSESSMENT — PATIENT HEALTH QUESTIONNAIRE - PHQ9: CLINICAL INTERPRETATION OF PHQ2 SCORE: 0

## 2024-11-07 PROCEDURE — 665998 HH PPS REVENUE CREDIT

## 2024-11-07 PROCEDURE — 665999 HH PPS REVENUE DEBIT

## 2024-11-08 ENCOUNTER — HOME CARE VISIT (OUTPATIENT)
Dept: HOME HEALTH SERVICES | Facility: HOME HEALTHCARE | Age: 69
End: 2024-11-08
Payer: MEDICARE

## 2024-11-08 PROCEDURE — 665998 HH PPS REVENUE CREDIT

## 2024-11-08 PROCEDURE — 665999 HH PPS REVENUE DEBIT

## 2024-11-09 PROCEDURE — 665998 HH PPS REVENUE CREDIT

## 2024-11-09 PROCEDURE — 665999 HH PPS REVENUE DEBIT

## 2024-11-10 PROCEDURE — 665999 HH PPS REVENUE DEBIT

## 2024-11-10 PROCEDURE — 665998 HH PPS REVENUE CREDIT

## 2024-11-11 ENCOUNTER — PHARMACY VISIT (OUTPATIENT)
Dept: PHARMACY | Facility: MEDICAL CENTER | Age: 69
End: 2024-11-11
Payer: COMMERCIAL

## 2024-11-11 ENCOUNTER — HOME CARE VISIT (OUTPATIENT)
Dept: HOME HEALTH SERVICES | Facility: HOME HEALTHCARE | Age: 69
End: 2024-11-11
Payer: MEDICARE

## 2024-11-11 VITALS
TEMPERATURE: 97.5 F | DIASTOLIC BLOOD PRESSURE: 64 MMHG | OXYGEN SATURATION: 96 % | HEART RATE: 61 BPM | SYSTOLIC BLOOD PRESSURE: 132 MMHG | RESPIRATION RATE: 18 BRPM

## 2024-11-11 PROCEDURE — G0299 HHS/HOSPICE OF RN EA 15 MIN: HCPCS

## 2024-11-11 PROCEDURE — 665999 HH PPS REVENUE DEBIT

## 2024-11-11 PROCEDURE — 665998 HH PPS REVENUE CREDIT

## 2024-11-11 ASSESSMENT — ENCOUNTER SYMPTOMS
STOOL FREQUENCY: DAILY
PAIN LOCATION - PAIN FREQUENCY: FREQUENT
HIGHEST PAIN SEVERITY IN PAST 24 HOURS: 3/10
PAIN LOCATION - RELIEVING FACTORS: NON PHARMACOLOGICAL INTERVENTIONS
PAIN LOCATION: RIGHT SHOULDER
SUBJECTIVE PAIN PROGRESSION: UNCHANGED
ASSOCIATED SYMPTOMS: DENIES
PAIN LOCATION - PAIN SEVERITY: 3/10
BOWEL PATTERN NORMAL: 1
PAIN: 1
PAIN LOCATION - PAIN QUALITY: DULL, ACHY
PAIN SEVERITY GOAL: 0/10
LOWEST PAIN SEVERITY IN PAST 24 HOURS: 2/10
LAST BOWEL MOVEMENT: 67155
VOMITING: DENIES
NAUSEA: DENIES
MUSCLE WEAKNESS: 1

## 2024-11-11 ASSESSMENT — PATIENT HEALTH QUESTIONNAIRE - PHQ9: CLINICAL INTERPRETATION OF PHQ2 SCORE: 0

## 2024-11-11 NOTE — CASE COMMUNICATION
noted  ----- Message -----  From: Alessia Johnson R.N.  Sent: 11/1/2024   4:14 PM PST  To: Brisa Robledo R.N.; Lucy Moraes R.N.      VS [facility]. Patient tolerated wound care well. Concerned about how red the foot is looking but it is not warm to the touch. Educated patient on elevating it as much as possible and not to put pressure on the lateral side of the right foot - patient agreed to plan. No other concerns at this time.

## 2024-11-12 PROCEDURE — 665999 HH PPS REVENUE DEBIT

## 2024-11-12 PROCEDURE — 665998 HH PPS REVENUE CREDIT

## 2024-11-13 PROCEDURE — 665999 HH PPS REVENUE DEBIT

## 2024-11-13 PROCEDURE — 665998 HH PPS REVENUE CREDIT

## 2024-11-14 ENCOUNTER — HOME CARE VISIT (OUTPATIENT)
Dept: HOME HEALTH SERVICES | Facility: HOME HEALTHCARE | Age: 69
End: 2024-11-14
Payer: MEDICARE

## 2024-11-14 VITALS
HEART RATE: 70 BPM | TEMPERATURE: 97.7 F | RESPIRATION RATE: 17 BRPM | OXYGEN SATURATION: 92 % | SYSTOLIC BLOOD PRESSURE: 118 MMHG | DIASTOLIC BLOOD PRESSURE: 68 MMHG

## 2024-11-14 PROCEDURE — RXMED WILLOW AMBULATORY MEDICATION CHARGE: Performed by: NURSE PRACTITIONER

## 2024-11-14 PROCEDURE — G0299 HHS/HOSPICE OF RN EA 15 MIN: HCPCS

## 2024-11-14 PROCEDURE — 665999 HH PPS REVENUE DEBIT

## 2024-11-14 PROCEDURE — 665998 HH PPS REVENUE CREDIT

## 2024-11-14 ASSESSMENT — ENCOUNTER SYMPTOMS
PAIN LOCATION - PAIN QUALITY: ACHES
PAIN LOCATION - PAIN FREQUENCY: FREQUENT
LOWEST PAIN SEVERITY IN PAST 24 HOURS: 2/10
STOOL FREQUENCY: DAILY
HYPERTENSION: 1
BOWEL PATTERN NORMAL: 1
NAUSEA: DENIES
VOMITING: DENIES
PAIN SEVERITY GOAL: 0/10
HIGHEST PAIN SEVERITY IN PAST 24 HOURS: 4/10
PAIN LOCATION - RELIEVING FACTORS: REST, MEDS
MUSCLE WEAKNESS: 1
PAIN LOCATION: NECK
PAIN: 1
LAST BOWEL MOVEMENT: 67157
PAIN LOCATION - PAIN SEVERITY: 2/10
FATIGUE: 1
SUBJECTIVE PAIN PROGRESSION: UNCHANGED
PAIN LOCATION - EXACERBATING FACTORS: ACTIVITY

## 2024-11-14 NOTE — CASE COMMUNICATION
[facility] SHAYLA Kirk RN on 11/14/2024:     Edits completed by SHAYLA Kirk RN:     1.  and  diagnosis coding updated per chart review  2. Added bleeding precautions to the 485 form

## 2024-11-14 NOTE — Clinical Note
Thank you! I agree with the changes.    ----- Message -----  From: Ava Kirk R.N.  Sent: 11/14/2024  12:04 PM PST  To: Alessia Johnson R.N.      Quality Review Completed for [facility] by SHAYLA Kirk RN on 11/14/2024:     Edits completed by SHAYLA Kirk RN:     1.  and  diagnosis coding updated per chart review  2. Added bleeding precautions to the 485 form

## 2024-11-15 PROCEDURE — 665999 HH PPS REVENUE DEBIT

## 2024-11-15 PROCEDURE — 665998 HH PPS REVENUE CREDIT

## 2024-11-16 DIAGNOSIS — I48.91 ATRIAL FIBRILLATION, UNSPECIFIED TYPE (HCC): ICD-10-CM

## 2024-11-16 PROCEDURE — RXMED WILLOW AMBULATORY MEDICATION CHARGE: Performed by: FAMILY MEDICINE

## 2024-11-16 PROCEDURE — 665999 HH PPS REVENUE DEBIT

## 2024-11-16 PROCEDURE — 665998 HH PPS REVENUE CREDIT

## 2024-11-17 PROCEDURE — 665998 HH PPS REVENUE CREDIT

## 2024-11-17 PROCEDURE — 665999 HH PPS REVENUE DEBIT

## 2024-11-18 ENCOUNTER — HOME CARE VISIT (OUTPATIENT)
Dept: HOME HEALTH SERVICES | Facility: HOME HEALTHCARE | Age: 69
End: 2024-11-18
Payer: MEDICARE

## 2024-11-18 VITALS
TEMPERATURE: 97.6 F | RESPIRATION RATE: 18 BRPM | SYSTOLIC BLOOD PRESSURE: 120 MMHG | OXYGEN SATURATION: 95 % | HEART RATE: 68 BPM | DIASTOLIC BLOOD PRESSURE: 62 MMHG

## 2024-11-18 PROCEDURE — 665999 HH PPS REVENUE DEBIT

## 2024-11-18 PROCEDURE — G0299 HHS/HOSPICE OF RN EA 15 MIN: HCPCS

## 2024-11-18 PROCEDURE — RXMED WILLOW AMBULATORY MEDICATION CHARGE: Performed by: FAMILY MEDICINE

## 2024-11-18 PROCEDURE — 665998 HH PPS REVENUE CREDIT

## 2024-11-18 ASSESSMENT — PATIENT HEALTH QUESTIONNAIRE - PHQ9: CLINICAL INTERPRETATION OF PHQ2 SCORE: 0

## 2024-11-18 ASSESSMENT — ENCOUNTER SYMPTOMS
VOMITING: DENIES
PAIN LOCATION - PAIN FREQUENCY: CONSTANT
BOWEL PATTERN NORMAL: 1
LAST BOWEL MOVEMENT: 67161
PAIN: 1
SUBJECTIVE PAIN PROGRESSION: UNCHANGED
PAIN LOCATION - PAIN SEVERITY: 3/10
PAIN LOCATION - PAIN QUALITY: DULL, ACHY
FATIGUES EASILY: 1
PAIN LOCATION: NECK
PAIN LOCATION - RELIEVING FACTORS: LIDOCAINE PATCH
LOWEST PAIN SEVERITY IN PAST 24 HOURS: 3/10
ASSOCIATED SYMPTOMS: DENIES
NAUSEA: DENIES
HIGHEST PAIN SEVERITY IN PAST 24 HOURS: 5/10
PAIN SEVERITY GOAL: 1/10
STOOL FREQUENCY: DAILY

## 2024-11-18 NOTE — TELEPHONE ENCOUNTER
Received request via: Pharmacy    Was the patient seen in the last year in this department? Yes    Does the patient have an active prescription (recently filled or refills available) for medication(s) requested? No    Ph[facility] Name[facility]     Does the patient have detention Plus and need 100-day supply? (This applies to ALL medications) Yes, quantity updated to 100 days

## 2024-11-19 PROCEDURE — 665999 HH PPS REVENUE DEBIT

## 2024-11-19 PROCEDURE — 665998 HH PPS REVENUE CREDIT

## 2024-11-20 ENCOUNTER — PHARMACY VISIT (OUTPATIENT)
Dept: PHARMACY | Facility: MEDICAL CENTER | Age: 69
End: 2024-11-20
Payer: COMMERCIAL

## 2024-11-20 PROCEDURE — 665998 HH PPS REVENUE CREDIT

## 2024-11-20 PROCEDURE — 665999 HH PPS REVENUE DEBIT

## 2024-11-21 ENCOUNTER — HOME CARE VISIT (OUTPATIENT)
Dept: HOME HEALTH SERVICES | Facility: HOME HEALTHCARE | Age: 69
End: 2024-11-21
Payer: MEDICARE

## 2024-11-21 PROCEDURE — 665999 HH PPS REVENUE DEBIT

## 2024-11-21 PROCEDURE — 665998 HH PPS REVENUE CREDIT

## 2024-11-21 NOTE — CASE COMMUNICATION
FYI-Missed SNV, Missed SNV d/t wound care appt at [facility]

## 2024-11-22 PROCEDURE — 665998 HH PPS REVENUE CREDIT

## 2024-11-22 PROCEDURE — 665999 HH PPS REVENUE DEBIT

## 2024-11-23 PROCEDURE — 665999 HH PPS REVENUE DEBIT

## 2024-11-23 PROCEDURE — 665998 HH PPS REVENUE CREDIT

## 2024-11-24 PROCEDURE — 665998 HH PPS REVENUE CREDIT

## 2024-11-24 PROCEDURE — 665999 HH PPS REVENUE DEBIT

## 2024-11-25 ENCOUNTER — HOME CARE VISIT (OUTPATIENT)
Dept: HOME HEALTH SERVICES | Facility: HOME HEALTHCARE | Age: 69
End: 2024-11-25
Payer: MEDICARE

## 2024-11-25 VITALS
HEART RATE: 66 BPM | OXYGEN SATURATION: 92 % | RESPIRATION RATE: 16 BRPM | DIASTOLIC BLOOD PRESSURE: 76 MMHG | TEMPERATURE: 97.4 F | SYSTOLIC BLOOD PRESSURE: 128 MMHG

## 2024-11-25 PROCEDURE — 665998 HH PPS REVENUE CREDIT

## 2024-11-25 PROCEDURE — G0299 HHS/HOSPICE OF RN EA 15 MIN: HCPCS

## 2024-11-25 PROCEDURE — 665999 HH PPS REVENUE DEBIT

## 2024-11-25 SDOH — HEALTH STABILITY: PHYSICAL HEALTH: EXERCISE TYPE: HOME EXERCISE PROGRAM

## 2024-11-25 ASSESSMENT — ENCOUNTER SYMPTOMS
LOWEST PAIN SEVERITY IN PAST 24 HOURS: 2/10
PAIN LOCATION - EXACERBATING FACTORS: EXERTION
LAST BOWEL MOVEMENT: 67169
PAIN LOCATION - RELIEVING FACTORS: GABAPENTIN
BOWEL PATTERN NORMAL: 1
NAUSEA: DENIES
STOOL FREQUENCY: DAILY
ASSOCIATED SYMPTOMS: DENIES
PAIN: 1
VOMITING: DENIES
PAIN LOCATION - PAIN FREQUENCY: CONSTANT
HIGHEST PAIN SEVERITY IN PAST 24 HOURS: 3/10
PAIN LOCATION - PAIN QUALITY: DULL, ACHY
PAIN LOCATION - PAIN SEVERITY: 2/10
SUBJECTIVE PAIN PROGRESSION: GRADUALLY IMPROVING
PAIN LOCATION: RIGHT SHOULDER
LOWER EXTREMITY EDEMA: 1
PAIN SEVERITY GOAL: 1/10

## 2024-11-25 ASSESSMENT — PATIENT HEALTH QUESTIONNAIRE - PHQ9: CLINICAL INTERPRETATION OF PHQ2 SCORE: 0

## 2024-11-26 PROCEDURE — 665999 HH PPS REVENUE DEBIT

## 2024-11-26 PROCEDURE — 665998 HH PPS REVENUE CREDIT

## 2024-11-27 PROCEDURE — 665998 HH PPS REVENUE CREDIT

## 2024-11-27 PROCEDURE — 665999 HH PPS REVENUE DEBIT

## 2024-11-28 PROCEDURE — 665999 HH PPS REVENUE DEBIT

## 2024-11-28 PROCEDURE — 665998 HH PPS REVENUE CREDIT

## 2024-11-29 ENCOUNTER — HOME CARE VISIT (OUTPATIENT)
Dept: HOME HEALTH SERVICES | Facility: HOME HEALTHCARE | Age: 69
End: 2024-11-29
Payer: MEDICARE

## 2024-11-29 VITALS
RESPIRATION RATE: 16 BRPM | HEART RATE: 62 BPM | OXYGEN SATURATION: 95 % | DIASTOLIC BLOOD PRESSURE: 60 MMHG | SYSTOLIC BLOOD PRESSURE: 116 MMHG | TEMPERATURE: 97.4 F

## 2024-11-29 PROCEDURE — G0299 HHS/HOSPICE OF RN EA 15 MIN: HCPCS

## 2024-11-29 PROCEDURE — 665998 HH PPS REVENUE CREDIT

## 2024-11-29 PROCEDURE — 665999 HH PPS REVENUE DEBIT

## 2024-11-29 ASSESSMENT — ENCOUNTER SYMPTOMS
DENIES PAIN: 1
BOWEL PATTERN NORMAL: 1
NAUSEA: DENIES
STOOL FREQUENCY: DAILY
VOMITING: DENIES
LAST BOWEL MOVEMENT: 67173
FATIGUES EASILY: 1

## 2024-11-29 ASSESSMENT — PATIENT HEALTH QUESTIONNAIRE - PHQ9: CLINICAL INTERPRETATION OF PHQ2 SCORE: 0

## 2024-11-30 DIAGNOSIS — I48.91 ATRIAL FIBRILLATION, UNSPECIFIED TYPE (HCC): ICD-10-CM

## 2024-12-02 ENCOUNTER — HOME CARE VISIT (OUTPATIENT)
Dept: HOME HEALTH SERVICES | Facility: HOME HEALTHCARE | Age: 69
End: 2024-12-02
Payer: MEDICARE

## 2024-12-02 VITALS
TEMPERATURE: 97.9 F | HEART RATE: 60 BPM | DIASTOLIC BLOOD PRESSURE: 70 MMHG | OXYGEN SATURATION: 91 % | SYSTOLIC BLOOD PRESSURE: 130 MMHG | RESPIRATION RATE: 18 BRPM

## 2024-12-02 PROCEDURE — RXMED WILLOW AMBULATORY MEDICATION CHARGE: Performed by: FAMILY MEDICINE

## 2024-12-02 PROCEDURE — G0299 HHS/HOSPICE OF RN EA 15 MIN: HCPCS

## 2024-12-02 RX ORDER — AMIODARONE HYDROCHLORIDE 200 MG/1
200 TABLET ORAL DAILY
Qty: 100 TABLET | Refills: 0 | Status: SHIPPED | OUTPATIENT
Start: 2024-12-02

## 2024-12-02 ASSESSMENT — ENCOUNTER SYMPTOMS
SUBJECTIVE PAIN PROGRESSION: UNCHANGED
PAIN LOCATION - PAIN SEVERITY: 2/10
BOWEL PATTERN NORMAL: 1
ASSOCIATED SYMPTOMS: DENIES
NAUSEA: DENIES
PAIN SEVERITY GOAL: 1/10
PAIN LOCATION - PAIN QUALITY: DULL, ACHY
LAST BOWEL MOVEMENT: 67176
PAIN LOCATION - RELIEVING FACTORS: GABAPENTIN
PAIN: 1
HIGHEST PAIN SEVERITY IN PAST 24 HOURS: 3/10
PAIN LOCATION - PAIN FREQUENCY: FREQUENT
LOWER EXTREMITY EDEMA: 1
VOMITING: DENIES
LOWEST PAIN SEVERITY IN PAST 24 HOURS: 2/10
STOOL FREQUENCY: DAILY
PAIN LOCATION: NECK

## 2024-12-02 ASSESSMENT — PATIENT HEALTH QUESTIONNAIRE - PHQ9: CLINICAL INTERPRETATION OF PHQ2 SCORE: 0

## 2024-12-02 NOTE — TELEPHONE ENCOUNTER
Received request via: Pharmacy    Was the patient seen in the last year in this department? Yes    Does the patient have an active prescription (recently filled or refills available) for medication(s) requested? No    Pharmacy Name: [facility]     Does the patient have alf Plus and need 100-day supply? (This applies to ALL medications) Yes, quantity updated to 100 days

## 2024-12-04 ENCOUNTER — PHARMACY VISIT (OUTPATIENT)
Dept: PHARMACY | Facility: MEDICAL CENTER | Age: 69
End: 2024-12-04
Payer: COMMERCIAL

## 2024-12-05 ENCOUNTER — HOME CARE VISIT (OUTPATIENT)
Dept: HOME HEALTH SERVICES | Facility: HOME HEALTHCARE | Age: 69
End: 2024-12-05
Payer: MEDICARE

## 2024-12-09 ENCOUNTER — HOME CARE VISIT (OUTPATIENT)
Dept: HOME HEALTH SERVICES | Facility: HOME HEALTHCARE | Age: 69
End: 2024-12-09
Payer: MEDICARE

## 2024-12-09 VITALS
RESPIRATION RATE: 18 BRPM | HEART RATE: 66 BPM | SYSTOLIC BLOOD PRESSURE: 130 MMHG | DIASTOLIC BLOOD PRESSURE: 70 MMHG | TEMPERATURE: 97.5 F | OXYGEN SATURATION: 95 %

## 2024-12-09 PROCEDURE — G0299 HHS/HOSPICE OF RN EA 15 MIN: HCPCS

## 2024-12-09 PROCEDURE — RXMED WILLOW AMBULATORY MEDICATION CHARGE: Performed by: FAMILY MEDICINE

## 2024-12-09 ASSESSMENT — ENCOUNTER SYMPTOMS
LOWEST PAIN SEVERITY IN PAST 24 HOURS: 0/10
PAIN LOCATION - PAIN FREQUENCY: FREQUENT
PAIN LOCATION - EXACERBATING FACTORS: ARTHRITIS
SUBJECTIVE PAIN PROGRESSION: UNCHANGED
PAIN LOCATION - PAIN QUALITY: ACHE
PAIN LOCATION - PAIN SEVERITY: 2/10
PAIN LOCATION - PAIN DURATION: ONGOING
PAIN LOCATION: NECK
HIGHEST PAIN SEVERITY IN PAST 24 HOURS: 3/10
PAIN: 1
PAIN SEVERITY GOAL: 0/10

## 2024-12-10 ENCOUNTER — PHARMACY VISIT (OUTPATIENT)
Dept: PHARMACY | Facility: MEDICAL CENTER | Age: 69
End: 2024-12-10
Payer: COMMERCIAL

## 2024-12-11 ASSESSMENT — ENCOUNTER SYMPTOMS
BOWEL PATTERN NORMAL: 1
LAST BOWEL MOVEMENT: 67183
STOOL FREQUENCY: LESS THAN DAILY

## 2024-12-11 ASSESSMENT — ACTIVITIES OF DAILY LIVING (ADL): AMBULATION ASSISTANCE: STAND BY ASSIST

## 2024-12-12 ENCOUNTER — HOME CARE VISIT (OUTPATIENT)
Dept: HOME HEALTH SERVICES | Facility: HOME HEALTHCARE | Age: 69
End: 2024-12-12
Payer: MEDICARE

## 2024-12-12 VITALS
SYSTOLIC BLOOD PRESSURE: 112 MMHG | HEART RATE: 65 BPM | DIASTOLIC BLOOD PRESSURE: 80 MMHG | RESPIRATION RATE: 17 BRPM | TEMPERATURE: 97.5 F | OXYGEN SATURATION: 93 %

## 2024-12-12 PROCEDURE — G0299 HHS/HOSPICE OF RN EA 15 MIN: HCPCS

## 2024-12-12 ASSESSMENT — ACTIVITIES OF DAILY LIVING (ADL)
AMBULATION ASSISTANCE: NON-AMBULATORY
CURRENT_FUNCTION: STAND BY ASSIST

## 2024-12-12 ASSESSMENT — ENCOUNTER SYMPTOMS
PAIN LOCATION - PAIN SEVERITY: 2/10
MUSCLE WEAKNESS: 1
FATIGUE: 1
PAIN LOCATION: NECK
LOWEST PAIN SEVERITY IN PAST 24 HOURS: 2/10
NAUSEA: DENIES
HIGHEST PAIN SEVERITY IN PAST 24 HOURS: 3/10
STOOL FREQUENCY: DAILY
VOMITING: DENIES
PAIN LOCATION - PAIN FREQUENCY: CONSTANT
PAIN: 1
SUBJECTIVE PAIN PROGRESSION: UNCHANGED
PAIN SEVERITY GOAL: 1/10
BOWEL PATTERN NORMAL: 1
PAIN LOCATION - EXACERBATING FACTORS: ACTIVITY
LAST BOWEL MOVEMENT: 67185
PAIN LOCATION - RELIEVING FACTORS: MEDS
PAIN LOCATION - PAIN QUALITY: ACHES

## 2024-12-16 ENCOUNTER — HOME CARE VISIT (OUTPATIENT)
Dept: HOME HEALTH SERVICES | Facility: HOME HEALTHCARE | Age: 69
End: 2024-12-16
Payer: MEDICARE

## 2024-12-16 VITALS
DIASTOLIC BLOOD PRESSURE: 68 MMHG | RESPIRATION RATE: 16 BRPM | TEMPERATURE: 97.4 F | OXYGEN SATURATION: 96 % | SYSTOLIC BLOOD PRESSURE: 128 MMHG | HEART RATE: 72 BPM

## 2024-12-16 PROCEDURE — G0299 HHS/HOSPICE OF RN EA 15 MIN: HCPCS

## 2024-12-17 ASSESSMENT — ENCOUNTER SYMPTOMS
MUSCLE WEAKNESS: 1
PAIN SEVERITY GOAL: 1/10
LOWEST PAIN SEVERITY IN PAST 24 HOURS: 2/10
STOOL FREQUENCY: DAILY
VOMITING: DENIES
LOWER EXTREMITY EDEMA: 1
PAIN LOCATION - PAIN FREQUENCY: FREQUENT
NAUSEA: DENIES
PAIN LOCATION - PAIN QUALITY: DULL, ACHY
PAIN LOCATION - PAIN SEVERITY: 2/10
PAIN LOCATION: NECK
BOWEL PATTERN NORMAL: 1
PAIN: 1
SUBJECTIVE PAIN PROGRESSION: UNCHANGED
ASSOCIATED SYMPTOMS: DENIES
HIGHEST PAIN SEVERITY IN PAST 24 HOURS: 4/10
LAST BOWEL MOVEMENT: 67190

## 2024-12-17 ASSESSMENT — PATIENT HEALTH QUESTIONNAIRE - PHQ9: CLINICAL INTERPRETATION OF PHQ2 SCORE: 0

## 2024-12-19 ENCOUNTER — HOME CARE VISIT (OUTPATIENT)
Dept: HOME HEALTH SERVICES | Facility: HOME HEALTHCARE | Age: 69
End: 2024-12-19
Payer: MEDICARE

## 2024-12-23 ENCOUNTER — HOME CARE VISIT (OUTPATIENT)
Dept: HOME HEALTH SERVICES | Facility: HOME HEALTHCARE | Age: 69
End: 2024-12-23
Payer: MEDICARE

## 2024-12-23 VITALS
RESPIRATION RATE: 16 BRPM | HEART RATE: 68 BPM | DIASTOLIC BLOOD PRESSURE: 70 MMHG | TEMPERATURE: 97.5 F | SYSTOLIC BLOOD PRESSURE: 132 MMHG | OXYGEN SATURATION: 94 %

## 2024-12-23 PROCEDURE — G0299 HHS/HOSPICE OF RN EA 15 MIN: HCPCS

## 2024-12-23 ASSESSMENT — ENCOUNTER SYMPTOMS
PAIN: 1
HIGHEST PAIN SEVERITY IN PAST 24 HOURS: 3/10
PAIN LOCATION - PAIN FREQUENCY: FREQUENT
ASSOCIATED SYMPTOMS: DENIES
PAIN SEVERITY GOAL: 1/10
SUBJECTIVE PAIN PROGRESSION: UNCHANGED
PAIN LOCATION - RELIEVING FACTORS: PAIN MEDICATION
PAIN LOCATION - PAIN QUALITY: DULL, ACHY
BOWEL PATTERN NORMAL: 1
LAST BOWEL MOVEMENT: 67197
NAUSEA: DENIES
PAIN LOCATION: NECK
STOOL FREQUENCY: DAILY
MUSCLE WEAKNESS: 1
VOMITING: DENIES
LOWEST PAIN SEVERITY IN PAST 24 HOURS: 2/10
PAIN LOCATION - PAIN SEVERITY: 3/10

## 2024-12-23 ASSESSMENT — PATIENT HEALTH QUESTIONNAIRE - PHQ9: CLINICAL INTERPRETATION OF PHQ2 SCORE: 0

## 2024-12-26 ENCOUNTER — HOME CARE VISIT (OUTPATIENT)
Dept: HOME HEALTH SERVICES | Facility: HOME HEALTHCARE | Age: 69
End: 2024-12-26
Payer: MEDICARE

## 2024-12-26 VITALS
SYSTOLIC BLOOD PRESSURE: 142 MMHG | RESPIRATION RATE: 16 BRPM | HEART RATE: 64 BPM | DIASTOLIC BLOOD PRESSURE: 74 MMHG | TEMPERATURE: 98 F | OXYGEN SATURATION: 94 %

## 2024-12-26 PROCEDURE — G0299 HHS/HOSPICE OF RN EA 15 MIN: HCPCS

## 2024-12-26 ASSESSMENT — ENCOUNTER SYMPTOMS
BOWEL PATTERN NORMAL: 1
STOOL FREQUENCY: DAILY
LAST BOWEL MOVEMENT: 67200
DENIES PAIN: 1
VOMITING: DENIES
NAUSEA: DENIES

## 2024-12-26 ASSESSMENT — PATIENT HEALTH QUESTIONNAIRE - PHQ9: CLINICAL INTERPRETATION OF PHQ2 SCORE: 0

## 2024-12-30 ENCOUNTER — HOME CARE VISIT (OUTPATIENT)
Dept: HOME HEALTH SERVICES | Facility: HOME HEALTHCARE | Age: 69
End: 2024-12-30
Payer: MEDICARE

## 2024-12-30 PROCEDURE — G0299 HHS/HOSPICE OF RN EA 15 MIN: HCPCS

## 2024-12-31 VITALS
TEMPERATURE: 97.6 F | DIASTOLIC BLOOD PRESSURE: 70 MMHG | RESPIRATION RATE: 18 BRPM | OXYGEN SATURATION: 95 % | HEART RATE: 67 BPM | SYSTOLIC BLOOD PRESSURE: 130 MMHG

## 2024-12-31 ASSESSMENT — ACTIVITIES OF DAILY LIVING (ADL)
PREPARING MEALS: INDEPENDENT
ORAL_CARE_CURRENT_FUNCTION: INDEPENDENT
PHYSICAL TRANSFERS ASSESSED: 1
OASIS_M1830: 03
CURRENT_FUNCTION: INDEPENDENT
USING THE TELPHONE: INDEPENDENT
ORAL_CARE_ASSESSED: 1
TELEPHONE USE ASSESSED: 1

## 2024-12-31 ASSESSMENT — ENCOUNTER SYMPTOMS
VOMITING: DENIES
LOWER EXTREMITY EDEMA: 1
SUBJECTIVE PAIN PROGRESSION: UNCHANGED
PAIN LOCATION - PAIN QUALITY: DULL, SHARP
PAIN LOCATION - RELIEVING FACTORS: PAIN MEDICATION
NAUSEA: DENIES
ASSOCIATED SYMPTOMS: DENIES
PAIN LOCATION - PAIN FREQUENCY: FREQUENT
LOWEST PAIN SEVERITY IN PAST 24 HOURS: 2/10
PAIN: 1
HIGHEST PAIN SEVERITY IN PAST 24 HOURS: 5/10
PAIN SEVERITY GOAL: 1/10
PAIN LOCATION: RIGHT KNEE
PAIN LOCATION - PAIN SEVERITY: 2/10

## 2024-12-31 ASSESSMENT — PATIENT HEALTH QUESTIONNAIRE - PHQ9: CLINICAL INTERPRETATION OF PHQ2 SCORE: 0

## 2025-01-02 ENCOUNTER — HOME CARE VISIT (OUTPATIENT)
Dept: HOME HEALTH SERVICES | Facility: HOME HEALTHCARE | Age: 70
End: 2025-01-02
Payer: MEDICARE

## 2025-01-06 ENCOUNTER — HOME CARE VISIT (OUTPATIENT)
Dept: HOME HEALTH SERVICES | Facility: HOME HEALTHCARE | Age: 70
End: 2025-01-06
Payer: MEDICARE

## 2025-01-06 VITALS
OXYGEN SATURATION: 94 % | TEMPERATURE: 97.5 F | HEART RATE: 64 BPM | RESPIRATION RATE: 16 BRPM | DIASTOLIC BLOOD PRESSURE: 60 MMHG | SYSTOLIC BLOOD PRESSURE: 132 MMHG

## 2025-01-06 PROCEDURE — G0299 HHS/HOSPICE OF RN EA 15 MIN: HCPCS

## 2025-01-06 ASSESSMENT — ENCOUNTER SYMPTOMS
NAUSEA: DENIES
LOWER EXTREMITY EDEMA: 1
LAST BOWEL MOVEMENT: 67211
DENIES PAIN: 1
MUSCLE WEAKNESS: 1
FORGETFULNESS: 1
STOOL FREQUENCY: DAILY
BOWEL PATTERN NORMAL: 1
VOMITING: DENIES

## 2025-01-06 ASSESSMENT — PATIENT HEALTH QUESTIONNAIRE - PHQ9: CLINICAL INTERPRETATION OF PHQ2 SCORE: 0

## 2025-01-08 ENCOUNTER — HOME CARE VISIT (OUTPATIENT)
Dept: HOME HEALTH SERVICES | Facility: HOME HEALTHCARE | Age: 70
End: 2025-01-08
Payer: MEDICARE

## 2025-01-08 NOTE — CASE COMMUNICATION
Quality Review Completed for [facility] by SHAYLA Kirk RN on 1/8/2025:     Edits completed by SHAYLA Kirk RN:     1.  and  diagnosis coding updated per chart review  2.  changed to set up due to ambulation is supervised  3. Diabetic diet added to 485 form   4. Added diabetic foot care back to the care plan. Per agency protocol, this will stay on care plan for SN visits to eval feet each visit.   Added pain interventions b ack to the care plan per Mary's email in Nov if patient has pain, the intervention needs to stay on the care plan. Recert assessment documents pain to be 5/10 highest and 2/10 lowest rating in the last 24 H

## 2025-01-09 ENCOUNTER — HOME CARE VISIT (OUTPATIENT)
Dept: HOME HEALTH SERVICES | Facility: HOME HEALTHCARE | Age: 70
End: 2025-01-09
Payer: MEDICARE

## 2025-01-09 VITALS
OXYGEN SATURATION: 93 % | HEART RATE: 81 BPM | TEMPERATURE: 97.9 F | DIASTOLIC BLOOD PRESSURE: 76 MMHG | RESPIRATION RATE: 16 BRPM | SYSTOLIC BLOOD PRESSURE: 140 MMHG

## 2025-01-09 PROCEDURE — G0299 HHS/HOSPICE OF RN EA 15 MIN: HCPCS

## 2025-01-09 ASSESSMENT — ENCOUNTER SYMPTOMS
PAIN LOCATION - PAIN SEVERITY: 2/10
HIGHEST PAIN SEVERITY IN PAST 24 HOURS: 4/10
PAIN LOCATION: RIGHT SHOULDER
PAIN: 1
PAIN LOCATION - RELIEVING FACTORS: PAIN MEDICATION
ASSOCIATED SYMPTOMS: DENIES
SUBJECTIVE PAIN PROGRESSION: GRADUALLY IMPROVING
PAIN LOCATION - PAIN FREQUENCY: FREQUENT
FATIGUES EASILY: 1
PAIN LOCATION - PAIN QUALITY: DULL, ACHY
VOMITING: DENIES
STOOL FREQUENCY: DAILY
BOWEL PATTERN NORMAL: 1
LOWEST PAIN SEVERITY IN PAST 24 HOURS: 2/10
NAUSEA: DENIES
PAIN SEVERITY GOAL: 1/10
LAST BOWEL MOVEMENT: 67214

## 2025-01-09 ASSESSMENT — PATIENT HEALTH QUESTIONNAIRE - PHQ9: CLINICAL INTERPRETATION OF PHQ2 SCORE: 0

## 2025-01-09 NOTE — CASE COMMUNICATION
I agree with the changes noted.  ----- Message -----  From: Ava Kirk R.N.  Sent: 1/8/2025  11:14 AM PST  To: Lucy Moraes R.N.      Quality Review Completed for [facility] by SHAYLA Kirk RN on 1/8/2025:     Edits completed by SHAYLA Kirk RN:     1.  and  diagnosis coding updated per chart review  2.  changed to set up due to ambulation is supervised  3. Diabetic diet added to 485 form   4. Added diabetic foot car e back to the care plan. Per agency protocol, this will stay on care plan for SN visits to eval feet each visit.   Added pain interventions back to the care plan per Mary's email in Nov if patient has pain, the intervention needs to stay on the care plan. Recert assessment documents pain to be 5/10 highest and 2/10 lowest rating in the last 24 H

## 2025-01-13 ENCOUNTER — HOME CARE VISIT (OUTPATIENT)
Dept: HOME HEALTH SERVICES | Facility: HOME HEALTHCARE | Age: 70
End: 2025-01-13
Payer: MEDICARE

## 2025-01-13 VITALS
DIASTOLIC BLOOD PRESSURE: 70 MMHG | TEMPERATURE: 97.6 F | RESPIRATION RATE: 18 BRPM | SYSTOLIC BLOOD PRESSURE: 128 MMHG | HEART RATE: 74 BPM | BODY MASS INDEX: 54.93 KG/M2 | WEIGHT: 315 LBS | OXYGEN SATURATION: 96 %

## 2025-01-13 PROCEDURE — G0299 HHS/HOSPICE OF RN EA 15 MIN: HCPCS

## 2025-01-13 ASSESSMENT — ENCOUNTER SYMPTOMS
DENIES PAIN: 1
NAUSEA: DENIES
LAST BOWEL MOVEMENT: 67218
VOMITING: DENIES
BOWEL PATTERN NORMAL: 1
PAIN LOCATION: NECK
STOOL FREQUENCY: DAILY

## 2025-01-13 ASSESSMENT — PATIENT HEALTH QUESTIONNAIRE - PHQ9: CLINICAL INTERPRETATION OF PHQ2 SCORE: 0

## 2025-01-13 ASSESSMENT — FIBROSIS 4 INDEX: FIB4 SCORE: 0.88

## 2025-01-16 ENCOUNTER — HOME CARE VISIT (OUTPATIENT)
Dept: HOME HEALTH SERVICES | Facility: HOME HEALTHCARE | Age: 70
End: 2025-01-16
Payer: MEDICARE

## 2025-01-18 ENCOUNTER — PATIENT MESSAGE (OUTPATIENT)
Dept: BEHAVIORAL HEALTH | Facility: MEDICAL CENTER | Age: 70
End: 2025-01-18
Payer: MEDICARE

## 2025-01-18 DIAGNOSIS — I10 BENIGN ESSENTIAL HTN: ICD-10-CM

## 2025-01-18 PROCEDURE — RXMED WILLOW AMBULATORY MEDICATION CHARGE: Performed by: FAMILY MEDICINE

## 2025-01-20 ENCOUNTER — HOME CARE VISIT (OUTPATIENT)
Dept: HOME HEALTH SERVICES | Facility: HOME HEALTHCARE | Age: 70
End: 2025-01-20
Payer: MEDICARE

## 2025-01-20 VITALS
TEMPERATURE: 97.9 F | SYSTOLIC BLOOD PRESSURE: 136 MMHG | DIASTOLIC BLOOD PRESSURE: 70 MMHG | RESPIRATION RATE: 16 BRPM | HEART RATE: 65 BPM | OXYGEN SATURATION: 96 %

## 2025-01-20 PROCEDURE — G0299 HHS/HOSPICE OF RN EA 15 MIN: HCPCS

## 2025-01-20 PROCEDURE — RXMED WILLOW AMBULATORY MEDICATION CHARGE: Performed by: FAMILY MEDICINE

## 2025-01-20 RX ORDER — HYDROCHLOROTHIAZIDE 25 MG/1
25 TABLET ORAL DAILY
Qty: 100 TABLET | Refills: 2 | Status: SHIPPED | OUTPATIENT
Start: 2025-01-20 | End: 2025-11-16

## 2025-01-20 ASSESSMENT — ENCOUNTER SYMPTOMS
NAUSEA: DENIES
ABDOMINAL PAIN: 1
STOOL FREQUENCY: DAILY
BOWEL PATTERN NORMAL: 1
LAST BOWEL MOVEMENT: 67225
MUSCLE WEAKNESS: 1
VOMITING: DENIES
DENIES PAIN: 1

## 2025-01-20 ASSESSMENT — PATIENT HEALTH QUESTIONNAIRE - PHQ9: CLINICAL INTERPRETATION OF PHQ2 SCORE: 0

## 2025-01-20 NOTE — PATIENT COMMUNICATION
Received request via: Patient    Was the patient seen in the last year in this department? Yes    Does the patient have an active prescription (recently filled or refills available) for medication(s) requested? No    Pharmacy Name: [facility] pharmacy     Does the patient have [facility] and need 100-day supply? (This applies to ALL medications) Yes, quantity updated to 100 days

## 2025-01-21 ENCOUNTER — PHARMACY VISIT (OUTPATIENT)
Dept: PHARMACY | Facility: MEDICAL CENTER | Age: 70
End: 2025-01-21
Payer: COMMERCIAL

## 2025-01-23 ENCOUNTER — PHARMACY VISIT (OUTPATIENT)
Dept: PHARMACY | Facility: MEDICAL CENTER | Age: 70
End: 2025-01-23
Payer: COMMERCIAL

## 2025-01-23 ENCOUNTER — HOME CARE VISIT (OUTPATIENT)
Dept: HOME HEALTH SERVICES | Facility: HOME HEALTHCARE | Age: 70
End: 2025-01-23
Payer: MEDICARE

## 2025-01-23 VITALS
HEART RATE: 69 BPM | SYSTOLIC BLOOD PRESSURE: 130 MMHG | TEMPERATURE: 97.4 F | OXYGEN SATURATION: 92 % | RESPIRATION RATE: 16 BRPM | DIASTOLIC BLOOD PRESSURE: 80 MMHG

## 2025-01-23 PROCEDURE — RXMED WILLOW AMBULATORY MEDICATION CHARGE: Performed by: FAMILY MEDICINE

## 2025-01-23 PROCEDURE — G0299 HHS/HOSPICE OF RN EA 15 MIN: HCPCS

## 2025-01-23 ASSESSMENT — PATIENT HEALTH QUESTIONNAIRE - PHQ9: CLINICAL INTERPRETATION OF PHQ2 SCORE: 0

## 2025-01-23 ASSESSMENT — ENCOUNTER SYMPTOMS
VOMITING: DENIES
DENIES PAIN: 1
NAUSEA: DENIES
BOWEL PATTERN NORMAL: 1
STOOL FREQUENCY: DAILY
LAST BOWEL MOVEMENT: 67228

## 2025-01-27 ENCOUNTER — HOME CARE VISIT (OUTPATIENT)
Dept: HOME HEALTH SERVICES | Facility: HOME HEALTHCARE | Age: 70
End: 2025-01-27
Payer: MEDICARE

## 2025-01-27 DIAGNOSIS — E78.2 MIXED HYPERLIPIDEMIA: ICD-10-CM

## 2025-01-27 PROCEDURE — G0299 HHS/HOSPICE OF RN EA 15 MIN: HCPCS

## 2025-01-27 PROCEDURE — RXMED WILLOW AMBULATORY MEDICATION CHARGE: Performed by: FAMILY MEDICINE

## 2025-01-27 RX ORDER — POTASSIUM CHLORIDE 1500 MG/1
TABLET, EXTENDED RELEASE ORAL
Qty: 60 TABLET | Refills: 3 | Status: SHIPPED | OUTPATIENT
Start: 2025-01-27

## 2025-01-27 NOTE — TELEPHONE ENCOUNTER
Received request via: Patient    Was the patient seen in the last year in this department? Yes    Does the patient have an active prescription (recently filled or refills available) for medication(s) requested? No    Pharmacy Name: [facility]    Does the patient have retirement Plus and need 100-day supply? (This applies to ALL medications) Yes, quantity updated to 100 days

## 2025-01-28 ENCOUNTER — PHARMACY VISIT (OUTPATIENT)
Dept: PHARMACY | Facility: MEDICAL CENTER | Age: 70
End: 2025-01-28
Payer: COMMERCIAL

## 2025-01-28 VITALS
TEMPERATURE: 97.6 F | SYSTOLIC BLOOD PRESSURE: 128 MMHG | OXYGEN SATURATION: 96 % | RESPIRATION RATE: 18 BRPM | HEART RATE: 74 BPM | DIASTOLIC BLOOD PRESSURE: 60 MMHG

## 2025-01-28 SDOH — HEALTH STABILITY: PHYSICAL HEALTH: EXERCISE TYPE: HOME EXERCISE PROGRAM

## 2025-01-28 ASSESSMENT — ENCOUNTER SYMPTOMS
PAIN SEVERITY GOAL: 0/10
NAUSEA: DENIES
PAIN LOCATION - PAIN SEVERITY: 1/10
ASSOCIATED SYMPTOMS: DENIES
PAIN LOCATION - PAIN FREQUENCY: FREQUENT
MUSCLE WEAKNESS: 1
PAIN LOCATION - PAIN QUALITY: DULL, ACHY
LOWER EXTREMITY EDEMA: 1
SUBJECTIVE PAIN PROGRESSION: GRADUALLY IMPROVING
LAST BOWEL MOVEMENT: 67232
LOWEST PAIN SEVERITY IN PAST 24 HOURS: 1/10
HIGHEST PAIN SEVERITY IN PAST 24 HOURS: 3/10
PAIN: 1
PAIN LOCATION: NECK
VOMITING: DENIES
STOOL FREQUENCY: DAILY
PAIN LOCATION - RELIEVING FACTORS: PAIN MEDICATION
BOWEL PATTERN NORMAL: 1

## 2025-01-28 ASSESSMENT — PATIENT HEALTH QUESTIONNAIRE - PHQ9: CLINICAL INTERPRETATION OF PHQ2 SCORE: 0

## 2025-01-30 ENCOUNTER — HOME CARE VISIT (OUTPATIENT)
Dept: HOME HEALTH SERVICES | Facility: HOME HEALTHCARE | Age: 70
End: 2025-01-30
Payer: MEDICARE

## 2025-01-30 VITALS
OXYGEN SATURATION: 95 % | DIASTOLIC BLOOD PRESSURE: 60 MMHG | HEART RATE: 16 BPM | SYSTOLIC BLOOD PRESSURE: 134 MMHG | RESPIRATION RATE: 16 BRPM | TEMPERATURE: 97.9 F

## 2025-01-30 PROCEDURE — G0299 HHS/HOSPICE OF RN EA 15 MIN: HCPCS

## 2025-01-30 ASSESSMENT — ENCOUNTER SYMPTOMS
BOWEL PATTERN NORMAL: 1
MUSCLE WEAKNESS: 1
STOOL FREQUENCY: DAILY
NAUSEA: DENIES
LOWER EXTREMITY EDEMA: 1
DENIES PAIN: 1
LAST BOWEL MOVEMENT: 67235
VOMITING: DENIES

## 2025-01-30 ASSESSMENT — PATIENT HEALTH QUESTIONNAIRE - PHQ9: CLINICAL INTERPRETATION OF PHQ2 SCORE: 0

## 2025-02-03 ENCOUNTER — HOME CARE VISIT (OUTPATIENT)
Dept: HOME HEALTH SERVICES | Facility: HOME HEALTHCARE | Age: 70
End: 2025-02-03
Payer: MEDICARE

## 2025-02-03 VITALS
OXYGEN SATURATION: 92 % | SYSTOLIC BLOOD PRESSURE: 130 MMHG | HEART RATE: 64 BPM | DIASTOLIC BLOOD PRESSURE: 74 MMHG | RESPIRATION RATE: 16 BRPM | TEMPERATURE: 97.8 F

## 2025-02-03 PROCEDURE — G0299 HHS/HOSPICE OF RN EA 15 MIN: HCPCS

## 2025-02-03 ASSESSMENT — ENCOUNTER SYMPTOMS
BOWEL PATTERN NORMAL: 1
PAIN SEVERITY GOAL: 1/10
LOWER EXTREMITY EDEMA: 1
PAIN: 1
VOMITING: DENIES
PAIN LOCATION - PAIN QUALITY: DULL, ACHY
LAST BOWEL MOVEMENT: 67239
HIGHEST PAIN SEVERITY IN PAST 24 HOURS: 3/10
PAIN LOCATION - RELIEVING FACTORS: PAIN MEDICATION
PAIN LOCATION: NECK
SUBJECTIVE PAIN PROGRESSION: UNCHANGED
STOOL FREQUENCY: DAILY
NAUSEA: DENIES
PAIN LOCATION - PAIN SEVERITY: 2/10
PAIN LOCATION - PAIN FREQUENCY: FREQUENT
LOWEST PAIN SEVERITY IN PAST 24 HOURS: 2/10
ASSOCIATED SYMPTOMS: DENIES

## 2025-02-03 ASSESSMENT — PATIENT HEALTH QUESTIONNAIRE - PHQ9: CLINICAL INTERPRETATION OF PHQ2 SCORE: 0

## 2025-02-06 ENCOUNTER — HOME CARE VISIT (OUTPATIENT)
Dept: HOME HEALTH SERVICES | Facility: HOME HEALTHCARE | Age: 70
End: 2025-02-06
Payer: MEDICARE

## 2025-02-10 ENCOUNTER — HOME CARE VISIT (OUTPATIENT)
Dept: HOME HEALTH SERVICES | Facility: HOME HEALTHCARE | Age: 70
End: 2025-02-10
Payer: MEDICARE

## 2025-02-10 VITALS
SYSTOLIC BLOOD PRESSURE: 140 MMHG | OXYGEN SATURATION: 95 % | RESPIRATION RATE: 16 BRPM | TEMPERATURE: 97.4 F | HEART RATE: 61 BPM | DIASTOLIC BLOOD PRESSURE: 84 MMHG

## 2025-02-10 PROCEDURE — G0299 HHS/HOSPICE OF RN EA 15 MIN: HCPCS

## 2025-02-10 ASSESSMENT — ENCOUNTER SYMPTOMS
PAIN LOCATION - PAIN FREQUENCY: CONSTANT
PAIN: 1
SUBJECTIVE PAIN PROGRESSION: UNCHANGED
PAIN LOCATION - RELIEVING FACTORS: PAIN MEDICATION
PAIN LOCATION - PAIN QUALITY: DULL, ACHY
ASSOCIATED SYMPTOMS: DENIES
PAIN LOCATION: NECK
HIGHEST PAIN SEVERITY IN PAST 24 HOURS: 5/10
LOWEST PAIN SEVERITY IN PAST 24 HOURS: 4/10
PAIN SEVERITY GOAL: 2/10
PAIN LOCATION - PAIN SEVERITY: 4/10

## 2025-02-11 ASSESSMENT — ENCOUNTER SYMPTOMS
MUSCLE WEAKNESS: 1
VOMITING: DENIES
BOWEL PATTERN NORMAL: 1
LAST BOWEL MOVEMENT: 67246
STOOL FREQUENCY: DAILY
NAUSEA: DENIES
LOWER EXTREMITY EDEMA: 1

## 2025-02-11 ASSESSMENT — PATIENT HEALTH QUESTIONNAIRE - PHQ9: CLINICAL INTERPRETATION OF PHQ2 SCORE: 0

## 2025-02-13 PROCEDURE — RXMED WILLOW AMBULATORY MEDICATION CHARGE: Performed by: NURSE PRACTITIONER

## 2025-02-14 ENCOUNTER — HOME CARE VISIT (OUTPATIENT)
Dept: HOME HEALTH SERVICES | Facility: HOME HEALTHCARE | Age: 70
End: 2025-02-14
Payer: MEDICARE

## 2025-02-14 ENCOUNTER — PHARMACY VISIT (OUTPATIENT)
Dept: PHARMACY | Facility: MEDICAL CENTER | Age: 70
End: 2025-02-14
Payer: COMMERCIAL

## 2025-02-14 VITALS
RESPIRATION RATE: 16 BRPM | HEART RATE: 62 BPM | DIASTOLIC BLOOD PRESSURE: 70 MMHG | OXYGEN SATURATION: 96 % | TEMPERATURE: 97.9 F | SYSTOLIC BLOOD PRESSURE: 128 MMHG

## 2025-02-14 PROCEDURE — G0299 HHS/HOSPICE OF RN EA 15 MIN: HCPCS

## 2025-02-14 ASSESSMENT — ENCOUNTER SYMPTOMS
STOOL FREQUENCY: DAILY
FATIGUES EASILY: 1
BOWEL PATTERN NORMAL: 1
LAST BOWEL MOVEMENT: 67250
NAUSEA: DENIES
DENIES PAIN: 1
MUSCLE WEAKNESS: 1

## 2025-02-14 ASSESSMENT — PATIENT HEALTH QUESTIONNAIRE - PHQ9: CLINICAL INTERPRETATION OF PHQ2 SCORE: 0

## 2025-02-17 ENCOUNTER — HOME CARE VISIT (OUTPATIENT)
Dept: HOME HEALTH SERVICES | Facility: HOME HEALTHCARE | Age: 70
End: 2025-02-17
Payer: MEDICARE

## 2025-02-17 VITALS
DIASTOLIC BLOOD PRESSURE: 80 MMHG | TEMPERATURE: 97.4 F | SYSTOLIC BLOOD PRESSURE: 138 MMHG | RESPIRATION RATE: 16 BRPM | HEART RATE: 60 BPM | OXYGEN SATURATION: 94 %

## 2025-02-17 PROCEDURE — G0299 HHS/HOSPICE OF RN EA 15 MIN: HCPCS

## 2025-02-17 ASSESSMENT — ENCOUNTER SYMPTOMS
LAST BOWEL MOVEMENT: 67253
NAUSEA: DENIES
MUSCLE WEAKNESS: 1
PAIN LOCATION - PAIN QUALITY: DULL, ACHY
PAIN LOCATION: NECK
BOWEL PATTERN NORMAL: 1
LOWER EXTREMITY EDEMA: 1
DENIES PAIN: 1
STOOL FREQUENCY: DAILY
VOMITING: DENIES
PAIN LOCATION - PAIN SEVERITY: 2/10

## 2025-02-17 ASSESSMENT — PATIENT HEALTH QUESTIONNAIRE - PHQ9: CLINICAL INTERPRETATION OF PHQ2 SCORE: 0

## 2025-02-20 ENCOUNTER — HOME CARE VISIT (OUTPATIENT)
Dept: HOME HEALTH SERVICES | Facility: HOME HEALTHCARE | Age: 70
End: 2025-02-20
Payer: MEDICARE

## 2025-02-20 VITALS
SYSTOLIC BLOOD PRESSURE: 126 MMHG | TEMPERATURE: 97.6 F | DIASTOLIC BLOOD PRESSURE: 70 MMHG | HEART RATE: 64 BPM | OXYGEN SATURATION: 93 % | RESPIRATION RATE: 16 BRPM

## 2025-02-20 PROCEDURE — G0299 HHS/HOSPICE OF RN EA 15 MIN: HCPCS

## 2025-02-20 ASSESSMENT — ENCOUNTER SYMPTOMS
PAIN: 1
SUBJECTIVE PAIN PROGRESSION: UNCHANGED
MUSCLE WEAKNESS: 1
LOWER EXTREMITY EDEMA: 1
PAIN SEVERITY GOAL: 1/10
BOWEL PATTERN NORMAL: 1
PAIN LOCATION: NECK
HIGHEST PAIN SEVERITY IN PAST 24 HOURS: 4/10
PAIN LOCATION - PAIN QUALITY: DULL, ACHY
ASSOCIATED SYMPTOMS: DENIES
PAIN LOCATION - PAIN SEVERITY: 2/10
VOMITING: DENIES
STOOL FREQUENCY: DAILY
NAUSEA: DENIES
LOWEST PAIN SEVERITY IN PAST 24 HOURS: 2/10
LAST BOWEL MOVEMENT: 67256

## 2025-02-20 ASSESSMENT — PATIENT HEALTH QUESTIONNAIRE - PHQ9: CLINICAL INTERPRETATION OF PHQ2 SCORE: 0

## 2025-02-22 DIAGNOSIS — I48.91 ATRIAL FIBRILLATION, UNSPECIFIED TYPE (HCC): ICD-10-CM

## 2025-02-22 DIAGNOSIS — I10 BENIGN ESSENTIAL HTN: ICD-10-CM

## 2025-02-22 PROCEDURE — RXMED WILLOW AMBULATORY MEDICATION CHARGE: Performed by: FAMILY MEDICINE

## 2025-02-24 ENCOUNTER — HOME CARE VISIT (OUTPATIENT)
Dept: HOME HEALTH SERVICES | Facility: HOME HEALTHCARE | Age: 70
End: 2025-02-24
Payer: MEDICARE

## 2025-02-24 VITALS
HEART RATE: 68 BPM | RESPIRATION RATE: 16 BRPM | SYSTOLIC BLOOD PRESSURE: 138 MMHG | OXYGEN SATURATION: 98 % | TEMPERATURE: 97.5 F | DIASTOLIC BLOOD PRESSURE: 78 MMHG

## 2025-02-24 PROCEDURE — RXMED WILLOW AMBULATORY MEDICATION CHARGE: Performed by: FAMILY MEDICINE

## 2025-02-24 PROCEDURE — G0299 HHS/HOSPICE OF RN EA 15 MIN: HCPCS

## 2025-02-24 RX ORDER — CARVEDILOL 6.25 MG/1
6.25 TABLET ORAL 2 TIMES DAILY WITH MEALS
Qty: 200 TABLET | Refills: 2 | Status: SHIPPED | OUTPATIENT
Start: 2025-02-24

## 2025-02-24 ASSESSMENT — ENCOUNTER SYMPTOMS
BOWEL PATTERN NORMAL: 1
DENIES PAIN: 1
NAUSEA: DENIES
MUSCLE WEAKNESS: 1
FATIGUES EASILY: 1
LAST BOWEL MOVEMENT: 67260
VOMITING: DENIES
STOOL FREQUENCY: DAILY

## 2025-02-24 ASSESSMENT — PATIENT HEALTH QUESTIONNAIRE - PHQ9: CLINICAL INTERPRETATION OF PHQ2 SCORE: 0

## 2025-02-24 NOTE — TELEPHONE ENCOUNTER
Received request via: Patient    Was the patient seen in the last year in this department? Yes    Does the patient have an active prescription (recently filled or refills available) for medication(s) requested? No    Ph[facility] Name[facility]    Does the patient have snf Plus and need 100-day supply? (This applies to ALL medications) Yes, quantity updated to 100 days

## 2025-02-25 ENCOUNTER — HOME CARE VISIT (OUTPATIENT)
Dept: HOME HEALTH SERVICES | Facility: HOME HEALTHCARE | Age: 70
End: 2025-02-25
Payer: MEDICARE

## 2025-02-25 NOTE — CASE COMMUNICATION
IDT  Patient was discussed in IDT today due to upcoming end of the certification period. Discussed progress toward goals of care with the treatment team. The treatment team currently involves the following disciplines: nursing. Plan is continue plan of care..

## 2025-02-27 ENCOUNTER — HOME CARE VISIT (OUTPATIENT)
Dept: HOME HEALTH SERVICES | Facility: HOME HEALTHCARE | Age: 70
End: 2025-02-27
Payer: MEDICARE

## 2025-03-03 ENCOUNTER — HOME CARE VISIT (OUTPATIENT)
Dept: HOME HEALTH SERVICES | Facility: HOME HEALTHCARE | Age: 70
End: 2025-03-03
Payer: MEDICARE

## 2025-03-03 VITALS
SYSTOLIC BLOOD PRESSURE: 138 MMHG | OXYGEN SATURATION: 92 % | TEMPERATURE: 97.4 F | RESPIRATION RATE: 16 BRPM | BODY MASS INDEX: 54.11 KG/M2 | HEART RATE: 65 BPM | WEIGHT: 315 LBS | DIASTOLIC BLOOD PRESSURE: 80 MMHG

## 2025-03-03 DIAGNOSIS — E11.51 DM (DIABETES MELLITUS) TYPE II, CONTROLLED, WITH PERIPHERAL VASCULAR DISORDER (HCC): ICD-10-CM

## 2025-03-03 PROCEDURE — G0299 HHS/HOSPICE OF RN EA 15 MIN: HCPCS

## 2025-03-03 RX ORDER — DILTIAZEM HYDROCHLORIDE 30 MG/1
30 TABLET, FILM COATED ORAL 3 TIMES DAILY
Qty: 300 TABLET | Refills: 2 | Status: SHIPPED | OUTPATIENT
Start: 2025-03-03

## 2025-03-03 RX ORDER — ATORVASTATIN CALCIUM 20 MG/1
20 TABLET, FILM COATED ORAL DAILY
Qty: 100 TABLET | Refills: 1 | Status: SHIPPED | OUTPATIENT
Start: 2025-03-03

## 2025-03-03 ASSESSMENT — ENCOUNTER SYMPTOMS
VOMITING: DENIES
PAIN LOCATION - PAIN SEVERITY: 2/10
PAIN LOCATION - PAIN QUALITY: DULL, ACHY
PAIN LOCATION - PAIN FREQUENCY: FREQUENT
PAIN: 1
PAIN LOCATION: NECK
NAUSEA: DENIES
SUBJECTIVE PAIN PROGRESSION: UNCHANGED
HIGHEST PAIN SEVERITY IN PAST 24 HOURS: 3/10
LOWEST PAIN SEVERITY IN PAST 24 HOURS: 2/10
FATIGUES EASILY: 1
PAIN SEVERITY GOAL: 1/10
ASSOCIATED SYMPTOMS: DENIES

## 2025-03-03 ASSESSMENT — ACTIVITIES OF DAILY LIVING (ADL)
TELEPHONE USE ASSESSED: 1
USING THE TELPHONE: INDEPENDENT

## 2025-03-03 ASSESSMENT — PATIENT HEALTH QUESTIONNAIRE - PHQ9: CLINICAL INTERPRETATION OF PHQ2 SCORE: 0

## 2025-03-03 ASSESSMENT — FIBROSIS 4 INDEX: FIB4 SCORE: 0.88

## 2025-03-03 NOTE — TELEPHONE ENCOUNTER
Received request via: Patient    Was the patient seen in the last year in this department? Yes    Does the patient have an active prescription (recently filled or refills available) for medication(s) requested? No    Ph[facility]acy Name[facility]    Does the patient have long-term Plus and need 100-day supply? (This applies to ALL medications) Yes, quantity updated to 100 days

## 2025-03-03 NOTE — TELEPHONE ENCOUNTER
Received request via: Patient    Was the patient seen in the last year in this department? Yes    Does the patient have an active prescription (recently filled or refills available) for medication(s) requested? No    [facility] Name[facility]    Does the patient have longterm Plus and need 100-day supply? (This applies to ALL medications) Yes, quantity updated to 100 days

## 2025-03-04 PROCEDURE — RXMED WILLOW AMBULATORY MEDICATION CHARGE: Performed by: FAMILY MEDICINE

## 2025-03-04 PROCEDURE — G0179 MD RECERTIFICATION HHA PT: HCPCS | Performed by: FAMILY MEDICINE

## 2025-03-05 ENCOUNTER — PHARMACY VISIT (OUTPATIENT)
Dept: PHARMACY | Facility: MEDICAL CENTER | Age: 70
End: 2025-03-05
Payer: COMMERCIAL

## 2025-03-05 PROCEDURE — 665998 HH PPS REVENUE CREDIT

## 2025-03-05 PROCEDURE — 665999 HH PPS REVENUE DEBIT

## 2025-03-06 PROCEDURE — 665998 HH PPS REVENUE CREDIT

## 2025-03-06 PROCEDURE — 665999 HH PPS REVENUE DEBIT

## 2025-03-07 ENCOUNTER — HOME CARE VISIT (OUTPATIENT)
Dept: HOME HEALTH SERVICES | Facility: HOME HEALTHCARE | Age: 70
End: 2025-03-07
Payer: MEDICARE

## 2025-03-07 VITALS
OXYGEN SATURATION: 60 % | HEART RATE: 60 BPM | SYSTOLIC BLOOD PRESSURE: 134 MMHG | RESPIRATION RATE: 16 BRPM | TEMPERATURE: 97.5 F | DIASTOLIC BLOOD PRESSURE: 80 MMHG

## 2025-03-07 PROCEDURE — G0299 HHS/HOSPICE OF RN EA 15 MIN: HCPCS

## 2025-03-07 PROCEDURE — 665998 HH PPS REVENUE CREDIT

## 2025-03-07 PROCEDURE — 665999 HH PPS REVENUE DEBIT

## 2025-03-07 PROCEDURE — 665003 FOLLOW UP-HOME HEALTH

## 2025-03-07 ASSESSMENT — ENCOUNTER SYMPTOMS
STOOL FREQUENCY: DAILY
NAUSEA: DENIES
LAST BOWEL MOVEMENT: 67271
DENIES PAIN: 1
BOWEL PATTERN NORMAL: 1
FATIGUES EASILY: 1
VOMITING: DENIES

## 2025-03-07 ASSESSMENT — PATIENT HEALTH QUESTIONNAIRE - PHQ9: CLINICAL INTERPRETATION OF PHQ2 SCORE: 0

## 2025-03-08 PROCEDURE — 665998 HH PPS REVENUE CREDIT

## 2025-03-08 PROCEDURE — 665999 HH PPS REVENUE DEBIT

## 2025-03-09 PROCEDURE — 665999 HH PPS REVENUE DEBIT

## 2025-03-09 PROCEDURE — 665998 HH PPS REVENUE CREDIT

## 2025-03-10 ENCOUNTER — HOME CARE VISIT (OUTPATIENT)
Dept: HOME HEALTH SERVICES | Facility: HOME HEALTHCARE | Age: 70
End: 2025-03-10
Payer: MEDICARE

## 2025-03-10 VITALS
OXYGEN SATURATION: 95 % | DIASTOLIC BLOOD PRESSURE: 76 MMHG | TEMPERATURE: 97.8 F | HEART RATE: 60 BPM | SYSTOLIC BLOOD PRESSURE: 134 MMHG | RESPIRATION RATE: 18 BRPM

## 2025-03-10 PROCEDURE — G0299 HHS/HOSPICE OF RN EA 15 MIN: HCPCS

## 2025-03-10 PROCEDURE — 665998 HH PPS REVENUE CREDIT

## 2025-03-10 PROCEDURE — 665999 HH PPS REVENUE DEBIT

## 2025-03-10 ASSESSMENT — ENCOUNTER SYMPTOMS
VOMITING: DENIES
PAIN LOCATION: NECK
MUSCLE WEAKNESS: 1
ASSOCIATED SYMPTOMS: DENIES
FATIGUES EASILY: 1
BOWEL PATTERN NORMAL: 1
PAIN: 1
PAIN LOCATION - PAIN SEVERITY: 2/10
PAIN LOCATION - PAIN FREQUENCY: FREQUENT
HIGHEST PAIN SEVERITY IN PAST 24 HOURS: 4/10
PAIN SEVERITY GOAL: 1/10
STOOL FREQUENCY: DAILY
NAUSEA: DENIES
LOWEST PAIN SEVERITY IN PAST 24 HOURS: 2/10
LAST BOWEL MOVEMENT: 67274
SUBJECTIVE PAIN PROGRESSION: UNCHANGED
PAIN LOCATION - PAIN QUALITY: DULL, ACHY

## 2025-03-10 ASSESSMENT — ACTIVITIES OF DAILY LIVING (ADL): OASIS_M1830: 02

## 2025-03-11 PROCEDURE — 665999 HH PPS REVENUE DEBIT

## 2025-03-11 PROCEDURE — 665998 HH PPS REVENUE CREDIT

## 2025-03-12 PROCEDURE — 665998 HH PPS REVENUE CREDIT

## 2025-03-12 PROCEDURE — 665999 HH PPS REVENUE DEBIT

## 2025-03-13 ENCOUNTER — HOME CARE VISIT (OUTPATIENT)
Dept: HOME HEALTH SERVICES | Facility: HOME HEALTHCARE | Age: 70
End: 2025-03-13
Payer: MEDICARE

## 2025-03-13 VITALS
DIASTOLIC BLOOD PRESSURE: 86 MMHG | SYSTOLIC BLOOD PRESSURE: 140 MMHG | TEMPERATURE: 97.5 F | RESPIRATION RATE: 18 BRPM | OXYGEN SATURATION: 93 % | HEART RATE: 65 BPM

## 2025-03-13 PROCEDURE — 665998 HH PPS REVENUE CREDIT

## 2025-03-13 PROCEDURE — 665999 HH PPS REVENUE DEBIT

## 2025-03-13 PROCEDURE — G0299 HHS/HOSPICE OF RN EA 15 MIN: HCPCS

## 2025-03-13 ASSESSMENT — ENCOUNTER SYMPTOMS
FATIGUES EASILY: 1
PAIN LOCATION: NECK
VOMITING: DENIES
STOOL FREQUENCY: DAILY
PAIN LOCATION - PAIN SEVERITY: 2/10
NAUSEA: DENIES
PAIN SEVERITY GOAL: 0/10
PAIN LOCATION - PAIN FREQUENCY: CONSTANT
PAIN: 1
PAIN LOCATION - PAIN QUALITY: DULL, ACHY
HIGHEST PAIN SEVERITY IN PAST 24 HOURS: 4/10
BOWEL PATTERN NORMAL: 1
ASSOCIATED SYMPTOMS: DENIES
MUSCLE WEAKNESS: 1
PAIN LOCATION - RELIEVING FACTORS: PAIN MEDICATION
LAST BOWEL MOVEMENT: 67277
SUBJECTIVE PAIN PROGRESSION: UNCHANGED
LOWEST PAIN SEVERITY IN PAST 24 HOURS: 2/10

## 2025-03-13 ASSESSMENT — PATIENT HEALTH QUESTIONNAIRE - PHQ9: CLINICAL INTERPRETATION OF PHQ2 SCORE: 0

## 2025-03-14 PROCEDURE — 665998 HH PPS REVENUE CREDIT

## 2025-03-14 PROCEDURE — 665999 HH PPS REVENUE DEBIT

## 2025-03-15 DIAGNOSIS — I48.91 ATRIAL FIBRILLATION, UNSPECIFIED TYPE (HCC): ICD-10-CM

## 2025-03-15 PROCEDURE — 665998 HH PPS REVENUE CREDIT

## 2025-03-15 PROCEDURE — 665999 HH PPS REVENUE DEBIT

## 2025-03-16 PROCEDURE — 665998 HH PPS REVENUE CREDIT

## 2025-03-16 PROCEDURE — 665999 HH PPS REVENUE DEBIT

## 2025-03-17 ENCOUNTER — HOME CARE VISIT (OUTPATIENT)
Dept: HOME HEALTH SERVICES | Facility: HOME HEALTHCARE | Age: 70
End: 2025-03-17
Payer: MEDICARE

## 2025-03-17 ENCOUNTER — HOSPITAL ENCOUNTER (OUTPATIENT)
Facility: MEDICAL CENTER | Age: 70
End: 2025-03-17
Attending: FAMILY MEDICINE
Payer: MEDICARE

## 2025-03-17 VITALS
HEART RATE: 61 BPM | DIASTOLIC BLOOD PRESSURE: 62 MMHG | TEMPERATURE: 97.9 F | RESPIRATION RATE: 18 BRPM | OXYGEN SATURATION: 94 % | SYSTOLIC BLOOD PRESSURE: 124 MMHG

## 2025-03-17 DIAGNOSIS — E11.51 DM (DIABETES MELLITUS) TYPE II, CONTROLLED, WITH PERIPHERAL VASCULAR DISORDER (HCC): ICD-10-CM

## 2025-03-17 LAB
EST. AVERAGE GLUCOSE BLD GHB EST-MCNC: 123 MG/DL
HBA1C MFR BLD: 5.9 % (ref 4–5.6)

## 2025-03-17 PROCEDURE — G0299 HHS/HOSPICE OF RN EA 15 MIN: HCPCS

## 2025-03-17 PROCEDURE — 83036 HEMOGLOBIN GLYCOSYLATED A1C: CPT

## 2025-03-17 PROCEDURE — 665999 HH PPS REVENUE DEBIT

## 2025-03-17 PROCEDURE — 665998 HH PPS REVENUE CREDIT

## 2025-03-17 RX ORDER — AMIODARONE HYDROCHLORIDE 200 MG/1
200 TABLET ORAL DAILY
Qty: 100 TABLET | Refills: 0 | Status: SHIPPED | OUTPATIENT
Start: 2025-03-17

## 2025-03-17 ASSESSMENT — ENCOUNTER SYMPTOMS
VOMITING: DENIES
LOWEST PAIN SEVERITY IN PAST 24 HOURS: 1/10
PAIN LOCATION - PAIN FREQUENCY: FREQUENT
PAIN LOCATION - PAIN SEVERITY: 1/10
MUSCLE WEAKNESS: 1
STOOL FREQUENCY: DAILY
BOWEL PATTERN NORMAL: 1
PAIN LOCATION - PAIN QUALITY: DULL, ACHY
ASSOCIATED SYMPTOMS: DENIES
HIGHEST PAIN SEVERITY IN PAST 24 HOURS: 2/10
LOWER EXTREMITY EDEMA: 1
PAIN SEVERITY GOAL: 0/10
NAUSEA: DENIES
PAIN: 1
PAIN LOCATION: NECK
SUBJECTIVE PAIN PROGRESSION: GRADUALLY IMPROVING
LAST BOWEL MOVEMENT: 67281

## 2025-03-17 ASSESSMENT — PATIENT HEALTH QUESTIONNAIRE - PHQ9: CLINICAL INTERPRETATION OF PHQ2 SCORE: 0

## 2025-03-17 NOTE — TELEPHONE ENCOUNTER
Received request via: Patient    Was the patient seen in the last year in this department? Yes    Does the patient have an active prescription (recently filled or refills available) for medication(s) requested? No    Ph[facility] Name[facility]    Does the patient have correction Plus and need 100-day supply? (This applies to ALL medications) Yes, quantity updated to 100 days

## 2025-03-18 ENCOUNTER — PHARMACY VISIT (OUTPATIENT)
Dept: PHARMACY | Facility: MEDICAL CENTER | Age: 70
End: 2025-03-18
Payer: COMMERCIAL

## 2025-03-18 PROCEDURE — 665998 HH PPS REVENUE CREDIT

## 2025-03-18 PROCEDURE — 665999 HH PPS REVENUE DEBIT

## 2025-03-18 PROCEDURE — RXMED WILLOW AMBULATORY MEDICATION CHARGE: Performed by: FAMILY MEDICINE

## 2025-03-19 ENCOUNTER — HOME CARE VISIT (OUTPATIENT)
Dept: HOME HEALTH SERVICES | Facility: HOME HEALTHCARE | Age: 70
End: 2025-03-19
Payer: MEDICARE

## 2025-03-19 ENCOUNTER — HOSPITAL ENCOUNTER (OUTPATIENT)
Facility: MEDICAL CENTER | Age: 70
End: 2025-03-19
Attending: FAMILY MEDICINE
Payer: MEDICARE

## 2025-03-19 LAB
ALBUMIN SERPL BCP-MCNC: 4 G/DL (ref 3.2–4.9)
ALBUMIN/GLOB SERPL: 1.4 G/DL
ALP SERPL-CCNC: 138 U/L (ref 30–99)
ALT SERPL-CCNC: 14 U/L (ref 2–50)
ANION GAP SERPL CALC-SCNC: 10 MMOL/L (ref 7–16)
AST SERPL-CCNC: 22 U/L (ref 12–45)
BILIRUB SERPL-MCNC: 0.6 MG/DL (ref 0.1–1.5)
BUN SERPL-MCNC: 25 MG/DL (ref 8–22)
CALCIUM ALBUM COR SERPL-MCNC: 9.2 MG/DL (ref 8.5–10.5)
CALCIUM SERPL-MCNC: 9.2 MG/DL (ref 8.5–10.5)
CHLORIDE SERPL-SCNC: 103 MMOL/L (ref 96–112)
CHOLEST SERPL-MCNC: 99 MG/DL (ref 100–199)
CO2 SERPL-SCNC: 28 MMOL/L (ref 20–33)
CREAT SERPL-MCNC: 1.43 MG/DL (ref 0.5–1.4)
GFR SERPLBLD CREATININE-BSD FMLA CKD-EPI: 53 ML/MIN/1.73 M 2
GLOBULIN SER CALC-MCNC: 2.8 G/DL (ref 1.9–3.5)
GLUCOSE SERPL-MCNC: 95 MG/DL (ref 65–99)
HDLC SERPL-MCNC: 31 MG/DL
LDLC SERPL CALC-MCNC: 47 MG/DL
POTASSIUM SERPL-SCNC: 4.5 MMOL/L (ref 3.6–5.5)
PROT SERPL-MCNC: 6.8 G/DL (ref 6–8.2)
SODIUM SERPL-SCNC: 141 MMOL/L (ref 135–145)
TRIGL SERPL-MCNC: 106 MG/DL (ref 0–149)

## 2025-03-19 PROCEDURE — 665999 HH PPS REVENUE DEBIT

## 2025-03-19 PROCEDURE — G0299 HHS/HOSPICE OF RN EA 15 MIN: HCPCS

## 2025-03-19 PROCEDURE — 80053 COMPREHEN METABOLIC PANEL: CPT

## 2025-03-19 PROCEDURE — 665998 HH PPS REVENUE CREDIT

## 2025-03-19 PROCEDURE — 80061 LIPID PANEL: CPT

## 2025-03-19 ASSESSMENT — ENCOUNTER SYMPTOMS
DENIES PAIN: 1
BOWEL PATTERN NORMAL: 1
LAST BOWEL MOVEMENT: 67283
NAUSEA: DENIES
MUSCLE WEAKNESS: 1
VOMITING: DENIES
STOOL FREQUENCY: DAILY

## 2025-03-19 ASSESSMENT — PATIENT HEALTH QUESTIONNAIRE - PHQ9: CLINICAL INTERPRETATION OF PHQ2 SCORE: 0

## 2025-03-20 ENCOUNTER — HOME CARE VISIT (OUTPATIENT)
Dept: HOME HEALTH SERVICES | Facility: HOME HEALTHCARE | Age: 70
End: 2025-03-20
Payer: MEDICARE

## 2025-03-20 VITALS
SYSTOLIC BLOOD PRESSURE: 134 MMHG | HEART RATE: 74 BPM | OXYGEN SATURATION: 94 % | RESPIRATION RATE: 18 BRPM | TEMPERATURE: 97.6 F | DIASTOLIC BLOOD PRESSURE: 60 MMHG

## 2025-03-20 PROCEDURE — 665998 HH PPS REVENUE CREDIT

## 2025-03-20 PROCEDURE — 665999 HH PPS REVENUE DEBIT

## 2025-03-20 PROCEDURE — G0299 HHS/HOSPICE OF RN EA 15 MIN: HCPCS

## 2025-03-20 ASSESSMENT — ENCOUNTER SYMPTOMS
BOWEL PATTERN NORMAL: 1
LOWER EXTREMITY EDEMA: 1
LOWEST PAIN SEVERITY IN PAST 24 HOURS: 3/10
HIGHEST PAIN SEVERITY IN PAST 24 HOURS: 5/10
LAST BOWEL MOVEMENT: 67284
PAIN LOCATION - PAIN FREQUENCY: CONSTANT
SUBJECTIVE PAIN PROGRESSION: UNCHANGED
PAIN SEVERITY GOAL: 1/10
PAIN LOCATION - PAIN QUALITY: DULL, ACHY
MUSCLE WEAKNESS: 1
ASSOCIATED SYMPTOMS: DENIES
PAIN: 1
PAIN LOCATION: NECK
NAUSEA: DENIES
PAIN LOCATION - PAIN SEVERITY: 3/10
PAIN LOCATION - RELIEVING FACTORS: PAIN MEDICATION
VOMITING: DENIES
STOOL FREQUENCY: DAILY

## 2025-03-20 ASSESSMENT — PATIENT HEALTH QUESTIONNAIRE - PHQ9: CLINICAL INTERPRETATION OF PHQ2 SCORE: 0

## 2025-03-21 PROCEDURE — 665998 HH PPS REVENUE CREDIT

## 2025-03-21 PROCEDURE — 665999 HH PPS REVENUE DEBIT

## 2025-03-22 PROCEDURE — 665998 HH PPS REVENUE CREDIT

## 2025-03-22 PROCEDURE — 665999 HH PPS REVENUE DEBIT

## 2025-03-23 PROCEDURE — 665999 HH PPS REVENUE DEBIT

## 2025-03-23 PROCEDURE — 665998 HH PPS REVENUE CREDIT

## 2025-03-24 ENCOUNTER — HOME CARE VISIT (OUTPATIENT)
Dept: HOME HEALTH SERVICES | Facility: HOME HEALTHCARE | Age: 70
End: 2025-03-24
Payer: MEDICARE

## 2025-03-24 VITALS
SYSTOLIC BLOOD PRESSURE: 130 MMHG | RESPIRATION RATE: 18 BRPM | TEMPERATURE: 98 F | DIASTOLIC BLOOD PRESSURE: 84 MMHG | HEART RATE: 68 BPM | OXYGEN SATURATION: 94 %

## 2025-03-24 PROCEDURE — G0299 HHS/HOSPICE OF RN EA 15 MIN: HCPCS

## 2025-03-24 PROCEDURE — 665998 HH PPS REVENUE CREDIT

## 2025-03-24 PROCEDURE — 665999 HH PPS REVENUE DEBIT

## 2025-03-24 ASSESSMENT — ENCOUNTER SYMPTOMS
PAIN LOCATION - PAIN QUALITY: DULL, ACHY, THROBBING
LOWER EXTREMITY EDEMA: 1
PAIN LOCATION - PAIN FREQUENCY: CONSTANT
PAIN LOCATION: RIGHT ELBOW
ASSOCIATED SYMPTOMS: DENIES
MUSCLE WEAKNESS: 1
PAIN SEVERITY GOAL: 0/10
HIGHEST PAIN SEVERITY IN PAST 24 HOURS: 5/10
NAUSEA: DENIES
SUBJECTIVE PAIN PROGRESSION: GRADUALLY IMPROVING
PAIN LOCATION - PAIN SEVERITY: 4/10
VOMITING: DENIES
PAIN LOCATION - RELIEVING FACTORS: PAIN MEDICATION
STOOL FREQUENCY: DAILY
PAIN: 1
LOWEST PAIN SEVERITY IN PAST 24 HOURS: 4/10
LAST BOWEL MOVEMENT: 67288

## 2025-03-24 ASSESSMENT — PATIENT HEALTH QUESTIONNAIRE - PHQ9: CLINICAL INTERPRETATION OF PHQ2 SCORE: 0

## 2025-03-25 PROCEDURE — 665998 HH PPS REVENUE CREDIT

## 2025-03-25 PROCEDURE — 665999 HH PPS REVENUE DEBIT

## 2025-03-26 PROCEDURE — 665999 HH PPS REVENUE DEBIT

## 2025-03-26 PROCEDURE — 665998 HH PPS REVENUE CREDIT

## 2025-03-27 PROCEDURE — 665998 HH PPS REVENUE CREDIT

## 2025-03-27 PROCEDURE — 665999 HH PPS REVENUE DEBIT

## 2025-03-28 ENCOUNTER — HOME CARE VISIT (OUTPATIENT)
Dept: HOME HEALTH SERVICES | Facility: HOME HEALTHCARE | Age: 70
End: 2025-03-28
Payer: MEDICARE

## 2025-03-28 PROCEDURE — 665998 HH PPS REVENUE CREDIT

## 2025-03-28 PROCEDURE — 665999 HH PPS REVENUE DEBIT

## 2025-03-29 PROCEDURE — RXMED WILLOW AMBULATORY MEDICATION CHARGE: Performed by: FAMILY MEDICINE

## 2025-03-29 PROCEDURE — 665999 HH PPS REVENUE DEBIT

## 2025-03-29 PROCEDURE — 665998 HH PPS REVENUE CREDIT

## 2025-03-30 PROCEDURE — 665998 HH PPS REVENUE CREDIT

## 2025-03-30 PROCEDURE — 665999 HH PPS REVENUE DEBIT

## 2025-03-31 ENCOUNTER — HOME CARE VISIT (OUTPATIENT)
Dept: HOME HEALTH SERVICES | Facility: HOME HEALTHCARE | Age: 70
End: 2025-03-31
Payer: MEDICARE

## 2025-03-31 VITALS
SYSTOLIC BLOOD PRESSURE: 142 MMHG | OXYGEN SATURATION: 96 % | RESPIRATION RATE: 18 BRPM | DIASTOLIC BLOOD PRESSURE: 80 MMHG | TEMPERATURE: 97.7 F | HEART RATE: 74 BPM

## 2025-03-31 PROCEDURE — G0299 HHS/HOSPICE OF RN EA 15 MIN: HCPCS

## 2025-03-31 ASSESSMENT — ENCOUNTER SYMPTOMS
BOWEL PATTERN NORMAL: 1
DENIES PAIN: 1
FATIGUES EASILY: 1
MUSCLE WEAKNESS: 1
VOMITING: DENIES
STOOL FREQUENCY: DAILY
LAST BOWEL MOVEMENT: 67295
NAUSEA: DENIES

## 2025-03-31 ASSESSMENT — PATIENT HEALTH QUESTIONNAIRE - PHQ9: CLINICAL INTERPRETATION OF PHQ2 SCORE: 0

## 2025-04-01 ENCOUNTER — PHARMACY VISIT (OUTPATIENT)
Dept: PHARMACY | Facility: MEDICAL CENTER | Age: 70
End: 2025-04-01
Payer: COMMERCIAL

## 2025-04-01 PROBLEM — L89.614 PRESSURE ULCER OF RIGHT HEEL, STAGE 4 (HCC): Status: ACTIVE | Noted: 2025-04-01

## 2025-04-03 ENCOUNTER — HOME CARE VISIT (OUTPATIENT)
Dept: HOME HEALTH SERVICES | Facility: HOME HEALTHCARE | Age: 70
End: 2025-04-03
Payer: MEDICARE

## 2025-04-03 VITALS
SYSTOLIC BLOOD PRESSURE: 130 MMHG | OXYGEN SATURATION: 93 % | DIASTOLIC BLOOD PRESSURE: 76 MMHG | RESPIRATION RATE: 18 BRPM | HEART RATE: 62 BPM | TEMPERATURE: 98 F

## 2025-04-03 PROCEDURE — G0299 HHS/HOSPICE OF RN EA 15 MIN: HCPCS

## 2025-04-03 ASSESSMENT — ENCOUNTER SYMPTOMS
ASSOCIATED SYMPTOMS: DENIES
PAIN LOCATION - PAIN QUALITY: DULL, ACHY
PAIN: 1
LAST BOWEL MOVEMENT: 67298
PAIN SEVERITY GOAL: 0/10
PAIN LOCATION - PAIN SEVERITY: 2/10
NAUSEA: DENIES
PAIN LOCATION: RIGHT WRIST
BOWEL PATTERN NORMAL: 1
LOWEST PAIN SEVERITY IN PAST 24 HOURS: 2/10
SUBJECTIVE PAIN PROGRESSION: GRADUALLY IMPROVING
VOMITING: DENIES
STOOL FREQUENCY: DAILY
PAIN LOCATION - RELIEVING FACTORS: PAIN MEDICATION
LOWER EXTREMITY EDEMA: 1
HIGHEST PAIN SEVERITY IN PAST 24 HOURS: 4/10
PAIN LOCATION - PAIN FREQUENCY: FREQUENT
BLURRED VISION: 1
PAIN LOCATION - PAIN DURATION: FEW MINUTES
FATIGUES EASILY: 1

## 2025-04-03 ASSESSMENT — PATIENT HEALTH QUESTIONNAIRE - PHQ9: CLINICAL INTERPRETATION OF PHQ2 SCORE: 0

## 2025-04-07 ENCOUNTER — HOME CARE VISIT (OUTPATIENT)
Dept: HOME HEALTH SERVICES | Facility: HOME HEALTHCARE | Age: 70
End: 2025-04-07
Payer: MEDICARE

## 2025-04-07 ENCOUNTER — TELEMEDICINE (OUTPATIENT)
Dept: MEDICAL GROUP | Facility: PHYSICIAN GROUP | Age: 70
End: 2025-04-07
Payer: MEDICARE

## 2025-04-07 VITALS
SYSTOLIC BLOOD PRESSURE: 136 MMHG | RESPIRATION RATE: 18 BRPM | HEART RATE: 64 BPM | OXYGEN SATURATION: 95 % | TEMPERATURE: 97.5 F | WEIGHT: 315 LBS | BODY MASS INDEX: 54.39 KG/M2 | DIASTOLIC BLOOD PRESSURE: 74 MMHG

## 2025-04-07 VITALS
HEART RATE: 64 BPM | OXYGEN SATURATION: 95 % | BODY MASS INDEX: 42.66 KG/M2 | DIASTOLIC BLOOD PRESSURE: 77 MMHG | SYSTOLIC BLOOD PRESSURE: 136 MMHG | HEIGHT: 72 IN | WEIGHT: 315 LBS

## 2025-04-07 DIAGNOSIS — N18.31 STAGE 3A CHRONIC KIDNEY DISEASE: ICD-10-CM

## 2025-04-07 DIAGNOSIS — E11.22 TYPE 2 DIABETES MELLITUS WITH STAGE 3 CHRONIC KIDNEY DISEASE, WITHOUT LONG-TERM CURRENT USE OF INSULIN, UNSPECIFIED WHETHER STAGE 3A OR 3B CKD (HCC): ICD-10-CM

## 2025-04-07 DIAGNOSIS — I48.91 ATRIAL FIBRILLATION, UNSPECIFIED TYPE (HCC): ICD-10-CM

## 2025-04-07 DIAGNOSIS — N18.30 TYPE 2 DIABETES MELLITUS WITH STAGE 3 CHRONIC KIDNEY DISEASE, WITHOUT LONG-TERM CURRENT USE OF INSULIN, UNSPECIFIED WHETHER STAGE 3A OR 3B CKD (HCC): ICD-10-CM

## 2025-04-07 DIAGNOSIS — E66.01 SEVERE OBESITY (HCC): ICD-10-CM

## 2025-04-07 DIAGNOSIS — Z89.512 HX OF BKA, LEFT (HCC): ICD-10-CM

## 2025-04-07 PROCEDURE — 99214 OFFICE O/P EST MOD 30 MIN: CPT | Performed by: FAMILY MEDICINE

## 2025-04-07 PROCEDURE — G0299 HHS/HOSPICE OF RN EA 15 MIN: HCPCS

## 2025-04-07 RX ORDER — POTASSIUM CHLORIDE 1500 MG/1
TABLET, EXTENDED RELEASE ORAL
COMMUNITY

## 2025-04-07 RX ORDER — SULFAMETHOXAZOLE AND TRIMETHOPRIM 800; 160 MG/1; MG/1
1 TABLET ORAL 2 TIMES DAILY
COMMUNITY
End: 2025-04-30

## 2025-04-07 RX ORDER — PSEUDOEPHEDRINE HCL 30 MG
TABLET ORAL
COMMUNITY
End: 2025-04-30

## 2025-04-07 RX ORDER — SENNOSIDES A AND B 8.6 MG/1
8.6 TABLET, FILM COATED ORAL
COMMUNITY
Start: 2025-04-30

## 2025-04-07 RX ORDER — SEMAGLUTIDE 0.68 MG/ML
0.25 INJECTION, SOLUTION SUBCUTANEOUS
Qty: 3 ML | Refills: 11 | Status: SHIPPED | OUTPATIENT
Start: 2025-04-07

## 2025-04-07 RX ORDER — GINSENG 100 MG
CAPSULE ORAL
COMMUNITY
Start: 2024-11-07

## 2025-04-07 ASSESSMENT — ENCOUNTER SYMPTOMS
DENIES PAIN: 1
BOWEL PATTERN NORMAL: 1
FATIGUES EASILY: 1
NAUSEA: DENIES
VOMITING: DENIES
LAST BOWEL MOVEMENT: 67302
MUSCLE WEAKNESS: 1
STOOL FREQUENCY: DAILY

## 2025-04-07 ASSESSMENT — FIBROSIS 4 INDEX
FIB4 SCORE: 1.13
FIB4 SCORE: 1.13

## 2025-04-07 NOTE — PROGRESS NOTES
Virtual Visit: Established Patient   This visit was conducted via Teams using secure and encrypted videoconferencing technology.   The patient was in their home in the [facility].    The patient's identity was confirmed and verbal consent was obtained for this virtual visit.    Subjective:   CC:   Chief Complaint   Patient presents with    Lab Results     Jose Mc is a 69 y.o. male presenting for evaluation and management of:    Went over labs which are great. Would like to lose weight and try glp. May not be covered since DM doing well with just metformin will send off rx  1. Type 2 diabetes mellitus with stage 3 chronic kidney disease, without long-term current use of insulin, unspecified whether stage 3a or 3b CKD (HCC)  Currently treated for DM, taking meds and checking bs at home, trying to do DM diet.controlled    - Semaglutide,0.25 or 0.5MG/DOS, (OZEMPIC, 0.25 OR 0.5 MG/DOSE,) 2 MG/3ML Solution Pen-injector; Inject 0.25 mg under the skin every 7 days.  Dispense: 3 mL; Refill: 11    2. Severe obesity (HCC)  HCC Gap Form    Diagnosis: E66.01 - Severe obesity (HCC)  Z68.43 - Body mass index (BMI) of 50-59.9 in adult (HCC)  The current BMI is 54.25 kg/m² as of 04/07/25.    Past BMI Values:  04/07/25 : 54.25 kg/m². 03/03/25 : 54.11 kg/m². 01/13/25 : 54.93 kg/m².   Assessment and plan: Chronic, stable. Encouraged healthy diet and physical activity changes with a goal of weight loss. Follow up at least annually.  Diagnosis to address: I48.91 - Atrial fibrillation, unspecified type (HCC)  Assessment and plan: Chronic, stable, as based on today's assessment and impact on other conditions evaluated today. Continue with current treatment plan:  Follow-up with specialist as directed, but at least annually.  Diagnosis: N18.31 - Stage 3a chronic kidney disease  Assessment and plan: Chronic, stable, as based on today's assessment and impact on other conditions evaluated today. Continue with current treatment  plan: Patient is currently being followed for chronic renal insufficiency. They are avoiding nsaids and maintaining hydration and following renal diet. Taking all appropriate meds. No new change in urine frequency or volume.   Follow-up with specialist as directed, but at least annually.  Diagnosis: Z89.512 - Hx of BKA, left (HCC)  Assessment and plan: Chronic, stable. Continue with current defined treatment plan: The patient's current medical issue is well controlled on the current therapy with no new symptoms or worsening  . Follow-up at least annually.  Last edited 25 13:37 PDT by Theo James M.D.         - Semaglutide,0.25 or 0.5MG/DOS, (OZEMPIC, 0.25 OR 0.5 MG/DOSE,) 2 MG/3ML Solution Pen-injector; Inject 0.25 mg under the skin every 7 days.  Dispense: 3 mL; Refill: 11    3. Body mass index (BMI) of 50-59.9 in adult (HCC)      4. Atrial fibrillation, unspecified type (HCC)      5. Stage 3a chronic kidney disease      6. Hx of BKA, left (HCC)      Past Medical History:   Diagnosis Date    At risk for sleep apnea     Diabetes (HCC)     Diabetic neuropathy (HCC)     DVT (deep venous thrombosis) (HCC)     Hyperlipidemia     Hypertension     Sleep apnea      Past Surgical History:   Procedure Laterality Date    PB AMPUTATION TOE,MT-P JT Right 9/15/2021    Procedure: RIGHT SECOND TOE AMPUTATION (30 MIN);  Surgeon: Jackson Castro M.D.;  Location: [facility];  Service: Orthopedics    TOE AMPUTATION Left        Social History     Tobacco Use    Smoking status: Former     Current packs/day: 0.00     Types: Cigarettes     Quit date: 1980     Years since quittin.2    Smokeless tobacco: Never   Vaping Use    Vaping status: Never Used   Substance Use Topics    Alcohol use: Not Currently     Comment: OCC    Drug use: Never       Family History   Problem Relation Age of Onset    Cancer Mother     Cancer Father          Current Outpatient Medications:     bacitracin 500  UNIT/GM ointment, Apply to open leg wound at time of dressing change ( as needed), Disp: , Rfl:     potassium chloride SA (KDUR) 20 MEQ Tab CR, TAKE 1 TABLET BY MOUTH EVERY DAY FOR LOW POTASSIUM, Disp: , Rfl:     sennosides (SENOKOT) 8.6 MG Tab, Take  by mouth., Disp: , Rfl:     Semaglutide,0.25 or 0.5MG/DOS, (OZEMPIC, 0.25 OR 0.5 MG/DOSE,) 2 MG/3ML Solution Pen-injector, Inject 0.25 mg under the skin every 7 days., Disp: 3 mL, Rfl: 11    amiodarone (CORDARONE) 200 MG Tab, Take 1 Tablet by mouth every day., Disp: 100 Tablet, Rfl: 0    dilTIAZem (CARDIZEM) 30 MG Tab, Take 1 Tablet by mouth 3 times a day. Indications: Atrial Fibrillation, Disp: 300 Tablet, Rfl: 2    atorvastatin (LIPITOR) 20 MG Tab, Take 1 Tablet by mouth every day. Indications: High Amount of Fats in the Blood, Disp: 100 Tablet, Rfl: 1    carvedilol (COREG) 6.25 MG Tab, Take 1 Tablet by mouth 2 times a day with meals., Disp: 200 Tablet, Rfl: 2    apixaban (ELIQUIS) 5mg Tab, Take 1 Tablet by mouth 2 times a day. Indications: Blood Clot in a Deep Vein, Afib, Disp: 200 Tablet, Rfl: 0    potassium Chloride ER (K-TAB) 20 MEQ Tab CR tablet, TAKE 1 TABLET BY MOUTH EVERY DAY FOR HYPOKALEMIA  Indications: Low Amount of Potassium in the Blood, Disp: 60 Tablet, Rfl: 3    hydroCHLOROthiazide 25 MG Tab, Take 1 Tablet by mouth every day for 300 days. Indications: Edema, Disp: 100 Tablet, Rfl: 2    gabapentin (NEURONTIN) 400 MG Cap, Take 1 Capsule by mouth 4 times a day for 90 days., Disp: 360 Capsule, Rfl: 1    allopurinol (ZYLOPRIM) 100 MG Tab, Take 1 Tablet by mouth every day. Indications: Gout, Disp: 90 Tablet, Rfl: 2    pioglitazone (ACTOS) 30 MG Tab, Take 1 Tablet by mouth every day. Indications: Type 2 Diabetes, Disp: 100 Tablet, Rfl: 3    metformin (GLUCOPHAGE) 1000 MG tablet, TAKE 1 TABLET BY MOUTH TWICE DAILY WITH FOOD  Indications: Type 2 Diabetes, Disp: 200 Tablet, Rfl: 3    Alcohol Swabs (ALCOHOL PREP) Pads, Use 1 each in the morning, at noon, and at  bedtime., Disp: 200 Each, Rfl: 2    Blood Glucose Monitoring Suppl (ONETOUCH VERIO) w/Device Kit, Use device in the morning, at noon, and at bedtime., Disp: 1 Kit, Rfl: 3    glucose blood strip, Use to test in the morning, at noon and at bedtime, Disp: 100 Strip, Rfl: 4    glucose blood (ONETOUCH VERIO) strip, 1 Each by Other route as needed. Test 3 x a week   Indications: BGM strips, Disp: , Rfl:     Simethicone 250 MG Cap, Take 1 Capsule by mouth 1 time a day as needed (gas). Chewable  Indications: gas pain, Disp: , Rfl:     vitamin D (CHOLECALCIFEROL) 1000 UNIT Tab, Take 1 Tablet by mouth every day. Indications: Nutritional supplement (Patient taking differently: Take 2,000 Units by mouth every day. Indications: Nutritional supplement), Disp: 60 Tablet, Rfl: 0    loratadine (CLARITIN) 10 MG Tab, Take 10 mg by mouth every day. Indications: Eye Itching, Hayfever, Runny Nose, Watery Eyes, Disp: , Rfl:     Multiple Vitamins-Minerals (MULTIVITAMIN GUMMIES ADULTS PO), Take 1 Dose by mouth every day at 6 PM. Indications: supplement, Disp: , Rfl:     Saccharomyces boulardii (DAILY PROBIOTIC SUPPLEMENT PO), Take 1 Dose by mouth every day. Indications: supplement, Disp: , Rfl:     Nutritional Supplements (ARGINAID) Pack, Take 1 Dose by mouth every day at 6 PM. Indications: supplement, Disp: , Rfl:     Nutritional Supplements (JOSÉ) Powder, Take 1 Dose by mouth every day at 6 PM. Indications: supplement, Disp: , Rfl:     acetaminophen (TYLENOL) 500 MG Tab, Take 500-1,000 mg by mouth every 6 hours as needed for Mild Pain or Moderate Pain. Indications: Pain, Disp: , Rfl:     docusate sodium 250 MG Cap, Take  by mouth. (Patient not taking: Reported on 4/7/2025), Disp: , Rfl:     sulfamethoxazole-trimethoprim (BACTRIM DS) 800-160 MG tablet, Take 1 Tablet by mouth 2 times a day. (Patient not taking: Reported on 4/7/2025), Disp: , Rfl:     Lidocaine (ZTLIDO) 1.8 % Patch, Apply 1-2 Patches topically every day (May wear up to 12  hours)., Disp: 60 Patch, Rfl: 2    Lancets (FINGERSTICK) Misc, Use to test in the morning, at noon and at bedtime, Disp: 100 Each, Rfl: 4    gabapentin (NEURONTIN) 400 MG Cap, Take 1 Capsule by mouth 4 times a day. 300mg 4x day  Indications: Diabetes with Nerve Disease (Patient not taking: Reported on 4/7/2025), Disp: 90 Capsule, Rfl: 2    dilTIAZem (CARDIZEM) 30 MG Tab, TAKE 1 TABLET BY MOUTH EVERY 8 HOURS FOR ATRIAL FIBRILLATION, Disp: 300 Tablet, Rfl: 0    hydrocortisone (PREPARATION H) 1 % Cream, Apply 1 Application  topically 1 time a day as needed (hemorroids). Indications: Itching, hemorroids (Patient not taking: Reported on 4/7/2025), Disp: , Rfl:     Patient was instructed on the use of medications, either prescriptions or OTC and informed on when the appropriate follow up time period should be. In addition, patient was also instructed that should any acute worsening occur that they should notify this clinic asap or call 911.        ROS       Current medicines (including changes today)  Current Outpatient Medications   Medication Sig Dispense Refill    bacitracin 500 UNIT/GM ointment Apply to open leg wound at time of dressing change ( as needed)      potassium chloride SA (KDUR) 20 MEQ Tab CR TAKE 1 TABLET BY MOUTH EVERY DAY FOR LOW POTASSIUM      sennosides (SENOKOT) 8.6 MG Tab Take  by mouth.      Semaglutide,0.25 or 0.5MG/DOS, (OZEMPIC, 0.25 OR 0.5 MG/DOSE,) 2 MG/3ML Solution Pen-injector Inject 0.25 mg under the skin every 7 days. 3 mL 11    amiodarone (CORDARONE) 200 MG Tab Take 1 Tablet by mouth every day. 100 Tablet 0    dilTIAZem (CARDIZEM) 30 MG Tab Take 1 Tablet by mouth 3 times a day. Indications: Atrial Fibrillation 300 Tablet 2    atorvastatin (LIPITOR) 20 MG Tab Take 1 Tablet by mouth every day. Indications: High Amount of Fats in the Blood 100 Tablet 1    carvedilol (COREG) 6.25 MG Tab Take 1 Tablet by mouth 2 times a day with meals. 200 Tablet 2    apixaban (ELIQUIS) 5mg Tab Take 1 Tablet  by mouth 2 times a day. Indications: Blood Clot in a Deep Vein, Afib 200 Tablet 0    potassium Chloride ER (K-TAB) 20 MEQ Tab CR tablet TAKE 1 TABLET BY MOUTH EVERY DAY FOR HYPOKALEMIA  Indications: Low Amount of Potassium in the Blood 60 Tablet 3    hydroCHLOROthiazide 25 MG Tab Take 1 Tablet by mouth every day for 300 days. Indications: Edema 100 Tablet 2    gabapentin (NEURONTIN) 400 MG Cap Take 1 Capsule by mouth 4 times a day for 90 days. 360 Capsule 1    allopurinol (ZYLOPRIM) 100 MG Tab Take 1 Tablet by mouth every day. Indications: Gout 90 Tablet 2    pioglitazone (ACTOS) 30 MG Tab Take 1 Tablet by mouth every day. Indications: Type 2 Diabetes 100 Tablet 3    metformin (GLUCOPHAGE) 1000 MG tablet TAKE 1 TABLET BY MOUTH TWICE DAILY WITH FOOD  Indications: Type 2 Diabetes 200 Tablet 3    Alcohol Swabs (ALCOHOL PREP) Pads Use 1 each in the morning, at noon, and at bedtime. 200 Each 2    Blood Glucose Monitoring Suppl (ONETOUCH VERIO) w/Device Kit Use device in the morning, at noon, and at bedtime. 1 Kit 3    glucose blood strip Use to test in the morning, at noon and at bedtime 100 Strip 4    glucose blood (ONETOUCH VERIO) strip 1 Each by Other route as needed. Test 3 x a week   Indications: BGM strips      Simethicone 250 MG Cap Take 1 Capsule by mouth 1 time a day as needed (gas). Chewable  Indications: gas pain      vitamin D (CHOLECALCIFEROL) 1000 UNIT Tab Take 1 Tablet by mouth every day. Indications: Nutritional supplement (Patient taking differently: Take 2,000 Units by mouth every day. Indications: Nutritional supplement) 60 Tablet 0    loratadine (CLARITIN) 10 MG Tab Take 10 mg by mouth every day. Indications: Eye Itching, Hayfever, Runny Nose, Watery Eyes      Multiple Vitamins-Minerals (MULTIVITAMIN GUMMIES ADULTS PO) Take 1 Dose by mouth every day at 6 PM. Indications: supplement      Saccharomyces boulardii (DAILY PROBIOTIC SUPPLEMENT PO) Take 1 Dose by mouth every day. Indications: supplement       Nutritional Supplements (ARGINAID) Pack Take 1 Dose by mouth every day at 6 PM. Indications: supplement      Nutritional Supplements (JOSÉ) Powder Take 1 Dose by mouth every day at 6 PM. Indications: supplement      acetaminophen (TYLENOL) 500 MG Tab Take 500-1,000 mg by mouth every 6 hours as needed for Mild Pain or Moderate Pain. Indications: Pain      docusate sodium 250 MG Cap Take  by mouth. (Patient not taking: Reported on 4/7/2025)      sulfamethoxazole-trimethoprim (BACTRIM DS) 800-160 MG tablet Take 1 Tablet by mouth 2 times a day. (Patient not taking: Reported on 4/7/2025)      Lidocaine (ZTLIDO) 1.8 % Patch Apply 1-2 Patches topically every day (May wear up to 12 hours). 60 Patch 2    Lancets (FINGERSTICK) Misc Use to test in the morning, at noon and at bedtime 100 Each 4    gabapentin (NEURONTIN) 400 MG Cap Take 1 Capsule by mouth 4 times a day. 300mg 4x day  Indications: Diabetes with Nerve Disease (Patient not taking: Reported on 4/7/2025) 90 Capsule 2    dilTIAZem (CARDIZEM) 30 MG Tab TAKE 1 TABLET BY MOUTH EVERY 8 HOURS FOR ATRIAL FIBRILLATION 300 Tablet 0    hydrocortisone (PREPARATION H) 1 % Cream Apply 1 Application  topically 1 time a day as needed (hemorroids). Indications: Itching, hemorroids (Patient not taking: Reported on 4/7/2025)       No current facility-administered medications for this visit.       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    Diabetes ([facility]) 09/09/2022    Diabetic ulcer of right calf ([facility]) 09/09/2022    PAD (peripheral artery disease) ([facility]) 09/09/2022    Stage 3a chronic kidney disease 09/09/2022    DM (diabetes mellitus) type II, controlled, with peripheral vascular disorder ([facility]) 07/20/2021    Morbid obesity ([facility]) 07/20/2021    Impaired mobility and ADLs 03/11/2020    Peripheral neuropathy  03/10/2020    Hx of BKA, left ([facility]) 03/10/2020    Obstructive sleep apnea syndrome 03/10/2020    Benign essential HTN 01/21/2020    Mixed hyperlipidemia 01/21/2020    History of DVT (deep vein thrombosis) 01/21/2020    Lumbar radicular syndrome 01/21/2020        Objective:   /77   Pulse 64   Ht 1.829 m (6')   Wt (!) 181 kg (400 lb)   SpO2 95%   BMI 54.25 kg/m²     Physical Exam:  Constitutional: Alert, no distress, well-groomed.  Skin: No rashes in visible areas.  Eye: Round. Conjunctiva clear, lids normal. No icterus.   ENMT: Lips pink without lesions, good dentition, moist mucous membranes. Phonation normal.  Neck: No masses, no thyromegaly. Moves freely without pain.  Respiratory: Unlabored respiratory effort, no cough or audible wheeze  Psych: Alert and oriented x3, normal affect and mood.     Assessment and Plan:   The following treatment plan was discussed:     1. Type 2 diabetes mellitus with stage 3 chronic kidney disease, without long-term current use of insulin, unspecified whether stage 3a or 3b CKD ([facility])  - Semaglutide,0.25 or 0.5MG/DOS, (OZEMPIC, 0.25 OR 0.5 MG/DOSE,) 2 MG/3ML Solution Pen-injector; Inject 0.25 mg under the skin every 7 days.  Dispense: 3 mL; Refill: 11    2. Severe obesity ([facility])  - Semaglutide,0.25 or 0.5MG/DOS, (OZEMPIC, 0.25 OR 0.5 MG/DOSE,) 2 MG/3ML Solution Pen-injector; Inject 0.25 mg under the skin every 7 days.  Dispense: 3 mL; Refill: 11    3. Body mass index (BMI) of 50-59.9 in adult ([facility])    4. Atrial fibrillation, unspecified type ([facility])    5. Stage 3a chronic kidney disease    6. Hx of BKA, left ([facility])    Other orders  - bacitracin 500 UNIT/GM ointment; Apply to open leg wound at time of dressing change ( as needed)  - docusate sodium 250 MG Cap; Take  by mouth. (Patient not taking: Reported on 4/7/2025)  - potassium chloride SA (KDUR) 20 MEQ Tab CR; TAKE 1 TABLET BY MOUTH EVERY DAY FOR LOW POTASSIUM  - sennosides (SENOKOT) 8.6 MG Tab; Take  by mouth.  -  sulfamethoxazole-trimethoprim (BACTRIM DS) 800-160 MG tablet; Take 1 Tablet by mouth 2 times a day. (Patient not taking: Reported on 4/7/2025)      Follow-up: No follow-ups on file.

## 2025-04-08 ASSESSMENT — PATIENT HEALTH QUESTIONNAIRE - PHQ9: CLINICAL INTERPRETATION OF PHQ2 SCORE: 0

## 2025-04-10 ENCOUNTER — HOME CARE VISIT (OUTPATIENT)
Dept: HOME HEALTH SERVICES | Facility: HOME HEALTHCARE | Age: 70
End: 2025-04-10
Payer: MEDICARE

## 2025-04-10 VITALS
SYSTOLIC BLOOD PRESSURE: 136 MMHG | TEMPERATURE: 97.5 F | RESPIRATION RATE: 18 BRPM | OXYGEN SATURATION: 92 % | DIASTOLIC BLOOD PRESSURE: 64 MMHG | HEART RATE: 68 BPM

## 2025-04-10 PROCEDURE — G0299 HHS/HOSPICE OF RN EA 15 MIN: HCPCS

## 2025-04-10 PROCEDURE — RXMED WILLOW AMBULATORY MEDICATION CHARGE: Performed by: FAMILY MEDICINE

## 2025-04-10 ASSESSMENT — ENCOUNTER SYMPTOMS
HIGHEST PAIN SEVERITY IN PAST 24 HOURS: 4/10
BOWEL PATTERN NORMAL: 1
SUBJECTIVE PAIN PROGRESSION: UNCHANGED
STOOL FREQUENCY: DAILY
PAIN LOCATION: NECK
PAIN LOCATION - PAIN QUALITY: DULL, ACHY
PAIN LOCATION - RELIEVING FACTORS: PAIN MEDICATION
FATIGUES EASILY: 1
MUSCLE WEAKNESS: 1
LAST BOWEL MOVEMENT: 67305
PAIN LOCATION - PAIN FREQUENCY: FREQUENT
PAIN SEVERITY GOAL: 0/10
VOMITING: DENIES
PAIN LOCATION - PAIN SEVERITY: 2/10
NAUSEA: DENIES
ASSOCIATED SYMPTOMS: DENIES
PAIN: 1
LOWEST PAIN SEVERITY IN PAST 24 HOURS: 2/10

## 2025-04-10 ASSESSMENT — PATIENT HEALTH QUESTIONNAIRE - PHQ9: CLINICAL INTERPRETATION OF PHQ2 SCORE: 0

## 2025-04-14 ENCOUNTER — PHARMACY VISIT (OUTPATIENT)
Dept: PHARMACY | Facility: MEDICAL CENTER | Age: 70
End: 2025-04-14
Payer: COMMERCIAL

## 2025-04-14 ENCOUNTER — HOME CARE VISIT (OUTPATIENT)
Dept: HOME HEALTH SERVICES | Facility: HOME HEALTHCARE | Age: 70
End: 2025-04-14
Payer: MEDICARE

## 2025-04-14 VITALS
RESPIRATION RATE: 18 BRPM | TEMPERATURE: 97.6 F | HEART RATE: 65 BPM | SYSTOLIC BLOOD PRESSURE: 140 MMHG | DIASTOLIC BLOOD PRESSURE: 76 MMHG | OXYGEN SATURATION: 94 %

## 2025-04-14 PROCEDURE — G0299 HHS/HOSPICE OF RN EA 15 MIN: HCPCS

## 2025-04-14 ASSESSMENT — ENCOUNTER SYMPTOMS
LAST BOWEL MOVEMENT: 67309
MUSCLE WEAKNESS: 1
DENIES PAIN: 1
STOOL FREQUENCY: DAILY
VOMITING: DENIES
BOWEL PATTERN NORMAL: 1
NAUSEA: DENIES
LOWER EXTREMITY EDEMA: 1

## 2025-04-14 ASSESSMENT — PATIENT HEALTH QUESTIONNAIRE - PHQ9: CLINICAL INTERPRETATION OF PHQ2 SCORE: 0

## 2025-04-17 ENCOUNTER — HOME CARE VISIT (OUTPATIENT)
Dept: HOME HEALTH SERVICES | Facility: HOME HEALTHCARE | Age: 70
End: 2025-04-17
Payer: MEDICARE

## 2025-04-17 VITALS
OXYGEN SATURATION: 93 % | HEART RATE: 64 BPM | SYSTOLIC BLOOD PRESSURE: 138 MMHG | RESPIRATION RATE: 18 BRPM | TEMPERATURE: 97.6 F | DIASTOLIC BLOOD PRESSURE: 70 MMHG

## 2025-04-17 PROCEDURE — G0299 HHS/HOSPICE OF RN EA 15 MIN: HCPCS

## 2025-04-17 ASSESSMENT — ENCOUNTER SYMPTOMS
LOWEST PAIN SEVERITY IN PAST 24 HOURS: 2/10
NAUSEA: DENIES
MUSCLE WEAKNESS: 1
PAIN LOCATION - PAIN SEVERITY: 2/10
PAIN LOCATION - PAIN FREQUENCY: INFREQUENT
PAIN: 1
HIGHEST PAIN SEVERITY IN PAST 24 HOURS: 4/10
SUBJECTIVE PAIN PROGRESSION: UNCHANGED
BOWEL PATTERN NORMAL: 1
FATIGUES EASILY: 1
PAIN SEVERITY GOAL: 1/10
LAST BOWEL MOVEMENT: 67312
PAIN LOCATION - RELIEVING FACTORS: PAIN MEDICATION
ASSOCIATED SYMPTOMS: DENIES
PAIN LOCATION: SHOULDERS
VOMITING: DENIES
STOOL FREQUENCY: DAILY

## 2025-04-17 ASSESSMENT — PATIENT HEALTH QUESTIONNAIRE - PHQ9: CLINICAL INTERPRETATION OF PHQ2 SCORE: 0

## 2025-04-21 ENCOUNTER — HOME CARE VISIT (OUTPATIENT)
Dept: HOME HEALTH SERVICES | Facility: HOME HEALTHCARE | Age: 70
End: 2025-04-21
Payer: MEDICARE

## 2025-04-21 VITALS
OXYGEN SATURATION: 94 % | TEMPERATURE: 97.5 F | DIASTOLIC BLOOD PRESSURE: 80 MMHG | SYSTOLIC BLOOD PRESSURE: 134 MMHG | RESPIRATION RATE: 18 BRPM | HEART RATE: 60 BPM

## 2025-04-21 PROCEDURE — RXMED WILLOW AMBULATORY MEDICATION CHARGE: Performed by: FAMILY MEDICINE

## 2025-04-21 PROCEDURE — G0299 HHS/HOSPICE OF RN EA 15 MIN: HCPCS

## 2025-04-21 ASSESSMENT — ENCOUNTER SYMPTOMS
PAIN LOCATION - PAIN QUALITY: DULL,ACHY
SUBJECTIVE PAIN PROGRESSION: UNCHANGED
BOWEL PATTERN NORMAL: 1
LOWEST PAIN SEVERITY IN PAST 24 HOURS: 2/10
LAST BOWEL MOVEMENT: 67316
ASSOCIATED SYMPTOMS: DENIES
STOOL FREQUENCY: DAILY
VOMITING: DENIES
PAIN: 1
MUSCLE WEAKNESS: 1
PAIN SEVERITY GOAL: 1/10
HIGHEST PAIN SEVERITY IN PAST 24 HOURS: 4/10
NAUSEA: DENIES
PAIN LOCATION: BACK
PAIN LOCATION - PAIN FREQUENCY: FREQUENT
PAIN LOCATION - PAIN SEVERITY: 2/10
FATIGUES EASILY: 1

## 2025-04-21 ASSESSMENT — PATIENT HEALTH QUESTIONNAIRE - PHQ9: CLINICAL INTERPRETATION OF PHQ2 SCORE: 0

## 2025-04-25 ENCOUNTER — HOME CARE VISIT (OUTPATIENT)
Dept: HOME HEALTH SERVICES | Facility: HOME HEALTHCARE | Age: 70
End: 2025-04-25
Payer: MEDICARE

## 2025-04-25 VITALS
SYSTOLIC BLOOD PRESSURE: 144 MMHG | RESPIRATION RATE: 18 BRPM | OXYGEN SATURATION: 94 % | TEMPERATURE: 97.5 F | HEART RATE: 62 BPM | DIASTOLIC BLOOD PRESSURE: 82 MMHG

## 2025-04-25 PROCEDURE — G0299 HHS/HOSPICE OF RN EA 15 MIN: HCPCS

## 2025-04-25 ASSESSMENT — ENCOUNTER SYMPTOMS
ASSOCIATED SYMPTOMS: DENIES
SUBJECTIVE PAIN PROGRESSION: UNCHANGED
NAUSEA: DENIES
PAIN LOCATION - PAIN QUALITY: DULL, ACHY
HIGHEST PAIN SEVERITY IN PAST 24 HOURS: 5/10
STOOL FREQUENCY: DAILY
VOMITING: DENIES
PAIN SEVERITY GOAL: 1/10
PAIN LOCATION - RELIEVING FACTORS: PAIN MEDICATION
PAIN LOCATION - PAIN FREQUENCY: FREQUENT
PAIN LOCATION: NECK
LAST BOWEL MOVEMENT: 67320
LOWEST PAIN SEVERITY IN PAST 24 HOURS: 3/10
PAIN: 1
BOWEL PATTERN NORMAL: 1
LOWER EXTREMITY EDEMA: 1
PAIN LOCATION - PAIN SEVERITY: 3/10

## 2025-04-25 ASSESSMENT — PATIENT HEALTH QUESTIONNAIRE - PHQ9: CLINICAL INTERPRETATION OF PHQ2 SCORE: 0

## 2025-04-28 ENCOUNTER — HOME CARE VISIT (OUTPATIENT)
Dept: HOME HEALTH SERVICES | Facility: HOME HEALTHCARE | Age: 70
End: 2025-04-28
Payer: MEDICARE

## 2025-04-28 VITALS
RESPIRATION RATE: 18 BRPM | OXYGEN SATURATION: 93 % | DIASTOLIC BLOOD PRESSURE: 72 MMHG | HEART RATE: 63 BPM | TEMPERATURE: 97.5 F | SYSTOLIC BLOOD PRESSURE: 132 MMHG

## 2025-04-28 PROCEDURE — G0299 HHS/HOSPICE OF RN EA 15 MIN: HCPCS

## 2025-04-28 ASSESSMENT — PATIENT HEALTH QUESTIONNAIRE - PHQ9: CLINICAL INTERPRETATION OF PHQ2 SCORE: 0

## 2025-04-28 ASSESSMENT — ENCOUNTER SYMPTOMS
STOOL FREQUENCY: DAILY
LAST BOWEL MOVEMENT: 67323
PAIN: 1
HIGHEST PAIN SEVERITY IN PAST 24 HOURS: 5/10
PAIN LOCATION - PAIN FREQUENCY: CONSTANT
NAUSEA: DENIES
LOWER EXTREMITY EDEMA: 1
PAIN LOCATION: NECK PAIN
PAIN SEVERITY GOAL: 1/10
PAIN LOCATION - PAIN QUALITY: DULL, ACHY
SUBJECTIVE PAIN PROGRESSION: UNCHANGED
PAIN LOCATION - PAIN SEVERITY: 5/10
ASSOCIATED SYMPTOMS: DENIES
VOMITING: DENIES
LOWEST PAIN SEVERITY IN PAST 24 HOURS: 3/10
BOWEL PATTERN NORMAL: 1

## 2025-04-30 ENCOUNTER — PHARMACY VISIT (OUTPATIENT)
Dept: PHARMACY | Facility: MEDICAL CENTER | Age: 70
End: 2025-04-30
Payer: COMMERCIAL

## 2025-04-30 ENCOUNTER — HOME CARE VISIT (OUTPATIENT)
Dept: HOME HEALTH SERVICES | Facility: HOME HEALTHCARE | Age: 70
End: 2025-04-30
Payer: MEDICARE

## 2025-04-30 VITALS
OXYGEN SATURATION: 93 % | WEIGHT: 315 LBS | SYSTOLIC BLOOD PRESSURE: 118 MMHG | RESPIRATION RATE: 18 BRPM | TEMPERATURE: 97.8 F | BODY MASS INDEX: 54.25 KG/M2 | DIASTOLIC BLOOD PRESSURE: 60 MMHG | HEART RATE: 61 BPM

## 2025-04-30 PROCEDURE — G0299 HHS/HOSPICE OF RN EA 15 MIN: HCPCS

## 2025-04-30 ASSESSMENT — ENCOUNTER SYMPTOMS
PAIN SEVERITY GOAL: 1/10
PAIN LOCATION - PAIN SEVERITY: 5/10
LOWER EXTREMITY EDEMA: 1
SUBJECTIVE PAIN PROGRESSION: UNCHANGED
FATIGUES EASILY: 1
PAIN LOCATION - PAIN FREQUENCY: FREQUENT
NAUSEA: DENIES
LOWEST PAIN SEVERITY IN PAST 24 HOURS: 4/10
FORGETFULNESS: 1
PAIN LOCATION - PAIN QUALITY: DULL, ACHY
PAIN LOCATION - RELIEVING FACTORS: PAIN MEDICATION
PAIN LOCATION: NECK
VOMITING: DENIES
HIGHEST PAIN SEVERITY IN PAST 24 HOURS: 6/10
ASSOCIATED SYMPTOMS: DENIES
PAIN: 1

## 2025-04-30 ASSESSMENT — ACTIVITIES OF DAILY LIVING (ADL)
TELEPHONE USE ASSESSED: 1
OASIS_M1830: 02
USING THE TELPHONE: INDEPENDENT

## 2025-04-30 ASSESSMENT — FIBROSIS 4 INDEX: FIB4 SCORE: 1.15

## 2025-04-30 ASSESSMENT — PATIENT HEALTH QUESTIONNAIRE - PHQ9: CLINICAL INTERPRETATION OF PHQ2 SCORE: 0

## 2025-05-01 ENCOUNTER — DOCUMENTATION (OUTPATIENT)
Dept: MEDICAL GROUP | Facility: PHYSICIAN GROUP | Age: 70
End: 2025-05-01

## 2025-05-02 NOTE — PROGRESS NOTES
Medication chart review for [facility] services    Received referral from [facility].   Medications reviewed  compared with discharge summary if available.  Discharge summary date, if applicable:   N/a    Current medication list per [facility]     Medication list one, patient is currently taking    Current Outpatient Medications:     bacitracin, Apply to open leg wound at time of dressing change ( as needed)    potassium chloride SA, TAKE 1 TABLET BY MOUTH EVERY DAY FOR LOW POTASSIUM    sennosides, 8.6 mg, Oral, QDAY PRN    Ozempic (0.25 or 0.5 MG/DOSE), 0.25 mg, Subcutaneous, Q7 DAYS    amiodarone, 200 mg, Oral, DAILY    dilTIAZem, 30 mg, Oral, TID    atorvastatin, 20 mg, Oral, DAILY    carvedilol, 6.25 mg, Oral, BID WITH MEALS    apixaban, 5 mg, Oral, BID    potassium Chloride ER, TAKE 1 TABLET BY MOUTH EVERY DAY FOR HYPOKALEMIA  Indications: Low Amount of Potassium in the Blood    hydroCHLOROthiazide, 25 mg, Oral, DAILY    gabapentin, 400 mg, Oral, 4X/DAY    ZTlido, 1-2 Patch, Topical, DAILY    allopurinol, 100 mg, Oral, DAILY    pioglitazone, 30 mg, Oral, DAILY    metformin, TAKE 1 TABLET BY MOUTH TWICE DAILY WITH FOOD  Indications: Type 2 Diabetes (Patient not taking: Reported on 4/30/2025)    Alcohol Prep, 1 Each, Does not apply, TID    OneTouch Verio, 1 Each, Does not apply, TID    glucose blood, Use to test in the morning, at noon and at bedtime    fingerstick, Use to test in the morning, at noon and at bedtime    OneTouch Verio, 1 Each, Other, PRN    gabapentin, 400 mg, Oral, 4XDAY (Patient not taking: Reported on 4/30/2025)    dilTIAZem, TAKE 1 TABLET BY MOUTH EVERY 8 HOURS FOR ATRIAL FIBRILLATION    Simethicone, 1 Capsule, Oral, QDAY PRN    hydrocortisone, 1 Application , Topical, QDAY PRN (Patient not taking: Reported on 4/7/2025)    vitamin D, 1,000 Units, Oral, DAILY (Patient taking differently: 2,000 Units, Oral, DAILY, Indications: Nutritional supplement)    loratadine, 10 mg, Oral, DAILY     Multiple Vitamins-Minerals (MULTIVITAMIN GUMMIES ADULTS PO), 1 Dose, Oral, DAILY AT 1800    Saccharomyces boulardii (DAILY PROBIOTIC SUPPLEMENT PO), 1 Dose, Oral, DAILY    Arginaid, 1 Dose, Oral, DAILY AT 1800    Jony, 1 Dose, Oral, DAILY AT 1800    acetaminophen, 500-1,000 mg, Oral, Q6HRS PRN      Medication list two, drugs that the patient has been prescribed or recommended to take by their healthcare provider on discharge summary  N/a    Allergies   Allergen Reactions    Penicillins Unspecified and Swelling     Given in eye drops as a child, eyes swell  2003-06-09;ITCH   Create Date: [id number] Create User Name: Juan J Castellanos Update Date: [id number] Update User Name: Juan J Castellanos    Bee Venom Swelling    Cefazolin Rash, Itching and Swelling    Linezolid Unspecified     Caused unk lab value to increase.    Benzalkonium Chloride      Other Reaction(s): Not available       Labs     Lab Results   Component Value Date/Time    SODIUM 141 03/19/2025 10:00 AM    POTASSIUM 4.5 03/19/2025 10:00 AM    CHLORIDE 103 03/19/2025 10:00 AM    CO2 28 03/19/2025 10:00 AM    GLUCOSE 95 03/19/2025 10:00 AM    BUN 25 (H) 03/19/2025 10:00 AM    CREATININE 1.43 (H) 03/19/2025 10:00 AM    GLOMRATE 52 (L) 09/08/2023 08:50 AM     Lab Results   Component Value Date/Time    ALKPHOSPHAT 138 (H) 03/19/2025 10:00 AM    ASTSGOT 22 03/19/2025 10:00 AM    ALTSGPT 14 03/19/2025 10:00 AM    TBILIRUBIN 0.6 03/19/2025 10:00 AM    ALBUMIN 4.0 03/19/2025 10:00 AM        Assessment for clinically significant drug interactions, drug omissions/additions, duplicative therapies.            CC   Theo James M.D.  [address] [address]  Fax: [phone]    [facility]  Phone [phone] fax [phone]    This note was created using voice recognition software (Dragon). The accuracy of the dictation is limited by the abilities of the software. I have reviewed the note prior to signing,  however some errors in grammar and context are still possible. If you have any questions related to this note please do not hesitate to contact our office.       No follow-ups on file.

## 2025-05-05 ENCOUNTER — HOME CARE VISIT (OUTPATIENT)
Dept: HOME HEALTH SERVICES | Facility: HOME HEALTHCARE | Age: 70
End: 2025-05-05
Payer: MEDICARE

## 2025-05-05 VITALS
DIASTOLIC BLOOD PRESSURE: 70 MMHG | OXYGEN SATURATION: 93 % | SYSTOLIC BLOOD PRESSURE: 130 MMHG | TEMPERATURE: 97.5 F | RESPIRATION RATE: 18 BRPM | HEART RATE: 60 BPM

## 2025-05-05 PROCEDURE — G0299 HHS/HOSPICE OF RN EA 15 MIN: HCPCS

## 2025-05-05 ASSESSMENT — ENCOUNTER SYMPTOMS
HIGHEST PAIN SEVERITY IN PAST 24 HOURS: 3/10
PAIN LOCATION: NECK
PAIN LOCATION - RELIEVING FACTORS: PAIN MEDICATION
LOWER EXTREMITY EDEMA: 1
LOWEST PAIN SEVERITY IN PAST 24 HOURS: 1/10
PAIN SEVERITY GOAL: 0/10
PAIN LOCATION - PAIN FREQUENCY: FREQUENT
PAIN LOCATION - PAIN SEVERITY: 2/10
NAUSEA: DENIES
VOMITING: DENIES
ASSOCIATED SYMPTOMS: DENIES
STOOL FREQUENCY: DAILY
LAST BOWEL MOVEMENT: 67330
SUBJECTIVE PAIN PROGRESSION: UNCHANGED
PAIN: 1
BOWEL PATTERN NORMAL: 1
PAIN LOCATION - PAIN QUALITY: ACHY
MUSCLE WEAKNESS: 1

## 2025-05-05 ASSESSMENT — PATIENT HEALTH QUESTIONNAIRE - PHQ9: CLINICAL INTERPRETATION OF PHQ2 SCORE: 0

## 2025-05-07 ENCOUNTER — HOME CARE VISIT (OUTPATIENT)
Dept: HOME HEALTH SERVICES | Facility: HOME HEALTHCARE | Age: 70
End: 2025-05-07
Payer: MEDICARE

## 2025-05-07 VITALS
SYSTOLIC BLOOD PRESSURE: 124 MMHG | RESPIRATION RATE: 18 BRPM | OXYGEN SATURATION: 95 % | DIASTOLIC BLOOD PRESSURE: 60 MMHG | HEART RATE: 74 BPM | TEMPERATURE: 97.6 F

## 2025-05-07 PROCEDURE — G0299 HHS/HOSPICE OF RN EA 15 MIN: HCPCS

## 2025-05-07 ASSESSMENT — ENCOUNTER SYMPTOMS
LAST BOWEL MOVEMENT: 67332
ASSOCIATED SYMPTOMS: DENIES
STOOL FREQUENCY: DAILY
PAIN: 1
FATIGUES EASILY: 1
PAIN LOCATION - RELIEVING FACTORS: DISTRACTIONS
NAUSEA: DENIES
VOMITING: DENIES
PAIN LOCATION - PAIN FREQUENCY: FREQUENT
SUBJECTIVE PAIN PROGRESSION: UNCHANGED
LOWEST PAIN SEVERITY IN PAST 24 HOURS: 2/10
PAIN LOCATION - PAIN SEVERITY: 2/10
PAIN LOCATION: NECK
PAIN SEVERITY GOAL: 0/10
HIGHEST PAIN SEVERITY IN PAST 24 HOURS: 4/10
PAIN LOCATION - PAIN QUALITY: DULL, ACHY
MUSCLE WEAKNESS: 1
BOWEL PATTERN NORMAL: 1

## 2025-05-07 ASSESSMENT — PATIENT HEALTH QUESTIONNAIRE - PHQ9: CLINICAL INTERPRETATION OF PHQ2 SCORE: 0

## 2025-05-09 ENCOUNTER — HOME CARE VISIT (OUTPATIENT)
Dept: HOME HEALTH SERVICES | Facility: HOME HEALTHCARE | Age: 70
End: 2025-05-09
Payer: MEDICARE

## 2025-05-09 VITALS
SYSTOLIC BLOOD PRESSURE: 128 MMHG | TEMPERATURE: 97.4 F | RESPIRATION RATE: 18 BRPM | OXYGEN SATURATION: 91 % | DIASTOLIC BLOOD PRESSURE: 74 MMHG | HEART RATE: 62 BPM

## 2025-05-09 PROCEDURE — G0299 HHS/HOSPICE OF RN EA 15 MIN: HCPCS

## 2025-05-09 ASSESSMENT — ENCOUNTER SYMPTOMS
NAUSEA: DENIES
LOWER EXTREMITY EDEMA: 1
PAIN LOCATION: NECK
PAIN SEVERITY GOAL: 1/10
BOWEL PATTERN NORMAL: 1
HIGHEST PAIN SEVERITY IN PAST 24 HOURS: 3/10
PAIN LOCATION - PAIN SEVERITY: 2/10
PAIN: 1
PAIN LOCATION - RELIEVING FACTORS: PAIN MEDICATION
VOMITING: DENIES
ASSOCIATED SYMPTOMS: DENIES
DYSPNEA ON EXERTION: 1
STOOL FREQUENCY: DAILY
PAIN LOCATION - PAIN QUALITY: DULL, ACHY
MUSCLE WEAKNESS: 1
LAST BOWEL MOVEMENT: 67334
LOWEST PAIN SEVERITY IN PAST 24 HOURS: 2/10
PAIN LOCATION - PAIN FREQUENCY: FREQUENT
SUBJECTIVE PAIN PROGRESSION: GRADUALLY IMPROVING

## 2025-05-09 ASSESSMENT — PATIENT HEALTH QUESTIONNAIRE - PHQ9: CLINICAL INTERPRETATION OF PHQ2 SCORE: 0

## 2025-05-12 ENCOUNTER — HOME CARE VISIT (OUTPATIENT)
Dept: HOME HEALTH SERVICES | Facility: HOME HEALTHCARE | Age: 70
End: 2025-05-12
Payer: MEDICARE

## 2025-05-12 VITALS
DIASTOLIC BLOOD PRESSURE: 76 MMHG | HEART RATE: 60 BPM | SYSTOLIC BLOOD PRESSURE: 132 MMHG | TEMPERATURE: 97.5 F | RESPIRATION RATE: 18 BRPM | OXYGEN SATURATION: 95 %

## 2025-05-12 DIAGNOSIS — I10 BENIGN ESSENTIAL HTN: ICD-10-CM

## 2025-05-12 PROCEDURE — G0299 HHS/HOSPICE OF RN EA 15 MIN: HCPCS

## 2025-05-12 RX ORDER — HYDROCHLOROTHIAZIDE 25 MG/1
25 TABLET ORAL DAILY
Qty: 100 TABLET | Refills: 2 | Status: SHIPPED | OUTPATIENT
Start: 2025-05-12 | End: 2026-03-08

## 2025-05-12 ASSESSMENT — ENCOUNTER SYMPTOMS
PAIN LOCATION: NECK
PAIN LOCATION - PAIN QUALITY: DULL, ACHY
LAST BOWEL MOVEMENT: 67337
VOMITING: DENIES
PAIN SEVERITY GOAL: 0/10
ASSOCIATED SYMPTOMS: DENIES
HIGHEST PAIN SEVERITY IN PAST 24 HOURS: 3/10
SUBJECTIVE PAIN PROGRESSION: GRADUALLY IMPROVING
PAIN LOCATION - PAIN SEVERITY: 2/10
MUSCLE WEAKNESS: 1
LOWEST PAIN SEVERITY IN PAST 24 HOURS: 1/10
PAIN LOCATION - RELIEVING FACTORS: PAIN MEDICATION
BOWEL PATTERN NORMAL: 1
STOOL FREQUENCY: DAILY
FATIGUES EASILY: 1
PAIN LOCATION - PAIN FREQUENCY: FREQUENT
PAIN: 1
NAUSEA: DENIES

## 2025-05-12 ASSESSMENT — PATIENT HEALTH QUESTIONNAIRE - PHQ9: CLINICAL INTERPRETATION OF PHQ2 SCORE: 0

## 2025-05-12 NOTE — TELEPHONE ENCOUNTER
Received request via: Patient    Was the patient seen in the last year in this department? Yes    Does the patient have an active prescription (recently filled or refills available) for medication(s) requested? No    [facility]    Does the patient have intermediate Plus and need 100-day supply? (This applies to ALL medications) Yes, quantity updated to 100 days

## 2025-05-14 ENCOUNTER — HOME CARE VISIT (OUTPATIENT)
Dept: HOME HEALTH SERVICES | Facility: HOME HEALTHCARE | Age: 70
End: 2025-05-14
Payer: MEDICARE

## 2025-05-14 PROCEDURE — G0299 HHS/HOSPICE OF RN EA 15 MIN: HCPCS

## 2025-05-14 PROCEDURE — RXMED WILLOW AMBULATORY MEDICATION CHARGE: Performed by: FAMILY MEDICINE

## 2025-05-14 ASSESSMENT — ENCOUNTER SYMPTOMS
PAIN LOCATION - PAIN QUALITY: DULL, ACHY
PAIN LOCATION - PAIN SEVERITY: 3/10
LOWER EXTREMITY EDEMA: 1
BOWEL PATTERN NORMAL: 1
HIGHEST PAIN SEVERITY IN PAST 24 HOURS: 5/10
PAIN: 1
LOWEST PAIN SEVERITY IN PAST 24 HOURS: 3/10
STOOL FREQUENCY: DAILY
PAIN LOCATION: NECK PAIN
PAIN LOCATION - PAIN FREQUENCY: FREQUENT
NAUSEA: DENIES
PAIN SEVERITY GOAL: 1/10
VOMITING: DENIES
LAST BOWEL MOVEMENT: 67339
SUBJECTIVE PAIN PROGRESSION: UNCHANGED

## 2025-05-14 ASSESSMENT — PATIENT HEALTH QUESTIONNAIRE - PHQ9: CLINICAL INTERPRETATION OF PHQ2 SCORE: 0

## 2025-05-15 ENCOUNTER — HOME CARE VISIT (OUTPATIENT)
Dept: HOME HEALTH SERVICES | Facility: HOME HEALTHCARE | Age: 70
End: 2025-05-15
Payer: MEDICARE

## 2025-05-15 ENCOUNTER — PHARMACY VISIT (OUTPATIENT)
Dept: PHARMACY | Facility: MEDICAL CENTER | Age: 70
End: 2025-05-15
Payer: COMMERCIAL

## 2025-05-15 VITALS
HEART RATE: 65 BPM | RESPIRATION RATE: 65 BRPM | OXYGEN SATURATION: 93 % | DIASTOLIC BLOOD PRESSURE: 70 MMHG | TEMPERATURE: 97.4 F | SYSTOLIC BLOOD PRESSURE: 118 MMHG

## 2025-05-15 PROCEDURE — G0299 HHS/HOSPICE OF RN EA 15 MIN: HCPCS

## 2025-05-15 ASSESSMENT — ENCOUNTER SYMPTOMS
PAIN SEVERITY GOAL: 0/10
HYPERTENSION: 1
NAUSEA: DENIES
LAST BOWEL MOVEMENT: 67340
DRY SKIN: 1
PAIN: 1
PAIN LOCATION - EXACERBATING FACTORS: ACTIVITY
LOWEST PAIN SEVERITY IN PAST 24 HOURS: 1/10
MUSCLE WEAKNESS: 1
PAIN LOCATION - PAIN FREQUENCY: FREQUENT
PAIN LOCATION - PAIN QUALITY: ACHES
PAIN LOCATION: NECK
VOMITING: DENIES
HIGHEST PAIN SEVERITY IN PAST 24 HOURS: 5/10
SUBJECTIVE PAIN PROGRESSION: UNCHANGED
STOOL FREQUENCY: DAILY
LOSS OF SENSATION: 1
BOWEL PATTERN NORMAL: 1
PAIN LOCATION - PAIN SEVERITY: 3/10
PAIN LOCATION - RELIEVING FACTORS: MEDS

## 2025-05-15 ASSESSMENT — ACTIVITIES OF DAILY LIVING (ADL)
TRANSPORTATION COMMENTS: PATIENT NEEDS ASSISTANCE TO LEAVE THE HOME
AMBULATION ASSISTANCE: STAND BY ASSIST
CURRENT_FUNCTION: STAND BY ASSIST

## 2025-05-19 ENCOUNTER — HOME CARE VISIT (OUTPATIENT)
Dept: HOME HEALTH SERVICES | Facility: HOME HEALTHCARE | Age: 70
End: 2025-05-19
Payer: MEDICARE

## 2025-05-19 ENCOUNTER — PHARMACY VISIT (OUTPATIENT)
Dept: PHARMACY | Facility: MEDICAL CENTER | Age: 70
End: 2025-05-19
Payer: MEDICARE

## 2025-05-19 VITALS
BODY MASS INDEX: 53.62 KG/M2 | DIASTOLIC BLOOD PRESSURE: 70 MMHG | OXYGEN SATURATION: 94 % | WEIGHT: 315 LBS | SYSTOLIC BLOOD PRESSURE: 128 MMHG | RESPIRATION RATE: 16 BRPM | HEART RATE: 64 BPM | TEMPERATURE: 97.9 F

## 2025-05-19 PROCEDURE — G0299 HHS/HOSPICE OF RN EA 15 MIN: HCPCS

## 2025-05-19 ASSESSMENT — ENCOUNTER SYMPTOMS
PAIN LOCATION - PAIN SEVERITY: 4/10
PAIN LOCATION - RELIEVING FACTORS: PAIN MEDICATION
VOMITING: DENIES
LAST BOWEL MOVEMENT: 67344
SUBJECTIVE PAIN PROGRESSION: UNCHANGED
STOOL FREQUENCY: DAILY
PAIN LOCATION: NECK
PAIN: 1
PAIN LOCATION - PAIN QUALITY: DULL, ACHY
ASSOCIATED SYMPTOMS: DENIES
BOWEL PATTERN NORMAL: 1
PAIN LOCATION - PAIN FREQUENCY: CONSTANT
HIGHEST PAIN SEVERITY IN PAST 24 HOURS: 5/10
NAUSEA: DENIES
LOWEST PAIN SEVERITY IN PAST 24 HOURS: 3/10
FATIGUES EASILY: 1
PAIN SEVERITY GOAL: 1/10

## 2025-05-19 ASSESSMENT — PATIENT HEALTH QUESTIONNAIRE - PHQ9: CLINICAL INTERPRETATION OF PHQ2 SCORE: 0

## 2025-05-19 ASSESSMENT — FIBROSIS 4 INDEX: FIB4 SCORE: 1.15

## 2025-05-22 ENCOUNTER — PHARMACY VISIT (OUTPATIENT)
Dept: PHARMACY | Facility: MEDICAL CENTER | Age: 70
End: 2025-05-22
Payer: COMMERCIAL

## 2025-05-22 PROCEDURE — RXMED WILLOW AMBULATORY MEDICATION CHARGE: Performed by: PHYSICIAN ASSISTANT

## 2025-05-22 RX ORDER — GABAPENTIN 400 MG/1
CAPSULE ORAL
Qty: 360 CAPSULE | Refills: 1 | OUTPATIENT
Start: 2025-05-22

## 2025-05-23 ENCOUNTER — HOME CARE VISIT (OUTPATIENT)
Dept: HOME HEALTH SERVICES | Facility: HOME HEALTHCARE | Age: 70
End: 2025-05-23
Payer: MEDICARE

## 2025-05-23 VITALS
TEMPERATURE: 97.9 F | OXYGEN SATURATION: 95 % | RESPIRATION RATE: 16 BRPM | SYSTOLIC BLOOD PRESSURE: 132 MMHG | HEART RATE: 68 BPM | DIASTOLIC BLOOD PRESSURE: 84 MMHG

## 2025-05-23 PROCEDURE — G0299 HHS/HOSPICE OF RN EA 15 MIN: HCPCS

## 2025-05-23 ASSESSMENT — ENCOUNTER SYMPTOMS
PAIN: 1
BOWEL PATTERN NORMAL: 1
HIGHEST PAIN SEVERITY IN PAST 24 HOURS: 5/10
PAIN LOCATION - PAIN FREQUENCY: FREQUENT
PAIN LOCATION: NECK
MUSCLE WEAKNESS: 1
LAST BOWEL MOVEMENT: 67348
PAIN SEVERITY GOAL: 1/10
LOWER EXTREMITY EDEMA: 1
VOMITING: DENIES
LOWEST PAIN SEVERITY IN PAST 24 HOURS: 3/10
PAIN LOCATION - PAIN SEVERITY: 3/10
ASSOCIATED SYMPTOMS: DENIES
NAUSEA: DENIES
STOOL FREQUENCY: DAILY
SUBJECTIVE PAIN PROGRESSION: UNCHANGED
PAIN LOCATION - PAIN QUALITY: ACHY

## 2025-05-23 ASSESSMENT — PATIENT HEALTH QUESTIONNAIRE - PHQ9: CLINICAL INTERPRETATION OF PHQ2 SCORE: 0

## 2025-05-24 PROCEDURE — RXMED WILLOW AMBULATORY MEDICATION CHARGE: Performed by: FAMILY MEDICINE

## 2025-05-26 ENCOUNTER — HOME CARE VISIT (OUTPATIENT)
Dept: HOME HEALTH SERVICES | Facility: HOME HEALTHCARE | Age: 70
End: 2025-05-26
Payer: MEDICARE

## 2025-05-26 VITALS
OXYGEN SATURATION: 93 % | SYSTOLIC BLOOD PRESSURE: 138 MMHG | TEMPERATURE: 97.4 F | RESPIRATION RATE: 16 BRPM | DIASTOLIC BLOOD PRESSURE: 70 MMHG | HEART RATE: 62 BPM

## 2025-05-26 PROCEDURE — G0299 HHS/HOSPICE OF RN EA 15 MIN: HCPCS

## 2025-05-26 ASSESSMENT — ENCOUNTER SYMPTOMS
LAST BOWEL MOVEMENT: 67351
ASSOCIATED SYMPTOMS: DENIES
PAIN LOCATION: NECK
SUBJECTIVE PAIN PROGRESSION: UNCHANGED
STOOL FREQUENCY: DAILY
PAIN LOCATION - RELIEVING FACTORS: PAIN MEDICATION
LOWEST PAIN SEVERITY IN PAST 24 HOURS: 2/10
NAUSEA: DENIES
PAIN: 1
FATIGUES EASILY: 1
BOWEL PATTERN NORMAL: 1
HIGHEST PAIN SEVERITY IN PAST 24 HOURS: 4/10
PAIN LOCATION - PAIN SEVERITY: 2/10
PAIN LOCATION - PAIN QUALITY: DULL, ACHY
MUSCLE WEAKNESS: 1
VOMITING: DENIES
PAIN SEVERITY GOAL: 1/10
PAIN LOCATION - PAIN FREQUENCY: FREQUENT

## 2025-05-26 ASSESSMENT — PATIENT HEALTH QUESTIONNAIRE - PHQ9: CLINICAL INTERPRETATION OF PHQ2 SCORE: 0

## 2025-05-27 ENCOUNTER — PHARMACY VISIT (OUTPATIENT)
Dept: PHARMACY | Facility: MEDICAL CENTER | Age: 70
End: 2025-05-27
Payer: COMMERCIAL

## 2025-05-29 ENCOUNTER — HOME CARE VISIT (OUTPATIENT)
Dept: HOME HEALTH SERVICES | Facility: HOME HEALTHCARE | Age: 70
End: 2025-05-29
Payer: MEDICARE

## 2025-06-02 ENCOUNTER — HOME CARE VISIT (OUTPATIENT)
Dept: HOME HEALTH SERVICES | Facility: HOME HEALTHCARE | Age: 70
End: 2025-06-02
Payer: MEDICARE

## 2025-06-02 VITALS
RESPIRATION RATE: 18 BRPM | OXYGEN SATURATION: 92 % | SYSTOLIC BLOOD PRESSURE: 136 MMHG | TEMPERATURE: 97.9 F | HEART RATE: 65 BPM | DIASTOLIC BLOOD PRESSURE: 70 MMHG

## 2025-06-02 PROCEDURE — G0299 HHS/HOSPICE OF RN EA 15 MIN: HCPCS

## 2025-06-02 ASSESSMENT — ENCOUNTER SYMPTOMS
PAIN LOCATION: NECK
BOWEL PATTERN NORMAL: 1
VOMITING: DENIES
LOWEST PAIN SEVERITY IN PAST 24 HOURS: 2/10
PAIN LOCATION - PAIN FREQUENCY: FREQUENT
HIGHEST PAIN SEVERITY IN PAST 24 HOURS: 3/10
PAIN: 1
LAST BOWEL MOVEMENT: 67358
NAUSEA: DENIES
PAIN SEVERITY GOAL: 1/10
SUBJECTIVE PAIN PROGRESSION: UNCHANGED
PAIN LOCATION - RELIEVING FACTORS: PAIN MEDICATION
PAIN LOCATION - PAIN SEVERITY: 2/10
MUSCLE WEAKNESS: 1
LOWER EXTREMITY EDEMA: 1
PAIN LOCATION - PAIN QUALITY: DULL, ACHY
STOOL FREQUENCY: DAILY
ASSOCIATED SYMPTOMS: DENIES

## 2025-06-02 ASSESSMENT — PATIENT HEALTH QUESTIONNAIRE - PHQ9: CLINICAL INTERPRETATION OF PHQ2 SCORE: 0

## 2025-06-05 ENCOUNTER — HOME CARE VISIT (OUTPATIENT)
Dept: HOME HEALTH SERVICES | Facility: HOME HEALTHCARE | Age: 70
End: 2025-06-05
Payer: MEDICARE

## 2025-06-05 VITALS
RESPIRATION RATE: 18 BRPM | DIASTOLIC BLOOD PRESSURE: 70 MMHG | HEART RATE: 68 BPM | OXYGEN SATURATION: 98 % | SYSTOLIC BLOOD PRESSURE: 130 MMHG | TEMPERATURE: 98 F

## 2025-06-05 PROCEDURE — G0299 HHS/HOSPICE OF RN EA 15 MIN: HCPCS

## 2025-06-05 ASSESSMENT — ENCOUNTER SYMPTOMS
SUBJECTIVE PAIN PROGRESSION: UNCHANGED
HIGHEST PAIN SEVERITY IN PAST 24 HOURS: 3/10
LOWEST PAIN SEVERITY IN PAST 24 HOURS: 2/10
VOMITING: DENIES
PAIN SEVERITY GOAL: 1/10
LOWER EXTREMITY EDEMA: 1
MUSCLE WEAKNESS: 1
PAIN LOCATION - PAIN SEVERITY: 2/10
PAIN LOCATION - PAIN QUALITY: DULL, ACHY
BOWEL PATTERN NORMAL: 1
PAIN LOCATION - RELIEVING FACTORS: PAIN MEDICATION
ASSOCIATED SYMPTOMS: DENIES
PAIN: 1
LAST BOWEL MOVEMENT: 67361
NAUSEA: DENIES
PAIN LOCATION: NECK
PAIN LOCATION - PAIN FREQUENCY: FREQUENT
STOOL FREQUENCY: DAILY

## 2025-06-05 ASSESSMENT — PATIENT HEALTH QUESTIONNAIRE - PHQ9: CLINICAL INTERPRETATION OF PHQ2 SCORE: 0

## 2025-06-07 DIAGNOSIS — I48.91 ATRIAL FIBRILLATION, UNSPECIFIED TYPE (HCC): ICD-10-CM

## 2025-06-07 PROCEDURE — RXMED WILLOW AMBULATORY MEDICATION CHARGE: Performed by: FAMILY MEDICINE

## 2025-06-09 ENCOUNTER — HOME CARE VISIT (OUTPATIENT)
Dept: HOME HEALTH SERVICES | Facility: HOME HEALTHCARE | Age: 70
End: 2025-06-09
Payer: MEDICARE

## 2025-06-09 VITALS
HEART RATE: 62 BPM | RESPIRATION RATE: 18 BRPM | BODY MASS INDEX: 54.39 KG/M2 | DIASTOLIC BLOOD PRESSURE: 76 MMHG | SYSTOLIC BLOOD PRESSURE: 128 MMHG | OXYGEN SATURATION: 95 % | TEMPERATURE: 97 F | WEIGHT: 315 LBS

## 2025-06-09 PROCEDURE — RXMED WILLOW AMBULATORY MEDICATION CHARGE: Performed by: FAMILY MEDICINE

## 2025-06-09 PROCEDURE — G0299 HHS/HOSPICE OF RN EA 15 MIN: HCPCS

## 2025-06-09 ASSESSMENT — ENCOUNTER SYMPTOMS
PAIN LOCATION - PAIN QUALITY: ACHE
FATIGUES EASILY: 1
LAST BOWEL MOVEMENT: 67365
VOMITING: DENIES
PAIN LOCATION: NECK
PAIN LOCATION - PAIN SEVERITY: 2/10
NAUSEA: DENIES
LOWEST PAIN SEVERITY IN PAST 24 HOURS: 0/10
FORGETFULNESS: 1
PAIN: 1
STOOL FREQUENCY: DAILY
BOWEL PATTERN NORMAL: 1
SUBJECTIVE PAIN PROGRESSION: UNCHANGED
PAIN SEVERITY GOAL: 0/10
HIGHEST PAIN SEVERITY IN PAST 24 HOURS: 2/10
PAIN LOCATION - PAIN FREQUENCY: CONSTANT
PAIN LOCATION - EXACERBATING FACTORS: ACTIVITY
FATIGUE: 1
PAIN LOCATION - PAIN DURATION: DAILY

## 2025-06-09 ASSESSMENT — ACTIVITIES OF DAILY LIVING (ADL)
AMBULATION ASSISTANCE: STAND BY ASSIST
CURRENT_FUNCTION: STAND BY ASSIST

## 2025-06-09 ASSESSMENT — FIBROSIS 4 INDEX: FIB4 SCORE: 1.15

## 2025-06-09 NOTE — TELEPHONE ENCOUNTER
Received request via: Patient    Was the patient seen in the last year in this department? Yes    Does the patient have an active prescription (recently filled or refills available) for medication(s) requested? No    [facility]    Does the patient have nursing home Plus and need 100-day supply? (This applies to ALL medications) Yes, quantity updated to 100 days

## 2025-06-10 ENCOUNTER — HOME CARE VISIT (OUTPATIENT)
Dept: HOME HEALTH SERVICES | Facility: HOME HEALTHCARE | Age: 70
End: 2025-06-10
Payer: MEDICARE

## 2025-06-10 ENCOUNTER — PHARMACY VISIT (OUTPATIENT)
Dept: PHARMACY | Facility: MEDICAL CENTER | Age: 70
End: 2025-06-10
Payer: COMMERCIAL

## 2025-06-10 VITALS
HEART RATE: 72 BPM | DIASTOLIC BLOOD PRESSURE: 88 MMHG | RESPIRATION RATE: 16 BRPM | TEMPERATURE: 97.6 F | OXYGEN SATURATION: 95 % | SYSTOLIC BLOOD PRESSURE: 114 MMHG

## 2025-06-10 PROCEDURE — G0151 HHCP-SERV OF PT,EA 15 MIN: HCPCS

## 2025-06-10 ASSESSMENT — ENCOUNTER SYMPTOMS
PAIN LOCATION: NECK
HIGHEST PAIN SEVERITY IN PAST 24 HOURS: 5/10
PAIN LOCATION - PAIN DURATION: DAILY
PAIN SEVERITY GOAL: 1/10
PAIN: PAIN LIMITS FUNCTION LESS THAN DAILY
PAIN: 1
PAIN LOCATION - RELIEVING FACTORS: REST,
PAIN LOCATION - PAIN QUALITY: ACHE
PAIN LOCATION - PAIN FREQUENCY: INTERMITTENT
LOWEST PAIN SEVERITY IN PAST 24 HOURS: 0/10
PAIN LOCATION - PAIN SEVERITY: 1/10
SUBJECTIVE PAIN PROGRESSION: WAXING AND WANING

## 2025-06-10 NOTE — Clinical Note
Physical therapy evaluation performed 6/10/2025 and I will follow 1 time a week for 1 week and 2 times a week for 3 weeks.  Further insurance authorization may be required.  Thank you

## 2025-06-11 ENCOUNTER — HOME CARE VISIT (OUTPATIENT)
Dept: HOME HEALTH SERVICES | Facility: HOME HEALTHCARE | Age: 70
End: 2025-06-11
Payer: MEDICARE

## 2025-06-11 NOTE — HOME HEALTH
PHYSICAL THERAPY EVALUATION AND PLAN OF CARE     ·       Patient: Jose Mc     ·       Home Health Admission due to: Recent referral from PCP with continued healing of right foot wound who presents with decreased functional mobility, nonambulatory for several months, decreased standing balance, decreased activity tolerance, decreased functional lower extremity strength, and increased risk for falls      ·       Living Situation/PLOF: Patient lives alone in a single-story home with several steps to enter at front door.  He has no stated caregivers.  He has wheelchairs, SPC, Lofstrand crutches, upright 4WW, grab bars in shower, hand-held shower, shower bench, FWW.  Patient reports that he was ambulating with 1 Lofstrand crutch prior to getting right foot wounds.  He states he was driving and accessing the community on a regular basis plus was independent in ADLs and some IADLs.     ·       Past Medical History: Left BKA (2020), right toe amputations, ulcers of the right foot, DM2 with peripheral angiopathy and polyneuropathy, atrial fibrillation, CKD stage IIIa, morbid obesity      ·       Skilled Therapeutic Need: Decreased activity tolerance, LE weakness, Balance control, Generalized deconditioning, Gait training and Fall prevention     Recommend skilled HHPT to address deficits and improve function-report sent to MD     ·       Frequency:   1 time a week for 1 week and 2 times a week for 3 weeks,  Effective 60/10/2025     ·       Goals: Patient/caregivers will verbalize understanding of home safety strategies will fall preventions and fall recovery related to altered mobility by 3 visits.  Patient will demonstrate improved immediate standing balance to level of fair to good by 7 visits.  Patient will ascend/descend curb/slope/stairs at no more than CGA level using left prosthesis and device as needed by 7 visits.  Patient/caregivers will demonstrate understanding at independent level of home exercise  program by 3 visits.  Patient will demonstrate ability to ambulate at least 100 feet independently in the home and no more than supervised in the community using left prosthesis and device as needed level to enable limited community access by 7 visits.  Patient will demonstrate increased lower extremity muscle strength to allow for at least 10 consecutive sit/stands with or without upper extremity support for purpose of improved functional mobility and decreased fall risk by 7 visits.    Does the patient get SOB with Maximum exertion?  Within 20 feet of ambulation mild SOB    How often (if at all) does pain interfere with patient's movements?  On a less than daily basis limits function

## 2025-06-12 ENCOUNTER — HOME CARE VISIT (OUTPATIENT)
Dept: HOME HEALTH SERVICES | Facility: HOME HEALTHCARE | Age: 70
End: 2025-06-12
Payer: MEDICARE

## 2025-06-12 VITALS
SYSTOLIC BLOOD PRESSURE: 122 MMHG | OXYGEN SATURATION: 94 % | DIASTOLIC BLOOD PRESSURE: 80 MMHG | RESPIRATION RATE: 16 BRPM | TEMPERATURE: 97.4 F | HEART RATE: 65 BPM

## 2025-06-12 PROCEDURE — G0299 HHS/HOSPICE OF RN EA 15 MIN: HCPCS

## 2025-06-12 ASSESSMENT — ENCOUNTER SYMPTOMS
PAIN LOCATION - PAIN SEVERITY: 2/10
STOOL FREQUENCY: DAILY
ARTHRALGIAS: 1
LIMITED RANGE OF MOTION: 1
VOMITING: DENIES
SUBJECTIVE PAIN PROGRESSION: UNCHANGED
PAIN LOCATION: NECK
NAUSEA: DENIES
PAIN: 1
PAIN LOCATION - PAIN FREQUENCY: CONSTANT
LOWEST PAIN SEVERITY IN PAST 24 HOURS: 1/10
HIGHEST PAIN SEVERITY IN PAST 24 HOURS: 2/10
LAST BOWEL MOVEMENT: 67368
PAIN SEVERITY GOAL: 0/10
PAIN LOCATION - PAIN QUALITY: ACHE
FATIGUES EASILY: 1
PAIN LOCATION - EXACERBATING FACTORS: ACTIVITY
BOWEL PATTERN NORMAL: 1
FATIGUE: 1
PAIN LOCATION - PAIN DURATION: DAILY
MUSCLE WEAKNESS: 1

## 2025-06-12 ASSESSMENT — ACTIVITIES OF DAILY LIVING (ADL)
AMBULATION ASSISTANCE: STAND BY ASSIST
CURRENT_FUNCTION: STAND BY ASSIST

## 2025-06-16 ENCOUNTER — HOME CARE VISIT (OUTPATIENT)
Dept: HOME HEALTH SERVICES | Facility: HOME HEALTHCARE | Age: 70
End: 2025-06-16
Payer: MEDICARE

## 2025-06-16 VITALS
HEART RATE: 61 BPM | SYSTOLIC BLOOD PRESSURE: 114 MMHG | OXYGEN SATURATION: 94 % | RESPIRATION RATE: 16 BRPM | TEMPERATURE: 97.4 F | DIASTOLIC BLOOD PRESSURE: 82 MMHG

## 2025-06-16 PROCEDURE — G0299 HHS/HOSPICE OF RN EA 15 MIN: HCPCS

## 2025-06-16 ASSESSMENT — ENCOUNTER SYMPTOMS
SKIN LESIONS: 1
HIGHEST PAIN SEVERITY IN PAST 24 HOURS: 2/10
DRY SKIN: 1
PAIN LOCATION - PAIN FREQUENCY: INTERMITTENT
VOMITING: DENIES
NAUSEA: DENIES
PAIN LOCATION - PAIN DURATION: DAILY
STOOL FREQUENCY: DAILY
FATIGUE: 1
SUBJECTIVE PAIN PROGRESSION: UNCHANGED
PAIN LOCATION - PAIN SEVERITY: 2/10
ARTHRALGIAS: 1
MUSCLE WEAKNESS: 1
LAST BOWEL MOVEMENT: 67372
PAIN: 1
PAIN SEVERITY GOAL: 0/10
PAIN LOCATION - PAIN QUALITY: ACHE
PAIN LOCATION: NECK
LIMITED RANGE OF MOTION: 1
BOWEL PATTERN NORMAL: 1
LOWEST PAIN SEVERITY IN PAST 24 HOURS: 1/10
FATIGUES EASILY: 1

## 2025-06-16 ASSESSMENT — ACTIVITIES OF DAILY LIVING (ADL)
CURRENT_FUNCTION: STAND BY ASSIST
AMBULATION ASSISTANCE: STAND BY ASSIST

## 2025-06-17 ENCOUNTER — HOME CARE VISIT (OUTPATIENT)
Dept: HOME HEALTH SERVICES | Facility: HOME HEALTHCARE | Age: 70
End: 2025-06-17
Payer: MEDICARE

## 2025-06-17 VITALS
OXYGEN SATURATION: 94 % | TEMPERATURE: 97.5 F | SYSTOLIC BLOOD PRESSURE: 100 MMHG | DIASTOLIC BLOOD PRESSURE: 60 MMHG | HEART RATE: 64 BPM | RESPIRATION RATE: 18 BRPM

## 2025-06-17 PROCEDURE — G0151 HHCP-SERV OF PT,EA 15 MIN: HCPCS

## 2025-06-17 ASSESSMENT — ENCOUNTER SYMPTOMS
PAIN LOCATION - EXACERBATING FACTORS: NIGHT
PAIN LOCATION: NECK
HIGHEST PAIN SEVERITY IN PAST 24 HOURS: 4/10
PAIN LOCATION - EXACERBATING FACTORS: UE SUPPORT
PAIN LOCATION - PAIN QUALITY: ACHE, DULL
PAIN LOCATION - PAIN DURATION: DAILY
PAIN SEVERITY GOAL: 2/10
PAIN LOCATION: PHANTOM PAIN
PAIN LOCATION - PAIN SEVERITY: 1/10
LOWEST PAIN SEVERITY IN PAST 24 HOURS: 1/10
PAIN LOCATION - PAIN SEVERITY: 3/10
PAIN LOCATION - PAIN DURATION: DAILY
SUBJECTIVE PAIN PROGRESSION: WAXING AND WANING
PAIN: 1
PAIN LOCATION - PAIN FREQUENCY: FREQUENT
PAIN LOCATION - PAIN FREQUENCY: CONSTANT
PAIN: PAIN DOES NOT LIMIT
PAIN LOCATION - PAIN QUALITY: AHCE

## 2025-06-18 ENCOUNTER — OFFICE VISIT (OUTPATIENT)
Dept: MEDICAL GROUP | Facility: PHYSICIAN GROUP | Age: 70
End: 2025-06-18
Payer: MEDICARE

## 2025-06-18 ENCOUNTER — HOSPITAL ENCOUNTER (OUTPATIENT)
Facility: MEDICAL CENTER | Age: 70
End: 2025-06-18
Attending: FAMILY MEDICINE
Payer: MEDICARE

## 2025-06-18 VITALS
WEIGHT: 315 LBS | OXYGEN SATURATION: 95 % | SYSTOLIC BLOOD PRESSURE: 126 MMHG | DIASTOLIC BLOOD PRESSURE: 70 MMHG | TEMPERATURE: 97.1 F | HEART RATE: 80 BPM | HEIGHT: 72 IN | RESPIRATION RATE: 20 BRPM | BODY MASS INDEX: 42.66 KG/M2

## 2025-06-18 DIAGNOSIS — E11.622: ICD-10-CM

## 2025-06-18 DIAGNOSIS — E11.51 DM (DIABETES MELLITUS) TYPE II, CONTROLLED, WITH PERIPHERAL VASCULAR DISORDER (HCC): Primary | ICD-10-CM

## 2025-06-18 DIAGNOSIS — L97.219: ICD-10-CM

## 2025-06-18 DIAGNOSIS — I10 BENIGN ESSENTIAL HTN: ICD-10-CM

## 2025-06-18 DIAGNOSIS — E66.01 SEVERE OBESITY (HCC): ICD-10-CM

## 2025-06-18 LAB
CREAT UR-MCNC: 114 MG/DL
HBA1C MFR BLD: 5.8 % (ref ?–5.8)
MICROALBUMIN UR-MCNC: 2.8 MG/DL
MICROALBUMIN/CREAT UR: 25 MG/G (ref 0–30)
POCT INT CON NEG: NEGATIVE
POCT INT CON POS: POSITIVE

## 2025-06-18 PROCEDURE — 82570 ASSAY OF URINE CREATININE: CPT

## 2025-06-18 PROCEDURE — 3078F DIAST BP <80 MM HG: CPT | Performed by: FAMILY MEDICINE

## 2025-06-18 PROCEDURE — 99214 OFFICE O/P EST MOD 30 MIN: CPT | Performed by: FAMILY MEDICINE

## 2025-06-18 PROCEDURE — 3074F SYST BP LT 130 MM HG: CPT | Performed by: FAMILY MEDICINE

## 2025-06-18 PROCEDURE — 82043 UR ALBUMIN QUANTITATIVE: CPT

## 2025-06-18 PROCEDURE — 83036 HEMOGLOBIN GLYCOSYLATED A1C: CPT | Performed by: FAMILY MEDICINE

## 2025-06-18 ASSESSMENT — ENCOUNTER SYMPTOMS
DEPRESSION: 0
NEUROLOGICAL NEGATIVE: 1
HEADACHES: 0
MYALGIAS: 0
RESPIRATORY NEGATIVE: 1
PSYCHIATRIC NEGATIVE: 1
BLURRED VISION: 0
GASTROINTESTINAL NEGATIVE: 1
BRUISES/BLEEDS EASILY: 0
HEMOPTYSIS: 0
CONSTITUTIONAL NEGATIVE: 1
FEVER: 0
EYES NEGATIVE: 1
CARDIOVASCULAR NEGATIVE: 1
TINGLING: 0
MUSCULOSKELETAL NEGATIVE: 1
COUGH: 0
DIZZINESS: 0
HEARTBURN: 0
PALPITATIONS: 0
NAUSEA: 0
DOUBLE VISION: 0
CHILLS: 0

## 2025-06-18 ASSESSMENT — FIBROSIS 4 INDEX: FIB4 SCORE: 1.15

## 2025-06-18 NOTE — PROGRESS NOTES
Subjective     Jose Mc is a 70 y.o. male who presents with Follow-Up            A1c at target at 5.8  Doing home PT and walked 110 feet yesterday  1. DM (diabetes mellitus) type II, controlled, with peripheral vascular disorder (HCC)     POCT Hemoglobin A1C    2. Diabetic ulcer of right calf (HCC)  Wraps per wound care   POCT Retinal Eye Exam   Microalbumin Creat Ratio Urine (Clinic Collect); Future    3. Benign essential HTN  Currently treated for HTN, taking meds with no CP or sob, monitors bp at home periodically. controlled        4. Severe obesity (HCC)      Past Medical History:  No date: At risk for sleep apnea  No date: Diabetes (HCC)  No date: Diabetic neuropathy (HCC)  No date: DVT (deep venous thrombosis) ([facility])  No date: Hyperlipidemia  No date: Hypertension  No date: Sleep apnea  Past Surgical History:  9/15/2021: PB AMPUTATION TOE,MT-P JT; Right      Comment:  Procedure: RIGHT SECOND TOE AMPUTATION (30 MIN);                 Surgeon: Jackson Castro M.D.;  Location: [facility];  Service: Orthopedics  No date: TOE AMPUTATION; Left  Social History    Tobacco Use      Smoking status: Former        Packs/day: 0.00        Types: Cigarettes        Quit date: 1980        Years since quittin.4      Smokeless tobacco: Never    Vaping Use      Vaping status: Never Used    Alcohol use: Not Currently      Comment: OCC    Drug use: Never    Review of patient's family history indicates:  Problem: Cancer      Relation: Mother          Age of Onset: (Not Specified)  Problem: Cancer      Relation: Father          Age of Onset: (Not Specified)      Current Outpatient Medications: ·  apixaban (ELIQUIS) 5mg Tab, Take 1 Tablet by mouth 2 times a day. Indications: Blood Clot in a Deep Vein, Afib, Disp: 200 Tablet, Rfl: 0·  gabapentin (NEURONTIN) 400 MG Cap, Take 1 capsule by mouth  4 times a day, Disp: 360 Capsule, Rfl: 1·  hydroCHLOROthiazide 25 MG Tab,  Take 1 Tablet by mouth every day for 300 days. Indications: Edema, Disp: 100 Tablet, Rfl: 2·  potassium chloride SA (KDUR) 20 MEQ Tab CR, TAKE 1 TABLET BY MOUTH EVERY DAY FOR LOW POTASSIUM, Disp: , Rfl: ·  sennosides (SENOKOT) 8.6 MG Tab, Take 8.6 mg by mouth 1 time a day as needed (constipation). Indications: Constipation, Disp: , Rfl: ·  Semaglutide,0.25 or 0.5MG/DOS, (OZEMPIC, 0.25 OR 0.5 MG/DOSE,) 2 MG/3ML Solution Pen-injector, Inject 0.25 mg under the skin every 7 days., Disp: 3 mL, Rfl: 11·  amiodarone (CORDARONE) 200 MG Tab, Take 1 Tablet by mouth every day., Disp: 100 Tablet, Rfl: 0·  dilTIAZem (CARDIZEM) 30 MG Tab, Take 1 Tablet by mouth 3 times a day. Indications: Atrial Fibrillation, Disp: 300 Tablet, Rfl: 2·  atorvastatin (LIPITOR) 20 MG Tab, Take 1 Tablet by mouth every day. Indications: High Amount of Fats in the Blood, Disp: 100 Tablet, Rfl: 1·  carvedilol (COREG) 6.25 MG Tab, Take 1 Tablet by mouth 2 times a day with meals., Disp: 200 Tablet, Rfl: 2·  potassium Chloride ER (K-TAB) 20 MEQ Tab CR tablet, TAKE 1 TABLET BY MOUTH EVERY DAY FOR HYPOKALEMIA  Indications: Low Amount of Potassium in the Blood, Disp: 60 Tablet, Rfl: 3·  allopurinol (ZYLOPRIM) 100 MG Tab, Take 1 Tablet by mouth every day. Indications: Gout, Disp: 90 Tablet, Rfl: 2·  pioglitazone (ACTOS) 30 MG Tab, Take 1 Tablet by mouth every day. Indications: Type 2 Diabetes, Disp: 100 Tablet, Rfl: 3·  metformin (GLUCOPHAGE) 1000 MG tablet, TAKE 1 TABLET BY MOUTH TWICE DAILY WITH FOOD  Indications: Type 2 Diabetes, Disp: 200 Tablet, Rfl: 3·  Alcohol Swabs (ALCOHOL PREP) Pads, Use 1 each in the morning, at noon, and at bedtime., Disp: 200 Each, Rfl: 2·  Blood Glucose Monitoring Suppl (ONETOUCH VERIO) w/Device Kit, Use device in the morning, at noon, and at bedtime., Disp: 1 Kit, Rfl: 3·  glucose blood strip, Use to test in the morning, at noon and at bedtime, Disp: 100 Strip, Rfl: 4·  Lancets (FINGERSTICK) Misc, Use to test in the morning, at  noon and at bedtime, Disp: 100 Each, Rfl: 4·  glucose blood (ONETOUCH VERIO) strip, 1 Each by Other route as needed. Test 3 x a week   Indications: BGM strips, Disp: , Rfl: ·  gabapentin (NEURONTIN) 400 MG Cap, Take 1 Capsule by mouth 4 times a day. 300mg 4x day  Indications: Diabetes with Nerve Disease, Disp: 90 Capsule, Rfl: 2·  Simethicone 250 MG Cap, Take 1 Capsule by mouth 1 time a day as needed (gas). Chewable  Indications: gas pain, Disp: , Rfl: ·  hydrocortisone (PREPARATION H) 1 % Cream, Apply 1 Application  topically 1 time a day as needed (hemorroids). Indications: Itching, hemorroids, Disp: , Rfl: ·  vitamin D (CHOLECALCIFEROL) 1000 UNIT Tab, Take 1 Tablet by mouth every day. Indications: Nutritional supplement (Patient taking differently: Take 2,000 Units by mouth every day. Indications: Nutritional supplement), Disp: 60 Tablet, Rfl: 0·  loratadine (CLARITIN) 10 MG Tab, Take 10 mg by mouth every day. Indications: Eye Itching, Hayfever, Runny Nose, Watery Eyes, Disp: , Rfl: ·  Multiple Vitamins-Minerals (MULTIVITAMIN GUMMIES ADULTS PO), Take 1 Dose by mouth every day at 6 PM. Indications: supplement, Disp: , Rfl: ·  Saccharomyces boulardii (DAILY PROBIOTIC SUPPLEMENT PO), Take 1 Dose by mouth every day. Indications: supplement, Disp: , Rfl: ·  Nutritional Supplements (ARGINAID) Pack, Take 1 Dose by mouth every day at 6 PM. Indications: supplement, Disp: , Rfl: ·  Nutritional Supplements (JOSÉ) Powder, Take 1 Dose by mouth every day at 6 PM. Indications: supplement, Disp: , Rfl: ·  acetaminophen (TYLENOL) 500 MG Tab, Take 500-1,000 mg by mouth every 6 hours as needed for Mild Pain or Moderate Pain. Indications: Pain, Disp: , Rfl: ·  bacitracin 500 UNIT/GM ointment, Apply to open leg wound at time of dressing change ( as needed), Disp: , Rfl: ·  Lidocaine (ZTLIDO) 1.8 % Patch, Apply 1-2 Patches topically every day (May wear up to 12 hours)., Disp: 60 Patch, Rfl: 2·  dilTIAZem (CARDIZEM) 30 MG Tab, TAKE 1  TABLET BY MOUTH EVERY 8 HOURS FOR ATRIAL FIBRILLATION, Disp: 300 Tablet, Rfl: 0    Patient was instructed on the use of medications, either prescriptions or OTC and informed on when the appropriate follow up time period should be. In addition, patient was also instructed that should any acute worsening occur that they should notify this clinic asap or call 911.              Review of Systems   Constitutional: Negative.  Negative for chills and fever.   HENT: Negative.  Negative for hearing loss.    Eyes: Negative.  Negative for blurred vision and double vision.   Respiratory: Negative.  Negative for cough and hemoptysis.    Cardiovascular: Negative.  Negative for chest pain and palpitations.   Gastrointestinal: Negative.  Negative for heartburn and nausea.   Genitourinary: Negative.  Negative for dysuria.   Musculoskeletal: Negative.  Negative for myalgias.   Skin: Negative.  Negative for rash.   Neurological: Negative.  Negative for dizziness, tingling and headaches.   Endo/Heme/Allergies: Negative.  Does not bruise/bleed easily.   Psychiatric/Behavioral: Negative.  Negative for depression and suicidal ideas.    All other systems reviewed and are negative.             Objective     /70   Pulse 80   Temp 36.2 °C (97.1 °F) (Temporal)   Resp 20   Ht 1.829 m (6')   Wt (!) 184 kg (406 lb 9.6 oz)   SpO2 95%   BMI 55.14 kg/m²      Physical Exam  Vitals and nursing note reviewed.   Constitutional:       General: He is not in acute distress.     Appearance: He is well-developed. He is not diaphoretic.   HENT:      Head: Normocephalic and atraumatic.      Mouth/Throat:      Pharynx: No oropharyngeal exudate.   Eyes:      Pupils: Pupils are equal, round, and reactive to light.   Cardiovascular:      Rate and Rhythm: Normal rate and regular rhythm.      Heart sounds: Normal heart sounds. No murmur heard.     No friction rub. No gallop.   Pulmonary:      Effort: Pulmonary effort is normal. No respiratory distress.       Breath sounds: Normal breath sounds. No wheezing or rales.   Chest:      Chest wall: No tenderness.   Musculoskeletal:      Comments: Left bka, in manual wc, wraps on right leg   Neurological:      Mental Status: He is alert and oriented to person, place, and time.   Psychiatric:         Behavior: Behavior normal.         Thought Content: Thought content normal.         Judgment: Judgment normal.                                  Assessment & Plan  DM (diabetes mellitus) type II, controlled, with peripheral vascular disorder (HCC)    Orders:    POCT Hemoglobin A1C    Diabetic ulcer of right calf ([facility])    Orders:    POCT Retinal Eye Exam    Microalbumin Creat Ratio Urine (Clinic Collect); Future    Benign essential HTN         Severe obesity (HCC)

## 2025-06-19 ENCOUNTER — HOME CARE VISIT (OUTPATIENT)
Dept: HOME HEALTH SERVICES | Facility: HOME HEALTHCARE | Age: 70
End: 2025-06-19
Payer: MEDICARE

## 2025-06-19 VITALS
HEART RATE: 72 BPM | TEMPERATURE: 97.4 F | DIASTOLIC BLOOD PRESSURE: 78 MMHG | SYSTOLIC BLOOD PRESSURE: 100 MMHG | RESPIRATION RATE: 16 BRPM

## 2025-06-19 VITALS
OXYGEN SATURATION: 94 % | RESPIRATION RATE: 16 BRPM | DIASTOLIC BLOOD PRESSURE: 78 MMHG | SYSTOLIC BLOOD PRESSURE: 110 MMHG | TEMPERATURE: 97.4 F | HEART RATE: 74 BPM

## 2025-06-19 PROCEDURE — G0299 HHS/HOSPICE OF RN EA 15 MIN: HCPCS

## 2025-06-19 PROCEDURE — G0151 HHCP-SERV OF PT,EA 15 MIN: HCPCS

## 2025-06-19 ASSESSMENT — ENCOUNTER SYMPTOMS
PAIN LOCATION - PAIN SEVERITY: 4/10
PAIN LOCATION - PAIN FREQUENCY: FREQUENT
PAIN: 1
LAST BOWEL MOVEMENT: 67374
PAIN LOCATION: BACK
DRY SKIN: 1
PAIN LOCATION - PAIN QUALITY: ACHY
PAIN LOCATION - PAIN FREQUENCY: FREQUENT
BOWEL PATTERN NORMAL: 1
LOWEST PAIN SEVERITY IN PAST 24 HOURS: 3/10
HIGHEST PAIN SEVERITY IN PAST 24 HOURS: 4/10
LOWEST PAIN SEVERITY IN PAST 24 HOURS: 0/10
LIMITED RANGE OF MOTION: 1
DEBILITATING PAIN: 1
FATIGUES EASILY: 1
PAIN LOCATION - PAIN QUALITY: ACHE
PAIN LOCATION - PAIN DURATION: DAILY
PAIN: 1
SUBJECTIVE PAIN PROGRESSION: WAXING AND WANING
PAIN LOCATION - PAIN SEVERITY: 3/10
PAIN SEVERITY GOAL: 1/10
HIGHEST PAIN SEVERITY IN PAST 24 HOURS: 5/10
LOWER EXTREMITY EDEMA: 1
PAIN LOCATION - RELIEVING FACTORS: REST, MEDICATION
HYPERTENSION: 1
SUBJECTIVE PAIN PROGRESSION: WAXING AND WANING
PAIN SEVERITY GOAL: 0/10
PAIN LOCATION - EXACERBATING FACTORS: MOVEMENT
MUSCLE WEAKNESS: 1
FATIGUE: 1
PAIN LOCATION - EXACERBATING FACTORS: MOVEMENT
PAIN LOCATION: LOWER BACK
STOOL FREQUENCY: LESS THAN DAILY
PAIN LOCATION - RELIEVING FACTORS: PAIN MED

## 2025-06-19 ASSESSMENT — PATIENT HEALTH QUESTIONNAIRE - PHQ9: CLINICAL INTERPRETATION OF PHQ2 SCORE: 0

## 2025-06-23 ENCOUNTER — HOME CARE VISIT (OUTPATIENT)
Dept: HOME HEALTH SERVICES | Facility: HOME HEALTHCARE | Age: 70
End: 2025-06-23
Payer: MEDICARE

## 2025-06-23 DIAGNOSIS — I48.91 ATRIAL FIBRILLATION, UNSPECIFIED TYPE (HCC): ICD-10-CM

## 2025-06-23 PROCEDURE — RXMED WILLOW AMBULATORY MEDICATION CHARGE: Performed by: FAMILY MEDICINE

## 2025-06-23 PROCEDURE — G0299 HHS/HOSPICE OF RN EA 15 MIN: HCPCS

## 2025-06-23 RX ORDER — AMIODARONE HYDROCHLORIDE 200 MG/1
200 TABLET ORAL DAILY
Qty: 100 TABLET | Refills: 0 | Status: SHIPPED | OUTPATIENT
Start: 2025-06-23

## 2025-06-23 ASSESSMENT — ENCOUNTER SYMPTOMS
ARTHRALGIAS: 1
MUSCLE WEAKNESS: 1
BOWEL PATTERN NORMAL: 1
LAST BOWEL MOVEMENT: 67379
LIMITED RANGE OF MOTION: 1
STOOL FREQUENCY: DAILY

## 2025-06-23 ASSESSMENT — ACTIVITIES OF DAILY LIVING (ADL): AMBULATION ASSISTANCE: STAND BY ASSIST

## 2025-06-23 NOTE — TELEPHONE ENCOUNTER
Received request via: Pharmacy    Was the patient seen in the last year in this department? Yes    Does the patient have an active prescription (recently filled or refills available) for medication(s) requested? No    Pharmacy Name: [facility]     Does the patient have [facility] and need 100-day supply? (This applies to ALL medications) Yes, quantity updated to 100 days

## 2025-06-24 ENCOUNTER — HOME CARE VISIT (OUTPATIENT)
Dept: HOME HEALTH SERVICES | Facility: HOME HEALTHCARE | Age: 70
End: 2025-06-24
Payer: MEDICARE

## 2025-06-24 ENCOUNTER — PHARMACY VISIT (OUTPATIENT)
Dept: PHARMACY | Facility: MEDICAL CENTER | Age: 70
End: 2025-06-24
Payer: COMMERCIAL

## 2025-06-24 VITALS
OXYGEN SATURATION: 94 % | HEART RATE: 68 BPM | RESPIRATION RATE: 16 BRPM | DIASTOLIC BLOOD PRESSURE: 82 MMHG | TEMPERATURE: 97.7 F | SYSTOLIC BLOOD PRESSURE: 116 MMHG

## 2025-06-24 VITALS
OXYGEN SATURATION: 93 % | DIASTOLIC BLOOD PRESSURE: 70 MMHG | TEMPERATURE: 97.5 F | SYSTOLIC BLOOD PRESSURE: 114 MMHG | HEART RATE: 66 BPM | RESPIRATION RATE: 16 BRPM

## 2025-06-24 PROCEDURE — G0151 HHCP-SERV OF PT,EA 15 MIN: HCPCS

## 2025-06-24 ASSESSMENT — ENCOUNTER SYMPTOMS
PAIN LOCATION - PAIN DURATION: ONGOING
PAIN LOCATION - EXACERBATING FACTORS: MOVEMENT
HIGHEST PAIN SEVERITY IN PAST 24 HOURS: 6/10
HIGHEST PAIN SEVERITY IN PAST 24 HOURS: 3/10
PAIN LOCATION - PAIN QUALITY: ACHE
PAIN LOCATION - RELIEVING FACTORS: REST, MEDICATION
PAIN LOCATION: BACK
PAIN LOCATION - PAIN SEVERITY: 4/10
PAIN LOCATION - RELIEVING FACTORS: REST
PAIN LOCATION - PAIN FREQUENCY: WITH ACTIVITY
SUBJECTIVE PAIN PROGRESSION: WAXING AND WANING
PAIN LOCATION - PAIN SEVERITY: 0/10
LOWEST PAIN SEVERITY IN PAST 24 HOURS: 0/10
PAIN: PAIN LIMITS FUNCTION DAILY NOT CONSTANT
PAIN LOCATION - PAIN FREQUENCY: INTERMITTENT
PAIN LOCATION: BACK
DEBILITATING PAIN: 1
SUBJECTIVE PAIN PROGRESSION: WAXING AND WANING
PAIN: 1
PAIN LOCATION - PAIN QUALITY: ACHE
PAIN LOCATION - PAIN DURATION: DAILY
PAIN: 1
PAIN SEVERITY GOAL: 2/10
LOWEST PAIN SEVERITY IN PAST 24 HOURS: 0/10
PAIN SEVERITY GOAL: 2/10

## 2025-06-25 ENCOUNTER — HOME CARE VISIT (OUTPATIENT)
Dept: HOME HEALTH SERVICES | Facility: HOME HEALTHCARE | Age: 70
End: 2025-06-25
Payer: MEDICARE

## 2025-06-26 ENCOUNTER — HOME CARE VISIT (OUTPATIENT)
Dept: HOME HEALTH SERVICES | Facility: HOME HEALTHCARE | Age: 70
End: 2025-06-26
Payer: MEDICARE

## 2025-06-26 VITALS
SYSTOLIC BLOOD PRESSURE: 126 MMHG | TEMPERATURE: 97.4 F | RESPIRATION RATE: 16 BRPM | HEART RATE: 63 BPM | OXYGEN SATURATION: 94 % | DIASTOLIC BLOOD PRESSURE: 60 MMHG

## 2025-06-26 PROCEDURE — G0299 HHS/HOSPICE OF RN EA 15 MIN: HCPCS

## 2025-06-26 PROCEDURE — G0151 HHCP-SERV OF PT,EA 15 MIN: HCPCS

## 2025-06-26 ASSESSMENT — ENCOUNTER SYMPTOMS
PAIN SEVERITY GOAL: 2/10
PAIN LOCATION - PAIN FREQUENCY: INTERMITTENT
PAIN LOCATION - PAIN QUALITY: ACHE
PAIN: 1
HIGHEST PAIN SEVERITY IN PAST 24 HOURS: 6/10
PAIN LOCATION: BACK
SUBJECTIVE PAIN PROGRESSION: WAXING AND WANING
PAIN LOCATION - PAIN SEVERITY: 4/10
PAIN LOCATION - RELIEVING FACTORS: REST, MEDICATION
DEBILITATING PAIN: 1
LOWEST PAIN SEVERITY IN PAST 24 HOURS: 0/10
PAIN LOCATION - EXACERBATING FACTORS: MOVEMENT
PAIN LOCATION - PAIN DURATION: DAILY
PAIN: PAIN LIMITS FUNCTION DAILY NOT CONSTANT

## 2025-06-28 VITALS
HEART RATE: 63 BPM | TEMPERATURE: 97.4 F | OXYGEN SATURATION: 94 % | DIASTOLIC BLOOD PRESSURE: 60 MMHG | RESPIRATION RATE: 16 BRPM | SYSTOLIC BLOOD PRESSURE: 126 MMHG

## 2025-06-28 ASSESSMENT — ENCOUNTER SYMPTOMS
PAIN LOCATION: R LEG
PAIN LOCATION - PAIN QUALITY: ACHE
PAIN LOCATION - PAIN DURATION: DAILY
PAIN: 1
PAIN LOCATION - PAIN SEVERITY: 4/10
FATIGUES EASILY: 1
DRY SKIN: 1
PAIN LOCATION - PAIN FREQUENCY: CONSTANT
BOWEL PATTERN NORMAL: 1
PAIN LOCATION - PAIN SEVERITY: 5/10
MUSCLE WEAKNESS: 1
PAIN LOCATION - RELIEVING FACTORS: ORAL MEDICATION
VOMITING: DENIES
NAUSEA: DENIES
STOOL FREQUENCY: DAILY
HIGHEST PAIN SEVERITY IN PAST 24 HOURS: 6/10
SUBJECTIVE PAIN PROGRESSION: UNCHANGED
DEBILITATING PAIN: 1
PAIN LOCATION: NECK/BACK
LOWEST PAIN SEVERITY IN PAST 24 HOURS: 4/10
PAIN SEVERITY GOAL: 2/10
PAIN LOCATION - PAIN DURATION: DAILY
FATIGUE: 1
PAIN LOCATION - PAIN QUALITY: BURN
SKIN LESIONS: 1
PAIN LOCATION - EXACERBATING FACTORS: ACTIVITY
ARTHRALGIAS: 1
PAIN LOCATION - EXACERBATING FACTORS: WOUND CARE
DEPRESSED MOOD: 1
PAIN LOCATION - PAIN FREQUENCY: INTERMITTENT
LAST BOWEL MOVEMENT: 67382

## 2025-06-28 ASSESSMENT — ACTIVITIES OF DAILY LIVING (ADL)
CURRENT_FUNCTION: STAND BY ASSIST
AMBULATION ASSISTANCE: STAND BY ASSIST

## 2025-07-01 ENCOUNTER — HOME CARE VISIT (OUTPATIENT)
Dept: HOME HEALTH SERVICES | Facility: HOME HEALTHCARE | Age: 70
End: 2025-07-01
Payer: MEDICARE

## 2025-07-01 VITALS
OXYGEN SATURATION: 95 % | TEMPERATURE: 97.4 F | HEART RATE: 68 BPM | SYSTOLIC BLOOD PRESSURE: 104 MMHG | RESPIRATION RATE: 18 BRPM | DIASTOLIC BLOOD PRESSURE: 68 MMHG

## 2025-07-01 PROCEDURE — G0151 HHCP-SERV OF PT,EA 15 MIN: HCPCS

## 2025-07-01 PROCEDURE — G0299 HHS/HOSPICE OF RN EA 15 MIN: HCPCS

## 2025-07-01 ASSESSMENT — FIBROSIS 4 INDEX: FIB4 SCORE: 1.15

## 2025-07-01 ASSESSMENT — ENCOUNTER SYMPTOMS
PAIN LOCATION - EXACERBATING FACTORS: MOVEMENT
PAIN LOCATION: BACK
PAIN: 1
PAIN LOCATION - PAIN FREQUENCY: INTERMITTENT
PAIN LOCATION - PAIN DURATION: DAILY
HIGHEST PAIN SEVERITY IN PAST 24 HOURS: 3/10
SUBJECTIVE PAIN PROGRESSION: WAXING AND WANING
PAIN: PAIN LIMITS FUNCTION DAILY NOT CONSTANT
PAIN LOCATION - RELIEVING FACTORS: REST, MEDICATION
LOWEST PAIN SEVERITY IN PAST 24 HOURS: 0/10
PAIN LOCATION - PAIN QUALITY: ACHE
PAIN LOCATION - PAIN SEVERITY: 3/10
PAIN SEVERITY GOAL: 2/10

## 2025-07-01 NOTE — HOME HEALTH
PT DISCHARGE SUMMARY:    The patient was seen for 5 home health physical therapy sessions.  Goals met.  The patient was discharged to self care  .  STATUS AT DISCHARGE:  Ambulation and Mobility: Independent  Patient Equipment: walker and loftstrands for ambulation.  Transferring: Independent  Bathing: Independent  Dressing: Independent  Medication management: see SN  Special equipment used: W/C, 4WW, loftstrands, shower chair, hand held shower    Frequency of pain interfering with activity or movement:   daily not constant    When is the patient dyspneic or noticeably Short of Breath:   - Patient is not short of breath

## 2025-07-01 NOTE — Clinical Note
D/C from P.T. on 7/1/2025  PT DISCHARGE SUMMARY:    The patient was seen for 5 home health physical therapy sessions.  Goals met.  The patient was discharged to self care  .  STATUS AT DISCHARGE:  Ambulation and Mobility: Independent  Patient Equipment: walker and loftstrands for ambulation.  Transferring: Independent  Bathing: Independent  Dressing: Independent  Medication management: see SN  Special equipment used: W/C, 4WW, loftstrands, shower chair, hand held shower    Frequency of pain interfering with activity or movement:   daily not constant    When is the patient dyspneic or noticeably Short of Breath:   - Patient is not short of breath

## 2025-07-02 ENCOUNTER — HOME CARE VISIT (OUTPATIENT)
Dept: HOME HEALTH SERVICES | Facility: HOME HEALTHCARE | Age: 70
End: 2025-07-02
Payer: MEDICARE

## 2025-07-02 VITALS
DIASTOLIC BLOOD PRESSURE: 68 MMHG | BODY MASS INDEX: 54.3 KG/M2 | WEIGHT: 315 LBS | RESPIRATION RATE: 18 BRPM | TEMPERATURE: 97.4 F | SYSTOLIC BLOOD PRESSURE: 104 MMHG | HEART RATE: 68 BPM

## 2025-07-02 ASSESSMENT — ENCOUNTER SYMPTOMS
PAIN LOCATION - PAIN SEVERITY: 2/10
HIGHEST PAIN SEVERITY IN PAST 24 HOURS: 5/10
PAIN LOCATION - PAIN DURATION: DAILY
PAIN SEVERITY GOAL: 2/10
SKIN LESIONS: 1
DRY SKIN: 1
PAIN LOCATION - EXACERBATING FACTORS: ACTIVITY
LOWEST PAIN SEVERITY IN PAST 24 HOURS: 3/10
PAIN LOCATION - EXACERBATING FACTORS: ACTIVITY
PAIN LOCATION - RELIEVING FACTORS: ORAL MEDICATION
PAIN LOCATION - PAIN FREQUENCY: INTERMITTENT
PAIN LOCATION - PAIN FREQUENCY: CONSTANT
PAIN LOCATION - PAIN SEVERITY: 4/10
NAUSEA: DENIES
SUBJECTIVE PAIN PROGRESSION: UNCHANGED
PAIN LOCATION: NECK/SHOULDER
PAIN: 1
VOMITING: DENIES
PAIN LOCATION - PAIN QUALITY: NEUROPATHIC
PAIN LOCATION - PAIN QUALITY: ACHE
PAIN LOCATION: R FOOT
PAIN LOCATION - PAIN DURATION: DAILY
FATIGUES EASILY: 1

## 2025-07-02 ASSESSMENT — PATIENT HEALTH QUESTIONNAIRE - PHQ9: CLINICAL INTERPRETATION OF PHQ2 SCORE: 0

## 2025-07-02 ASSESSMENT — ACTIVITIES OF DAILY LIVING (ADL): OASIS_M1830: 02

## 2025-07-02 NOTE — HOME HEALTH
Recertification of Care to Home Health for DM, Wound Care, Pressure Ulcer, Anticoagulation/Bleeding precautions and UTI prevention.    Current clinical summary, reason for continued home health services, new issues identified: Continued wound care R heel    LIVING SITUATION AND CAREGIVING:   Homebound Status: difficulty with ambulation/transfers, needs assistance to leave home, needs assistive devices to ambulate, taxing effort to ambulate less than 10 feet, unable to manage stairs, unsafe to drive or leave residence without assistance, unsteady gait, weakness and fatigue and debiltating pain  Type of Home:single family dwelling  Lives with:alone  Receives Assistance:nearby friend and family  Unresolved Safety Concerns:none    Does the patient have an Advanced Directive?    No Advanced directive and not interested    Does the patient have a POLST?     No POLST and not interested     REASON FOR HOME HEALTH SUPPORTING INFORMATION:  Skilled nursing to assess and teach the following but not limited to:    - medications/high risk, fall prevention, hydration, nutrition, infection control and S&S, home safety and Other: wound care    CURRENT LEVEL OF FUNCTION:   Ambulation and Mobility: Min Assist.   Patient Equipment: walker, wheelchair and motorized chair for ambulation.   Transferring: Supervised  Bathing: Shower, Sponge bath and min assist   Dressing: Supervised  Special equipment used:     How noticeably Short of Breath is the patient during the OASIS walk or other activity?     NA    MEDICATION MANAGEMENT:  Patient uses pharmacy: [facility]  Able to take independently  Medication issues: None noted

## 2025-07-03 PROCEDURE — 665999 HH PPS REVENUE DEBIT

## 2025-07-03 PROCEDURE — 665998 HH PPS REVENUE CREDIT

## 2025-07-03 PROCEDURE — G0179 MD RECERTIFICATION HHA PT: HCPCS | Performed by: FAMILY MEDICINE

## 2025-07-04 PROCEDURE — 665999 HH PPS REVENUE DEBIT

## 2025-07-04 PROCEDURE — 665998 HH PPS REVENUE CREDIT

## 2025-07-05 PROCEDURE — 665998 HH PPS REVENUE CREDIT

## 2025-07-05 PROCEDURE — 665999 HH PPS REVENUE DEBIT

## 2025-07-06 PROCEDURE — 665999 HH PPS REVENUE DEBIT

## 2025-07-06 PROCEDURE — 665998 HH PPS REVENUE CREDIT

## 2025-07-07 ENCOUNTER — HOME CARE VISIT (OUTPATIENT)
Dept: HOME HEALTH SERVICES | Facility: HOME HEALTHCARE | Age: 70
End: 2025-07-07
Payer: MEDICARE

## 2025-07-07 ENCOUNTER — DOCUMENTATION (OUTPATIENT)
Dept: VASCULAR LAB | Facility: MEDICAL CENTER | Age: 70
End: 2025-07-07
Payer: MEDICARE

## 2025-07-07 VITALS
WEIGHT: 315 LBS | BODY MASS INDEX: 54.25 KG/M2 | SYSTOLIC BLOOD PRESSURE: 118 MMHG | TEMPERATURE: 98.3 F | HEART RATE: 73 BPM | RESPIRATION RATE: 18 BRPM | OXYGEN SATURATION: 92 % | DIASTOLIC BLOOD PRESSURE: 80 MMHG

## 2025-07-07 PROCEDURE — 665998 HH PPS REVENUE CREDIT

## 2025-07-07 PROCEDURE — 665003 FOLLOW UP-HOME HEALTH

## 2025-07-07 PROCEDURE — G0299 HHS/HOSPICE OF RN EA 15 MIN: HCPCS

## 2025-07-07 PROCEDURE — 665999 HH PPS REVENUE DEBIT

## 2025-07-07 ASSESSMENT — ENCOUNTER SYMPTOMS
LAST BOWEL MOVEMENT: 67393
PAIN LOCATION - PAIN FREQUENCY: FREQUENT
STOOL FREQUENCY: DAILY
DRY SKIN: 1
LOWEST PAIN SEVERITY IN PAST 24 HOURS: 4/10
HIGHEST PAIN SEVERITY IN PAST 24 HOURS: 6/10
PAIN SEVERITY GOAL: 2/10
PAIN LOCATION - RELIEVING FACTORS: REST, MEDS
NAUSEA: DENIES
BOWEL PATTERN NORMAL: 1
PAIN LOCATION: BACK
SUBJECTIVE PAIN PROGRESSION: UNCHANGED
SHORTNESS OF BREATH: 1
PAIN LOCATION - EXACERBATING FACTORS: ACTIVITY
FATIGUES EASILY: 1
HYPERTENSION: 1
PAIN LOCATION - PAIN QUALITY: ACHES
PAIN: 1
LOSS OF SENSATION: 1
VOMITING: DENIES
DYSPNEA ON EXERTION: 1
DYSPNEA ACTIVITY LEVEL: AFTER AMBULATING 10 - 20 FT
PAIN LOCATION - PAIN SEVERITY: 4/10

## 2025-07-07 ASSESSMENT — ACTIVITIES OF DAILY LIVING (ADL)
AMBULATION ASSISTANCE: NON-AMBULATORY
TRANSPORTATION COMMENTS: PATIENT NEEDS ASSISTANCE TO LEAVE THE HOME
CURRENT_FUNCTION: STAND BY ASSIST

## 2025-07-07 ASSESSMENT — FIBROSIS 4 INDEX: FIB4 SCORE: 1.15

## 2025-07-07 NOTE — PROGRESS NOTES
[facility]    Received referral from [facility] to review patient's medication list.    Med list reviewed and reconciled.  Allergies reviewed.    No clinically significant DDI identified.    Cate Gomez, YaniD

## 2025-07-08 LAB — RETINAL SCREEN: NEGATIVE

## 2025-07-08 PROCEDURE — 665998 HH PPS REVENUE CREDIT

## 2025-07-08 PROCEDURE — 665999 HH PPS REVENUE DEBIT

## 2025-07-09 PROCEDURE — 665999 HH PPS REVENUE DEBIT

## 2025-07-09 PROCEDURE — 665998 HH PPS REVENUE CREDIT

## 2025-07-10 ENCOUNTER — HOME CARE VISIT (OUTPATIENT)
Dept: HOME HEALTH SERVICES | Facility: HOME HEALTHCARE | Age: 70
End: 2025-07-10
Payer: MEDICARE

## 2025-07-10 PROCEDURE — 665998 HH PPS REVENUE CREDIT

## 2025-07-10 PROCEDURE — 665999 HH PPS REVENUE DEBIT

## 2025-07-10 PROCEDURE — G0299 HHS/HOSPICE OF RN EA 15 MIN: HCPCS

## 2025-07-11 VITALS
DIASTOLIC BLOOD PRESSURE: 84 MMHG | RESPIRATION RATE: 18 BRPM | HEART RATE: 76 BPM | TEMPERATURE: 97.4 F | SYSTOLIC BLOOD PRESSURE: 122 MMHG | OXYGEN SATURATION: 95 %

## 2025-07-11 PROCEDURE — 665998 HH PPS REVENUE CREDIT

## 2025-07-11 PROCEDURE — 665999 HH PPS REVENUE DEBIT

## 2025-07-11 ASSESSMENT — ENCOUNTER SYMPTOMS
PAIN LOCATION - PAIN QUALITY: ACHE
HYPERTENSION: 1
PAIN: 1
PAIN LOCATION - RELIEVING FACTORS: ORAL MEDICATION
PAIN SEVERITY GOAL: 2/10
ARTHRALGIAS: 1
PAIN LOCATION - PAIN FREQUENCY: INTERMITTENT
NAUSEA: DENIES
PAIN LOCATION: NECK/BACK
STOOL FREQUENCY: DAILY
MUSCLE WEAKNESS: 1
FATIGUES EASILY: 1
PAIN LOCATION - PAIN DURATION: DAILY
SUBJECTIVE PAIN PROGRESSION: UNCHANGED
LIMITED RANGE OF MOTION: 1
PAIN LOCATION - PAIN SEVERITY: 4/10
DEPRESSED MOOD: 1
VOMITING: DENIES
LAST BOWEL MOVEMENT: 67395
HIGHEST PAIN SEVERITY IN PAST 24 HOURS: 6/10
DRY SKIN: 1
LOWEST PAIN SEVERITY IN PAST 24 HOURS: 3/10
BOWEL PATTERN NORMAL: 1

## 2025-07-11 ASSESSMENT — ACTIVITIES OF DAILY LIVING (ADL)
CURRENT_FUNCTION: STAND BY ASSIST
AMBULATION ASSISTANCE: STAND BY ASSIST

## 2025-07-12 PROCEDURE — 665998 HH PPS REVENUE CREDIT

## 2025-07-12 PROCEDURE — 665999 HH PPS REVENUE DEBIT

## 2025-07-13 PROCEDURE — 665999 HH PPS REVENUE DEBIT

## 2025-07-13 PROCEDURE — 665998 HH PPS REVENUE CREDIT

## 2025-07-14 ENCOUNTER — HOME CARE VISIT (OUTPATIENT)
Dept: HOME HEALTH SERVICES | Facility: HOME HEALTHCARE | Age: 70
End: 2025-07-14
Payer: MEDICARE

## 2025-07-14 PROCEDURE — 665998 HH PPS REVENUE CREDIT

## 2025-07-14 PROCEDURE — G0299 HHS/HOSPICE OF RN EA 15 MIN: HCPCS

## 2025-07-14 PROCEDURE — 665999 HH PPS REVENUE DEBIT

## 2025-07-15 VITALS
RESPIRATION RATE: 18 BRPM | HEART RATE: 61 BPM | SYSTOLIC BLOOD PRESSURE: 120 MMHG | DIASTOLIC BLOOD PRESSURE: 72 MMHG | OXYGEN SATURATION: 94 % | TEMPERATURE: 97.5 F

## 2025-07-15 PROCEDURE — 665999 HH PPS REVENUE DEBIT

## 2025-07-15 PROCEDURE — 665998 HH PPS REVENUE CREDIT

## 2025-07-15 ASSESSMENT — ACTIVITIES OF DAILY LIVING (ADL)
CURRENT_FUNCTION: STAND BY ASSIST
AMBULATION ASSISTANCE: STAND BY ASSIST

## 2025-07-15 ASSESSMENT — ENCOUNTER SYMPTOMS
LAST BOWEL MOVEMENT: 67400
PAIN: 1
VOMITING: DENIES
NAUSEA: DENIES
PAIN LOCATION: NECK
PAIN LOCATION - PAIN QUALITY: ACHE
BOWEL PATTERN NORMAL: 1
FATIGUES EASILY: 1
PAIN LOCATION - PAIN SEVERITY: 4/10
SUBJECTIVE PAIN PROGRESSION: UNCHANGED
LOWEST PAIN SEVERITY IN PAST 24 HOURS: 4/10
MUSCLE WEAKNESS: 1
PAIN LOCATION - PAIN FREQUENCY: CONSTANT
PAIN SEVERITY GOAL: 3/10
PAIN LOCATION - PAIN DURATION: DAILY
STOOL FREQUENCY: DAILY
SKIN LESIONS: 1
LIMITED RANGE OF MOTION: 1
ARTHRALGIAS: 1
HIGHEST PAIN SEVERITY IN PAST 24 HOURS: 6/10

## 2025-07-16 ENCOUNTER — TELEMEDICINE (OUTPATIENT)
Dept: MEDICAL GROUP | Facility: PHYSICIAN GROUP | Age: 70
End: 2025-07-16
Payer: MEDICARE

## 2025-07-16 VITALS — HEIGHT: 72 IN | BODY MASS INDEX: 54.25 KG/M2

## 2025-07-16 DIAGNOSIS — E66.01 SEVERE OBESITY (HCC): ICD-10-CM

## 2025-07-16 DIAGNOSIS — Z89.512 HX OF BKA, LEFT (HCC): Primary | ICD-10-CM

## 2025-07-16 DIAGNOSIS — E11.51 DM (DIABETES MELLITUS) TYPE II, CONTROLLED, WITH PERIPHERAL VASCULAR DISORDER (HCC): ICD-10-CM

## 2025-07-16 PROCEDURE — 99214 OFFICE O/P EST MOD 30 MIN: CPT | Mod: 95 | Performed by: FAMILY MEDICINE

## 2025-07-16 PROCEDURE — 665999 HH PPS REVENUE DEBIT

## 2025-07-16 PROCEDURE — 665998 HH PPS REVENUE CREDIT

## 2025-07-16 NOTE — PROGRESS NOTES
Virtual Visit: Established Patient   This visit was conducted via Teams using secure and encrypted videoconferencing technology.   The patient was in their home in the [facility].    The patient's identity was confirmed and verbal consent was obtained for this virtual visit.    Subjective:   CC:   Chief Complaint   Patient presents with    Other     [facility] two outer sleeves/diabetic shoes      Jose Mc is a 70 y.o. male presenting for evaluation and management of:    Patient with left BKA and has sleeve of prosthesis that is ripping and tearing. Needs to have new one so that he can ambulate.    Also with h/o dm and pvd needs diabetic shoes and inserts so that when he starts to ambulate he does not get any ulcers.    1. Hx of BKA, left (HCC) (Primary)    - NON SPECIFIED; Outer sleeve for prosthetic leg, left side BKA  Dispense: 1 Each; Refill: 0    2. DM (diabetes mellitus) type II, controlled, with peripheral vascular disorder (HCC)  Currently treated for DM, taking meds and checking bs at home, trying to do DM diet.controlle  - NON SPECIFIED; Diabetic shoes and inserts  Dispense: 1 Each; Refill: 0    3. Severe obesity (HCC)      Past Medical History[1]  Past Surgical History[2]  Social History[3]    Family History   Problem Relation Age of Onset    Cancer Mother     Cancer Father        Current Medications[4]    Patient was instructed on the use of medications, either prescriptions or OTC and informed on when the appropriate follow up time period should be. In addition, patient was also instructed that should any acute worsening occur that they should notify this clinic asap or call 911.        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     Diabetes (HCC) 09/09/2022    Diabetic ulcer of right calf (HCC) 09/09/2022    PAD (peripheral artery disease) (HCC) 09/09/2022    Stage 3a chronic kidney disease 09/09/2022    DM (diabetes mellitus) type II, controlled, with peripheral vascular disorder (HCC) 07/20/2021    Morbid obesity (HCC) 07/20/2021    Impaired mobility and ADLs 03/11/2020    Peripheral neuropathy 03/10/2020    Hx of BKA, left (HCC) 03/10/2020    Obstructive sleep apnea syndrome 03/10/2020    Benign essential HTN 01/21/2020    Mixed hyperlipidemia 01/21/2020    History of DVT (deep vein thrombosis) 01/21/2020    Lumbar radicular syndrome 01/21/2020        Objective:   Ht 1.829 m (6')   BMI 54.25 kg/m²     Physical Exam:  Constitutional: Alert, no distress, well-groomed.  Skin: No rashes in visible areas.  Eye: Round. Conjunctiva clear, lids normal. No icterus.   ENMT: Lips pink without lesions, good dentition, moist mucous membranes. Phonation normal.  Neck: No masses, no thyromegaly. Moves freely without pain.  Respiratory: Unlabored respiratory effort, no cough or audible wheeze  Psych: Alert and oriented x3, normal affect and mood.     Assessment and Plan:   The following treatment plan was discussed:     1. Hx of BKA, left (HCC)  - NON SPECIFIED; Outer sleeve for prosthetic leg, left side BKA  Dispense: 1 Each; Refill: 0    2. DM (diabetes mellitus) type II, controlled, with peripheral vascular disorder (HCC)  - NON SPECIFIED; Diabetic shoes and inserts  Dispense: 1 Each; Refill: 0    3. Severe obesity (HCC)      Follow-up: No follow-ups on file.              [1]   Past Medical History:  Diagnosis Date    At risk for sleep apnea     Diabetes (HCC)     Diabetic neuropathy (HCC)     DVT (deep venous thrombosis) (HCC)     Hyperlipidemia     Hypertension     Sleep apnea    [2]   Past Surgical History:  Procedure Laterality Date    PB AMPUTATION TOE,MT-P JT Right 9/15/2021    Procedure: RIGHT SECOND TOE AMPUTATION (30 MIN);  Surgeon: Jackson  ALE Castro M.D.;  Location: [facility];  Service: Orthopedics    TOE AMPUTATION Left    [3]   Social History  Tobacco Use    Smoking status: Former     Current packs/day: 0.00     Types: Cigarettes     Quit date: 1980     Years since quittin.5    Smokeless tobacco: Never   Vaping Use    Vaping status: Never Used   Substance Use Topics    Alcohol use: Not Currently     Comment: OCC    Drug use: Never   [4]   Current Outpatient Medications:     NON SPECIFIED, Outer sleeve for prosthetic leg, left side BKA, Disp: 1 Each, Rfl: 0    NON SPECIFIED, Diabetic shoes and inserts, Disp: 1 Each, Rfl: 0    amiodarone (CORDARONE) 200 MG Tab, Take 1 Tablet by mouth every day., Disp: 100 Tablet, Rfl: 0    apixaban (ELIQUIS) 5mg Tab, Take 1 Tablet by mouth 2 times a day. Indications: Blood Clot in a Deep Vein, Afib, Disp: 200 Tablet, Rfl: 0    gabapentin (NEURONTIN) 400 MG Cap, Take 1 capsule by mouth  4 times a day, Disp: 360 Capsule, Rfl: 1    hydroCHLOROthiazide 25 MG Tab, Take 1 Tablet by mouth every day for 300 days. Indications: Edema, Disp: 100 Tablet, Rfl: 2    sennosides (SENOKOT) 8.6 MG Tab, Take 8.6 mg by mouth 1 time a day as needed (constipation). Indications: Constipation, Disp: , Rfl:     Semaglutide,0.25 or 0.5MG/DOS, (OZEMPIC, 0.25 OR 0.5 MG/DOSE,) 2 MG/3ML Solution Pen-injector, Inject 0.25 mg under the skin every 7 days., Disp: 3 mL, Rfl: 11    dilTIAZem (CARDIZEM) 30 MG Tab, Take 1 Tablet by mouth 3 times a day. Indications: Atrial Fibrillation, Disp: 300 Tablet, Rfl: 2    atorvastatin (LIPITOR) 20 MG Tab, Take 1 Tablet by mouth every day. Indications: High Amount of Fats in the Blood, Disp: 100 Tablet, Rfl: 1    carvedilol (COREG) 6.25 MG Tab, Take 1 Tablet by mouth 2 times a day with meals., Disp: 200 Tablet, Rfl: 2    potassium Chloride ER (K-TAB) 20 MEQ Tab CR tablet, TAKE 1 TABLET BY MOUTH EVERY DAY FOR HYPOKALEMIA  Indications: Low Amount of Potassium in the Blood,  Disp: 60 Tablet, Rfl: 3    allopurinol (ZYLOPRIM) 100 MG Tab, Take 1 Tablet by mouth every day. Indications: Gout, Disp: 90 Tablet, Rfl: 2    pioglitazone (ACTOS) 30 MG Tab, Take 1 Tablet by mouth every day. Indications: Type 2 Diabetes, Disp: 100 Tablet, Rfl: 3    Alcohol Swabs (ALCOHOL PREP) Pads, Use 1 each in the morning, at noon, and at bedtime., Disp: 200 Each, Rfl: 2    Blood Glucose Monitoring Suppl (ONETOUCH VERIO) w/Device Kit, Use device in the morning, at noon, and at bedtime., Disp: 1 Kit, Rfl: 3    glucose blood strip, Use to test in the morning, at noon and at bedtime, Disp: 100 Strip, Rfl: 4    Lancets (FINGERSTICK) Misc, Use to test in the morning, at noon and at bedtime, Disp: 100 Each, Rfl: 4    glucose blood (ONETOUCH VERIO) strip, 1 Each by Other route as needed. Test 3 x a week   Indications: BGM strips, Disp: , Rfl:     Simethicone 250 MG Cap, Take 1 Capsule by mouth 1 time a day as needed (gas). Chewable  Indications: gas pain, Disp: , Rfl:     hydrocortisone (PREPARATION H) 1 % Cream, Apply 1 Application  topically 1 time a day as needed (hemorroids). Indications: Itching, hemorroids, Disp: , Rfl:     vitamin D (CHOLECALCIFEROL) 1000 UNIT Tab, Take 1 Tablet by mouth every day. Indications: Nutritional supplement (Patient taking differently: Take 2,000 Units by mouth every day. Indications: Nutritional supplement), Disp: 60 Tablet, Rfl: 0    loratadine (CLARITIN) 10 MG Tab, Take 10 mg by mouth every day. Indications: Eye Itching, Hayfever, Runny Nose, Watery Eyes, Disp: , Rfl:     Multiple Vitamins-Minerals (MULTIVITAMIN GUMMIES ADULTS PO), Take 1 Dose by mouth every day at 6 PM. Indications: supplement, Disp: , Rfl:     Saccharomyces boulardii (DAILY PROBIOTIC SUPPLEMENT PO), Take 1 Dose by mouth every day. Indications: supplement, Disp: , Rfl:     Nutritional Supplements (ARGINAID) Pack, Take 1 Dose by mouth every day at 6 PM. Indications: supplement, Disp: , Rfl:     Nutritional  Supplements (JOSÉ) Powder, Take 1 Dose by mouth every day at 6 PM. Indications: supplement, Disp: , Rfl:     acetaminophen (TYLENOL) 500 MG Tab, Take 500-1,000 mg by mouth every 6 hours as needed for Mild Pain or Moderate Pain. Indications: Pain, Disp: , Rfl:     bacitracin 500 UNIT/GM ointment, Apply to open leg wound at time of dressing change ( as needed), Disp: , Rfl:     Lidocaine (ZTLIDO) 1.8 % Patch, Apply 1-2 Patches topically every day (May wear up to 12 hours)., Disp: 60 Patch, Rfl: 2    metformin (GLUCOPHAGE) 1000 MG tablet, TAKE 1 TABLET BY MOUTH TWICE DAILY WITH FOOD  Indications: Type 2 Diabetes (Patient not taking: Reported on 7/16/2025), Disp: 200 Tablet, Rfl: 3    gabapentin (NEURONTIN) 400 MG Cap, Take 1 Capsule by mouth 4 times a day. 300mg 4x day  Indications: Diabetes with Nerve Disease (Patient not taking: Reported on 7/16/2025), Disp: 90 Capsule, Rfl: 2    dilTIAZem (CARDIZEM) 30 MG Tab, TAKE 1 TABLET BY MOUTH EVERY 8 HOURS FOR ATRIAL FIBRILLATION (Patient not taking: Reported on 6/23/2025), Disp: 300 Tablet, Rfl: 0  [5]   Current Outpatient Medications   Medication Sig Dispense Refill    NON SPECIFIED Outer sleeve for prosthetic leg, left side BKA 1 Each 0    NON SPECIFIED Diabetic shoes and inserts 1 Each 0    amiodarone (CORDARONE) 200 MG Tab Take 1 Tablet by mouth every day. 100 Tablet 0    apixaban (ELIQUIS) 5mg Tab Take 1 Tablet by mouth 2 times a day. Indications: Blood Clot in a Deep Vein, Afib 200 Tablet 0    gabapentin (NEURONTIN) 400 MG Cap Take 1 capsule by mouth  4 times a day 360 Capsule 1    hydroCHLOROthiazide 25 MG Tab Take 1 Tablet by mouth every day for 300 days. Indications: Edema 100 Tablet 2    sennosides (SENOKOT) 8.6 MG Tab Take 8.6 mg by mouth 1 time a day as needed (constipation). Indications: Constipation      Semaglutide,0.25 or 0.5MG/DOS, (OZEMPIC, 0.25 OR 0.5 MG/DOSE,) 2 MG/3ML Solution Pen-injector Inject 0.25 mg under the skin every 7 days. 3 mL 11    dilTIAZem  (CARDIZEM) 30 MG Tab Take 1 Tablet by mouth 3 times a day. Indications: Atrial Fibrillation 300 Tablet 2    atorvastatin (LIPITOR) 20 MG Tab Take 1 Tablet by mouth every day. Indications: High Amount of Fats in the Blood 100 Tablet 1    carvedilol (COREG) 6.25 MG Tab Take 1 Tablet by mouth 2 times a day with meals. 200 Tablet 2    potassium Chloride ER (K-TAB) 20 MEQ Tab CR tablet TAKE 1 TABLET BY MOUTH EVERY DAY FOR HYPOKALEMIA  Indications: Low Amount of Potassium in the Blood 60 Tablet 3    allopurinol (ZYLOPRIM) 100 MG Tab Take 1 Tablet by mouth every day. Indications: Gout 90 Tablet 2    pioglitazone (ACTOS) 30 MG Tab Take 1 Tablet by mouth every day. Indications: Type 2 Diabetes 100 Tablet 3    Alcohol Swabs (ALCOHOL PREP) Pads Use 1 each in the morning, at noon, and at bedtime. 200 Each 2    Blood Glucose Monitoring Suppl (ONETOUCH VERIO) w/Device Kit Use device in the morning, at noon, and at bedtime. 1 Kit 3    glucose blood strip Use to test in the morning, at noon and at bedtime 100 Strip 4    Lancets (FINGERSTICK) Misc Use to test in the morning, at noon and at bedtime 100 Each 4    glucose blood (ONETOUCH VERIO) strip 1 Each by Other route as needed. Test 3 x a week   Indications: BGM strips      Simethicone 250 MG Cap Take 1 Capsule by mouth 1 time a day as needed (gas). Chewable  Indications: gas pain      hydrocortisone (PREPARATION H) 1 % Cream Apply 1 Application  topically 1 time a day as needed (hemorroids). Indications: Itching, hemorroids      vitamin D (CHOLECALCIFEROL) 1000 UNIT Tab Take 1 Tablet by mouth every day. Indications: Nutritional supplement (Patient taking differently: Take 2,000 Units by mouth every day. Indications: Nutritional supplement) 60 Tablet 0    loratadine (CLARITIN) 10 MG Tab Take 10 mg by mouth every day. Indications: Eye Itching, Hayfever, Runny Nose, Watery Eyes      Multiple Vitamins-Minerals (MULTIVITAMIN GUMMIES ADULTS PO) Take 1 Dose by mouth every day at 6 PM.  Indications: supplement      Saccharomyces boulardii (DAILY PROBIOTIC SUPPLEMENT PO) Take 1 Dose by mouth every day. Indications: supplement      Nutritional Supplements (ARGINAID) Pack Take 1 Dose by mouth every day at 6 PM. Indications: supplement      Nutritional Supplements (JOSÉ) Powder Take 1 Dose by mouth every day at 6 PM. Indications: supplement      acetaminophen (TYLENOL) 500 MG Tab Take 500-1,000 mg by mouth every 6 hours as needed for Mild Pain or Moderate Pain. Indications: Pain      bacitracin 500 UNIT/GM ointment Apply to open leg wound at time of dressing change ( as needed)      Lidocaine (ZTLIDO) 1.8 % Patch Apply 1-2 Patches topically every day (May wear up to 12 hours). 60 Patch 2    metformin (GLUCOPHAGE) 1000 MG tablet TAKE 1 TABLET BY MOUTH TWICE DAILY WITH FOOD  Indications: Type 2 Diabetes (Patient not taking: Reported on 7/16/2025) 200 Tablet 3    gabapentin (NEURONTIN) 400 MG Cap Take 1 Capsule by mouth 4 times a day. 300mg 4x day  Indications: Diabetes with Nerve Disease (Patient not taking: Reported on 7/16/2025) 90 Capsule 2    dilTIAZem (CARDIZEM) 30 MG Tab TAKE 1 TABLET BY MOUTH EVERY 8 HOURS FOR ATRIAL FIBRILLATION (Patient not taking: Reported on 6/23/2025) 300 Tablet 0     No current facility-administered medications for this visit.

## 2025-07-17 ENCOUNTER — HOME CARE VISIT (OUTPATIENT)
Dept: HOME HEALTH SERVICES | Facility: HOME HEALTHCARE | Age: 70
End: 2025-07-17
Payer: MEDICARE

## 2025-07-17 VITALS
HEART RATE: 57 BPM | BODY MASS INDEX: 53.98 KG/M2 | TEMPERATURE: 96.7 F | OXYGEN SATURATION: 93 % | RESPIRATION RATE: 18 BRPM | DIASTOLIC BLOOD PRESSURE: 74 MMHG | WEIGHT: 315 LBS | SYSTOLIC BLOOD PRESSURE: 116 MMHG

## 2025-07-17 PROCEDURE — 665999 HH PPS REVENUE DEBIT

## 2025-07-17 PROCEDURE — 665998 HH PPS REVENUE CREDIT

## 2025-07-17 PROCEDURE — G0299 HHS/HOSPICE OF RN EA 15 MIN: HCPCS

## 2025-07-17 SDOH — HEALTH STABILITY: PHYSICAL HEALTH: EXERCISE TYPE: HOME EXERCISE PROGRAM

## 2025-07-17 ASSESSMENT — ENCOUNTER SYMPTOMS
STOOL FREQUENCY: DAILY
PAIN LOCATION: BACK
TINGLING: 1
PAIN LOCATION - PAIN FREQUENCY: CONSTANT
VOMITING: DENIES
PAIN LOCATION - PAIN DURATION: DAILY
PAIN LOCATION - RELIEVING FACTORS: ORAL MEDICATION
LAST BOWEL MOVEMENT: 67402
HIGHEST PAIN SEVERITY IN PAST 24 HOURS: 7/10
MUSCLE WEAKNESS: 1
SKIN LESIONS: 1
BOWEL PATTERN NORMAL: 1
LOWEST PAIN SEVERITY IN PAST 24 HOURS: 5/10
PAIN LOCATION - PAIN SEVERITY: 6/10
PAIN SEVERITY GOAL: 4/10
PAIN: 1
NAUSEA: DENIES
SUBJECTIVE PAIN PROGRESSION: UNCHANGED
DRY SKIN: 1
PAIN LOCATION - PAIN QUALITY: ACHE
ARTHRALGIAS: 1
FATIGUE: 1

## 2025-07-17 ASSESSMENT — PATIENT HEALTH QUESTIONNAIRE - PHQ9: CLINICAL INTERPRETATION OF PHQ2 SCORE: 0

## 2025-07-17 ASSESSMENT — FIBROSIS 4 INDEX: FIB4 SCORE: 1.15

## 2025-07-17 ASSESSMENT — ACTIVITIES OF DAILY LIVING (ADL)
AMBULATION ASSISTANCE: STAND BY ASSIST
CURRENT_FUNCTION: STAND BY ASSIST

## 2025-07-18 PROCEDURE — 665998 HH PPS REVENUE CREDIT

## 2025-07-18 PROCEDURE — 665999 HH PPS REVENUE DEBIT

## 2025-07-19 PROCEDURE — 665999 HH PPS REVENUE DEBIT

## 2025-07-19 PROCEDURE — 665998 HH PPS REVENUE CREDIT

## 2025-07-20 PROCEDURE — 665999 HH PPS REVENUE DEBIT

## 2025-07-20 PROCEDURE — 665998 HH PPS REVENUE CREDIT

## 2025-07-21 ENCOUNTER — HOME CARE VISIT (OUTPATIENT)
Dept: HOME HEALTH SERVICES | Facility: HOME HEALTHCARE | Age: 70
End: 2025-07-21
Payer: MEDICARE

## 2025-07-21 VITALS
RESPIRATION RATE: 18 BRPM | HEART RATE: 62 BPM | OXYGEN SATURATION: 94 % | TEMPERATURE: 97.3 F | SYSTOLIC BLOOD PRESSURE: 110 MMHG | DIASTOLIC BLOOD PRESSURE: 60 MMHG

## 2025-07-21 PROCEDURE — 665999 HH PPS REVENUE DEBIT

## 2025-07-21 PROCEDURE — G0299 HHS/HOSPICE OF RN EA 15 MIN: HCPCS

## 2025-07-21 PROCEDURE — 665998 HH PPS REVENUE CREDIT

## 2025-07-21 ASSESSMENT — ENCOUNTER SYMPTOMS
PAIN: 1
FATIGUES EASILY: 1
PAIN LOCATION - EXACERBATING FACTORS: MOVEMENT
LIMITED RANGE OF MOTION: 1
DRY SKIN: 1
HYPERTENSION: 1
PAIN LOCATION - PAIN FREQUENCY: FREQUENT
PAIN SEVERITY GOAL: 1/10
BOWEL PATTERN NORMAL: 1
LAST BOWEL MOVEMENT: 67407
ARTHRALGIAS: 1
PAIN LOCATION - PAIN QUALITY: ACHY
PAIN LOCATION: LOWER BACK
FATIGUE: 1
PAIN LOCATION - RELIEVING FACTORS: PAIN MED
HIGHEST PAIN SEVERITY IN PAST 24 HOURS: 5/10
STOOL FREQUENCY: LESS THAN DAILY
SUBJECTIVE PAIN PROGRESSION: WAXING AND WANING
LOWER EXTREMITY EDEMA: 1
PAIN LOCATION - PAIN SEVERITY: 4/10
MUSCLE WEAKNESS: 1
LOWEST PAIN SEVERITY IN PAST 24 HOURS: 4/10

## 2025-07-21 ASSESSMENT — PATIENT HEALTH QUESTIONNAIRE - PHQ9: CLINICAL INTERPRETATION OF PHQ2 SCORE: 0

## 2025-07-22 PROCEDURE — 665998 HH PPS REVENUE CREDIT

## 2025-07-22 PROCEDURE — 665999 HH PPS REVENUE DEBIT

## 2025-07-23 DIAGNOSIS — M10.9 GOUT, UNSPECIFIED CAUSE, UNSPECIFIED CHRONICITY, UNSPECIFIED SITE: ICD-10-CM

## 2025-07-23 PROCEDURE — 665999 HH PPS REVENUE DEBIT

## 2025-07-23 PROCEDURE — RXMED WILLOW AMBULATORY MEDICATION CHARGE: Performed by: FAMILY MEDICINE

## 2025-07-23 PROCEDURE — 665998 HH PPS REVENUE CREDIT

## 2025-07-23 RX ORDER — ALLOPURINOL 100 MG/1
100 TABLET ORAL DAILY
Qty: 100 TABLET | Refills: 2 | Status: SHIPPED | OUTPATIENT
Start: 2025-07-23

## 2025-07-24 ENCOUNTER — HOME CARE VISIT (OUTPATIENT)
Dept: HOME HEALTH SERVICES | Facility: HOME HEALTHCARE | Age: 70
End: 2025-07-24
Payer: MEDICARE

## 2025-07-24 ENCOUNTER — PHARMACY VISIT (OUTPATIENT)
Dept: PHARMACY | Facility: MEDICAL CENTER | Age: 70
End: 2025-07-24
Payer: COMMERCIAL

## 2025-07-24 VITALS
OXYGEN SATURATION: 95 % | TEMPERATURE: 97.4 F | RESPIRATION RATE: 18 BRPM | SYSTOLIC BLOOD PRESSURE: 118 MMHG | HEART RATE: 67 BPM | DIASTOLIC BLOOD PRESSURE: 66 MMHG

## 2025-07-24 PROCEDURE — G0299 HHS/HOSPICE OF RN EA 15 MIN: HCPCS

## 2025-07-24 PROCEDURE — 665999 HH PPS REVENUE DEBIT

## 2025-07-24 PROCEDURE — 665998 HH PPS REVENUE CREDIT

## 2025-07-24 PROCEDURE — RXMED WILLOW AMBULATORY MEDICATION CHARGE: Performed by: FAMILY MEDICINE

## 2025-07-24 ASSESSMENT — ENCOUNTER SYMPTOMS
SUBJECTIVE PAIN PROGRESSION: UNCHANGED
ARTHRALGIAS: 1
PAIN LOCATION - PAIN SEVERITY: 4/10
LIMITED RANGE OF MOTION: 1
LAST BOWEL MOVEMENT: 67410
PAIN LOCATION - PAIN FREQUENCY: CONSTANT
HIGHEST PAIN SEVERITY IN PAST 24 HOURS: 5/10
FATIGUES EASILY: 1
MUSCLE WEAKNESS: 1
DRY SKIN: 1
STOOL FREQUENCY: DAILY
PAIN: 1
PAIN LOCATION - PAIN DURATION: DAILY
NAUSEA: DENIES
VOMITING: DENIES
LOSS OF SENSATION: 1
PAIN LOCATION - PAIN QUALITY: ACHE
BOWEL PATTERN NORMAL: 1
LOWEST PAIN SEVERITY IN PAST 24 HOURS: 3/10
HYPERTENSION: 1
PAIN LOCATION - RELIEVING FACTORS: ORAL MEDICATION
PAIN LOCATION: BACK
PAIN LOCATION - EXACERBATING FACTORS: WALKING
PAIN SEVERITY GOAL: 2/10

## 2025-07-24 ASSESSMENT — ACTIVITIES OF DAILY LIVING (ADL)
CURRENT_FUNCTION: STAND BY ASSIST
AMBULATION ASSISTANCE: STAND BY ASSIST

## 2025-07-25 PROCEDURE — 665998 HH PPS REVENUE CREDIT

## 2025-07-25 PROCEDURE — 665999 HH PPS REVENUE DEBIT

## 2025-07-26 PROCEDURE — 665999 HH PPS REVENUE DEBIT

## 2025-07-26 PROCEDURE — 665998 HH PPS REVENUE CREDIT

## 2025-07-27 PROCEDURE — 665998 HH PPS REVENUE CREDIT

## 2025-07-27 PROCEDURE — 665999 HH PPS REVENUE DEBIT

## 2025-07-28 ENCOUNTER — HOME CARE VISIT (OUTPATIENT)
Dept: HOME HEALTH SERVICES | Facility: HOME HEALTHCARE | Age: 70
End: 2025-07-28
Payer: MEDICARE

## 2025-07-28 VITALS
OXYGEN SATURATION: 94 % | HEART RATE: 58 BPM | DIASTOLIC BLOOD PRESSURE: 68 MMHG | RESPIRATION RATE: 18 BRPM | SYSTOLIC BLOOD PRESSURE: 122 MMHG

## 2025-07-28 PROCEDURE — 665998 HH PPS REVENUE CREDIT

## 2025-07-28 PROCEDURE — G0299 HHS/HOSPICE OF RN EA 15 MIN: HCPCS

## 2025-07-28 PROCEDURE — 665999 HH PPS REVENUE DEBIT

## 2025-07-28 ASSESSMENT — ENCOUNTER SYMPTOMS
LIMITED RANGE OF MOTION: 1
MUSCLE WEAKNESS: 1
PAIN LOCATION: R LOWER BACK
SUBJECTIVE PAIN PROGRESSION: UNCHANGED
HIGHEST PAIN SEVERITY IN PAST 24 HOURS: 7/10
PAIN LOCATION - RELIEVING FACTORS: ORAL MEDICATION
NAUSEA: DENIES
DEBILITATING PAIN: 1
VOMITING: DENIES
LOWEST PAIN SEVERITY IN PAST 24 HOURS: 4/10
PAIN LOCATION - EXACERBATING FACTORS: WALKING
PAIN LOCATION - PAIN SEVERITY: 5/10
PAIN LOCATION - PAIN DURATION: DAILY
ARTHRALGIAS: 1
PAIN SEVERITY GOAL: 3/10
PAIN: 1
PAIN LOCATION - PAIN FREQUENCY: CONSTANT

## 2025-07-28 ASSESSMENT — ACTIVITIES OF DAILY LIVING (ADL)
CURRENT_FUNCTION: STAND BY ASSIST
AMBULATION ASSISTANCE: STAND BY ASSIST

## 2025-07-29 ENCOUNTER — PHARMACY VISIT (OUTPATIENT)
Dept: PHARMACY | Facility: MEDICAL CENTER | Age: 70
End: 2025-07-29
Payer: COMMERCIAL

## 2025-07-29 PROCEDURE — 665998 HH PPS REVENUE CREDIT

## 2025-07-29 PROCEDURE — RXMED WILLOW AMBULATORY MEDICATION CHARGE: Performed by: PHYSICIAN ASSISTANT

## 2025-07-29 PROCEDURE — 665999 HH PPS REVENUE DEBIT

## 2025-07-30 PROCEDURE — 665999 HH PPS REVENUE DEBIT

## 2025-07-30 PROCEDURE — 665998 HH PPS REVENUE CREDIT

## 2025-07-31 ENCOUNTER — HOME CARE VISIT (OUTPATIENT)
Dept: HOME HEALTH SERVICES | Facility: HOME HEALTHCARE | Age: 70
End: 2025-07-31
Payer: MEDICARE

## 2025-07-31 VITALS
HEART RATE: 61 BPM | DIASTOLIC BLOOD PRESSURE: 72 MMHG | OXYGEN SATURATION: 93 % | TEMPERATURE: 96.9 F | SYSTOLIC BLOOD PRESSURE: 110 MMHG | RESPIRATION RATE: 18 BRPM

## 2025-07-31 PROCEDURE — G0299 HHS/HOSPICE OF RN EA 15 MIN: HCPCS

## 2025-07-31 ASSESSMENT — ACTIVITIES OF DAILY LIVING (ADL)
AMBULATION ASSISTANCE: STAND BY ASSIST
CURRENT_FUNCTION: STAND BY ASSIST

## 2025-07-31 ASSESSMENT — ENCOUNTER SYMPTOMS
STOOL FREQUENCY: DAILY
DRY SKIN: 1
FATIGUES EASILY: 1
BOWEL PATTERN NORMAL: 1
ARTHRALGIAS: 1
PAIN LOCATION: R LOWER BACK
HIGHEST PAIN SEVERITY IN PAST 24 HOURS: 6/10
NAUSEA: DENIES
SUBJECTIVE PAIN PROGRESSION: UNCHANGED
MUSCLE WEAKNESS: 1
DEBILITATING PAIN: 1
VOMITING: DENIES
SKIN LESIONS: 1
LOWEST PAIN SEVERITY IN PAST 24 HOURS: 3/10
PAIN: 1
PAIN SEVERITY GOAL: 2/10
LIMITED RANGE OF MOTION: 1
LAST BOWEL MOVEMENT: 67417

## 2025-08-04 ENCOUNTER — HOME CARE VISIT (OUTPATIENT)
Dept: HOME HEALTH SERVICES | Facility: HOME HEALTHCARE | Age: 70
End: 2025-08-04
Payer: MEDICARE

## 2025-08-04 VITALS
RESPIRATION RATE: 16 BRPM | SYSTOLIC BLOOD PRESSURE: 128 MMHG | TEMPERATURE: 96.8 F | OXYGEN SATURATION: 93 % | HEART RATE: 93 BPM | DIASTOLIC BLOOD PRESSURE: 62 MMHG

## 2025-08-04 PROCEDURE — G0299 HHS/HOSPICE OF RN EA 15 MIN: HCPCS

## 2025-08-04 ASSESSMENT — ENCOUNTER SYMPTOMS
HIGHEST PAIN SEVERITY IN PAST 24 HOURS: 6/10
FATIGUE: 1
PAIN: 1
LOWEST PAIN SEVERITY IN PAST 24 HOURS: 2/10
PAIN LOCATION - PAIN FREQUENCY: CONSTANT
PAIN LOCATION: R HIP/BUTTOCK
PAIN LOCATION - PAIN SEVERITY: 4/10
PAIN LOCATION - PAIN QUALITY: ACHE
VOMITING: DENIES
DEBILITATING PAIN: 1
PAIN LOCATION - PAIN DURATION: 1 WEEK
PAIN SEVERITY GOAL: 0/10
BOWEL PATTERN NORMAL: 1
NAUSEA: DENIES
SUBJECTIVE PAIN PROGRESSION: GRADUALLY IMPROVING
STOOL FREQUENCY: DAILY
FATIGUES EASILY: 1
LAST BOWEL MOVEMENT: 67421

## 2025-08-04 ASSESSMENT — ACTIVITIES OF DAILY LIVING (ADL)
AMBULATION ASSISTANCE: STAND BY ASSIST
CURRENT_FUNCTION: STAND BY ASSIST

## 2025-08-07 ENCOUNTER — HOME CARE VISIT (OUTPATIENT)
Dept: HOME HEALTH SERVICES | Facility: HOME HEALTHCARE | Age: 70
End: 2025-08-07
Payer: MEDICARE

## 2025-08-07 VITALS
DIASTOLIC BLOOD PRESSURE: 50 MMHG | WEIGHT: 315 LBS | OXYGEN SATURATION: 92 % | TEMPERATURE: 97.1 F | RESPIRATION RATE: 16 BRPM | HEART RATE: 63 BPM | BODY MASS INDEX: 54.11 KG/M2 | SYSTOLIC BLOOD PRESSURE: 122 MMHG

## 2025-08-07 PROCEDURE — G0299 HHS/HOSPICE OF RN EA 15 MIN: HCPCS

## 2025-08-07 ASSESSMENT — ENCOUNTER SYMPTOMS
PAIN SEVERITY GOAL: 0/10
ARTHRALGIAS: 1
MUSCLE WEAKNESS: 1
PAIN LOCATION - PAIN SEVERITY: 3/10
PAIN: 1
LIMITED RANGE OF MOTION: 1
PAIN LOCATION - PAIN QUALITY: ACHE
DEBILITATING PAIN: 1
SUBJECTIVE PAIN PROGRESSION: GRADUALLY IMPROVING
PAIN LOCATION - PAIN DURATION: DAILY
HIGHEST PAIN SEVERITY IN PAST 24 HOURS: 5/10
BOWEL PATTERN NORMAL: 1
NAUSEA: DENIES
STOOL FREQUENCY: DAILY
PAIN LOCATION - RELIEVING FACTORS: ORAL MEDICATION
LAST BOWEL MOVEMENT: 67423
PAIN LOCATION: R LOWER BACK
PAIN LOCATION - PAIN FREQUENCY: CONSTANT
FATIGUES EASILY: 1
VOMITING: DENIES
LOWEST PAIN SEVERITY IN PAST 24 HOURS: 2/10
FATIGUE: 1

## 2025-08-07 ASSESSMENT — ACTIVITIES OF DAILY LIVING (ADL)
AMBULATION ASSISTANCE: STAND BY ASSIST
CURRENT_FUNCTION: STAND BY ASSIST

## 2025-08-07 ASSESSMENT — FIBROSIS 4 INDEX: FIB4 SCORE: 1.15

## 2025-08-11 ENCOUNTER — HOME CARE VISIT (OUTPATIENT)
Dept: HOME HEALTH SERVICES | Facility: HOME HEALTHCARE | Age: 70
End: 2025-08-11
Payer: MEDICARE

## 2025-08-11 VITALS
RESPIRATION RATE: 18 BRPM | OXYGEN SATURATION: 94 % | DIASTOLIC BLOOD PRESSURE: 62 MMHG | HEART RATE: 60 BPM | TEMPERATURE: 97 F | SYSTOLIC BLOOD PRESSURE: 122 MMHG

## 2025-08-11 PROCEDURE — G0299 HHS/HOSPICE OF RN EA 15 MIN: HCPCS

## 2025-08-11 ASSESSMENT — ENCOUNTER SYMPTOMS
LAST BOWEL MOVEMENT: 67428
PAIN LOCATION - PAIN FREQUENCY: CONSTANT
SUBJECTIVE PAIN PROGRESSION: GRADUALLY IMPROVING
VOMITING: DENIES
LIMITED RANGE OF MOTION: 1
PAIN LOCATION - RELIEVING FACTORS: ORAL MEDICATION
PAIN LOCATION: R LOWER BACK
BOWEL PATTERN NORMAL: 1
HIGHEST PAIN SEVERITY IN PAST 24 HOURS: 5/10
DEBILITATING PAIN: 1
PAIN LOCATION - PAIN DURATION: DAILY
ARTHRALGIAS: 1
PAIN LOCATION - PAIN QUALITY: ACHE
PAIN LOCATION - PAIN QUALITY: ACHE
PAIN LOCATION - EXACERBATING FACTORS: AMBULATING
PAIN LOCATION - PAIN DURATION: DAILY
FATIGUES EASILY: 1
NAUSEA: DENIES
FATIGUE: 1
PAIN LOCATION - PAIN SEVERITY: 1/10
STOOL FREQUENCY: DAILY
PAIN LOCATION - EXACERBATING FACTORS: AMBULATING
LOWEST PAIN SEVERITY IN PAST 24 HOURS: 2/10
PAIN LOCATION - PAIN FREQUENCY: INTERMITTENT
PAIN: 1
MUSCLE WEAKNESS: 1
PAIN SEVERITY GOAL: 0/10
PAIN LOCATION - PAIN SEVERITY: 2/10
PAIN LOCATION: L STUMP

## 2025-08-11 ASSESSMENT — ACTIVITIES OF DAILY LIVING (ADL)
CURRENT_FUNCTION: STAND BY ASSIST
AMBULATION ASSISTANCE: STAND BY ASSIST

## 2025-08-13 PROCEDURE — RXMED WILLOW AMBULATORY MEDICATION CHARGE: Performed by: FAMILY MEDICINE

## 2025-08-14 ENCOUNTER — HOME CARE VISIT (OUTPATIENT)
Dept: HOME HEALTH SERVICES | Facility: HOME HEALTHCARE | Age: 70
End: 2025-08-14
Payer: MEDICARE

## 2025-08-14 ENCOUNTER — PHARMACY VISIT (OUTPATIENT)
Dept: PHARMACY | Facility: MEDICAL CENTER | Age: 70
End: 2025-08-14
Payer: COMMERCIAL

## 2025-08-14 PROCEDURE — G0299 HHS/HOSPICE OF RN EA 15 MIN: HCPCS

## 2025-08-15 VITALS
RESPIRATION RATE: 18 BRPM | HEART RATE: 60 BPM | OXYGEN SATURATION: 91 % | DIASTOLIC BLOOD PRESSURE: 60 MMHG | SYSTOLIC BLOOD PRESSURE: 122 MMHG | TEMPERATURE: 97 F

## 2025-08-15 ASSESSMENT — ENCOUNTER SYMPTOMS
LOWEST PAIN SEVERITY IN PAST 24 HOURS: 3/10
DEBILITATING PAIN: 1
ARTHRALGIAS: 1
FATIGUES EASILY: 1
PAIN: 1
PAIN LOCATION - PAIN QUALITY: ACHE
PAIN LOCATION: R FLANK
PAIN LOCATION - PAIN SEVERITY: 3/10
HYPERTENSION: 1
PAIN LOCATION - EXACERBATING FACTORS: WALKING
PAIN SEVERITY GOAL: 2/10
PAIN LOCATION - PAIN FREQUENCY: INTERMITTENT
HIGHEST PAIN SEVERITY IN PAST 24 HOURS: 6/10
STOOL FREQUENCY: DAILY
BOWEL PATTERN NORMAL: 1
MUSCLE WEAKNESS: 1
LIMITED RANGE OF MOTION: 1
NAUSEA: DENIES
VOMITING: DENIES
PAIN LOCATION - PAIN DURATION: DAILY
SKIN LESIONS: 1
LAST BOWEL MOVEMENT: 67431
SUBJECTIVE PAIN PROGRESSION: GRADUALLY IMPROVING

## 2025-08-15 ASSESSMENT — ACTIVITIES OF DAILY LIVING (ADL)
AMBULATION ASSISTANCE: STAND BY ASSIST
CURRENT_FUNCTION: STAND BY ASSIST

## 2025-08-18 ENCOUNTER — HOME CARE VISIT (OUTPATIENT)
Dept: HOME HEALTH SERVICES | Facility: HOME HEALTHCARE | Age: 70
End: 2025-08-18
Payer: MEDICARE

## 2025-08-18 VITALS
RESPIRATION RATE: 18 BRPM | SYSTOLIC BLOOD PRESSURE: 104 MMHG | OXYGEN SATURATION: 93 % | DIASTOLIC BLOOD PRESSURE: 68 MMHG | HEART RATE: 87 BPM | TEMPERATURE: 98 F

## 2025-08-18 PROCEDURE — G0299 HHS/HOSPICE OF RN EA 15 MIN: HCPCS

## 2025-08-18 ASSESSMENT — ENCOUNTER SYMPTOMS
PAIN SEVERITY GOAL: 1/10
ARTHRALGIAS: 1
SKIN LESIONS: 1
HYPERTENSION: 1
DRY SKIN: 1
BOWEL PATTERN NORMAL: 1
SUBJECTIVE PAIN PROGRESSION: GRADUALLY IMPROVING
MUSCLE WEAKNESS: 1
LOWEST PAIN SEVERITY IN PAST 24 HOURS: 2/10
FATIGUES EASILY: 1
VOMITING: DENIES
PAIN LOCATION - PAIN SEVERITY: 3/10
LAST BOWEL MOVEMENT: 67435
PAIN LOCATION - PAIN QUALITY: ACHE
PAIN LOCATION - PAIN DURATION: DAILY
PAIN LOCATION - RELIEVING FACTORS: ORAL MEDICATION
STOOL FREQUENCY: DAILY
LIMITED RANGE OF MOTION: 1
PAIN LOCATION - PAIN FREQUENCY: CONSTANT
PAIN LOCATION - EXACERBATING FACTORS: WALKING
NAUSEA: DENIES
HIGHEST PAIN SEVERITY IN PAST 24 HOURS: 5/10
PAIN: 1

## 2025-08-18 ASSESSMENT — ACTIVITIES OF DAILY LIVING (ADL)
CURRENT_FUNCTION: STAND BY ASSIST
AMBULATION ASSISTANCE: STAND BY ASSIST

## 2025-08-21 ENCOUNTER — HOME CARE VISIT (OUTPATIENT)
Dept: HOME HEALTH SERVICES | Facility: HOME HEALTHCARE | Age: 70
End: 2025-08-21
Payer: MEDICARE

## 2025-08-21 PROCEDURE — G0299 HHS/HOSPICE OF RN EA 15 MIN: HCPCS

## 2025-08-22 VITALS
HEART RATE: 64 BPM | TEMPERATURE: 97.4 F | RESPIRATION RATE: 18 BRPM | OXYGEN SATURATION: 92 % | DIASTOLIC BLOOD PRESSURE: 76 MMHG | SYSTOLIC BLOOD PRESSURE: 108 MMHG

## 2025-08-22 ASSESSMENT — ENCOUNTER SYMPTOMS
LAST BOWEL MOVEMENT: 67437
PAIN LOCATION - PAIN QUALITY: ACHE/BURN
PAIN LOCATION: R BUTTOCK/HIP
FATIGUES EASILY: 1
LOWEST PAIN SEVERITY IN PAST 24 HOURS: 2/10
PAIN LOCATION - EXACERBATING FACTORS: WALKING
PAIN LOCATION - PAIN DURATION: DAILY
BOWEL PATTERN NORMAL: 1
VOMITING: DENIES
PAIN LOCATION - PAIN SEVERITY: 4/10
STOOL FREQUENCY: DAILY
PAIN LOCATION - PAIN QUALITY: ACHE
PAIN LOCATION - PAIN FREQUENCY: INTERMITTENT
PAIN LOCATION - PAIN FREQUENCY: INTERMITTENT
PAIN LOCATION - PAIN SEVERITY: 2/10
PAIN: 1
SUBJECTIVE PAIN PROGRESSION: UNCHANGED
NAUSEA: DENIES
ARTHRALGIAS: 1
DEBILITATING PAIN: 1
SKIN LESIONS: 1
LIMITED RANGE OF MOTION: 1
PAIN SEVERITY GOAL: 0/10
FATIGUE: 1
PAIN LOCATION - PAIN DURATION: DAILY
PAIN LOCATION - RELIEVING FACTORS: ORAL MEDICATION
MUSCLE WEAKNESS: 1
HIGHEST PAIN SEVERITY IN PAST 24 HOURS: 5/10

## 2025-08-22 ASSESSMENT — ACTIVITIES OF DAILY LIVING (ADL)
CURRENT_FUNCTION: STAND BY ASSIST
AMBULATION ASSISTANCE: STAND BY ASSIST

## 2025-08-25 ENCOUNTER — HOME CARE VISIT (OUTPATIENT)
Dept: HOME HEALTH SERVICES | Facility: HOME HEALTHCARE | Age: 70
End: 2025-08-25
Payer: MEDICARE

## 2025-08-25 PROCEDURE — G0299 HHS/HOSPICE OF RN EA 15 MIN: HCPCS

## 2025-08-25 ASSESSMENT — FIBROSIS 4 INDEX: FIB4 SCORE: 1.15

## 2025-08-26 VITALS
HEART RATE: 68 BPM | OXYGEN SATURATION: 91 % | WEIGHT: 315 LBS | RESPIRATION RATE: 18 BRPM | SYSTOLIC BLOOD PRESSURE: 112 MMHG | DIASTOLIC BLOOD PRESSURE: 78 MMHG | TEMPERATURE: 97.9 F | BODY MASS INDEX: 53.57 KG/M2

## 2025-08-26 ASSESSMENT — ENCOUNTER SYMPTOMS
VOMITING: DENIES
NAUSEA: DENIES
PAIN LOCATION - PAIN FREQUENCY: INTERMITTENT
LAST BOWEL MOVEMENT: 67441
BOWEL PATTERN NORMAL: 1
PAIN SEVERITY GOAL: 0/10
PAIN LOCATION - RELIEVING FACTORS: REST
DRY SKIN: 1
FATIGUES EASILY: 1
PAIN LOCATION - PAIN DURATION: DAILY
PAIN LOCATION - EXACERBATING FACTORS: WALKING
PAIN LOCATION - PAIN QUALITY: ACHE
PAIN: 1
HIGHEST PAIN SEVERITY IN PAST 24 HOURS: 5/10
MUSCLE WEAKNESS: 1
FATIGUE: 1
STOOL FREQUENCY: DAILY
LIMITED RANGE OF MOTION: 1
LOWEST PAIN SEVERITY IN PAST 24 HOURS: 2/10
ARTHRALGIAS: 1
SUBJECTIVE PAIN PROGRESSION: GRADUALLY IMPROVING
PAIN LOCATION - PAIN SEVERITY: 3/10
DEBILITATING PAIN: 1

## 2025-08-26 ASSESSMENT — ACTIVITIES OF DAILY LIVING (ADL)
CURRENT_FUNCTION: STAND BY ASSIST
AMBULATION ASSISTANCE: STAND BY ASSIST

## 2025-08-27 ENCOUNTER — HOME CARE VISIT (OUTPATIENT)
Dept: HOME HEALTH SERVICES | Facility: HOME HEALTHCARE | Age: 70
End: 2025-08-27
Payer: MEDICARE

## 2025-08-28 ENCOUNTER — HOME CARE VISIT (OUTPATIENT)
Dept: HOME HEALTH SERVICES | Facility: HOME HEALTHCARE | Age: 70
End: 2025-08-28
Payer: MEDICARE

## 2025-08-28 VITALS
HEART RATE: 63 BPM | RESPIRATION RATE: 16 BRPM | DIASTOLIC BLOOD PRESSURE: 68 MMHG | OXYGEN SATURATION: 96 % | SYSTOLIC BLOOD PRESSURE: 103 MMHG | TEMPERATURE: 97.8 F

## 2025-08-28 DIAGNOSIS — E11.51 TYPE 2 DIABETES MELLITUS WITH DIABETIC PERIPHERAL ANGIOPATHY WITHOUT GANGRENE, WITHOUT LONG-TERM CURRENT USE OF INSULIN (HCC): Primary | ICD-10-CM

## 2025-08-28 DIAGNOSIS — I10 BENIGN ESSENTIAL HTN: ICD-10-CM

## 2025-08-28 PROCEDURE — G0493 RN CARE EA 15 MIN HH/HOSPICE: HCPCS

## 2025-08-28 SDOH — ECONOMIC STABILITY: FOOD INSECURITY: MEALS PER DAY: 3

## 2025-08-28 SDOH — ECONOMIC STABILITY: HOUSING INSECURITY

## 2025-08-28 ASSESSMENT — ENCOUNTER SYMPTOMS
PAIN LOCATION - PAIN FREQUENCY: INTERMITTENT
PAIN SEVERITY GOAL: 0/10
PAIN LOCATION - PAIN QUALITY: ACHE
PAIN LOCATION: R BUTTOCK/HIP
STOOL FREQUENCY: DAILY
NAUSEA: DENIES
PAIN LOCATION - PAIN DURATION: DAILY
CHANGE IN APPETITE: UNCHANGED
FATIGUES EASILY: 1
FATIGUE: 1
HIGHEST PAIN SEVERITY IN PAST 24 HOURS: 4/10
LAST BOWEL MOVEMENT: 67444
PAIN LOCATION - EXACERBATING FACTORS: WALKING
APPETITE LEVEL: GOOD
PAIN LOCATION - PAIN SEVERITY: 3/10
DEPRESSED MOOD: 1
LOWEST PAIN SEVERITY IN PAST 24 HOURS: 1/10
BOWEL PATTERN NORMAL: 1
VOMITING: DENIES
SUBJECTIVE PAIN PROGRESSION: GRADUALLY IMPROVING
PAIN: 1

## 2025-08-28 ASSESSMENT — ACTIVITIES OF DAILY LIVING (ADL)
OASIS_M1830: 01
HOME_HEALTH_OASIS: 04